# Patient Record
Sex: FEMALE | Race: BLACK OR AFRICAN AMERICAN | Employment: OTHER | URBAN - METROPOLITAN AREA
[De-identification: names, ages, dates, MRNs, and addresses within clinical notes are randomized per-mention and may not be internally consistent; named-entity substitution may affect disease eponyms.]

---

## 2018-05-07 ENCOUNTER — HOSPITAL ENCOUNTER (OUTPATIENT)
Dept: MAMMOGRAPHY | Age: 78
Discharge: HOME | End: 2018-05-07
Attending: FAMILY MEDICINE | Admitting: FAMILY MEDICINE

## 2018-05-07 DIAGNOSIS — Z12.31 VISIT FOR SCREENING MAMMOGRAM: ICD-10-CM

## 2019-05-08 ENCOUNTER — HOSPITAL ENCOUNTER (OUTPATIENT)
Dept: MAMMOGRAPHY | Age: 79
Discharge: HOME | End: 2019-05-08
Payer: MEDICARE

## 2019-05-08 DIAGNOSIS — Z12.31 VISIT FOR SCREENING MAMMOGRAM: ICD-10-CM

## 2019-05-08 PROCEDURE — 77067 SCR MAMMO BI INCL CAD: CPT

## 2022-02-20 ENCOUNTER — APPOINTMENT (OUTPATIENT)
Dept: GENERAL RADIOLOGY | Age: 82
DRG: 233 | End: 2022-02-20
Payer: MEDICARE

## 2022-02-20 ENCOUNTER — HOSPITAL ENCOUNTER (INPATIENT)
Age: 82
LOS: 18 days | Discharge: NURSING, REHAB OR LONG-TERM CARE | DRG: 233 | End: 2022-03-10
Attending: INTERNAL MEDICINE | Admitting: THORACIC SURGERY (CARDIOTHORACIC VASCULAR SURGERY)
Payer: MEDICARE

## 2022-02-20 DIAGNOSIS — R07.9 CHEST PAIN, UNSPECIFIED TYPE: Primary | ICD-10-CM

## 2022-02-20 LAB
ANION GAP SERPL CALCULATED.3IONS-SCNC: 15 MMOL/L (ref 3–16)
BASOPHILS ABSOLUTE: 0 K/UL (ref 0–0.2)
BASOPHILS RELATIVE PERCENT: 1 %
BUN BLDV-MCNC: 25 MG/DL (ref 7–20)
CALCIUM SERPL-MCNC: 9 MG/DL (ref 8.3–10.6)
CHLORIDE BLD-SCNC: 102 MMOL/L (ref 99–110)
CO2: 22 MMOL/L (ref 21–32)
CREAT SERPL-MCNC: 1.2 MG/DL (ref 0.6–1.2)
EOSINOPHILS ABSOLUTE: 0.1 K/UL (ref 0–0.6)
EOSINOPHILS RELATIVE PERCENT: 1.4 %
GFR AFRICAN AMERICAN: 52
GFR NON-AFRICAN AMERICAN: 43
GLUCOSE BLD-MCNC: 94 MG/DL (ref 70–99)
HCT VFR BLD CALC: 40.7 % (ref 36–48)
HEMOGLOBIN: 13.4 G/DL (ref 12–16)
LYMPHOCYTES ABSOLUTE: 1.5 K/UL (ref 1–5.1)
LYMPHOCYTES RELATIVE PERCENT: 30.7 %
MCH RBC QN AUTO: 28.2 PG (ref 26–34)
MCHC RBC AUTO-ENTMCNC: 32.9 G/DL (ref 31–36)
MCV RBC AUTO: 85.7 FL (ref 80–100)
MONOCYTES ABSOLUTE: 0.5 K/UL (ref 0–1.3)
MONOCYTES RELATIVE PERCENT: 10 %
NEUTROPHILS ABSOLUTE: 2.8 K/UL (ref 1.7–7.7)
NEUTROPHILS RELATIVE PERCENT: 56.9 %
PDW BLD-RTO: 14.5 % (ref 12.4–15.4)
PLATELET # BLD: 166 K/UL (ref 135–450)
PMV BLD AUTO: 9.2 FL (ref 5–10.5)
POTASSIUM REFLEX MAGNESIUM: 4 MMOL/L (ref 3.5–5.1)
RBC # BLD: 4.75 M/UL (ref 4–5.2)
SODIUM BLD-SCNC: 139 MMOL/L (ref 136–145)
TROPONIN: <0.01 NG/ML
WBC # BLD: 4.9 K/UL (ref 4–11)

## 2022-02-20 PROCEDURE — 1200000000 HC SEMI PRIVATE

## 2022-02-20 PROCEDURE — 84484 ASSAY OF TROPONIN QUANT: CPT

## 2022-02-20 PROCEDURE — 71045 X-RAY EXAM CHEST 1 VIEW: CPT

## 2022-02-20 PROCEDURE — 36415 COLL VENOUS BLD VENIPUNCTURE: CPT

## 2022-02-20 PROCEDURE — 99285 EMERGENCY DEPT VISIT HI MDM: CPT

## 2022-02-20 PROCEDURE — 6370000000 HC RX 637 (ALT 250 FOR IP): Performed by: INTERNAL MEDICINE

## 2022-02-20 PROCEDURE — 93005 ELECTROCARDIOGRAM TRACING: CPT | Performed by: PHYSICIAN ASSISTANT

## 2022-02-20 PROCEDURE — 85025 COMPLETE CBC W/AUTO DIFF WBC: CPT

## 2022-02-20 PROCEDURE — 80048 BASIC METABOLIC PNL TOTAL CA: CPT

## 2022-02-20 PROCEDURE — 6360000002 HC RX W HCPCS

## 2022-02-20 PROCEDURE — 6370000000 HC RX 637 (ALT 250 FOR IP): Performed by: PHYSICIAN ASSISTANT

## 2022-02-20 PROCEDURE — 2500000003 HC RX 250 WO HCPCS

## 2022-02-20 PROCEDURE — 2580000003 HC RX 258: Performed by: INTERNAL MEDICINE

## 2022-02-20 RX ORDER — LATANOPROST 50 UG/ML
1 SOLUTION/ DROPS OPHTHALMIC DAILY
Status: DISCONTINUED | OUTPATIENT
Start: 2022-02-21 | End: 2022-02-20

## 2022-02-20 RX ORDER — METHIMAZOLE 5 MG/1
5 TABLET ORAL DAILY
COMMUNITY
Start: 2021-11-05 | End: 2022-06-25

## 2022-02-20 RX ORDER — GABAPENTIN 300 MG/1
300 CAPSULE ORAL 4 TIMES DAILY
COMMUNITY
Start: 2021-11-05

## 2022-02-20 RX ORDER — SODIUM CHLORIDE 0.9 % (FLUSH) 0.9 %
5-40 SYRINGE (ML) INJECTION EVERY 12 HOURS SCHEDULED
Status: DISCONTINUED | OUTPATIENT
Start: 2022-02-20 | End: 2022-02-21 | Stop reason: SDUPTHER

## 2022-02-20 RX ORDER — SODIUM CHLORIDE 9 MG/ML
25 INJECTION, SOLUTION INTRAVENOUS PRN
Status: DISCONTINUED | OUTPATIENT
Start: 2022-02-20 | End: 2022-02-21 | Stop reason: SDUPTHER

## 2022-02-20 RX ORDER — ONDANSETRON 4 MG/1
4 TABLET, ORALLY DISINTEGRATING ORAL EVERY 8 HOURS PRN
Status: DISCONTINUED | OUTPATIENT
Start: 2022-02-20 | End: 2022-02-25

## 2022-02-20 RX ORDER — NITROGLYCERIN 0.4 MG/1
0.4 TABLET SUBLINGUAL EVERY 5 MIN PRN
Status: DISCONTINUED | OUTPATIENT
Start: 2022-02-20 | End: 2022-02-25

## 2022-02-20 RX ORDER — AMLODIPINE AND OLMESARTAN MEDOXOMIL 5; 40 MG/1; MG/1
1 TABLET ORAL DAILY
Status: DISCONTINUED | OUTPATIENT
Start: 2022-02-21 | End: 2022-02-20

## 2022-02-20 RX ORDER — LINACLOTIDE 72 UG/1
1-2 CAPSULE, GELATIN COATED ORAL DAILY
Status: ON HOLD | COMMUNITY
Start: 2021-11-05 | End: 2022-06-26

## 2022-02-20 RX ORDER — POLYETHYLENE GLYCOL 3350 17 G/17G
17 POWDER, FOR SOLUTION ORAL DAILY PRN
Status: DISCONTINUED | OUTPATIENT
Start: 2022-02-20 | End: 2022-02-25

## 2022-02-20 RX ORDER — ACETAMINOPHEN 325 MG/1
650 TABLET ORAL EVERY 6 HOURS PRN
Status: DISCONTINUED | OUTPATIENT
Start: 2022-02-20 | End: 2022-02-25

## 2022-02-20 RX ORDER — OXYCODONE HYDROCHLORIDE AND ACETAMINOPHEN 5; 325 MG/1; MG/1
1 TABLET ORAL EVERY 4 HOURS PRN
Status: DISCONTINUED | OUTPATIENT
Start: 2022-02-20 | End: 2022-02-25

## 2022-02-20 RX ORDER — CLONIDINE HYDROCHLORIDE 0.1 MG/1
0.1 TABLET ORAL ONCE
Status: COMPLETED | OUTPATIENT
Start: 2022-02-20 | End: 2022-02-20

## 2022-02-20 RX ORDER — AMLODIPINE BESYLATE 5 MG/1
5 TABLET ORAL DAILY
Status: ON HOLD | COMMUNITY
Start: 2021-11-05 | End: 2022-03-09 | Stop reason: HOSPADM

## 2022-02-20 RX ORDER — SODIUM CHLORIDE 0.9 % (FLUSH) 0.9 %
5-40 SYRINGE (ML) INJECTION PRN
Status: DISCONTINUED | OUTPATIENT
Start: 2022-02-20 | End: 2022-02-22 | Stop reason: SDUPTHER

## 2022-02-20 RX ORDER — ACETAMINOPHEN 650 MG/1
650 SUPPOSITORY RECTAL EVERY 6 HOURS PRN
Status: DISCONTINUED | OUTPATIENT
Start: 2022-02-20 | End: 2022-02-25

## 2022-02-20 RX ORDER — ONDANSETRON 2 MG/ML
4 INJECTION INTRAMUSCULAR; INTRAVENOUS EVERY 6 HOURS PRN
Status: DISCONTINUED | OUTPATIENT
Start: 2022-02-20 | End: 2022-02-25

## 2022-02-20 RX ORDER — AMLODIPINE BESYLATE 5 MG/1
5 TABLET ORAL DAILY
Status: DISCONTINUED | OUTPATIENT
Start: 2022-02-21 | End: 2022-02-21

## 2022-02-20 RX ORDER — AMITRIPTYLINE HYDROCHLORIDE 10 MG/1
10 TABLET, FILM COATED ORAL EVERY EVENING
Status: ON HOLD | COMMUNITY
Start: 2021-11-05 | End: 2022-06-26

## 2022-02-20 RX ORDER — ASPIRIN 81 MG/1
81 TABLET, CHEWABLE ORAL DAILY
Status: DISCONTINUED | OUTPATIENT
Start: 2022-02-21 | End: 2022-02-25

## 2022-02-20 RX ORDER — ASPIRIN 81 MG/1
324 TABLET, CHEWABLE ORAL ONCE
Status: COMPLETED | OUTPATIENT
Start: 2022-02-20 | End: 2022-02-20

## 2022-02-20 RX ORDER — DOCUSATE SODIUM 100 MG/1
100 CAPSULE, LIQUID FILLED ORAL 2 TIMES DAILY
Status: DISCONTINUED | OUTPATIENT
Start: 2022-02-20 | End: 2022-02-25

## 2022-02-20 RX ORDER — HYDROCHLOROTHIAZIDE 25 MG/1
25 TABLET ORAL DAILY
Status: ON HOLD | COMMUNITY
Start: 2021-11-05 | End: 2022-03-09 | Stop reason: HOSPADM

## 2022-02-20 RX ORDER — LOSARTAN POTASSIUM 100 MG/1
100 TABLET ORAL DAILY
Status: DISCONTINUED | OUTPATIENT
Start: 2022-02-21 | End: 2022-02-20

## 2022-02-20 RX ORDER — MELOXICAM 15 MG/1
15 TABLET ORAL DAILY
Status: ON HOLD | COMMUNITY
Start: 2021-11-05 | End: 2022-03-04 | Stop reason: HOSPADM

## 2022-02-20 RX ORDER — PRAVASTATIN SODIUM 40 MG
40 TABLET ORAL EVERY EVENING
Status: DISCONTINUED | OUTPATIENT
Start: 2022-02-20 | End: 2022-02-21

## 2022-02-20 RX ORDER — HYDRALAZINE HYDROCHLORIDE 20 MG/ML
20 INJECTION INTRAMUSCULAR; INTRAVENOUS EVERY 6 HOURS PRN
Status: DISCONTINUED | OUTPATIENT
Start: 2022-02-20 | End: 2022-02-25

## 2022-02-20 RX ADMIN — PRAVASTATIN SODIUM 40 MG: 40 TABLET ORAL at 22:08

## 2022-02-20 RX ADMIN — ASPIRIN 81 MG 324 MG: 81 TABLET ORAL at 18:15

## 2022-02-20 RX ADMIN — CLONIDINE HYDROCHLORIDE 0.1 MG: 0.1 TABLET ORAL at 18:15

## 2022-02-20 RX ADMIN — Medication 10 ML: at 22:10

## 2022-02-20 ASSESSMENT — PAIN DESCRIPTION - LOCATION: LOCATION: CHEST

## 2022-02-20 ASSESSMENT — PAIN DESCRIPTION - FREQUENCY: FREQUENCY: CONTINUOUS

## 2022-02-20 ASSESSMENT — PAIN DESCRIPTION - DESCRIPTORS: DESCRIPTORS: TIGHTNESS

## 2022-02-20 ASSESSMENT — PAIN SCALES - GENERAL
PAINLEVEL_OUTOF10: 0
PAINLEVEL_OUTOF10: 7

## 2022-02-20 ASSESSMENT — PAIN DESCRIPTION - PAIN TYPE: TYPE: ACUTE PAIN

## 2022-02-20 ASSESSMENT — PAIN DESCRIPTION - PROGRESSION: CLINICAL_PROGRESSION: RESOLVED

## 2022-02-20 ASSESSMENT — PAIN DESCRIPTION - ONSET: ONSET: PROGRESSIVE

## 2022-02-20 ASSESSMENT — HEART SCORE: ECG: 1

## 2022-02-20 ASSESSMENT — PAIN - FUNCTIONAL ASSESSMENT: PAIN_FUNCTIONAL_ASSESSMENT: 0-10

## 2022-02-20 NOTE — ED TRIAGE NOTES
Arrives via EMS to ER c/o chest pain and high blood pressure. States shortness of breath earlier today, denies nausea or vomiting. No previous MI. Resp shallow and unlabored. Skin p/w/d.

## 2022-02-20 NOTE — ED PROVIDER NOTES
[address]  [address] [address]  Dept: [phone]  Loc: [address] ENCOUNTER        This patient was not seen or evaluated by the attending physician. I evaluated this patient, the attending physician was available for consultation. CHIEF COMPLAINT    Chief Complaint   Patient presents with    Chest Pain    Hypertension       HPI    Krista Palmer is a [de-identified] y.o. female who presents with chest pain. The onset was at 1:30 PM.  The duration has been constant since the onset. The quality of the pain is pressure, with a severity of 7/10. The pain is localized under both breasts, radiating to the midsternum. The context is that the symptoms started at rest. The patient complains of associated diaphoresis. The patient denies any associated vomiting. REVIEW OF SYSTEMS    Cardiac: see HPI, no syncope  Respiratory: no shortness of breath, no cough, no hemoptysis  GI: No vomiting or diarrhea  : No dysuria or hematuria  General: No fever or chills  All other systems reviewed and are negative. PAST MEDICAL & SURGICAL HISTORY    Past Medical History:   Diagnosis Date    Arthritis     Asthma     as child grew out of it    Glaucoma     Hyperlipidemia     Hypertension      Past Surgical History:   Procedure Laterality Date    CHOLECYSTECTOMY      FOOT SURGERY      bilateral, hammer toes    GASTRIC BAND  [id number]    laproscopic    TUBAL LIGATION         CURRENT MEDICATIONS  (may include discharge medications prescribed in the ED)  Current Outpatient Rx   Medication Sig Dispense Refill    amLODIPine-olmesartan (PRASHANT) 5-40 MG per tablet Take 1 tablet by mouth daily.  oxyCODONE-acetaminophen (PERCOCET) 5-325 MG per tablet Take 1 tablet by mouth every 4 hours as needed for Pain. 20 tablet 0    docusate sodium (COLACE) 100 MG capsule Take 1 capsule by mouth 2 times daily.  10 capsule 0  HYDROcodone-acetaminophen (VICODIN) 5-500 MG per tablet Take 1 tablet by mouth every 6 hours as needed.  ibuprofen (IBU) 800 MG tablet Take 1 tablet by mouth every 6 hours as needed for Pain. 30 tablet 0    Bimatoprost (LUMIGAN) 0.01 % SOLN Apply 1 drop to eye daily. 1 drop each eye for glaucoma       pravastatin (PRAVACHOL) 40 MG tablet Take 40 mg by mouth every evening. ALLERGIES    No Known Allergies    SOCIAL & FAMILY HISTORY    Social History     Socioeconomic History    Marital status:      Spouse name: None    Number of children: None    Years of education: None    Highest education level: None   Occupational History    None   Tobacco Use    Smoking status: Former Smoker     Quit date: 8/10/1999     Years since quittin.5    Smokeless tobacco: Never Used   Substance and Sexual Activity    Alcohol use: No    Drug use: None    Sexual activity: None   Other Topics Concern    None   Social History Narrative    None     Social Determinants of Health     Financial Resource Strain:     Difficulty of Paying Living Expenses: Not on file   Food Insecurity:     Worried About Running Out of Food in the Last Year: Not on file    Celeste of Food in the Last Year: Not on file   Transportation Needs:     Lack of Transportation (Medical): Not on file    Lack of Transportation (Non-Medical):  Not on file   Physical Activity:     Days of Exercise per Week: Not on file    Minutes of Exercise per Session: Not on file   Stress:     Feeling of Stress : Not on file   Social Connections:     Frequency of Communication with Friends and Family: Not on file    Frequency of Social Gatherings with Friends and Family: Not on file    Attends Congregation Services: Not on file    Active Member of Clubs or Organizations: Not on file    Attends Club or Organization Meetings: Not on file    Marital Status: Not on file   Intimate Partner Violence:     Fear of Current or Ex-Partner: Not on file    Emotionally Abused: Not on file    Physically Abused: Not on file    Sexually Abused: Not on file   Housing Stability:     Unable to Pay for Housing in the Last Year: Not on file    Number of Places Lived in the Last Year: Not on file    Unstable Housing in the Last Year: Not on file     History reviewed. No pertinent family history. PHYSICAL EXAM    VITAL SIGNS: BP (!) 231/95   Pulse 90   Temp 97.9 °F (36.6 °C) (Oral)   Resp 16   Ht 5' 3\" (1.6 m)   Wt 168 lb 1.6 oz (76.2 kg)   SpO2 100%   BMI 29.78 kg/m²    Constitutional:  Well developed, well nourished, no acute distress   HENT:  Atraumatic, moist mucus membranes  Neck: supple, no JVD   Respiratory:  Lungs clear to auscultation bilaterally, no retractions   Cardiovascular:  regular rate, no murmurs  Vascular: Radial and DP pulses 2+ and equal bilaterally  GI:  Soft, nontender, normal bowel sounds  Musculoskeletal:  no lower extremity edema, no lower extremity asymmetry, no calf tenderness, no thigh tenderness, no acute deformities  Integument:  Skin warm and dry, no petechiae   Neurologic:  Alert & oriented, no slurred speech  Psych: Pleasant affect, no hallucinations    EKG    Please see the physician note for EKG interpretation. RADIOLOGY/PROCEDURES    XR CHEST PORTABLE   Final Result   1. No acute process. 2. Possible small nodule in the right mid lung. Consider further evaluation   with nonemergent noncontrast chest CT. ED COURSE & MEDICAL DECISION MAKING    Pertinent Labs & Imaging studies reviewed and interpreted. (See chart for details)  The patient was immediately placed on the cardiac monitor. IV access obtained. ASA was given. See chart for details of medications given during the ED stay.     Vitals:    02/20/22 1644   BP: (!) 231/95   Pulse: 90   Resp: 16   Temp: 97.9 °F (36.6 °C)   TempSrc: Oral   SpO2: 100%   Weight: 168 lb 1.6 oz (76.2 kg)   Height: 5' 3\" (1.6 m)       Differential Diagnosis: Acute Coronary Syndrome, Congestive Heart Failure, Thoracic Dissection, Pericarditis, Pericardial Effusion, Pulmonary Embolism, Pneumonia, Pneumothorax, Ischemic Bowel, Bowel Obstruction, PUD, GERD, Acute Cholecystitis, Pancreatitis, Hepatitis, Colitis, other    CRITICAL CARE NOTE:  There was a high probability of clinically significant life-threatening deterioration of the patient's condition requiring my urgent intervention. Total critical care time was at least 15 minutes. This includes vital sign monitoring, pulse oximetry monitoring, telemetry monitoring, clinical response to the IV medications, reviewing the nursing notes, consultation time, dictation/documentation time, and interpretation of the labwork. This excludes any separately billable procedures performed. Patient is afebrile and nontoxic in appearance. Labs reveal no leukocytosis or anemia. Metabolic panel unremarkable. CXR findings as above. EKG interpreted by physician. Troponin negative. Patient's HEART score is 6, moderate risk. Reevaluation at 6:20 PM: Patient is resting comfortably. Consultation with hospitalist at 6:30 PM: I contacted Dr. Juan Carlos Garcia via [facility] for admission. FINAL IMPRESSION    1.  Chest pain, unspecified type        PLAN  Admission to the hospital    (Please note that this note was completed with a voice recognition program.  Every attempt was made to edit the dictations, but inevitably there remain words that are mis-transcribed.)        Jane Aguirre  02/20/22 5996

## 2022-02-20 NOTE — H&P
Hospital Medicine History & Physical      PCP: Keila Hutton    Date of Admission: 2/20/2022    Date of Service: Pt seen/examined on 2/20/2022    Chief Complaint:      Chief Complaint   Patient presents with    Chest Pain    Hypertension       History Of Present Illness:   81-year-old female with past medical history of hypertension, hyperlipidemia, arthritis presented to the hospital with chest pain. Patient states that she has been having chest pain since 1:30 PM and has been constant since then. She describes the pain as pressure-like, 7/10 in severity. It is located under both the breast radiating to the mid sternum. She said her symptoms started at rest and has not had symptoms like this before. She denies any shortness of breath but did have some diaphoresis. Due to her pain not improving she decided to come to the hospital.  On arrival she was noted to be hypertensive blood pressure to 130/95. Lab work including first set of troponin was negative. Chest x-ray was performed which did not show any acute process. Patient was admitted to the hospital for further work-up and management. Past Medical History:        Diagnosis Date    Arthritis     Asthma     as child grew out of it    Glaucoma     Hyperlipidemia     Hypertension        Past Surgical History:        Procedure Laterality Date    CHOLECYSTECTOMY      FOOT SURGERY      bilateral, hammer toes    GASTRIC BAND  [id number]    laproscopic    TUBAL LIGATION         Medications Prior to Admission:    Prior to Admission medications    Medication Sig Start Date End Date Taking? Authorizing Provider   amLODIPine-olmesartan (PRASHANT) 5-40 MG per tablet Take 1 tablet by mouth daily. Historical Provider, MD   oxyCODONE-acetaminophen (PERCOCET) 5-325 MG per tablet Take 1 tablet by mouth every 4 hours as needed for Pain. 2/6/15   Shannon Vásquez MD   docusate sodium (COLACE) 100 MG capsule Take 1 capsule by mouth 2 times daily. 2/6/15   Brianna Guy MD   HYDROcodone-acetaminophen (VICODIN) 5-500 MG per tablet Take 1 tablet by mouth every 6 hours as needed. Historical Provider, MD   ibuprofen (IBU) 800 MG tablet Take 1 tablet by mouth every 6 hours as needed for Pain. 8/16/12   TORRIE Fuller   Bimatoprost (LUMIGAN) 0.01 % SOLN Apply 1 drop to eye daily. 1 drop each eye for glaucoma     Historical Provider, MD   pravastatin (PRAVACHOL) 40 MG tablet Take 40 mg by mouth every evening. Historical Provider, MD       Allergies:  Patient has no known allergies. Social History:       reports that she quit smoking about 22 years ago. She has never used smokeless tobacco. She reports that she does not drink alcohol. Family History:  Reviewed in detail and negative for DM, Early CAD, Cancer, CVA. History reviewed. No pertinent family history. REVIEW OF SYSTEMS:   Positive review  noted in the HPI. All other systems reviewed and negative.     PHYSICAL EXAM:    BP (!) 231/95   Pulse 90   Temp 97.9 °F (36.6 °C) (Oral)   Resp 16   Ht 5' 3\" (1.6 m)   Wt 168 lb 1.6 oz (76.2 kg)   SpO2 100%   BMI 29.78 kg/m²   General Appearance: alert and oriented to person, place and time, well developed and well- nourished, in no acute distress  Skin: warm and dry, no rash or erythema  Head: normocephalic and atraumatic  Eyes: pupils equal, round, and reactive to light, extraocular eye movements intact, conjunctivae normal  ENT: tympanic membrane, external ear and ear canal normal bilaterally, nose without deformity, nasal mucosa and turbinates normal without polyps  Neck: supple and non-tender without mass, no thyromegaly or thyroid nodules, no cervical lymphadenopathy  Pulmonary/Chest: clear to auscultation bilaterally- no wheezes, rales or rhonchi, normal air movement, no respiratory distress  Cardiovascular: normal rate, regular rhythm, normal S1 and S2, no murmurs, rubs, clicks, or gallops, Peripheral pulses good, Cap refill <3 sec, no carotid bruits  Abdomen: soft, non-tender, non-distended, normal bowel sounds, no masses or organomegaly  Extremities: no cyanosis, clubbing or edema  Musculoskeletal: normal range of motion, no joint swelling, deformity or tenderness  Neurologic: reflexes normal and symmetric, no cranial nerve deficit, gait, coordination and speech normal      LABS:        CBC   Recent Labs     02/20/22  1657   WBC 4.9   HGB 13.4   HCT 40.7         RENAL  Recent Labs     02/20/22  1657      K 4.0      CO2 22   BUN 25*   CREATININE 1.2     LFT'S  No results for input(s): AST, ALT, ALB, BILIDIR, BILITOT, ALKPHOS in the last 72 hours. COAG  No results for input(s): INR in the last 72 hours. CARDIAC ENZYMES  Recent Labs     02/20/22 1657   TROPONINI <0.01       U/A:    Lab Results   Component Value Date    COLORU Yellow 08/10/2011    CLARITYU Clear 08/10/2011    SPECGRAV >=1.030 08/10/2011    LEUKOCYTESUR Negative 08/10/2011    BLOODU Negative 08/10/2011    GLUCOSEU Negative 08/10/2011       ABG  No results found for: QRT8WVU, BEART, E1GCZXGA, PHART, THGBART, YGY7GKW, PO2ART, ESB8TIV    UA:No results for input(s): NITRITE, COLORU, PHUR, LABCAST, WBCUA, RBCUA, MUCUS, TRICHOMONAS, YEAST, BACTERIA, CLARITYU, SPECGRAV, LEUKOCYTESUR, UROBILINOGEN, BILIRUBINUR, BLOODU, GLUCOSEU, KETUA, AMORPHOUS in the last 72 hours. Microbiology:  No results for input(s): LABGRAM, LABANAE, ORG, CXSURG in the last 72 hours. Nasal Culture: No results for input(s): ORG, MRSAPCR in the last 72 hours. Blood Culture: No results for input(s): BC, BLOODCULT2, ORG in the last 72 hours. Fungal Culture:   No results for input(s): FUNGSM in the last 72 hours. No results for input(s): FUNCXBLD in the last 72 hours. CSF Culture:  No results for input(s): COLORCSF, APPEARCSF, CFTUBE, CLOTCSF, WBCCSF, RBCCSF, NEUTCSF, NUMCELLSCSF, LYMPHSCSF, MONOCSF, GLUCCSF, VOLCSF in the last 72 hours.   Respiratory Culture:  No results for input(s): Nancie Ni in the last 72 hours. AFB:No results for input(s): AFBSMEAR in the last 72 hours. Urine Culture  No results for input(s): LABURIN in the last 72 hours. RADIOLOGY:    XR CHEST PORTABLE   Final Result   1. No acute process. 2. Possible small nodule in the right mid lung. Consider further evaluation   with nonemergent noncontrast chest CT. Previous medical records personally reviewed and analyzed         PHYSICIAN CERTIFICATION    I certify that Christiane Segura is expected to be hospitalized for >2 midnights based on the following assessment and plan:    ASSESSMENT/PLAN:    Hypertensive urgency  -Continue home blood pressure medications  -Hydralazine 20 mg IV for SBP greater than 180     Chest pain  -Continue aspirin, statin  -Nitroglycerin as needed  -Lexiscan/echo has been ordered    Osteoarthritis  -Continue home pain medication    DVT Prophylaxis: Lovenox  Diet: N.p.o. at midnight  Code Status: Full    Dispo -pending clinical course       Lisa Durand MD  The note was completed using EMR. Every effort was made to ensure accuracy; however, inadvertent computerized transcription errors may be present. Thank you [facility] for the opportunity to be involved in this patient's care. If you have any questions or concerns please feel free to contact me at [phone].

## 2022-02-21 LAB
ANION GAP SERPL CALCULATED.3IONS-SCNC: 13 MMOL/L (ref 3–16)
BASOPHILS ABSOLUTE: 0 K/UL (ref 0–0.2)
BASOPHILS RELATIVE PERCENT: 0.9 %
BUN BLDV-MCNC: 27 MG/DL (ref 7–20)
CALCIUM SERPL-MCNC: 8.7 MG/DL (ref 8.3–10.6)
CHLORIDE BLD-SCNC: 104 MMOL/L (ref 99–110)
CO2: 22 MMOL/L (ref 21–32)
CREAT SERPL-MCNC: 1.3 MG/DL (ref 0.6–1.2)
EOSINOPHILS ABSOLUTE: 0.1 K/UL (ref 0–0.6)
EOSINOPHILS RELATIVE PERCENT: 1.5 %
GFR AFRICAN AMERICAN: 48
GFR NON-AFRICAN AMERICAN: 39
GLUCOSE BLD-MCNC: 95 MG/DL (ref 70–99)
HCT VFR BLD CALC: 38.7 % (ref 36–48)
HEMOGLOBIN: 12.4 G/DL (ref 12–16)
LV EF: 58 %
LV EF: 69 %
LVEF MODALITY: NORMAL
LVEF MODALITY: NORMAL
LYMPHOCYTES ABSOLUTE: 1.8 K/UL (ref 1–5.1)
LYMPHOCYTES RELATIVE PERCENT: 36.9 %
MCH RBC QN AUTO: 27.7 PG (ref 26–34)
MCHC RBC AUTO-ENTMCNC: 32.1 G/DL (ref 31–36)
MCV RBC AUTO: 86.3 FL (ref 80–100)
MONOCYTES ABSOLUTE: 0.5 K/UL (ref 0–1.3)
MONOCYTES RELATIVE PERCENT: 9.6 %
NEUTROPHILS ABSOLUTE: 2.5 K/UL (ref 1.7–7.7)
NEUTROPHILS RELATIVE PERCENT: 51.1 %
PDW BLD-RTO: 14.7 % (ref 12.4–15.4)
PLATELET # BLD: 163 K/UL (ref 135–450)
PMV BLD AUTO: 9 FL (ref 5–10.5)
POTASSIUM REFLEX MAGNESIUM: 4.1 MMOL/L (ref 3.5–5.1)
RBC # BLD: 4.48 M/UL (ref 4–5.2)
SODIUM BLD-SCNC: 139 MMOL/L (ref 136–145)
TROPONIN: <0.01 NG/ML
WBC # BLD: 4.8 K/UL (ref 4–11)

## 2022-02-21 PROCEDURE — 3430000000 HC RX DIAGNOSTIC RADIOPHARMACEUTICAL: Performed by: INTERNAL MEDICINE

## 2022-02-21 PROCEDURE — 94760 N-INVAS EAR/PLS OXIMETRY 1: CPT

## 2022-02-21 PROCEDURE — 99223 1ST HOSP IP/OBS HIGH 75: CPT | Performed by: INTERNAL MEDICINE

## 2022-02-21 PROCEDURE — 93306 TTE W/DOPPLER COMPLETE: CPT

## 2022-02-21 PROCEDURE — 6370000000 HC RX 637 (ALT 250 FOR IP): Performed by: INTERNAL MEDICINE

## 2022-02-21 PROCEDURE — A9502 TC99M TETROFOSMIN: HCPCS | Performed by: INTERNAL MEDICINE

## 2022-02-21 PROCEDURE — 6360000002 HC RX W HCPCS: Performed by: INTERNAL MEDICINE

## 2022-02-21 PROCEDURE — 85025 COMPLETE CBC W/AUTO DIFF WBC: CPT

## 2022-02-21 PROCEDURE — 84484 ASSAY OF TROPONIN QUANT: CPT

## 2022-02-21 PROCEDURE — 80048 BASIC METABOLIC PNL TOTAL CA: CPT

## 2022-02-21 PROCEDURE — 1200000000 HC SEMI PRIVATE

## 2022-02-21 PROCEDURE — 2580000003 HC RX 258: Performed by: INTERNAL MEDICINE

## 2022-02-21 PROCEDURE — 36415 COLL VENOUS BLD VENIPUNCTURE: CPT

## 2022-02-21 PROCEDURE — 78452 HT MUSCLE IMAGE SPECT MULT: CPT

## 2022-02-21 PROCEDURE — 93017 CV STRESS TEST TRACING ONLY: CPT

## 2022-02-21 RX ORDER — ROSUVASTATIN CALCIUM 40 MG/1
40 TABLET, COATED ORAL NIGHTLY
Status: DISCONTINUED | OUTPATIENT
Start: 2022-02-21 | End: 2022-02-25

## 2022-02-21 RX ORDER — SODIUM CHLORIDE 0.9 % (FLUSH) 0.9 %
5-40 SYRINGE (ML) INJECTION EVERY 12 HOURS SCHEDULED
Status: DISCONTINUED | OUTPATIENT
Start: 2022-02-21 | End: 2022-02-22 | Stop reason: SDUPTHER

## 2022-02-21 RX ORDER — SODIUM CHLORIDE 9 MG/ML
INJECTION, SOLUTION INTRAVENOUS CONTINUOUS
Status: DISCONTINUED | OUTPATIENT
Start: 2022-02-22 | End: 2022-02-22

## 2022-02-21 RX ORDER — SODIUM CHLORIDE 9 MG/ML
25 INJECTION, SOLUTION INTRAVENOUS PRN
Status: DISCONTINUED | OUTPATIENT
Start: 2022-02-21 | End: 2022-02-22 | Stop reason: SDUPTHER

## 2022-02-21 RX ORDER — CARVEDILOL 6.25 MG/1
6.25 TABLET ORAL 2 TIMES DAILY WITH MEALS
Status: DISCONTINUED | OUTPATIENT
Start: 2022-02-21 | End: 2022-02-22

## 2022-02-21 RX ORDER — SODIUM CHLORIDE 0.9 % (FLUSH) 0.9 %
5-40 SYRINGE (ML) INJECTION PRN
Status: DISCONTINUED | OUTPATIENT
Start: 2022-02-21 | End: 2022-02-21 | Stop reason: SDUPTHER

## 2022-02-21 RX ADMIN — ROSUVASTATIN CALCIUM 40 MG: 40 TABLET, FILM COATED ORAL at 21:28

## 2022-02-21 RX ADMIN — ASPIRIN 81 MG 81 MG: 81 TABLET ORAL at 08:26

## 2022-02-21 RX ADMIN — DOCUSATE SODIUM 100 MG: 100 CAPSULE, LIQUID FILLED ORAL at 21:27

## 2022-02-21 RX ADMIN — REGADENOSON 0.4 MG: 0.08 INJECTION, SOLUTION INTRAVENOUS at 10:10

## 2022-02-21 RX ADMIN — SODIUM CHLORIDE, PRESERVATIVE FREE 10 ML: 5 INJECTION INTRAVENOUS at 21:39

## 2022-02-21 RX ADMIN — TETROFOSMIN 10 MILLICURIE: 1.38 INJECTION, POWDER, LYOPHILIZED, FOR SOLUTION INTRAVENOUS at 07:17

## 2022-02-21 RX ADMIN — AMLODIPINE BESYLATE 5 MG: 5 TABLET ORAL at 08:26

## 2022-02-21 RX ADMIN — CARVEDILOL 6.25 MG: 6.25 TABLET, FILM COATED ORAL at 18:52

## 2022-02-21 ASSESSMENT — PAIN SCALES - GENERAL
PAINLEVEL_OUTOF10: 0

## 2022-02-21 NOTE — PROGRESS NOTES
4 Eyes Admission Assessment     I agree as the admission nurse that 2 RN's have performed a thorough Head to Toe Skin Assessment on the patient. ALL assessment sites listed below have been assessed on admission. Areas assessed by both nurses:   [x]   Head, Face, and Ears   [x]   Shoulders, Back, and Chest  [x]   Arms, Elbows, and Hands   [x]   Coccyx, Sacrum, and Ischum  [x]   Legs, Feet, and Heels        Does the Patient have Skin Breakdown?   No         Kushal Prevention initiated:  NA   Wound Care Orders initiated:  NA      WO nurse consulted for Pressure Injury (Stage 3,4, Unstageable, DTI, NWPT, and Complex wounds):  NA      Nurse 1 eSignature: Electronically signed by Karely Durbin RN on 2/20/22 at 9:47 PM EST    **SHARE this note so that the co-signing nurse is able to place an eSignature**    Nurse 2 eSignature: Electronically signed by Naya Lopez RN on 2/21/22 at 6:57 AM EST

## 2022-02-21 NOTE — PROGRESS NOTES
Patient admitted to room 3111 from ER via stretcher. Oriented to room, call light, and floor policies. Plan of care reviewed with patient and daughter. Pt is resting in bed and not c/o pain at this time; no s/s of distress noted. VSS. Safety precautions in place; call light and bedside table within reach. Pt encouraged to call for needs or ambulation. Pt VU. Will continue to monitor.

## 2022-02-21 NOTE — ED PROVIDER NOTES
EKG interpretation    Normal sinus rhythm ventricular of 69 normal EKG      Edna Banda MD  02/20/22 2011

## 2022-02-21 NOTE — PROGRESS NOTES
Hospitalist Progress Note      PCP: Homar Islas [address]    Date of Admission: 2/20/2022      Subjective: Denies any chest pain at this time, no nausea vomiting or abdominal pain. Medications:  Reviewed    Infusion Medications    sodium chloride       Scheduled Medications    pravastatin  40 mg Oral QPM    docusate sodium  100 mg Oral BID    sodium chloride flush  5-40 mL IntraVENous 2 times per day    enoxaparin  40 mg SubCUTAneous Daily    aspirin  81 mg Oral Daily    amLODIPine  5 mg Oral Daily     PRN Meds: oxyCODONE-acetaminophen, sodium chloride flush, sodium chloride, ondansetron **OR** ondansetron, polyethylene glycol, acetaminophen **OR** acetaminophen, perflutren lipid microspheres, nitroGLYCERIN, hydrALAZINE      Intake/Output Summary (Last 24 hours) at 2/21/2022 2342  Last data filed at 2/20/2022 2209  Gross per 24 hour   Intake 120 ml   Output --   Net 120 ml       Physical Exam Performed:    BP (!) 156/75   Pulse 60   Temp 97.4 °F (36.3 °C) (Oral)   Resp 14   Ht 5' 3\" (1.6 m)   Wt 168 lb 1.6 oz (76.2 kg)   SpO2 100%   BMI 29.78 kg/m²     General appearance: No apparent distress  Neck: Supple  Respiratory:  Normal respiratory effort. Clear to auscultation, bilaterally without Rales/Wheezes/Rhonchi. Cardiovascular: Regular rate and rhythm with normal S1/S2 without murmurs, rubs or gallops. Abdomen: Soft, non-tender, non-distended with normal bowel sounds. Musculoskeletal: No erythema    Skin: Skin color, texture, turgor normal.  No rashes or lesions.   Neurologic: No focal weakness  Psychiatric: Alert and oriented  Capillary Refill: Brisk,3 seconds, normal   Peripheral Pulses: +2 palpable, equal bilaterally       Labs:   Recent Labs     02/20/22  1657 02/21/22  0549   WBC 4.9 4.8   HGB 13.4 12.4   HCT 40.7 38.7    163     Recent Labs     02/20/22  1657 02/21/22  0549    139   K 4.0 4.1    104   CO2 22 22   BUN 25* 27*   CREATININE 1.2 1.3*   CALCIUM 9.0 8.7 No results for input(s): AST, ALT, BILIDIR, BILITOT, ALKPHOS in the last 72 hours. No results for input(s): INR in the last 72 hours. Recent Labs     02/20/22  1657 02/21/22  0035   TROPONINI <0.01 <0.01       Urinalysis:      Lab Results   Component Value Date    NITRU Negative 08/10/2011    BLOODU Negative 08/10/2011    SPECGRAV >=1.030 08/10/2011    GLUCOSEU Negative 08/10/2011       Radiology:  XR CHEST PORTABLE   Final Result   1. No acute process. 2. Possible small nodule in the right mid lung. Consider further evaluation   with nonemergent noncontrast chest CT. NM MYOCARDIAL SPECT REST EXERCISE OR RX    (Results Pending)           Assessment/Plan:    Active Hospital Problems    Diagnosis     Chest pain [R07.9]      1. Hypertensive urgency, likely due to missing few doses of her medication, restarted on p.o. medication and as needed medication, better controlled at this time  2. Chest pain, troponin negative, for stress test today  3. Osteoarthritis, controlled at this time  4.   Likely CKD, follow-up as outpatient for further monitoring      Diet: Diet NPO  Code Status: Full Code        Aliyah Velez MD

## 2022-02-21 NOTE — PLAN OF CARE
Problem: Falls - Risk of:  Goal: Will remain free from falls  Description: Will remain free from falls  Outcome: Ongoing  Goal: Absence of physical injury  Description: Absence of physical injury  Outcome: Ongoing   Fall risk assessment completed every shift. All precautions in place. Pt has call light within reach at all times. Room clear of clutter. Pt aware to call for assistance when getting up. Problem: Skin Integrity:  Goal: Will show no infection signs and symptoms  Description: Will show no infection signs and symptoms  Outcome: Ongoing  Goal: Absence of new skin breakdown  Description: Absence of new skin breakdown  Outcome: Ongoing   Skin assessment completed every shift. Pt encouraged to turn/rotate every 2 hours. Assistance provided if pt unable to do so themselves.

## 2022-02-21 NOTE — CONSULTS
[address]  1940    February 21, 2022    Reason for Consult: Chest Pain    CC: Chest Pain    HPI:  The patient is [de-identified] y.o. female with a past medical history significant for essential hypertension and hyperlipidemia who presented to [facility] ED with chest pain. I was asked to see her for this and an abnormal cardiovascular stress. Theone Yves states that she started having tightness in her chest which did not go away with anything she did. It was sharp in nature and in both flanks. She had shortness of breath and \"shaking with it\" as well. This started about 3pm yesterday and lasted about 60-90 minutes before resolving. About 1-2 weeks prior she had an episode of dyspnea like this. Her family thought it was \"heartburn\" at that time. She is not fairly active right now. Her PCP is Dr. Lawanda Brice. Review of Systems:  Constitutional: No fatigue, weakness, night sweats or fever. HEENT: No new vision difficulties or ringing in the ears. Respiratory: No new SOB, PND, orthopnea or cough. Cardiovascular: See HPI   GI: No n/v, diarrhea, constipation, abdominal pain or changes in bowel habits. No melena, no hematochezia  : No urinary frequency, urgency, incontinence, hematuria or dysuria. Skin: No cyanosis or skin lesions. Musculoskeletal: No new muscle or joint pain. Neurological: No syncope or TIA-like symptoms.   Psychiatric: No anxiety, insomnia or depression     Past Medical History:   Diagnosis Date    Arthritis     Asthma     as child grew out of it    Glaucoma     Hyperlipidemia     Hypertension      Past Surgical History:   Procedure Laterality Date    CHOLECYSTECTOMY      FOOT SURGERY      bilateral, hammer toes    GASTRIC BAND  [id number]    laproscopic    TUBAL LIGATION       Family History:  No pertinent family history of early CAD or sudden cardiac death  Father with a CVA in his 66's, smoker    Social History     Tobacco Use    Smoking status: Former Smoker     Quit date: 8/10/1999     Years since quittin.5    Smokeless tobacco: Never Used   Substance Use Topics    Alcohol use: No    Drug use: Not on file       No Known Allergies  Current Facility-Administered Medications   Medication Dose Route Frequency Provider Last Rate Last Admin    technetium tetrofosmin (Tc-MYOVIEW) injection 30 millicurie  30 millicurie IntraVENous ONCE PRN Rob Montanez MD        pravastatin (PRAVACHOL) tablet 40 mg  40 mg Oral QPM [facility] MD OUMOU   40 mg at 22 2208    oxyCODONE-acetaminophen (PERCOCET) 5-325 MG per tablet 1 tablet  1 tablet Oral Q4H PRN [facility] MD OUMOU        docusate sodium (COLACE) capsule 100 mg  100 mg Oral BID [facility] MD OUMOU        sodium chloride flush 0.9 % injection 5-40 mL  5-40 mL IntraVENous 2 times per day [facility], MD   10 mL at 22 2210    sodium chloride flush 0.9 % injection 5-40 mL  5-40 mL IntraVENous PRN [facility] MD OUMOU        0.9 % sodium chloride infusion  25 mL IntraVENous PRN [facility], MD        enoxaparin (LOVENOX) injection 40 mg  40 mg SubCUTAneous Daily [facility] MD OUMOU        ondansetron (ZOFRAN-ODT) disintegrating tablet 4 mg  4 mg Oral Q8H PRN [facility] MD OUMOU        Or    ondansetron [facility]) injection 4 mg  4 mg IntraVENous Q6H PRN [facility] MD OUMOU        polyethylene glycol (GLYCOLAX) packet 17 g  17 g Oral Daily PRN [facility] MD OUMOU        acetaminophen (TYLENOL) tablet 650 mg  650 mg Oral Q6H PRN [facility] MD OUMOU        Or    acetaminophen (TYLENOL) suppository 650 mg  650 mg Rectal Q6H PRN [facility] MD OUMOU        perflutren lipid microspheres (DEFINITY) injection 1.65 mg  1.5 mL IntraVENous ONCE PRN [facility] MD OUMOU        aspirin chewable tablet 81 mg  81 mg Oral Daily [facility] MD OUMOU   81 mg at 22 0826    nitroGLYCERIN (NITROSTAT) SL tablet 0.4 mg  0.4 mg SubLINGual Q5 Min PRN [facility], MD        hydrALAZINE (APRESOLINE) injection 20 mg  20 mg IntraVENous Q6H PRN Shagufta Chao MD        amLODIPine [facility]) tablet 5 mg  5 mg Oral Daily Shagufta Chao MD   5 mg at 02/21/22 9885       Physical Exam:   BP (!) 156/75   Pulse 60   Temp 97.4 °F (36.3 °C) (Oral)   Resp 14   Ht 5' 3\" (1.6 m)   Wt 168 lb 1.6 oz (76.2 kg)   SpO2 100%   BMI 29.78 kg/m²     Intake/Output Summary (Last 24 hours) at 2/21/2022 1316  Last data filed at 2/20/2022 2209  Gross per 24 hour   Intake 120 ml   Output --   Net 120 ml     Wt Readings from Last 2 Encounters:   02/20/22 168 lb 1.6 oz (76.2 kg)   08/10/11 214 lb 2 oz (97.1 kg)     Constitutional: She is oriented to person, place, and time. She appears well-developed and well-nourished. In no acute distress. Head: Normocephalic and atraumatic. Neck: Neck supple. No JVD present. Carotid bruit is not present. No mass and no thyromegaly present. No lymphadenopathy present. Cardiovascular: Normal rate, regular rhythm, normal heart sounds and intact distal pulses. Exam reveals no gallop and no friction rub. No murmur heard. Pulmonary/Chest: Effort normal and breath sounds normal. No respiratory distress. She has no wheezes, rhonchi or rales. Abdominal: Soft, non-tender. Bowel sounds and aorta are normal. She exhibits no organomegaly, mass or bruit. Extremities: No edema, cyanosis, or clubbing. Pulses are 2+ radial/carotid/dorsalis pedis and posterior tibial bilaterally. Neurological: She is alert and oriented to person, place, and time. She has normal reflexes. No cranial nerve deficit. Coordination normal.   Skin: Skin is warm and dry. There is no rash or diaphoresis. Psychiatric: She has a normal mood and affect.  Her speech is normal and behavior is normal.     Personally reviewed and interpreted   EKG Interpretation: Sinus rhythm with normal intervals    Lab Review:   No results found for: TRIG, HDL, LDLCALC, LDLDIRECT, LABVLDL  Lab Results   Component Value Date     02/21/2022    K 4.1 02/21/2022    BUN 27 02/21/2022    CREATININE 1.3 02/21/2022     Recent Labs     02/20/22  1657 02/20/22  1657 02/21/22  0549   WBC 4.9   < > 4.8   HGB 13.4  --  12.4   HCT 40.7  --  38.7     --  163    < > = values in this interval not displayed. Stress Perfusion 2/21/22:  Abnormal study. There is a large sized, moderate intensity, reversible    defect of the mid to apical anterior, apical septal, apical inferior, and    apical cap which is consistent with ischemia.    Normal LV size and systolic function.    Uncontrolled hypertension.    Overall findings represent a high risk study. Assessment / Plan:    1. Unstable Angina  No pain at present and negative troponin assays with normal ECG. Nitrates as needed for chest pain. Start beta blockade with carvedilol and stop amlodipine. Continue aspirin. 2. Abnormal Cardiovascular Stress Test  Stress suspicious for ischemia in the distribution of the LAD. She warrants a left heart catheterization for this. I discussed the risks and benefits of cardiac catheterization with the patient. I also discussed the possible therapies including medical management, angioplasty and stenting or coronary bypass surgery. The patient is amenable to undergoing the procedure. We will have this scheduled for tomorrow morning. 3. Hyperlipidemia with goal LDL<70mg/dL  Start statin therapy with rosuvastatin 40mg daily.

## 2022-02-21 NOTE — CARE COORDINATION
Care manager for [facility]  Ph:  [phone]  Administrative Assist, Case Management  [phone]  Electronically signed by Tammy Bueno on 2/21/2022 at 10:41 AM

## 2022-02-22 ENCOUNTER — APPOINTMENT (OUTPATIENT)
Dept: CARDIAC CATH/INVASIVE PROCEDURES | Age: 82
DRG: 233 | End: 2022-02-22
Payer: MEDICARE

## 2022-02-22 LAB
EKG ATRIAL RATE: 69 BPM
EKG DIAGNOSIS: NORMAL
EKG P AXIS: 52 DEGREES
EKG P-R INTERVAL: 148 MS
EKG Q-T INTERVAL: 386 MS
EKG QRS DURATION: 68 MS
EKG QTC CALCULATION (BAZETT): 413 MS
EKG R AXIS: 5 DEGREES
EKG T AXIS: 19 DEGREES
EKG VENTRICULAR RATE: 69 BPM

## 2022-02-22 PROCEDURE — B2151ZZ FLUOROSCOPY OF LEFT HEART USING LOW OSMOLAR CONTRAST: ICD-10-PCS | Performed by: INTERNAL MEDICINE

## 2022-02-22 PROCEDURE — B2111ZZ FLUOROSCOPY OF MULTIPLE CORONARY ARTERIES USING LOW OSMOLAR CONTRAST: ICD-10-PCS | Performed by: INTERNAL MEDICINE

## 2022-02-22 PROCEDURE — 4A023N7 MEASUREMENT OF CARDIAC SAMPLING AND PRESSURE, LEFT HEART, PERCUTANEOUS APPROACH: ICD-10-PCS | Performed by: INTERNAL MEDICINE

## 2022-02-22 PROCEDURE — 2709999900 HC NON-CHARGEABLE SUPPLY

## 2022-02-22 PROCEDURE — 2580000003 HC RX 258: Performed by: INTERNAL MEDICINE

## 2022-02-22 PROCEDURE — C1894 INTRO/SHEATH, NON-LASER: HCPCS

## 2022-02-22 PROCEDURE — 2060000000 HC ICU INTERMEDIATE R&B

## 2022-02-22 PROCEDURE — 6370000000 HC RX 637 (ALT 250 FOR IP): Performed by: INTERNAL MEDICINE

## 2022-02-22 PROCEDURE — 6370000000 HC RX 637 (ALT 250 FOR IP)

## 2022-02-22 PROCEDURE — 99152 MOD SED SAME PHYS/QHP 5/>YRS: CPT

## 2022-02-22 PROCEDURE — 2500000003 HC RX 250 WO HCPCS

## 2022-02-22 PROCEDURE — 6360000004 HC RX CONTRAST MEDICATION: Performed by: INTERNAL MEDICINE

## 2022-02-22 PROCEDURE — 93458 L HRT ARTERY/VENTRICLE ANGIO: CPT

## 2022-02-22 PROCEDURE — 93010 ELECTROCARDIOGRAM REPORT: CPT | Performed by: INTERNAL MEDICINE

## 2022-02-22 PROCEDURE — 6360000002 HC RX W HCPCS

## 2022-02-22 PROCEDURE — 93458 L HRT ARTERY/VENTRICLE ANGIO: CPT | Performed by: INTERNAL MEDICINE

## 2022-02-22 PROCEDURE — 99152 MOD SED SAME PHYS/QHP 5/>YRS: CPT | Performed by: INTERNAL MEDICINE

## 2022-02-22 PROCEDURE — 99153 MOD SED SAME PHYS/QHP EA: CPT

## 2022-02-22 PROCEDURE — C1769 GUIDE WIRE: HCPCS

## 2022-02-22 RX ORDER — SODIUM CHLORIDE 9 MG/ML
25 INJECTION, SOLUTION INTRAVENOUS PRN
Status: DISCONTINUED | OUTPATIENT
Start: 2022-02-22 | End: 2022-02-25

## 2022-02-22 RX ORDER — ACETAMINOPHEN 325 MG/1
650 TABLET ORAL EVERY 4 HOURS PRN
Status: DISCONTINUED | OUTPATIENT
Start: 2022-02-22 | End: 2022-02-25

## 2022-02-22 RX ORDER — AMITRIPTYLINE HYDROCHLORIDE 10 MG/1
10 TABLET, FILM COATED ORAL EVERY EVENING
Status: DISCONTINUED | OUTPATIENT
Start: 2022-02-22 | End: 2022-02-25

## 2022-02-22 RX ORDER — CARVEDILOL 12.5 MG/1
12.5 TABLET ORAL 2 TIMES DAILY WITH MEALS
Status: DISCONTINUED | OUTPATIENT
Start: 2022-02-22 | End: 2022-02-25

## 2022-02-22 RX ORDER — SODIUM CHLORIDE 0.9 % (FLUSH) 0.9 %
5-40 SYRINGE (ML) INJECTION EVERY 12 HOURS SCHEDULED
Status: DISCONTINUED | OUTPATIENT
Start: 2022-02-22 | End: 2022-02-25

## 2022-02-22 RX ORDER — MELOXICAM 7.5 MG/1
15 TABLET ORAL DAILY
Status: DISCONTINUED | OUTPATIENT
Start: 2022-02-22 | End: 2022-02-24

## 2022-02-22 RX ORDER — SODIUM CHLORIDE 9 MG/ML
INJECTION, SOLUTION INTRAVENOUS CONTINUOUS
Status: ACTIVE | OUTPATIENT
Start: 2022-02-22 | End: 2022-02-22

## 2022-02-22 RX ORDER — SODIUM CHLORIDE 0.9 % (FLUSH) 0.9 %
5-40 SYRINGE (ML) INJECTION PRN
Status: DISCONTINUED | OUTPATIENT
Start: 2022-02-22 | End: 2022-02-25

## 2022-02-22 RX ORDER — HYDROCHLOROTHIAZIDE 25 MG/1
25 TABLET ORAL DAILY
Status: DISCONTINUED | OUTPATIENT
Start: 2022-02-22 | End: 2022-02-24

## 2022-02-22 RX ADMIN — SODIUM CHLORIDE, PRESERVATIVE FREE 10 ML: 5 INJECTION INTRAVENOUS at 20:45

## 2022-02-22 RX ADMIN — ASPIRIN 81 MG 81 MG: 81 TABLET ORAL at 08:30

## 2022-02-22 RX ADMIN — NITROGLYCERIN 0.5 INCH: 20 OINTMENT TOPICAL at 09:31

## 2022-02-22 RX ADMIN — DOCUSATE SODIUM 100 MG: 100 CAPSULE, LIQUID FILLED ORAL at 20:45

## 2022-02-22 RX ADMIN — ROSUVASTATIN CALCIUM 40 MG: 40 TABLET, FILM COATED ORAL at 20:45

## 2022-02-22 RX ADMIN — SODIUM CHLORIDE: 9 INJECTION, SOLUTION INTRAVENOUS at 05:58

## 2022-02-22 RX ADMIN — CARVEDILOL 12.5 MG: 12.5 TABLET, FILM COATED ORAL at 16:52

## 2022-02-22 RX ADMIN — AMITRIPTYLINE HYDROCHLORIDE 10 MG: 10 TABLET, FILM COATED ORAL at 16:52

## 2022-02-22 RX ADMIN — IOPAMIDOL 75 ML: 755 INJECTION, SOLUTION INTRAVENOUS at 08:44

## 2022-02-22 RX ADMIN — OXYCODONE HYDROCHLORIDE AND ACETAMINOPHEN 1 TABLET: 5; 325 TABLET ORAL at 13:45

## 2022-02-22 RX ADMIN — CARVEDILOL 12.5 MG: 12.5 TABLET, FILM COATED ORAL at 09:42

## 2022-02-22 ASSESSMENT — PAIN SCALES - WONG BAKER: WONGBAKER_NUMERICALRESPONSE: 0

## 2022-02-22 ASSESSMENT — PAIN SCALES - GENERAL
PAINLEVEL_OUTOF10: 0
PAINLEVEL_OUTOF10: 0
PAINLEVEL_OUTOF10: 9

## 2022-02-22 NOTE — PROGRESS NOTES
Hospitalist Progress Note      PCP: Donnie Hinton    Date of Admission: 2/20/2022        Subjective: Denies any chest pain this morning, no blurry vision dizziness or dyspnea. Daughter at bedside      Medications:  Reviewed    Infusion Medications    sodium chloride      sodium chloride       Scheduled Medications    rosuvastatin  40 mg Oral Nightly    carvedilol  6.25 mg Oral BID WC    sodium chloride flush  5-40 mL IntraVENous 2 times per day    docusate sodium  100 mg Oral BID    enoxaparin  40 mg SubCUTAneous Daily    aspirin  81 mg Oral Daily     PRN Meds: technetium tetrofosmin, sodium chloride, oxyCODONE-acetaminophen, sodium chloride flush, ondansetron **OR** ondansetron, polyethylene glycol, acetaminophen **OR** acetaminophen, perflutren lipid microspheres, nitroGLYCERIN, hydrALAZINE    No intake or output data in the 24 hours ending 02/22/22 0529    Physical Exam Performed:    /64   Pulse 68   Temp 97.5 °F (36.4 °C) (Oral)   Resp 16   Ht 5' 3\" (1.6 m)   Wt 168 lb 1.6 oz (76.2 kg)   SpO2 99%   BMI 29.78 kg/m²     General appearance: No apparent distress  Neck: Supple  Respiratory:  Normal respiratory effort. Clear to auscultation, bilaterally without Rales/Wheezes/Rhonchi. Cardiovascular: Regular rate and rhythm with normal S1/S2 without murmurs, rubs or gallops. Abdomen: Soft, non-tender, non-distended  Musculoskeletal: No clubbing, cyanosis   Skin: Skin color, texture, turgor normal.  No rashes or lesions.   Neurologic:  No focal weakness   Psychiatric: Alert and oriented  Capillary Refill: Brisk,3 seconds, normal   Peripheral Pulses: +2 palpable, equal bilaterally       Labs:   Recent Labs     02/20/22  1657 02/21/22  0549   WBC 4.9 4.8   HGB 13.4 12.4   HCT 40.7 38.7    163     Recent Labs     02/20/22  1657 02/21/22  0549    139   K 4.0 4.1    104   CO2 22 22   BUN 25* 27*   CREATININE 1.2 1.3*   CALCIUM 9.0 8.7     No results for input(s): AST, ALT, BILIDIR, BILITOT, ALKPHOS in the last 72 hours. No results for input(s): INR in the last 72 hours. Recent Labs     02/20/22  1657 02/21/22  0035   TROPONINI <0.01 <0.01       Urinalysis:      Lab Results   Component Value Date    NITRU Negative 08/10/2011    BLOODU Negative 08/10/2011    SPECGRAV >=1.030 08/10/2011    GLUCOSEU Negative 08/10/2011       Radiology:  NM MYOCARDIAL SPECT REST EXERCISE OR RX   Final Result      XR CHEST PORTABLE   Final Result   1. No acute process. 2. Possible small nodule in the right mid lung. Consider further evaluation   with nonemergent noncontrast chest CT. Assessment/Plan:    Active Hospital Problems    Diagnosis     Chest pain [R07.9]        1. Hypertensive urgency, likely due to missing few doses of her medication, restarted on p.o. medication and as needed medication, better controlled. 2. Chest pain, resolved now, troponin negative, positive stress test, cardiology consulted, for cardiac cath. 3. Osteoarthritis, controlled at this time  4.   Likely CKD, follow-up as outpatient for further monitoring      Diet: Diet NPO Exceptions are: Sips of Water with Meds  Code Status: Full Code        Lorri Umaña MD

## 2022-02-22 NOTE — PROCEDURES
[address]           [address] [address]                            CARDIAC CATHETERIZATION    PATIENT NAME: Emi Gamboa                   :        1940  MED REC NO:   [id number]                          ROOM:       3111  ACCOUNT NO:   [de-identified]                           ADMIT DATE: 2022  PROVIDER:     Chayo Diamond MD    DATE OF PROCEDURE:  2022    This is a [place] [address] cardiology catheterization report. INDICATION FOR PROCEDURE:  Unstable angina, abnormal cardiovascular  stress test.    PROCEDURE:  The risks and benefits of the procedure were explained to  the patient. Informed consent was obtained. She was placed on the  cardiac catheterization table and prepped and draped in a sterile  fashion. Anesthesia was provided in the volar surface of the right  wrist after an Delcie Hernández test confirmed patency of the palmar arch. The right radial artery was accessed and cannulated and a 5-Nepali  sheath inserted using Seldinger technique. The pre-cocktail of heparin,  verapamil and nitroglycerin was given through the sheath port. Over the 0.035 J-wire, the JL3.5, 5-Nepali diagnostic catheter was  advanced to the ostium of the left main coronary artery. Coronary  angiography was performed to this system. The catheter was removed over  the wire. Next, the JR5, 5-Nepali diagnostic catheter was advanced to  the ostium of the right coronary artery and coronary angiography was  performed to this system. Catheter was removed over the wire. Lastly,  the pigtail catheter was advanced over the wire into the left ventricle. Left ventricular end-diastolic pressure measurements were obtained and  then a power injection left ventriculogram was performed. The catheter  was flushed and placed back on pressure and then pulled back across the  aortic valve to assess for gradient.   The post cocktail of verapamil and  nitroglycerin was given through the sheath port. The right radial  sheath was removed and a Terumo pressure band applied for nonocclusive  hemostasis. There were no complications from the procedure and  estimated blood loss was less than 20 mL. Moderate sedation was given for the procedure. She had an ASA grade of  III and a Mallampati score of II. Total duration of sedation was 30  minutes. Sedation was administered by an independent agent at my  direction and supervision. She received 1.5 mg of IV Versed and 75 mcg  of fentanyl. Vital signs were monitored throughout the case and  remained stable and there were no complications from the procedure. FINDINGS:  1. Severe triple-vessel coronary artery disease. The distal left main  is heavily calcified and has a 60% lesion. The proximal LAD is heavily  calcified with a 60% lesion. The mid-LAD is 100% occluded but fills in  from right collaterals. The distal LAD backfills back to the midportion  via left collaterals. The proximal circumflex is heavily calcified and  has an 80% lesion. OM1 is calcified with 80% serial lesions. The  proximal right coronary artery is subtotally occluded and the PDA fills  from both collateral native flow from the right and the left. There is  backfilling from the right system into the mid LAD via a septal  . Also, the LAD first diagonal branch has a 90% lesion. 2.  Normal left ventricular systolic function. LV ejection fraction of  65%. 3.  Borderline left ventricular end-diastolic pressure at 14 to 16 mmHg. 4.  No gradient across the aortic valve on pullback to suggest aortic  stenosis.         Rhona Gtz MD    D: 02/22/2022 8:53:39       T: 02/22/2022 8:56:27     [id number]  Job#: [id number]     Doc#: [id number]    CC:  Obdulia Barry MD

## 2022-02-22 NOTE — PROGRESS NOTES
Patient in bed resting with daughter at the bedside. She is A/0 x4, RA, and VSS. Evening assessment and medication given without complications. Bed in lowest locked position and call light within reach. Will continue to monitor throughout the shift.

## 2022-02-22 NOTE — PLAN OF CARE
Problem: Falls - Risk of:  Goal: Will remain free from falls  Description: Will remain free from falls  Outcome: Ongoing  Note: Patient educated on fall prevention. Call light is within reach, bed locked in lowest position, personal items within reach, and bed alarm is on. Will round on patient per unit guidelines. Goal: Absence of physical injury  Description: Absence of physical injury  Outcome: Ongoing     Problem: Skin Integrity:  Goal: Will show no infection signs and symptoms  Description: Will show no infection signs and symptoms  Outcome: Ongoing  Note: Will monitor skin and mucous members. Will turn patient every 2 hours, monitor for friction and sheering, and change dressings as needed. Will preform skin assessment every shift.      Goal: Absence of new skin breakdown  Description: Absence of new skin breakdown  Outcome: Ongoing

## 2022-02-22 NOTE — ANESTHESIA PRE-OP
Brief Pre-Op Note/Sedation Assessment      Sandra Maher  1940  [id number]  8:03 AM    Planned Procedure: Cardiac Catheterization Procedure  Post Procedure Plan: Return to same level of care  Consent: I have discussed with the patient and/or the patient representative the indication, alternatives, and the possible risks and/or complications of the planned procedure and the anesthesia methods. The patient and/or patient representative appear to understand and agree to proceed. Chief Complaint:   Chest Pain/Pressure      Indications for Cath Procedure:  1. Presentation:  Worsening Angina  2. Anginal Classification within 2 weeks:  CCS III - Symptoms with everyday living activities, i.e., moderate limitation  3. Angina Symptoms Assessment:  Typical Chest Pain  4. Heart Failure Class within last 2 weeks:  No symptoms  5. Cardiovascular Instability:  No    Prior Ischemic Workup/Eval:  1. Pre-Procedural Medications: Yes: Aspirin, Beta Blockers and STATIN  2. Stress Test Completed? Yes:  Stress or Imaging Studies Performed (within ANY time period):   Type:  Stress Nuclear  Results:  Positive:  Myocardial Perfusion Defects (Nuclear) Extent of Ischemia:  High Risk (>3% annual death or MI)    Does Patient need surgery?   Cath Valve Surgery:  No    Pre-Procedure Medical History:  Vital Signs:  /64   Pulse 68   Temp 97.5 °F (36.4 °C) (Oral)   Resp 16   Ht 5' 3\" (1.6 m)   Wt 167 lb 15.9 oz (76.2 kg)   SpO2 99%   BMI 29.76 kg/m²     Allergies:  No Known Allergies  Medications:    Current Facility-Administered Medications   Medication Dose Route Frequency Provider Last Rate Last Admin    technetium tetrofosmin (Tc-MYOVIEW) injection 30 millicurie  30 millicurie IntraVENous ONCE PRN Rob Montanez MD        rosuvastatin (CRESTOR) tablet 40 mg  40 mg Oral Nightly Sean Quan MD   40 mg at 02/21/22 2128    carvedilol (COREG) tablet 6.25 mg  6.25 mg Oral BID JUANPABLO Stafford Conception MD Padilla   6.25 mg at 02/21/22 1852    0.9 % sodium chloride infusion   IntraVENous Continuous Tracy Castro  mL/hr at 02/22/22 0558 New Bag at 02/22/22 0558    sodium chloride flush 0.9 % injection 5-40 mL  5-40 mL IntraVENous 2 times per day Tracy Castro MD   10 mL at 02/21/22 2139    0.9 % sodium chloride infusion  25 mL IntraVENous PRN Tracy Castro MD        oxyCODONE-acetaminophen (PERCOCET) 5-325 MG per tablet 1 tablet  1 tablet Oral Q4H PRN Drake Peñaloza MD        docusate sodium (COLACE) capsule 100 mg  100 mg Oral BID Drake Peñaloza MD   100 mg at 02/21/22 2127    sodium chloride flush 0.9 % injection 5-40 mL  5-40 mL IntraVENous PRN Drake Peñaloza MD        enoxaparin (LOVENOX) injection 40 mg  40 mg SubCUTAneous Daily Drake Peñaloza MD        ondansetron (ZOFRAN-ODT) disintegrating tablet 4 mg  4 mg Oral Q8H PRN Drake Peñaolza MD        Or    [facility] injection 4 mg  4 mg IntraVENous Q6H PRN Drake Peñaloza MD        polyethylene glycol [facility]) packet 17 g  17 g Oral Daily PRN Drake Peñaloza MD        acetaminophen (TYLENOL) tablet 650 mg  650 mg Oral Q6H PRN Drake Peñaloza MD        Or    acetaminophen (TYLENOL) suppository 650 mg  650 mg Rectal Q6H PRN Drake Peñaloza MD        perflutren lipid microspheres (DEFINITY) injection 1.65 mg  1.5 mL IntraVENous ONCE PRN Drake Peñaloza MD        aspirin chewable tablet 81 mg  81 mg Oral Daily Drake Peñaloza MD   81 mg at 02/21/22 6059    nitroGLYCERIN (NITROSTAT) SL tablet 0.4 mg  0.4 mg SubLINGual Q5 Min PRN Drake Peñaloza MD        hydrALAZINE (APRESOLINE) injection 20 mg  20 mg IntraVENous Q6H PRN Drake Peñaloza MD           Past Medical History:    Past Medical History:   Diagnosis Date    Arthritis     Asthma     as child grew out of it    Glaucoma     Hyperlipidemia     Hypertension        Surgical History:    Past Surgical History:   Procedure Laterality Date    CHOLECYSTECTOMY      FOOT SURGERY      bilateral, hammer toes    GASTRIC BAND  [id number]    laproscopic    TUBAL LIGATION               Pre-Sedation:  Pre-Sedation Documentation and Exam:  I have personally completed a history, physical exam & review of systems for this patient (see notes). Prior History of Anesthesia Complications:   none    Modified Mallampati:  II (soft palate, uvula, fauces visible)    ASA Classification:  Class 3 - A patient with severe systemic disease that limits activity but is not incapacitating    Jordy Scale: Activity:  2 - Able to move 4 extremities voluntarily on command  Respiration:  2 - Able to breathe deeply and cough freely  Circulation:  2 - BP+/- 20mmHg of normal  Consciousness:  2 - Fully awake  Oxygen Saturation (color):  2 - Able to maintain oxygen saturation >92% on room air    Sedation/Anesthesia Plan:  Guard the patient's safety and welfare. Minimize physical discomfort and pain. Minimize negative psychological responses to treatment by providing sedation and analgesia and maximize the potential amnesia. Patient to meet pre-procedure discharge plan.     Medication Planned:  midazolam intravenously and fentanyl intravenously    Patient is an appropriate candidate for plan of sedation:   yes      Electronically signed by Gianna Doherty MD on 2/22/2022 at 8:03 AM

## 2022-02-23 ENCOUNTER — APPOINTMENT (OUTPATIENT)
Dept: CT IMAGING | Age: 82
DRG: 233 | End: 2022-02-23
Payer: MEDICARE

## 2022-02-23 LAB
ABO/RH: NORMAL
ALBUMIN SERPL-MCNC: 4 G/DL (ref 3.4–5)
ALP BLD-CCNC: 82 U/L (ref 40–129)
ALT SERPL-CCNC: 9 U/L (ref 10–40)
ANION GAP SERPL CALCULATED.3IONS-SCNC: 12 MMOL/L (ref 3–16)
ANTIBODY SCREEN: NORMAL
APTT: 38.3 SEC (ref 26.2–38.6)
AST SERPL-CCNC: 16 U/L (ref 15–37)
BASE EXCESS ARTERIAL: -0.1 MMOL/L (ref -3–3)
BASOPHILS ABSOLUTE: 0.1 K/UL (ref 0–0.2)
BASOPHILS RELATIVE PERCENT: 1.2 %
BILIRUB SERPL-MCNC: 0.5 MG/DL (ref 0–1)
BILIRUBIN DIRECT: <0.2 MG/DL (ref 0–0.3)
BILIRUBIN URINE: NEGATIVE
BILIRUBIN, INDIRECT: ABNORMAL MG/DL (ref 0–1)
BLOOD, URINE: NEGATIVE
BUN BLDV-MCNC: 30 MG/DL (ref 7–20)
CALCIUM SERPL-MCNC: 9 MG/DL (ref 8.3–10.6)
CARBOXYHEMOGLOBIN ARTERIAL: 0 % (ref 0–1.5)
CHLORIDE BLD-SCNC: 105 MMOL/L (ref 99–110)
CHOLESTEROL, TOTAL: 221 MG/DL (ref 0–199)
CLARITY: CLEAR
CO2: 23 MMOL/L (ref 21–32)
COLOR: YELLOW
CREAT SERPL-MCNC: 1.5 MG/DL (ref 0.6–1.2)
EOSINOPHILS ABSOLUTE: 0.1 K/UL (ref 0–0.6)
EOSINOPHILS RELATIVE PERCENT: 1.4 %
ESTIMATED AVERAGE GLUCOSE: 116.9 MG/DL
FIBRINOGEN: 475 MG/DL (ref 200–397)
GFR AFRICAN AMERICAN: 40
GFR NON-AFRICAN AMERICAN: 33
GLUCOSE BLD-MCNC: 95 MG/DL (ref 70–99)
GLUCOSE URINE: NEGATIVE MG/DL
HBA1C MFR BLD: 5.7 %
HCO3 ARTERIAL: 23.7 MMOL/L (ref 21–29)
HCT VFR BLD CALC: 40.8 % (ref 36–48)
HDLC SERPL-MCNC: 57 MG/DL (ref 40–60)
HEMOGLOBIN, ART, EXTENDED: 13.6 G/DL (ref 12–16)
HEMOGLOBIN: 13.3 G/DL (ref 12–16)
INR BLD: 1.05 (ref 0.88–1.12)
KETONES, URINE: NEGATIVE MG/DL
LDL CHOLESTEROL CALCULATED: 148 MG/DL
LEUKOCYTE ESTERASE, URINE: NEGATIVE
LYMPHOCYTES ABSOLUTE: 1.1 K/UL (ref 1–5.1)
LYMPHOCYTES RELATIVE PERCENT: 16.1 %
MAGNESIUM: 2.4 MG/DL (ref 1.8–2.4)
MCH RBC QN AUTO: 28.3 PG (ref 26–34)
MCHC RBC AUTO-ENTMCNC: 32.6 G/DL (ref 31–36)
MCV RBC AUTO: 86.7 FL (ref 80–100)
METHEMOGLOBIN ARTERIAL: 0.6 %
MICROSCOPIC EXAMINATION: NORMAL
MONOCYTES ABSOLUTE: 0.4 K/UL (ref 0–1.3)
MONOCYTES RELATIVE PERCENT: 6.1 %
NEUTROPHILS ABSOLUTE: 4.9 K/UL (ref 1.7–7.7)
NEUTROPHILS RELATIVE PERCENT: 75.2 %
NITRITE, URINE: NEGATIVE
O2 SAT, ARTERIAL: 98.1 %
O2 THERAPY: ABNORMAL
PCO2 ARTERIAL: 35.2 MMHG (ref 35–45)
PDW BLD-RTO: 15.2 % (ref 12.4–15.4)
PH ARTERIAL: 7.44 (ref 7.35–7.45)
PH UA: 5.5 (ref 5–8)
PLATELET # BLD: 186 K/UL (ref 135–450)
PMV BLD AUTO: 9.1 FL (ref 5–10.5)
PO2 ARTERIAL: 133 MMHG (ref 75–108)
POTASSIUM SERPL-SCNC: 4.2 MMOL/L (ref 3.5–5.1)
PREALBUMIN: 20.4 MG/DL (ref 20–40)
PROTEIN UA: NEGATIVE MG/DL
PROTHROMBIN TIME: 11.9 SEC (ref 9.9–12.7)
RBC # BLD: 4.7 M/UL (ref 4–5.2)
SARS-COV-2, NAAT: NOT DETECTED
SODIUM BLD-SCNC: 140 MMOL/L (ref 136–145)
SPECIFIC GRAVITY UA: 1.03 (ref 1–1.03)
TCO2 ARTERIAL: 24.8 MMOL/L
TOTAL PROTEIN: 7.6 G/DL (ref 6.4–8.2)
TRIGL SERPL-MCNC: 78 MG/DL (ref 0–150)
URINE REFLEX TO CULTURE: NORMAL
URINE TYPE: NORMAL
UROBILINOGEN, URINE: 0.2 E.U./DL
VLDLC SERPL CALC-MCNC: 16 MG/DL
WBC # BLD: 6.5 K/UL (ref 4–11)

## 2022-02-23 PROCEDURE — 94010 BREATHING CAPACITY TEST: CPT

## 2022-02-23 PROCEDURE — 85025 COMPLETE CBC W/AUTO DIFF WBC: CPT

## 2022-02-23 PROCEDURE — 99222 1ST HOSP IP/OBS MODERATE 55: CPT | Performed by: THORACIC SURGERY (CARDIOTHORACIC VASCULAR SURGERY)

## 2022-02-23 PROCEDURE — 94760 N-INVAS EAR/PLS OXIMETRY 1: CPT

## 2022-02-23 PROCEDURE — 85610 PROTHROMBIN TIME: CPT

## 2022-02-23 PROCEDURE — 84134 ASSAY OF PREALBUMIN: CPT

## 2022-02-23 PROCEDURE — 85730 THROMBOPLASTIN TIME PARTIAL: CPT

## 2022-02-23 PROCEDURE — 86850 RBC ANTIBODY SCREEN: CPT

## 2022-02-23 PROCEDURE — 83036 HEMOGLOBIN GLYCOSYLATED A1C: CPT

## 2022-02-23 PROCEDURE — 86900 BLOOD TYPING SEROLOGIC ABO: CPT

## 2022-02-23 PROCEDURE — 6370000000 HC RX 637 (ALT 250 FOR IP): Performed by: INTERNAL MEDICINE

## 2022-02-23 PROCEDURE — 86923 COMPATIBILITY TEST ELECTRIC: CPT

## 2022-02-23 PROCEDURE — 83735 ASSAY OF MAGNESIUM: CPT

## 2022-02-23 PROCEDURE — 87635 SARS-COV-2 COVID-19 AMP PRB: CPT

## 2022-02-23 PROCEDURE — 36600 WITHDRAWAL OF ARTERIAL BLOOD: CPT

## 2022-02-23 PROCEDURE — 6360000002 HC RX W HCPCS: Performed by: INTERNAL MEDICINE

## 2022-02-23 PROCEDURE — 2060000000 HC ICU INTERMEDIATE R&B

## 2022-02-23 PROCEDURE — 87641 MR-STAPH DNA AMP PROBE: CPT

## 2022-02-23 PROCEDURE — 2580000003 HC RX 258: Performed by: INTERNAL MEDICINE

## 2022-02-23 PROCEDURE — 99233 SBSQ HOSP IP/OBS HIGH 50: CPT | Performed by: INTERNAL MEDICINE

## 2022-02-23 PROCEDURE — 82803 BLOOD GASES ANY COMBINATION: CPT

## 2022-02-23 PROCEDURE — 93971 EXTREMITY STUDY: CPT

## 2022-02-23 PROCEDURE — 80061 LIPID PANEL: CPT

## 2022-02-23 PROCEDURE — P9016 RBC LEUKOCYTES REDUCED: HCPCS

## 2022-02-23 PROCEDURE — 36415 COLL VENOUS BLD VENIPUNCTURE: CPT

## 2022-02-23 PROCEDURE — 85384 FIBRINOGEN ACTIVITY: CPT

## 2022-02-23 PROCEDURE — 71250 CT THORAX DX C-: CPT

## 2022-02-23 PROCEDURE — 86901 BLOOD TYPING SEROLOGIC RH(D): CPT

## 2022-02-23 PROCEDURE — 80076 HEPATIC FUNCTION PANEL: CPT

## 2022-02-23 PROCEDURE — 80048 BASIC METABOLIC PNL TOTAL CA: CPT

## 2022-02-23 PROCEDURE — 81003 URINALYSIS AUTO W/O SCOPE: CPT

## 2022-02-23 PROCEDURE — 93880 EXTRACRANIAL BILAT STUDY: CPT

## 2022-02-23 RX ADMIN — MELOXICAM 15 MG: 7.5 TABLET ORAL at 08:26

## 2022-02-23 RX ADMIN — HYDROCHLOROTHIAZIDE 25 MG: 25 TABLET ORAL at 08:26

## 2022-02-23 RX ADMIN — CARVEDILOL 12.5 MG: 12.5 TABLET, FILM COATED ORAL at 17:55

## 2022-02-23 RX ADMIN — ASPIRIN 81 MG 81 MG: 81 TABLET ORAL at 08:27

## 2022-02-23 RX ADMIN — ENOXAPARIN SODIUM 40 MG: 100 INJECTION SUBCUTANEOUS at 08:27

## 2022-02-23 RX ADMIN — ROSUVASTATIN CALCIUM 40 MG: 40 TABLET, FILM COATED ORAL at 21:18

## 2022-02-23 RX ADMIN — AMITRIPTYLINE HYDROCHLORIDE 10 MG: 10 TABLET, FILM COATED ORAL at 17:55

## 2022-02-23 RX ADMIN — CARVEDILOL 12.5 MG: 12.5 TABLET, FILM COATED ORAL at 08:27

## 2022-02-23 RX ADMIN — SODIUM CHLORIDE, PRESERVATIVE FREE 10 ML: 5 INJECTION INTRAVENOUS at 08:31

## 2022-02-23 RX ADMIN — DOCUSATE SODIUM 100 MG: 100 CAPSULE, LIQUID FILLED ORAL at 08:26

## 2022-02-23 RX ADMIN — DOCUSATE SODIUM 100 MG: 100 CAPSULE, LIQUID FILLED ORAL at 21:18

## 2022-02-23 RX ADMIN — SODIUM CHLORIDE, PRESERVATIVE FREE 10 ML: 5 INJECTION INTRAVENOUS at 21:18

## 2022-02-23 ASSESSMENT — PAIN SCALES - WONG BAKER
WONGBAKER_NUMERICALRESPONSE: 0

## 2022-02-23 ASSESSMENT — PAIN SCALES - GENERAL
PAINLEVEL_OUTOF10: 0
PAINLEVEL_OUTOF10: 0

## 2022-02-23 NOTE — PROGRESS NOTES
Consult received. Consult for elevated creatinine. Patient had left heart Cath this admission. Found to have triple vessel coronary artery disease and just being evaluated for CABG. Appears to be on scheduled meloxicam.    Will hold meloxicam.  For consult to follow.

## 2022-02-23 NOTE — PROGRESS NOTES
[address]   Daily Progress Note      Admit Date:  2/20/2022      Subjective:   Ms. Daphne Sheffield is an [de-identified] male with a past medical history significant for essential hypertension who presented to [facility] with chest pain. She underwent a stress perfusion study that was grossly abnormal and high risk. A left heart catheterization demonstrated normal LV function but triple vessel CAD. A consult to CVTS was placed for CABG consideration. Intervaql History:  Roshan Xiao has had no further chest pain or tightness. She denies any shortness of breath. Sinus rhythm on telemetry.        Objective:     BP (!) 166/78   Pulse 73   Temp 98.6 °F (37 °C) (Oral)   Resp 16   Ht 5' 3\" (1.6 m)   Wt 171 lb 11.8 oz (77.9 kg)   SpO2 94%   BMI 30.42 kg/m²      Intake/Output Summary (Last 24 hours) at 2/23/2022 1236  Last data filed at 2/23/2022 1033  Gross per 24 hour   Intake 480 ml   Output --   Net 480 ml       Physical Exam:  General:  Awake, alert, NAD  Skin:  Warm and dry  Neck:  JVD<8, no carotid bruits  Chest:  Clear to auscultation, no wheezes/rhonchi/rales  Cardiovascular:  RRR, normal S1/S2, no M/R/G  Abdomen:  Soft, nontender, +bowel sounds  Extremities:  No edema  Pulses: 2+ bilat carotid    2+ bilat radial    2+ bilat femoral        Medications:    carvedilol  12.5 mg Oral BID WC    amitriptyline  10 mg Oral QPM    hydroCHLOROthiazide  25 mg Oral Daily    meloxicam  15 mg Oral Daily    sodium chloride flush  5-40 mL IntraVENous 2 times per day    rosuvastatin  40 mg Oral Nightly    docusate sodium  100 mg Oral BID    [Held by provider] enoxaparin  40 mg SubCUTAneous Daily    aspirin  81 mg Oral Daily      sodium chloride         Lab Data:  CBC:   Recent Labs     02/20/22  1657 02/21/22  0549   WBC 4.9 4.8   HGB 13.4 12.4    163     BMP:    Recent Labs     02/20/22  1657 02/21/22  0549    139   K 4.0 4.1   CO2 22 22   BUN 25* 27*   CREATININE 1.2 1.3*       Recent Labs 02/20/22  1657 02/21/22  0035   TROPONINI <0.01 <0.01       Left Heart Catheterization 2/22/22:  1. Severe triple-vessel coronary artery disease. The distal left main  is heavily calcified and has a 60% lesion. The proximal LAD is heavily  calcified with a 60% lesion. The mid-LAD is 100% occluded but fills in  from right collaterals. The distal LAD backfills back to the midportion  via left collaterals. The proximal circumflex is heavily calcified and  has an 80% lesion. OM1 is calcified with 80% serial lesions. The  proximal right coronary artery is subtotally occluded and the PDA fills  from both collateral native flow from the right and the left. There is  backfilling from the right system into the mid LAD via a septal  . Also, the LAD first diagonal branch has a 90% lesion. 2.  Normal left ventricular systolic function. LV ejection fraction of  65%. 3.  Borderline left ventricular end-diastolic pressure at 14 to 16 mmHg. 4.  No gradient across the aortic valve on pullback to suggest aortic  stenosis. Echo 2/21/22:  Normal left ventricle size, wall thickness, and systolic function with an   estimated ejection fraction of 55-60%. No regional wall motion abnormalities   are seen. grade 1 diastolic dysfunction   The right ventricle is normal in size and function. No significant valvular heart disease    Assessment / Plan:     1. Unstable Angina  Eliceo has had no further chest pain. Nitrates prn for angina  Continue aspirin, statin and beta blockade therapy. Triple vessel disease by [facility]. Check hemoglobin A1C. Will consult CVTS for consideration of CABG. Carotid study today. LVEF is preserved. 2. Hyperlipidemia with goal LDL<70mg/dL  Continue statin therapy with rosuvastatin 40mg daily. 3. Essential Hypertension  Continue HCTZ and carvedilol for control.             Signed:  Selma Batres MD

## 2022-02-23 NOTE — PROGRESS NOTES
Hospitalist Progress Note      PCP: Preethi Jaimes [id number]    Date of Admission: 2/20/2022        Subjective: Denies chest pain at this time, no dyspnea no dizziness blurry vision no abdominal pain. Son at bedside. Medications:  Reviewed    Infusion Medications    sodium chloride       Scheduled Medications    carvedilol  12.5 mg Oral BID WC    amitriptyline  10 mg Oral QPM    hydroCHLOROthiazide  25 mg Oral Daily    meloxicam  15 mg Oral Daily    sodium chloride flush  5-40 mL IntraVENous 2 times per day    rosuvastatin  40 mg Oral Nightly    docusate sodium  100 mg Oral BID    enoxaparin  40 mg SubCUTAneous Daily    aspirin  81 mg Oral Daily     PRN Meds: sodium chloride flush, sodium chloride, acetaminophen, technetium tetrofosmin, oxyCODONE-acetaminophen, ondansetron **OR** ondansetron, polyethylene glycol, acetaminophen **OR** acetaminophen, perflutren lipid microspheres, nitroGLYCERIN, hydrALAZINE      Intake/Output Summary (Last 24 hours) at 2/23/2022 0908  Last data filed at 2/22/2022 1854  Gross per 24 hour   Intake 240 ml   Output --   Net 240 ml       Physical Exam Performed:    BP (!) 175/71   Pulse 73   Temp 98 °F (36.7 °C) (Oral)   Resp 18   Ht 5' 3\" (1.6 m)   Wt 171 lb 11.8 oz (77.9 kg)   SpO2 99%   BMI 30.42 kg/m²     General appearance: No apparent distress  Respiratory:  Normal respiratory effort. Clear to auscultation, bilaterally without Rales/Wheezes/Rhonchi. Cardiovascular: Regular rate and rhythm with normal S1/S2 without murmurs, rubs or gallops. Abdomen: Soft, non-tender, non-distended  Musculoskeletal: No clubbing, cyanosis. Skin: Skin color, texture, turgor normal.  No rashes or lesions. Neurologic:  No focal weakness. Psychiatric: Alert and oriented.   Capillary Refill: Brisk,3 seconds, normal   Peripheral Pulses: +2 palpable, equal bilaterally       Labs:   Recent Labs     02/20/22  1657 02/21/22  0549   WBC 4.9 4.8   HGB 13.4 12.4   HCT 40.7 38.7   PLT

## 2022-02-23 NOTE — CONSULTS
Consultation H&P    Date of Admission:  2/20/2022  4:56 PM  Date of Consultation:  2/23/2022    PCP:  Preethi VAZ      Cards: Tavares Schafer    Chief Complaint: CAD    History of Present Illness: We are asked to see this patient in consultation by Dr. Gokul Staton regarding candidacy for cabg. oRberta Santana is a [de-identified] y.o. vaccinated female with hx significant for CKD, HTN, HLD, obesity. Presented to ED 2/20/21 - chest pain, began 3 hours prior at rest, constant, 7/10, midsternal pressure. +diaphoresis. bp 231/95. NSR. Trop <0.01. cxdr ?R nodule  Admitted. 2/21 Cr 1.3. BB, ASA. Echo EF 55. SPECT abn.  2/22 cath 3VD       Past Medical History:  Past Medical History:   Diagnosis Date    Arthritis     Asthma     as child grew out of it    CAD (coronary artery disease) 02/22/2022    multi vessel    CKD (chronic kidney disease) 2015    Elevated creatinine since 2015; Stage III b    Glaucoma     Hyperlipidemia     Hypertension     Hyperthyroidism 2019    Graves disease    IBS (irritable bowel syndrome) 10/2020    On Linzess    Neuropathy     Peripheral    Obesity (BMI 30-39. 9)     Osteopenia 05/2009    Osteoporosis        Past Surgical History:  Past Surgical History:   Procedure Laterality Date    ANKLE FRACTURE SURGERY Left 02/12/2015    CHOLECYSTECTOMY      FOOT SURGERY      bilateral, hammer toes    GASTRIC BAND  [id number]    laproscopic    HAND SURGERY  2009    trigger finger    ORIF FEMUR DECOMPRESSION Left 04/2014    OTHER SURGICAL HISTORY Right 2010    mass behind ear    TUBAL LIGATION         Home Medications:   Prior to Admission medications    Medication Sig Start Date End Date Taking?  Authorizing Provider   amitriptyline (ELAVIL) 10 MG tablet Take 10 mg by mouth every evening 11/5/21  Yes Historical Provider, MD   amLODIPine (NORVASC) 5 MG tablet Take 5 mg by mouth daily 11/5/21  Yes Historical Provider, MD   gabapentin (NEURONTIN) 100 MG capsule Take 100-300 mg by mouth daily. 21  Yes Historical Provider, MD   hydroCHLOROthiazide (HYDRODIURIL) 25 MG tablet Take 25 mg by mouth daily 21  Yes Historical Provider, MD   linaCLOtide Vance Nest) 72 MCG CAPS capsule Take 1-2 capsules by mouth daily 21  Yes Historical Provider, MD   Propranolol HCl SR Beads (PROPRANOLOL HCL ER BEADS PO) Take 20 mg by mouth daily  21  Yes Historical Provider, MD   meloxicam (MOBIC) 15 MG tablet Take 15 mg by mouth daily 21  Yes Historical Provider, MD   methIMAzole (TAPAZOLE) 5 MG tablet Take 5 mg by mouth daily 21  Yes Historical Provider, MD   pravastatin (PRAVACHOL) 80 MG tablet Take 80 mg by mouth every evening    Yes Historical Provider, MD        Facility Administered Medications:    carvedilol  12.5 mg Oral BID WC    amitriptyline  10 mg Oral QPM    hydroCHLOROthiazide  25 mg Oral Daily    [Held by provider] meloxicam  15 mg Oral Daily    sodium chloride flush  5-40 mL IntraVENous 2 times per day    rosuvastatin  40 mg Oral Nightly    docusate sodium  100 mg Oral BID    [Held by provider] enoxaparin  40 mg SubCUTAneous Daily    aspirin  81 mg Oral Daily       Allergies:  No Known Allergies     Social History:    Working: retired from "[employer]  Caffeine: soda, none this week  Lifestyle: lives with daughter. Son present in room, daughter on phone.    Social History     Socioeconomic History    Marital status:      Spouse name: Not on file    Number of children: Not on file    Years of education: Not on file    Highest education level: Not on file   Occupational History    Not on file   Tobacco Use    Smoking status: Former Smoker     Quit date: 8/10/1999     Years since quittin.5    Smokeless tobacco: Never Used    Tobacco comment: started age 26   Substance and Sexual Activity    Alcohol use: No    Drug use: Not on file    Sexual activity: Not on file   Other Topics Concern    Not on file   Social History Narrative    Not on file     Social Determinants of Health     Financial Resource Strain:     Difficulty of Paying Living Expenses: Not on file   Food Insecurity:     Worried About Running Out of Food in the Last Year: Not on file    Celeste of Food in the Last Year: Not on file   Transportation Needs:     Lack of Transportation (Medical): Not on file    Lack of Transportation (Non-Medical):  Not on file   Physical Activity:     Days of Exercise per Week: Not on file    Minutes of Exercise per Session: Not on file   Stress:     Feeling of Stress : Not on file   Social Connections:     Frequency of Communication with Friends and Family: Not on file    Frequency of Social Gatherings with Friends and Family: Not on file    Attends Protestant Services: Not on file    Active Member of [address] [place] [address] [employer] or Organizations: Not on file    Attends Club or Organization Meetings: Not on file    Marital Status: Not on file   Intimate Partner Violence:     Fear of Current or Ex-Partner: Not on file    Emotionally Abused: Not on file    Physically Abused: Not on file    Sexually Abused: Not on file   Housing Stability:     Unable to Pay for Housing in the Last Year: Not on file    Number of [place] in the Last Year: Not on file    Unstable Housing in the Last Year: Not on file       Family History:      Problem Relation Age of Onset    Stroke Father     Heart Disease Father          age 68, stroke    Rheum Arthritis Sister        Review of Systems:  Reviewed, negative unless noted  Constitutional: weight change, weakness, impairment of ADLs  Eyes: eyestrain, redness, discharges  ENMT: post nasal drip, sinus pain, discharge   Cardiovascular: faintness, vertigo, color changes in fingers/toes  Respiratory: cough, sputum, hemoptysis  GI: excessive thirst, changes in bowel habits, abdominal swelling  : painful urination, pyuria, incontinence  Musculoskeletal: cramps, swelling, limitation of motor activity  Integumentary: cyanosis, changes in skin, dryness  Neurological: paralysis, tingling, tremors  Psychiatric: restlessness, irritability, mood swings  Endocrine: heat/cold intolerance, excessive sweating, hair loss  Hematologic/lymphatic: swollen glands, anemia, easy bruising/bleeding      Physical Examination:    BP (!) 166/78   Pulse 73   Temp 98.6 °F (37 °C) (Oral)   Resp 16   Ht 5' 3\" (1.6 m)   Wt 171 lb 11.8 oz (77.9 kg)   SpO2 94%   BMI 30.42 kg/m²      BP RUE: 205/88 (127) BP LUE: 192/98 (129)  Admission Weight: 168 lb 1.6 oz (76.2 kg)   Hand dominance: right    General appearance: NAD, well nourished  Eyes: anicteric, PERRLA  ENMT: no scars or lesions, no nasal deformity, acceptable dentition, no cyanosis of oral mucosa  Neck: no masses, no thyroid enlargement, no JVD  Respiratory: effort is unlabored, symmetric, no crackles, wheezes or rubs. No palpable/percussable abnormalities. Cardiovascular: regular, no murmur. PMI normal, no thrill. No carotid bruits. No edema or varicosities. Abdominal aorta cannot be appreciated given body habitus. Pulses:    carotid brachial radial femoral popliteal DP PT   RIGHT   2+       LEFT   2+       GI: abdomen soft, nondistended, no organomegaly. No masses. Lymphatic: no cervical/supraclavicular adenopathy  Musculoskeletal: strength and tone normal. Full ROM. No scoliosis. Extremities: warm and pink. No clubbing or petechiae. Skin: no dermatitis or ulceration. No nodularity or induration. Neuro: CN grossly intact. Sensation and motor function grossly intact. Psychiatric: oriented, appropriate mood/affect. MEDICAL DECISION MAKING/TESTING  Studies personally reviewed. SPECT: 2/21/22  Abnormal study. There is a large sized, moderate intensity, reversible    defect of the mid to apical anterior, apical septal, apical inferior, and    apical cap which is consistent with ischemia.    Normal LV size and systolic function. Cath: 2/23/22  1.   Severe triple-vessel coronary artery disease. The distal left main is heavily calcified and has a 60% lesion. The proximal LAD is heavily calcified with a 60% lesion. The mid-LAD is 100% occluded but fills in from right collaterals. The distal LAD backfills back to the midportion via left collaterals. The proximal circumflex is heavily calcified and has an 80% lesion. OM1 is calcified with 80% serial lesions. The proximal right coronary artery is subtotally occluded and the PDA fills from both collateral native flow from the right and the left. There is backfilling from the right system into the mid LAD via a septal . Also, the LAD first diagonal branch has a 90% lesion. 2.  Normal left ventricular systolic function. LV ejection fraction of 65%. 3.  Borderline left ventricular end-diastolic pressure at 14 to 16 mmHg. 4.  No gradient across the aortic valve on pullback to suggest aortic stenosis. Echo: 2/21/22Normal left ventricle size, wall thickness, and systolic function with an estimated ejection fraction of 55-60%. No regional wall motion abnormalities are seen. grade 1 diastolic dysfunction The right ventricle is normal in size and function. No significant valvular heart disease LV Diastolic Dimension: 9.09 cm LV Systolic Dimension: 1.23 cm    CXR: 2/20/22  The lungs are without acute focal process.  Possible small nodule in the   right mid lung.  There is no effusion or pneumothorax. The cardiomediastinal   silhouette is without acute process. The osseous structures are without acute   process.      CT head: 10/18/11  CT without contrast obtained without prior for comparison.  No   intracranial hemorrhage or hydrocephalus.  Sinuses and mastoids   are clear.  No skull fracture.       Carotid duplex: 2/23/22  <50% bilat      Labs:   CBC:   Recent Labs     02/21/22  0549 02/23/22  1229   WBC 4.8 6.5   HGB 12.4 13.3   HCT 38.7 40.8   MCV 86.3 86.7    186     BMP:   Recent Labs     02/21/22  0549 02/23/22  1229    140   K 4.1 4.2    105   CO2 22 23   BUN 27* 30*   CREATININE 1.3* 1.5*   CALCIUM 8.7 9.0   MG  --  2.40     Cardiac Enzymes:   Recent Labs     02/21/22  0035   TROPONINI <0.01     PT/INR:   Recent Labs     02/23/22  1229   PROTIME 11.9   INR 1.05     APTT:   Recent Labs     02/23/22  1229   APTT 38.3     Liver Profile:  Lab Results   Component Value Date    AST 16 02/23/2022    ALT 9 02/23/2022    BILIDIR <0.2 02/23/2022    BILITOT 0.5 02/23/2022    ALKPHOS 82 02/23/2022    LABALBU 4.0 02/23/2022     Lab Results   Component Value Date    CHOL 221 02/23/2022    HDL 57 02/23/2022    TRIG 78 02/23/2022     HgbA1c:  Lab Results   Component Value Date    LABA1C 5.7 02/23/2022     UA:   Lab Results   Component Value Date    COLORU Yellow 08/10/2011    PHUR 6.0 08/10/2011    CLARITYU Clear 08/10/2011    SPECGRAV >=1.030 08/10/2011    LEUKOCYTESUR Negative 08/10/2011    UROBILINOGEN 0.2 08/10/2011    BILIRUBINUR Negative 08/10/2011    BLOODU Negative 08/10/2011    GLUCOSEU Negative 08/10/2011       History obtained: chart, pt    Risk factors: HTN, HLD, hx smoking    Diagnosis: CAD    Plan:   - CAD  Symptomatic. Ruled out for MI.   3VD. Well preserved LV function. Discussed cabg. Typical periop/postop course reviewed including initial limitations on driving/heavy lifting. Risks, benefits and postoperative complications discussed including bleeding, infection, stroke, death, postop pulmonary (former smoker) and renal (CKD) issues.    - HTN  - HLD  - CRI. Baseline 1.4-1.7.  - peripheral neuropathy. Meloxicam.    Pt and family in agreement to proceed. Schedule for 2/25 to give time for renal recovery from contrast and for renal consultation ([facility]  Etelvina Espinal)      [id number] Score for Atrial Fibrillation Stroke Risk   Risk   Factors  Component Value   C CHF No 0   H HTN No 0   A2 Age >= 76 Yes,  [de-identified] y.o.) 2   D DM No 0   S2 Prior Stroke/TIA No 0   V Vascular Disease No 0   A Age 74-69 No,  [de-identified] y.o.) 0   Sc Sex female 1    [id number]  Score  3   Score last updated 2/23/22 5:30 PM EST    Click here for a link to the UpToDate guideline \"Atrial Fibrillation: Anticoagulation therapy to prevent embolization    Disclaimer: Risk Score calculation is dependent on accuracy of patient problem list and past encounter diagnosis.     STS -  Risk of Mortality:2.319%  Renal Failure:2.323%  Permanent Stroke:2.251%  Prolonged Ventilation:8.313%  DSW Infection:0.141%  Reoperation:1.849%  Morbidity or Mortality:12.657%  Short Length of Stay:28.886%  Long Length of Stay:6.448%      Kasi Quiñones MD  2/23/2022  5:24 PM

## 2022-02-24 ENCOUNTER — ANESTHESIA EVENT (OUTPATIENT)
Dept: OPERATING ROOM | Age: 82
DRG: 233 | End: 2022-02-24
Payer: MEDICARE

## 2022-02-24 LAB
A/G RATIO: 0.9 (ref 1.1–2.2)
ALBUMIN SERPL-MCNC: 3.4 G/DL (ref 3.4–5)
ALP BLD-CCNC: 76 U/L (ref 40–129)
ALT SERPL-CCNC: 8 U/L (ref 10–40)
ANION GAP SERPL CALCULATED.3IONS-SCNC: 11 MMOL/L (ref 3–16)
AST SERPL-CCNC: 15 U/L (ref 15–37)
BASOPHILS ABSOLUTE: 0.1 K/UL (ref 0–0.2)
BASOPHILS RELATIVE PERCENT: 1.1 %
BILIRUB SERPL-MCNC: 0.4 MG/DL (ref 0–1)
BUN BLDV-MCNC: 26 MG/DL (ref 7–20)
CALCIUM SERPL-MCNC: 8.8 MG/DL (ref 8.3–10.6)
CHLORIDE BLD-SCNC: 104 MMOL/L (ref 99–110)
CO2: 23 MMOL/L (ref 21–32)
CREAT SERPL-MCNC: 1.4 MG/DL (ref 0.6–1.2)
EOSINOPHILS ABSOLUTE: 0.1 K/UL (ref 0–0.6)
EOSINOPHILS RELATIVE PERCENT: 2.6 %
GFR AFRICAN AMERICAN: 44
GFR NON-AFRICAN AMERICAN: 36
GLUCOSE BLD-MCNC: 98 MG/DL (ref 70–99)
HCT VFR BLD CALC: 38.8 % (ref 36–48)
HEMOGLOBIN: 12.8 G/DL (ref 12–16)
LYMPHOCYTES ABSOLUTE: 1.7 K/UL (ref 1–5.1)
LYMPHOCYTES RELATIVE PERCENT: 32.1 %
MCH RBC QN AUTO: 28.2 PG (ref 26–34)
MCHC RBC AUTO-ENTMCNC: 32.9 G/DL (ref 31–36)
MCV RBC AUTO: 85.6 FL (ref 80–100)
MONOCYTES ABSOLUTE: 0.6 K/UL (ref 0–1.3)
MONOCYTES RELATIVE PERCENT: 11 %
MRSA SCREEN RT-PCR: NORMAL
NEUTROPHILS ABSOLUTE: 2.8 K/UL (ref 1.7–7.7)
NEUTROPHILS RELATIVE PERCENT: 53.2 %
PDW BLD-RTO: 15.2 % (ref 12.4–15.4)
PLATELET # BLD: 170 K/UL (ref 135–450)
PMV BLD AUTO: 8.7 FL (ref 5–10.5)
POTASSIUM SERPL-SCNC: 3.9 MMOL/L (ref 3.5–5.1)
RBC # BLD: 4.53 M/UL (ref 4–5.2)
SODIUM BLD-SCNC: 138 MMOL/L (ref 136–145)
TOTAL PROTEIN: 7.1 G/DL (ref 6.4–8.2)
WBC # BLD: 5.2 K/UL (ref 4–11)

## 2022-02-24 PROCEDURE — 6370000000 HC RX 637 (ALT 250 FOR IP): Performed by: INTERNAL MEDICINE

## 2022-02-24 PROCEDURE — 2500000003 HC RX 250 WO HCPCS: Performed by: NURSE PRACTITIONER

## 2022-02-24 PROCEDURE — 80053 COMPREHEN METABOLIC PANEL: CPT

## 2022-02-24 PROCEDURE — 2100000000 HC CCU R&B

## 2022-02-24 PROCEDURE — 36415 COLL VENOUS BLD VENIPUNCTURE: CPT

## 2022-02-24 PROCEDURE — 2580000003 HC RX 258: Performed by: INTERNAL MEDICINE

## 2022-02-24 PROCEDURE — 99232 SBSQ HOSP IP/OBS MODERATE 35: CPT | Performed by: INTERNAL MEDICINE

## 2022-02-24 PROCEDURE — 6370000000 HC RX 637 (ALT 250 FOR IP): Performed by: NURSE PRACTITIONER

## 2022-02-24 PROCEDURE — 99024 POSTOP FOLLOW-UP VISIT: CPT | Performed by: THORACIC SURGERY (CARDIOTHORACIC VASCULAR SURGERY)

## 2022-02-24 PROCEDURE — 85025 COMPLETE CBC W/AUTO DIFF WBC: CPT

## 2022-02-24 PROCEDURE — 94760 N-INVAS EAR/PLS OXIMETRY 1: CPT

## 2022-02-24 RX ORDER — SODIUM CHLORIDE 0.9 % (FLUSH) 0.9 %
10 SYRINGE (ML) INJECTION PRN
Status: DISCONTINUED | OUTPATIENT
Start: 2022-02-24 | End: 2022-02-25

## 2022-02-24 RX ORDER — NITROGLYCERIN 0.4 MG/1
0.4 TABLET SUBLINGUAL EVERY 5 MIN PRN
Status: DISCONTINUED | OUTPATIENT
Start: 2022-02-24 | End: 2022-02-24 | Stop reason: SDUPTHER

## 2022-02-24 RX ORDER — CHLORHEXIDINE GLUCONATE 4 G/100ML
SOLUTION TOPICAL SEE ADMIN INSTRUCTIONS
Status: DISCONTINUED | OUTPATIENT
Start: 2022-02-24 | End: 2022-02-25

## 2022-02-24 RX ORDER — SODIUM CHLORIDE 9 MG/ML
INJECTION, SOLUTION INTRAVENOUS CONTINUOUS
Status: DISCONTINUED | OUTPATIENT
Start: 2022-02-25 | End: 2022-02-25

## 2022-02-24 RX ORDER — SODIUM CHLORIDE 9 MG/ML
25 INJECTION, SOLUTION INTRAVENOUS PRN
Status: DISCONTINUED | OUTPATIENT
Start: 2022-02-24 | End: 2022-02-25

## 2022-02-24 RX ORDER — BISACODYL 10 MG
10 SUPPOSITORY, RECTAL RECTAL ONCE
Status: DISCONTINUED | OUTPATIENT
Start: 2022-02-24 | End: 2022-02-25

## 2022-02-24 RX ORDER — NITROGLYCERIN 20 MG/100ML
10-200 INJECTION INTRAVENOUS CONTINUOUS
Status: DISCONTINUED | OUTPATIENT
Start: 2022-02-24 | End: 2022-02-25

## 2022-02-24 RX ORDER — CHLORHEXIDINE GLUCONATE 0.12 MG/ML
15 RINSE ORAL ONCE
Status: COMPLETED | OUTPATIENT
Start: 2022-02-25 | End: 2022-02-25

## 2022-02-24 RX ORDER — SODIUM CHLORIDE 0.9 % (FLUSH) 0.9 %
10 SYRINGE (ML) INJECTION EVERY 12 HOURS SCHEDULED
Status: DISCONTINUED | OUTPATIENT
Start: 2022-02-24 | End: 2022-02-25

## 2022-02-24 RX ADMIN — DOCUSATE SODIUM 100 MG: 100 CAPSULE, LIQUID FILLED ORAL at 09:02

## 2022-02-24 RX ADMIN — DOCUSATE SODIUM 100 MG: 100 CAPSULE, LIQUID FILLED ORAL at 21:46

## 2022-02-24 RX ADMIN — SODIUM CHLORIDE, PRESERVATIVE FREE 10 ML: 5 INJECTION INTRAVENOUS at 09:03

## 2022-02-24 RX ADMIN — AMITRIPTYLINE HYDROCHLORIDE 10 MG: 10 TABLET, FILM COATED ORAL at 18:35

## 2022-02-24 RX ADMIN — CARVEDILOL 12.5 MG: 12.5 TABLET, FILM COATED ORAL at 18:35

## 2022-02-24 RX ADMIN — CARVEDILOL 12.5 MG: 12.5 TABLET, FILM COATED ORAL at 09:02

## 2022-02-24 RX ADMIN — NITROGLYCERIN 10 MCG/MIN: 20 INJECTION INTRAVENOUS at 21:34

## 2022-02-24 RX ADMIN — ASPIRIN 81 MG 81 MG: 81 TABLET ORAL at 09:02

## 2022-02-24 RX ADMIN — ROSUVASTATIN CALCIUM 40 MG: 40 TABLET, FILM COATED ORAL at 21:46

## 2022-02-24 RX ADMIN — POLYETHYLENE GLYCOL 3350 17 G: 17 POWDER, FOR SOLUTION ORAL at 15:36

## 2022-02-24 RX ADMIN — MUPIROCIN: 20 OINTMENT TOPICAL at 21:46

## 2022-02-24 ASSESSMENT — PAIN SCALES - WONG BAKER
WONGBAKER_NUMERICALRESPONSE: 0

## 2022-02-24 ASSESSMENT — PAIN SCALES - GENERAL
PAINLEVEL_OUTOF10: 0

## 2022-02-24 NOTE — CARE COORDINATION
INITIAL CASE MANAGEMENT ASSESSMENT    Reviewed chart, met with patient & family (adults children [address]) to assess possible discharge needs. Explained Case Management role/services. Living Situation: Confirmed address, lives with daughter & son in law, basement level apartment, 6 steps w/ railing down, 1 step to enter building    ADLs: Independent, family assists with homemaking duties     DME: None    PT/OT Recs: Not ordered, may need eval post heart surgery     Active Services: None     Transportation: Active      Medications: Uses [facility] [address] -- no issues obtaining or affording medications    PCP: Tara Torres MD      HD/PD: n/a    PLAN/COMMENTS: Patient wants to return home with family. Would be open to home care if needed. Watch for needs post op. SW/CM provided contact information for patient or family to call with any questions. SW/CM will follow and assist as needed.   Electronically signed by Lupe Schmitt RN Case Management [phone] on 2/24/2022 at 10:21 AM

## 2022-02-24 NOTE — PROGRESS NOTES
[facility]   Daily Progress Note      Admit Date:  2/20/2022      Subjective:   Ms. [facility] is an [de-identified] male with a past medical history significant for essential hypertension who presented to [facility] with chest pain. She underwent a stress perfusion study that was grossly abnormal and high risk. A left heart catheterization demonstrated normal LV function but triple vessel CAD. A consult to CVTS was placed for CABG consideration. Intervaql History:  Samuel Banuelos feels well and has had no further chest pain or tightness. She denies any shortness of breath. . Sinus rhythm on telemetry. Objective:     /89   Pulse 66   Temp 97.7 °F (36.5 °C) (Oral)   Resp 16   Ht 5' 3\" (1.6 m)   Wt 173 lb 9.6 oz (78.7 kg)   SpO2 99%   BMI 30.75 kg/m²      Intake/Output Summary (Last 24 hours) at 2/24/2022 0905  Last data filed at 2/23/2022 1033  Gross per 24 hour   Intake 240 ml   Output --   Net 240 ml       Physical Exam:  General:  Awake, alert, NAD  Skin:  Warm and dry  Neck:  JVD<8, no carotid bruits  Chest:  Clear to auscultation, no wheezes/rhonchi/rales  Cardiovascular:  RRR, normal S1/S2, no M/R/G  Abdomen:  Soft, nontender, +bowel sounds  Extremities:  No edema  Pulses: 2+ bilat carotid    2+ bilat radial    2+ bilat femoral        Medications:    carvedilol  12.5 mg Oral BID WC    amitriptyline  10 mg Oral QPM    [Held by provider] hydroCHLOROthiazide  25 mg Oral Daily    sodium chloride flush  5-40 mL IntraVENous 2 times per day    rosuvastatin  40 mg Oral Nightly    docusate sodium  100 mg Oral BID    aspirin  81 mg Oral Daily      sodium chloride         Lab Data:  CBC:   Recent Labs     02/23/22  1229 02/24/22  0639   WBC 6.5 5.2   HGB 13.3 12.8    170     BMP:    Recent Labs     02/23/22  1229 02/24/22  0639    138   K 4.2 3.9   CO2 23 23   BUN 30* 26*   CREATININE 1.5* 1.4*       No results for input(s): CKMB, CKMBINDEX, TROPONINI in the last 72 hours.     Invalid input(s): CKTOTAL;3    Left Heart Catheterization 2/22/22:  1. Severe triple-vessel coronary artery disease. The distal left main  is heavily calcified and has a 60% lesion. The proximal LAD is heavily  calcified with a 60% lesion. The mid-LAD is 100% occluded but fills in  from right collaterals. The distal LAD backfills back to the midportion  via left collaterals. The proximal circumflex is heavily calcified and  has an 80% lesion. OM1 is calcified with 80% serial lesions. The  proximal right coronary artery is subtotally occluded and the PDA fills  from both collateral native flow from the right and the left. There is  backfilling from the right system into the mid LAD via a septal  . Also, the LAD first diagonal branch has a 90% lesion. 2.  Normal left ventricular systolic function. LV ejection fraction of  65%. 3.  Borderline left ventricular end-diastolic pressure at 14 to 16 mmHg. 4.  No gradient across the aortic valve on pullback to suggest aortic  stenosis. Echo 2/21/22:  Normal left ventricle size, wall thickness, and systolic function with an   estimated ejection fraction of 55-60%. No regional wall motion abnormalities   are seen. grade 1 diastolic dysfunction   The right ventricle is normal in size and function. No significant valvular heart disease    Assessment / Plan:     1. Unstable Angina  Rayola has had no further chest pain. Nitrates prn for angina  Continue aspirin, statin and beta blockade therapy. Triple vessel disease by [facility]. Check hemoglobin A1C. Plan for CABG on 2/25/22. Mild carotid disease by Duplex. LVEF is preserved. 2. Hyperlipidemia with goal LDL<70mg/dL  Continue statin therapy with rosuvastatin 40mg daily. 3. Essential Hypertension  Continue carvedilol for control.   Stop HCTZ now            Signed:  Precious Shelton MD

## 2022-02-24 NOTE — PROGRESS NOTES
Hospitalist Progress Note      PCP: Em Salinas    Date of Admission: 2/20/2022        Subjective: Denies any chest pain nausea vomiting blurry vision or double vision. Denies any neck pain denies any palpitation. Daughter at bedside      Medications:  Reviewed    Infusion Medications    sodium chloride       Scheduled Medications    carvedilol  12.5 mg Oral BID WC    amitriptyline  10 mg Oral QPM    hydroCHLOROthiazide  25 mg Oral Daily    [Held by provider] meloxicam  15 mg Oral Daily    sodium chloride flush  5-40 mL IntraVENous 2 times per day    rosuvastatin  40 mg Oral Nightly    docusate sodium  100 mg Oral BID    [Held by provider] enoxaparin  40 mg SubCUTAneous Daily    aspirin  81 mg Oral Daily     PRN Meds: sodium chloride flush, sodium chloride, acetaminophen, technetium tetrofosmin, oxyCODONE-acetaminophen, ondansetron **OR** ondansetron, polyethylene glycol, acetaminophen **OR** acetaminophen, perflutren lipid microspheres, nitroGLYCERIN, hydrALAZINE      Intake/Output Summary (Last 24 hours) at 2/24/2022 0357  Last data filed at 2/23/2022 1033  Gross per 24 hour   Intake 240 ml   Output --   Net 240 ml       Physical Exam Performed:    /60   Pulse 67   Temp 97.2 °F (36.2 °C) (Axillary)   Resp 18   Ht 5' 3\" (1.6 m)   Wt 171 lb 11.8 oz (77.9 kg)   SpO2 100%   BMI 30.42 kg/m²     General appearance: No apparent distress  Neck: Supple  Respiratory:  Normal respiratory effort. Clear to auscultation, bilaterally without Rales/Wheezes/Rhonchi. Cardiovascular: Regular rate and rhythm with normal S1/S2 without murmurs, rubs or gallops. Abdomen: Soft, non-tender, non-distended   Musculoskeletal: No clubbing, cyanosis   Skin: Skin color, texture, turgor normal.  No rashes or lesions.   Neurologic: No focal weakness  Psychiatric: Alert and oriented  Capillary Refill: Brisk,3 seconds, normal   Peripheral Pulses: +2 palpable, equal bilaterally       Labs:   Recent Labs 02/21/22  0549 02/23/22  1229   WBC 4.8 6.5   HGB 12.4 13.3   HCT 38.7 40.8    186     Recent Labs     02/21/22  0549 02/23/22  1229    140   K 4.1 4.2    105   CO2 22 23   BUN 27* 30*   CREATININE 1.3* 1.5*   CALCIUM 8.7 9.0     Recent Labs     02/23/22  1229   AST 16   ALT 9*   BILIDIR <0.2   BILITOT 0.5   ALKPHOS 82     Recent Labs     02/23/22  1229   INR 1.05     No results for input(s): CKTOTAL, TROPONINI in the last 72 hours. Urinalysis:      Lab Results   Component Value Date    NITRU Negative 02/23/2022    BLOODU Negative 02/23/2022    SPECGRAV 1.026 02/23/2022    GLUCOSEU Negative 02/23/2022    GLUCOSEU Negative 08/10/2011       Radiology:  VL PRE OP VEIN MAPPING   Final Result      CT CHEST WO CONTRAST   Final Result   Punctate pulmonary nodule in the lingula. No pulmonary nodule on the right. No pneumonia or edema. Severe coronary artery calcification is seen. There is mild calcification of   the ascending aorta with moderate calcification of the aortic arch and   descending thoracic aorta. RECOMMENDATIONS:   Fleischner Society guidelines for follow-up and management of incidentally   detected pulmonary nodules:      Single Solid Nodule:      Nodule size less than 6 mm   In a low-risk patient, no routine follow-up. In a high-risk patient, optional CT at 12 months. - Low risk patients include individuals with minimal or absent history of   smoking and other known risk factors. - High risk patients include individuals with a history or smoking or known   risk factors. Radiology 2017 http://pubs. rsna.org/doi/full/10.1148/radiol. 9489962123         VL DUP CAROTID BILATERAL   Final Result      NM MYOCARDIAL SPECT REST EXERCISE OR RX   Final Result      XR CHEST PORTABLE   Final Result   1. No acute process. 2. Possible small nodule in the right mid lung. Consider further evaluation   with nonemergent noncontrast chest CT. Assessment/Plan:    Active Hospital Problems    Diagnosis     Unstable angina ([facility] [address] [I20.0]     Abnormal cardiovascular stress test [R94.39]     Hyperlipidemia LDL goal <70 [E78.5]     Chest pain [R07.9]      1. unstable angina, troponin negative, positive stress test, cardiology consulted, cardiac cath positive for triple-vessel disease, discussed with cardiology, discussed with patient and her son, all questions answered. Plan to proceed with CABG. 2. Hypertensive urgency, likely due to missing few doses of her medication, restarted on p.o. medication and as needed medication, better controlled. 3. Osteoarthritis, controlled at this time  4.  Likely CKD, with some increase in creatinine, nephrology consulted. Diet: ADULT DIET; Regular;  No Added Salt (3-4 gm)  Code Status: Full Code      Leticia Anne MD

## 2022-02-24 NOTE — PLAN OF CARE
Problem: Falls - Risk of:  Goal: Will remain free from falls  Description: Will remain free from falls  Outcome: Met This Shift  Goal: Absence of physical injury  Description: Absence of physical injury  Outcome: Met This Shift   Alert and oriented with some confusion but easily redirected. Resp. Easy and even Sats. WNL on RA Lungs diminished in bases Cough and deep breathing exercises encouraged Daughter at bedside Patient up ADLIB with steady gait Free form fall/injury. Cont.  Per bathroom Frequently turns and repositions self

## 2022-02-24 NOTE — ACP (ADVANCE CARE PLANNING)
Advance Care Planning     Advance Care Planning Activator (Inpatient)  Conversation Note      Date of ACP Conversation: 2/24/2022     Conversation Conducted with: Patient with Decision Making Capacity    ACP Activator: Willow Meyesr 46 Decision Maker:     Current Designated Health Care Decision Maker:     Primary Decision Maker: Cyndy Newberry Child - [phone]    Secondary Decision Maker: Yusuf Hernandez - Child - [phone]    Today we documented Decision Maker(s) consistent with Legal Next of Kin hierarchy. Care Preferences    Ventilation: \"If you were in your present state of health and suddenly became very ill and were unable to breathe on your own, what would your preference be about the use of a ventilator (breathing machine) if it were available to you? \"      Would the patient desire the use of ventilator (breathing machine)?: yes    \"If your health worsens and it becomes clear that your chance of recovery is unlikely, what would your preference be about the use of a ventilator (breathing machine) if it were available to you? \"     Would the patient desire the use of ventilator (breathing machine)?: No      Resuscitation  \"CPR works best to restart the heart when there is a sudden event, like a heart attack, in someone who is otherwise healthy. Unfortunately, CPR does not typically restart the heart for people who have serious health conditions or who are very sick. \"    \"In the event your heart stopped as a result of an underlying serious health condition, would you want attempts to be made to restart your heart (answer \"yes\" for attempt to resuscitate) or would you prefer a natural death (answer \"no\" for do not attempt to resuscitate)? \" yes       [] Yes   [x] No   Educated Patient / Mk Izaguirre regarding differences between Advance Directives and portable DNR orders.     Length of ACP Conversation in minutes:  5 minutes    Conversation Outcomes:  [x] ACP discussion completed  [] Existing advance directive reviewed with patient; no changes to patient's previously recorded wishes  [] New Advance Directive completed  [] Portable Do Not Rescitate prepared for Provider review and signature  [] POLST/POST/MOLST/MOST prepared for Provider review and signature      Follow-up plan:    [] Schedule follow-up conversation to continue planning  [] Referred individual to Provider for additional questions/concerns   [] Advised patient/agent/surrogate to review completed ACP document and update if needed with changes in condition, patient preferences or care setting    [x] This note routed to one or more involved healthcare providers    Electronically signed by Caleb Perera RN Case Management [phone] on 2/24/2022 at 10:24 AM

## 2022-02-24 NOTE — CONSULTS
[address] [place] [address] Nephrology   [facility] [employer]  [phone]  Nephrology Consult Note          Patient ID: Amanda Bennett  Referring/ Physician: Kelly Horowitz MD      HPI/Summary:   Amanda Bennett is being seen by nephrology for SKINNY. This is a [de-identified] yo lady with PMH of HTN, CKD, HLD and obesity who presented with chest pain and SOB. She had been having several weeks of chest tightness and dyspnea with exertion. She feels ok now, no complaints. Says she has been on medication for BP for decades. Her Bp had been running prior to admission. She has been taking Meloxicam for the past few years for knee pain/arthritis. Denies hematuria, foamy urine. LUTS. No dysuria. No issues with swelling right now. During this admission she has had C 2/22/22 showing severe triple vessel disease. Normal LV function. LVEDP 14-16. She was referred for CABG and it is planned for 2/25/22 . Cr has been rising from 1.2 > 1.3 > 1.5 at time of consult. Of note , she has been on scheduled meloxicam in addition to receiving contrast. Her PO intake has been fine, no NVD. /89  99% sat on room air. UO not recorded. Plan:   - discontinue NSAIDs.   - Cr stable today and no acute electrolyte or volume issues. - suspect Cr rise was triggered by decreased renal perfusion in the setting of contrast load plus the NSAID. - her risk of postoperative SKINNY and/or dialysis are very low. 1.8% based on [facility] scoring system. 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 Cr peaked at 1.5  She was on meloxicam and got contrast 2/22, Cr started to rise two days later consistent with DANNY. She is not volume overloaded or dehydrated on exam.   Uric acid  CK  UA showed no protein or cells, totally bland. #CAD triple vessel disease  [facility] 2/22  CABG planned 2/25  Asa statin and BB  Preserved EF  Not volume overloaded. #full code.          [facility] Nephrology would like to thank you for the opportunity to serve this patient. Please call with any questions or concerns. Albino Walls MD  [facility] Nephrology  [address]  [place], [address]  Fax: [phone]  Office: [phone]         CC/Reason for consult:   Reason for consult: SKINNY  Chief Complaint   Patient presents with    Chest Pain    Hypertension           Review of Systems:   [address] All other remaining systems are negative. Constitutional:  fever, chills, weakness, weight change, fatigue,      Skin:  rash, pruritus, hair loss, bruising, dry skin, petechiae. Head, Face, Neck   headaches, swelling,  cervical adenopathy. Respiratory: shortness of breath, cough, or wheezing  Cardiovascular: chest pain, palpitations, dizzy, edema  Gastrointestinal: nausea, vomiting, diarrhea, constipation,belly pain    Yellow skin, blood in stool  Musculoskeletal:  back pain, muscle weakness, gait problems,       joint pain or swelling. Genitourinary:  dysuria, poor urine flow, flank pain, blood in urine  Neurologic:  vertigo, TIA'S, syncope, seizures, focal weakness  Psychosocial:  insomnia, anxiety, or depression. Additional positive findings: -     PMH/SH/FH:    Medical Hx: reviewed and updated as appropriate  Past Medical History:   Diagnosis Date    Arthritis     Asthma     as child grew out of it    CAD (coronary artery disease) 02/22/2022    multi vessel    CKD (chronic kidney disease) 2015    Elevated creatinine since 2015; Stage III b    Glaucoma     Hyperlipidemia     Hypertension     Hyperthyroidism 2019    Graves disease    IBS (irritable bowel syndrome) 10/2020    On Linzess    Neuropathy     Peripheral    Obesity (BMI 30-39. 9)     Osteopenia 05/2009    Osteoporosis          Surgical Hx: reviewed and updated as appropriate   has a past surgical history that includes Cholecystectomy; Foot surgery;  Tubal ligation; Gastric Band ([id number]); orif femur decompression (Left, 2014); Ankle fracture surgery (Left, 2015); Hand surgery (); and other surgical history (Right, ). Social Hx: reviewed and updated as appropriate  Social History     Tobacco Use    Smoking status: Former Smoker     Quit date: 8/10/1999     Years since quittin.5    Smokeless tobacco: Never Used    Tobacco comment: started age 26   Substance Use Topics    Alcohol use: No        Family hx: reviewed and updated as appropriate  family history includes Heart Disease in her father; Rheum Arthritis in her sister; Stroke in her father. Medications:   carvedilol, 12.5 mg, BID WC  amitriptyline, 10 mg, QPM  sodium chloride flush, 5-40 mL, 2 times per day  rosuvastatin, 40 mg, Nightly  docusate sodium, 100 mg, BID  aspirin, 81 mg, Daily       Patient has no known allergies. Allergies:   No Known Allergies      Physical Exam/Objective:   Vitals:    22 0830   BP: 122/89   Pulse: 66   Resp: 16   Temp: 97.7 °F (36.5 °C)   SpO2: 99%       Intake/Output Summary (Last 24 hours) at 2022 1225  Last data filed at 2022 0919  Gross per 24 hour   Intake 120 ml   Output --   Net 120 ml         General appearance:  in no acute distress, comfortable, communicative, awake and alert. HEENT: no icterus, EOM intact, trachea midline. Neck : no masses, appears symmetrical and no JVD appreciated. Respiratory: Respiratory effort normal, bilateral equal chest rise. No wheeze, no crackles   Cardiovascular: Ausculation shows RRR and  no edema   Abdomen: abdomen is soft, non distended, no masses, no pain with palpation. Musculoskeletal:  no joint swelling, no deformity, strength grossly normal.   Skin: no rashes, no induration, no tightening, no jaundice   Neuro:   Follows commands, moves all extremities spontaneously       Data:   CBC:   Recent Labs     22  1229 22  0639   WBC 6.5 5.2   HGB 13.3 12.8   HCT 40.8 38.8    170     BMP:    Recent Labs     22  1229 02/24/22  0639    138   K 4.2 3.9    104   CO2 23 23   BUN 30* 26*   CREATININE 1.5* 1.4*   GLUCOSE 95 98   MG 2.40  --

## 2022-02-25 ENCOUNTER — APPOINTMENT (OUTPATIENT)
Dept: GENERAL RADIOLOGY | Age: 82
DRG: 233 | End: 2022-02-25
Payer: MEDICARE

## 2022-02-25 ENCOUNTER — ANESTHESIA (OUTPATIENT)
Dept: OPERATING ROOM | Age: 82
DRG: 233 | End: 2022-02-25
Payer: MEDICARE

## 2022-02-25 VITALS — TEMPERATURE: 98.1 F | RESPIRATION RATE: 12 BRPM | OXYGEN SATURATION: 100 %

## 2022-02-25 LAB
ACTIVATED CLOTTING TIME: 135 SEC (ref 99–130)
ACTIVATED CLOTTING TIME: 471 SEC (ref 99–130)
ACTIVATED CLOTTING TIME: 489 SEC (ref 99–130)
ACTIVATED CLOTTING TIME: 502 SEC (ref 99–130)
ACTIVATED CLOTTING TIME: 521 SEC (ref 99–130)
ACTIVATED CLOTTING TIME: 531 SEC (ref 99–130)
ACTIVATED CLOTTING TIME: 96 SEC (ref 99–130)
ANION GAP SERPL CALCULATED.3IONS-SCNC: 10 MMOL/L (ref 3–16)
ANION GAP SERPL CALCULATED.3IONS-SCNC: 11 MMOL/L (ref 3–16)
APTT: 36.7 SEC (ref 26.2–38.6)
BASE EXCESS ARTERIAL: -1 (ref -3–3)
BASE EXCESS ARTERIAL: -2 (ref -3–3)
BASE EXCESS ARTERIAL: -3 (ref -3–3)
BASE EXCESS ARTERIAL: -4 (ref -3–3)
BASE EXCESS ARTERIAL: -5 (ref -3–3)
BASE EXCESS ARTERIAL: 0 (ref -3–3)
BASE EXCESS ARTERIAL: 1 (ref -3–3)
BASOPHILS ABSOLUTE: 0 K/UL (ref 0–0.2)
BASOPHILS ABSOLUTE: 0 K/UL (ref 0–0.2)
BASOPHILS RELATIVE PERCENT: 0.3 %
BASOPHILS RELATIVE PERCENT: 0.9 %
BUN BLDV-MCNC: 19 MG/DL (ref 7–20)
BUN BLDV-MCNC: 24 MG/DL (ref 7–20)
CALCIUM IONIZED: 1.13 MMOL/L (ref 1.12–1.32)
CALCIUM IONIZED: 1.14 MMOL/L (ref 1.12–1.32)
CALCIUM IONIZED: 1.14 MMOL/L (ref 1.12–1.32)
CALCIUM IONIZED: 1.17 MMOL/L (ref 1.12–1.32)
CALCIUM IONIZED: 1.19 MMOL/L (ref 1.12–1.32)
CALCIUM IONIZED: 1.32 MMOL/L (ref 1.12–1.32)
CALCIUM IONIZED: 1.34 MMOL/L (ref 1.12–1.32)
CALCIUM IONIZED: 1.35 MMOL/L (ref 1.12–1.32)
CALCIUM SERPL-MCNC: 8.1 MG/DL (ref 8.3–10.6)
CALCIUM SERPL-MCNC: 8.9 MG/DL (ref 8.3–10.6)
CHLORIDE BLD-SCNC: 101 MMOL/L (ref 99–110)
CHLORIDE BLD-SCNC: 106 MMOL/L (ref 99–110)
CO2: 21 MMOL/L (ref 21–32)
CO2: 23 MMOL/L (ref 21–32)
CREAT SERPL-MCNC: 1.1 MG/DL (ref 0.6–1.2)
CREAT SERPL-MCNC: 1.3 MG/DL (ref 0.6–1.2)
EOSINOPHILS ABSOLUTE: 0 K/UL (ref 0–0.6)
EOSINOPHILS ABSOLUTE: 0.1 K/UL (ref 0–0.6)
EOSINOPHILS RELATIVE PERCENT: 0 %
EOSINOPHILS RELATIVE PERCENT: 2.5 %
GFR AFRICAN AMERICAN: 48
GFR AFRICAN AMERICAN: 58
GFR NON-AFRICAN AMERICAN: 39
GFR NON-AFRICAN AMERICAN: 48
GLUCOSE BLD-MCNC: 103 MG/DL (ref 70–99)
GLUCOSE BLD-MCNC: 109 MG/DL (ref 70–99)
GLUCOSE BLD-MCNC: 126 MG/DL (ref 70–99)
GLUCOSE BLD-MCNC: 132 MG/DL (ref 70–99)
GLUCOSE BLD-MCNC: 164 MG/DL (ref 70–99)
GLUCOSE BLD-MCNC: 209 MG/DL (ref 70–99)
GLUCOSE BLD-MCNC: 76 MG/DL (ref 70–99)
GLUCOSE BLD-MCNC: 86 MG/DL (ref 70–99)
GLUCOSE BLD-MCNC: 88 MG/DL (ref 70–99)
GLUCOSE BLD-MCNC: 89 MG/DL (ref 70–99)
GLUCOSE BLD-MCNC: 89 MG/DL (ref 70–99)
GLUCOSE BLD-MCNC: 97 MG/DL (ref 70–99)
GLUCOSE BLD-MCNC: 98 MG/DL (ref 70–99)
HCO3 ARTERIAL: 20.4 MMOL/L (ref 21–29)
HCO3 ARTERIAL: 20.5 MMOL/L (ref 21–29)
HCO3 ARTERIAL: 21.3 MMOL/L (ref 21–29)
HCO3 ARTERIAL: 21.7 MMOL/L (ref 21–29)
HCO3 ARTERIAL: 22.1 MMOL/L (ref 21–29)
HCO3 ARTERIAL: 22.6 MMOL/L (ref 21–29)
HCO3 ARTERIAL: 22.8 MMOL/L (ref 21–29)
HCO3 ARTERIAL: 23.2 MMOL/L (ref 21–29)
HCO3 ARTERIAL: 23.6 MMOL/L (ref 21–29)
HCO3 ARTERIAL: 23.8 MMOL/L (ref 21–29)
HCO3 ARTERIAL: 23.9 MMOL/L (ref 21–29)
HCO3 ARTERIAL: 25.4 MMOL/L (ref 21–29)
HCT VFR BLD CALC: 23.1 % (ref 36–48)
HCT VFR BLD CALC: 24.1 % (ref 36–48)
HCT VFR BLD CALC: 39.3 % (ref 36–48)
HEMOGLOBIN: 11.5 GM/DL (ref 12–16)
HEMOGLOBIN: 12.9 G/DL (ref 12–16)
HEMOGLOBIN: 6.4 GM/DL (ref 12–16)
HEMOGLOBIN: 6.6 GM/DL (ref 12–16)
HEMOGLOBIN: 7.6 G/DL (ref 12–16)
HEMOGLOBIN: 7.8 GM/DL (ref 12–16)
HEMOGLOBIN: 7.9 GM/DL (ref 12–16)
HEMOGLOBIN: 8 GM/DL (ref 12–16)
HEMOGLOBIN: 8.2 G/DL (ref 12–16)
HEMOGLOBIN: 9.6 GM/DL (ref 12–16)
INR BLD: 1.09 (ref 0.88–1.12)
INR BLD: 1.82 (ref 0.88–1.12)
LACTATE: 1.32 MMOL/L (ref 0.4–2)
LACTATE: 1.32 MMOL/L (ref 0.4–2)
LACTATE: 1.35 MMOL/L (ref 0.4–2)
LACTATE: 1.39 MMOL/L (ref 0.4–2)
LACTATE: 1.52 MMOL/L (ref 0.4–2)
LACTATE: 1.55 MMOL/L (ref 0.4–2)
LACTATE: 1.69 MMOL/L (ref 0.4–2)
LYMPHOCYTES ABSOLUTE: 0.3 K/UL (ref 1–5.1)
LYMPHOCYTES ABSOLUTE: 1 K/UL (ref 1–5.1)
LYMPHOCYTES RELATIVE PERCENT: 2.9 %
LYMPHOCYTES RELATIVE PERCENT: 22.3 %
MAGNESIUM: 2.1 MG/DL (ref 1.8–2.4)
MAGNESIUM: 3.3 MG/DL (ref 1.8–2.4)
MCH RBC QN AUTO: 28 PG (ref 26–34)
MCH RBC QN AUTO: 28.1 PG (ref 26–34)
MCH RBC QN AUTO: 28.8 PG (ref 26–34)
MCHC RBC AUTO-ENTMCNC: 32.7 G/DL (ref 31–36)
MCHC RBC AUTO-ENTMCNC: 32.8 G/DL (ref 31–36)
MCHC RBC AUTO-ENTMCNC: 34.1 G/DL (ref 31–36)
MCV RBC AUTO: 84.4 FL (ref 80–100)
MCV RBC AUTO: 85.3 FL (ref 80–100)
MCV RBC AUTO: 85.9 FL (ref 80–100)
MONOCYTES ABSOLUTE: 0.4 K/UL (ref 0–1.3)
MONOCYTES ABSOLUTE: 0.8 K/UL (ref 0–1.3)
MONOCYTES RELATIVE PERCENT: 7.1 %
MONOCYTES RELATIVE PERCENT: 9.6 %
NEUTROPHILS ABSOLUTE: 10.1 K/UL (ref 1.7–7.7)
NEUTROPHILS ABSOLUTE: 3 K/UL (ref 1.7–7.7)
NEUTROPHILS RELATIVE PERCENT: 64.7 %
NEUTROPHILS RELATIVE PERCENT: 89.7 %
O2 SAT, ARTERIAL: 100 % (ref 93–100)
O2 SAT, ARTERIAL: 99 % (ref 93–100)
O2 SAT, ARTERIAL: 99 % (ref 93–100)
PCO2 ARTERIAL: 30.1 MM HG (ref 35–45)
PCO2 ARTERIAL: 30.1 MM HG (ref 35–45)
PCO2 ARTERIAL: 31 MM HG (ref 35–45)
PCO2 ARTERIAL: 31.3 MM HG (ref 35–45)
PCO2 ARTERIAL: 33.5 MM HG (ref 35–45)
PCO2 ARTERIAL: 35.2 MM HG (ref 35–45)
PCO2 ARTERIAL: 37.1 MM HG (ref 35–45)
PCO2 ARTERIAL: 37.8 MM HG (ref 35–45)
PCO2 ARTERIAL: 38.6 MM HG (ref 35–45)
PCO2 ARTERIAL: 40.1 MM HG (ref 35–45)
PCO2 ARTERIAL: 41 MM HG (ref 35–45)
PCO2 ARTERIAL: 41.3 MM HG (ref 35–45)
PDW BLD-RTO: 13.9 % (ref 12.4–15.4)
PDW BLD-RTO: 14.5 % (ref 12.4–15.4)
PDW BLD-RTO: 14.6 % (ref 12.4–15.4)
PERFORMED ON: ABNORMAL
PH ARTERIAL: 7.35 (ref 7.35–7.45)
PH ARTERIAL: 7.37 (ref 7.35–7.45)
PH ARTERIAL: 7.38 (ref 7.35–7.45)
PH ARTERIAL: 7.39 (ref 7.35–7.45)
PH ARTERIAL: 7.39 (ref 7.35–7.45)
PH ARTERIAL: 7.41 (ref 7.35–7.45)
PH ARTERIAL: 7.41 (ref 7.35–7.45)
PH ARTERIAL: 7.42 (ref 7.35–7.45)
PH ARTERIAL: 7.43 (ref 7.35–7.45)
PH ARTERIAL: 7.46 (ref 7.35–7.45)
PH ARTERIAL: 7.47 (ref 7.35–7.45)
PH ARTERIAL: 7.48 (ref 7.35–7.45)
PLATELET # BLD: 151 K/UL (ref 135–450)
PLATELET # BLD: 72 K/UL (ref 135–450)
PLATELET # BLD: 75 K/UL (ref 135–450)
PLATELET SLIDE REVIEW: ABNORMAL
PMV BLD AUTO: 8.8 FL (ref 5–10.5)
PMV BLD AUTO: 8.9 FL (ref 5–10.5)
PMV BLD AUTO: 9.1 FL (ref 5–10.5)
PO2 ARTERIAL: 136.7 MM HG (ref 75–108)
PO2 ARTERIAL: 158.9 MM HG (ref 75–108)
PO2 ARTERIAL: 207.2 MM HG (ref 75–108)
PO2 ARTERIAL: 256.7 MM HG (ref 75–108)
PO2 ARTERIAL: 381.4 MM HG (ref 75–108)
PO2 ARTERIAL: 382.4 MM HG (ref 75–108)
PO2 ARTERIAL: 422.8 MM HG (ref 75–108)
PO2 ARTERIAL: 452.3 MM HG (ref 75–108)
PO2 ARTERIAL: 458.6 MM HG (ref 75–108)
PO2 ARTERIAL: 504.6 MM HG (ref 75–108)
PO2 ARTERIAL: 523.4 MM HG (ref 75–108)
PO2 ARTERIAL: 580.7 MM HG (ref 75–108)
POC FIO2: 100
POC FIO2: 4
POC FIO2: 40
POC FIO2: 50
POC HEMATOCRIT: 19 % (ref 36–48)
POC HEMATOCRIT: 19 % (ref 36–48)
POC HEMATOCRIT: 23 % (ref 36–48)
POC HEMATOCRIT: 23 % (ref 36–48)
POC HEMATOCRIT: 24 % (ref 36–48)
POC HEMATOCRIT: 28 % (ref 36–48)
POC HEMATOCRIT: 34 % (ref 36–48)
POC POTASSIUM: 3.8 MMOL/L (ref 3.5–5.1)
POC POTASSIUM: 3.9 MMOL/L (ref 3.5–5.1)
POC POTASSIUM: 4.2 MMOL/L (ref 3.5–5.1)
POC POTASSIUM: 4.4 MMOL/L (ref 3.5–5.1)
POC POTASSIUM: 4.4 MMOL/L (ref 3.5–5.1)
POC POTASSIUM: 4.6 MMOL/L (ref 3.5–5.1)
POC POTASSIUM: 4.8 MMOL/L (ref 3.5–5.1)
POC POTASSIUM: 4.9 MMOL/L (ref 3.5–5.1)
POC POTASSIUM: 5 MMOL/L (ref 3.5–5.1)
POC POTASSIUM: 5.8 MMOL/L (ref 3.5–5.1)
POC SAMPLE TYPE: ABNORMAL
POC SODIUM: 136 MMOL/L (ref 136–145)
POC SODIUM: 137 MMOL/L (ref 136–145)
POC SODIUM: 138 MMOL/L (ref 136–145)
POC SODIUM: 138 MMOL/L (ref 136–145)
POC SODIUM: 141 MMOL/L (ref 136–145)
POTASSIUM SERPL-SCNC: 4.2 MMOL/L (ref 3.5–5.1)
POTASSIUM SERPL-SCNC: 4.3 MMOL/L (ref 3.5–5.1)
POTASSIUM SERPL-SCNC: 4.5 MMOL/L (ref 3.5–5.1)
PROTHROMBIN TIME: 12.4 SEC (ref 9.9–12.7)
PROTHROMBIN TIME: 21.1 SEC (ref 9.9–12.7)
RBC # BLD: 2.7 M/UL (ref 4–5.2)
RBC # BLD: 2.86 M/UL (ref 4–5.2)
RBC # BLD: 4.58 M/UL (ref 4–5.2)
SLIDE REVIEW: ABNORMAL
SODIUM BLD-SCNC: 135 MMOL/L (ref 136–145)
SODIUM BLD-SCNC: 137 MMOL/L (ref 136–145)
TCO2 ARTERIAL: 21 MMOL/L
TCO2 ARTERIAL: 22 MMOL/L
TCO2 ARTERIAL: 22 MMOL/L
TCO2 ARTERIAL: 23 MMOL/L
TCO2 ARTERIAL: 23 MMOL/L
TCO2 ARTERIAL: 24 MMOL/L
TCO2 ARTERIAL: 25 MMOL/L
TCO2 ARTERIAL: 27 MMOL/L
VANCOMYCIN RANDOM: 10.2 UG/ML
WBC # BLD: 11.3 K/UL (ref 4–11)
WBC # BLD: 11.3 K/UL (ref 4–11)
WBC # BLD: 4.6 K/UL (ref 4–11)

## 2022-02-25 PROCEDURE — C1889 IMPLANT/INSERT DEVICE, NOC: HCPCS | Performed by: THORACIC SURGERY (CARDIOTHORACIC VASCULAR SURGERY)

## 2022-02-25 PROCEDURE — P9041 ALBUMIN (HUMAN),5%, 50ML: HCPCS | Performed by: THORACIC SURGERY (CARDIOTHORACIC VASCULAR SURGERY)

## 2022-02-25 PROCEDURE — 6360000002 HC RX W HCPCS: Performed by: NURSE PRACTITIONER

## 2022-02-25 PROCEDURE — C1751 CATH, INF, PER/CENT/MIDLINE: HCPCS | Performed by: THORACIC SURGERY (CARDIOTHORACIC VASCULAR SURGERY)

## 2022-02-25 PROCEDURE — 83735 ASSAY OF MAGNESIUM: CPT

## 2022-02-25 PROCEDURE — 3600000018 HC SURGERY OHS ADDTL 15MIN: Performed by: THORACIC SURGERY (CARDIOTHORACIC VASCULAR SURGERY)

## 2022-02-25 PROCEDURE — 2500000003 HC RX 250 WO HCPCS: Performed by: ANESTHESIOLOGY

## 2022-02-25 PROCEDURE — 71045 X-RAY EXAM CHEST 1 VIEW: CPT

## 2022-02-25 PROCEDURE — 3700000001 HC ADD 15 MINUTES (ANESTHESIA): Performed by: THORACIC SURGERY (CARDIOTHORACIC VASCULAR SURGERY)

## 2022-02-25 PROCEDURE — C1729 CATH, DRAINAGE: HCPCS | Performed by: THORACIC SURGERY (CARDIOTHORACIC VASCULAR SURGERY)

## 2022-02-25 PROCEDURE — 82803 BLOOD GASES ANY COMBINATION: CPT

## 2022-02-25 PROCEDURE — A4216 STERILE WATER/SALINE, 10 ML: HCPCS | Performed by: ANESTHESIOLOGY

## 2022-02-25 PROCEDURE — B24BZZ4 ULTRASONOGRAPHY OF HEART WITH AORTA, TRANSESOPHAGEAL: ICD-10-PCS | Performed by: THORACIC SURGERY (CARDIOTHORACIC VASCULAR SURGERY)

## 2022-02-25 PROCEDURE — 82947 ASSAY GLUCOSE BLOOD QUANT: CPT

## 2022-02-25 PROCEDURE — 33533 CABG ARTERIAL SINGLE: CPT | Performed by: THORACIC SURGERY (CARDIOTHORACIC VASCULAR SURGERY)

## 2022-02-25 PROCEDURE — A4217 STERILE WATER/SALINE, 500 ML: HCPCS | Performed by: THORACIC SURGERY (CARDIOTHORACIC VASCULAR SURGERY)

## 2022-02-25 PROCEDURE — 02100A9 BYPASS CORONARY ARTERY, ONE ARTERY FROM LEFT INTERNAL MAMMARY WITH AUTOLOGOUS ARTERIAL TISSUE, OPEN APPROACH: ICD-10-PCS | Performed by: THORACIC SURGERY (CARDIOTHORACIC VASCULAR SURGERY)

## 2022-02-25 PROCEDURE — 2720000010 HC SURG SUPPLY STERILE: Performed by: THORACIC SURGERY (CARDIOTHORACIC VASCULAR SURGERY)

## 2022-02-25 PROCEDURE — 84132 ASSAY OF SERUM POTASSIUM: CPT

## 2022-02-25 PROCEDURE — 2500000003 HC RX 250 WO HCPCS: Performed by: THORACIC SURGERY (CARDIOTHORACIC VASCULAR SURGERY)

## 2022-02-25 PROCEDURE — 02L70CK OCCLUSION OF LEFT ATRIAL APPENDAGE WITH EXTRALUMINAL DEVICE, OPEN APPROACH: ICD-10-PCS | Performed by: THORACIC SURGERY (CARDIOTHORACIC VASCULAR SURGERY)

## 2022-02-25 PROCEDURE — 06BQ0ZZ EXCISION OF LEFT SAPHENOUS VEIN, OPEN APPROACH: ICD-10-PCS | Performed by: THORACIC SURGERY (CARDIOTHORACIC VASCULAR SURGERY)

## 2022-02-25 PROCEDURE — 2709999900 HC NON-CHARGEABLE SUPPLY: Performed by: THORACIC SURGERY (CARDIOTHORACIC VASCULAR SURGERY)

## 2022-02-25 PROCEDURE — 6370000000 HC RX 637 (ALT 250 FOR IP): Performed by: NURSE PRACTITIONER

## 2022-02-25 PROCEDURE — 7100000001 HC PACU RECOVERY - ADDTL 15 MIN

## 2022-02-25 PROCEDURE — 3700000000 HC ANESTHESIA ATTENDED CARE: Performed by: THORACIC SURGERY (CARDIOTHORACIC VASCULAR SURGERY)

## 2022-02-25 PROCEDURE — 2780000010 HC IMPLANT OTHER: Performed by: THORACIC SURGERY (CARDIOTHORACIC VASCULAR SURGERY)

## 2022-02-25 PROCEDURE — 33519 CABG ARTERY-VEIN THREE: CPT | Performed by: THORACIC SURGERY (CARDIOTHORACIC VASCULAR SURGERY)

## 2022-02-25 PROCEDURE — 94002 VENT MGMT INPAT INIT DAY: CPT

## 2022-02-25 PROCEDURE — 7100000000 HC PACU RECOVERY - FIRST 15 MIN

## 2022-02-25 PROCEDURE — 2580000003 HC RX 258: Performed by: NURSE ANESTHETIST, CERTIFIED REGISTERED

## 2022-02-25 PROCEDURE — 6360000002 HC RX W HCPCS: Performed by: ANESTHESIOLOGY

## 2022-02-25 PROCEDURE — 6370000000 HC RX 637 (ALT 250 FOR IP): Performed by: NURSE ANESTHETIST, CERTIFIED REGISTERED

## 2022-02-25 PROCEDURE — 80202 ASSAY OF VANCOMYCIN: CPT

## 2022-02-25 PROCEDURE — 3600000008 HC SURGERY OHS BASE: Performed by: THORACIC SURGERY (CARDIOTHORACIC VASCULAR SURGERY)

## 2022-02-25 PROCEDURE — 85347 COAGULATION TIME ACTIVATED: CPT

## 2022-02-25 PROCEDURE — 2100000000 HC CCU R&B

## 2022-02-25 PROCEDURE — 85730 THROMBOPLASTIN TIME PARTIAL: CPT

## 2022-02-25 PROCEDURE — 85610 PROTHROMBIN TIME: CPT

## 2022-02-25 PROCEDURE — 2580000003 HC RX 258: Performed by: THORACIC SURGERY (CARDIOTHORACIC VASCULAR SURGERY)

## 2022-02-25 PROCEDURE — 36415 COLL VENOUS BLD VENIPUNCTURE: CPT

## 2022-02-25 PROCEDURE — 2580000003 HC RX 258: Performed by: NURSE PRACTITIONER

## 2022-02-25 PROCEDURE — 6360000002 HC RX W HCPCS: Performed by: NURSE ANESTHETIST, CERTIFIED REGISTERED

## 2022-02-25 PROCEDURE — 84295 ASSAY OF SERUM SODIUM: CPT

## 2022-02-25 PROCEDURE — 80048 BASIC METABOLIC PNL TOTAL CA: CPT

## 2022-02-25 PROCEDURE — 6360000002 HC RX W HCPCS: Performed by: THORACIC SURGERY (CARDIOTHORACIC VASCULAR SURGERY)

## 2022-02-25 PROCEDURE — 33508 ENDOSCOPIC VEIN HARVEST: CPT | Performed by: THORACIC SURGERY (CARDIOTHORACIC VASCULAR SURGERY)

## 2022-02-25 PROCEDURE — 83605 ASSAY OF LACTIC ACID: CPT

## 2022-02-25 PROCEDURE — 6370000000 HC RX 637 (ALT 250 FOR IP): Performed by: THORACIC SURGERY (CARDIOTHORACIC VASCULAR SURGERY)

## 2022-02-25 PROCEDURE — 2580000003 HC RX 258: Performed by: ANESTHESIOLOGY

## 2022-02-25 PROCEDURE — 2500000003 HC RX 250 WO HCPCS: Performed by: NURSE ANESTHETIST, CERTIFIED REGISTERED

## 2022-02-25 PROCEDURE — 94761 N-INVAS EAR/PLS OXIMETRY MLT: CPT

## 2022-02-25 PROCEDURE — 85014 HEMATOCRIT: CPT

## 2022-02-25 PROCEDURE — 2500000003 HC RX 250 WO HCPCS: Performed by: NURSE PRACTITIONER

## 2022-02-25 PROCEDURE — 0212093 BYPASS CORONARY ARTERY, THREE ARTERIES FROM CORONARY ARTERY WITH AUTOLOGOUS VENOUS TISSUE, OPEN APPROACH: ICD-10-PCS | Performed by: THORACIC SURGERY (CARDIOTHORACIC VASCULAR SURGERY)

## 2022-02-25 PROCEDURE — 36430 TRANSFUSION BLD/BLD COMPNT: CPT

## 2022-02-25 PROCEDURE — 5A12012 PERFORMANCE OF CARDIAC OUTPUT, SINGLE, MANUAL: ICD-10-PCS | Performed by: THORACIC SURGERY (CARDIOTHORACIC VASCULAR SURGERY)

## 2022-02-25 PROCEDURE — 6360000002 HC RX W HCPCS: Performed by: INTERNAL MEDICINE

## 2022-02-25 PROCEDURE — 85025 COMPLETE CBC W/AUTO DIFF WBC: CPT

## 2022-02-25 PROCEDURE — 82330 ASSAY OF CALCIUM: CPT

## 2022-02-25 PROCEDURE — 2700000000 HC OXYGEN THERAPY PER DAY

## 2022-02-25 PROCEDURE — 94060 EVALUATION OF WHEEZING: CPT | Performed by: INTERNAL MEDICINE

## 2022-02-25 PROCEDURE — P9041 ALBUMIN (HUMAN),5%, 50ML: HCPCS | Performed by: ANESTHESIOLOGY

## 2022-02-25 PROCEDURE — 85027 COMPLETE CBC AUTOMATED: CPT

## 2022-02-25 DEVICE — DEVICE OCCL CLP L35MM PLUNG GRP FLX SHFT FOR GILLINOV: Type: IMPLANTABLE DEVICE | Site: HEART | Status: FUNCTIONAL

## 2022-02-25 DEVICE — MATERIAL IMPL BNE HEMSTAS 3.5 GM ALKYLENE STRL OSTENE: Type: IMPLANTABLE DEVICE | Site: STERNUM | Status: FUNCTIONAL

## 2022-02-25 RX ORDER — ALBUMIN, HUMAN INJ 5% 5 %
SOLUTION INTRAVENOUS
Status: DISCONTINUED
Start: 2022-02-25 | End: 2022-02-25

## 2022-02-25 RX ORDER — ESMOLOL HYDROCHLORIDE 10 MG/ML
INJECTION INTRAVENOUS PRN
Status: DISCONTINUED | OUTPATIENT
Start: 2022-02-25 | End: 2022-02-25 | Stop reason: SDUPTHER

## 2022-02-25 RX ORDER — OXYCODONE HYDROCHLORIDE 10 MG/1
10 TABLET ORAL EVERY 4 HOURS PRN
Status: DISCONTINUED | OUTPATIENT
Start: 2022-02-25 | End: 2022-02-25

## 2022-02-25 RX ORDER — SODIUM CHLORIDE 9 MG/ML
INJECTION, SOLUTION INTRAVENOUS CONTINUOUS
Status: DISCONTINUED | OUTPATIENT
Start: 2022-02-25 | End: 2022-02-26

## 2022-02-25 RX ORDER — ALBUMIN, HUMAN INJ 5% 5 %
25 SOLUTION INTRAVENOUS PRN
Status: DISCONTINUED | OUTPATIENT
Start: 2022-02-25 | End: 2022-03-07

## 2022-02-25 RX ORDER — DIPHENHYDRAMINE HCL 25 MG
25 TABLET ORAL NIGHTLY PRN
Status: DISCONTINUED | OUTPATIENT
Start: 2022-02-26 | End: 2022-03-08

## 2022-02-25 RX ORDER — INSULIN GLARGINE 100 [IU]/ML
0.15 INJECTION, SOLUTION SUBCUTANEOUS NIGHTLY
Status: DISCONTINUED | OUTPATIENT
Start: 2022-02-26 | End: 2022-02-27

## 2022-02-25 RX ORDER — REMIFENTANIL HYDROCHLORIDE 1 MG/ML
INJECTION, POWDER, LYOPHILIZED, FOR SOLUTION INTRAVENOUS PRN
Status: DISCONTINUED | OUTPATIENT
Start: 2022-02-25 | End: 2022-02-25

## 2022-02-25 RX ORDER — DEXTROSE MONOHYDRATE 25 G/50ML
12.5 INJECTION, SOLUTION INTRAVENOUS PRN
Status: DISCONTINUED | OUTPATIENT
Start: 2022-02-25 | End: 2022-03-10 | Stop reason: HOSPADM

## 2022-02-25 RX ORDER — SODIUM CHLORIDE 9 MG/ML
10 INJECTION INTRAVENOUS DAILY
Status: DISCONTINUED | OUTPATIENT
Start: 2022-02-26 | End: 2022-03-10 | Stop reason: HOSPADM

## 2022-02-25 RX ORDER — MEPERIDINE HYDROCHLORIDE 50 MG/ML
25 INJECTION INTRAMUSCULAR; INTRAVENOUS; SUBCUTANEOUS
Status: ACTIVE | OUTPATIENT
Start: 2022-02-25 | End: 2022-02-25

## 2022-02-25 RX ORDER — PHENYLEPHRINE HCL IN 0.9% NACL 1 MG/10 ML
SYRINGE (ML) INTRAVENOUS PRN
Status: DISCONTINUED | OUTPATIENT
Start: 2022-02-25 | End: 2022-02-25 | Stop reason: SDUPTHER

## 2022-02-25 RX ORDER — SODIUM CHLORIDE 0.9 % (FLUSH) 0.9 %
10 SYRINGE (ML) INJECTION EVERY 12 HOURS SCHEDULED
Status: DISCONTINUED | OUTPATIENT
Start: 2022-02-25 | End: 2022-03-10 | Stop reason: HOSPADM

## 2022-02-25 RX ORDER — NICOTINE POLACRILEX 4 MG
15 LOZENGE BUCCAL PRN
Status: DISCONTINUED | OUTPATIENT
Start: 2022-02-25 | End: 2022-03-10 | Stop reason: HOSPADM

## 2022-02-25 RX ORDER — DEXTROSE MONOHYDRATE 50 MG/ML
100 INJECTION, SOLUTION INTRAVENOUS PRN
Status: DISCONTINUED | OUTPATIENT
Start: 2022-02-25 | End: 2022-03-10 | Stop reason: HOSPADM

## 2022-02-25 RX ORDER — AMINOCAPROIC ACID 250 MG/ML
INJECTION, SOLUTION INTRAVENOUS PRN
Status: DISCONTINUED | OUTPATIENT
Start: 2022-02-25 | End: 2022-02-25 | Stop reason: SDUPTHER

## 2022-02-25 RX ORDER — NITROGLYCERIN 40 MG/1
1 PATCH TRANSDERMAL DAILY
Status: DISCONTINUED | OUTPATIENT
Start: 2022-02-26 | End: 2022-02-26

## 2022-02-25 RX ORDER — ALBUMIN, HUMAN INJ 5% 5 %
SOLUTION INTRAVENOUS PRN
Status: DISCONTINUED | OUTPATIENT
Start: 2022-02-25 | End: 2022-02-25 | Stop reason: SDUPTHER

## 2022-02-25 RX ORDER — PROTAMINE SULFATE 10 MG/ML
INJECTION, SOLUTION INTRAVENOUS PRN
Status: DISCONTINUED | OUTPATIENT
Start: 2022-02-25 | End: 2022-02-25 | Stop reason: SDUPTHER

## 2022-02-25 RX ORDER — MAGNESIUM HYDROXIDE 1200 MG/15ML
LIQUID ORAL CONTINUOUS PRN
Status: COMPLETED | OUTPATIENT
Start: 2022-02-25 | End: 2022-02-25

## 2022-02-25 RX ORDER — ATORVASTATIN CALCIUM 40 MG/1
40 TABLET, FILM COATED ORAL NIGHTLY
Status: DISCONTINUED | OUTPATIENT
Start: 2022-02-26 | End: 2022-02-25

## 2022-02-25 RX ORDER — KETAMINE HCL IN NACL, ISO-OSM 100MG/10ML
SYRINGE (ML) INJECTION PRN
Status: DISCONTINUED | OUTPATIENT
Start: 2022-02-25 | End: 2022-02-25 | Stop reason: SDUPTHER

## 2022-02-25 RX ORDER — PANTOPRAZOLE SODIUM 40 MG/10ML
40 INJECTION, POWDER, LYOPHILIZED, FOR SOLUTION INTRAVENOUS DAILY
Status: DISCONTINUED | OUTPATIENT
Start: 2022-02-26 | End: 2022-02-27

## 2022-02-25 RX ORDER — FENTANYL CITRATE 50 UG/ML
25 INJECTION, SOLUTION INTRAMUSCULAR; INTRAVENOUS
Status: DISCONTINUED | OUTPATIENT
Start: 2022-02-25 | End: 2022-02-26

## 2022-02-25 RX ORDER — SODIUM CHLORIDE 9 MG/ML
25 INJECTION, SOLUTION INTRAVENOUS PRN
Status: DISCONTINUED | OUTPATIENT
Start: 2022-02-25 | End: 2022-03-10 | Stop reason: HOSPADM

## 2022-02-25 RX ORDER — CHLORHEXIDINE GLUCONATE 0.12 MG/ML
15 RINSE ORAL 2 TIMES DAILY
Status: DISCONTINUED | OUTPATIENT
Start: 2022-02-25 | End: 2022-03-10 | Stop reason: HOSPADM

## 2022-02-25 RX ORDER — MAGNESIUM SULFATE IN WATER 40 MG/ML
2000 INJECTION, SOLUTION INTRAVENOUS PRN
Status: DISCONTINUED | OUTPATIENT
Start: 2022-02-25 | End: 2022-03-10 | Stop reason: HOSPADM

## 2022-02-25 RX ORDER — ONDANSETRON 2 MG/ML
INJECTION INTRAMUSCULAR; INTRAVENOUS PRN
Status: DISCONTINUED | OUTPATIENT
Start: 2022-02-25 | End: 2022-02-25 | Stop reason: SDUPTHER

## 2022-02-25 RX ORDER — FUROSEMIDE 10 MG/ML
40 INJECTION INTRAMUSCULAR; INTRAVENOUS PRN
Status: DISCONTINUED | OUTPATIENT
Start: 2022-02-25 | End: 2022-03-07

## 2022-02-25 RX ORDER — POTASSIUM CHLORIDE 29.8 MG/ML
20 INJECTION INTRAVENOUS PRN
Status: DISCONTINUED | OUTPATIENT
Start: 2022-02-25 | End: 2022-03-02

## 2022-02-25 RX ORDER — OXYCODONE HYDROCHLORIDE 5 MG/1
2.5 TABLET ORAL EVERY 4 HOURS PRN
Status: DISCONTINUED | OUTPATIENT
Start: 2022-02-25 | End: 2022-03-04

## 2022-02-25 RX ORDER — PROTAMINE SULFATE 10 MG/ML
50 INJECTION, SOLUTION INTRAVENOUS
Status: ACTIVE | OUTPATIENT
Start: 2022-02-25 | End: 2022-02-25

## 2022-02-25 RX ORDER — PROPOFOL 10 MG/ML
INJECTION, EMULSION INTRAVENOUS PRN
Status: DISCONTINUED | OUTPATIENT
Start: 2022-02-25 | End: 2022-02-25 | Stop reason: SDUPTHER

## 2022-02-25 RX ORDER — SODIUM CHLORIDE 0.9 % (FLUSH) 0.9 %
10 SYRINGE (ML) INJECTION PRN
Status: DISCONTINUED | OUTPATIENT
Start: 2022-02-25 | End: 2022-03-10 | Stop reason: HOSPADM

## 2022-02-25 RX ORDER — MIDAZOLAM HYDROCHLORIDE 1 MG/ML
1 INJECTION INTRAMUSCULAR; INTRAVENOUS
Status: ACTIVE | OUTPATIENT
Start: 2022-02-25 | End: 2022-02-26

## 2022-02-25 RX ORDER — OXYCODONE HYDROCHLORIDE 5 MG/1
5 TABLET ORAL EVERY 4 HOURS PRN
Status: DISCONTINUED | OUTPATIENT
Start: 2022-02-25 | End: 2022-02-25

## 2022-02-25 RX ORDER — DEXAMETHASONE SODIUM PHOSPHATE 4 MG/ML
INJECTION, SOLUTION INTRA-ARTICULAR; INTRALESIONAL; INTRAMUSCULAR; INTRAVENOUS; SOFT TISSUE PRN
Status: DISCONTINUED | OUTPATIENT
Start: 2022-02-25 | End: 2022-02-25 | Stop reason: SDUPTHER

## 2022-02-25 RX ORDER — ONDANSETRON 2 MG/ML
4 INJECTION INTRAMUSCULAR; INTRAVENOUS EVERY 6 HOURS PRN
Status: DISCONTINUED | OUTPATIENT
Start: 2022-02-25 | End: 2022-03-10 | Stop reason: HOSPADM

## 2022-02-25 RX ORDER — REMIFENTANIL HYDROCHLORIDE 1 MG/ML
INJECTION, POWDER, LYOPHILIZED, FOR SOLUTION INTRAVENOUS CONTINUOUS PRN
Status: DISCONTINUED | OUTPATIENT
Start: 2022-02-25 | End: 2022-02-25 | Stop reason: SDUPTHER

## 2022-02-25 RX ORDER — SODIUM CHLORIDE 9 MG/ML
INJECTION INTRAVENOUS PRN
Status: DISCONTINUED | OUTPATIENT
Start: 2022-02-25 | End: 2022-02-25 | Stop reason: SDUPTHER

## 2022-02-25 RX ORDER — SUCCINYLCHOLINE/SOD CL,ISO/PF 200MG/10ML
SYRINGE (ML) INTRAVENOUS PRN
Status: DISCONTINUED | OUTPATIENT
Start: 2022-02-25 | End: 2022-02-25 | Stop reason: SDUPTHER

## 2022-02-25 RX ORDER — 0.9 % SODIUM CHLORIDE 0.9 %
500 INTRAVENOUS SOLUTION INTRAVENOUS CONTINUOUS PRN
Status: DISCONTINUED | OUTPATIENT
Start: 2022-02-25 | End: 2022-02-28

## 2022-02-25 RX ORDER — ROCURONIUM BROMIDE 10 MG/ML
INJECTION, SOLUTION INTRAVENOUS PRN
Status: DISCONTINUED | OUTPATIENT
Start: 2022-02-25 | End: 2022-02-25 | Stop reason: SDUPTHER

## 2022-02-25 RX ORDER — POTASSIUM CHLORIDE 750 MG/1
10 TABLET, FILM COATED, EXTENDED RELEASE ORAL
Status: DISCONTINUED | OUTPATIENT
Start: 2022-02-27 | End: 2022-02-27

## 2022-02-25 RX ORDER — OXYCODONE HYDROCHLORIDE 5 MG/1
5 TABLET ORAL EVERY 4 HOURS PRN
Status: DISCONTINUED | OUTPATIENT
Start: 2022-02-25 | End: 2022-03-04

## 2022-02-25 RX ORDER — CALCIUM CHLORIDE 100 MG/ML
INJECTION INTRAVENOUS; INTRAVENTRICULAR PRN
Status: DISCONTINUED | OUTPATIENT
Start: 2022-02-25 | End: 2022-02-25 | Stop reason: SDUPTHER

## 2022-02-25 RX ORDER — MILRINONE LACTATE 0.2 MG/ML
0.38 INJECTION, SOLUTION INTRAVENOUS CONTINUOUS PRN
Status: DISCONTINUED | OUTPATIENT
Start: 2022-02-25 | End: 2022-03-02

## 2022-02-25 RX ORDER — MIDAZOLAM HYDROCHLORIDE 1 MG/ML
INJECTION INTRAMUSCULAR; INTRAVENOUS PRN
Status: DISCONTINUED | OUTPATIENT
Start: 2022-02-25 | End: 2022-02-25 | Stop reason: SDUPTHER

## 2022-02-25 RX ORDER — ATORVASTATIN CALCIUM 80 MG/1
80 TABLET, FILM COATED ORAL NIGHTLY
Status: DISCONTINUED | OUTPATIENT
Start: 2022-02-26 | End: 2022-03-10 | Stop reason: HOSPADM

## 2022-02-25 RX ORDER — SODIUM CHLORIDE 9 MG/ML
INJECTION, SOLUTION INTRAVENOUS PRN
Status: DISCONTINUED | OUTPATIENT
Start: 2022-02-25 | End: 2022-03-02

## 2022-02-25 RX ORDER — LANOLIN ALCOHOL/MO/W.PET/CERES
400 CREAM (GRAM) TOPICAL 2 TIMES DAILY
Status: DISCONTINUED | OUTPATIENT
Start: 2022-02-26 | End: 2022-03-03

## 2022-02-25 RX ORDER — FUROSEMIDE 10 MG/ML
40 INJECTION INTRAMUSCULAR; INTRAVENOUS 2 TIMES DAILY
Status: DISCONTINUED | OUTPATIENT
Start: 2022-02-26 | End: 2022-02-27

## 2022-02-25 RX ORDER — MILRINONE LACTATE 0.2 MG/ML
INJECTION, SOLUTION INTRAVENOUS CONTINUOUS PRN
Status: DISCONTINUED | OUTPATIENT
Start: 2022-02-25 | End: 2022-02-25 | Stop reason: SDUPTHER

## 2022-02-25 RX ORDER — SUFENTANIL CITRATE 50 UG/ML
INJECTION EPIDURAL; INTRAVENOUS PRN
Status: DISCONTINUED | OUTPATIENT
Start: 2022-02-25 | End: 2022-02-25 | Stop reason: SDUPTHER

## 2022-02-25 RX ORDER — NITROGLYCERIN 20 MG/100ML
10 INJECTION INTRAVENOUS CONTINUOUS PRN
Status: DISCONTINUED | OUTPATIENT
Start: 2022-02-25 | End: 2022-03-04

## 2022-02-25 RX ORDER — HEPARIN SODIUM 1000 [USP'U]/ML
INJECTION, SOLUTION INTRAVENOUS; SUBCUTANEOUS PRN
Status: DISCONTINUED | OUTPATIENT
Start: 2022-02-25 | End: 2022-02-25 | Stop reason: SDUPTHER

## 2022-02-25 RX ORDER — SODIUM CHLORIDE 9 MG/ML
INJECTION, SOLUTION INTRAVENOUS CONTINUOUS PRN
Status: DISCONTINUED | OUTPATIENT
Start: 2022-02-25 | End: 2022-02-25 | Stop reason: SDUPTHER

## 2022-02-25 RX ORDER — NITROGLYCERIN 20 MG/100ML
INJECTION INTRAVENOUS PRN
Status: DISCONTINUED | OUTPATIENT
Start: 2022-02-25 | End: 2022-02-25 | Stop reason: SDUPTHER

## 2022-02-25 RX ORDER — DOBUTAMINE HYDROCHLORIDE 200 MG/100ML
2.5-1 INJECTION INTRAVENOUS CONTINUOUS
Status: DISCONTINUED | OUTPATIENT
Start: 2022-02-25 | End: 2022-03-01

## 2022-02-25 RX ORDER — HYDRALAZINE HYDROCHLORIDE 20 MG/ML
5 INJECTION INTRAMUSCULAR; INTRAVENOUS
Status: DISCONTINUED | OUTPATIENT
Start: 2022-02-25 | End: 2022-03-07

## 2022-02-25 RX ORDER — ONDANSETRON 4 MG/1
4 TABLET, ORALLY DISINTEGRATING ORAL EVERY 8 HOURS PRN
Status: DISCONTINUED | OUTPATIENT
Start: 2022-02-25 | End: 2022-03-10 | Stop reason: HOSPADM

## 2022-02-25 RX ADMIN — ROCURONIUM BROMIDE 100 MG: 10 INJECTION INTRAVENOUS at 07:20

## 2022-02-25 RX ADMIN — SUFENTANIL CITRATE 50 MCG: 50 INJECTION EPIDURAL; INTRAVENOUS at 07:36

## 2022-02-25 RX ADMIN — MILRINONE LACTATE 0.38 MCG/KG/MIN: 0.2 INJECTION, SOLUTION INTRAVENOUS at 10:20

## 2022-02-25 RX ADMIN — CEFAZOLIN 2 G: 10 INJECTION, POWDER, FOR SOLUTION INTRAVENOUS at 10:48

## 2022-02-25 RX ADMIN — ESMOLOL HYDROCHLORIDE 100 MG: 10 INJECTION, SOLUTION INTRAVENOUS at 10:54

## 2022-02-25 RX ADMIN — Medication 30 MG: at 07:13

## 2022-02-25 RX ADMIN — AMINOCAPROIC ACID 5000 MG: 250 INJECTION, SOLUTION INTRAVENOUS at 07:55

## 2022-02-25 RX ADMIN — NITROGLYCERIN 25 MCG: 20 INJECTION INTRAVENOUS at 07:40

## 2022-02-25 RX ADMIN — ONDANSETRON 4 MG: 2 INJECTION INTRAMUSCULAR; INTRAVENOUS at 11:28

## 2022-02-25 RX ADMIN — NITROGLYCERIN 10 MCG/MIN: 20 INJECTION INTRAVENOUS at 17:20

## 2022-02-25 RX ADMIN — Medication 200 MCG: at 11:36

## 2022-02-25 RX ADMIN — DEXMEDETOMIDINE HYDROCHLORIDE 0.7 MCG/KG/HR: 100 INJECTION, SOLUTION INTRAVENOUS at 07:13

## 2022-02-25 RX ADMIN — MIDAZOLAM 2 MG: 1 INJECTION INTRAMUSCULAR; INTRAVENOUS at 10:20

## 2022-02-25 RX ADMIN — ALBUMIN (HUMAN) 25 G: 12.5 INJECTION, SOLUTION INTRAVENOUS at 12:29

## 2022-02-25 RX ADMIN — POTASSIUM CHLORIDE 20 MEQ: 29.8 INJECTION, SOLUTION INTRAVENOUS at 13:39

## 2022-02-25 RX ADMIN — CEFAZOLIN 2000 MG: 10 INJECTION, POWDER, FOR SOLUTION INTRAVENOUS at 20:11

## 2022-02-25 RX ADMIN — SODIUM CHLORIDE 10 ML: 9 INJECTION INTRAMUSCULAR; INTRAVENOUS; SUBCUTANEOUS at 07:13

## 2022-02-25 RX ADMIN — CALCIUM CHLORIDE 0.2 G: 100 INJECTION, SOLUTION INTRAVENOUS; INTRAVENTRICULAR at 07:28

## 2022-02-25 RX ADMIN — CHLORHEXIDINE GLUCONATE 0.12% ORAL RINSE 15 ML: 1.2 LIQUID ORAL at 21:59

## 2022-02-25 RX ADMIN — SODIUM CHLORIDE 4 UNITS/HR: 9 INJECTION, SOLUTION INTRAVENOUS at 07:30

## 2022-02-25 RX ADMIN — NITROGLYCERIN 10 MCG/MIN: 20 INJECTION INTRAVENOUS at 15:37

## 2022-02-25 RX ADMIN — ROCURONIUM BROMIDE 50 MG: 10 INJECTION INTRAVENOUS at 08:40

## 2022-02-25 RX ADMIN — POTASSIUM CHLORIDE 20 MEQ: 29.8 INJECTION, SOLUTION INTRAVENOUS at 18:35

## 2022-02-25 RX ADMIN — MILRINONE LACTATE IN DEXTROSE 0.38 MCG/KG/MIN: 200 INJECTION, SOLUTION INTRAVENOUS at 17:07

## 2022-02-25 RX ADMIN — MIDAZOLAM 5 MG: 1 INJECTION INTRAMUSCULAR; INTRAVENOUS at 07:13

## 2022-02-25 RX ADMIN — ALBUMIN (HUMAN) 500 ML: 12.5 INJECTION, SOLUTION INTRAVENOUS at 10:39

## 2022-02-25 RX ADMIN — POTASSIUM CHLORIDE 20 MEQ: 29.8 INJECTION, SOLUTION INTRAVENOUS at 16:56

## 2022-02-25 RX ADMIN — Medication 200 MCG: at 07:26

## 2022-02-25 RX ADMIN — Medication 200 MCG: at 08:36

## 2022-02-25 RX ADMIN — SODIUM CHLORIDE 2.5 MG/HR: 9 INJECTION, SOLUTION INTRAVENOUS at 17:51

## 2022-02-25 RX ADMIN — SUFENTANIL CITRATE 50 MCG: 50 INJECTION EPIDURAL; INTRAVENOUS at 07:13

## 2022-02-25 RX ADMIN — SODIUM BICARBONATE 25 MEQ: 84 INJECTION, SOLUTION INTRAVENOUS at 11:00

## 2022-02-25 RX ADMIN — CEFAZOLIN 2 G: 10 INJECTION, POWDER, FOR SOLUTION INTRAVENOUS at 07:13

## 2022-02-25 RX ADMIN — SODIUM CHLORIDE, PRESERVATIVE FREE 10 ML: 5 INJECTION INTRAVENOUS at 21:59

## 2022-02-25 RX ADMIN — PROPOFOL 50 MG: 10 INJECTION, EMULSION INTRAVENOUS at 07:42

## 2022-02-25 RX ADMIN — CALCIUM CHLORIDE 0.5 G: 100 INJECTION, SOLUTION INTRAVENOUS; INTRAVENTRICULAR at 10:41

## 2022-02-25 RX ADMIN — SODIUM CHLORIDE 500 ML: 9 INJECTION, SOLUTION INTRAVENOUS at 16:11

## 2022-02-25 RX ADMIN — Medication 160 MG: at 07:13

## 2022-02-25 RX ADMIN — Medication 1250 MG: at 22:05

## 2022-02-25 RX ADMIN — DOBUTAMINE HYDROCHLORIDE 2.5 MCG/KG/MIN: 200 INJECTION INTRAVENOUS at 17:14

## 2022-02-25 RX ADMIN — CALCIUM CHLORIDE 1000 MG: 100 INJECTION, SOLUTION INTRAVENOUS at 15:07

## 2022-02-25 RX ADMIN — PROTAMINE SULFATE 50 MG: 10 INJECTION, SOLUTION INTRAVENOUS at 11:01

## 2022-02-25 RX ADMIN — CHLORHEXIDINE GLUCONATE 15 ML: 1.2 SOLUTION ORAL at 05:05

## 2022-02-25 RX ADMIN — AMINOCAPROIC ACID 5000 MG: 250 INJECTION, SOLUTION INTRAVENOUS at 10:48

## 2022-02-25 RX ADMIN — MUPIROCIN: 20 OINTMENT TOPICAL at 21:59

## 2022-02-25 RX ADMIN — Medication 30 MG: at 10:20

## 2022-02-25 RX ADMIN — FAMOTIDINE 20 MG: 10 INJECTION, SOLUTION INTRAVENOUS at 05:05

## 2022-02-25 RX ADMIN — REMIFENTANIL HYDROCHLORIDE 0.15 MCG/KG/MIN: 1 INJECTION, POWDER, LYOPHILIZED, FOR SOLUTION INTRAVENOUS at 10:20

## 2022-02-25 RX ADMIN — SODIUM BICARBONATE 50 MEQ: 84 INJECTION, SOLUTION INTRAVENOUS at 14:21

## 2022-02-25 RX ADMIN — HEPARIN SODIUM 27500 UNITS: 1000 INJECTION INTRAVENOUS; SUBCUTANEOUS at 08:13

## 2022-02-25 RX ADMIN — DEXAMETHASONE SODIUM PHOSPHATE 4 MG: 4 INJECTION, SOLUTION INTRAMUSCULAR; INTRAVENOUS at 11:28

## 2022-02-25 RX ADMIN — SODIUM CHLORIDE 1.29 UNITS/HR: 9 INJECTION, SOLUTION INTRAVENOUS at 12:15

## 2022-02-25 RX ADMIN — Medication 200 MCG: at 08:37

## 2022-02-25 RX ADMIN — SUGAMMADEX 200 MG: 100 INJECTION, SOLUTION INTRAVENOUS at 12:06

## 2022-02-25 RX ADMIN — ROCURONIUM BROMIDE 50 MG: 10 INJECTION INTRAVENOUS at 10:20

## 2022-02-25 RX ADMIN — Medication 200 MCG: at 11:32

## 2022-02-25 RX ADMIN — SODIUM CHLORIDE: 9 INJECTION, SOLUTION INTRAVENOUS at 07:02

## 2022-02-25 RX ADMIN — Medication 100 MCG: at 08:40

## 2022-02-25 RX ADMIN — SODIUM CHLORIDE: 9 INJECTION, SOLUTION INTRAVENOUS at 13:38

## 2022-02-25 RX ADMIN — Medication 100 MCG: at 08:45

## 2022-02-25 RX ADMIN — SODIUM BICARBONATE 25 MEQ: 84 INJECTION, SOLUTION INTRAVENOUS at 08:03

## 2022-02-25 RX ADMIN — PROTAMINE SULFATE 350 MG: 10 INJECTION, SOLUTION INTRAVENOUS at 10:48

## 2022-02-25 RX ADMIN — HEPARIN SODIUM 2500 UNITS: 1000 INJECTION INTRAVENOUS; SUBCUTANEOUS at 07:56

## 2022-02-25 RX ADMIN — HYDRALAZINE HYDROCHLORIDE 10 MG: 20 INJECTION INTRAMUSCULAR; INTRAVENOUS at 05:58

## 2022-02-25 ASSESSMENT — PULMONARY FUNCTION TESTS
PIF_VALUE: 22
PIF_VALUE: 1
PIF_VALUE: 20
PIF_VALUE: 1
PIF_VALUE: 1
PIF_VALUE: 22
PIF_VALUE: 22
PIF_VALUE: 20
PIF_VALUE: 29
PIF_VALUE: 15
PIF_VALUE: 1
PIF_VALUE: 1
PIF_VALUE: 20
PIF_VALUE: 20
PIF_VALUE: 1
PIF_VALUE: 21
PIF_VALUE: 15
PIF_VALUE: 1
PIF_VALUE: 1
PIF_VALUE: 15
PIF_VALUE: 1
PIF_VALUE: 13
PIF_VALUE: 21
PIF_VALUE: 1
PIF_VALUE: 19
PIF_VALUE: 24
PIF_VALUE: 22
PIF_VALUE: 1
PIF_VALUE: 22
PIF_VALUE: 19
PIF_VALUE: 20
PIF_VALUE: 23
PIF_VALUE: 19
PIF_VALUE: 20
PIF_VALUE: 21
PIF_VALUE: 2
PIF_VALUE: 21
PIF_VALUE: 20
PIF_VALUE: 14
PIF_VALUE: 1
PIF_VALUE: 15
PIF_VALUE: 21
PIF_VALUE: 16
PIF_VALUE: 22
PIF_VALUE: 1
PIF_VALUE: 20
PIF_VALUE: 1
PIF_VALUE: 14
PIF_VALUE: 1
PIF_VALUE: 23
PIF_VALUE: 19
PIF_VALUE: 14
PIF_VALUE: 0
PIF_VALUE: 15
PIF_VALUE: 0
PIF_VALUE: 23
PIF_VALUE: 20
PIF_VALUE: 16
PIF_VALUE: 20
PIF_VALUE: 14
PIF_VALUE: 1
PIF_VALUE: 1
PIF_VALUE: 25
PIF_VALUE: 15
PIF_VALUE: 2
PIF_VALUE: 23
PIF_VALUE: 22
PIF_VALUE: 23
PIF_VALUE: 15
PIF_VALUE: 22
PIF_VALUE: 21
PIF_VALUE: 20
PIF_VALUE: 1
PIF_VALUE: 20
PIF_VALUE: 0
PIF_VALUE: 20
PIF_VALUE: 1
PIF_VALUE: 20
PIF_VALUE: 20
PIF_VALUE: 21
PIF_VALUE: 1
PIF_VALUE: 21
PIF_VALUE: 21
PIF_VALUE: 10
PIF_VALUE: 22
PIF_VALUE: 1
PIF_VALUE: 15
PIF_VALUE: 1
PIF_VALUE: 20
PIF_VALUE: 18
PIF_VALUE: 21
PIF_VALUE: 21
PIF_VALUE: 1
PIF_VALUE: 26
PIF_VALUE: 1
PIF_VALUE: 22
PIF_VALUE: 20
PIF_VALUE: 15
PIF_VALUE: 20
PIF_VALUE: 16
PIF_VALUE: 16
PIF_VALUE: 1
PIF_VALUE: 2
PIF_VALUE: 20
PIF_VALUE: 13
PIF_VALUE: 21
PIF_VALUE: 35
PIF_VALUE: 1
PIF_VALUE: 1
PIF_VALUE: 19
PIF_VALUE: 14
PIF_VALUE: 1
PIF_VALUE: 16
PIF_VALUE: 21
PIF_VALUE: 24
PIF_VALUE: 14
PIF_VALUE: 15
PIF_VALUE: 22
PIF_VALUE: 1
PIF_VALUE: 1
PIF_VALUE: 14
PIF_VALUE: 1
PIF_VALUE: 15
PIF_VALUE: 1
PIF_VALUE: 16
PIF_VALUE: 20
PIF_VALUE: 20
PIF_VALUE: 1
PIF_VALUE: 20
PIF_VALUE: 23
PIF_VALUE: 15
PIF_VALUE: 1
PIF_VALUE: 20
PIF_VALUE: 1
PIF_VALUE: 15
PIF_VALUE: 19
PIF_VALUE: 1
PIF_VALUE: 1
PIF_VALUE: 18
PIF_VALUE: 14
PIF_VALUE: 24
PIF_VALUE: 1
PIF_VALUE: 1
PIF_VALUE: 19
PIF_VALUE: 14
PIF_VALUE: 1
PIF_VALUE: 24
PIF_VALUE: 16
PIF_VALUE: 1
PIF_VALUE: 20
PIF_VALUE: 23
PIF_VALUE: 14
PIF_VALUE: 22
PIF_VALUE: 22
PIF_VALUE: 14
PIF_VALUE: 14
PIF_VALUE: 21
PIF_VALUE: 21
PIF_VALUE: 25
PIF_VALUE: 22
PIF_VALUE: 19
PIF_VALUE: 20
PIF_VALUE: 1
PIF_VALUE: 14
PIF_VALUE: 21
PIF_VALUE: 1
PIF_VALUE: 21
PIF_VALUE: 21
PIF_VALUE: 1
PIF_VALUE: 1
PIF_VALUE: 19
PIF_VALUE: 25
PIF_VALUE: 16
PIF_VALUE: 14
PIF_VALUE: 15
PIF_VALUE: 21
PIF_VALUE: 21
PIF_VALUE: 1
PIF_VALUE: 1
PIF_VALUE: 18
PIF_VALUE: 23
PIF_VALUE: 1
PIF_VALUE: 20
PIF_VALUE: 16
PIF_VALUE: 1
PIF_VALUE: 22
PIF_VALUE: 20
PIF_VALUE: 1
PIF_VALUE: 22
PIF_VALUE: 1
PIF_VALUE: 14
PIF_VALUE: 1
PIF_VALUE: 20
PIF_VALUE: 24
PIF_VALUE: 4
PIF_VALUE: 19
PIF_VALUE: 20
PIF_VALUE: 4
PIF_VALUE: 1
PIF_VALUE: 20
PIF_VALUE: 2
PIF_VALUE: 21
PIF_VALUE: 22
PIF_VALUE: 22
PIF_VALUE: 1
PIF_VALUE: 21
PIF_VALUE: 0
PIF_VALUE: 15
PIF_VALUE: 1
PIF_VALUE: 1
PIF_VALUE: 14
PIF_VALUE: 21
PIF_VALUE: 20
PIF_VALUE: 20
PIF_VALUE: 16
PIF_VALUE: 21
PIF_VALUE: 20
PIF_VALUE: 23
PIF_VALUE: 17
PIF_VALUE: 19
PIF_VALUE: 22
PIF_VALUE: 1
PIF_VALUE: 16
PIF_VALUE: 1
PIF_VALUE: 22
PIF_VALUE: 1
PIF_VALUE: 22
PIF_VALUE: 1
PIF_VALUE: 20
PIF_VALUE: 14
PIF_VALUE: 15
PIF_VALUE: 1
PIF_VALUE: 22
PIF_VALUE: 20
PIF_VALUE: 14
PIF_VALUE: 20
PIF_VALUE: 1
PIF_VALUE: 1
PIF_VALUE: 22
PIF_VALUE: 40
PIF_VALUE: 1
PIF_VALUE: 21
PIF_VALUE: 21
PIF_VALUE: 20
PIF_VALUE: 24
PIF_VALUE: 15
PIF_VALUE: 1
PIF_VALUE: 23
PIF_VALUE: 1
PIF_VALUE: 20
PIF_VALUE: 1
PIF_VALUE: 22
PIF_VALUE: 20
PIF_VALUE: 1
PIF_VALUE: 1
PIF_VALUE: 23
PIF_VALUE: 1
PIF_VALUE: 19
PIF_VALUE: 22
PIF_VALUE: 1
PIF_VALUE: 22
PIF_VALUE: 1
PIF_VALUE: 1
PIF_VALUE: 21
PIF_VALUE: 19
PIF_VALUE: 1
PIF_VALUE: 0
PIF_VALUE: 14
PIF_VALUE: 10
PIF_VALUE: 0
PIF_VALUE: 15
PIF_VALUE: 21
PIF_VALUE: 14
PIF_VALUE: 1
PIF_VALUE: 24
PIF_VALUE: 23
PIF_VALUE: 21
PIF_VALUE: 22
PIF_VALUE: 1
PIF_VALUE: 21
PIF_VALUE: 1
PIF_VALUE: 20
PIF_VALUE: 20

## 2022-02-25 ASSESSMENT — PAIN SCALES - GENERAL
PAINLEVEL_OUTOF10: 0
PAINLEVEL_OUTOF10: 1
PAINLEVEL_OUTOF10: 0
PAINLEVEL_OUTOF10: 0

## 2022-02-25 ASSESSMENT — ENCOUNTER SYMPTOMS: SHORTNESS OF BREATH: 1

## 2022-02-25 ASSESSMENT — LIFESTYLE VARIABLES: SMOKING_STATUS: 0

## 2022-02-25 ASSESSMENT — PAIN DESCRIPTION - PAIN TYPE: TYPE: SURGICAL PAIN

## 2022-02-25 ASSESSMENT — PAIN - FUNCTIONAL ASSESSMENT: PAIN_FUNCTIONAL_ASSESSMENT: PREVENTS OR INTERFERES SOME ACTIVE ACTIVITIES AND ADLS

## 2022-02-25 ASSESSMENT — PAIN DESCRIPTION - LOCATION: LOCATION: STERNUM

## 2022-02-25 NOTE — PROGRESS NOTES
Late Entry:Patient admitted to CVU from [place] [address] and attached to ventilator and monitors. Report received from anesthesiologist.  Chest x-ray ordered. Labs drawn and sent. Assessment complete. Continue monitoring hemodynamics. Family let back to see patient. Visiting hours reviewed and all questions answered. Family aware of discharge class.  Primary RN [address]

## 2022-02-25 NOTE — PROCEDURES
Reason for PFT:  Chest pain       Overall Interpretation  No active airflow obstruction, moderate degree of restrictive lung defect is implied given symmetrical restriction of FVC and FEV1. complete PFT would help distinguish parenchymal versus extraparenchymal restriction. OBSTRUCTION % Predicted FEV1   MILD >70%   MODERATE 60-69%   MODERATELY-SEVERE 50-59%   SEVERE 35-49%   VERY SEVERE <35%         RESTRICTION % Predicted TLC   MILD Less than LLN but > than 70%   MODERATE 60-69%   MODERATELY-SEVERE <60%                 DIFFUSION CAPACITY DL,CO % Pred   MILD >60% AND < LLN   MODERATE 40-60%   SEVERE <40%       PFT data will be scanned into the media tab in epic. Daphney Matias M.D.    [facility] 1475

## 2022-02-25 NOTE — BRIEF OP NOTE
Date: 2/25/2022  Patient:  Peter De Paz    Brief Op Note    Pre-op Diagnosis:  cad    Post-op Diagnosis:  same    Procedure:    1. JENNY  2. Left greater saphenous EVH  3. Urgent cabgx4 (LIMA-LAD, SVG-RI-OM, SVG-PDA)  4. EDISON exclusion( 35mm AtriClip)    Surgeon: Geovanna Chiang    Assistant(s):  Krys Wilson    Anesthesia:  General    EBL: n/a    XC:  78       CPB: 97     Specimens:  none    Findings:  preEF 55, postEF 65    Drains:  Mediastinal, left pleural    Drips:  milrinone    Complications:  none    Disposition: To CVU in stable condition    Post-Op Note:  Dictated.      Devaughn Jackson MD  2/25/2022  11:38 AM

## 2022-02-25 NOTE — ANESTHESIA POSTPROCEDURE EVALUATION
Department of Anesthesiology  Postprocedure Note    Patient: Tristen Catalan  MRN: [id number]  YOB: 1940  Date of evaluation: 2/25/2022  Time:  1:48 PM     Procedure Summary     Date: 02/25/22 Room / Location: [address]    Anesthesia Start: 0702 Anesthesia Stop: 8289    Procedure: TRANSESOPHAGEAL ECHOCARDIOGRAM, TOTAL CARDIOPULMONARY BYPASS, TOTAL CARDIOPULMONARY BYPASS GRAFTING X4 (1 ARTERY, 3 VEIN) USING LEFT INTERNAL MAMMARY ARTERY AND RIGHT ENDOSCOPICALLY HARVESTED SAPHENOUS VEIN, LEFT ATRIAL APPENDAGE CLIP WITH 35MM ATRICLIP (N/A Chest) Diagnosis:       Coronary artery disease, unspecified vessel or lesion type, unspecified whether angina present, unspecified whether native or transplanted heart      (CORONARY ARTERY DISEASE)    Surgeons: Marciano Reilly MD Responsible Provider: Edison Moreira MD    Anesthesia Type: general ASA Status: 4          Anesthesia Type: general    Jordy Phase I:      Jordy Phase II:      Last vitals: Reviewed and per EMR flowsheets.        Anesthesia Post Evaluation    Patient location during evaluation: ICU  Patient participation: complete - patient cannot participate  Level of consciousness: sedated and ventilated  Pain score: 0  Airway patency: patent  Nausea & Vomiting: no nausea and no vomiting  Cardiovascular status: hemodynamically stable  Respiratory status: ventilator  Hydration status: euvolemic

## 2022-02-25 NOTE — PROGRESS NOTES
[address] [place] [address] Nephrology   [facility] [email or web address]  [phone]  Nephrology Note          Patient ID: Khushbu Hinton  Referring/ Physician: Wilson Nava MD      HPI/Summary:   Khushbu Hinton is being seen by nephrology for SKINNY. This is a [de-identified] yo lady with PMH of HTN, CKD, HLD and obesity who presented with chest pain and SOB. She had been having several weeks of chest tightness and dyspnea with exertion. She feels ok now, no complaints. Says she has been on medication for BP for decades. Her Bp had been running prior to admission. She has been taking Meloxicam for the past few years for knee pain/arthritis. Denies hematuria, foamy urine. LUTS. No dysuria. No issues with swelling right now. During this admission she has had LHC 2/22/22 showing severe triple vessel disease. Normal LV function. LVEDP 14-16. She was referred for CABG and it is planned for 2/25/22 . Cr has been rising from 1.2 > 1.3 > 1.5 at time of consult. Of note , she has been on scheduled meloxicam in addition to receiving contrast. Her PO intake has been fine, no NVD. Interval hx:   Seen at bedside in CVICU post op  She is intubated and sedated. S/p JENNY, s/P CABG EDISON exclusion. With Dr Valarie Castelan. No significant blood loss per op report. Surgery went well. BP 80/39  On NTG and milrinone. Ventilated mechanically on 60% Fio2 sating 100%  UO 1.3 L today so far. Labs reviewed. Cr 1.5 >1.4 >1.3 >1.1  No acute electrolyte issues. Hgb dropped 6.6        Plan:   - SKINNY has resolved and Cr is at baseline.   - would make sure she stays off NSAIDs at discharge as she does have some CKD  - will follow along with you. 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 Cr peaked at 1.5  She was on meloxicam and got contrast 2/22, Cr started to rise two days later consistent with DANNY.    She is not volume overloaded or dehydrated on exam.   Uric acid  CK  UA showed no protein or cells, totally floyd. #CAD triple vessel disease  [facility]   CABG planned   Asa statin and BB  Preserved EF  Not volume overloaded. #full code. [facility] Nephrology would like to thank you for the opportunity to serve this patient. Please call with any questions or concerns. Lenin Christensen MD  [facility] Nephrology  [address]  [place], [address]  Fax: [phone]  Office: [phone]         CC/Reason for consult:   Reason for consult: SKINNY  Chief Complaint   Patient presents with    Chest Pain    Hypertension           Review of Systems:   Unable to assess. PMH/SH/FH:    Medical Hx: reviewed and updated as appropriate  Past Medical History:   Diagnosis Date    Arthritis     Asthma     as child grew out of it    CAD (coronary artery disease) 2022    multi vessel    CKD (chronic kidney disease)     Elevated creatinine since ; Stage III b    Glaucoma     Hyperlipidemia     Hypertension     Hyperthyroidism     Graves disease    IBS (irritable bowel syndrome) 10/2020    On Linzess    Neuropathy     Peripheral    Obesity (BMI 30-39. 9)     Osteopenia 2009    Osteoporosis          Surgical Hx: reviewed and updated as appropriate   has a past surgical history that includes Cholecystectomy; Foot surgery; Tubal ligation; Gastric Band ([id number]); orif femur decompression (Left, 2014); Ankle fracture surgery (Left, 2015); Hand surgery (); other surgical history (Right, ); Coronary artery bypass graft (2022); and Coronary artery bypass graft (N/A, 2022).      Social Hx: reviewed and updated as appropriate  Social History     Tobacco Use    Smoking status: Former Smoker     Quit date: 8/10/1999     Years since quittin.5    Smokeless tobacco: Never Used    Tobacco comment: started age 26   Substance Use Topics    Alcohol use: No        Family hx: reviewed and updated as appropriate  family history includes Heart Disease in her father; Rheum Arthritis in her sister; Stroke in her father. Medications:   sodium chloride flush, 10 mL, 2 times per day  [START ON 2/26/2022] aspirin, 325 mg, Daily  chlorhexidine, 15 mL, BID  [START ON 2/26/2022] furosemide, 40 mg, BID  [START ON 2/26/2022] magnesium oxide, 400 mg, BID  mupirocin, , BID  [START ON 2/26/2022] nitroGLYCERIN, 1 patch, Daily  [START ON 2/27/2022] potassium chloride, 10 mEq, TID WC  [START ON 2/26/2022] metoprolol tartrate, 12.5 mg, BID  [START ON 2/26/2022] pantoprazole, 40 mg, Daily   And  [START ON 2/26/2022] sodium chloride (PF), 10 mL, Daily  ceFAZolin (ANCEF) IVPB, 2,000 mg, Q12H  [START ON 2/26/2022] insulin glargine, 0.15 Units/kg, Nightly  [START ON 2/27/2022] insulin lispro, 0-12 Units, TID WC  [START ON 2/27/2022] insulin lispro, 0-6 Units, Nightly  [START ON 2/26/2022] atorvastatin, 80 mg, Nightly       Patient has no known allergies. Allergies:   No Known Allergies      Physical Exam/Objective:   Vitals:    02/25/22 1330   BP:    Pulse: 85   Resp: 21   Temp:    SpO2: 100%       Intake/Output Summary (Last 24 hours) at 2/25/2022 1344  Last data filed at 2/25/2022 1330  Gross per 24 hour   Intake 18.05 ml   Output 1415 ml   Net -1396.95 ml         General appearance: intubated and sedated  HEENT: no icterus, EOM intact, trachea midline. Neck : no masses, appears symmetrical and no JVD appreciated. Respiratory: Respiratory effort normal, bilateral equal chest rise. Cardiovascular:  Ausculation shows RRR and  no edema   Abdomen: abdomen is soft, non distended  Musculoskeletal:  no joint swelling, no deformity  Skin: no rashes, no induration, no tightening, no jaundice   Neuro:  sedated      Data:   CBC:   Recent Labs     02/24/22  0639 02/24/22  0639 02/25/22  0530 02/25/22  0725 02/25/22  1058 02/25/22  1159 02/25/22  1200   WBC 5.2  --  4.6  --   --   --  11.3*   HGB 12.8   < > 12.9   < > 6.4* 6.6* 7.6*   HCT 38.8  --  39.3  --   --   --  23.1*     --  151  --   --   --

## 2022-02-25 NOTE — PROGRESS NOTES
While in lining room patients bp up to 838 systolic. Ntg drip titrated up. 10 mg hydralazine given ivp.

## 2022-02-25 NOTE — PROGRESS NOTES
Pt verified information regarding surgery, name, birth date, surgeon, procedure and allergies: No Known Allergies. Consent and labs reviewed. 4 units of RBC's on call to OR. Patient transferred to CV preop for surgery. Appropriate antibiotics ordered: 2g of cefazolin and 1g of Vancomycin . Beta blocker: carvedilol last given on 02/24/2022 at [address]. DVT prophyaxis: Lovenox 40mg stopped at 0827. Hair clipped, pt washed with 2% chlorhexidine gluconate skin prep and alcohol wipe down. Mepilex sacral border applied. Patient and family educated about surgery and pain management. Arterial line placed in right brachial at 0644 and TLC introducer in right internal jugular with swan at 0654 by Dr. Lenora Langley. VSS. Tolerated well. To surgery per bed at 0700.

## 2022-02-25 NOTE — PROGRESS NOTES
Pt admitted via wheelchair to 1301, transported with daughter at side. A&O, transferred self to bed and ambulated to bathroom without difficulty. Denies pain. VS stable, with exception of elevated BP. Per charge RN from 5, pt had not received pm Coreg. Administered pm Coreg and Elavil-see MAR. Will recheck BP in one hour and consider prn if needed. Oriented to call light, TV and flow of CVU. Daughter approved to spend night. Informed daughter she would be unable to spend night tomorrow night post surgery. All questions answered. Will monitor. Electronically signed by Brenton Rodriguez RN on 2/24/2022 at 7:06 PM    Pt also states she has a small knot in the middle of her abdomen, slightly above the umbilicus. Stated she had had gastric surgery? Years ago, and has had this know ever since. States it has caused her elimination problems, per pt and daughter.   Electronically signed by Brenton Rodriguez RN on 2/24/2022 at 7:11 PM

## 2022-02-25 NOTE — H&P
PCP:  Mariann VAZ      Cards: Actonvic Garcia Sandeep    Previous H+P on chart 2/23/22:  Maria Guadalupe Bro is a [de-identified] y.o. vaccinated female with hx significant for CKD, HTN, HLD, obesity.   Presented to ED 2/20/21 - chest pain, began 3 hours prior at rest, constant, 7/10, midsternal pressure. +diaphoresis. bp 231/95. NSR. Trop <0.01. cxdr ?R nodule  Admitted. 2/21 Cr 1.3. BB, ASA. Echo EF 55. SPECT abn.  2/22 cath 3VD\"  No changes    Past Medical History:  Past Medical History:   Diagnosis Date    Arthritis     Asthma     as child grew out of it    CAD (coronary artery disease) 02/22/2022    multi vessel    CKD (chronic kidney disease) 2015    Elevated creatinine since 2015; Stage III b    Glaucoma     Hyperlipidemia     Hypertension     Hyperthyroidism 2019    Graves disease    IBS (irritable bowel syndrome) 10/2020    On Linzess    Neuropathy     Peripheral    Obesity (BMI 30-39. 9)     Osteopenia 05/2009    Osteoporosis        Past Surgical History:  Past Surgical History:   Procedure Laterality Date    ANKLE FRACTURE SURGERY Left 02/12/2015    CHOLECYSTECTOMY      FOOT SURGERY      bilateral, hammer toes    GASTRIC BAND  [id number]    laproscopic    HAND SURGERY  2009    trigger finger    ORIF FEMUR DECOMPRESSION Left 04/2014    OTHER SURGICAL HISTORY Right 2010    mass behind ear    TUBAL LIGATION         Home Medications:   Prior to Admission medications    Medication Sig Start Date End Date Taking? Authorizing Provider   amitriptyline (ELAVIL) 10 MG tablet Take 10 mg by mouth every evening 11/5/21  Yes Historical Provider, MD   amLODIPine (NORVASC) 5 MG tablet Take 5 mg by mouth daily 11/5/21  Yes Historical Provider, MD   gabapentin (NEURONTIN) 100 MG capsule Take 100-300 mg by mouth daily.  11/5/21  Yes Historical Provider, MD   hydroCHLOROthiazide (HYDRODIURIL) 25 MG tablet Take 25 mg by mouth daily 11/5/21  Yes Historical Provider, MD   linaCLOtide Lattie Ades) 72 MCG CAPS capsule Take 1-2 capsules by mouth daily 11/5/21  Yes Historical Provider, MD   Propranolol HCl SR Beads (PROPRANOLOL HCL ER BEADS PO) Take 20 mg by mouth daily  11/5/21  Yes Historical Provider, MD   meloxicam (MOBIC) 15 MG tablet Take 15 mg by mouth daily 11/5/21  Yes Historical Provider, MD   methIMAzole (TAPAZOLE) 5 MG tablet Take 5 mg by mouth daily 11/5/21  Yes Historical Provider, MD   pravastatin (PRAVACHOL) 80 MG tablet Take 80 mg by mouth every evening    Yes Historical Provider, MD        Facility Administered Medications:    sodium chloride flush  10 mL IntraVENous 2 times per day    vancomycin (VANCOCIN) IV  1,000 mg IntraVENous On Call to OR    mupirocin   Nasal BID    chlorhexidine   Topical See Admin Instructions    ceFAZolin (ANCEF) IVPB  2,000 mg IntraVENous On Call to OR    famotidine (PEPCID) injection  20 mg IntraVENous 60 Min Pre-Op    bisacodyl  10 mg Rectal Once    carvedilol  12.5 mg Oral BID WC    amitriptyline  10 mg Oral QPM    sodium chloride flush  5-40 mL IntraVENous 2 times per day    rosuvastatin  40 mg Oral Nightly    docusate sodium  100 mg Oral BID    aspirin  81 mg Oral Daily       Allergies:  No Known Allergies     Pt examined.   Vital Signs:                                                 BP (!) 172/69   Pulse 67   Temp 98.2 °F (36.8 °C) (Oral)   Resp 15   Ht 5' 3\" (1.6 m)   Wt 191 lb 12.8 oz (87 kg)   SpO2 100%   BMI 33.98 kg/m²  O2 Flow Rate (L/min): 2 L/min  PAP (Mean): 5 mmHg  CCI: 2.8 L/min  SVO2 (%): 89 %   Admission Weight: Weight: 168 lb 1.6 oz (76.2 kg)      CV: reg  Pulm: decreased at bases  Abd: soft  Ext: warm    CBC:   Lab Results   Component Value Date    WBC 4.6 02/25/2022    HGB 12.9 02/25/2022    HCT 39.3 02/25/2022    MCV 85.9 02/25/2022     02/25/2022     BMP:   Lab Results   Component Value Date     02/25/2022    K 4.3 02/25/2022    K 4.1 02/21/2022     02/25/2022    CO2 23 02/25/2022    BUN 24 02/25/2022 CREATININE 1.3 02/25/2022    CALCIUM 8.9 02/25/2022    MG 2.10 02/25/2022     Cardiac Enzymes:   Lab Results   Component Value Date    TROPONINI <0.01 02/21/2022     PT/INR:   Lab Results   Component Value Date    PROTIME 12.4 02/25/2022    INR 1.09 02/25/2022     APTT:   Lab Results   Component Value Date    APTT 38.3 02/23/2022     Liver Profile:  Lab Results   Component Value Date    AST 15 02/24/2022    ALT 8 02/24/2022    BILIDIR <0.2 02/23/2022    BILITOT 0.4 02/24/2022    ALKPHOS 76 02/24/2022    LABALBU 3.4 02/24/2022     Lab Results   Component Value Date    CHOL 221 02/23/2022    HDL 57 02/23/2022    TRIG 78 02/23/2022     HgbA1c:  Lab Results   Component Value Date    LABA1C 5.7 02/23/2022     UA:   Lab Results   Component Value Date    COLORU YELLOW 02/23/2022    PHUR 5.5 02/23/2022    CLARITYU Clear 02/23/2022    SPECGRAV 1.026 02/23/2022    LEUKOCYTESUR Negative 02/23/2022    UROBILINOGEN 0.2 02/23/2022    BILIRUBINUR Negative 02/23/2022    BILIRUBINUR Negative 08/10/2011    BLOODU Negative 02/23/2022    GLUCOSEU Negative 02/23/2022    GLUCOSEU Negative 08/10/2011       Reviewed above, reason for surgery, diagnosis and treatment plan including risks, benefits and alternatives. Will proceed with Robina Restrepo MD  2/25/2022  7:07 AM     I have discussed with the patient the rationale for blood component transfusion; its benefits in treating or preventing fatigue, organ damage, or death; and its risk which includes mild transfusion reactions, rare risk of blood borne infection, or more serious but rare reactions. I have discussed the alternatives to transfusion, including the risk and consequences of not receiving transfusion. The patient had an opportunity to ask questions and had agreed to proceed with transfusion of blood components.

## 2022-02-25 NOTE — PROGRESS NOTES
Hospitalist Progress Note      PCP: Galdino Cruz    Date of Admission: 2/20/2022        Subjective: Feels okay this morning no chest pain or shortness of breath. Daughter at bedside      Medications:  Reviewed    Infusion Medications    sodium chloride      sodium chloride      nitroGLYCERIN 25 mcg/min (02/25/22 9674)    sodium chloride       Scheduled Medications    sodium chloride flush  10 mL IntraVENous 2 times per day    vancomycin (VANCOCIN) IV  1,000 mg IntraVENous On Call to OR    mupirocin   Nasal BID    chlorhexidine   Topical See Admin Instructions    ceFAZolin (ANCEF) IVPB  2,000 mg IntraVENous On Call to OR    famotidine (PEPCID) injection  20 mg IntraVENous 60 Min Pre-Op    bisacodyl  10 mg Rectal Once    carvedilol  12.5 mg Oral BID WC    amitriptyline  10 mg Oral QPM    sodium chloride flush  5-40 mL IntraVENous 2 times per day    rosuvastatin  40 mg Oral Nightly    docusate sodium  100 mg Oral BID    aspirin  81 mg Oral Daily     PRN Meds: sodium chloride flush, sodium chloride, sodium chloride flush, sodium chloride, acetaminophen, technetium tetrofosmin, oxyCODONE-acetaminophen, ondansetron **OR** ondansetron, polyethylene glycol, acetaminophen **OR** acetaminophen, perflutren lipid microspheres, nitroGLYCERIN, hydrALAZINE      Intake/Output Summary (Last 24 hours) at 2/25/2022 0541  Last data filed at 2/25/2022 0317  Gross per 24 hour   Intake 378.05 ml   Output --   Net 378.05 ml       Physical Exam Performed:    /65   Pulse 60   Temp 98.2 °F (36.8 °C) (Oral)   Resp 15   Ht 5' 3\" (1.6 m)   Wt 191 lb 12.8 oz (87 kg)   SpO2 97%   BMI 33.98 kg/m²     General appearance: No apparent distress  Neck: Supple  Respiratory:  Normal respiratory effort. Clear to auscultation, bilaterally without Rales/Wheezes/Rhonchi. Cardiovascular: Regular rate and rhythm with normal S1/S2 without murmurs, rubs or gallops.   Abdomen: Soft, non-tender, non-distended  Musculoskeletal: No clubbing, cyanosis  Skin: Skin color, texture, turgor normal.  No rashes or lesions. Neurologic:  No focal weakness   Psychiatric: Alert and oriented  Capillary Refill: Brisk,3 seconds, normal   Peripheral Pulses: +2 palpable, equal bilaterally       Labs:   Recent Labs     02/23/22  1229 02/24/22  0639   WBC 6.5 5.2   HGB 13.3 12.8   HCT 40.8 38.8    170     Recent Labs     02/23/22  1229 02/24/22  0639    138   K 4.2 3.9    104   CO2 23 23   BUN 30* 26*   CREATININE 1.5* 1.4*   CALCIUM 9.0 8.8     Recent Labs     02/23/22  1229 02/24/22  0639   AST 16 15   ALT 9* 8*   BILIDIR <0.2  --    BILITOT 0.5 0.4   ALKPHOS 82 76     Recent Labs     02/23/22  1229   INR 1.05     No results for input(s): CKTOTAL, TROPONINI in the last 72 hours. Urinalysis:      Lab Results   Component Value Date    NITRU Negative 02/23/2022    BLOODU Negative 02/23/2022    SPECGRAV 1.026 02/23/2022    GLUCOSEU Negative 02/23/2022    GLUCOSEU Negative 08/10/2011       Radiology:  VL PRE OP VEIN MAPPING   Final Result      CT CHEST WO CONTRAST   Final Result   Punctate pulmonary nodule in the lingula. No pulmonary nodule on the right. No pneumonia or edema. Severe coronary artery calcification is seen. There is mild calcification of   the ascending aorta with moderate calcification of the aortic arch and   descending thoracic aorta. RECOMMENDATIONS:   Fleischner Society guidelines for follow-up and management of incidentally   detected pulmonary nodules:      Single Solid Nodule:      Nodule size less than 6 mm   In a low-risk patient, no routine follow-up. In a high-risk patient, optional CT at 12 months. - Low risk patients include individuals with minimal or absent history of   smoking and other known risk factors. - High risk patients include individuals with a history or smoking or known   risk factors.       Radiology 2017 http://pubs. rsna.org/doi/full/10.1148/radiol. 4011388388         VL DUP CAROTID BILATERAL   Final Result      NM MYOCARDIAL SPECT REST EXERCISE OR RX   Final Result      XR CHEST PORTABLE   Final Result   1. No acute process. 2. Possible small nodule in the right mid lung. Consider further evaluation   with nonemergent noncontrast chest CT. Assessment/Plan:    Active Hospital Problems    Diagnosis     Unstable angina ([facility] [address] [I20.0]     Abnormal cardiovascular stress test [R94.39]     Hyperlipidemia LDL goal <70 [E78.5]     Chest pain [R07.9]      1. unstable angina, troponin negative, positive stress test, cardiology consulted, cardiac cath positive for triple-vessel disease, for CABG today    2. Hypertensive urgency, likely due to missing few doses of her medication, restarted on p.o. medication and as needed medication, better controlled. 3. Osteoarthritis, controlled at this time  4.  Likely CKD, with some increase in creatinine, nephrology consulted.       Diet: Diet NPO Exceptions are: Sips of Water with Meds  Code Status: Full Code        Leticia Anne MD

## 2022-02-25 NOTE — PROGRESS NOTES
1720 Pt awake and following all commands. 1728 Pt placed on CPAP 5/5.     1805 ABG's drawn:    Results for Palmira Owen (MRN [id number]) as of 2/25/2022 18:36   Ref. Range 2/25/2022 18:05   pH, Arterial Latest Ref Range: 7.350 - 7.450  7.369   pCO2, Arterial Latest Ref Range: 35.0 - 45.0 mm Hg 41.3   pO2, Arterial Latest Ref Range: 75.0 - 108.0 mm Hg 158.9 (H)   HCO3, Arterial Latest Ref Range: 21.0 - 29.0 mmol/L 23.8   TCO2 (calc), Art Latest Ref Range: Not Established mmol/L 25   Base Excess, Arterial Latest Ref Range: -3 - 3  -2   O2 Sat, Arterial Latest Ref Range: 93 - 100 % 99   Sample Type Unknown ART   FIO2 Unknown 40.00     Pt following all commands, able to lift her head off the pillow. RSBI < 100, SPO2 saturation 100% on monitor. VSS. Minimal drainage from CT's. 1815 Pt extubated to 100% NRB. Lung sounds clear bilaterally. SPO2 100%. Pt denies any pain at this time. Family at bedside. All questions and concerns addressed.

## 2022-02-25 NOTE — ANESTHESIA PRE PROCEDURE
Department of Anesthesiology  Preprocedure Note       Name:  Olaf Benavidez   Age:  [de-identified] y.o.  :  1940                                          MRN:  [id number]         Date:  2022      Surgeon: Nallely Pérez):  Gurdeep Monte MD    Procedure: Procedure(s):  TRANSESOPHAGEAL ECHOCARDIOGRAM, TOTAL CARDIOPULMONARY BYPASS GRAFTING X4 (1 ARTERY, 3 VEIN) USING LEFT INTERNAL MAMMARY ARTERY AND RIGHT ENDOSCOPICALLY HARVESTED SAPHENOUS VEIN    Medications prior to admission:   Prior to Admission medications    Medication Sig Start Date End Date Taking? Authorizing Provider   amitriptyline (ELAVIL) 10 MG tablet Take 10 mg by mouth every evening 21  Yes Historical Provider, MD   amLODIPine (NORVASC) 5 MG tablet Take 5 mg by mouth daily 21  Yes Historical Provider, MD   gabapentin (NEURONTIN) 100 MG capsule Take 100-300 mg by mouth daily.  21  Yes Historical Provider, MD   hydroCHLOROthiazide (HYDRODIURIL) 25 MG tablet Take 25 mg by mouth daily 21  Yes Historical Provider, MD   linaCLOtide Jaylon Snow) 72 MCG CAPS capsule Take 1-2 capsules by mouth daily 21  Yes Historical Provider, MD   Propranolol HCl SR Beads (PROPRANOLOL HCL ER BEADS PO) Take 20 mg by mouth daily  21  Yes Historical Provider, MD   meloxicam (MOBIC) 15 MG tablet Take 15 mg by mouth daily 21  Yes Historical Provider, MD   methIMAzole (TAPAZOLE) 5 MG tablet Take 5 mg by mouth daily 21  Yes Historical Provider, MD   pravastatin (PRAVACHOL) 80 MG tablet Take 80 mg by mouth every evening    Yes Historical Provider, MD       Current medications:    Current Facility-Administered Medications   Medication Dose Route Frequency Provider Last Rate Last Admin    sodium chloride 0.9 % irrigation    Continuous PRN Gurdeep Monte MD   4,000 mL at 22 0608    sodium chloride flush 0.9 % injection 10 mL  10 mL IntraVENous 2 times per day MARKEL Alston - CNP        sodium chloride flush 0.9 % injection 10 mL  10 mL IntraVENous PRN Harle Rogers, APRN - CNP        0.9 % sodium chloride infusion  25 mL IntraVENous PRN Harle Rogers, APRN - CNP        vancomycin 1000 mg IVPB in 250 mL D5W addavial  1,000 mg IntraVENous On Call to [address], APRN - CNP        mupirocin (BACTROBAN) 2 % ointment   Nasal BID Harle Rogers, APRN - CNP   Given at 02/24/22 2146    chlorhexidine (HIBICLENS) 4 % liquid   Topical See Admin Instructions Harle Rogers, APRN - CNP        0.9 % sodium chloride infusion   IntraVENous Continuous Harle Rogers, APRN - CNP        famotidine (PEPCID) injection 20 mg  20 mg IntraVENous 60 Min Pre-Op Harle Rogers, APRN - CNP   20 mg at 02/25/22 0505    bisacodyl (DULCOLAX) suppository 10 mg  10 mg Rectal Once Aki M DO Jenna        nitroGLYCERIN 50 mg in dextrose 5% 250 mL infusion   mcg/min IntraVENous Continuous Abilio Rogers, APRN - CNP 19.5 mL/hr at 02/25/22 0606 65 mcg/min at 02/25/22 0606    carvedilol (COREG) tablet 12.5 mg  12.5 mg Oral BID WC Jossue Reid MD   12.5 mg at 02/24/22 1835    amitriptyline (ELAVIL) tablet 10 mg  10 mg Oral QPM Jossue Reid MD   10 mg at 02/24/22 1835    sodium chloride flush 0.9 % injection 5-40 mL  5-40 mL IntraVENous 2 times per day Jossue Reid MD   10 mL at 02/24/22 5927    sodium chloride flush 0.9 % injection 5-40 mL  5-40 mL IntraVENous PRN Jossue Reid MD        0.9 % sodium chloride infusion  25 mL IntraVENous PRN Jossue Reid MD        acetaminophen (TYLENOL) tablet 650 mg  650 mg Oral Q4H PRN Jossue Reid MD        technetium tetrofosmin (Tc-MYOVIEW) injection 30 millicurie  30 millicurie IntraVENous ONCE PRN Jossue Reid MD        rosuvastatin (CRESTOR) tablet 40 mg  40 mg Oral Nightly Jossue Reid MD   40 mg at 02/24/22 2146    oxyCODONE-acetaminophen (PERCOCET) 5-325 MG per tablet 1 tablet  1 tablet Oral Q4H PRN [place] IntraVENous PRN Marge Cheadle, APRN - CRNA   0.2 g at 02/25/22 3929    phenylephrine (LIV-SYNEPHRINE) 1 MG/10ML prefilled syringe   IntraVENous PRN Marge Cheadle, APRN - CRNA   200 mcg at 02/25/22 8454    insulin regular (HUMULIN R;NOVOLIN R) 100 Units in sodium chloride 0.9 % 100 mL infusion   IntraVENous Continuous PRN Marge Cheadle, APRN - CRNA 4 mL/hr at 02/25/22 0730 4 Units/hr at 02/25/22 0730    dexmedetomidine (PRECEDEX) 400 mcg in sodium chloride 0.9 % 100 mL infusion   IntraVENous Continuous PRN Marge Cheadle, APRN - CRNA 15.225 mL/hr at 02/25/22 0713 0.7 mcg/kg/hr at 02/25/22 0713    nitroGLYCERIN 50 mg in dextrose 5% 250 mL infusion   IntraVENous PRN Marge Cheadle, APRN - CRNA   25 mcg at 02/25/22 0740    propofol injection   IntraVENous PRN Marge Cheadle, APRN - CRNA   50 mg at 02/25/22 3445       Allergies:  No Known Allergies    Problem List:    Patient Active Problem List   Diagnosis Code    Morbid obesity ([facility] [address] E66.01    Chest pain R07.9    Unstable angina ([facility] [address] I20.0    Abnormal cardiovascular stress test R94.39    Hyperlipidemia LDL goal <70 E78.5       Past Medical History:        Diagnosis Date    Arthritis     Asthma     as child grew out of it    CAD (coronary artery disease) 02/22/2022    multi vessel    CKD (chronic kidney disease) 2015    Elevated creatinine since 2015; Stage III b    Glaucoma     Hyperlipidemia     Hypertension     Hyperthyroidism 2019    Graves disease    IBS (irritable bowel syndrome) 10/2020    On Linzess    Neuropathy     Peripheral    Obesity (BMI 30-39. 9)     Osteopenia 05/2009    Osteoporosis        Past Surgical History:        Procedure Laterality Date    ANKLE FRACTURE SURGERY Left 02/12/2015    CHOLECYSTECTOMY      FOOT SURGERY      bilateral, hammer toes    GASTRIC BAND  [id number]    laproscopic    HAND SURGERY  2009    trigger finger    ORIF FEMUR DECOMPRESSION Left 04/2014    OTHER SURGICAL HISTORY Right 2010    mass behind ear    TUBAL LIGATION         Social History:    Social History     Tobacco Use    Smoking status: Former Smoker     Quit date: 8/10/1999     Years since quittin.5    Smokeless tobacco: Never Used    Tobacco comment: started age 26   Substance Use Topics    Alcohol use: No                                Counseling given: Not Answered  Comment: started age 26      Vital Signs (Current):   Vitals:    22 0650 22 0655 22 0658 22 0700   BP:       Pulse: 73 68 67 67   Resp: 19 19 15 18   Temp:       TempSrc:       SpO2: 100% 100% 100% 100%   Weight:       Height:                                                  BP Readings from Last 3 Encounters:   22 (!) 172/69   08/10/11 151/86       NPO Status: Time of last liquid consumption:                         Time of last solid consumption:                         Date of last liquid consumption: 22                        Date of last solid food consumption: 22    BMI:   Wt Readings from Last 3 Encounters:   22 191 lb 12.8 oz (87 kg)   08/10/11 214 lb 2 oz (97.1 kg)     Body mass index is 33.98 kg/m². CBC:   Lab Results   Component Value Date    WBC 4.6 2022    RBC 4.58 2022    HGB 12.9 2022    HCT 39.3 2022    MCV 85.9 2022    RDW 14.5 2022     2022       CMP:   Lab Results   Component Value Date     2022    K 4.3 2022    K 4.1 2022     2022    CO2 23 2022    BUN 24 2022    CREATININE 1.3 2022    GFRAA 48 2022    GFRAA 66 08/10/2011    AGRATIO 0.9 2022    LABGLOM 39 2022    GLUCOSE 103 2022    PROT 7.1 2022    PROT 7.2 08/10/2011    CALCIUM 8.9 2022    BILITOT 0.4 2022    ALKPHOS 76 2022    AST 15 2022    ALT 8 2022       POC Tests: No results for input(s): POCGLU, POCNA, POCK, POCCL, POCBUN, POCHEMO, POCHCT in the last 72 hours.     Coags:   Lab Results Component Value Date    PROTIME 12.4 02/25/2022    INR 1.09 02/25/2022    APTT 38.3 02/23/2022       HCG (If Applicable): No results found for: PREGTESTUR, PREGSERUM, HCG, HCGQUANT     ABGs:   Lab Results   Component Value Date    PHART 7.437 02/23/2022    PO2ART 133.0 02/23/2022    RQY1UGG 35.2 02/23/2022    EUI0KPM 23.7 02/23/2022    BEART -0.1 02/23/2022    K5ONCZQW 98.1 02/23/2022        Type & Screen (If Applicable):  No results found for: LABABO, LABRH    Drug/Infectious Status (If Applicable):  No results found for: HIV, HEPCAB    COVID-19 Screening (If Applicable):   Lab Results   Component Value Date    COVID19 Not Detected 02/23/2022           Anesthesia Evaluation  Patient summary reviewed and Nursing notes reviewed no history of anesthetic complications:   Airway: Mallampati: III  TM distance: >3 FB   Neck ROM: full  Mouth opening: > = 3 FB Dental:      Comment: No loose teeth    Pulmonary: breath sounds clear to auscultation  (+) shortness of breath:  asthma:     (-) pneumonia, COPD, recent URI, sleep apnea and not a current smoker                           Cardiovascular:    (+) hypertension:, angina:, past MI:, CAD:, JHA:, hyperlipidemia    (-) pacemaker, valvular problems/murmurs, dysrhythmias,  CHF, orthopnea, PND and no pulmonary hypertensionCABG/stent: CABG 2-. ECG reviewed  Rhythm: regular    Echocardiogram reviewed    Cleared by cardiology     Beta Blocker:  Dose within 24 Hrs         Neuro/Psych:      (-) seizures, neuromuscular disease, TIA, CVA, headaches and depression/anxiety            GI/Hepatic/Renal:        (-) hiatal hernia, GERD, PUD, hepatitis, liver disease, bowel prep and no morbid obesity       Endo/Other:    (+) hyperthyroidism::., no malignancy/cancer. (-) diabetes mellitus, hypothyroidism, blood dyscrasia, no electrolyte abnormalities, no malignancy/cancer               Abdominal:             Vascular:     - PVD, DVT and PE.       Other Findings: Anesthesia Plan      general     ASA 4       Induction: intravenous. arterial line, PA catheter, BIS, JENNY, central line and CVP  MIPS: Postoperative opioids intended and Prophylactic antiemetics administered. Anesthetic plan and risks discussed with patient. Use of blood products discussed with patient whom consented to blood products. Plan discussed with CRNA. Meg Christensen MD   2/25/2022        This pre-anesthesia assessment may be used as a history and physical.    DOS STAFF ADDENDUM:    Pt seen and examined, chart reviewed (including anesthesia, drug and allergy history). No interval changes to history and physical examination. Anesthetic plan, risks, benefits, alternatives, and personnel involved discussed with patient. Patient verbalized an understanding and agrees to proceed.       Meg Christensen MD  February 25, 2022  7:52 AM

## 2022-02-25 NOTE — PROCEDURES
[facility] Department of Anesthesiology  Procedure Note - Pulmonary Artery Catheter Insertion       Name:  Krista Palmer                                         Age:  [de-identified] y.o. MRN:  [id number]     ALLERGIES: Patient has no known allergies. Indication: Perioperative evaluation of cardiac function via cardiac output/index monitoring and evaluation of pulmonary artery and central venous pressures. Central administration of vasoactive medications. Time-Out: A time-out was completed verifying correct patient, procedure, site, positioning, and special equipment if applicable. Procedure: Patient supine. Landmarks identified. Sterile prep and drape. Lidocaine 1% skin wheal over right internal jugular vein approximately 4 cm cephalad of the clavicle. Ultrasound used to locate the IJ vein; an 18G needle was then directed in the same plane into the right internal jugular vein. A guidewire was then passed easily via the needle and the needle was removed. A 9Fr introducer was then passed easily over the wire and the wire and dilator introducer were then removed. The introducer was secured after verifying good blood return and injectability. A pulmonary artery catheter was then introduced and passed easily through the introducer; a pulmonary artery waveform was obtained and the catheter was secured at 50 cm after being withdrawn 2 cm once the catheter balloon was deflated. There were no apparent complications and the procedure was tolerated well. Estimated Blood Loss: minimal    Nallely Alex MD  February 25, 2022  7:53 AM      [facility] Department of Anesthesiology  Procedure Note - Arterial Line Placement       Name:  Krista Palmer                                         Age:  [de-identified] y.o. MRN:  [id number]     ALLERGIES: Patient has no known allergies. Indication: Perioperative hemodynamic monitoring and access.     Time-Out: A time-out was completed verifying correct patient, procedure,

## 2022-02-25 NOTE — PROGRESS NOTES
* Late entry due to patient care. Upon coming on shift patient had a leaking 22 g iv. Patients bp also elevated as high as 060 systolic. Spoke with Zuleyma Thomas NP. Nitro drip to be started to target sbp<140. Attempted to place an 18 g iv for or but unable to obtain. Patient tolerates sticks poorly and is a tough stick. Only to obtain an ultrasound guided 20 g iv in right upper arm. Will defer any more sticks to CV[facility] staff as this nurse attempted 3 times. Patient received pre op chg bath. Education also provided.

## 2022-02-26 LAB
ANION GAP SERPL CALCULATED.3IONS-SCNC: 10 MMOL/L (ref 3–16)
ANION GAP SERPL CALCULATED.3IONS-SCNC: 11 MMOL/L (ref 3–16)
BASE EXCESS ARTERIAL: -3 (ref -3–3)
BASE EXCESS ARTERIAL: -4 (ref -3–3)
BASE EXCESS MIXED: -9
BUN BLDV-MCNC: 21 MG/DL (ref 7–20)
BUN BLDV-MCNC: 26 MG/DL (ref 7–20)
CALCIUM IONIZED: 1.28 MMOL/L (ref 1.12–1.32)
CALCIUM SERPL-MCNC: 8.2 MG/DL (ref 8.3–10.6)
CALCIUM SERPL-MCNC: 8.8 MG/DL (ref 8.3–10.6)
CHLORIDE BLD-SCNC: 112 MMOL/L (ref 99–110)
CHLORIDE BLD-SCNC: 112 MMOL/L (ref 99–110)
CO2: 18 MMOL/L (ref 21–32)
CO2: 19 MMOL/L (ref 21–32)
CREAT SERPL-MCNC: 1.8 MG/DL (ref 0.6–1.2)
CREAT SERPL-MCNC: 2.6 MG/DL (ref 0.6–1.2)
GFR AFRICAN AMERICAN: 21
GFR AFRICAN AMERICAN: 33
GFR NON-AFRICAN AMERICAN: 18
GFR NON-AFRICAN AMERICAN: 27
GLUCOSE BLD-MCNC: 112 MG/DL (ref 70–99)
GLUCOSE BLD-MCNC: 135 MG/DL (ref 70–99)
GLUCOSE BLD-MCNC: 80 MG/DL (ref 70–99)
GLUCOSE BLD-MCNC: 80 MG/DL (ref 70–99)
GLUCOSE BLD-MCNC: 83 MG/DL (ref 70–99)
GLUCOSE BLD-MCNC: 86 MG/DL (ref 70–99)
GLUCOSE BLD-MCNC: 87 MG/DL (ref 70–99)
GLUCOSE BLD-MCNC: 89 MG/DL (ref 70–99)
GLUCOSE BLD-MCNC: 89 MG/DL (ref 70–99)
GLUCOSE BLD-MCNC: 92 MG/DL (ref 70–99)
GLUCOSE BLD-MCNC: 93 MG/DL (ref 70–99)
GLUCOSE BLD-MCNC: 98 MG/DL (ref 70–99)
HCO3 ARTERIAL: 21.3 MMOL/L (ref 21–29)
HCO3 ARTERIAL: 22 MMOL/L (ref 21–29)
HCO3, MIXED: 16.8 MMOL/L
HCT VFR BLD CALC: 22.3 % (ref 36–48)
HCT VFR BLD CALC: 26 % (ref 36–48)
HEMOGLOBIN: 7.2 G/DL (ref 12–16)
HEMOGLOBIN: 8.5 G/DL (ref 12–16)
HEPARIN INDUCED PLATELET ANTIBODY: NEGATIVE
INR BLD: 1.29 (ref 0.88–1.12)
MAGNESIUM: 2.8 MG/DL (ref 1.8–2.4)
MCH RBC QN AUTO: 27.8 PG (ref 26–34)
MCHC RBC AUTO-ENTMCNC: 32.4 G/DL (ref 31–36)
MCV RBC AUTO: 85.9 FL (ref 80–100)
O2 SAT, ARTERIAL: 98 % (ref 93–100)
O2 SAT, ARTERIAL: 99 % (ref 93–100)
O2 SAT, MIXED: 53 %
PCO2 ARTERIAL: 37.6 MM HG (ref 35–45)
PCO2 ARTERIAL: 38 MM HG (ref 35–45)
PCO2 MIXED: 32.2 MM HG
PDW BLD-RTO: 14.9 % (ref 12.4–15.4)
PERFORMED ON: ABNORMAL
PERFORMED ON: NORMAL
PH ARTERIAL: 7.36 (ref 7.35–7.45)
PH ARTERIAL: 7.37 (ref 7.35–7.45)
PH, MIXED: 7.32 (ref 7.35–7.45)
PLATELET # BLD: 66 K/UL (ref 135–450)
PMV BLD AUTO: 8.8 FL (ref 5–10.5)
PO2 ARTERIAL: 113.9 MM HG (ref 75–108)
PO2 ARTERIAL: 141.6 MM HG (ref 75–108)
PO2 MIXED: 30 MM HG
POC FIO2: 3
POC SAMPLE TYPE: ABNORMAL
POC SAMPLE TYPE: NORMAL
POTASSIUM SERPL-SCNC: 4.4 MMOL/L (ref 3.5–5.1)
POTASSIUM SERPL-SCNC: 4.8 MMOL/L (ref 3.5–5.1)
POTASSIUM SERPL-SCNC: 4.9 MMOL/L (ref 3.5–5.1)
PROTHROMBIN TIME: 14.7 SEC (ref 9.9–12.7)
RBC # BLD: 2.59 M/UL (ref 4–5.2)
SODIUM BLD-SCNC: 140 MMOL/L (ref 136–145)
SODIUM BLD-SCNC: 142 MMOL/L (ref 136–145)
TCO2 ARTERIAL: 23 MMOL/L
TCO2 ARTERIAL: 23 MMOL/L
TCO2 CALC MIXED: 18 MMOL/L
WBC # BLD: 12.3 K/UL (ref 4–11)

## 2022-02-26 PROCEDURE — 36430 TRANSFUSION BLD/BLD COMPNT: CPT

## 2022-02-26 PROCEDURE — 85014 HEMATOCRIT: CPT

## 2022-02-26 PROCEDURE — 85027 COMPLETE CBC AUTOMATED: CPT

## 2022-02-26 PROCEDURE — P9041 ALBUMIN (HUMAN),5%, 50ML: HCPCS | Performed by: THORACIC SURGERY (CARDIOTHORACIC VASCULAR SURGERY)

## 2022-02-26 PROCEDURE — 82947 ASSAY GLUCOSE BLOOD QUANT: CPT

## 2022-02-26 PROCEDURE — 6370000000 HC RX 637 (ALT 250 FOR IP): Performed by: THORACIC SURGERY (CARDIOTHORACIC VASCULAR SURGERY)

## 2022-02-26 PROCEDURE — 86022 PLATELET ANTIBODIES: CPT

## 2022-02-26 PROCEDURE — 85018 HEMOGLOBIN: CPT

## 2022-02-26 PROCEDURE — 83735 ASSAY OF MAGNESIUM: CPT

## 2022-02-26 PROCEDURE — 37799 UNLISTED PX VASCULAR SURGERY: CPT

## 2022-02-26 PROCEDURE — 6370000000 HC RX 637 (ALT 250 FOR IP): Performed by: NURSE PRACTITIONER

## 2022-02-26 PROCEDURE — 80048 BASIC METABOLIC PNL TOTAL CA: CPT

## 2022-02-26 PROCEDURE — 85610 PROTHROMBIN TIME: CPT

## 2022-02-26 PROCEDURE — 84132 ASSAY OF SERUM POTASSIUM: CPT

## 2022-02-26 PROCEDURE — 6360000002 HC RX W HCPCS: Performed by: THORACIC SURGERY (CARDIOTHORACIC VASCULAR SURGERY)

## 2022-02-26 PROCEDURE — 82803 BLOOD GASES ANY COMBINATION: CPT

## 2022-02-26 PROCEDURE — 99024 POSTOP FOLLOW-UP VISIT: CPT | Performed by: THORACIC SURGERY (CARDIOTHORACIC VASCULAR SURGERY)

## 2022-02-26 PROCEDURE — C9113 INJ PANTOPRAZOLE SODIUM, VIA: HCPCS | Performed by: THORACIC SURGERY (CARDIOTHORACIC VASCULAR SURGERY)

## 2022-02-26 PROCEDURE — 2580000003 HC RX 258: Performed by: THORACIC SURGERY (CARDIOTHORACIC VASCULAR SURGERY)

## 2022-02-26 PROCEDURE — 2100000000 HC CCU R&B

## 2022-02-26 PROCEDURE — 6360000002 HC RX W HCPCS: Performed by: NURSE PRACTITIONER

## 2022-02-26 PROCEDURE — 9990000010 HC NO CHARGE VISIT: Performed by: PHYSICAL THERAPIST

## 2022-02-26 RX ORDER — FENTANYL CITRATE 50 UG/ML
12.5 INJECTION, SOLUTION INTRAMUSCULAR; INTRAVENOUS
Status: DISCONTINUED | OUTPATIENT
Start: 2022-02-26 | End: 2022-02-27

## 2022-02-26 RX ORDER — ACETAMINOPHEN 325 MG/1
650 TABLET ORAL EVERY 4 HOURS PRN
Status: DISCONTINUED | OUTPATIENT
Start: 2022-02-26 | End: 2022-03-10 | Stop reason: HOSPADM

## 2022-02-26 RX ORDER — PREDNISONE 20 MG/1
20 TABLET ORAL 2 TIMES DAILY
Status: DISCONTINUED | OUTPATIENT
Start: 2022-02-26 | End: 2022-03-01

## 2022-02-26 RX ORDER — SODIUM CHLORIDE 9 MG/ML
INJECTION, SOLUTION INTRAVENOUS PRN
Status: DISCONTINUED | OUTPATIENT
Start: 2022-02-26 | End: 2022-03-02

## 2022-02-26 RX ADMIN — PREDNISONE 20 MG: 20 TABLET ORAL at 20:08

## 2022-02-26 RX ADMIN — SODIUM CHLORIDE 500 ML: 9 INJECTION, SOLUTION INTRAVENOUS at 21:58

## 2022-02-26 RX ADMIN — ALBUMIN (HUMAN) 25 G: 12.5 INJECTION, SOLUTION INTRAVENOUS at 00:29

## 2022-02-26 RX ADMIN — FENTANYL CITRATE 12.5 MCG: 50 INJECTION, SOLUTION INTRAMUSCULAR; INTRAVENOUS at 13:53

## 2022-02-26 RX ADMIN — FENTANYL CITRATE 25 MCG: 50 INJECTION, SOLUTION INTRAMUSCULAR; INTRAVENOUS at 01:13

## 2022-02-26 RX ADMIN — ACETAMINOPHEN 650 MG: 325 TABLET ORAL at 20:08

## 2022-02-26 RX ADMIN — OXYCODONE 2.5 MG: 5 TABLET ORAL at 20:08

## 2022-02-26 RX ADMIN — POTASSIUM CHLORIDE 20 MEQ: 29.8 INJECTION, SOLUTION INTRAVENOUS at 02:33

## 2022-02-26 RX ADMIN — MILRINONE LACTATE IN DEXTROSE 0.3 MCG/KG/MIN: 200 INJECTION, SOLUTION INTRAVENOUS at 04:17

## 2022-02-26 RX ADMIN — SODIUM CHLORIDE, PRESERVATIVE FREE 10 ML: 5 INJECTION INTRAVENOUS at 08:27

## 2022-02-26 RX ADMIN — OXYCODONE 2.5 MG: 5 TABLET ORAL at 02:09

## 2022-02-26 RX ADMIN — CEFAZOLIN SODIUM 1000 MG: 1 INJECTION, POWDER, FOR SOLUTION INTRAMUSCULAR; INTRAVENOUS at 21:59

## 2022-02-26 RX ADMIN — SODIUM CHLORIDE, PRESERVATIVE FREE 10 ML: 5 INJECTION INTRAVENOUS at 21:55

## 2022-02-26 RX ADMIN — ALBUMIN (HUMAN) 25 G: 12.5 INJECTION, SOLUTION INTRAVENOUS at 09:17

## 2022-02-26 RX ADMIN — FENTANYL CITRATE 25 MCG: 50 INJECTION, SOLUTION INTRAMUSCULAR; INTRAVENOUS at 03:44

## 2022-02-26 RX ADMIN — PANTOPRAZOLE SODIUM 40 MG: 40 INJECTION, POWDER, FOR SOLUTION INTRAVENOUS at 08:25

## 2022-02-26 RX ADMIN — ATORVASTATIN CALCIUM 80 MG: 80 TABLET, FILM COATED ORAL at 20:08

## 2022-02-26 RX ADMIN — FENTANYL CITRATE 12.5 MCG: 50 INJECTION, SOLUTION INTRAMUSCULAR; INTRAVENOUS at 11:16

## 2022-02-26 RX ADMIN — MUPIROCIN: 20 OINTMENT TOPICAL at 08:25

## 2022-02-26 RX ADMIN — MUPIROCIN: 20 OINTMENT TOPICAL at 20:08

## 2022-02-26 RX ADMIN — CEFAZOLIN 2000 MG: 10 INJECTION, POWDER, FOR SOLUTION INTRAVENOUS at 08:25

## 2022-02-26 RX ADMIN — CHLORHEXIDINE GLUCONATE 0.12% ORAL RINSE 15 ML: 1.2 LIQUID ORAL at 20:08

## 2022-02-26 RX ADMIN — MILRINONE LACTATE IN DEXTROSE 0.22 MCG/KG/MIN: 200 INJECTION, SOLUTION INTRAVENOUS at 18:52

## 2022-02-26 RX ADMIN — CHLORHEXIDINE GLUCONATE 0.12% ORAL RINSE 15 ML: 1.2 LIQUID ORAL at 08:25

## 2022-02-26 RX ADMIN — SODIUM CHLORIDE, PRESERVATIVE FREE 10 ML: 5 INJECTION INTRAVENOUS at 08:26

## 2022-02-26 ASSESSMENT — PAIN SCALES - GENERAL
PAINLEVEL_OUTOF10: 4
PAINLEVEL_OUTOF10: 4
PAINLEVEL_OUTOF10: 0
PAINLEVEL_OUTOF10: 4
PAINLEVEL_OUTOF10: 5
PAINLEVEL_OUTOF10: 4
PAINLEVEL_OUTOF10: 0

## 2022-02-26 ASSESSMENT — PAIN DESCRIPTION - DESCRIPTORS: DESCRIPTORS: ACHING

## 2022-02-26 ASSESSMENT — PAIN DESCRIPTION - FREQUENCY: FREQUENCY: INTERMITTENT

## 2022-02-26 ASSESSMENT — PAIN DESCRIPTION - PAIN TYPE: TYPE: OTHER (COMMENT)

## 2022-02-26 ASSESSMENT — PAIN DESCRIPTION - LOCATION: LOCATION: LEG

## 2022-02-26 ASSESSMENT — PAIN SCALES - WONG BAKER
WONGBAKER_NUMERICALRESPONSE: 4
WONGBAKER_NUMERICALRESPONSE: 4

## 2022-02-26 ASSESSMENT — PAIN DESCRIPTION - ORIENTATION: ORIENTATION: RIGHT

## 2022-02-26 NOTE — PROGRESS NOTES
[address] [place] [address] Nephrology   [facility]  [phone]  Nephrology Note          Patient ID: Hailey Jason  Referring/ Physician: No att. providers found      HPI/Summary:   Hailey Jason is being seen by nephrology for SKINNY. This is a [de-identified] yo lady with PMH of HTN, CKD, HLD and obesity who presented with chest pain and SOB. She had been having several weeks of chest tightness and dyspnea with exertion. She feels ok now, no complaints. Says she has been on medication for BP for decades. Her Bp had been running prior to admission. She has been taking Meloxicam for the past few years for knee pain/arthritis. Denies hematuria, foamy urine. LUTS. No dysuria. No issues with swelling right now. During this admission she has had LHC 2/22/22 showing severe triple vessel disease. Normal LV function. LVEDP 14-16. She was referred for CABG and it is planned for 2/25/22 . Cr has been rising from 1.2 > 1.3 > 1.5 at time of consult. Of note , she has been on scheduled meloxicam in addition to receiving contrast. Her PO intake has been fine, no NVD. Interval hx:    The patient was seen and examined in ICU with her family members present  The patient nurse is also at her bedside  The patient was resting comfortably  The patient looked pale and frail, but was under no acute distress  The patient was arousable and able to answer the patient denies of any new discomfort  The patient had major blood pressure fluctuation, the patient required medication to lower her blood pressure and then pressors  1 patient on Levophed at 5 mg/min the patient also on dobutamine  The patient output has dropped  The patient neuropathy for yesterday was slightly over 2 L and today so far it is only 77 mL  The patient is edematous  Lab work from this morning showed sodium 142, potassium 4.9, bicarb 19, BUN 21, creatinine was 1.8, glucose was 93, magnesium 2.8        Assessment/plan for today:  The patient's creatinine has gone up, this morning it was 1.8, yesterday it was 1.1  The patient also has dropped  She had a bypass grafting surgery 1 day earlier  According the patient nurse, the patient blood pressure has been very labile, from very high to very low. Right now the patient does not require supplemental oxygen  The patient was verbal, drowsy. The patient appears to be comfortable  The patient is edematous  The patient 5 mg Levophed  The patient blood pressure now is between 412-952 systolic    Reviewed her medication list, I do not see any concerning medications as of now    So as of now I would recommend to hold off on any further evaluation intervention above SKINNY. she suffered AK probably from hemodynamic changes related to her open heart surgery and also the blood pressure fluctuation after open heart surgery. I have explained to the patient family about her condition and care plan from [facility]  I have discussed the case with the patient nurse. 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 Cr peaked at 1.5  She was on meloxicam and got contrast 2/22, Cr started to rise two days later consistent with DANNY. She is not volume overloaded or dehydrated on exam.   Uric acid  CK  UA showed no protein or cells, totally bland. #CAD triple vessel disease  LHC 2/22  CABG planned 2/25  Asa statin and BB  Preserved EF  Not volume overloaded. #full code. [facility] would like to thank you for the opportunity to serve this patient. Please call with any questions or concerns. Marisa Cunningham MD  [facility]  [address], [address]  Fax: [phone]  Office: [phone]         CC/Reason for consult:   Reason for consult: SKINNY  Chief Complaint   Patient presents with    Chest Pain    Hypertension           Review of Systems:   Unable to assess.      PMH/SH/FH:    Medical Hx: reviewed and updated as appropriate  Past Medical History: Diagnosis Date    Arthritis     Asthma     as child grew out of it    CAD (coronary artery disease) 2022    multi vessel    CKD (chronic kidney disease)     Elevated creatinine since ; Stage III b    Glaucoma     Hyperlipidemia     Hypertension     Hyperthyroidism     Graves disease    IBS (irritable bowel syndrome) 10/2020    On Linzess    Neuropathy     Peripheral    Obesity (BMI 30-39. 9)     Osteopenia 2009    Osteoporosis          Surgical Hx: reviewed and updated as appropriate   has a past surgical history that includes Cholecystectomy; Foot surgery; Tubal ligation; Gastric Band ([id number]); orif femur decompression (Left, 2014); Ankle fracture surgery (Left, 2015); Hand surgery (); other surgical history (Right, ); Coronary artery bypass graft (2022); and Coronary artery bypass graft (N/A, 2022). Social Hx: reviewed and updated as appropriate  Social History     Tobacco Use    Smoking status: Former Smoker     Quit date: 8/10/1999     Years since quittin.5    Smokeless tobacco: Never Used    Tobacco comment: started age 26   Substance Use Topics    Alcohol use: No        Family hx: reviewed and updated as appropriate  family history includes Heart Disease in her father; Rheum Arthritis in her sister; Stroke in her father.     Medications:   predniSONE, 20 mg, BID  ceFAZolin, 1,000 mg, Q12H  sodium chloride flush, 10 mL, 2 times per day  [Held by provider] aspirin, 325 mg, Daily  chlorhexidine, 15 mL, BID  [Held by provider] furosemide, 40 mg, BID  [Held by provider] magnesium oxide, 400 mg, BID  mupirocin, , BID  [START ON 2022] potassium chloride, 10 mEq, TID WC  [Held by provider] metoprolol tartrate, 12.5 mg, BID  pantoprazole, 40 mg, Daily   And  sodium chloride (PF), 10 mL, Daily  insulin glargine, 0.15 Units/kg, Nightly  [START ON 2022] insulin lispro, 0-12 Units, TID WC  [START ON 2022] insulin lispro, 0-6 Units, Nightly  atorvastatin, 80 mg, Nightly       Patient has no known allergies. Allergies:   No Known Allergies      Physical Exam/Objective:   Vitals:    02/26/22 1545   BP:    Pulse: 96   Resp: 23   Temp:    SpO2: 93%       Intake/Output Summary (Last 24 hours) at 2/26/2022 1719  Last data filed at 2/26/2022 1400  Gross per 24 hour   Intake 2661.89 ml   Output 997 ml   Net 1664.89 ml         General appearance: intubated and sedated  HEENT: no icterus, EOM intact, trachea midline. Neck : no masses, appears symmetrical and no JVD appreciated. Respiratory: Respiratory effort normal, bilateral equal chest rise. Cardiovascular: Ausculation shows RRR and  no edema   Abdomen: abdomen is soft, non distended  Musculoskeletal:  no joint swelling, no deformity  Skin: no rashes, no induration, no tightening, no jaundice   Neuro:  sedated      Data:   CBC:   Recent Labs     02/25/22  1200 02/25/22  1200 02/25/22  1605 02/26/22  0413 02/26/22  1330   WBC 11.3*  --  11.3* 12.3*  --    HGB 7.6*   < > 8.2* 7.2* 8.5*   HCT 23.1*   < > 24.1* 22.3* 26.0*   PLT 75*  --  72* 66*  --     < > = values in this interval not displayed. BMP:    Recent Labs     02/25/22  0530 02/25/22  0530 02/25/22  1200 02/25/22  1200 02/25/22  1805 02/26/22  0000 02/26/22  0413   *  --  137  --   --   --  142   K 4.3   < > 4.2   < > 4.5 4.4 4.9     --  106  --   --   --  112*   CO2 23  --  21  --   --   --  19*   BUN 24*  --  19  --   --   --  21*   CREATININE 1.3*  --  1.1  --   --   --  1.8*   GLUCOSE 103*  --  98  --   --   --  93   MG 2.10  --  3.30*  --   --   --  2.80*    < > = values in this interval not displayed.

## 2022-02-26 NOTE — PROGRESS NOTES
Clinical Pharmacy Note  Renal Dose Adjustment    Gabriel Carrera is receiving Cefazolin 2 gm IVPB Q12H. This medication is renally eliminated. Based on the patient's Estimated Creatinine Clearance: 27 mL/min (A) (based on SCr of 1.8 mg/dL (H)). and urine output, the dose has been adjusted to Cefazolin 1 gm IVPB Q12H for remainder of 48-hour post-op Cefazolin course.       Christopher Rene RPH, PharmD, BCPS  2/26/2022 11:56 AM

## 2022-02-26 NOTE — PROGRESS NOTES
POD #1    NSR  Hemodynamics relatively stable. Will transfuse I unit prbc to give a little volume expansion ad see if that helps us wean her drips. She's awake and alert but not very talkative. Not c/o pain. U.O. just adequate. Cr up to 1.8. My guess is her kidneys are used to higher pressures than what she's currently experiencing. Will d/c nitro patch and wean drips as tolerated. Plts 66K. HIT panel pending. Will add stress dose oral steroids  Given her age and general frailty.

## 2022-02-26 NOTE — PROGRESS NOTES
Occupational Therapy  Unable to see pt at this time due to RN wishing to hold therapy evaluation this date and initiate therapy tomorrow . Will follow up with pt as time allows.     Juliann Emerson OTR/L

## 2022-02-26 NOTE — PROGRESS NOTES
Late entry due to patient care:    @0005: Attempted to begin weaning milrinone. CI stable at 2.2 or greater. BP  systolic. Weaned to 8.8ml/hr  @0029: Noted BP decreasing. CI 2.4 on monitor. 500ml albumin started infusing via pump at this time. @0102: Milrinone to 9ml/hr  @0113: Pt more alert at this time and complaining of pain in back and legs. PRN fentanyl given. @9694[de-identified] Milrinone back to 9.8ml. Albumins remains infusing but almost finished.

## 2022-02-26 NOTE — PLAN OF CARE
Problem: Nutrition  Goal: Optimal nutrition therapy  Outcome: Ongoing     Nutrition Problem #1: Increased nutrient needs  Intervention: Food and/or Nutrient Delivery: Continue Current Diet,Continue Oral Nutrition Supplement  Nutritional Goals: PO intake greater than 50%

## 2022-02-26 NOTE — OP NOTE
[address]                                OPERATIVE REPORT    PATIENT NAME: Jose Daniel Saonn                   :        1940  MED REC NO:   [id number]                          ROOM:       1301  ACCOUNT NO:   [de-identified]                           ADMIT DATE: 2022  PROVIDER:     Amador Bella MD      DATE OF PROCEDURE:  2022    PREOPERATIVE DIAGNOSIS:  Coronary artery disease. POSTOPERATIVE DIAGNOSIS:  Coronary artery disease. OPERATION PERFORMED:  1.  JENNY. 2.  Left greater saphenous endoscopic vein harvesting. 3.  Urgent coronary artery bypass grafting x4 (LIMA to LAD, SVG  sequentially to ramus intermedius and OM, SVG to PDA). 4.  Left atrial appendage exclusion (35-mm AtriClip). SURGEON:  Amador Bella MD    ASSISTANTS:  Radha Bailey SA and Alfonso Lu.    ANESTHESIA:  General.    INDICATIONS:  The patient is an 67-year-old female who presented to the  emergency room with anginal symptoms and ruled in for a non-ST-elevation  myocardial infarction. She underwent cardiac catheterization  demonstrating diffuse three-vessel coronary artery disease with  well-preserved left ventricular function. She presents for coronary  artery bypass grafting. She and her children are aware of the risks and  possible complications of the procedure and wish to proceed. OPERATIVE FINDINGS:  The preprocedural JENNY confirmed the presence of  left ventricular hypertrophy and ejection fraction 55%. There were no  valvular abnormalities. Saphenous vein conduit was taken from the left  leg and the majority of it was of good caliber and quality. One  friable, weepy area had to be excluded. The left internal mammary  artery was a moderate-size vessel with excellent flow. The ascending  aorta was free of any palpable calcification.   There was palpable  calcification along the lesser curvature of the arch. The coronary  targets were heavily and diffusely calcified. On the inferior wall, the  posterior descending artery was selected for grafting in one of the few  soft areas available. An EverPoint needle was required. The vessel was  2 mm in diameter. On the anterior wall, the distal LAD was selected. The wall was quite thickened with patchy calcification. The vessel was  about 2.5 mm in diameter. High on the lateral wall was a ramus  intermedius branch, again with heavy calcification, which was able to be  grafted beyond its bifurcation but did require an EverPoint needle. The  vessel was about 1.5 to 2 mm in diameter. More inferolaterally was an  obtuse marginal branch, which again was about 1.5 to 2 mm in diameter  and was heavily calcified. The posterolateral branch was prohibitively  calcified. The completion JENNY showed improvement in the ejection  fraction to 65%. OPERATIVE PROCEDURE:  The patient was brought to the operating room and  placed supine on the operating table. General anesthesia was induced  without complication. The patient was prepped and draped in the usual  sterile fashion. After assuring that preoperative antibiotics had been  given, the left greater saphenous vein was harvested endoscopically. The wounds were closed with 0-Vicryl and 2-0 Vicryl suture and 4-0  Monocryl running subcuticular closure. Simultaneously, a median sternotomy was performed. The left internal  mammary artery was dissected off the anterior chest wall and placed in a  papaverine-soaked sponge. The pericardium was opened and a cradle  raised. 3-0 [place]-K[place] ascending aortic cannulation stitches were placed  followed by a 2-0 Ethibond atrial cannulation stitch. After assuring an  adequate ACT, a soft flow aortic cannula was introduced followed by a  triple-staged venous cannula. A retrograde cardioplegia cannula was  placed into the coronary sinus.     After preparing the procured conduit, the patient was placed on  cardiopulmonary bypass and the temperature cooled to 33 degrees. An  ascending aortic cardioplegia root vent was inserted. Attention was  turned to the left atrial appendage, which was sized. An aortic  cross-clamp was applied along with topical iced saline. 500 mL of cold  blood antegrade cardioplegia was given with arrest at 150 mL. This was  followed by an equivalent amount of retrograde cardioplegia. A 35-mm  AtriClip was then deployed to exclude the left atrial appendage. Attention was turned to the obtuse marginal branch, which was dissected  out and opened longitudinally. The saphenous vein conduit was reversed  and anastomosed to it in an end-to-side fashion. A side-to-side  transverse anastomosis was constructed between the same segment of the  saphenous vein and the ramus intermedius branch. An additional dose of  retrograde cardioplegia was given, as the proximal anastomosis was  performed to the left side of the ascending aorta. Attention was then turned to the posterior descending artery, which was  dissected out and opened longitudinally. The remaining saphenous vein  conduit was reversed and anastomosed to it in an end-to-side fashion. The proximal anastomosis was performed to the right side of the  ascending aorta excluding the weeping piece of conduit. An additional  dose of retrograde cardioplegia was given. The left internal mammary artery was then brought through the lateral  pericardium and trimmed and spatulated appropriately. It was  anastomosed to the distal left anterior descending artery in an  end-to-side fashion. The patient was systemically rewarmed. A hot shot  of cardioplegia was given, first retrograde and then antegrade. The  aortic cross-clamp was removed and there was spontaneous return of sinus  rhythm. After an appropriate recovery period, the patient was weaned from  cardiopulmonary bypass on a milrinone drip. Protamine sulfate was  administered without complication and all cannulas were removed. Cannulation sites were oversewn with 3-0 Prolene suture. After assuring  adequate hemostasis, left pleural and mediastinal chest tubes were  placed in addition to a ventricular pacing wire. Platelet-poor gel was  applied to the mammary bed. The pericardium was closed. The sternum was approximated with #7 sternal wires after application of  platelet-rich gel to the oozing bony edges. The wound was irrigated  with warm antibiotic solution and closed in layers with 0 Vicryl and 2-0  Vicryl suture and 4-0 Monocryl running subcuticular closure. Sterile  dressings were applied. The patient tolerated the procedure well without any complications. Estimated blood loss was not applicable. Cross-clamp time was 78  minutes. Cardiopulmonary bypass time was 97 minutes. The patient was  transferred intubated in stable condition on a milrinone drip to the  cardiovascular unit. Sponge and needle count was correct at the end of  the case.         Lianet Romero MD    D: 02/25/2022 12:23:43       T: 02/25/2022 13:47:21     [email or web address]  Job#: [id number]     Doc#: [id number]    CC:

## 2022-02-26 NOTE — PROGRESS NOTES
Physical Therapy  Ajay Acosta  [phone]  [id number]    Attempted to see for PT eval however nursing requested to defer until tomorrow  Electronically signed by LINDY FLORIAN, PT on 2/26/2022 at 1:36 PM

## 2022-02-26 NOTE — CONSULTS
Comprehensive Nutrition Assessment    Type and Reason for Visit:  Initial,Consult    Nutrition Recommendations/Plan:   - Continue current diet  - Continue ONS  - Pt needs cardiac nutrition edu prior to dc    Nutrition Assessment:  RD consult s/p cardiac sx. Pt underwent CABG yesterday afternoon. Pt will need cardiac nutrition edu prior to d/c. On 3 carb, low fat, low cholesterol, high fiber, no added salt diet. Pt with >50% intake prior to sx. Nepro supplement onboard for additional protein intake to aid in wound healing. Will continue current interventions and monitor. Malnutrition Assessment:  Malnutrition Status:  No malnutrition    Context:  Chronic Illness       Estimated Daily Nutrient Needs:  Energy (kcal):  9472-1209 (20-25kcal/76kg); Weight Used for Energy Requirements:  Admission     Protein (g):  62-73 (1.2-1.4g/52kg); Weight Used for Protein Requirements:  Ideal        Fluid (ml/day): 1 ml/kcal      Nutrition Related Findings:  +2.2 liters. Oriented to situation / person. Forgetful. +BM 2/21. +1 nonpitting generalized edema. Wounds:  Multiple,Surgical Incision       Current Nutrition Therapies:    ADULT ORAL NUTRITION SUPPLEMENT; Breakfast, Lunch, Dinner; Renal Oral Supplement  ADULT DIET; Regular; 3 carb choices (45 gm/meal);  Low Fat/Low Chol/High Fiber/URSULA    Anthropometric Measures:  · Height: 5' 3\" (160 cm)  · Current Body Weight: 198 lb (89.8 kg)   · Admission Body Weight: 167 lb (75.8 kg)    · Ideal Body Weight: 115 lbs; % Ideal Body Weight 172.2 %   · BMI: 35.1   · BMI Categories: Overweight (BMI 25.0-29.9) (Using admission wt)       Nutrition Diagnosis:   · Increased nutrient needs related to increase demand for energy/nutrients as evidenced by wounds    Nutrition Interventions:   Food and/or Nutrient Delivery:  Continue Current Diet,Continue Oral Nutrition Supplement  Nutrition Education/Counseling:  Education needed   Coordination of Nutrition Care:  Continue to monitor while inpatient    Goals:  PO intake greater than 50%       Nutrition Monitoring and Evaluation:   Behavioral-Environmental Outcomes:  None Identified   Food/Nutrient Intake Outcomes:  Diet Advancement/Tolerance,Food and Nutrient Intake,Supplement Intake  Physical Signs/Symptoms Outcomes:  Biochemical Data,Fluid Status or Edema,Skin,Weight,Meal Time Behavior     Discharge Planning:     Too soon to determine     Electronically signed by Jimmy Maradiaga RD, LD on 2/26/22 at 11:40 AM EST    Contact: [phone]

## 2022-02-27 LAB
ALBUMIN SERPL-MCNC: 3.5 G/DL (ref 3.4–5)
ALBUMIN SERPL-MCNC: 3.7 G/DL (ref 3.4–5)
ALBUMIN SERPL-MCNC: 3.8 G/DL (ref 3.4–5)
ANION GAP SERPL CALCULATED.3IONS-SCNC: 12 MMOL/L (ref 3–16)
ANION GAP SERPL CALCULATED.3IONS-SCNC: 15 MMOL/L (ref 3–16)
ANION GAP SERPL CALCULATED.3IONS-SCNC: 17 MMOL/L (ref 3–16)
ANION GAP SERPL CALCULATED.3IONS-SCNC: 18 MMOL/L (ref 3–16)
BASE EXCESS ARTERIAL: -10 (ref -3–3)
BASE EXCESS ARTERIAL: -7 (ref -3–3)
BLOOD BANK DISPENSE STATUS: NORMAL
BLOOD BANK PRODUCT CODE: NORMAL
BPU ID: NORMAL
BUN BLDV-MCNC: 30 MG/DL (ref 7–20)
BUN BLDV-MCNC: 33 MG/DL (ref 7–20)
BUN BLDV-MCNC: 40 MG/DL (ref 7–20)
BUN BLDV-MCNC: 44 MG/DL (ref 7–20)
CALCIUM IONIZED: 1.15 MMOL/L (ref 1.12–1.32)
CALCIUM SERPL-MCNC: 7.9 MG/DL (ref 8.3–10.6)
CALCIUM SERPL-MCNC: 8 MG/DL (ref 8.3–10.6)
CALCIUM SERPL-MCNC: 8 MG/DL (ref 8.3–10.6)
CALCIUM SERPL-MCNC: 8.3 MG/DL (ref 8.3–10.6)
CHLORIDE BLD-SCNC: 106 MMOL/L (ref 99–110)
CHLORIDE BLD-SCNC: 107 MMOL/L (ref 99–110)
CHLORIDE BLD-SCNC: 109 MMOL/L (ref 99–110)
CHLORIDE BLD-SCNC: 109 MMOL/L (ref 99–110)
CO2: 14 MMOL/L (ref 21–32)
CO2: 15 MMOL/L (ref 21–32)
CO2: 15 MMOL/L (ref 21–32)
CO2: 16 MMOL/L (ref 21–32)
CREAT SERPL-MCNC: 3.8 MG/DL (ref 0.6–1.2)
CREAT SERPL-MCNC: 4 MG/DL (ref 0.6–1.2)
CREAT SERPL-MCNC: 4.7 MG/DL (ref 0.6–1.2)
CREAT SERPL-MCNC: 4.9 MG/DL (ref 0.6–1.2)
DESCRIPTION BLOOD BANK: NORMAL
GFR AFRICAN AMERICAN: 10
GFR AFRICAN AMERICAN: 11
GFR AFRICAN AMERICAN: 13
GFR AFRICAN AMERICAN: 14
GFR NON-AFRICAN AMERICAN: 11
GFR NON-AFRICAN AMERICAN: 11
GFR NON-AFRICAN AMERICAN: 9
GFR NON-AFRICAN AMERICAN: 9
GLUCOSE BLD-MCNC: 136 MG/DL (ref 70–99)
GLUCOSE BLD-MCNC: 145 MG/DL (ref 70–99)
GLUCOSE BLD-MCNC: 148 MG/DL (ref 70–99)
GLUCOSE BLD-MCNC: 149 MG/DL (ref 70–99)
GLUCOSE BLD-MCNC: 152 MG/DL (ref 70–99)
GLUCOSE BLD-MCNC: 152 MG/DL (ref 70–99)
GLUCOSE BLD-MCNC: 166 MG/DL (ref 70–99)
GLUCOSE BLD-MCNC: 168 MG/DL (ref 70–99)
HCO3 ARTERIAL: 16.2 MMOL/L (ref 21–29)
HCO3 ARTERIAL: 17.8 MMOL/L (ref 21–29)
HCT VFR BLD CALC: 26.2 % (ref 36–48)
HEMOGLOBIN: 8.6 G/DL (ref 12–16)
LACTATE: 0.87 MMOL/L (ref 0.4–2)
LACTATE: 1.1 MMOL/L (ref 0.4–2)
MAGNESIUM: 2.6 MG/DL (ref 1.8–2.4)
MCH RBC QN AUTO: 29 PG (ref 26–34)
MCHC RBC AUTO-ENTMCNC: 32.7 G/DL (ref 31–36)
MCV RBC AUTO: 88.9 FL (ref 80–100)
O2 SAT, ARTERIAL: 91 % (ref 93–100)
O2 SAT, ARTERIAL: 96 % (ref 93–100)
PCO2 ARTERIAL: 28.1 MM HG (ref 35–45)
PCO2 ARTERIAL: 29.9 MM HG (ref 35–45)
PDW BLD-RTO: 15.7 % (ref 12.4–15.4)
PERFORMED ON: ABNORMAL
PH ARTERIAL: 7.34 (ref 7.35–7.45)
PH ARTERIAL: 7.41 (ref 7.35–7.45)
PHOSPHORUS: 3.9 MG/DL (ref 2.5–4.9)
PHOSPHORUS: 4.2 MG/DL (ref 2.5–4.9)
PHOSPHORUS: 4.5 MG/DL (ref 2.5–4.9)
PLATELET # BLD: 70 K/UL (ref 135–450)
PMV BLD AUTO: 9.7 FL (ref 5–10.5)
PO2 ARTERIAL: 63.3 MM HG (ref 75–108)
PO2 ARTERIAL: 79.3 MM HG (ref 75–108)
POC FIO2: 2
POC SAMPLE TYPE: ABNORMAL
POC SAMPLE TYPE: ABNORMAL
POTASSIUM SERPL-SCNC: 5 MMOL/L (ref 3.5–5.1)
POTASSIUM SERPL-SCNC: 5.2 MMOL/L (ref 3.5–5.1)
POTASSIUM SERPL-SCNC: 5.2 MMOL/L (ref 3.5–5.1)
POTASSIUM SERPL-SCNC: 5.5 MMOL/L (ref 3.5–5.1)
RBC # BLD: 2.95 M/UL (ref 4–5.2)
SODIUM BLD-SCNC: 137 MMOL/L (ref 136–145)
SODIUM BLD-SCNC: 137 MMOL/L (ref 136–145)
SODIUM BLD-SCNC: 138 MMOL/L (ref 136–145)
SODIUM BLD-SCNC: 141 MMOL/L (ref 136–145)
TCO2 ARTERIAL: 17 MMOL/L
TCO2 ARTERIAL: 19 MMOL/L
WBC # BLD: 16.5 K/UL (ref 4–11)

## 2022-02-27 PROCEDURE — 82803 BLOOD GASES ANY COMBINATION: CPT

## 2022-02-27 PROCEDURE — 6360000002 HC RX W HCPCS: Performed by: NURSE PRACTITIONER

## 2022-02-27 PROCEDURE — 80069 RENAL FUNCTION PANEL: CPT

## 2022-02-27 PROCEDURE — 6370000000 HC RX 637 (ALT 250 FOR IP): Performed by: NURSE PRACTITIONER

## 2022-02-27 PROCEDURE — 6370000000 HC RX 637 (ALT 250 FOR IP): Performed by: THORACIC SURGERY (CARDIOTHORACIC VASCULAR SURGERY)

## 2022-02-27 PROCEDURE — 83605 ASSAY OF LACTIC ACID: CPT

## 2022-02-27 PROCEDURE — 37799 UNLISTED PX VASCULAR SURGERY: CPT

## 2022-02-27 PROCEDURE — 82330 ASSAY OF CALCIUM: CPT

## 2022-02-27 PROCEDURE — 83735 ASSAY OF MAGNESIUM: CPT

## 2022-02-27 PROCEDURE — 97162 PT EVAL MOD COMPLEX 30 MIN: CPT

## 2022-02-27 PROCEDURE — 6360000002 HC RX W HCPCS: Performed by: THORACIC SURGERY (CARDIOTHORACIC VASCULAR SURGERY)

## 2022-02-27 PROCEDURE — 2580000003 HC RX 258: Performed by: THORACIC SURGERY (CARDIOTHORACIC VASCULAR SURGERY)

## 2022-02-27 PROCEDURE — 99024 POSTOP FOLLOW-UP VISIT: CPT | Performed by: THORACIC SURGERY (CARDIOTHORACIC VASCULAR SURGERY)

## 2022-02-27 PROCEDURE — 85027 COMPLETE CBC AUTOMATED: CPT

## 2022-02-27 PROCEDURE — 2100000000 HC CCU R&B

## 2022-02-27 PROCEDURE — 97530 THERAPEUTIC ACTIVITIES: CPT

## 2022-02-27 PROCEDURE — C9113 INJ PANTOPRAZOLE SODIUM, VIA: HCPCS | Performed by: THORACIC SURGERY (CARDIOTHORACIC VASCULAR SURGERY)

## 2022-02-27 RX ORDER — PANTOPRAZOLE SODIUM 20 MG/1
20 TABLET, DELAYED RELEASE ORAL
Status: DISCONTINUED | OUTPATIENT
Start: 2022-02-28 | End: 2022-03-10 | Stop reason: HOSPADM

## 2022-02-27 RX ORDER — INSULIN GLARGINE 100 [IU]/ML
0.05 INJECTION, SOLUTION SUBCUTANEOUS NIGHTLY
Status: DISCONTINUED | OUTPATIENT
Start: 2022-02-27 | End: 2022-03-02

## 2022-02-27 RX ADMIN — INSULIN GLARGINE 4 UNITS: 100 INJECTION, SOLUTION SUBCUTANEOUS at 20:48

## 2022-02-27 RX ADMIN — OXYCODONE 2.5 MG: 5 TABLET ORAL at 10:42

## 2022-02-27 RX ADMIN — PREDNISONE 20 MG: 20 TABLET ORAL at 08:48

## 2022-02-27 RX ADMIN — PREDNISONE 20 MG: 20 TABLET ORAL at 20:48

## 2022-02-27 RX ADMIN — SODIUM CHLORIDE, PRESERVATIVE FREE 10 ML: 5 INJECTION INTRAVENOUS at 08:48

## 2022-02-27 RX ADMIN — CHLORHEXIDINE GLUCONATE 0.12% ORAL RINSE 15 ML: 1.2 LIQUID ORAL at 08:47

## 2022-02-27 RX ADMIN — CHLORHEXIDINE GLUCONATE 0.12% ORAL RINSE 15 ML: 1.2 LIQUID ORAL at 20:48

## 2022-02-27 RX ADMIN — PANTOPRAZOLE SODIUM 40 MG: 40 INJECTION, POWDER, FOR SOLUTION INTRAVENOUS at 08:48

## 2022-02-27 RX ADMIN — INSULIN LISPRO 1 UNITS: 100 INJECTION, SOLUTION INTRAVENOUS; SUBCUTANEOUS at 20:47

## 2022-02-27 RX ADMIN — INSULIN LISPRO 2 UNITS: 100 INJECTION, SOLUTION INTRAVENOUS; SUBCUTANEOUS at 12:18

## 2022-02-27 RX ADMIN — MUPIROCIN: 20 OINTMENT TOPICAL at 08:47

## 2022-02-27 RX ADMIN — ATORVASTATIN CALCIUM 80 MG: 80 TABLET, FILM COATED ORAL at 20:48

## 2022-02-27 RX ADMIN — FUROSEMIDE 10 MG/HR: 10 INJECTION, SOLUTION INTRAMUSCULAR; INTRAVENOUS at 12:18

## 2022-02-27 RX ADMIN — MUPIROCIN: 20 OINTMENT TOPICAL at 20:48

## 2022-02-27 RX ADMIN — SODIUM CHLORIDE, PRESERVATIVE FREE 10 ML: 5 INJECTION INTRAVENOUS at 08:51

## 2022-02-27 RX ADMIN — INSULIN LISPRO 2 UNITS: 100 INJECTION, SOLUTION INTRAVENOUS; SUBCUTANEOUS at 18:45

## 2022-02-27 RX ADMIN — ACETAMINOPHEN 650 MG: 325 TABLET ORAL at 20:48

## 2022-02-27 RX ADMIN — FUROSEMIDE 20 MG/HR: 10 INJECTION, SOLUTION INTRAMUSCULAR; INTRAVENOUS at 23:25

## 2022-02-27 RX ADMIN — INSULIN LISPRO 2 UNITS: 100 INJECTION, SOLUTION INTRAVENOUS; SUBCUTANEOUS at 09:32

## 2022-02-27 RX ADMIN — FUROSEMIDE 20 MG/HR: 10 INJECTION, SOLUTION INTRAMUSCULAR; INTRAVENOUS at 18:16

## 2022-02-27 RX ADMIN — OXYCODONE 2.5 MG: 5 TABLET ORAL at 17:01

## 2022-02-27 RX ADMIN — DOBUTAMINE HYDROCHLORIDE 2.5 MCG/KG/MIN: 200 INJECTION INTRAVENOUS at 04:45

## 2022-02-27 RX ADMIN — FENTANYL CITRATE 12.5 MCG: 50 INJECTION, SOLUTION INTRAMUSCULAR; INTRAVENOUS at 03:45

## 2022-02-27 RX ADMIN — SODIUM CHLORIDE, PRESERVATIVE FREE 10 ML: 5 INJECTION INTRAVENOUS at 20:48

## 2022-02-27 RX ADMIN — ACETAMINOPHEN 650 MG: 325 TABLET ORAL at 08:48

## 2022-02-27 ASSESSMENT — PAIN SCALES - GENERAL
PAINLEVEL_OUTOF10: 0
PAINLEVEL_OUTOF10: 6
PAINLEVEL_OUTOF10: 3
PAINLEVEL_OUTOF10: 0
PAINLEVEL_OUTOF10: 4
PAINLEVEL_OUTOF10: 0
PAINLEVEL_OUTOF10: 5
PAINLEVEL_OUTOF10: 0
PAINLEVEL_OUTOF10: 3
PAINLEVEL_OUTOF10: 0
PAINLEVEL_OUTOF10: 3
PAINLEVEL_OUTOF10: 0
PAINLEVEL_OUTOF10: 4

## 2022-02-27 ASSESSMENT — PAIN SCALES - WONG BAKER
WONGBAKER_NUMERICALRESPONSE: 0

## 2022-02-27 NOTE — PROGRESS NOTES
Morning labs results. Results sent via perfect serve to Nephrology on call d/t increasing creatine, decreasing CO2, and elevated K. Informed of minimal UOP and gtts. Awaiting response. Assessment unchanged for pt. Difficult to awaken and needs frequent cuing to follow commands.

## 2022-02-27 NOTE — PROGRESS NOTES
Most recent lab work showed [id number]. [address] [facility]   [facility]. [place]  [phone]  Nephrology Note          Patient ID: Nicole Young  Referring/ Physician: No att. providers found      HPI/Summary:   Nicole Young is being seen by nephrology for SKINNY. This is a [de-identified] yo lady with PMH of HTN, CKD, HLD and obesity who presented with chest pain and SOB. She had been having several weeks of chest tightness and dyspnea with exertion. She feels ok now, no complaints. Says she has been on medication for BP for decades. Her Bp had been running prior to admission. She has been taking Meloxicam for the past few years for knee pain/arthritis. Denies hematuria, foamy urine. LUTS. No dysuria. No issues with swelling right now. During this admission she has had LHC 2/22/22 showing severe triple vessel disease. Normal LV function. LVEDP 14-16. She was referred for CABG and it is planned for 2/25/22 . Cr has been rising from 1.2 > 1.3 > 1.5 at time of consult. Of note , she has been on scheduled meloxicam in addition to receiving contrast. Her PO intake has been fine, no NVD. Interval hx:   Unfortunately, she has suffered severe SKINNY, most likely due to hemodynamic instability associated with her open heart surgery  The patient's electrolytes are okay, potassium level was 5, bicarb 16, and creatinine is up to 4. Reviewed her medications, and did not see any concerning medications. As of now, there is no indication for dialysis. Typically this type of  SKINNY will recover with improvement of hemodynamic status. The patient hemodynamic status is getting much better, she is off pressors and her blood pressure is good.       The patient was seen and examined in CVICU  The patient was sleeping  The patient's daughter is at her bedside  The patient nurse is also at her bedside  According to her nurse, she is definitely much more awake, alert and conversant  She is off Levophed completely now  She has been started on Lasix drip around noontime, currently she is getting 15 mg/h  She is on dobutamine drip 1 mcg/min/kg body weight. So far the patient often has been very poor  The patient appears to be comfortable  She is on O2 2 L via nasal cannula  No she complains of pain at her thoracotomy site  The patient vital signs getting much better now the most recent set of vital signs showed blood pressure of 127/56 with a MAP of 75 via A-line, heart rate 88 and pulse ox 100%  She has a Natarajan catheter    Most recent lab work showed sodium 137, potassium 5, bicarb 16, BUN 33, creatinine 4, glucose 149, calcium 8.5, phosphorus 4.5    WBC 16.5, hemoglobin 8.6 and platelet was 70. 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 Cr peaked at 1.5  She was on meloxicam and got contrast 2/22, Cr started to rise two days later consistent with DANNY. She is not volume overloaded or dehydrated on exam.   Uric acid  CK  UA showed no protein or cells, totally bland. #CAD triple vessel disease  LHC 2/22  CABG planned 2/25  Asa statin and BB  Preserved EF  Not volume overloaded. #full code. [facility] would like to thank you for the opportunity to serve this patient. Please call with any questions or concerns. Radhika Thomas MD  [facility]  [address], [address]  Fax: [phone]  Office: [phone]         CC/Reason for consult:   Reason for consult: SKINNY  Chief Complaint   Patient presents with    Chest Pain    Hypertension           Review of Systems:   Unable to assess.      PMH/SH/FH:    Medical Hx: reviewed and updated as appropriate  Past Medical History:   Diagnosis Date    Arthritis     Asthma     as child grew out of it    CAD (coronary artery disease) 02/22/2022    multi vessel    CKD (chronic kidney disease) 2015    Elevated creatinine since 2015; Stage III b    Glaucoma     Hyperlipidemia     Hypertension     Hyperthyroidism     Graves disease    IBS (irritable bowel syndrome) 10/2020    On Linzess    Neuropathy     Peripheral    Obesity (BMI 30-39. 9)     Osteopenia 2009    Osteoporosis          Surgical Hx: reviewed and updated as appropriate   has a past surgical history that includes Cholecystectomy; Foot surgery; Tubal ligation; Gastric Band ([id number]); orif femur decompression (Left, 2014); Ankle fracture surgery (Left, 2015); Hand surgery (); other surgical history (Right, ); Coronary artery bypass graft (2022); and Coronary artery bypass graft (N/A, 2022). Social Hx: reviewed and updated as appropriate  Social History     Tobacco Use    Smoking status: Former Smoker     Quit date: 8/10/1999     Years since quittin.5    Smokeless tobacco: Never Used    Tobacco comment: started age 26   Substance Use Topics    Alcohol use: No        Family hx: reviewed and updated as appropriate  family history includes Heart Disease in her father; Rheum Arthritis in her sister; Stroke in her father. Medications:   insulin glargine, 0.05 Units/kg, Nightly  [START ON 2022] pantoprazole, 20 mg, QAM AC  predniSONE, 20 mg, BID  sodium chloride flush, 10 mL, 2 times per day  [Held by provider] aspirin, 325 mg, Daily  chlorhexidine, 15 mL, BID  [Held by provider] magnesium oxide, 400 mg, BID  mupirocin, , BID  potassium chloride, 10 mEq, TID WC  [Held by provider] metoprolol tartrate, 12.5 mg, BID  sodium chloride (PF), 10 mL, Daily  insulin lispro, 0-12 Units, TID WC  insulin lispro, 0-6 Units, Nightly  atorvastatin, 80 mg, Nightly       Patient has no known allergies.     Allergies:   No Known Allergies      Physical Exam/Objective:   Vitals:    22 1600   BP:    Pulse: 88   Resp: 26   Temp:    SpO2: 99%       Intake/Output Summary (Last 24 hours) at 2022 1633  Last data filed at 2022 1600  Gross per 24 hour   Intake 955.74 ml   Output 505 ml   Net 450.74 ml General appearance: intubated and sedated  HEENT: no icterus, EOM intact, trachea midline. Neck : no masses, appears symmetrical and no JVD appreciated. Respiratory: Respiratory effort normal, bilateral equal chest rise. Cardiovascular: Ausculation shows RRR and  no edema   Abdomen: abdomen is soft, non distended  Musculoskeletal:  no joint swelling, no deformity  Skin: no rashes, no induration, no tightening, no jaundice   Neuro:  sedated      Data:   CBC:   Recent Labs     02/25/22  1605 02/25/22  1605 02/26/22  0413 02/26/22  1330 02/27/22  0405   WBC 11.3*  --  12.3*  --  16.5*   HGB 8.2*   < > 7.2* 8.5* 8.6*   HCT 24.1*   < > 22.3* 26.0* 26.2*   PLT 72*  --  66*  --  70*    < > = values in this interval not displayed. BMP:    Recent Labs     02/25/22  1200 02/25/22  1805 02/26/22  0413 02/26/22  0413 02/26/22  1730 02/27/22  0405 02/27/22  1212     --  142   < > 140 141 137   K 4.2   < > 4.9   < > 4.8 5.2* 5.0     --  112*   < > 112* 109 109   CO2 21  --  19*   < > 18* 14* 16*   BUN 19  --  21*   < > 26* 30* 33*   CREATININE 1.1  --  1.8*   < > 2.6* 3.8* 4.0*   GLUCOSE 98  --  93   < > 87 136* 149*   MG 3.30*  --  2.80*  --   --  2.60*  --    PHOS  --   --   --   --   --   --  4.5    < > = values in this interval not displayed.

## 2022-02-27 NOTE — PLAN OF CARE
Problem: Falls - Risk of:  Goal: Will remain free from falls  Description: Will remain free from falls  2/27/2022 0519 by Dwight Grier RN  Outcome: Met This Shift  2/27/2022 0519 by Dwight Grier RN  Outcome: Met This Shift  Goal: Absence of physical injury  Description: Absence of physical injury  2/27/2022 0519 by Dwight Grier RN  Outcome: Met This Shift  2/27/2022 0519 by Dwight Grier RN  Outcome: Met This Shift     Problem: Skin Integrity:  Goal: Will show no infection signs and symptoms  Description: Will show no infection signs and symptoms  2/27/2022 0519 by Dwight Grier RN  Outcome: Ongoing  2/27/2022 0519 by Dwight Grier RN  Outcome: Ongoing  Goal: Absence of new skin breakdown  Description: Absence of new skin breakdown  2/27/2022 0519 by Dwight Grier RN  Outcome: Ongoing  2/27/2022 0519 by Dwight Grier RN  Outcome: Ongoing     Problem: Pain:  Goal: Pain level will decrease  Description: Pain level will decrease  2/27/2022 0519 by Dwight Grier RN  Outcome: Ongoing  2/27/2022 0519 by Dwight Grier RN  Outcome: Ongoing  Goal: Control of acute pain  Description: Control of acute pain  2/27/2022 0519 by Dwight Grier RN  Outcome: Ongoing  2/27/2022 0519 by Dwight Grier RN  Outcome: Ongoing     Problem: Discharge Planning:  Goal: Discharged to appropriate level of care  Description: Discharged to appropriate level of care  Outcome: Ongoing     Problem: Anxiety:  Goal: Level of anxiety will decrease  Description: Level of anxiety will decrease  Outcome: Ongoing     Problem: Cardiac Output - Decreased:  Goal: Cardiac output within specified parameters  Description: Cardiac output within specified parameters  Outcome: Ongoing  Goal: Hemodynamic stability will improve  Description: Hemodynamic stability will improve  Outcome: Ongoing     Problem: Fluid Volume - Imbalance:  Goal: Ability to achieve a balanced intake and output will improve  Description: Ability to achieve a balanced intake and output will improve  Outcome: Ongoing  Goal: Chest tube drainage is within specified parameters  Description: Chest tube drainage is within specified parameters  Outcome: Ongoing     Problem: Gas Exchange - Impaired:  Goal: Levels of oxygenation will improve  Description: Levels of oxygenation will improve  Outcome: Ongoing  Goal: Ability to maintain adequate ventilation will improve  Description: Ability to maintain adequate ventilation will improve  Outcome: Ongoing     Problem: Tissue Perfusion - Cardiopulmonary, Altered:  Goal: Absence of angina  Description: Absence of angina  Outcome: Ongoing  Goal: Hemodynamic stability will improve  Description: Hemodynamic stability will improve  Outcome: Ongoing  Goal: Will show no evidence of cardiac arrhythmias  Description: Will show no evidence of cardiac arrhythmias  Outcome: Ongoing     Problem: Nutrition  Goal: Optimal nutrition therapy  2/27/2022 0519 by Tj Moore RN  Outcome: Not Met This Shift  2/27/2022 0519 by Tj Moore RN  Outcome: Not Met This Shift     Problem:  Activity Intolerance:  Goal: Able to perform prescribed physical activity  Description: Able to perform prescribed physical activity  Outcome: Not Met This Shift  Goal: Ability to tolerate increased activity will improve  Description: Ability to tolerate increased activity will improve  Outcome: Not Met This Shift     Problem: Non-Violent Restraints  Goal: Removal from restraints as soon as assessed to be safe  Outcome: Completed  Goal: No harm/injury to patient while restraints in use  Outcome: Completed  Goal: Patient's dignity will be maintained  Outcome: Completed

## 2022-02-27 NOTE — PROGRESS NOTES
Assessment completed. Pt very difficult to wake up. Will answer to yes/no questions when prompting. Needed reorientation to place/time/situation. Able to state name. Followed commands when asked to move all extremities, squeeze hands, and move BLE, but needed cuing. Per report, this has been pt baseline today and MD aware. Needed heavy coaching to take PO medications with spoon filled sips of water. Eventually able to get all PO medications scheduled along with tylenol and 2.5mg oxicodone as pt was displaying signs of discomfort. BG 80. Insulin gtt stopped at this time. Attempting to let BP run on higher end of hemodynamic parameters to hopefully increase renal perfusion. Levo running @ 5mcg at this time.

## 2022-02-27 NOTE — PROGRESS NOTES
Off all drips except dobutamine at 1 prabha/kg/min. Once this has been weaned off she can be transitioned. Had breakfast this morning, much more alert,  Cr 3.8, K+ 5.2, clearly has SKINNY. Will check renal panel q 6Hrs today. Will titrate up her lasix drip to try and keep her in non-oliguric renal failure and help her excrete potassium. Her son was at bedside and all questions answered. Overall better today. Hope she can avoid CRRT.

## 2022-02-27 NOTE — PROGRESS NOTES
Physical Therapy    [facility]  Initial Assessment    NAME: Pricilla Will  : 1940  MRN: [id number]    Date of Service: 2022    Discharge Recommendations:  Continue to assess pending progress   PT Equipment Recommendations  Other: Will monitor for potential equipt needs. Current Am-Pac Score 8/24; will monitor as able to proceed eob/oob. Assessment   Body structures, Functions, Activity limitations: Decreased functional mobility ; Decreased strength;Decreased safe awareness;Decreased cognition;Decreased endurance  Assessment: [de-identified] y/o female admit 2022 with CAD, NSTEMI.  2022 S/P AtriClip, Urgent CABG x 4. PMH as noted including CKD, IBS, L Femur Fx/ORIF (2014), L Ankle Fx/ORIF (2015), Osteoporosis, Surg R Ear Mass (behind ear, ). PTA (per family) pt living with dtr/s-i-l in apt setting with steps to access, independent daily care and functional mobility. Currently, Pt confused; follows simple commands delayed/inconsistent. Defer oob at this time. At this time, monitor closely for potential cont PT (3-5) upon d/c. Will cont to monitor progress. Prognosis: Fair;Good  Decision Making: Medium Complexity  History: [de-identified] y/o female admit 2022 with CAD, NSTEMI.  2022 S/P AtriClip, Urgent CABG x 4. PMH as noted including CKD, IBS, L Femur Fx/ORIF (2014), L Ankle Fx/ORIF (2015), Osteoporosis, Surg R Ear Mass (behind ear, ). Exam: See above. Clinical Presentation: See above. Patient Education: Role of PT, POC, Need to call for assist, Sternal Precautions/Use of Cardiac Pillow. Barriers to Learning: Cognitive. REQUIRES PT FOLLOW UP: Yes  Activity Tolerance  Activity Tolerance: Patient limited by cognitive status  Activity Tolerance: Pt confused; follows simple commands delayed/inconsistent. Defer oob at this time. Patient Diagnosis(es): The encounter diagnosis was Chest pain, unspecified type.      has a past medical history of Layout: One level  Home Access: Stairs to enter without rails  Bathroom Shower/Tub: Tub/Shower unit  Bathroom Toilet: Standard  Bathroom Equipment: Shower chair  Bathroom Accessibility: Accessible  ADL Assistance: Independent  Homemaking Assistance:  (Shared with family.)  Ambulation Assistance: Independent (Without assist device pta.)  Transfer Assistance: Independent  Active : Yes  Occupation: Retired  Type of occupation: Retired : worked [employer]. Additional Comments: Information obtained by PT from son at bedside. Reports S-I-L works although Dtr does not (able to provide assist upon d/c). Cognition   Cognition  Overall Cognitive Status: Exceptions  Arousal/Alertness: Appropriate responses to stimuli  Following Commands: Follows one step commands with increased time; Follows one step commands with repetition  Attention Span: Attends with cues to redirect  Memory: Decreased recall of recent events  Insights: Decreased awareness of deficits    Objective          AROM RLE (degrees)  RLE AROM: WFL  AROM LLE (degrees)  LLE AROM : WFL  AROM RUE (degrees)  RUE AROM : WFL  RUE General AROM: Adhere to Sternal Precautions. AROM LUE (degrees)  LUE AROM : WFL  LUE General AROM: Adhere to Sternal Precautions. Strength Other  Other: Unable to fully assess due to cognition; appears at least 3+/comparable R/L. Bed mobility  Supine to Sit: Dependent/Total  Sit to Supine: Dependent/Total  Comment: Pt briefly to chair position. Able to kyleigh upright/supported sitting. Dependent to brief unsupported sitting while adhere to Sternal Precautions. Transfers  Bed to Chair: Unable to assess  Comment: Defer oob at this time due to safety concerns. Plan   Plan  Times per week: 3-5x week while in acute care setting.   Current Treatment Recommendations: Strengthening,Functional Mobility Training,Transfer Training,Gait Training,Stair training,Safety Education & Training,Patient/Caregiver Education & Training  Safety Devices  Type of devices: Bed alarm in place,Call light within reach,Left in bed,Nurse notified      AM-PAC Score  AM-PAC Inpatient Mobility Raw Score : 8 (02/27/22 0714)  AM-PAC Inpatient T-Scale Score : 28.52 (02/27/22 0714)  Mobility Inpatient CMS 0-100% Score: 86.62 (02/27/22 0714)  Mobility Inpatient CMS G-Code Modifier : CM (02/27/22 7512)          Goals  Short term goals  Time Frame for Short term goals: Upon d/c acute care setting. Short term goal 1: Bed Mob Mod assist; adhere to Sternal Precautions. Short term goal 2: Transfers with assist device Min assist; adhere to Sternal Precautions. Short term goal 3: Amb with assist device 48' CGA. Short term goal 4: Stair Negotiation as approp. Patient Goals   Patient goals : Return home with family assist/support.        Therapy Time   Individual Concurrent Group Co-treatment   Time In 0630         Time Out 0700         Minutes [address], PT

## 2022-02-27 NOTE — PLAN OF CARE
Problem: Falls - Risk of:  Goal: Will remain free from falls  Description: Will remain free from falls  2/27/2022 1623 by Shital Zhao RN  Outcome: Ongoing  2/27/2022 0519 by Gareth Plaza RN  Outcome: Met This Shift  2/27/2022 0519 by Gareth Plaza RN  Outcome: Met This Shift  Goal: Absence of physical injury  Description: Absence of physical injury  2/27/2022 1623 by Shital Zhao RN  Outcome: Ongoing  2/27/2022 0519 by Gareth Plaza RN  Outcome: Met This Shift  2/27/2022 0519 by Gareth Plaza RN  Outcome: Met This Shift     Problem: Skin Integrity:  Goal: Will show no infection signs and symptoms  Description: Will show no infection signs and symptoms  2/27/2022 1623 by Shital Zhao RN  Outcome: Ongoing  2/27/2022 0519 by Gareth Plaza RN  Outcome: Ongoing  2/27/2022 0519 by Gareth Plaza RN  Outcome: Ongoing  Goal: Absence of new skin breakdown  Description: Absence of new skin breakdown  2/27/2022 1623 by Shital Zhao RN  Outcome: Ongoing  2/27/2022 0519 by Gareth Plaza RN  Outcome: Ongoing  2/27/2022 0519 by Gareth Plaza RN  Outcome: Ongoing       Problem: Pain:  Goal: Pain level will decrease  Description: Pain level will decrease  2/27/2022 1623 by Shital Zhao RN  Outcome: Ongoing  2/27/2022 0519 by Gareth Plaza RN  Outcome: Ongoing  2/27/2022 0519 by Gareth Plaza RN  Outcome: Ongoing  Goal: Control of acute pain  Description: Control of acute pain  2/27/2022 1623 by Shital Zhao RN  Outcome: Ongoing  2/27/2022 0519 by Gareth Plaza RN  Outcome: Ongoing  2/27/2022 0519 by Gareth Plaza RN  Outcome: Ongoing  Goal: Control of chronic pain  Description: Control of chronic pain  Outcome: Ongoing     Problem: Nutrition  Goal: Optimal nutrition therapy  2/27/2022 1623 by Shital Zhao RN  Outcome: Ongoing  2/27/2022 0519 by Gareth Plaza RN  Outcome: Not Met This Shift  2/27/2022 0519 by Gareth Plaza RN  Outcome: Not Met This Shift     Problem: Discharge Planning:  Goal: Discharged to appropriate level of care  Description: Discharged to appropriate level of care  2/27/2022 1623 by Narayan Calero RN  Outcome: Ongoing  2/27/2022 0519 by Estefany Elias RN  Outcome: Ongoing     Problem: Cardiac Output - Decreased:  Goal: Cardiac output within specified parameters  Description: Cardiac output within specified parameters  2/27/2022 1623 by Narayan Calero RN  Outcome: Ongoing  2/27/2022 0519 by Estefany Elias RN  Outcome: Ongoing  Goal: Hemodynamic stability will improve  Description: Hemodynamic stability will improve  2/27/2022 1623 by Narayan Calero RN  Outcome: Ongoing  2/27/2022 0519 by Estefany Elias RN  Outcome: Ongoing     Problem: Cardiac Output - Decreased:  Goal: Hemodynamic stability will improve  Description: Hemodynamic stability will improve  2/27/2022 1623 by Narayan Calero RN  Outcome: Ongoing  2/27/2022 0519 by Estefany Elias RN  Outcome: Ongoing     Problem: Fluid Volume - Imbalance:  Goal: Ability to achieve a balanced intake and output will improve  Description: Ability to achieve a balanced intake and output will improve  2/27/2022 1623 by Narayan Calero RN  Outcome: Ongoing  2/27/2022 0519 by Estefany Elias RN  Outcome: Ongoing  Goal: Chest tube drainage is within specified parameters  Description: Chest tube drainage is within specified parameters  2/27/2022 1623 by Narayan Calero RN  Outcome: Ongoing  2/27/2022 0519 by Estefany Elias RN  Outcome: Ongoing     Problem: Fluid Volume - Imbalance:  Goal: Chest tube drainage is within specified parameters  Description: Chest tube drainage is within specified parameters  2/27/2022 1623 by Narayan Calero RN  Outcome: Ongoing  2/27/2022 0519 by Estefany Elias RN  Outcome: Ongoing     Problem: Gas Exchange - Impaired:  Goal: Levels of oxygenation will improve  Description: Levels of oxygenation will improve  2/27/2022 1623 by Harris Gutiérrez RN  Outcome: Ongoing  2/27/2022 0519 by Tani Elkins RN  Outcome: Ongoing  Goal: Ability to maintain adequate ventilation will improve  Description: Ability to maintain adequate ventilation will improve  2/27/2022 1623 by Harris Gutiérrez RN  Outcome: Ongoing  2/27/2022 0519 by Tani Elkins RN  Outcome: Ongoing     Problem: Pain:  Goal: Control of acute pain  Description: Control of acute pain  Outcome: Ongoing  Goal: Control of chronic pain  Description: Control of chronic pain  Outcome: Ongoing     Problem: Tissue Perfusion - Cardiopulmonary, Altered:  Goal: Absence of angina  Description: Absence of angina  2/27/2022 1623 by Harris Gutiérrez RN  Outcome: Ongoing  2/27/2022 0519 by Tani Elkins RN  Outcome: Ongoing  Goal: Hemodynamic stability will improve  Description: Hemodynamic stability will improve  2/27/2022 1623 by Harris Gutiérrez RN  Outcome: Ongoing  2/27/2022 0519 by Tani Elkins RN  Outcome: Ongoing  Goal: Will show no evidence of cardiac arrhythmias  Description: Will show no evidence of cardiac arrhythmias  2/27/2022 1623 by Harris Gutiérrez RN  Outcome: Ongoing  2/27/2022 0519 by Tani Elkins RN  Outcome: Ongoing

## 2022-02-27 NOTE — PROGRESS NOTES
Dr. Babs Reardon at bedside this am. Dobutamine titrated down to 1 mcg/kg/min to keep C.I. > 2.0. Pt's index 2.0-2.2 on Vigilance Monitor. Levophed gtt remains off since this am. BP stable on arterial line (SBP 's). Lasix gtt initiated per orders at 1200. U/O remains low at 5-15 ml/hr. Okay to continue to titrate gtt up per nephrology to 20 mg/hr in efforts to achieve u/o goal. Repeat Renal Function lab scheduled for 1645. Dr. Stacy Brunson at bedside this afternoon updated family and pt. No plan for CRRT today. Will continue to monitor.

## 2022-02-27 NOTE — PROGRESS NOTES
Nephrology note:  Received a phone call from the patient's nurse regarding her lab test  At 4 AM this morning, the patient renal panel showed sodium 141, potassium 5.2, bicarb 14, BUN 30, creatinine was 3.8. Her urine output for yesterday was only 190  The day before that it was slightly over 2 L  The patient still requires Levophed at 5 mcg/min and dobutamine drip. So the patient kidney function is rising rapidly. The patient suffered SKINNY, most likely due to ATN from unstable hemodynamic status related to her CABG surgery. I have reviewed her medication list and did not see any obvious nephrotoxins. The patient bicarb level is low and it is also dropping rapidly, this is due to the patient's renal failure. The patient ABG showed pH of 7.34 with PCO2 of 30. Therefore, this is purely metabolic acidosis. The patient potassium level is rising    As of now, there is no indication for dialysis  However, we will need to monitor the patient potassium and bicarb levels very closely. I will repeat another renal panel at noon time.

## 2022-02-28 ENCOUNTER — ANESTHESIA EVENT (OUTPATIENT)
Dept: CARDIOVASCULAR ICU | Age: 82
End: 2022-02-28

## 2022-02-28 ENCOUNTER — ANESTHESIA (OUTPATIENT)
Dept: CARDIOVASCULAR ICU | Age: 82
End: 2022-02-28

## 2022-02-28 ENCOUNTER — APPOINTMENT (OUTPATIENT)
Dept: GENERAL RADIOLOGY | Age: 82
DRG: 233 | End: 2022-02-28
Payer: MEDICARE

## 2022-02-28 LAB
A/G RATIO: 1.8 (ref 1.1–2.2)
ABO/RH: NORMAL
ALBUMIN SERPL-MCNC: 3.5 G/DL (ref 3.4–5)
ALBUMIN SERPL-MCNC: 3.5 G/DL (ref 3.4–5)
ALBUMIN SERPL-MCNC: 3.7 G/DL (ref 3.4–5)
ALBUMIN SERPL-MCNC: 3.9 G/DL (ref 3.4–5)
ALP BLD-CCNC: 52 U/L (ref 40–129)
ALP BLD-CCNC: 56 U/L (ref 40–129)
ALT SERPL-CCNC: <5 U/L (ref 10–40)
ALT SERPL-CCNC: <5 U/L (ref 10–40)
ANION GAP SERPL CALCULATED.3IONS-SCNC: 14 MMOL/L (ref 3–16)
ANION GAP SERPL CALCULATED.3IONS-SCNC: 16 MMOL/L (ref 3–16)
ANION GAP SERPL CALCULATED.3IONS-SCNC: 16 MMOL/L (ref 3–16)
ANTIBODY SCREEN: NORMAL
AST SERPL-CCNC: 30 U/L (ref 15–37)
AST SERPL-CCNC: 30 U/L (ref 15–37)
BASE EXCESS ARTERIAL: -3 (ref -3–3)
BILIRUB SERPL-MCNC: 0.4 MG/DL (ref 0–1)
BILIRUB SERPL-MCNC: 0.4 MG/DL (ref 0–1)
BILIRUBIN DIRECT: <0.2 MG/DL (ref 0–0.3)
BILIRUBIN, INDIRECT: ABNORMAL MG/DL (ref 0–1)
BUN BLDV-MCNC: 48 MG/DL (ref 7–20)
BUN BLDV-MCNC: 52 MG/DL (ref 7–20)
BUN BLDV-MCNC: 52 MG/DL (ref 7–20)
CALCIUM SERPL-MCNC: 7.8 MG/DL (ref 8.3–10.6)
CALCIUM SERPL-MCNC: 7.9 MG/DL (ref 8.3–10.6)
CALCIUM SERPL-MCNC: 8.1 MG/DL (ref 8.3–10.6)
CHLORIDE BLD-SCNC: 106 MMOL/L (ref 99–110)
CHLORIDE BLD-SCNC: 107 MMOL/L (ref 99–110)
CHLORIDE BLD-SCNC: 108 MMOL/L (ref 99–110)
CO2: 15 MMOL/L (ref 21–32)
CO2: 16 MMOL/L (ref 21–32)
CO2: 16 MMOL/L (ref 21–32)
CREAT SERPL-MCNC: 5.5 MG/DL (ref 0.6–1.2)
CREAT SERPL-MCNC: 5.6 MG/DL (ref 0.6–1.2)
CREAT SERPL-MCNC: 5.9 MG/DL (ref 0.6–1.2)
EKG ATRIAL RATE: 86 BPM
EKG DIAGNOSIS: NORMAL
EKG P AXIS: 50 DEGREES
EKG P-R INTERVAL: 146 MS
EKG Q-T INTERVAL: 380 MS
EKG QRS DURATION: 62 MS
EKG QTC CALCULATION (BAZETT): 454 MS
EKG R AXIS: 12 DEGREES
EKG T AXIS: 19 DEGREES
EKG VENTRICULAR RATE: 86 BPM
GFR AFRICAN AMERICAN: 8
GFR AFRICAN AMERICAN: 9
GFR AFRICAN AMERICAN: 9
GFR NON-AFRICAN AMERICAN: 7
GLUCOSE BLD-MCNC: 103 MG/DL (ref 70–99)
GLUCOSE BLD-MCNC: 123 MG/DL (ref 70–99)
GLUCOSE BLD-MCNC: 126 MG/DL (ref 70–99)
GLUCOSE BLD-MCNC: 137 MG/DL (ref 70–99)
GLUCOSE BLD-MCNC: 150 MG/DL (ref 70–99)
GLUCOSE BLD-MCNC: 153 MG/DL (ref 70–99)
GLUCOSE BLD-MCNC: 158 MG/DL (ref 70–99)
GLUCOSE BLD-MCNC: 162 MG/DL (ref 70–99)
HCO3 ARTERIAL: 21.2 MMOL/L (ref 21–29)
HCT VFR BLD CALC: 24.2 % (ref 36–48)
HEMOGLOBIN: 7.8 G/DL (ref 12–16)
INR BLD: 1.2 (ref 0.88–1.12)
MAGNESIUM: 2.8 MG/DL (ref 1.8–2.4)
MAGNESIUM: 2.8 MG/DL (ref 1.8–2.4)
MCH RBC QN AUTO: 28.4 PG (ref 26–34)
MCHC RBC AUTO-ENTMCNC: 32.2 G/DL (ref 31–36)
MCV RBC AUTO: 88 FL (ref 80–100)
O2 SAT, ARTERIAL: 100 % (ref 93–100)
PCO2 ARTERIAL: 29.2 MM HG (ref 35–45)
PDW BLD-RTO: 15.7 % (ref 12.4–15.4)
PERFORMED ON: ABNORMAL
PH ARTERIAL: 7.47 (ref 7.35–7.45)
PHOSPHORUS: 3.9 MG/DL (ref 2.5–4.9)
PHOSPHORUS: 4.1 MG/DL (ref 2.5–4.9)
PHOSPHORUS: 4.1 MG/DL (ref 2.5–4.9)
PLATELET # BLD: 57 K/UL (ref 135–450)
PMV BLD AUTO: 9.7 FL (ref 5–10.5)
PO2 ARTERIAL: 412.9 MM HG (ref 75–108)
POC FIO2: 80
POC SAMPLE TYPE: ABNORMAL
POTASSIUM REFLEX MAGNESIUM: 4.9 MMOL/L (ref 3.5–5.1)
POTASSIUM SERPL-SCNC: 5.1 MMOL/L (ref 3.5–5.1)
POTASSIUM SERPL-SCNC: 5.4 MMOL/L (ref 3.5–5.1)
PROTHROMBIN TIME: 13.7 SEC (ref 9.9–12.7)
RBC # BLD: 2.75 M/UL (ref 4–5.2)
SODIUM BLD-SCNC: 136 MMOL/L (ref 136–145)
SODIUM BLD-SCNC: 138 MMOL/L (ref 136–145)
SODIUM BLD-SCNC: 140 MMOL/L (ref 136–145)
TCO2 ARTERIAL: 22 MMOL/L
TOTAL PROTEIN: 5.6 G/DL (ref 6.4–8.2)
TOTAL PROTEIN: 6.1 G/DL (ref 6.4–8.2)
WBC # BLD: 14.7 K/UL (ref 4–11)

## 2022-02-28 PROCEDURE — 99233 SBSQ HOSP IP/OBS HIGH 50: CPT | Performed by: INTERNAL MEDICINE

## 2022-02-28 PROCEDURE — 6360000002 HC RX W HCPCS: Performed by: NURSE PRACTITIONER

## 2022-02-28 PROCEDURE — 6360000002 HC RX W HCPCS: Performed by: THORACIC SURGERY (CARDIOTHORACIC VASCULAR SURGERY)

## 2022-02-28 PROCEDURE — 93010 ELECTROCARDIOGRAM REPORT: CPT | Performed by: INTERNAL MEDICINE

## 2022-02-28 PROCEDURE — 84100 ASSAY OF PHOSPHORUS: CPT

## 2022-02-28 PROCEDURE — 85027 COMPLETE CBC AUTOMATED: CPT

## 2022-02-28 PROCEDURE — 36415 COLL VENOUS BLD VENIPUNCTURE: CPT

## 2022-02-28 PROCEDURE — 86900 BLOOD TYPING SEROLOGIC ABO: CPT

## 2022-02-28 PROCEDURE — 9990000010 HC NO CHARGE VISIT

## 2022-02-28 PROCEDURE — 6370000000 HC RX 637 (ALT 250 FOR IP): Performed by: NURSE PRACTITIONER

## 2022-02-28 PROCEDURE — 94761 N-INVAS EAR/PLS OXIMETRY MLT: CPT

## 2022-02-28 PROCEDURE — 2100000000 HC CCU R&B

## 2022-02-28 PROCEDURE — 6370000000 HC RX 637 (ALT 250 FOR IP): Performed by: THORACIC SURGERY (CARDIOTHORACIC VASCULAR SURGERY)

## 2022-02-28 PROCEDURE — 80053 COMPREHEN METABOLIC PANEL: CPT

## 2022-02-28 PROCEDURE — 3E1M39Z IRRIGATION OF PERITONEAL CAVITY USING DIALYSATE, PERCUTANEOUS APPROACH: ICD-10-PCS | Performed by: STUDENT IN AN ORGANIZED HEALTH CARE EDUCATION/TRAINING PROGRAM

## 2022-02-28 PROCEDURE — 83735 ASSAY OF MAGNESIUM: CPT

## 2022-02-28 PROCEDURE — 99024 POSTOP FOLLOW-UP VISIT: CPT | Performed by: THORACIC SURGERY (CARDIOTHORACIC VASCULAR SURGERY)

## 2022-02-28 PROCEDURE — 71045 X-RAY EXAM CHEST 1 VIEW: CPT

## 2022-02-28 PROCEDURE — 2580000003 HC RX 258: Performed by: STUDENT IN AN ORGANIZED HEALTH CARE EDUCATION/TRAINING PROGRAM

## 2022-02-28 PROCEDURE — 37799 UNLISTED PX VASCULAR SURGERY: CPT

## 2022-02-28 PROCEDURE — 86850 RBC ANTIBODY SCREEN: CPT

## 2022-02-28 PROCEDURE — P9047 ALBUMIN (HUMAN), 25%, 50ML: HCPCS | Performed by: NURSE PRACTITIONER

## 2022-02-28 PROCEDURE — 2580000003 HC RX 258: Performed by: THORACIC SURGERY (CARDIOTHORACIC VASCULAR SURGERY)

## 2022-02-28 PROCEDURE — 93005 ELECTROCARDIOGRAM TRACING: CPT | Performed by: THORACIC SURGERY (CARDIOTHORACIC VASCULAR SURGERY)

## 2022-02-28 PROCEDURE — 36556 INSERT NON-TUNNEL CV CATH: CPT

## 2022-02-28 PROCEDURE — 85610 PROTHROMBIN TIME: CPT

## 2022-02-28 PROCEDURE — 2500000003 HC RX 250 WO HCPCS

## 2022-02-28 PROCEDURE — 90945 DIALYSIS ONE EVALUATION: CPT

## 2022-02-28 PROCEDURE — 86901 BLOOD TYPING SEROLOGIC RH(D): CPT

## 2022-02-28 RX ORDER — FENTANYL CITRATE 50 UG/ML
12.5 INJECTION, SOLUTION INTRAMUSCULAR; INTRAVENOUS
Status: DISCONTINUED | OUTPATIENT
Start: 2022-02-28 | End: 2022-03-04

## 2022-02-28 RX ORDER — POTASSIUM CHLORIDE 29.8 MG/ML
20 INJECTION INTRAVENOUS PRN
Status: DISCONTINUED | OUTPATIENT
Start: 2022-02-28 | End: 2022-03-02

## 2022-02-28 RX ORDER — FENTANYL CITRATE 50 UG/ML
INJECTION, SOLUTION INTRAMUSCULAR; INTRAVENOUS
Status: DISPENSED
Start: 2022-02-28 | End: 2022-03-01

## 2022-02-28 RX ORDER — HYDRALAZINE HYDROCHLORIDE 10 MG/1
5 TABLET, FILM COATED ORAL ONCE
Status: COMPLETED | OUTPATIENT
Start: 2022-02-28 | End: 2022-02-28

## 2022-02-28 RX ORDER — HYDRALAZINE HYDROCHLORIDE 10 MG/1
5 TABLET, FILM COATED ORAL EVERY 6 HOURS SCHEDULED
Status: DISCONTINUED | OUTPATIENT
Start: 2022-02-28 | End: 2022-03-01

## 2022-02-28 RX ORDER — CALCIUM CHLORIDE, MAGNESIUM CHLORIDE, DEXTROSE MONOHYDRATE, LACTIC ACID, SODIUM CHLORIDE, SODIUM BICARBONATE AND POTASSIUM CHLORIDE 5.15; 2.03; 22; 5.4; 6.46; 3.09; .157 G/L; G/L; G/L; G/L; G/L; G/L; G/L
INJECTION INTRAVENOUS CONTINUOUS
Status: DISCONTINUED | OUTPATIENT
Start: 2022-02-28 | End: 2022-03-03

## 2022-02-28 RX ORDER — CALCIUM GLUCONATE 20 MG/ML
2000 INJECTION, SOLUTION INTRAVENOUS PRN
Status: DISCONTINUED | OUTPATIENT
Start: 2022-02-28 | End: 2022-03-02

## 2022-02-28 RX ORDER — HEPARIN SODIUM 1000 [USP'U]/ML
1000 INJECTION, SOLUTION INTRAVENOUS; SUBCUTANEOUS
Status: ACTIVE | OUTPATIENT
Start: 2022-02-28 | End: 2022-02-28

## 2022-02-28 RX ORDER — HYDRALAZINE HYDROCHLORIDE 10 MG/1
5 TABLET, FILM COATED ORAL EVERY 8 HOURS SCHEDULED
Status: DISCONTINUED | OUTPATIENT
Start: 2022-02-28 | End: 2022-02-28

## 2022-02-28 RX ORDER — LIDOCAINE HYDROCHLORIDE 10 MG/ML
INJECTION, SOLUTION EPIDURAL; INFILTRATION; INTRACAUDAL; PERINEURAL
Status: DISPENSED
Start: 2022-02-28 | End: 2022-02-28

## 2022-02-28 RX ORDER — CALCIUM CHLORIDE, MAGNESIUM CHLORIDE, DEXTROSE MONOHYDRATE, LACTIC ACID, SODIUM CHLORIDE, SODIUM BICARBONATE AND POTASSIUM CHLORIDE 5.15; 2.03; 22; 5.4; 6.46; 3.09; .157 G/L; G/L; G/L; G/L; G/L; G/L; G/L
INJECTION INTRAVENOUS CONTINUOUS
Status: DISCONTINUED | OUTPATIENT
Start: 2022-02-28 | End: 2022-02-28

## 2022-02-28 RX ORDER — SODIUM CHLORIDE 9 MG/ML
INJECTION, SOLUTION INTRAVENOUS PRN
Status: DISCONTINUED | OUTPATIENT
Start: 2022-02-28 | End: 2022-03-08

## 2022-02-28 RX ORDER — CALCIUM GLUCONATE 20 MG/ML
1000 INJECTION, SOLUTION INTRAVENOUS PRN
Status: DISCONTINUED | OUTPATIENT
Start: 2022-02-28 | End: 2022-03-02

## 2022-02-28 RX ORDER — MAGNESIUM SULFATE 1 G/100ML
1000 INJECTION INTRAVENOUS PRN
Status: DISCONTINUED | OUTPATIENT
Start: 2022-02-28 | End: 2022-03-02

## 2022-02-28 RX ORDER — ALBUMIN (HUMAN) 12.5 G/50ML
25 SOLUTION INTRAVENOUS EVERY 6 HOURS
Status: DISCONTINUED | OUTPATIENT
Start: 2022-02-28 | End: 2022-03-02

## 2022-02-28 RX ORDER — LIDOCAINE HYDROCHLORIDE 10 MG/ML
INJECTION, SOLUTION EPIDURAL; INFILTRATION; INTRACAUDAL; PERINEURAL
Status: COMPLETED
Start: 2022-02-28 | End: 2022-02-28

## 2022-02-28 RX ADMIN — HYDRALAZINE HYDROCHLORIDE 5 MG: 20 INJECTION INTRAMUSCULAR; INTRAVENOUS at 18:12

## 2022-02-28 RX ADMIN — CHLORHEXIDINE GLUCONATE 0.12% ORAL RINSE 15 ML: 1.2 LIQUID ORAL at 21:15

## 2022-02-28 RX ADMIN — ATORVASTATIN CALCIUM 80 MG: 80 TABLET, FILM COATED ORAL at 21:07

## 2022-02-28 RX ADMIN — HYDRALAZINE HYDROCHLORIDE 5 MG: 10 TABLET, FILM COATED ORAL at 11:39

## 2022-02-28 RX ADMIN — CHLORHEXIDINE GLUCONATE 0.12% ORAL RINSE 15 ML: 1.2 LIQUID ORAL at 07:43

## 2022-02-28 RX ADMIN — ALBUMIN (HUMAN) 25 G: 0.25 INJECTION, SOLUTION INTRAVENOUS at 17:48

## 2022-02-28 RX ADMIN — ALBUMIN (HUMAN) 25 G: 0.25 INJECTION, SOLUTION INTRAVENOUS at 23:01

## 2022-02-28 RX ADMIN — ALBUMIN (HUMAN) 25 G: 0.25 INJECTION, SOLUTION INTRAVENOUS at 11:31

## 2022-02-28 RX ADMIN — OXYCODONE 2.5 MG: 5 TABLET ORAL at 15:49

## 2022-02-28 RX ADMIN — CALCIUM CHLORIDE, MAGNESIUM CHLORIDE, DEXTROSE MONOHYDRATE, LACTIC ACID, SODIUM CHLORIDE, SODIUM BICARBONATE AND POTASSIUM CHLORIDE: 5.15; 2.03; 22; 5.4; 6.46; 3.09; .157 INJECTION INTRAVENOUS at 16:37

## 2022-02-28 RX ADMIN — OXYCODONE 5 MG: 5 TABLET ORAL at 21:07

## 2022-02-28 RX ADMIN — CALCIUM CHLORIDE, MAGNESIUM CHLORIDE, DEXTROSE MONOHYDRATE, LACTIC ACID, SODIUM CHLORIDE, SODIUM BICARBONATE AND POTASSIUM CHLORIDE: 5.15; 2.03; 22; 5.4; 6.46; 3.09; .157 INJECTION INTRAVENOUS at 19:19

## 2022-02-28 RX ADMIN — Medication: at 19:19

## 2022-02-28 RX ADMIN — INSULIN GLARGINE 4 UNITS: 100 INJECTION, SOLUTION SUBCUTANEOUS at 21:07

## 2022-02-28 RX ADMIN — FENTANYL CITRATE 12.5 MCG: 50 INJECTION, SOLUTION INTRAMUSCULAR; INTRAVENOUS at 18:49

## 2022-02-28 RX ADMIN — Medication: at 12:08

## 2022-02-28 RX ADMIN — FENTANYL CITRATE 12.5 MCG: 50 INJECTION, SOLUTION INTRAMUSCULAR; INTRAVENOUS at 12:39

## 2022-02-28 RX ADMIN — HYDRALAZINE HYDROCHLORIDE 5 MG: 20 INJECTION INTRAMUSCULAR; INTRAVENOUS at 21:13

## 2022-02-28 RX ADMIN — PREDNISONE 20 MG: 20 TABLET ORAL at 21:07

## 2022-02-28 RX ADMIN — OXYCODONE 2.5 MG: 5 TABLET ORAL at 06:20

## 2022-02-28 RX ADMIN — MUPIROCIN: 20 OINTMENT TOPICAL at 07:43

## 2022-02-28 RX ADMIN — LIDOCAINE HYDROCHLORIDE: 10 INJECTION, SOLUTION EPIDURAL; INFILTRATION; INTRACAUDAL; PERINEURAL at 12:22

## 2022-02-28 RX ADMIN — FENTANYL CITRATE 12.5 MCG: 50 INJECTION, SOLUTION INTRAMUSCULAR; INTRAVENOUS at 14:18

## 2022-02-28 RX ADMIN — MUPIROCIN: 20 OINTMENT TOPICAL at 21:07

## 2022-02-28 RX ADMIN — PREDNISONE 20 MG: 20 TABLET ORAL at 11:40

## 2022-02-28 RX ADMIN — SODIUM CHLORIDE, PRESERVATIVE FREE 10 ML: 5 INJECTION INTRAVENOUS at 21:07

## 2022-02-28 RX ADMIN — HYDRALAZINE HYDROCHLORIDE 5 MG: 20 INJECTION INTRAMUSCULAR; INTRAVENOUS at 06:06

## 2022-02-28 RX ADMIN — FUROSEMIDE 20 MG/HR: 10 INJECTION, SOLUTION INTRAMUSCULAR; INTRAVENOUS at 09:13

## 2022-02-28 RX ADMIN — ACETAMINOPHEN 650 MG: 325 TABLET ORAL at 21:07

## 2022-02-28 RX ADMIN — CALCIUM CHLORIDE, MAGNESIUM CHLORIDE, DEXTROSE MONOHYDRATE, LACTIC ACID, SODIUM CHLORIDE, SODIUM BICARBONATE AND POTASSIUM CHLORIDE: 5.15; 2.03; 22; 5.4; 6.46; 3.09; .157 INJECTION INTRAVENOUS at 12:11

## 2022-02-28 RX ADMIN — CALCIUM CHLORIDE, MAGNESIUM CHLORIDE, DEXTROSE MONOHYDRATE, LACTIC ACID, SODIUM CHLORIDE, SODIUM BICARBONATE AND POTASSIUM CHLORIDE: 5.15; 2.03; 22; 5.4; 6.46; 3.09; .157 INJECTION INTRAVENOUS at 12:14

## 2022-02-28 RX ADMIN — HYDRALAZINE HYDROCHLORIDE 5 MG: 10 TABLET, FILM COATED ORAL at 15:48

## 2022-02-28 RX ADMIN — CALCIUM CHLORIDE, MAGNESIUM CHLORIDE, DEXTROSE MONOHYDRATE, LACTIC ACID, SODIUM CHLORIDE, SODIUM BICARBONATE AND POTASSIUM CHLORIDE: 5.15; 2.03; 22; 5.4; 6.46; 3.09; .157 INJECTION INTRAVENOUS at 22:00

## 2022-02-28 RX ADMIN — HYDRALAZINE HYDROCHLORIDE 5 MG: 10 TABLET, FILM COATED ORAL at 18:05

## 2022-02-28 RX ADMIN — FUROSEMIDE 20 MG/HR: 10 INJECTION, SOLUTION INTRAMUSCULAR; INTRAVENOUS at 04:07

## 2022-02-28 ASSESSMENT — PAIN SCALES - GENERAL
PAINLEVEL_OUTOF10: 0
PAINLEVEL_OUTOF10: 7
PAINLEVEL_OUTOF10: 0
PAINLEVEL_OUTOF10: 4
PAINLEVEL_OUTOF10: 5
PAINLEVEL_OUTOF10: 4
PAINLEVEL_OUTOF10: 6
PAINLEVEL_OUTOF10: 5

## 2022-02-28 NOTE — PROGRESS NOTES
Received handoff from Cathalene Dandy. Dobutamine weaned to 0.5 mcg/kg/min overnight. C.I. stable from 2.3-2.5. Arterial -120's. Levophed gtt is off. Lasix gtt at 20 mg/hr. Urine output increasing to 40 to 50 cc/hr the last 2 hours. Urine is clear and yellow. Pt remains responsive to voice and is following all commands. Tor Glaser NP at bedside this am. STAT labs sent and consent obtained for vas-cath placement from pt's daughter Maru So at bedside. Will continue to monitor.

## 2022-02-28 NOTE — PROGRESS NOTES
BP quickly rebounded after restarting levo and dobutamine. Levo off quickly. BP continuing to rise. @8594, 5mg hydralazine given IVP. Pt awake and moaning at this time.

## 2022-02-28 NOTE — PROGRESS NOTES
Most recent lab work showed [id number]. [address] [facility]   [facility]. [facility]  [phone]  Nephrology Note          Patient ID: Katia Howard  Referring/ Physician: No att. providers found      HPI/Summary:   Katia Howard is being seen by nephrology for SKINNY. This is a [de-identified] yo lady with PMH of HTN, CKD, HLD and obesity who presented with chest pain and SOB. She had been having several weeks of chest tightness and dyspnea with exertion. She feels ok now, no complaints. Says she has been on medication for BP for decades. Her Bp had been running prior to admission. She has been taking Meloxicam for the past few years for knee pain/arthritis. Denies hematuria, foamy urine. LUTS. No dysuria. No issues with swelling right now. During this admission she has had C 2/22/22 showing severe triple vessel disease. Normal LV function. LVEDP 14-16. She was referred for CABG and it is planned for 2/25/22 . Cr has been rising from 1.2 > 1.3 > 1.5 at time of consult. Of note , she has been on scheduled meloxicam in addition to receiving contrast. Her PO intake has been fine, no NVD. Interval hx:   Cr continues to worsen  Persistently acidotic. UO is sluggish, only had  284 mL yesterday  CVP is elevated  On dobutamine 0.5 mcg/min    Plan:  Start CRRT for solute and volume management. Stop lasix infusion once CRRT is started. Can re-assess renal function once euvolemic. 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 Cr peaked at 1.5  She was on meloxicam and got contrast 2/22, Cr started to rise two days later consistent with DANNY. She is not volume overloaded or dehydrated on exam.   Uric acid  CK  UA showed no protein or cells, totally bland. #CAD triple vessel disease  C 2/22  CABG planned 2/25  Asa statin and BB  Preserved EF  Not volume overloaded. #full code.          [facility] would like to thank you for the opportunity to serve this patient. Please call with any questions or concerns. Disha Gonzalez MD  [facility]  [address], [address]  Fax: [phone]  Office: [phone]         CC/Reason for consult:   Reason for consult: SKINNY  Chief Complaint   Patient presents with    Chest Pain    Hypertension           Review of Systems:   [address]. All other remaining systems are negative. Constitutional:  fever, chills, weakness, weight change, fatigue,      Skin:  rash, pruritus, hair loss, bruising, dry skin, petechiae. Head, Face, Neck   headaches, swelling,  cervical adenopathy. Respiratory: shortness of breath, cough, or wheezing  Cardiovascular: chest pain, palpitations, dizzy, edema  Gastrointestinal: nausea, vomiting, diarrhea, constipation,belly pain    Yellow skin, blood in stool  Musculoskeletal:  back pain, muscle weakness, gait problems,       joint pain or swelling. Genitourinary:  dysuria, poor urine flow, flank pain, blood in urine  Neurologic:  vertigo, TIA'S, syncope, seizures, focal weakness  Psychosocial:  insomnia, anxiety, or depression. Additional positive findings: -        PMH/SH/FH:    Medical Hx: reviewed and updated as appropriate  Past Medical History:   Diagnosis Date    Arthritis     Asthma     as child grew out of it    CAD (coronary artery disease) 02/22/2022    multi vessel    CKD (chronic kidney disease) 2015    Elevated creatinine since 2015; Stage III b    Glaucoma     Hyperlipidemia     Hypertension     Hyperthyroidism 2019    Graves disease    IBS (irritable bowel syndrome) 10/2020    On Linzess    Neuropathy     Peripheral    Obesity (BMI 30-39. 9)     Osteopenia 05/2009    Osteoporosis          Surgical Hx: reviewed and updated as appropriate   has a past surgical history that includes Cholecystectomy; Foot surgery; Tubal ligation; Gastric Band ([id number]); orif femur decompression (Left, 04/2014);  Ankle fracture surgery (Left, 2015); Hand surgery (); other surgical history (Right, 2010); Coronary artery bypass graft (2022); and Coronary artery bypass graft (N/A, 2022). Social Hx: reviewed and updated as appropriate  Social History     Tobacco Use    Smoking status: Former Smoker     Quit date: 8/10/1999     Years since quittin.5    Smokeless tobacco: Never Used    Tobacco comment: started age 26   Substance Use Topics    Alcohol use: No        Family hx: reviewed and updated as appropriate  family history includes Heart Disease in her father; Rheum Arthritis in her sister; Stroke in her father. Medications:   hydrALAZINE, 5 mg, 3 times per day  albumin human, 25 g, Q6H  lidocaine PF, ,   fentaNYL, ,   insulin glargine, 0.05 Units/kg, Nightly  pantoprazole, 20 mg, QAM AC  predniSONE, 20 mg, BID  sodium chloride flush, 10 mL, 2 times per day  [Held by provider] aspirin, 325 mg, Daily  chlorhexidine, 15 mL, BID  [Held by provider] magnesium oxide, 400 mg, BID  mupirocin, , BID  [Held by provider] metoprolol tartrate, 12.5 mg, BID  sodium chloride (PF), 10 mL, Daily  insulin lispro, 0-12 Units, TID WC  insulin lispro, 0-6 Units, Nightly  atorvastatin, 80 mg, Nightly       Patient has no known allergies. Allergies:   No Known Allergies      Physical Exam/Objective:   Vitals:    22 1300   BP: 115/76   Pulse: 85   Resp: 20   Temp: 99.3 °F (37.4 °C)   SpO2: 99%       Intake/Output Summary (Last 24 hours) at 2022 1411  Last data filed at 2022 1300  Gross per 24 hour   Intake 859.65 ml   Output 612 ml   Net 247.65 ml         General appearance: ialert, intearactive  HEENT: no icterus, EOM intact, trachea midline. Neck : no masses, appears symmetrical and no JVD appreciated. Respiratory: Respiratory effort normal, bilateral equal chest rise. Cardiovascular:  Ausculation shows RRR and  ++ edema   Abdomen: abdomen is soft, non distended  Musculoskeletal:  no joint swelling, no deformity  Skin: no rashes, no induration, no tightening, no jaundice   Neuro: no localizing deficits      Data:   CBC:   Recent Labs     02/26/22  0413 02/26/22  0413 02/26/22  1330 02/27/22  0405 02/28/22  0406   WBC 12.3*  --   --  16.5* 14.7*   HGB 7.2*   < > 8.5* 8.6* 7.8*   HCT 22.3*   < > 26.0* 26.2* 24.2*   PLT 66*  --   --  70* 57*    < > = values in this interval not displayed. BMP:    Recent Labs     02/26/22  0413 02/26/22  1730 02/27/22  0405 02/27/22  1212 02/27/22  2208 02/28/22  0406 02/28/22  1030      < > 141   < > 137 138 140   K 4.9   < > 5.2*   < > 5.2* 5.4* 5.1   *   < > 109   < > 107 106 108   CO2 19*   < > 14*   < > 15* 16* 16*   BUN 21*   < > 30*   < > 44* 48* 52*   CREATININE 1.8*   < > 3.8*   < > 4.9* 5.6* 5.9*   GLUCOSE 93   < > 136*   < > 148* 158* 153*   MG 2.80*  --  2.60*  --   --  2.80*  --    PHOS  --   --   --    < > 3.9 4.1 4.1    < > = values in this interval not displayed.

## 2022-02-28 NOTE — PROGRESS NOTES
Spoke with Nephrology updated on pt's status. Order to begin CRRT and stop Lasix gtt once CRRT initiated.

## 2022-02-28 NOTE — PROGRESS NOTES
Progress Note    S/P cabgx4, EDISON exclusion 2/25/22    Vital Signs:                                                 BP (!) 116/57   Pulse 75   Temp 98.5 °F (36.9 °C) (Core)   Resp 24   Ht 5' 3\" (1.6 m)   Wt 207 lb 10.8 oz (94.2 kg)   SpO2 99%   BMI 36.79 kg/m²  O2 Flow Rate (L/min): 2 L/min   SR  CVP (Mean): 21 mmHg  PAP (s/d): 41/24  PAP (Mean): 29 mmHg  SVR (Using ABP Mean): 782.22 dyne*sec/cm5  CCI: 2.8 L/min  SVO2 (%): 62 %  Admission Weight: 168 lb 1.6 oz (76.2 kg)  lb/87 kg     Drips: dobut 0.5, lasix 20    I/O:      Intake/Output Summary (Last 24 hours) at 2/28/2022 0959  Last data filed at 2/28/2022 0913  Gross per 24 hour   Intake 1151.44 ml   Output 467 ml   Net 684.44 ml     Chest Tube: 90, 10, 10    CV: reg, wound c/d/i  Pulm: decreased  Abd: soft  Ext: warm, 1+ edema    Data Review:  CBC:   Recent Labs     02/26/22  0413 02/26/22  0413 02/26/22  1330 02/27/22  0405 02/28/22  0406   WBC 12.3*  --   --  16.5* 14.7*   HGB 7.2*   < > 8.5* 8.6* 7.8*   HCT 22.3*   < > 26.0* 26.2* 24.2*   MCV 85.9  --   --  88.9 88.0   PLT 66*  --   --  70* 57*    < > = values in this interval not displayed. BMP:   Recent Labs     02/26/22  0413 02/26/22  1730 02/27/22  0405 02/27/22  1212 02/27/22  1720 02/27/22  2208 02/28/22  0406      < > 141   < > 138 137 138   K 4.9   < > 5.2*   < > 5.5* 5.2* 5.4*   *   < > 109   < > 106 107 106   CO2 19*   < > 14*   < > 15* 15* 16*   PHOS  --   --   --    < > 4.2 3.9 4.1   BUN 21*   < > 30*   < > 40* 44* 48*   CREATININE 1.8*   < > 3.8*   < > 4.7* 4.9* 5.6*   CALCIUM 8.8   < > 8.3   < > 8.0* 7.9* 8.1*   MG 2.80*  --  2.60*  --   --   --  2.80*    < > = values in this interval not displayed. Cardiac Enzymes: No results for input(s): CKTOTAL, CKMB, CKMBINDEX, TROPONINI in the last 72 hours.   PT/INR:   Recent Labs     02/25/22  1200 02/26/22  0413 02/28/22  0725   PROTIME 21.1* 14.7* 13.7*   INR 1.82* 1.29* 1.20*     APTT:   Recent Labs 02/25/22  1200   APTT 36.7       Assessment/Plan:  CV - SR   - EF nl but marginal CI. LVH, needs afterload reduction and optimal filling. Hydralazine, SPA. Wean dobut.   - HLD/CAD. Statin. pulm - pulm toilet, wean O2  Renal - acute on CKD, BL Cr 1.4-1.7. place line, start crrt/hd per neph recs   - retain Natarajan for accurate I+O  ID - elevated wbc likely secondary to steroids. Wean. Heme - acute blood loss anemia, dilutional. Fe   - thrombocytopenia. HIT neg 2/26. Hold ASA.     - scds dvt prophylaxis    Liang Marin MD  2/28/2022  9:59 AM

## 2022-02-28 NOTE — PROGRESS NOTES
Pt BP decreasing high 70-80's. Dobutamine and levo off at this time. CI 1.8-2. Restarted dobutamine @ 0.5mcg and levo @ 1mcg. Received return phone call from Dr. Lei Oropeza. Will be in to see shortly this morning, and pt will most likely need dialysis but will evaluate on assessment.

## 2022-02-28 NOTE — PROGRESS NOTES
Physical Therapy  PT Rx held today per CTS request.   Reports probable CRRT. Will follow-up tomorrow, cont current POC as approp.   Bob Vilchis, PT

## 2022-02-28 NOTE — PROGRESS NOTES
Attempted to decrease dobutamine to 0.5mcg. CI stable around 2.2 for 2 hours, then decreasing to 1.5 sustained. Increased dobutamine to 1mcg. Renal panel drawn and sent to lab for processing.

## 2022-02-28 NOTE — PLAN OF CARE
Problem: Falls - Risk of:  Goal: Will remain free from falls  Description: Will remain free from falls  2/28/2022 1634 by Elysia Escobar RN  Outcome: Ongoing  2/28/2022 0601 by Monse Puentes RN  Outcome: Met This Shift  Goal: Absence of physical injury  Description: Absence of physical injury  2/28/2022 1634 by Elysia Escobar RN  Outcome: Ongoing  2/28/2022 0601 by Monse Puentes RN  Outcome: Met This Shift     Problem: Skin Integrity:  Goal: Will show no infection signs and symptoms  Description: Will show no infection signs and symptoms  2/28/2022 1634 by Elysia Escobar RN  Outcome: Ongoing  2/28/2022 0601 by Monse Puentes RN  Outcome: Ongoing  Goal: Absence of new skin breakdown  Description: Absence of new skin breakdown  2/28/2022 1634 by Elysia Escobar RN  Outcome: Ongoing  2/28/2022 0601 by Monse Puentes RN  Outcome: Ongoing     Problem: Pain:  Goal: Pain level will decrease  Description: Pain level will decrease  2/28/2022 1634 by Elysia Escobar RN  Outcome: Ongoing  2/28/2022 0601 by Monse Puentes RN  Outcome: Ongoing  Goal: Control of acute pain  Description: Control of acute pain  2/28/2022 1634 by Elysia Escobar RN  Outcome: Ongoing  2/28/2022 0601 by Monse Puentes RN  Outcome: Ongoing  Goal: Control of chronic pain  Description: Control of chronic pain  Outcome: Ongoing     Problem: Nutrition  Goal: Optimal nutrition therapy  2/28/2022 1634 by Elysia Escobar RN  Outcome: Ongoing  2/28/2022 0601 by Monse Puentes RN  Outcome: Ongoing     Problem: Discharge Planning:  Goal: Discharged to appropriate level of care  Description: Discharged to appropriate level of care  2/28/2022 1634 by Elysia Escobar RN  Outcome: Ongoing  2/28/2022 0601 by Monse Puentes RN  Outcome: Ongoing     Problem:  Activity Intolerance:  Goal: Able to perform prescribed physical activity  Description: Able to perform prescribed physical activity  2/28/2022 1634 by Saeid Torres Alhaji Ward RN  Outcome: Ongoing  2/28/2022 0601 by Tj Moore RN  Outcome: Ongoing  Goal: Ability to tolerate increased activity will improve  Description: Ability to tolerate increased activity will improve  2/28/2022 1634 by Roselyn Aguilera RN  Outcome: Ongoing  2/28/2022 0601 by Tj Moore RN  Outcome: Ongoing     Problem: Anxiety:  Goal: Level of anxiety will decrease  Description: Level of anxiety will decrease  2/28/2022 1634 by Roselyn Aguilera RN  Outcome: Ongoing  2/28/2022 0601 by Tj Moore RN  Outcome: Ongoing     Problem: Cardiac Output - Decreased:  Goal: Cardiac output within specified parameters  Description: Cardiac output within specified parameters  2/28/2022 1634 by Roselyn Aguilera RN  Outcome: Ongoing  2/28/2022 0601 by Tj Moore RN  Outcome: Ongoing  Goal: Hemodynamic stability will improve  Description: Hemodynamic stability will improve  2/28/2022 1634 by Roselyn Aguilera RN  Outcome: Ongoing  2/28/2022 0601 by Tj Moore RN  Outcome: Ongoing     Problem: Fluid Volume - Imbalance:  Goal: Ability to achieve a balanced intake and output will improve  Description: Ability to achieve a balanced intake and output will improve  2/28/2022 1634 by Roselyn Aguilera RN  Outcome: Ongoing  2/28/2022 0601 by Tj Moore RN  Outcome: Ongoing  Goal: Chest tube drainage is within specified parameters  Description: Chest tube drainage is within specified parameters  2/28/2022 1634 by Roselyn Aguilera RN  Outcome: Ongoing  2/28/2022 0601 by Tj Moore RN  Outcome: Ongoing     Problem: Gas Exchange - Impaired:  Goal: Levels of oxygenation will improve  Description: Levels of oxygenation will improve  2/28/2022 1634 by Roselyn Aguilera RN  Outcome: Ongoing  2/28/2022 0601 by Tj Moore RN  Outcome: Ongoing  Goal: Ability to maintain adequate ventilation will improve  Description: Ability to maintain adequate ventilation will improve  2/28/2022 1634 by Shital Zhao RN  Outcome: Ongoing  2/28/2022 0601 by Gareth Plaza RN  Outcome: Ongoing     Problem: Pain:  Goal: Control of acute pain  Description: Control of acute pain  2/28/2022 1634 by Shital Zhao RN  Outcome: Ongoing  2/28/2022 0601 by Gareth Plaza RN  Outcome: Ongoing  Goal: Control of chronic pain  Description: Control of chronic pain  Outcome: Ongoing     Problem: Tissue Perfusion - Cardiopulmonary, Altered:  Goal: Absence of angina  Description: Absence of angina  2/28/2022 1634 by Shital Zhao RN  Outcome: Ongoing  2/28/2022 0601 by Gareth Plaza RN  Outcome: Ongoing  Goal: Hemodynamic stability will improve  Description: Hemodynamic stability will improve  2/28/2022 1634 by Shital Zhao RN  Outcome: Ongoing  2/28/2022 0601 by Gareth Plaza RN  Outcome: Ongoing  Goal: Will show no evidence of cardiac arrhythmias  Description: Will show no evidence of cardiac arrhythmias  2/28/2022 1634 by Shital Zhao RN  Outcome: Ongoing  2/28/2022 0601 by Gareth Plaza RN  Outcome: Ongoing

## 2022-02-28 NOTE — PROGRESS NOTES
[address]   Daily Progress Note      Admit Date:  2/20/2022      Subjective:   Ms. Efren Delgadillo is an [de-identified] male with a past medical history significant for essential hypertension who presented to [facility] with chest pain. She underwent a stress perfusion study that was grossly abnormal and high risk. A left heart catheterization demonstrated normal LV function but triple vessel CAD. A consult to CVTS was placed for CABG consideration. Intervaql History:  Purcell Libman is lethargic and has had diminished urine output overnight. She is requiring several liters of oxygen . Sinus rhythm on telemetry. She is on lasix drip at 20mg/hr. She is also still on dobutamine 05. mcg/kg/hr.        Objective:     BP (!) 116/57   Pulse 87   Temp 98.5 °F (36.9 °C) (Core)   Resp 27   Ht 5' 3\" (1.6 m)   Wt 207 lb 10.8 oz (94.2 kg)   SpO2 98%   BMI 36.79 kg/m²      Intake/Output Summary (Last 24 hours) at 2/28/2022 0935  Last data filed at 2/28/2022 0913  Gross per 24 hour   Intake 1151.44 ml   Output 467 ml   Net 684.44 ml       Physical Exam:  General:  Awake, alert, NAD  Skin:  Warm and dry  Neck:  JVD<8, no carotid bruits  Chest:  Clear to auscultation, no wheezes/rhonchi/rales  Cardiovascular:  RRR, normal S1/S2, no M/R/G  Abdomen:  Soft, nontender, +bowel sounds  Extremities:  No edema  Pulses: 2+ bilat carotid    2+ bilat radial    2+ bilat femoral        Medications:    insulin glargine  0.05 Units/kg SubCUTAneous Nightly    pantoprazole  20 mg Oral QAM AC    predniSONE  20 mg Oral BID    sodium chloride flush  10 mL IntraVENous 2 times per day    [Held by provider] aspirin  325 mg Oral Daily    chlorhexidine  15 mL Mouth/Throat BID    [Held by provider] magnesium oxide  400 mg Oral BID    mupirocin   Nasal BID    [Held by provider] metoprolol tartrate  12.5 mg Oral BID    sodium chloride (PF)  10 mL IntraVENous Daily    insulin lispro  0-12 Units SubCUTAneous TID WC    insulin lispro  0-6 Units SubCUTAneous Nightly    atorvastatin  80 mg Oral Nightly      sodium chloride      furosemide (LASIX) 1mg/ml infusion 20 mg/hr (02/28/22 0913)    sodium chloride      sodium chloride 5 mL/hr at 02/28/22 0913    sodium chloride Stopped (02/25/22 1711)    milrinone Stopped (02/27/22 0607)    nitroGLYCERIN Stopped (02/25/22 1850)    insulin Stopped (02/26/22 2001)    dextrose      sodium chloride      norepinephrine Stopped (02/28/22 0110)    DOBUTamine 0.5 mcg/kg/min (02/28/22 0913)       Lab Data:  CBC:   Recent Labs     02/26/22  0413 02/26/22  0413 02/26/22  1330 02/27/22  0405 02/28/22  0406   WBC 12.3*  --   --  16.5* 14.7*   HGB 7.2*   < > 8.5* 8.6* 7.8*   PLT 66*  --   --  70* 57*    < > = values in this interval not displayed. BMP:    Recent Labs     02/27/22  1720 02/27/22  2208 02/28/22  0406    137 138   K 5.5* 5.2* 5.4*   CO2 15* 15* 16*   BUN 40* 44* 48*   CREATININE 4.7* 4.9* 5.6*       No results for input(s): CKMB, CKMBINDEX, TROPONINI in the last 72 hours. Invalid input(s): CKTOTAL;3    Left Heart Catheterization 2/22/22:  1. Severe triple-vessel coronary artery disease. The distal left main  is heavily calcified and has a 60% lesion. The proximal LAD is heavily  calcified with a 60% lesion. The mid-LAD is 100% occluded but fills in  from right collaterals. The distal LAD backfills back to the midportion  via left collaterals. The proximal circumflex is heavily calcified and  has an 80% lesion. OM1 is calcified with 80% serial lesions. The  proximal right coronary artery is subtotally occluded and the PDA fills  from both collateral native flow from the right and the left. There is  backfilling from the right system into the mid LAD via a septal  . Also, the LAD first diagonal branch has a 90% lesion. 2.  Normal left ventricular systolic function. LV ejection fraction of  65%. 3.  Borderline left ventricular end-diastolic pressure at 14 to 16 mmHg.   4. No gradient across the aortic valve on pullback to suggest aortic  stenosis. Echo 2/21/22:  Normal left ventricle size, wall thickness, and systolic function with an   estimated ejection fraction of 55-60%. No regional wall motion abnormalities   are seen. grade 1 diastolic dysfunction   The right ventricle is normal in size and function. No significant valvular heart disease    Assessment / Plan:     1. Unstable Angina  S/p CABG on 02/25/22. Continue aspirin, statin and beta blockade therapy. Triple vessel disease by [facility]. Check hemoglobin A1C. Mild carotid disease by Duplex. LVEF is preserved. 2. Hyperlipidemia with goal LDL<70mg/dL  Continue statin therapy with rosuvastatin 40mg daily. 3. Essential Hypertension  Continue carvedilol for control. Stop HCTZ now    4. Acute on chronic Kidney Injury  Likely ATN. Still producing urine. Agree with Fabi Read and dialysis for now.           Signed:  Precious Shelton MD

## 2022-02-28 NOTE — PROGRESS NOTES
Occupational Therapy    OT order received. Per CTS defer therapy this date. Will follow up as schedule and pt condition permit.     Electronically signed by Carol Khalil OT on 2/28/2022 at 8:18 AM

## 2022-02-28 NOTE — PROGRESS NOTES
PO pain medication given as pt showing signs of pain and just nodding and saying yes. Pt kept refusing to open mouth. Able to get pt to take pill and small sips of water fed via spoon. Daughter at bedside.

## 2022-02-28 NOTE — PLAN OF CARE
physical activity  2/28/2022 0601 by Kyra Sanchez RN  Outcome: Ongoing  2/27/2022 1623 by Isra Blanc RN  Outcome: Ongoing  Goal: Ability to tolerate increased activity will improve  Description: Ability to tolerate increased activity will improve  2/28/2022 0601 by Kyra Sanchez RN  Outcome: Ongoing  2/27/2022 1623 by Isra Blanc RN  Outcome: Ongoing     Problem: Anxiety:  Goal: Level of anxiety will decrease  Description: Level of anxiety will decrease  2/28/2022 0601 by Kyra Sanchez RN  Outcome: Ongoing  2/27/2022 1623 by Isra Blanc RN  Outcome: Ongoing     Problem: Cardiac Output - Decreased:  Goal: Cardiac output within specified parameters  Description: Cardiac output within specified parameters  2/28/2022 0601 by Kyra Sanchez RN  Outcome: Ongoing  2/27/2022 1623 by Isra Blanc RN  Outcome: Ongoing  Goal: Hemodynamic stability will improve  Description: Hemodynamic stability will improve  2/28/2022 0601 by Kyra Sanchez RN  Outcome: Ongoing  2/27/2022 1623 by Isra Blanc RN  Outcome: Ongoing     Problem: Fluid Volume - Imbalance:  Goal: Ability to achieve a balanced intake and output will improve  Description: Ability to achieve a balanced intake and output will improve  2/28/2022 0601 by Kyra Sanchez RN  Outcome: Ongoing  2/27/2022 1623 by Isra Blanc RN  Outcome: Ongoing  Goal: Chest tube drainage is within specified parameters  Description: Chest tube drainage is within specified parameters  2/28/2022 0601 by Kyra Sanchez RN  Outcome: Ongoing  2/27/2022 1623 by Isra Blanc RN  Outcome: Ongoing     Problem: Gas Exchange - Impaired:  Goal: Levels of oxygenation will improve  Description: Levels of oxygenation will improve  2/28/2022 0601 by Kyra Sanchez RN  Outcome: Ongoing  2/27/2022 1623 by Isra Blanc RN  Outcome: Ongoing  Goal: Ability to maintain adequate ventilation will improve  Description: Ability to maintain adequate ventilation will improve  2/28/2022 0601 by Oanh Carson RN  Outcome: Ongoing  2/27/2022 1623 by Oli Lopez RN  Outcome: Ongoing     Problem: Pain:  Goal: Control of acute pain  Description: Control of acute pain  2/28/2022 0601 by Oanh Carson RN  Outcome: Ongoing  2/27/2022 1623 by Oli Lopez RN  Outcome: Ongoing  Goal: Control of chronic pain  Description: Control of chronic pain  2/27/2022 1623 by Oli Lopez RN  Outcome: Ongoing     Problem: Tissue Perfusion - Cardiopulmonary, Altered:  Goal: Absence of angina  Description: Absence of angina  2/28/2022 0601 by Oanh Carson RN  Outcome: Ongoing  2/27/2022 1623 by Oli Lopez RN  Outcome: Ongoing  Goal: Hemodynamic stability will improve  Description: Hemodynamic stability will improve  2/28/2022 0601 by Oanh Carson RN  Outcome: Ongoing  2/27/2022 1623 by Oli Lopez RN  Outcome: Ongoing  Goal: Will show no evidence of cardiac arrhythmias  Description: Will show no evidence of cardiac arrhythmias  2/28/2022 0601 by Oanh Carson RN  Outcome: Ongoing  2/27/2022 1623 by Oli Lopez RN  Outcome: Ongoing

## 2022-02-28 NOTE — PROCEDURES
[facility] Department of Anesthesiology  Procedure Note - Temporary Dialysis Catheter       Name:  Sandra Maher                                         Age:  [de-identified] y.o. MRN:  [id number]     Procedure: Insertion of temporary dialysis catheter under ultrasound guidance     ALLERGIES: Patient has no known allergies. Indication: renal failure    Time-Out: A time-out was completed verifying correct patient, procedure, site, positioning, and special equipment if applicable. Anesthesia: Local infiltration of 1% lidocaine    Consent:  Informed written consent obtained     Technique:  Under sterile conditions and full barrier precautions the skin above the left internal jugular vein was prepped with chlorhexidine (ChloraPrep) and covered with a sterile drape. Local anesthesia 1% xylocaine was applied to the skin and subcutaneous tissues. Ultrasound guidance was used to identify the underlying internal jugular vein. An 18-gauge central venous access needle was then inserted into the vein. A guide wire was then passed easily through the needle. There were no arrhythmias. The needle was then withdrawn. A 9.0 dilator and a triple-lumen dialysis catheter was then inserted into the vessel over the guide wire. The catheter was sutured into place. Number of sticks: 1    Number of kits used: 1    Estimated Blood Loss: minimal    SEDATION 1mg versed  10 mg ketamine.     Complications: No immediate complication    Steph Perera MD  February 28, 2022  9:40 AM

## 2022-02-28 NOTE — PROGRESS NOTES
Critical creatinine called by lab. PerfectServe message sent to Dr. Dontrell Walker, nephrology on call with critical lab, additional renal panel results, and to see if any new orders to be placed. Awaiting response.

## 2022-02-28 NOTE — PROGRESS NOTES
Dobutamine stopped at this time. CI >2.0. Will see if pt tolerates being off inotrope and assess the need to restart.

## 2022-02-28 NOTE — PROGRESS NOTES
Pt tolerating CRRT fluid removal rate of 100 cc/hr. -160's this afternoon. Dr. Lorene Mena updated and scheduled hydralazine frequency increased. Dobutamine gtt stopped at 1646. C.I. have remained greater than 2.

## 2022-03-01 LAB
A/G RATIO: 2.5 (ref 1.1–2.2)
A/G RATIO: 2.6 (ref 1.1–2.2)
ALBUMIN SERPL-MCNC: 4.6 G/DL (ref 3.4–5)
ALBUMIN SERPL-MCNC: 4.9 G/DL (ref 3.4–5)
ALP BLD-CCNC: 50 U/L (ref 40–129)
ALP BLD-CCNC: 52 U/L (ref 40–129)
ALT SERPL-CCNC: <5 U/L (ref 10–40)
ALT SERPL-CCNC: <5 U/L (ref 10–40)
ANION GAP SERPL CALCULATED.3IONS-SCNC: 14 MMOL/L (ref 3–16)
ANION GAP SERPL CALCULATED.3IONS-SCNC: 17 MMOL/L (ref 3–16)
ANION GAP SERPL CALCULATED.3IONS-SCNC: 18 MMOL/L (ref 3–16)
AST SERPL-CCNC: 30 U/L (ref 15–37)
AST SERPL-CCNC: 36 U/L (ref 15–37)
BILIRUB SERPL-MCNC: 0.6 MG/DL (ref 0–1)
BILIRUB SERPL-MCNC: 1 MG/DL (ref 0–1)
BLOOD BANK DISPENSE STATUS: NORMAL
BLOOD BANK PRODUCT CODE: NORMAL
BPU ID: NORMAL
BUN BLDV-MCNC: 20 MG/DL (ref 7–20)
BUN BLDV-MCNC: 27 MG/DL (ref 7–20)
BUN BLDV-MCNC: 31 MG/DL (ref 7–20)
CALCIUM IONIZED: 1.17 MMOL/L (ref 1.12–1.32)
CALCIUM IONIZED: 1.22 MMOL/L (ref 1.12–1.32)
CALCIUM SERPL-MCNC: 8.8 MG/DL (ref 8.3–10.6)
CALCIUM SERPL-MCNC: 9.7 MG/DL (ref 8.3–10.6)
CALCIUM SERPL-MCNC: 9.7 MG/DL (ref 8.3–10.6)
CHLORIDE BLD-SCNC: 101 MMOL/L (ref 99–110)
CHLORIDE BLD-SCNC: 102 MMOL/L (ref 99–110)
CHLORIDE BLD-SCNC: 103 MMOL/L (ref 99–110)
CO2: 20 MMOL/L (ref 21–32)
CO2: 20 MMOL/L (ref 21–32)
CO2: 21 MMOL/L (ref 21–32)
CREAT SERPL-MCNC: 1.8 MG/DL (ref 0.6–1.2)
CREAT SERPL-MCNC: 2.5 MG/DL (ref 0.6–1.2)
CREAT SERPL-MCNC: 2.9 MG/DL (ref 0.6–1.2)
DESCRIPTION BLOOD BANK: NORMAL
GFR AFRICAN AMERICAN: 19
GFR AFRICAN AMERICAN: 22
GFR AFRICAN AMERICAN: 33
GFR NON-AFRICAN AMERICAN: 16
GFR NON-AFRICAN AMERICAN: 19
GFR NON-AFRICAN AMERICAN: 27
GLUCOSE BLD-MCNC: 109 MG/DL (ref 70–99)
GLUCOSE BLD-MCNC: 117 MG/DL (ref 70–99)
GLUCOSE BLD-MCNC: 118 MG/DL (ref 70–99)
GLUCOSE BLD-MCNC: 124 MG/DL (ref 70–99)
GLUCOSE BLD-MCNC: 124 MG/DL (ref 70–99)
GLUCOSE BLD-MCNC: 132 MG/DL (ref 70–99)
GLUCOSE BLD-MCNC: 134 MG/DL (ref 70–99)
HCT VFR BLD CALC: 23.8 % (ref 36–48)
HEMOGLOBIN: 7.8 G/DL (ref 12–16)
MAGNESIUM: 2.3 MG/DL (ref 1.8–2.4)
MCH RBC QN AUTO: 28.5 PG (ref 26–34)
MCHC RBC AUTO-ENTMCNC: 32.8 G/DL (ref 31–36)
MCV RBC AUTO: 86.8 FL (ref 80–100)
PDW BLD-RTO: 15.3 % (ref 12.4–15.4)
PERFORMED ON: ABNORMAL
PH VENOUS: 7.44 (ref 7.35–7.45)
PH VENOUS: 7.47 (ref 7.35–7.45)
PHOSPHORUS: 2.3 MG/DL (ref 2.5–4.9)
PHOSPHORUS: 2.6 MG/DL (ref 2.5–4.9)
PLATELET # BLD: 64 K/UL (ref 135–450)
PMV BLD AUTO: 10.2 FL (ref 5–10.5)
POTASSIUM REFLEX MAGNESIUM: 4 MMOL/L (ref 3.5–5.1)
POTASSIUM REFLEX MAGNESIUM: 4.1 MMOL/L (ref 3.5–5.1)
POTASSIUM SERPL-SCNC: 4 MMOL/L (ref 3.5–5.1)
RBC # BLD: 2.74 M/UL (ref 4–5.2)
SODIUM BLD-SCNC: 137 MMOL/L (ref 136–145)
SODIUM BLD-SCNC: 139 MMOL/L (ref 136–145)
SODIUM BLD-SCNC: 140 MMOL/L (ref 136–145)
TOTAL PROTEIN: 6.4 G/DL (ref 6.4–8.2)
TOTAL PROTEIN: 6.9 G/DL (ref 6.4–8.2)
WBC # BLD: 12 K/UL (ref 4–11)

## 2022-03-01 PROCEDURE — 82330 ASSAY OF CALCIUM: CPT

## 2022-03-01 PROCEDURE — 97530 THERAPEUTIC ACTIVITIES: CPT

## 2022-03-01 PROCEDURE — 2580000003 HC RX 258: Performed by: THORACIC SURGERY (CARDIOTHORACIC VASCULAR SURGERY)

## 2022-03-01 PROCEDURE — 80053 COMPREHEN METABOLIC PANEL: CPT

## 2022-03-01 PROCEDURE — 99232 SBSQ HOSP IP/OBS MODERATE 35: CPT | Performed by: INTERNAL MEDICINE

## 2022-03-01 PROCEDURE — 85027 COMPLETE CBC AUTOMATED: CPT

## 2022-03-01 PROCEDURE — 94761 N-INVAS EAR/PLS OXIMETRY MLT: CPT

## 2022-03-01 PROCEDURE — 6370000000 HC RX 637 (ALT 250 FOR IP): Performed by: THORACIC SURGERY (CARDIOTHORACIC VASCULAR SURGERY)

## 2022-03-01 PROCEDURE — 2700000000 HC OXYGEN THERAPY PER DAY

## 2022-03-01 PROCEDURE — 6360000002 HC RX W HCPCS: Performed by: THORACIC SURGERY (CARDIOTHORACIC VASCULAR SURGERY)

## 2022-03-01 PROCEDURE — 6370000000 HC RX 637 (ALT 250 FOR IP): Performed by: NURSE PRACTITIONER

## 2022-03-01 PROCEDURE — 51798 US URINE CAPACITY MEASURE: CPT

## 2022-03-01 PROCEDURE — 90945 DIALYSIS ONE EVALUATION: CPT

## 2022-03-01 PROCEDURE — 97110 THERAPEUTIC EXERCISES: CPT

## 2022-03-01 PROCEDURE — 83735 ASSAY OF MAGNESIUM: CPT

## 2022-03-01 PROCEDURE — 97166 OT EVAL MOD COMPLEX 45 MIN: CPT

## 2022-03-01 PROCEDURE — 99024 POSTOP FOLLOW-UP VISIT: CPT | Performed by: THORACIC SURGERY (CARDIOTHORACIC VASCULAR SURGERY)

## 2022-03-01 PROCEDURE — 6360000002 HC RX W HCPCS: Performed by: NURSE PRACTITIONER

## 2022-03-01 PROCEDURE — 84100 ASSAY OF PHOSPHORUS: CPT

## 2022-03-01 PROCEDURE — P9047 ALBUMIN (HUMAN), 25%, 50ML: HCPCS | Performed by: NURSE PRACTITIONER

## 2022-03-01 PROCEDURE — 2500000003 HC RX 250 WO HCPCS: Performed by: STUDENT IN AN ORGANIZED HEALTH CARE EDUCATION/TRAINING PROGRAM

## 2022-03-01 PROCEDURE — 2580000003 HC RX 258: Performed by: STUDENT IN AN ORGANIZED HEALTH CARE EDUCATION/TRAINING PROGRAM

## 2022-03-01 PROCEDURE — 2100000000 HC CCU R&B

## 2022-03-01 RX ORDER — HEPARIN SODIUM 5000 [USP'U]/ML
5000 INJECTION, SOLUTION INTRAVENOUS; SUBCUTANEOUS EVERY 8 HOURS SCHEDULED
Status: DISCONTINUED | OUTPATIENT
Start: 2022-03-01 | End: 2022-03-01

## 2022-03-01 RX ORDER — FENTANYL CITRATE 50 UG/ML
12.5 INJECTION, SOLUTION INTRAMUSCULAR; INTRAVENOUS
Status: COMPLETED | OUTPATIENT
Start: 2022-03-01 | End: 2022-03-01

## 2022-03-01 RX ORDER — POLYETHYLENE GLYCOL 3350 17 G/17G
17 POWDER, FOR SOLUTION ORAL DAILY
Status: DISCONTINUED | OUTPATIENT
Start: 2022-03-01 | End: 2022-03-08

## 2022-03-01 RX ORDER — HYDRALAZINE HYDROCHLORIDE 25 MG/1
25 TABLET, FILM COATED ORAL EVERY 6 HOURS SCHEDULED
Status: DISCONTINUED | OUTPATIENT
Start: 2022-03-01 | End: 2022-03-02

## 2022-03-01 RX ORDER — SENNA AND DOCUSATE SODIUM 50; 8.6 MG/1; MG/1
2 TABLET, FILM COATED ORAL 2 TIMES DAILY
Status: DISCONTINUED | OUTPATIENT
Start: 2022-03-01 | End: 2022-03-08

## 2022-03-01 RX ORDER — PREDNISONE 1 MG/1
15 TABLET ORAL 2 TIMES DAILY
Status: DISCONTINUED | OUTPATIENT
Start: 2022-03-01 | End: 2022-03-02

## 2022-03-01 RX ORDER — LIDOCAINE 4 G/G
1 PATCH TOPICAL DAILY
Status: DISCONTINUED | OUTPATIENT
Start: 2022-03-01 | End: 2022-03-10 | Stop reason: HOSPADM

## 2022-03-01 RX ADMIN — FENTANYL CITRATE 12.5 MCG: 50 INJECTION, SOLUTION INTRAMUSCULAR; INTRAVENOUS at 12:13

## 2022-03-01 RX ADMIN — Medication: at 15:57

## 2022-03-01 RX ADMIN — CALCIUM CHLORIDE, MAGNESIUM CHLORIDE, DEXTROSE MONOHYDRATE, LACTIC ACID, SODIUM CHLORIDE, SODIUM BICARBONATE AND POTASSIUM CHLORIDE: 5.15; 2.03; 22; 5.4; 6.46; 3.09; .157 INJECTION INTRAVENOUS at 03:07

## 2022-03-01 RX ADMIN — INSULIN GLARGINE 4 UNITS: 100 INJECTION, SOLUTION SUBCUTANEOUS at 20:10

## 2022-03-01 RX ADMIN — HYDRALAZINE HYDROCHLORIDE 25 MG: 25 TABLET, FILM COATED ORAL at 11:17

## 2022-03-01 RX ADMIN — CALCIUM CHLORIDE, MAGNESIUM CHLORIDE, DEXTROSE MONOHYDRATE, LACTIC ACID, SODIUM CHLORIDE, SODIUM BICARBONATE AND POTASSIUM CHLORIDE: 5.15; 2.03; 22; 5.4; 6.46; 3.09; .157 INJECTION INTRAVENOUS at 10:37

## 2022-03-01 RX ADMIN — HYDRALAZINE HYDROCHLORIDE 5 MG: 20 INJECTION INTRAMUSCULAR; INTRAVENOUS at 15:03

## 2022-03-01 RX ADMIN — MUPIROCIN: 20 OINTMENT TOPICAL at 08:23

## 2022-03-01 RX ADMIN — CALCIUM CHLORIDE, MAGNESIUM CHLORIDE, DEXTROSE MONOHYDRATE, LACTIC ACID, SODIUM CHLORIDE, SODIUM BICARBONATE AND POTASSIUM CHLORIDE: 5.15; 2.03; 22; 5.4; 6.46; 3.09; .157 INJECTION INTRAVENOUS at 13:17

## 2022-03-01 RX ADMIN — FENTANYL CITRATE 12.5 MCG: 50 INJECTION, SOLUTION INTRAMUSCULAR; INTRAVENOUS at 16:47

## 2022-03-01 RX ADMIN — CHLORHEXIDINE GLUCONATE 0.12% ORAL RINSE 15 ML: 1.2 LIQUID ORAL at 20:10

## 2022-03-01 RX ADMIN — CHLORHEXIDINE GLUCONATE 0.12% ORAL RINSE 15 ML: 1.2 LIQUID ORAL at 08:23

## 2022-03-01 RX ADMIN — FENTANYL CITRATE 12.5 MCG: 50 INJECTION, SOLUTION INTRAMUSCULAR; INTRAVENOUS at 13:12

## 2022-03-01 RX ADMIN — OXYCODONE 5 MG: 5 TABLET ORAL at 09:22

## 2022-03-01 RX ADMIN — PANTOPRAZOLE SODIUM 20 MG: 20 TABLET, DELAYED RELEASE ORAL at 06:05

## 2022-03-01 RX ADMIN — HYDRALAZINE HYDROCHLORIDE 5 MG: 10 TABLET, FILM COATED ORAL at 00:09

## 2022-03-01 RX ADMIN — HYDRALAZINE HYDROCHLORIDE 5 MG: 20 INJECTION INTRAMUSCULAR; INTRAVENOUS at 13:14

## 2022-03-01 RX ADMIN — CALCIUM CHLORIDE, MAGNESIUM CHLORIDE, DEXTROSE MONOHYDRATE, LACTIC ACID, SODIUM CHLORIDE, SODIUM BICARBONATE AND POTASSIUM CHLORIDE: 5.15; 2.03; 22; 5.4; 6.46; 3.09; .157 INJECTION INTRAVENOUS at 15:55

## 2022-03-01 RX ADMIN — HYDRALAZINE HYDROCHLORIDE 5 MG: 20 INJECTION INTRAMUSCULAR; INTRAVENOUS at 01:45

## 2022-03-01 RX ADMIN — Medication: at 00:34

## 2022-03-01 RX ADMIN — CALCIUM CHLORIDE, MAGNESIUM CHLORIDE, DEXTROSE MONOHYDRATE, LACTIC ACID, SODIUM CHLORIDE, SODIUM BICARBONATE AND POTASSIUM CHLORIDE: 5.15; 2.03; 22; 5.4; 6.46; 3.09; .157 INJECTION INTRAVENOUS at 05:35

## 2022-03-01 RX ADMIN — PREDNISONE 15 MG: 5 TABLET ORAL at 20:10

## 2022-03-01 RX ADMIN — SODIUM PHOSPHATE, MONOBASIC, MONOHYDRATE 6 MMOL: 276; 142 INJECTION, SOLUTION INTRAVENOUS at 14:00

## 2022-03-01 RX ADMIN — ATORVASTATIN CALCIUM 80 MG: 80 TABLET, FILM COATED ORAL at 20:10

## 2022-03-01 RX ADMIN — METOPROLOL TARTRATE 12.5 MG: 25 TABLET, FILM COATED ORAL at 15:57

## 2022-03-01 RX ADMIN — Medication: at 05:36

## 2022-03-01 RX ADMIN — HYDRALAZINE HYDROCHLORIDE 5 MG: 20 INJECTION INTRAMUSCULAR; INTRAVENOUS at 09:11

## 2022-03-01 RX ADMIN — HYDRALAZINE HYDROCHLORIDE 5 MG: 20 INJECTION INTRAMUSCULAR; INTRAVENOUS at 11:03

## 2022-03-01 RX ADMIN — SODIUM CHLORIDE, PRESERVATIVE FREE 10 ML: 5 INJECTION INTRAVENOUS at 20:11

## 2022-03-01 RX ADMIN — FENTANYL CITRATE 12.5 MCG: 50 INJECTION, SOLUTION INTRAMUSCULAR; INTRAVENOUS at 04:51

## 2022-03-01 RX ADMIN — HYDRALAZINE HYDROCHLORIDE 5 MG: 20 INJECTION INTRAMUSCULAR; INTRAVENOUS at 17:05

## 2022-03-01 RX ADMIN — SODIUM CHLORIDE, PRESERVATIVE FREE 10 ML: 5 INJECTION INTRAVENOUS at 08:23

## 2022-03-01 RX ADMIN — ALBUMIN (HUMAN) 25 G: 0.25 INJECTION, SOLUTION INTRAVENOUS at 05:02

## 2022-03-01 RX ADMIN — CALCIUM CHLORIDE, MAGNESIUM CHLORIDE, DEXTROSE MONOHYDRATE, LACTIC ACID, SODIUM CHLORIDE, SODIUM BICARBONATE AND POTASSIUM CHLORIDE: 5.15; 2.03; 22; 5.4; 6.46; 3.09; .157 INJECTION INTRAVENOUS at 15:54

## 2022-03-01 RX ADMIN — POLYETHYLENE GLYCOL 3350 17 G: 17 POWDER, FOR SOLUTION ORAL at 09:03

## 2022-03-01 RX ADMIN — Medication: at 10:45

## 2022-03-01 RX ADMIN — HYDRALAZINE HYDROCHLORIDE 5 MG: 20 INJECTION INTRAMUSCULAR; INTRAVENOUS at 06:14

## 2022-03-01 RX ADMIN — HYDRALAZINE HYDROCHLORIDE 5 MG: 10 TABLET, FILM COATED ORAL at 06:05

## 2022-03-01 RX ADMIN — OXYCODONE 5 MG: 5 TABLET ORAL at 13:16

## 2022-03-01 RX ADMIN — FENTANYL CITRATE 12.5 MCG: 50 INJECTION, SOLUTION INTRAMUSCULAR; INTRAVENOUS at 18:00

## 2022-03-01 RX ADMIN — PREDNISONE 15 MG: 5 TABLET ORAL at 09:03

## 2022-03-01 RX ADMIN — CALCIUM CHLORIDE, MAGNESIUM CHLORIDE, DEXTROSE MONOHYDRATE, LACTIC ACID, SODIUM CHLORIDE, SODIUM BICARBONATE AND POTASSIUM CHLORIDE: 5.15; 2.03; 22; 5.4; 6.46; 3.09; .157 INJECTION INTRAVENOUS at 21:09

## 2022-03-01 RX ADMIN — SENNOSIDES AND DOCUSATE SODIUM 2 TABLET: 50; 8.6 TABLET ORAL at 09:03

## 2022-03-01 RX ADMIN — CALCIUM CHLORIDE, MAGNESIUM CHLORIDE, DEXTROSE MONOHYDRATE, LACTIC ACID, SODIUM CHLORIDE, SODIUM BICARBONATE AND POTASSIUM CHLORIDE: 5.15; 2.03; 22; 5.4; 6.46; 3.09; .157 INJECTION INTRAVENOUS at 10:41

## 2022-03-01 RX ADMIN — FENTANYL CITRATE 12.5 MCG: 50 INJECTION, SOLUTION INTRAMUSCULAR; INTRAVENOUS at 03:25

## 2022-03-01 RX ADMIN — Medication: at 21:08

## 2022-03-01 RX ADMIN — CALCIUM CHLORIDE, MAGNESIUM CHLORIDE, DEXTROSE MONOHYDRATE, LACTIC ACID, SODIUM CHLORIDE, SODIUM BICARBONATE AND POTASSIUM CHLORIDE: 5.15; 2.03; 22; 5.4; 6.46; 3.09; .157 INJECTION INTRAVENOUS at 18:33

## 2022-03-01 RX ADMIN — ACETAMINOPHEN 650 MG: 325 TABLET ORAL at 09:11

## 2022-03-01 RX ADMIN — OXYCODONE 5 MG: 5 TABLET ORAL at 19:24

## 2022-03-01 RX ADMIN — OXYCODONE 2.5 MG: 5 TABLET ORAL at 00:44

## 2022-03-01 RX ADMIN — CALCIUM CHLORIDE, MAGNESIUM CHLORIDE, DEXTROSE MONOHYDRATE, LACTIC ACID, SODIUM CHLORIDE, SODIUM BICARBONATE AND POTASSIUM CHLORIDE: 5.15; 2.03; 22; 5.4; 6.46; 3.09; .157 INJECTION INTRAVENOUS at 08:08

## 2022-03-01 RX ADMIN — CALCIUM CHLORIDE, MAGNESIUM CHLORIDE, DEXTROSE MONOHYDRATE, LACTIC ACID, SODIUM CHLORIDE, SODIUM BICARBONATE AND POTASSIUM CHLORIDE: 5.15; 2.03; 22; 5.4; 6.46; 3.09; .157 INJECTION INTRAVENOUS at 00:31

## 2022-03-01 RX ADMIN — ACETAMINOPHEN 650 MG: 325 TABLET ORAL at 13:55

## 2022-03-01 RX ADMIN — MUPIROCIN: 20 OINTMENT TOPICAL at 20:11

## 2022-03-01 RX ADMIN — FENTANYL CITRATE 12.5 MCG: 50 INJECTION, SOLUTION INTRAMUSCULAR; INTRAVENOUS at 15:19

## 2022-03-01 RX ADMIN — HYDRALAZINE HYDROCHLORIDE 25 MG: 25 TABLET, FILM COATED ORAL at 18:14

## 2022-03-01 RX ADMIN — SENNOSIDES AND DOCUSATE SODIUM 2 TABLET: 50; 8.6 TABLET ORAL at 20:10

## 2022-03-01 ASSESSMENT — PAIN SCALES - GENERAL
PAINLEVEL_OUTOF10: 0
PAINLEVEL_OUTOF10: 6
PAINLEVEL_OUTOF10: 0
PAINLEVEL_OUTOF10: 5
PAINLEVEL_OUTOF10: 8
PAINLEVEL_OUTOF10: 7
PAINLEVEL_OUTOF10: 0
PAINLEVEL_OUTOF10: 4
PAINLEVEL_OUTOF10: 10
PAINLEVEL_OUTOF10: 8
PAINLEVEL_OUTOF10: 9
PAINLEVEL_OUTOF10: 8
PAINLEVEL_OUTOF10: 0
PAINLEVEL_OUTOF10: 4
PAINLEVEL_OUTOF10: 7
PAINLEVEL_OUTOF10: 0
PAINLEVEL_OUTOF10: 0
PAINLEVEL_OUTOF10: 5
PAINLEVEL_OUTOF10: 0
PAINLEVEL_OUTOF10: 8
PAINLEVEL_OUTOF10: 0

## 2022-03-01 ASSESSMENT — PAIN DESCRIPTION - LOCATION
LOCATION: HIP
LOCATION: CHEST
LOCATION: HIP
LOCATION: CHEST
LOCATION: HIP

## 2022-03-01 ASSESSMENT — PAIN - FUNCTIONAL ASSESSMENT
PAIN_FUNCTIONAL_ASSESSMENT: ACTIVITIES ARE NOT PREVENTED
PAIN_FUNCTIONAL_ASSESSMENT: ACTIVITIES ARE NOT PREVENTED

## 2022-03-01 ASSESSMENT — PAIN DESCRIPTION - ORIENTATION
ORIENTATION: MID
ORIENTATION: MID
ORIENTATION: UPPER
ORIENTATION: LEFT
ORIENTATION: LEFT
ORIENTATION: MID
ORIENTATION: MID;LOWER

## 2022-03-01 ASSESSMENT — PAIN DESCRIPTION - DESCRIPTORS
DESCRIPTORS: ACHING
DESCRIPTORS: SORE
DESCRIPTORS: ACHING
DESCRIPTORS: SORE
DESCRIPTORS: STABBING
DESCRIPTORS: ACHING

## 2022-03-01 ASSESSMENT — PAIN DESCRIPTION - PAIN TYPE
TYPE: ACUTE PAIN
TYPE: SURGICAL PAIN
TYPE: ACUTE PAIN
TYPE: SURGICAL PAIN

## 2022-03-01 ASSESSMENT — PAIN DESCRIPTION - ONSET: ONSET: GRADUAL

## 2022-03-01 NOTE — PROGRESS NOTES
Pt with uneventful shift. CRRT running with no complications. Able to pull 100ml/hr or a little more without issue. BP remains elevated. Has been given 3 IVP 5mg hydralazine as well as 5mg PO hydralazine scheduled. Dobutamine remains off and CI >2.0. Pain managed with PRN pain medication. Pt able to follow commands, but is pleasantly confused most of the time. Minimal UOP noted. CT with minimal serosanguinous drainage.

## 2022-03-01 NOTE — PROGRESS NOTES
Pt weighed x2  In bed. 2 pillows, sheet, and extra items lifted off bed. 1kg increase from yesterday. Question accuracy of bed weights.

## 2022-03-01 NOTE — PROGRESS NOTES
Occupational Therapy   Occupational Therapy Initial Assessment  Date: 3/1/2022   Patient Name: Melissa Ball  MRN: [id number]     : 1940    Date of Service: 3/1/2022    Discharge Recommendations:  Patient would benefit from continued therapy after discharge,3-5 sessions per week  OT Equipment Recommendations  Other: defer to [place] facility    Melissa Ball scored a 9/ on the AM-PAC ADL Inpatient form. Current research shows that an AM-PAC score of 17 or less is typically not associated with a discharge to the patient's home setting. Based on the patient's AM-PAC score and their current ADL deficits, it is recommended that the patient have 3-5 sessions per week of Occupational Therapy at d/c to increase the patient's independence. Please see assessment section for further patient specific details. If patient discharges prior to next session this note will serve as a discharge summary. Please see below for the latest assessment towards goals. Assessment   Performance deficits / Impairments: Decreased functional mobility ; Decreased safe awareness;Decreased balance;Decreased ADL status; Decreased cognition;Decreased high-level IADLs;Decreased endurance;Decreased strength  Assessment: [de-identified] y/o female admit 2022 with CAD, NSTEMI.  2022 S/P AtriClip, Urgent CABG x 4.  CRRT initiated. PTA pt lived at home with family and was independent with ADLs and functional mobility. Today, pt oriented to self only but able to follow commands. Pt tolerated chair position and brief stand to stedy with mod A x 2. Pt with functional UE ROM for self care and anticipate will require up to max A for ADLs at this time. Pt is functioning below baseline and benefit from skilled therapy.   Prognosis: Good  Decision Making: Medium Complexity  OT Education: OT Role;Transfer Training;Plan of Care;Orientation;Precautions  Barriers to Learning: confusion  REQUIRES OT FOLLOW UP: Yes  Activity Tolerance  Activity Tolerance: Treatment limited secondary to decreased cognition;Patient limited by fatigue;Patient limited by pain  Activity Tolerance: easily fatigued, pleasantly confused but able to follow commands; moaning as if she is in pain but did not rate  Safety Devices  Safety Devices in place: Yes  Type of devices: Nurse notified;Call light within reach; Left in bed           Patient Diagnosis(es): The encounter diagnosis was Chest pain, unspecified type. has a past medical history of Arthritis, Asthma, CAD (coronary artery disease), CKD (chronic kidney disease), Glaucoma, Hyperlipidemia, Hypertension, Hyperthyroidism, IBS (irritable bowel syndrome), Neuropathy, Obesity (BMI 30-39.9), Osteopenia, and Osteoporosis. has a past surgical history that includes Cholecystectomy; Foot surgery; Tubal ligation; Gastric Band ([id number]); orif femur decompression (Left, 04/2014); Ankle fracture surgery (Left, 02/12/2015); Hand surgery (2009); other surgical history (Right, 2010); Coronary artery bypass graft (02/25/2022); and Coronary artery bypass graft (N/A, 2/25/2022). Restrictions  Restrictions/Precautions  Restrictions/Precautions: Fall Risk  Position Activity Restriction  Sternal Precautions: 2/25/2022 S/P AtriClip, Urgent CABG x 4.  Other position/activity restrictions: Arterial Line, Pacing Wires, Chest Tube x 2, O2 1L via NC. CRRT via L IJ    Subjective   General  Chart Reviewed: Yes  Patient assessed for rehabilitation services?: Yes  Additional Pertinent Hx: [de-identified] y/o female admit 2/20/2022 with CAD, NSTEMI.  2/25/2022 S/P AtriClip, Urgent CABG x 4. 2/28 CRRT initiated  Family / Caregiver Present: No  Referring Practitioner: MARKEL Koroma CNP  Subjective  Subjective: Pt seen bedside and agreeable to therapy. Pt pleasantly confused. General Comment  Comments: Per RN ok for therapy.     Social/Functional History  Social/Functional History  Lives With: Family (Dtr, S-I-L.)  Type of Home: Apartment (Lower level : 6 steps with handrail.)  Home Layout: One level  Home Access: Stairs to enter without rails  Bathroom Shower/Tub: Tub/Shower unit  Bathroom Toilet: Standard  Bathroom Equipment: Shower chair  Bathroom Accessibility: Accessible  ADL Assistance: Independent  Homemaking Assistance:  (Shared with family.)  Ambulation Assistance: Independent (Without assist device pta.)  Transfer Assistance: Independent  Active : Yes  Occupation: Retired  Type of occupation: Retired : [email or web address]. Additional Comments: Information obtained by PT from son at bedside on 2/27. Reports S-I-L works although Dtr does not (able to provide assist upon d/c). Objective   Vision: Within Functional Limits  Hearing: Within functional limits    Orientation  Overall Orientation Status: Impaired  Orientation Level: Oriented to person;Disoriented to situation;Disoriented to time;Disoriented to place     Balance  Sitting Balance:  (assist to bring trunk forward in bed and maintain prior to standing)  Standing Balance: Minimal assistance  Standing Balance  Time: stood briefly with stedy  Functional Mobility  Functional Mobility Comments: defer ambulation 2/2 CRRT     ADL  Additional Comments: Anticipate pt will require max A for LB bathing/dressing and toileting, min A for UB bathing/dressing and grooming when seated based on balance and cognition observed        Bed mobility  Supine to Sit:  (bed converted to chair position)  Sit to Supine:  (bed taken out of chair position)  Scooting: Dependent/Total;2 Person assistance (to scoot up in bed)  Comment: Pt briefly to chair position. Able to kyleigh upright/supported sitting. Dependent to brief unsupported sitting while adhere to Sternal Precautions        Cognition  Overall Cognitive Status: Exceptions  Arousal/Alertness: Appropriate responses to stimuli  Following Commands: Follows one step commands with increased time; Follows one step commands with repetition  Attention Span: Attends with cues to redirect  Memory: Decreased recall of recent events;Decreased short term memory;Decreased recall of biographical Information;Decreased recall of precautions  Problem Solving: Decreased awareness of errors  Insights: Decreased awareness of deficits                 LUE AROM (degrees)  LUE AROM : WFL  Left Hand AROM (degrees)  Left Hand AROM: WFL  RUE AROM (degrees)  RUE AROM : WFL  Right Hand AROM (degrees)  Right Hand AROM: [facility]       Plan   Plan  Times per week: 3-5  Current Treatment Recommendations: Strengthening,Endurance Training,Balance Training,Gait Training,Functional Mobility Training,Cognitive Reorientation,Self-Care / ADL    AM-PAC Score     AM-PAC Inpatient Daily Activity Raw Score: 9 (03/01/22 0916)  AM-PAC Inpatient ADL T-Scale Score : 25.33 (03/01/22 0916)  ADL Inpatient CMS 0-100% Score: 79.59 (03/01/22 0916)  ADL Inpatient CMS G-Code Modifier : CL (03/01/22 0833)    Goals  Short term goals  Time Frame for Short term goals: Prior to DC: Short term goal 1: Pt will complete ADL transfers with min A  Short term goal 2: Pt will complete functional mobility with min A  Short term goal 3: Pt will tolerate standing > 5 min for functional task with CGA  Short term goal 4: Pt will complete grooming tasks with set up  Short term goal 5: Pt will complete toileting with min A  Patient Goals   Patient goals : no goal stated 2/2 cognition       Therapy Time   Individual Concurrent Group Co-treatment   Time In 0830         Time Out 0910         Minutes 40         Timed Code Treatment Minutes: 25 Minutes     This note to serve as OT d/c summary if pt is d/c-ed prior to next therapy session.     Unk Levels, OTR/L

## 2022-03-01 NOTE — PROGRESS NOTES
Physical Therapy  Facility/Department: [facility]  Daily Treatment Note  NAME: Sherly Hdz  : 1940  MRN: [id number]    Date of Service: 3/1/2022    Discharge Recommendations:  Continue to assess pending progress,3-5 sessions per week   PT Equipment Recommendations  Other: Will monitor for potential equipt needs. Sherly Hdz scored a  on the AM-PAC short mobility form. Current research shows that an AM-PAC score of 17 or less is typically not associated with a discharge to the patient's home setting. Based on the patient's AM-PAC score and their current functional mobility deficits, it is recommended that the patient have 3-5 sessions per week of Physical Therapy at d/c to increase the patient's independence. Please see assessment section for further patient specific details. If patient discharges prior to next session this note will serve as a discharge summary. Please see below for the latest assessment towards goals. Assessment   Body structures, Functions, Activity limitations: Decreased functional mobility ; Decreased strength;Decreased safe awareness;Decreased cognition;Decreased endurance  Assessment: [de-identified] y/o female admit 2022 with CAD, NSTEMI.  2022 S/P AtriClip, Urgent CABG x 4. PMH as noted including CKD, IBS, L Femur Fx/ORIF (2014), L Ankle Fx/ORIF (2015), Osteoporosis, Surg R Ear Mass (behind ear, ). PTA (per family) pt living with dtr/s-i-l in apt setting with steps to access, independent daily care and functional mobility. Pt cont somewhat confused; following simple commands although alittle delayed at times. Pt kyleigh brief eob/upright oob via Stedy with assist x 2. At this time, cont anticipate need cont PT (3-5) upon d/c. Will monitor pt's progress. Prognosis: Fair;Good  Decision Making: Medium Complexity  History: [de-identified] y/o female admit 2022 with CAD, NSTEMI.  2022 S/P AtriClip, Urgent CABG x 4.   PMH as noted including CKD, IBS, L Femur Fx/ORIF (4/2014), L Ankle Fx/ORIF (2/2015), Osteoporosis, Surg R Ear Mass (behind ear, 2010). Exam: See above. Clinical Presentation: See above. Patient Education: Role of PT, POC, Need to call for assist, Sternal Precautions/Use of Cardiac Pillow, OOB via Stedy. Barriers to Learning: Cognitive. REQUIRES PT FOLLOW UP: Yes  Activity Tolerance  Activity Tolerance: Patient limited by cognitive status; Patient limited by endurance  Activity Tolerance: Pt cont somewhat confused; following simple commands although alittle delayed at times. Pt kyleigh brief eob/upright oob via Stedy with assist x 2. Patient Diagnosis(es): The encounter diagnosis was Chest pain, unspecified type. has a past medical history of Arthritis, Asthma, CAD (coronary artery disease), CKD (chronic kidney disease), Glaucoma, Hyperlipidemia, Hypertension, Hyperthyroidism, IBS (irritable bowel syndrome), Neuropathy, Obesity (BMI 30-39.9), Osteopenia, and Osteoporosis. has a past surgical history that includes Cholecystectomy; Foot surgery; Tubal ligation; Gastric Band ([id number]); orif femur decompression (Left, 04/2014); Ankle fracture surgery (Left, 02/12/2015); Hand surgery (2009); other surgical history (Right, 2010); Coronary artery bypass graft (02/25/2022); and Coronary artery bypass graft (N/A, 2/25/2022). Restrictions  Restrictions/Precautions  Restrictions/Precautions: Fall Risk  Position Activity Restriction  Sternal Precautions: 2/25/2022 S/P AtriClip, Urgent CABG x 4.  Other position/activity restrictions: Arterial Line, Pacing Wires, Chest Tube x 2, O2 1L via NC. CRRT via L IJ  Subjective   General  Chart Reviewed: Yes  Additional Pertinent Hx: [de-identified] y/o female admit 2/20/2022 with CAD, NSTEMI.  2/25/2022 S/P AtriClip, Urgent CABG x 4.  2/28/2022 CRRT started. PMH as noted including CKD, IBS, L Femur Fx/ORIF (4/2014), L Ankle Fx/ORIF (2/2015), Osteoporosis, Surg R Ear Mass (behind ear, 2010).   Family / Caregiver Present: No  Referring Practitioner: Dr. Jv Rushing  Subjective  Subjective: Pt agreeable to PT Rx. Orientation  Orientation  Overall Orientation Status: Impaired  Orientation Level: Oriented to person;Disoriented to place; Disoriented to time;Disoriented to situation  Cognition   Cognition  Overall Cognitive Status: Exceptions  Arousal/Alertness: Appropriate responses to stimuli  Following Commands: Follows one step commands with increased time; Follows one step commands with repetition  Attention Span: Attends with cues to redirect  Memory: Decreased recall of recent events;Decreased short term memory;Decreased recall of biographical Information;Decreased recall of precautions  Problem Solving: Decreased awareness of errors  Insights: Decreased awareness of deficits  Objective   Bed mobility  Supine to Sit: Dependent/Total (Bed converted to/from chair position.)  Sit to Supine: Dependent/Total (Bed converted to/from chair position.)  Scooting: Dependent/Total;2 Person assistance  Comment: Pt briefly to chair position. Able to kyleigh upright/supported sitting. Dependent to brief unsupported sitting while adhere to Sternal Precautions  Transfers  Sit to Stand: Moderate Assistance;2 Person Assistance (Via Windell Bolus. Use of Cardiac Pillow. (+1 for lines). )  Stand to sit: Moderate Assistance;2 Person Assistance (Via Windell Bolus. Use of Cardiac Pillow. (+1 for lines). )  Bed to Chair: Unable to assess (Defer oob at this time.)  Comment: Pt able to maintain brief sitting on Stedy during Transfers enabling repositioning and placement of Lift pad prior return to bed. Exercises  Hip Flexion: 5x B LEs. Knee Long Arc Quad: 5x B LEs. Ankle Pumps: 10x. Comment: Ongoing pt ed re : Sternal Precautions/Use of Cardiac Pillow.                  AM-PAC Score  AM-PAC Inpatient Mobility Raw Score : 9 (03/01/22 1134)  AM-PAC Inpatient T-Scale Score : 30.55 (03/01/22 1134)  Mobility Inpatient CMS 0-100% Score: 81.38 (03/01/22 1134)  Mobility Inpatient CMS G-Code Modifier : CM (03/01/22 1134)          Goals  Short term goals  Time Frame for Short term goals: Upon d/c acute care setting. Short term goal 1: Bed Mob Mod assist; adhere to Sternal Precautions. Short term goal 2: Transfers with assist device Min assist; adhere to Sternal Precautions. Short term goal 3: Amb with assist device 48' CGA. Short term goal 4: Stair Negotiation as approp. Patient Goals   Patient goals : Return home with family assist/support. Plan    Plan  Times per week: 3-5x week while in acute care setting.   Current Treatment Recommendations: Strengthening,Functional Mobility Training,Transfer Training,Gait Training,Stair training,Safety Education & Training,Patient/Caregiver Education & Training  Safety Devices  Type of devices: Bed alarm in place,Call light within reach,Left in bed,Nurse notified     Therapy Time   Individual Concurrent Group Co-treatment   Time In 0830         Time Out 0910         Minutes [address] [facility], PT

## 2022-03-01 NOTE — PROGRESS NOTES
Most recent lab work showed [id number] [facility]   [facility]. [facility]  [phone]  Nephrology Note          Patient ID: Apryl Gonzalez  Referring/ Physician: No att. providers found      HPI/Summary:   Apryl Gonzalez is being seen by nephrology for SKINNY. This is a [de-identified] yo lady with PMH of HTN, CKD, HLD and obesity who presented with chest pain and SOB. She had been having several weeks of chest tightness and dyspnea with exertion. She feels ok now, no complaints. Says she has been on medication for BP for decades. Her Bp had been running prior to admission. She has been taking Meloxicam for the past few years for knee pain/arthritis. Denies hematuria, foamy urine. LUTS. No dysuria. No issues with swelling right now. During this admission she has had LHC 2/22/22 showing severe triple vessel disease. Normal LV function. LVEDP 14-16. She was referred for CABG and it is planned for 2/25/22 . Cr has been rising from 1.2 > 1.3 > 1.5 at time of consult. Of note , she has been on scheduled meloxicam in addition to receiving contrast. Her PO intake has been fine, no NVD. Interval hx:   Started CRRT 2/28/2022  Tolerating CRRT well without complications. Tolerated net -ve 100 mL/h  Recorded  mL, 2101 mL UF. Net -ve 1910 mL  Off dobutamine, off pressors. Now hypertensive. Requiring hydralazine. Looks more alert and oriented today    Plan: Will continue CRRT for today and aim for net -ve 100-150 mL for volume optimization  If she remains hemodynamically stable till tomorrow, will then plan on transitioning to iHD. 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 Cr peaked at 1.5  She was on meloxicam and got contrast 2/22, Cr started to rise two days later consistent with DANNY. She is not volume overloaded or dehydrated on exam.   Uric acid  CK  UA showed no protein or cells, totally bland.      #CAD triple vessel disease  LHC 2/22  CABG planned 2/25  Asa statin and BB  Preserved EF  Not volume overloaded. #full code. [facility] would like to thank you for the opportunity to serve this patient. Please call with any questions or concerns. Sarabjit Mckeon MD  [facility]  [address], [address]  Fax: [phone]  Office: [phone]         CC/Reason for consult:   Reason for consult: SKINNY  Chief Complaint   Patient presents with    Chest Pain    Hypertension           Review of Systems:   [address]. All other remaining systems are negative. Constitutional:  fever, chills, weakness, weight change, fatigue,      Skin:  rash, pruritus, hair loss, bruising, dry skin, petechiae. Head, Face, Neck   headaches, swelling,  cervical adenopathy. Respiratory: shortness of breath, cough, or wheezing  Cardiovascular: chest pain, palpitations, dizzy, edema  Gastrointestinal: nausea, vomiting, diarrhea, constipation,belly pain    Yellow skin, blood in stool  Musculoskeletal:  back pain, muscle weakness, gait problems,       joint pain or swelling. Genitourinary:  dysuria, poor urine flow, flank pain, blood in urine  Neurologic:  vertigo, TIA'S, syncope, seizures, focal weakness  Psychosocial:  insomnia, anxiety, or depression. Additional positive findings: -        PMH/SH/FH:    Medical Hx: reviewed and updated as appropriate  Past Medical History:   Diagnosis Date    Arthritis     Asthma     as child grew out of it    CAD (coronary artery disease) 02/22/2022    multi vessel    CKD (chronic kidney disease) 2015    Elevated creatinine since 2015; Stage III b    Glaucoma     Hyperlipidemia     Hypertension     Hyperthyroidism 2019    Graves disease    IBS (irritable bowel syndrome) 10/2020    On Linzess    Neuropathy     Peripheral    Obesity (BMI 30-39. 9)     Osteopenia 05/2009    Osteoporosis          Surgical Hx: reviewed and updated as appropriate   has a past surgical history that includes Cholecystectomy; Foot surgery; Tubal ligation; Gastric Band ([id number]); orif femur decompression (Left, 2014); Ankle fracture surgery (Left, 2015); Hand surgery (); other surgical history (Right, ); Coronary artery bypass graft (2022); and Coronary artery bypass graft (N/A, 2022). Social Hx: reviewed and updated as appropriate  Social History     Tobacco Use    Smoking status: Former Smoker     Quit date: 8/10/1999     Years since quittin.5    Smokeless tobacco: Never Used    Tobacco comment: started age 26   Substance Use Topics    Alcohol use: No        Family hx: reviewed and updated as appropriate  family history includes Heart Disease in her father; Rheum Arthritis in her sister; Stroke in her father. Medications:   hydrALAZINE, 25 mg, 4 times per day  predniSONE, 15 mg, BID  [Held by provider] heparin (porcine), 5,000 Units, 3 times per day  sennosides-docusate sodium, 2 tablet, BID  polyethylene glycol, 17 g, Daily  albumin human, 25 g, Q6H  insulin glargine, 0.05 Units/kg, Nightly  pantoprazole, 20 mg, QAM AC  sodium chloride flush, 10 mL, 2 times per day  [Held by provider] aspirin, 325 mg, Daily  chlorhexidine, 15 mL, BID  [Held by provider] magnesium oxide, 400 mg, BID  mupirocin, , BID  [Held by provider] metoprolol tartrate, 12.5 mg, BID  sodium chloride (PF), 10 mL, Daily  insulin lispro, 0-12 Units, TID WC  insulin lispro, 0-6 Units, Nightly  atorvastatin, 80 mg, Nightly       Patient has no known allergies. Allergies:   No Known Allergies      Physical Exam/Objective:   Vitals:    22 0900   BP: (!) 158/76   Pulse: 94   Resp: 23   Temp:    SpO2: 95%       Intake/Output Summary (Last 24 hours) at 3/1/2022 5417  Last data filed at 3/1/2022 0800  Gross per 24 hour   Intake 794.16 ml   Output 2838 ml   Net -2043.84 ml         General appearance: ialert, intearactive  HEENT: no icterus, EOM intact, trachea midline.    Neck : no masses, appears symmetrical and no JVD appreciated. Respiratory: Respiratory effort normal, bilateral equal chest rise. Cardiovascular: Ausculation shows RRR and  ++ edema   Abdomen: abdomen is soft, non distended  Musculoskeletal:  no joint swelling, no deformity  Skin: no rashes, no induration, no tightening, no jaundice   Neuro: no localizing deficits      Data:   CBC:   Recent Labs     02/27/22  0405 02/28/22  0406 03/01/22  0531   WBC 16.5* 14.7* 12.0*   HGB 8.6* 7.8* 7.8*   HCT 26.2* 24.2* 23.8*   PLT 70* 57* 64*     BMP:    Recent Labs     02/28/22  1030 02/28/22  1030 02/28/22  1330 03/01/22  0005 03/01/22  0531      < > 136 137 139   K 5.1  --  4.9 4.1 4.0      < > 107 103 101   CO2 16*   < > 15* 20* 20*   BUN 52*   < > 52* 31* 27*   CREATININE 5.9*   < > 5.5* 2.9* 2.5*   GLUCOSE 153*   < > 137* 109* 124*   MG  --   --  2.80* 2.30 2.30   PHOS 4.1  --  3.9 2.6  --     < > = values in this interval not displayed.

## 2022-03-01 NOTE — PLAN OF CARE
Problem: Falls - Risk of:  Goal: Will remain free from falls  Description: Will remain free from falls  3/1/2022 0958 by Maggie Etienne RN  Outcome: Ongoing  3/1/2022 0546 by Pranay Helton RN  Outcome: Met This Shift  Goal: Absence of physical injury  Description: Absence of physical injury  3/1/2022 0958 by Maggie Etienne RN  Outcome: Ongoing  3/1/2022 0546 by Pranay Helton RN  Outcome: Met This Shift     Problem: Skin Integrity:  Goal: Will show no infection signs and symptoms  Description: Will show no infection signs and symptoms  3/1/2022 0958 by Maggie Etienne RN  Outcome: Ongoing  3/1/2022 0546 by Pranay Helton RN  Outcome: Ongoing  Goal: Absence of new skin breakdown  Description: Absence of new skin breakdown  3/1/2022 0958 by Maggie Etienne RN  Outcome: Ongoing  3/1/2022 0546 by Pranay Helton RN  Outcome: Ongoing     Problem: Pain:  Goal: Pain level will decrease  Description: Pain level will decrease  3/1/2022 0958 by Maggie Etienne RN  Outcome: Ongoing  3/1/2022 0546 by Pranay Helton RN  Outcome: Ongoing  Goal: Control of acute pain  Description: Control of acute pain  3/1/2022 0958 by Maggie Etienne RN  Outcome: Ongoing  3/1/2022 0546 by Pranay Helton RN  Outcome: Ongoing  Goal: Control of chronic pain  Description: Control of chronic pain  Outcome: Ongoing     Problem: Nutrition  Goal: Optimal nutrition therapy  3/1/2022 0546 by Pranay Helton RN  Outcome: Ongoing     Problem: Discharge Planning:  Goal: Discharged to appropriate level of care  Description: Discharged to appropriate level of care  3/1/2022 0546 by Pranay Helton RN  Outcome: Ongoing     Problem:  Activity Intolerance:  Goal: Able to perform prescribed physical activity  Description: Able to perform prescribed physical activity  3/1/2022 0958 by Maggie Etienne RN  Outcome: Ongoing  3/1/2022 0546 by Pranay Helton RN  Outcome: Ongoing  Goal: Ability to tolerate increased activity will improve  Description: Ability to tolerate increased activity will improve  3/1/2022 0958 by Shivam Rosales RN  Outcome: Ongoing  3/1/2022 0546 by Dominic Samaniego RN  Outcome: Ongoing     Problem: Anxiety:  Goal: Level of anxiety will decrease  Description: Level of anxiety will decrease  3/1/2022 0546 by Dominic Samaniego RN  Outcome: Ongoing     Problem: Cardiac Output - Decreased:  Goal: Cardiac output within specified parameters  Description: Cardiac output within specified parameters  3/1/2022 0546 by Dominic Samaniego RN  Outcome: Ongoing  Goal: Hemodynamic stability will improve  Description: Hemodynamic stability will improve  3/1/2022 0546 by Dominic Samaniego RN  Outcome: Ongoing     Problem: Fluid Volume - Imbalance:  Goal: Ability to achieve a balanced intake and output will improve  Description: Ability to achieve a balanced intake and output will improve  3/1/2022 0958 by Shivam Rosales RN  Outcome: Ongoing  3/1/2022 0546 by Dominic Samaniego RN  Outcome: Ongoing  Goal: Chest tube drainage is within specified parameters  Description: Chest tube drainage is within specified parameters  3/1/2022 0958 by Shivam Rosales RN  Outcome: Ongoing

## 2022-03-01 NOTE — PLAN OF CARE
Problem: Falls - Risk of:  Goal: Will remain free from falls  Description: Will remain free from falls  3/1/2022 0546 by Tanya Flynn RN  Outcome: Met This Shift  2/28/2022 1634 by Hamlet White RN  Outcome: Ongoing  Goal: Absence of physical injury  Description: Absence of physical injury  3/1/2022 0546 by Tanya Flynn RN  Outcome: Met This Shift  2/28/2022 1634 by Hamlet White RN  Outcome: Ongoing     Problem: Tissue Perfusion - Cardiopulmonary, Altered:  Goal: Absence of angina  Description: Absence of angina  3/1/2022 0546 by Tanya Flynn RN  Outcome: Met This Shift  2/28/2022 1634 by Hamlet White RN  Outcome: Ongoing  Goal: Will show no evidence of cardiac arrhythmias  Description: Will show no evidence of cardiac arrhythmias  3/1/2022 0546 by Tanya Flynn RN  Outcome: Met This Shift  2/28/2022 1634 by Hamlet White RN  Outcome: Ongoing     Problem: Skin Integrity:  Goal: Will show no infection signs and symptoms  Description: Will show no infection signs and symptoms  3/1/2022 0546 by Tanya Flynn RN  Outcome: Ongoing  2/28/2022 1634 by Hamlet White RN  Outcome: Ongoing  Goal: Absence of new skin breakdown  Description: Absence of new skin breakdown  3/1/2022 0546 by Tanya Flynn RN  Outcome: Ongoing  2/28/2022 1634 by Hamlet White RN  Outcome: Ongoing     Problem: Pain:  Goal: Pain level will decrease  Description: Pain level will decrease  3/1/2022 0546 by Tanya Flynn RN  Outcome: Ongoing  2/28/2022 1634 by Hamlet White RN  Outcome: Ongoing  Goal: Control of acute pain  Description: Control of acute pain  3/1/2022 0546 by Tanya Flynn RN  Outcome: Ongoing  2/28/2022 1634 by Hamlet White RN  Outcome: Ongoing  Goal: Control of chronic pain  Description: Control of chronic pain  2/28/2022 1634 by Hamlet White RN  Outcome: Ongoing     Problem: Nutrition  Goal: Optimal nutrition therapy  3/1/2022 0546 by Denis Snyder NAZANIN Carroll  Outcome: Ongoing  2/28/2022 1634 by Joselyn Montalvo RN  Outcome: Ongoing     Problem: Discharge Planning:  Goal: Discharged to appropriate level of care  Description: Discharged to appropriate level of care  3/1/2022 0546 by Ana Finney RN  Outcome: Ongoing  2/28/2022 1634 by Joselyn Montalvo RN  Outcome: Ongoing     Problem:  Activity Intolerance:  Goal: Able to perform prescribed physical activity  Description: Able to perform prescribed physical activity  3/1/2022 0546 by Ana Finney RN  Outcome: Ongoing  2/28/2022 1634 by Joselyn Montalvo RN  Outcome: Ongoing  Goal: Ability to tolerate increased activity will improve  Description: Ability to tolerate increased activity will improve  3/1/2022 0546 by Ana Finney RN  Outcome: Ongoing  2/28/2022 1634 by Joselyn Montalvo RN  Outcome: Ongoing     Problem: Anxiety:  Goal: Level of anxiety will decrease  Description: Level of anxiety will decrease  3/1/2022 0546 by Ana Finney RN  Outcome: Ongoing  2/28/2022 1634 by Joselyn Montalvo RN  Outcome: Ongoing     Problem: Cardiac Output - Decreased:  Goal: Cardiac output within specified parameters  Description: Cardiac output within specified parameters  3/1/2022 0546 by Ana Finney RN  Outcome: Ongoing  2/28/2022 1634 by Joselyn Montalvo RN  Outcome: Ongoing  Goal: Hemodynamic stability will improve  Description: Hemodynamic stability will improve  3/1/2022 0546 by Ana Finney RN  Outcome: Ongoing  2/28/2022 1634 by Joselyn Montalvo RN  Outcome: Ongoing     Problem: Fluid Volume - Imbalance:  Goal: Ability to achieve a balanced intake and output will improve  Description: Ability to achieve a balanced intake and output will improve  3/1/2022 0546 by Ana Finney RN  Outcome: Ongoing  2/28/2022 1634 by Joselyn Montalvo RN  Outcome: Ongoing  Goal: Chest tube drainage is within specified parameters  Description: Chest tube drainage is within specified parameters  3/1/2022 0546 by Tj Moore RN  Outcome: Ongoing  2/28/2022 1634 by Roselyn Aguilera RN  Outcome: Ongoing     Problem: Gas Exchange - Impaired:  Goal: Levels of oxygenation will improve  Description: Levels of oxygenation will improve  3/1/2022 0546 by Tj Moore RN  Outcome: Ongoing  2/28/2022 1634 by Roselyn Aguilera RN  Outcome: Ongoing  Goal: Ability to maintain adequate ventilation will improve  Description: Ability to maintain adequate ventilation will improve  3/1/2022 0546 by Tj Moore RN  Outcome: Ongoing  2/28/2022 1634 by Roselyn Aguilera RN  Outcome: Ongoing     Problem: Pain:  Goal: Control of acute pain  Description: Control of acute pain  3/1/2022 0546 by Tj Moore RN  Outcome: Ongoing  2/28/2022 1634 by Roselyn Aguilera RN  Outcome: Ongoing  Goal: Control of chronic pain  Description: Control of chronic pain  2/28/2022 1634 by Roselyn Aguilera RN  Outcome: Ongoing     Problem: Tissue Perfusion - Cardiopulmonary, Altered:  Goal: Hemodynamic stability will improve  Description: Hemodynamic stability will improve  3/1/2022 0546 by Tj Moore RN  Outcome: Ongoing  2/28/2022 1634 by Roselyn Aguilera RN  Outcome: Ongoing

## 2022-03-01 NOTE — PROGRESS NOTES
[facility]   Daily Progress Note      Admit Date:  2/20/2022      Subjective:   Ms. Whit Thornton is an [de-identified] male with a past medical history significant for essential hypertension who presented to [facility] with chest pain. She underwent a stress perfusion study that was grossly abnormal and high risk. A left heart catheterization demonstrated normal LV function but triple vessel CAD. A consult to CVTS was placed for CABG consideration. Intervaql History:  Eliceo appears more comofrtable today but is somewhat confused. She is off supplemental oxygen . Sinus rhythm on telemetry. She is on CRRT.        Objective:     BP 99/84   Pulse 85   Temp 97.7 °F (36.5 °C) (Core)   Resp 27   Ht 5' 3\" (1.6 m)   Wt 210 lb 1.6 oz (95.3 kg)   SpO2 94%   BMI 37.22 kg/m²      Intake/Output Summary (Last 24 hours) at 3/1/2022 1317  Last data filed at 3/1/2022 1200  Gross per 24 hour   Intake 881.16 ml   Output 3411 ml   Net -2529.84 ml       Physical Exam:  General:  Awake, alert, NAD  Skin:  Warm and dry  Neck:  JVD<8, no carotid bruits  Chest:  Clear to auscultation, no wheezes/rhonchi/rales  Cardiovascular:  RRR, normal S1/S2, no M/R/G  Abdomen:  Soft, nontender, +bowel sounds  Extremities:  No edema  Pulses: 2+ bilat carotid    2+ bilat radial    2+ bilat femoral        Medications:    hydrALAZINE  25 mg Oral 4 times per day    predniSONE  15 mg Oral BID    sennosides-docusate sodium  2 tablet Oral BID    polyethylene glycol  17 g Oral Daily    [Held by provider] albumin human  25 g IntraVENous Q6H    insulin glargine  0.05 Units/kg SubCUTAneous Nightly    pantoprazole  20 mg Oral QAM AC    sodium chloride flush  10 mL IntraVENous 2 times per day    [Held by provider] aspirin  325 mg Oral Daily    chlorhexidine  15 mL Mouth/Throat BID    [Held by provider] magnesium oxide  400 mg Oral BID    mupirocin   Nasal BID    [Held by provider] metoprolol tartrate  12.5 mg Oral BID    sodium chloride (PF) 10 mL IntraVENous Daily    insulin lispro  0-12 Units SubCUTAneous TID WC    insulin lispro  0-6 Units SubCUTAneous Nightly    atorvastatin  80 mg Oral Nightly      sodium chloride      prismaSATE BGK 4/2.5 250 mL/hr at 03/01/22 1045    prismaSol BGK 2/3.5 2,000 mL/hr at 03/01/22 1041    prismaSol BGK 2/3.5 1,000 mL/hr at 03/01/22 1037    sodium chloride      sodium chloride Stopped (02/28/22 1748)    milrinone Stopped (02/27/22 0607)    nitroGLYCERIN Stopped (02/25/22 1850)    dextrose      sodium chloride      norepinephrine Stopped (02/28/22 0110)       Lab Data:  CBC:   Recent Labs     02/27/22  0405 02/28/22  0406 03/01/22  0531   WBC 16.5* 14.7* 12.0*   HGB 8.6* 7.8* 7.8*   PLT 70* 57* 64*     BMP:    Recent Labs     03/01/22  0005 03/01/22  0531 03/01/22  1219    139 140   K 4.1 4.0 4.0   CO2 20* 20* 21   BUN 31* 27* 20   CREATININE 2.9* 2.5* 1.8*         Left Heart Catheterization 2/22/22:  1. Severe triple-vessel coronary artery disease. The distal left main  is heavily calcified and has a 60% lesion. The proximal LAD is heavily  calcified with a 60% lesion. The mid-LAD is 100% occluded but fills in  from right collaterals. The distal LAD backfills back to the midportion  via left collaterals. The proximal circumflex is heavily calcified and  has an 80% lesion. OM1 is calcified with 80% serial lesions. The  proximal right coronary artery is subtotally occluded and the PDA fills  from both collateral native flow from the right and the left. There is  backfilling from the right system into the mid LAD via a septal  . Also, the LAD first diagonal branch has a 90% lesion. 2.  Normal left ventricular systolic function. LV ejection fraction of  65%. 3.  Borderline left ventricular end-diastolic pressure at 14 to 16 mmHg. 4.  No gradient across the aortic valve on pullback to suggest aortic  stenosis.     Echo 2/21/22:  Normal left ventricle size, wall thickness, and systolic function with an   estimated ejection fraction of 55-60%. No regional wall motion abnormalities   are seen. grade 1 diastolic dysfunction   The right ventricle is normal in size and function. No significant valvular heart disease    Assessment / Plan:     1. Unstable Angina  S/p CABG on 02/25/22. Continue aspirin, statin therapy. Triple vessel disease by [facility]. Check hemoglobin A1C. Mild carotid disease by Duplex. LVEF is preserved. 2. Hyperlipidemia with goal LDL<70mg/dL  Continue statin therapy with rosuvastatin 40mg daily. 3. Essential Hypertension  Restart beta blockade when off CRRT. 4. Acute on chronic Kidney Injury  Likely ATN. Continue CRRT for clearance.        Signed:  Clarence Dinero MD

## 2022-03-01 NOTE — PROGRESS NOTES
Pt with uneventful shift. Pt's BP remained elevated throughout the day with a 20-40 point difference between arterial systolic and cuff SBP. Spoke with  Martinez Tai, ADRIANA and updated on pt's status. Order given to treat BP based on cuff pressures. Pt still requiring several PRN doses of IVP hydralazine for SBP >120. Beta blocker started this evening. Will continue to monitor patient closely.

## 2022-03-01 NOTE — PROGRESS NOTES
Progress Note    S/P cabgx4, EDISON exclusion 2/25/22    Vital Signs:                                                 BP (!) 164/73   Pulse 73   Temp 97.6 °F (36.4 °C) (Core)   Resp 12   Ht 5' 3\" (1.6 m)   Wt 210 lb 1.6 oz (95.3 kg)   SpO2 99%   BMI 37.22 kg/m²  O2 Flow Rate (L/min): 2 L/min   SR  CVP (Mean): 38 mmHg  PAP (s/d): 45/29  PAP (Mean): 35 mmHg  SVR (Using ABP Mean): 872.73 dyne*sec/cm5  CCI: 2.8 L/min  SVO2 (%): 61 %  Admission Weight: 168 lb 1.6 oz (76.2 kg)  lb/87 kg     Drips: -    I/O:      Intake/Output Summary (Last 24 hours) at 3/1/2022 0707  Last data filed at 3/1/2022 0600  Gross per 24 hour   Intake 879.5 ml   Output 2635 ml   Net -1755.5 ml     Chest Tube: 20, 90, 90    CV: reg, wound c/d/i  Pulm: decreased  Abd: soft  Ext: warm, 1+ edema    Data Review:  CBC:   Recent Labs     02/27/22  0405 02/28/22  0406 03/01/22  0531   WBC 16.5* 14.7* 12.0*   HGB 8.6* 7.8* 7.8*   HCT 26.2* 24.2* 23.8*   MCV 88.9 88.0 86.8   PLT 70* 57* 64*     BMP:   Recent Labs     02/28/22  1030 02/28/22  1030 02/28/22  1330 03/01/22  0005 03/01/22  0531      < > 136 137 139   K 5.1  --  4.9 4.1 4.0      < > 107 103 101   CO2 16*   < > 15* 20* 20*   PHOS 4.1  --  3.9 2.6  --    BUN 52*   < > 52* 31* 27*   CREATININE 5.9*   < > 5.5* 2.9* 2.5*   CALCIUM 7.9*   < > 7.8* 8.8 9.7   MG  --   --  2.80* 2.30 2.30    < > = values in this interval not displayed. Cardiac Enzymes: No results for input(s): CKTOTAL, CKMB, CKMBINDEX, TROPONINI in the last 72 hours. PT/INR:   Recent Labs     02/28/22  0725   PROTIME 13.7*   INR 1.20*     APTT:   No results for input(s): APTT in the last 72 hours. Assessment/Plan:  CV - SR   - EF nl. Remove swan. Hydralazine.   - HLD/CAD. Statin. pulm - pulm toilet, wean O2  Renal - acute on CKD, BL Cr 1.4-1.7. crrt, -100/hr   - remove Natarajan  ID - elevated wbc likely secondary to steroids. Wean.   Heme - acute blood loss anemia, dilutional. Fe   - thrombocytopenia. HIT neg 2/26. Hold ASA.     - sc hep, scds dvt prophylaxis    Shereen Saab MD  3/1/2022  7:07 AM

## 2022-03-02 LAB
A/G RATIO: 2.5 (ref 1.1–2.2)
ALBUMIN SERPL-MCNC: 4.9 G/DL (ref 3.4–5)
ALP BLD-CCNC: 62 U/L (ref 40–129)
ALT SERPL-CCNC: <5 U/L (ref 10–40)
ANION GAP SERPL CALCULATED.3IONS-SCNC: 12 MMOL/L (ref 3–16)
ANION GAP SERPL CALCULATED.3IONS-SCNC: 14 MMOL/L (ref 3–16)
AST SERPL-CCNC: 38 U/L (ref 15–37)
BILIRUB SERPL-MCNC: 1.1 MG/DL (ref 0–1)
BUN BLDV-MCNC: 13 MG/DL (ref 7–20)
BUN BLDV-MCNC: 17 MG/DL (ref 7–20)
CALCIUM IONIZED: 1.21 MMOL/L (ref 1.12–1.32)
CALCIUM IONIZED: 1.28 MMOL/L (ref 1.12–1.32)
CALCIUM SERPL-MCNC: 9.8 MG/DL (ref 8.3–10.6)
CALCIUM SERPL-MCNC: 9.9 MG/DL (ref 8.3–10.6)
CHLORIDE BLD-SCNC: 101 MMOL/L (ref 99–110)
CHLORIDE BLD-SCNC: 99 MMOL/L (ref 99–110)
CO2: 24 MMOL/L (ref 21–32)
CO2: 26 MMOL/L (ref 21–32)
CREAT SERPL-MCNC: 1.3 MG/DL (ref 0.6–1.2)
CREAT SERPL-MCNC: 1.6 MG/DL (ref 0.6–1.2)
GFR AFRICAN AMERICAN: 37
GFR AFRICAN AMERICAN: 48
GFR NON-AFRICAN AMERICAN: 31
GFR NON-AFRICAN AMERICAN: 39
GLUCOSE BLD-MCNC: 113 MG/DL (ref 70–99)
GLUCOSE BLD-MCNC: 115 MG/DL (ref 70–99)
GLUCOSE BLD-MCNC: 124 MG/DL (ref 70–99)
GLUCOSE BLD-MCNC: 124 MG/DL (ref 70–99)
HCT VFR BLD CALC: 26.1 % (ref 36–48)
HEMOGLOBIN: 8.8 G/DL (ref 12–16)
MAGNESIUM: 2.3 MG/DL (ref 1.8–2.4)
MAGNESIUM: 2.3 MG/DL (ref 1.8–2.4)
MCH RBC QN AUTO: 29.4 PG (ref 26–34)
MCHC RBC AUTO-ENTMCNC: 33.5 G/DL (ref 31–36)
MCV RBC AUTO: 87.5 FL (ref 80–100)
PDW BLD-RTO: 15.3 % (ref 12.4–15.4)
PERFORMED ON: ABNORMAL
PERFORMED ON: ABNORMAL
PH VENOUS: 7.42 (ref 7.35–7.45)
PH VENOUS: 7.51 (ref 7.35–7.45)
PHOSPHORUS: 2.4 MG/DL (ref 2.5–4.9)
PLATELET # BLD: 92 K/UL (ref 135–450)
PMV BLD AUTO: 9.2 FL (ref 5–10.5)
POTASSIUM REFLEX MAGNESIUM: 4 MMOL/L (ref 3.5–5.1)
POTASSIUM SERPL-SCNC: 4 MMOL/L (ref 3.5–5.1)
RBC # BLD: 2.98 M/UL (ref 4–5.2)
SODIUM BLD-SCNC: 137 MMOL/L (ref 136–145)
SODIUM BLD-SCNC: 139 MMOL/L (ref 136–145)
TOTAL PROTEIN: 6.9 G/DL (ref 6.4–8.2)
WBC # BLD: 14 K/UL (ref 4–11)

## 2022-03-02 PROCEDURE — 85027 COMPLETE CBC AUTOMATED: CPT

## 2022-03-02 PROCEDURE — 6370000000 HC RX 637 (ALT 250 FOR IP): Performed by: NURSE PRACTITIONER

## 2022-03-02 PROCEDURE — 80048 BASIC METABOLIC PNL TOTAL CA: CPT

## 2022-03-02 PROCEDURE — 51798 US URINE CAPACITY MEASURE: CPT

## 2022-03-02 PROCEDURE — 2580000003 HC RX 258: Performed by: STUDENT IN AN ORGANIZED HEALTH CARE EDUCATION/TRAINING PROGRAM

## 2022-03-02 PROCEDURE — 94761 N-INVAS EAR/PLS OXIMETRY MLT: CPT

## 2022-03-02 PROCEDURE — 99024 POSTOP FOLLOW-UP VISIT: CPT | Performed by: THORACIC SURGERY (CARDIOTHORACIC VASCULAR SURGERY)

## 2022-03-02 PROCEDURE — 6360000002 HC RX W HCPCS: Performed by: NURSE PRACTITIONER

## 2022-03-02 PROCEDURE — 99233 SBSQ HOSP IP/OBS HIGH 50: CPT | Performed by: INTERNAL MEDICINE

## 2022-03-02 PROCEDURE — 6360000002 HC RX W HCPCS: Performed by: THORACIC SURGERY (CARDIOTHORACIC VASCULAR SURGERY)

## 2022-03-02 PROCEDURE — 2500000003 HC RX 250 WO HCPCS: Performed by: STUDENT IN AN ORGANIZED HEALTH CARE EDUCATION/TRAINING PROGRAM

## 2022-03-02 PROCEDURE — 97530 THERAPEUTIC ACTIVITIES: CPT

## 2022-03-02 PROCEDURE — 6370000000 HC RX 637 (ALT 250 FOR IP): Performed by: THORACIC SURGERY (CARDIOTHORACIC VASCULAR SURGERY)

## 2022-03-02 PROCEDURE — 82330 ASSAY OF CALCIUM: CPT

## 2022-03-02 PROCEDURE — 90945 DIALYSIS ONE EVALUATION: CPT

## 2022-03-02 PROCEDURE — 2580000003 HC RX 258: Performed by: THORACIC SURGERY (CARDIOTHORACIC VASCULAR SURGERY)

## 2022-03-02 PROCEDURE — 2100000000 HC CCU R&B

## 2022-03-02 PROCEDURE — 83735 ASSAY OF MAGNESIUM: CPT

## 2022-03-02 PROCEDURE — 36592 COLLECT BLOOD FROM PICC: CPT

## 2022-03-02 RX ORDER — HEPARIN SODIUM 1000 [USP'U]/ML
2600 INJECTION, SOLUTION INTRAVENOUS; SUBCUTANEOUS PRN
Status: DISCONTINUED | OUTPATIENT
Start: 2022-03-02 | End: 2022-03-10 | Stop reason: HOSPADM

## 2022-03-02 RX ORDER — HYDRALAZINE HYDROCHLORIDE 25 MG/1
25 TABLET, FILM COATED ORAL EVERY 8 HOURS SCHEDULED
Status: DISCONTINUED | OUTPATIENT
Start: 2022-03-02 | End: 2022-03-03

## 2022-03-02 RX ORDER — PREDNISONE 1 MG/1
10 TABLET ORAL 2 TIMES DAILY
Status: DISCONTINUED | OUTPATIENT
Start: 2022-03-02 | End: 2022-03-02

## 2022-03-02 RX ORDER — HEPARIN SODIUM 5000 [USP'U]/ML
5000 INJECTION, SOLUTION INTRAVENOUS; SUBCUTANEOUS EVERY 8 HOURS SCHEDULED
Status: DISPENSED | OUTPATIENT
Start: 2022-03-02 | End: 2022-03-06

## 2022-03-02 RX ORDER — HEPARIN SODIUM 1000 [USP'U]/ML
3200 INJECTION, SOLUTION INTRAVENOUS; SUBCUTANEOUS PRN
Status: DISCONTINUED | OUTPATIENT
Start: 2022-03-02 | End: 2022-03-02

## 2022-03-02 RX ADMIN — PREDNISONE 10 MG: 5 TABLET ORAL at 07:50

## 2022-03-02 RX ADMIN — HYDRALAZINE HYDROCHLORIDE 25 MG: 25 TABLET, FILM COATED ORAL at 14:49

## 2022-03-02 RX ADMIN — CHLORHEXIDINE GLUCONATE 0.12% ORAL RINSE 15 ML: 1.2 LIQUID ORAL at 21:34

## 2022-03-02 RX ADMIN — CALCIUM CHLORIDE, MAGNESIUM CHLORIDE, DEXTROSE MONOHYDRATE, LACTIC ACID, SODIUM CHLORIDE, SODIUM BICARBONATE AND POTASSIUM CHLORIDE: 5.15; 2.03; 22; 5.4; 6.46; 3.09; .157 INJECTION INTRAVENOUS at 00:34

## 2022-03-02 RX ADMIN — HEPARIN SODIUM 5000 UNITS: 5000 INJECTION INTRAVENOUS; SUBCUTANEOUS at 14:49

## 2022-03-02 RX ADMIN — Medication: at 09:52

## 2022-03-02 RX ADMIN — HYDRALAZINE HYDROCHLORIDE 5 MG: 20 INJECTION INTRAMUSCULAR; INTRAVENOUS at 07:41

## 2022-03-02 RX ADMIN — SODIUM CHLORIDE, PRESERVATIVE FREE 10 ML: 5 INJECTION INTRAVENOUS at 08:20

## 2022-03-02 RX ADMIN — SENNOSIDES AND DOCUSATE SODIUM 2 TABLET: 50; 8.6 TABLET ORAL at 21:35

## 2022-03-02 RX ADMIN — MUPIROCIN: 20 OINTMENT TOPICAL at 07:53

## 2022-03-02 RX ADMIN — POLYETHYLENE GLYCOL 3350 17 G: 17 POWDER, FOR SOLUTION ORAL at 07:50

## 2022-03-02 RX ADMIN — METOPROLOL TARTRATE 25 MG: 25 TABLET, FILM COATED ORAL at 07:49

## 2022-03-02 RX ADMIN — SODIUM CHLORIDE, PRESERVATIVE FREE 10 ML: 5 INJECTION INTRAVENOUS at 21:34

## 2022-03-02 RX ADMIN — SENNOSIDES AND DOCUSATE SODIUM 2 TABLET: 50; 8.6 TABLET ORAL at 08:14

## 2022-03-02 RX ADMIN — METOPROLOL TARTRATE 25 MG: 25 TABLET, FILM COATED ORAL at 21:33

## 2022-03-02 RX ADMIN — SODIUM PHOSPHATE, MONOBASIC, MONOHYDRATE 6 MMOL: 276; 142 INJECTION, SOLUTION INTRAVENOUS at 02:05

## 2022-03-02 RX ADMIN — ATORVASTATIN CALCIUM 80 MG: 80 TABLET, FILM COATED ORAL at 21:33

## 2022-03-02 RX ADMIN — CHLORHEXIDINE GLUCONATE 0.12% ORAL RINSE 15 ML: 1.2 LIQUID ORAL at 07:53

## 2022-03-02 RX ADMIN — HYDRALAZINE HYDROCHLORIDE 5 MG: 20 INJECTION INTRAMUSCULAR; INTRAVENOUS at 04:47

## 2022-03-02 RX ADMIN — CALCIUM CHLORIDE, MAGNESIUM CHLORIDE, DEXTROSE MONOHYDRATE, LACTIC ACID, SODIUM CHLORIDE, SODIUM BICARBONATE AND POTASSIUM CHLORIDE: 5.15; 2.03; 22; 5.4; 6.46; 3.09; .157 INJECTION INTRAVENOUS at 10:30

## 2022-03-02 RX ADMIN — PANTOPRAZOLE SODIUM 20 MG: 20 TABLET, DELAYED RELEASE ORAL at 06:06

## 2022-03-02 RX ADMIN — HYDRALAZINE HYDROCHLORIDE 25 MG: 25 TABLET, FILM COATED ORAL at 00:12

## 2022-03-02 RX ADMIN — CALCIUM CHLORIDE, MAGNESIUM CHLORIDE, DEXTROSE MONOHYDRATE, LACTIC ACID, SODIUM CHLORIDE, SODIUM BICARBONATE AND POTASSIUM CHLORIDE: 5.15; 2.03; 22; 5.4; 6.46; 3.09; .157 INJECTION INTRAVENOUS at 05:00

## 2022-03-02 RX ADMIN — SODIUM CHLORIDE, PRESERVATIVE FREE 10 ML: 5 INJECTION INTRAVENOUS at 07:41

## 2022-03-02 RX ADMIN — CALCIUM CHLORIDE, MAGNESIUM CHLORIDE, DEXTROSE MONOHYDRATE, LACTIC ACID, SODIUM CHLORIDE, SODIUM BICARBONATE AND POTASSIUM CHLORIDE: 5.15; 2.03; 22; 5.4; 6.46; 3.09; .157 INJECTION INTRAVENOUS at 12:59

## 2022-03-02 RX ADMIN — CALCIUM CHLORIDE, MAGNESIUM CHLORIDE, DEXTROSE MONOHYDRATE, LACTIC ACID, SODIUM CHLORIDE, SODIUM BICARBONATE AND POTASSIUM CHLORIDE: 5.15; 2.03; 22; 5.4; 6.46; 3.09; .157 INJECTION INTRAVENOUS at 12:54

## 2022-03-02 RX ADMIN — CALCIUM CHLORIDE, MAGNESIUM CHLORIDE, DEXTROSE MONOHYDRATE, LACTIC ACID, SODIUM CHLORIDE, SODIUM BICARBONATE AND POTASSIUM CHLORIDE: 5.15; 2.03; 22; 5.4; 6.46; 3.09; .157 INJECTION INTRAVENOUS at 07:46

## 2022-03-02 RX ADMIN — HEPARIN SODIUM 5000 UNITS: 5000 INJECTION INTRAVENOUS; SUBCUTANEOUS at 21:34

## 2022-03-02 RX ADMIN — CALCIUM CHLORIDE, MAGNESIUM CHLORIDE, DEXTROSE MONOHYDRATE, LACTIC ACID, SODIUM CHLORIDE, SODIUM BICARBONATE AND POTASSIUM CHLORIDE: 5.15; 2.03; 22; 5.4; 6.46; 3.09; .157 INJECTION INTRAVENOUS at 08:07

## 2022-03-02 RX ADMIN — HYDRALAZINE HYDROCHLORIDE 25 MG: 25 TABLET, FILM COATED ORAL at 06:06

## 2022-03-02 ASSESSMENT — PULMONARY FUNCTION TESTS
PIF_VALUE: 0

## 2022-03-02 ASSESSMENT — PAIN SCALES - GENERAL
PAINLEVEL_OUTOF10: 0

## 2022-03-02 NOTE — PROGRESS NOTES
Physical Therapy  Facility/Department: [address] CVICU  Daily Treatment Note  NAME: Lior Mosher  : 1940  MRN: [id number]    Date of Service: 3/2/2022    Discharge Recommendations:  Continue to assess pending progress,3-5 sessions per week   PT Equipment Recommendations  Other: Will monitor for potential equipt needs. Lior Mosher scored a  on the AM-PAC short mobility form. Current research shows that an AM-PAC score of 17 or less is typically not associated with a discharge to the patient's home setting. Based on the patient's AM-PAC score and their current functional mobility deficits, it is recommended that the patient have 3-5 sessions per week of Physical Therapy at d/c to increase the patient's independence. Please see assessment section for further patient specific details. If patient discharges prior to next session this note will serve as a discharge summary. Please see below for the latest assessment towards goals. Assessment   Body structures, Functions, Activity limitations: Decreased functional mobility ; Decreased strength;Decreased safe awareness;Decreased cognition;Decreased endurance  Assessment: [de-identified] y/o female admit 2022 with CAD, NSTEMI.  2022 S/P AtriClip, Urgent CABG x 4. PMH as noted including CKD, IBS, L Femur Fx/ORIF (2014), L Ankle Fx/ORIF (2015), Osteoporosis, Surg R Ear Mass (behind ear, ). PTA (per family) pt living with dtr/s-i-l in apt setting with steps to access, independent daily care and functional mobility. Pt cont somewhat confused; following simple commands although alittle delayed at times. Pt kyleigh brief eob/upright oob via Stedy with assist x 2. At this time, cont anticipate need cont PT (3-5) upon d/c. Will monitor pt's progress. Prognosis: Fair;Good  Decision Making: Medium Complexity  History: [de-identified] y/o female admit 2022 with CAD, NSTEMI.  2022 S/P AtriClip, Urgent CABG x 4.   PMH as noted including CKD, IBS, L Femur Fx/ORIF (4/2014), L Ankle Fx/ORIF (2/2015), Osteoporosis, Surg R Ear Mass (behind ear, 2010). Exam: See above. Clinical Presentation: See above. Patient Education: Role of PT, POC, Need to call for assist, Sternal Precautions/Use of Cardiac Pillow, OOB via Stedy, LE Exs. Barriers to Learning: Cognitive. REQUIRES PT FOLLOW UP: Yes  Activity Tolerance  Activity Tolerance: Patient limited by cognitive status; Patient limited by endurance  Activity Tolerance: Pt cont somewhat confused although improving; following simple commands although alittle delayed at times. Pt kyleigh brief eob/upright oob via Stedy with assist x 2. Patient Diagnosis(es): The encounter diagnosis was Chest pain, unspecified type. has a past medical history of Arthritis, Asthma, CAD (coronary artery disease), CKD (chronic kidney disease), Glaucoma, Hyperlipidemia, Hypertension, Hyperthyroidism, IBS (irritable bowel syndrome), Neuropathy, Obesity (BMI 30-39.9), Osteopenia, and Osteoporosis. has a past surgical history that includes Cholecystectomy; Foot surgery; Tubal ligation; Gastric Band ([id number]); orif femur decompression (Left, 04/2014); Ankle fracture surgery (Left, 02/12/2015); Hand surgery (2009); other surgical history (Right, 2010); Coronary artery bypass graft (02/25/2022); and Coronary artery bypass graft (N/A, 2/25/2022). Restrictions  Restrictions/Precautions  Restrictions/Precautions: Fall Risk  Position Activity Restriction  Sternal Precautions: 2/25/2022 S/P AtriClip, Urgent CABG x 4.  Other position/activity restrictions: Chest Tube x 2, O2 1L via NC. CRRT via L IJ  Subjective   General  Chart Reviewed: Yes  Additional Pertinent Hx: [de-identified] y/o female admit 2/20/2022 with CAD, NSTEMI.  2/25/2022 S/P AtriClip, Urgent CABG x 4.  2/28/2022 CRRT started. PMH as noted including CKD, IBS, L Femur Fx/ORIF (4/2014), L Ankle Fx/ORIF (2/2015), Osteoporosis, Surg R Ear Mass (behind ear, 2010).   Response To Previous Treatment: Patient with no complaints from previous session. Family / Caregiver Present: Yes (Dtr.)  Referring Practitioner: Dr. Chad Mercer  Subjective  Subjective: Pt agreeable to PT Rx. Orientation  Orientation  Overall Orientation Status: Impaired  Orientation Level: Oriented to person;Disoriented to place; Disoriented to time;Disoriented to situation  Cognition   Cognition  Arousal/Alertness: Appropriate responses to stimuli  Following Commands: Follows one step commands with increased time; Follows one step commands with repetition  Attention Span: Attends with cues to redirect  Memory: Decreased recall of recent events;Decreased recall of precautions  Problem Solving: Decreased awareness of errors  Insights: Decreased awareness of deficits  Objective      Transfers  Sit to Stand: Moderate Assistance;2 Person Assistance ([place]ia [place]. Use of Cardiac Pillow. (+1 for lines). )  Stand to sit: Moderate Assistance;2 Person Assistance (Via [place]. Use of Cardiac Pillow. (+1 for lines). )  Bed to Chair: Unable to assess (Defer oob at this time.)  Comment: Pt able to maintain brief sitting on Stedy during Transfers enabling repositioning and placement of Lift pad prior return to bed. Ambulation  Ambulation?: No        Exercises  Quad Sets: 10x  Heelslides: 10x B LEs. Hip Abduction: 10x B LEs. Ankle Pumps: 10x. Comment: Ongoing pt ed re : Sternal Precautions/Use of Cardiac Pillow. AM-PAC Score  AM-PAC Inpatient Mobility Raw Score : 9 (03/02/22 1343)  AM-PAC Inpatient T-Scale Score : 30.55 (03/02/22 1343)  Mobility Inpatient CMS 0-100% Score: 81.38 (03/02/22 1343)  Mobility Inpatient CMS G-Code Modifier : CM (03/02/22 1343)          Goals  Short term goals  Time Frame for Short term goals: Upon d/c acute care setting. Short term goal 1: Bed Mob Mod assist; adhere to Sternal Precautions. Short term goal 2: Transfers with assist device Min assist; adhere to Sternal Precautions.   Short term goal 3: Amb with assist device 50' CGA. Short term goal 4: Stair Negotiation as approp. Patient Goals   Patient goals : Return home with family assist/support. Plan    Plan  Times per week: 3-5x week while in acute care setting.   Current Treatment Recommendations: Strengthening,Functional Mobility Training,Transfer Training,Gait Training,Stair training,Safety Education & Training,Patient/Caregiver Education & Training  Safety Devices  Type of devices: Bed alarm in place,Call light within reach,Left in bed,Nurse notified     Therapy Time   Individual Concurrent Group Co-treatment   Time In [address]         Time Out 1210         Minutes [address] [facility], PT

## 2022-03-02 NOTE — PLAN OF CARE
Problem: Gas Exchange - Impaired:  Goal: Levels of oxygenation will improve  Description: Levels of oxygenation will improve  Outcome: Met This Shift  Note: A: SpO2 monitoring, titrate O2 to keep SpO2 >90%. Encourage cough/deep breath, IS and Acapella. Collaborate with RT. Progressive activity plan. R: Pt maintaining SpO2 >90% on room air  Goal: Ability to maintain adequate ventilation will improve  Description: Ability to maintain adequate ventilation will improve  Outcome: Met This Shift  Note: A: Assess respiratory rate, effort and pattern. Assess for JHA, SOB and accessory muscle use. Notify physician of changes. Problem: Falls - Risk of:  Goal: Will remain free from falls  Description: Will remain free from falls  Outcome: Ongoing  Note: A: Sound com dome light, fall risk arm band, bed/chair alarm, bed in lowest position, wheels of bed/chair locked, non skid footwear, call light in reach, fall precaution education, intentional rounding. PT/OT eval/treat    Goal: Absence of physical injury  Description: Absence of physical injury  Outcome: Ongoing     Problem: Skin Integrity:  Goal: Will show no infection signs and symptoms  Description: Will show no infection signs and symptoms  Outcome: Ongoing  Note: A: Routine surgical site care. Assess surgical site and educate for sign/symptoms of infection. Goal: Absence of new skin breakdown  Description: Absence of new skin breakdown  Outcome: Ongoing  Note: A: Low air loss/alternating pressure specialty mattress, assist with turn/repositioning, incontinence care, Preventative Sacral Mepilex border, off loading pressure areas, Nutritional consult, daily weight, I/O, monitor meal intake       Problem: Pain:  Goal: Pain level will decrease  Description: Pain level will decrease  Outcome: Ongoing  Note: A: Assess for pain using appropriate scale. Reposition/non-pharmaceutical interventions and/or medicate as needed for pain.  Reassess for effectiveness of intervention. Notify physician of unmanaged pain. Goal: Control of acute pain  Description: Control of acute pain  Outcome: Ongoing     Problem: Nutrition  Goal: Optimal nutrition therapy  Outcome: Ongoing  Note: A: Monitor meal intake. Nutritional consult as needed. Problem: Discharge Planning:  Goal: Discharged to appropriate level of care  Description: Discharged to appropriate level of care  Outcome: Ongoing  Note: A: SW following for discharge planning. Problem: Activity Intolerance:  Goal: Able to perform prescribed physical activity  Description: Able to perform prescribed physical activity  Outcome: Ongoing  Note: A: Educate benefits of progressive activity post-operatively. Collaborate with patient, physician, and PT/OT for progressive activity plan. Assess tolerance of activity. Goal: Ability to tolerate increased activity will improve  Description: Ability to tolerate increased activity will improve  Outcome: Ongoing     Problem: Cardiac Output - Decreased:  Goal: Cardiac output within specified parameters  Description: Cardiac output within specified parameters  Outcome: Ongoing  Goal: Hemodynamic stability will improve  Description: Hemodynamic stability will improve  Outcome: Ongoing  Note: A: Continuous cardiac telemetry monitor. VS per routine. Administer treatments and medications as ordered. Monitor patient's weight. Repeat 12 EKGs as ordered for rhythm changes or chest pain. Notify physician of hemodynamic or rhythm changes. Problem: Fluid Volume - Imbalance:  Goal: Ability to achieve a balanced intake and output will improve  Description: Ability to achieve a balanced intake and output will improve  Outcome: Ongoing  Note: A: Strict I/O, daily weight. Assess for edema.  Fluid balance management per nephrology CRRT orders    Goal: Chest tube drainage is within specified parameters  Description: Chest tube drainage is within specified parameters  Outcome: Ongoing  Note: A: CT to suction as ordered. Assess for air leak and crepitus. Assess quality and quantity of CT drainage. Notify physician if drainage greater then ordered parameters or meets criteria for removal.       Problem: Tissue Perfusion - Cardiopulmonary, Altered:  Goal: Will show no evidence of cardiac arrhythmias  Description: Will show no evidence of cardiac arrhythmias  Outcome: Ongoing  Note: A: Continuous cardiac rhythm monitor. Initiate CTS arrhythmia protocol orders for Amiodarone if patient converts to arterial fib. Problem: Infection - Central Venous Catheter-Associated Bloodstream Infection:  Goal: Will show no infection signs and symptoms  Description: Will show no infection signs and symptoms  Outcome: Ongoing  Note: A: Assess need for continued use. Assess and educate for signs/symptoms of infection. Occlusive dressing with antimicrobial patch in place. Alcohol swab cap on unused port. Aseptic technique when accessing ports. Problem: Tissue Perfusion - Renal, Altered:  Goal: Ability to achieve a balanced intake and output will improve  Description: Ability to achieve a balanced intake and output will improve  Outcome: Ongoing  Note: A: Strict I/O, daily weight. Assess for edema. Fluid balance management per nephrology CRRT orders    Goal: Electrolytes within specified parameters  Description: Electrolytes within specified parameters  Outcome: Ongoing  Note: A: Review lab results. Notify physician of abnormal results. Electrolyte replacement per CRRT orders  Goal: Serum creatinine will be within specified parameters  Description: Serum creatinine will be within specified parameters  Outcome: Ongoing  Note: A: Review lab results. Notify physician of abnormal results.

## 2022-03-02 NOTE — CARE COORDINATION
Orders rec'd for DC planning. Met with patient and multiple family members at bedside to introduce  role and to initiate discussion regarding DC planning. Informed them she is participating in therapy and at this time, she would need care of two people if she were to return home. Informed them she will continue to work with our therapy Team on physical therapy and occupational therapies. Provided options to them of SNF vs home with home care but stressed she will need someone 24 hours of the day. Dgtr in room states she is going to be that person for her and the goal is to return home. Informed them of both options:  Home care and provided them with CMS star rated list of agencies with education given regarding star rating. Provided them with CMS Star rated list of SNF agencies in the event she may need more can than can be given at home with one person. THey are hopeful she can return home with one daughter providing care and they would be agreeable to home care services to assist.  Following for DC needs. Dgr is asking if therapy will do any training on how to care for her mother. Will update this request to Danae Hurtado     Case Management   [phone]    3/2/2022  1:46 PM  The Plan for Transition of Care is related to the following treatment goals:   Home vs snf to continue her progress in personal care and ambulation needs    The Patient and son and daughter  was provided with a choice of provider and agrees   with the discharge plan. [x] Yes [] No    Freedom of choice list was provided with basic dialogue that supports the patient's individualized plan of care/goals, treatment preferences and shares the quality data associated with the providers.  [x] Yes [] No  Danae Garcia     Case Management   [phone]    3/2/2022  1:47 PM

## 2022-03-02 NOTE — PROGRESS NOTES
Late Entry d/t care:  @ 0801 Pt awake/alert in bed. Disoriented to time, knows she is at hospital and had surgery. Pt repetitive to daughter stating she is ready to go home. Assessment completed. CRRT in progress with goal 150ml/hr removal. SBP above goal, PRN dose IVP Hydralazine administered and oral betablocker administered, see eMAR. Daughter at bedside. New order noted for Yony Linton, daughter informed can bring in pt home supply since not in hospital formulary. @ 0900 Criteria met per clinical pathway to remove epicardial pacing wires & MD order received. Procedure explained to patient. Pacing wire site within normal limits. Cleansed site with Chloroprep. Ventricular pacing wires removed per policy without difficulty. Dry sterile dressing applied. Vital signs will be monitored and recorded per open heart protocol (every 15 minutes X 2, every 30 minutes X 1, and every hour X 1). Patient tolerated procedure well, heart tones easily auscultated, oxygen saturation within normal limits. Signs/symtoms for cardiac tamponade will be monitored closely.

## 2022-03-02 NOTE — PROGRESS NOTES
[facility]   Daily Progress Note      Admit Date:  2/20/2022      Subjective:   Ms. Jerry Araiza is an [de-identified] male with a past medical history significant for essential hypertension who presented to [facility] with chest pain. She underwent a stress perfusion study that was grossly abnormal and high risk. A left heart catheterization demonstrated normal LV function but triple vessel CAD. A consult to CVTS was placed for CABG consideration. Intervaql History:  Shannon Escalante appears to be doing better today and is no longer confused. She is off supplemental oxygen . Sinus rhythm on telemetry. She is on CRRT but this is to be stopped later today.        Objective:     /64   Pulse 77   Temp 97.7 °F (36.5 °C) (Axillary)   Resp 22   Ht 5' 3\" (1.6 m)   Wt 195 lb 12.3 oz (88.8 kg)   SpO2 97%   BMI 34.68 kg/m²      Intake/Output Summary (Last 24 hours) at 3/2/2022 1155  Last data filed at 3/2/2022 1100  Gross per 24 hour   Intake 1111.5 ml   Output 4968 ml   Net -3856.5 ml       Physical Exam:  General:  Awake, alert, NAD  Skin:  Warm and dry  Neck:  JVD<8, no carotid bruits  Chest:  Clear to auscultation, no wheezes/rhonchi/rales  Cardiovascular:  RRR, normal S1/S2, no M/R/G  Abdomen:  Soft, nontender, +bowel sounds  Extremities:  No edema  Pulses: 2+ bilat carotid    2+ bilat radial    2+ bilat femoral        Medications:    linaCLOtide  144 mcg Oral QAM AC    metoprolol tartrate  25 mg Oral BID    hydrALAZINE  25 mg Oral 3 times per day    heparin (porcine)  5,000 Units SubCUTAneous 3 times per day    sennosides-docusate sodium  2 tablet Oral BID    polyethylene glycol  17 g Oral Daily    lidocaine  1 patch TransDERmal Daily    pantoprazole  20 mg Oral QAM AC    sodium chloride flush  10 mL IntraVENous 2 times per day    [Held by provider] aspirin  325 mg Oral Daily    chlorhexidine  15 mL Mouth/Throat BID    [Held by provider] magnesium oxide  400 mg Oral BID    sodium chloride (PF)  10 mL IntraVENous Daily    atorvastatin  80 mg Oral Nightly      sodium chloride      prismaSol BGK 2/3.5 2,000 mL/hr at 03/02/22 1030    prismaSol BGK 2/3.5 1,000 mL/hr at 03/02/22 0746    sodium chloride Stopped (02/28/22 1748)    nitroGLYCERIN Stopped (02/25/22 1850)    dextrose      norepinephrine Stopped (02/28/22 0110)       Lab Data:  CBC:   Recent Labs     02/28/22  0406 03/01/22  0531 03/02/22  0613   WBC 14.7* 12.0* 14.0*   HGB 7.8* 7.8* 8.8*   PLT 57* 64* 92*     BMP:    Recent Labs     03/01/22  1219 03/01/22  2350 03/02/22  0613    139 137   K 4.0 4.0 4.0   CO2 21 26 24   BUN 20 17 13   CREATININE 1.8* 1.6* 1.3*         Left Heart Catheterization 2/22/22:  1. Severe triple-vessel coronary artery disease. The distal left main  is heavily calcified and has a 60% lesion. The proximal LAD is heavily  calcified with a 60% lesion. The mid-LAD is 100% occluded but fills in  from right collaterals. The distal LAD backfills back to the midportion  via left collaterals. The proximal circumflex is heavily calcified and  has an 80% lesion. OM1 is calcified with 80% serial lesions. The  proximal right coronary artery is subtotally occluded and the PDA fills  from both collateral native flow from the right and the left. There is  backfilling from the right system into the mid LAD via a septal  . Also, the LAD first diagonal branch has a 90% lesion. 2.  Normal left ventricular systolic function. LV ejection fraction of  65%. 3.  Borderline left ventricular end-diastolic pressure at 14 to 16 mmHg. 4.  No gradient across the aortic valve on pullback to suggest aortic  stenosis. Echo 2/21/22:  Normal left ventricle size, wall thickness, and systolic function with an   estimated ejection fraction of 55-60%. No regional wall motion abnormalities   are seen. grade 1 diastolic dysfunction   The right ventricle is normal in size and function.    No significant valvular heart

## 2022-03-02 NOTE — CONSULTS
Consult for cardiac diet education. Pt instructed to follow a low fat, low sodium, heart healthy diet upon discharge. Family was seen in room and said she would be doing the grocery shopping and cooking for pt. She was receptive of education and wanted to know what she should buy/cook for pt. I explained good recommendations she could follow and she understood. Pt has educational packet and dietitian's contact information for any follow up questions.      Electronically signed by Neva Coffman on 3/2/2022 at 10:24 AM

## 2022-03-02 NOTE — PROGRESS NOTES
volume overloaded. #full code. [facility] would like to thank you for the opportunity to serve this patient. Please call with any questions or concerns. Efren Echeverria MD  [facility]  Elza Professor [address], [address]  Fax: [phone]  Office: [phone]         CC/Reason for consult:   Reason for consult: SKINNY  Chief Complaint   Patient presents with    Chest Pain    Hypertension           Review of Systems:   [address]. All other remaining systems are negative. Constitutional:  fever, chills, weakness, weight change, fatigue,      Skin:  rash, pruritus, hair loss, bruising, dry skin, petechiae. Head, Face, Neck   headaches, swelling,  cervical adenopathy. Respiratory: shortness of breath, cough, or wheezing  Cardiovascular: chest pain, palpitations, dizzy, edema  Gastrointestinal: nausea, vomiting, diarrhea, constipation,belly pain    Yellow skin, blood in stool  Musculoskeletal:  back pain, muscle weakness, gait problems,       joint pain or swelling. Genitourinary:  dysuria, poor urine flow, flank pain, blood in urine  Neurologic:  vertigo, TIA'S, syncope, seizures, focal weakness  Psychosocial:  insomnia, anxiety, or depression. Additional positive findings: -        PMH/SH/FH:    Medical Hx: reviewed and updated as appropriate  Past Medical History:   Diagnosis Date    Arthritis     Asthma     as child grew out of it    CAD (coronary artery disease) 02/22/2022    multi vessel    CKD (chronic kidney disease) 2015    Elevated creatinine since 2015; Stage III b    Glaucoma     Hyperlipidemia     Hypertension     Hyperthyroidism 2019    Graves disease    IBS (irritable bowel syndrome) 10/2020    On Linzess    Neuropathy     Peripheral    Obesity (BMI 30-39. 9)     Osteopenia 05/2009    Osteoporosis          Surgical Hx: reviewed and updated as appropriate   has a past surgical history that includes Cholecystectomy; Foot surgery;  Tubal ligation; Gastric Band ([id number]); orif femur decompression (Left, 2014); Ankle fracture surgery (Left, 2015); Hand surgery (); other surgical history (Right, ); Coronary artery bypass graft (2022); and Coronary artery bypass graft (N/A, 2022). Social Hx: reviewed and updated as appropriate  Social History     Tobacco Use    Smoking status: Former Smoker     Quit date: 8/10/1999     Years since quittin.5    Smokeless tobacco: Never Used    Tobacco comment: started age 26   Substance Use Topics    Alcohol use: No        Family hx: reviewed and updated as appropriate  family history includes Heart Disease in her father; Rheum Arthritis in her sister; Stroke in her father. Medications:   linaCLOtide, 144 mcg, QAM AC  predniSONE, 10 mg, BID  metoprolol tartrate, 25 mg, BID  hydrALAZINE, 25 mg, 3 times per day  heparin (porcine), 5,000 Units, 3 times per day  sennosides-docusate sodium, 2 tablet, BID  polyethylene glycol, 17 g, Daily  lidocaine, 1 patch, Daily  pantoprazole, 20 mg, QAM AC  sodium chloride flush, 10 mL, 2 times per day  [Held by provider] aspirin, 325 mg, Daily  chlorhexidine, 15 mL, BID  [Held by provider] magnesium oxide, 400 mg, BID  sodium chloride (PF), 10 mL, Daily  atorvastatin, 80 mg, Nightly       Patient has no known allergies. Allergies:   No Known Allergies      Physical Exam/Objective:   Vitals:    22 1004   BP: 109/66   Pulse: 78   Resp: 21   Temp:    SpO2: 93%       Intake/Output Summary (Last 24 hours) at 3/2/2022 1016  Last data filed at 3/2/2022 1000  Gross per 24 hour   Intake 1112 ml   Output 4840 ml   Net -3728 ml         General appearance: ialert, intearactive  HEENT: no icterus, EOM intact, trachea midline. Neck : no masses, appears symmetrical and no JVD appreciated. Respiratory: Respiratory effort normal, bilateral equal chest rise. Cardiovascular:  Ausculation shows RRR and  ++ edema   Abdomen: abdomen is soft, non distended  Musculoskeletal:  no joint swelling, no deformity  Skin: no rashes, no induration, no tightening, no jaundice   Neuro: no localizing deficits      Data:   CBC:   Recent Labs     02/28/22  0406 03/01/22  0531 03/02/22  0613   WBC 14.7* 12.0* 14.0*   HGB 7.8* 7.8* 8.8*   HCT 24.2* 23.8* 26.1*   PLT 57* 64* 92*     BMP:    Recent Labs     03/01/22  0005 03/01/22  0531 03/01/22  1219 03/01/22  2350 03/02/22  0613      < > 140 139 137   K 4.1   < > 4.0 4.0 4.0      < > 102 101 99   CO2 20*   < > 21 26 24   BUN 31*   < > 20 17 13   CREATININE 2.9*   < > 1.8* 1.6* 1.3*   GLUCOSE 109*   < > 118* 124* 115*   MG 2.30   < > 2.30 2.30 2.30   PHOS 2.6  --  2.3* 2.4*  --     < > = values in this interval not displayed.

## 2022-03-02 NOTE — PROGRESS NOTES
Progress Note    S/P cabgx4, EDISON exclusion 2/25/22    Vital Signs:                                                 BP (!) 136/116   Pulse 81   Temp 97.7 °F (36.5 °C) (Axillary)   Resp 26   Ht 5' 3\" (1.6 m)   Wt 195 lb 12.3 oz (88.8 kg)   SpO2 97%   BMI 34.68 kg/m²  O2 Flow Rate (L/min): 1 L/min   SR  Admission Weight: 168 lb 1.6 oz (76.2 kg)  lb/87 kg     I/O:      Intake/Output Summary (Last 24 hours) at 3/2/2022 0609  Last data filed at 3/2/2022 0600  Gross per 24 hour   Intake 1066 ml   Output 4870 ml   Net -3804 ml     Chest Tube: 230, 160, 50    CV: reg, wound c/d/i  Pulm: decreased  Abd: soft  Ext: warm, 1+ edema    Data Review:  CBC:   Recent Labs     02/28/22  0406 03/01/22  0531   WBC 14.7* 12.0*   HGB 7.8* 7.8*   HCT 24.2* 23.8*   MCV 88.0 86.8   PLT 57* 64*     BMP:   Recent Labs     03/01/22  0005 03/01/22  0005 03/01/22  0531 03/01/22  1219 03/01/22  2350      < > 139 140 139   K 4.1  --  4.0 4.0 4.0      < > 101 102 101   CO2 20*   < > 20* 21 26   PHOS 2.6  --   --  2.3* 2.4*   BUN 31*   < > 27* 20 17   CREATININE 2.9*   < > 2.5* 1.8* 1.6*   CALCIUM 8.8   < > 9.7 9.7 9.9   MG 2.30   < > 2.30 2.30 2.30    < > = values in this interval not displayed. Cardiac Enzymes: No results for input(s): CKTOTAL, CKMB, CKMBINDEX, TROPONINI in the last 72 hours. PT/INR:   Recent Labs     02/28/22  0725   PROTIME 13.7*   INR 1.20*     APTT:   No results for input(s): APTT in the last 72 hours. Assessment/Plan:  CV - SR   - EF nl. BB, hydralazine.   - HLD/CAD. Statin. pulm - pulm toilet, off O2  Renal - acute on CKD, BL Cr 1.4-1.7. crrt. Close to preop weight  ID - await wbc. Weaning steroids. Heme - await h&h   - thrombocytopenia. HIT neg 2/26. Holding ASA. Await plt ct. - scds dvt prophylaxis    Jennifer Fajardo MD  3/2/2022  6:09 AM     Cr 1.3  plts 92, hgb 8. 8    pw removed  Follow chest tube output - split tubes  crrt to hd  clarified statin    Jennifer Fajardo, MD  3/2/2022  12:35 PM

## 2022-03-03 LAB
ANION GAP SERPL CALCULATED.3IONS-SCNC: 12 MMOL/L (ref 3–16)
BUN BLDV-MCNC: 28 MG/DL (ref 7–20)
CALCIUM IONIZED: 1.21 MMOL/L (ref 1.12–1.32)
CALCIUM IONIZED: 1.28 MMOL/L (ref 1.12–1.32)
CALCIUM SERPL-MCNC: 9.3 MG/DL (ref 8.3–10.6)
CHLORIDE BLD-SCNC: 99 MMOL/L (ref 99–110)
CO2: 24 MMOL/L (ref 21–32)
CREAT SERPL-MCNC: 2.8 MG/DL (ref 0.6–1.2)
GFR AFRICAN AMERICAN: 20
GFR NON-AFRICAN AMERICAN: 16
GLUCOSE BLD-MCNC: 98 MG/DL (ref 70–99)
HCT VFR BLD CALC: 25.5 % (ref 36–48)
HEMOGLOBIN: 8.4 G/DL (ref 12–16)
MAGNESIUM: 2.3 MG/DL (ref 1.8–2.4)
MCH RBC QN AUTO: 28.7 PG (ref 26–34)
MCHC RBC AUTO-ENTMCNC: 32.8 G/DL (ref 31–36)
MCV RBC AUTO: 87.5 FL (ref 80–100)
PDW BLD-RTO: 15.4 % (ref 12.4–15.4)
PH VENOUS: 7.46 (ref 7.35–7.45)
PH VENOUS: 7.51 (ref 7.35–7.45)
PLATELET # BLD: 111 K/UL (ref 135–450)
PMV BLD AUTO: 8.7 FL (ref 5–10.5)
POTASSIUM SERPL-SCNC: 3.3 MMOL/L (ref 3.5–5.1)
RBC # BLD: 2.91 M/UL (ref 4–5.2)
SODIUM BLD-SCNC: 135 MMOL/L (ref 136–145)
WBC # BLD: 14.8 K/UL (ref 4–11)

## 2022-03-03 PROCEDURE — 2580000003 HC RX 258: Performed by: THORACIC SURGERY (CARDIOTHORACIC VASCULAR SURGERY)

## 2022-03-03 PROCEDURE — 87340 HEPATITIS B SURFACE AG IA: CPT

## 2022-03-03 PROCEDURE — 6370000000 HC RX 637 (ALT 250 FOR IP): Performed by: THORACIC SURGERY (CARDIOTHORACIC VASCULAR SURGERY)

## 2022-03-03 PROCEDURE — 97110 THERAPEUTIC EXERCISES: CPT

## 2022-03-03 PROCEDURE — 97530 THERAPEUTIC ACTIVITIES: CPT

## 2022-03-03 PROCEDURE — 86706 HEP B SURFACE ANTIBODY: CPT

## 2022-03-03 PROCEDURE — 99024 POSTOP FOLLOW-UP VISIT: CPT | Performed by: THORACIC SURGERY (CARDIOTHORACIC VASCULAR SURGERY)

## 2022-03-03 PROCEDURE — 97535 SELF CARE MNGMENT TRAINING: CPT

## 2022-03-03 PROCEDURE — 80048 BASIC METABOLIC PNL TOTAL CA: CPT

## 2022-03-03 PROCEDURE — 83735 ASSAY OF MAGNESIUM: CPT

## 2022-03-03 PROCEDURE — 37799 UNLISTED PX VASCULAR SURGERY: CPT

## 2022-03-03 PROCEDURE — 2100000000 HC CCU R&B

## 2022-03-03 PROCEDURE — 82330 ASSAY OF CALCIUM: CPT

## 2022-03-03 PROCEDURE — 94761 N-INVAS EAR/PLS OXIMETRY MLT: CPT

## 2022-03-03 PROCEDURE — 86704 HEP B CORE ANTIBODY TOTAL: CPT

## 2022-03-03 PROCEDURE — 6360000002 HC RX W HCPCS: Performed by: NURSE PRACTITIONER

## 2022-03-03 PROCEDURE — 51798 US URINE CAPACITY MEASURE: CPT

## 2022-03-03 PROCEDURE — 6370000000 HC RX 637 (ALT 250 FOR IP): Performed by: NURSE PRACTITIONER

## 2022-03-03 PROCEDURE — 85027 COMPLETE CBC AUTOMATED: CPT

## 2022-03-03 RX ORDER — HYDRALAZINE HYDROCHLORIDE 10 MG/1
5 TABLET, FILM COATED ORAL EVERY 8 HOURS SCHEDULED
Status: DISCONTINUED | OUTPATIENT
Start: 2022-03-03 | End: 2022-03-04

## 2022-03-03 RX ORDER — HYDRALAZINE HYDROCHLORIDE 10 MG/1
10 TABLET, FILM COATED ORAL EVERY 8 HOURS SCHEDULED
Status: DISCONTINUED | OUTPATIENT
Start: 2022-03-03 | End: 2022-03-03

## 2022-03-03 RX ORDER — FERROUS SULFATE TAB EC 324 MG (65 MG FE EQUIVALENT) 324 (65 FE) MG
324 TABLET DELAYED RESPONSE ORAL 2 TIMES DAILY WITH MEALS
Status: DISCONTINUED | OUTPATIENT
Start: 2022-03-03 | End: 2022-03-10 | Stop reason: HOSPADM

## 2022-03-03 RX ORDER — LANOLIN ALCOHOL/MO/W.PET/CERES
400 CREAM (GRAM) TOPICAL DAILY
Status: DISCONTINUED | OUTPATIENT
Start: 2022-03-03 | End: 2022-03-08

## 2022-03-03 RX ADMIN — PANTOPRAZOLE SODIUM 20 MG: 20 TABLET, DELAYED RELEASE ORAL at 06:05

## 2022-03-03 RX ADMIN — FERROUS SULFATE TAB EC 324 MG (65 MG FE EQUIVALENT) 324 MG: 324 (65 FE) TABLET DELAYED RESPONSE at 17:24

## 2022-03-03 RX ADMIN — METOPROLOL TARTRATE 12.5 MG: 25 TABLET, FILM COATED ORAL at 21:51

## 2022-03-03 RX ADMIN — FERROUS SULFATE TAB EC 324 MG (65 MG FE EQUIVALENT) 324 MG: 324 (65 FE) TABLET DELAYED RESPONSE at 09:41

## 2022-03-03 RX ADMIN — SENNOSIDES AND DOCUSATE SODIUM 2 TABLET: 50; 8.6 TABLET ORAL at 21:51

## 2022-03-03 RX ADMIN — HEPARIN SODIUM 5000 UNITS: 5000 INJECTION INTRAVENOUS; SUBCUTANEOUS at 21:52

## 2022-03-03 RX ADMIN — ATORVASTATIN CALCIUM 80 MG: 80 TABLET, FILM COATED ORAL at 21:51

## 2022-03-03 RX ADMIN — HEPARIN SODIUM 5000 UNITS: 5000 INJECTION INTRAVENOUS; SUBCUTANEOUS at 06:04

## 2022-03-03 RX ADMIN — CHLORHEXIDINE GLUCONATE 0.12% ORAL RINSE 15 ML: 1.2 LIQUID ORAL at 21:52

## 2022-03-03 RX ADMIN — HYDRALAZINE HYDROCHLORIDE 5 MG: 10 TABLET, FILM COATED ORAL at 21:52

## 2022-03-03 RX ADMIN — SODIUM CHLORIDE, PRESERVATIVE FREE 10 ML: 5 INJECTION INTRAVENOUS at 09:36

## 2022-03-03 RX ADMIN — POLYETHYLENE GLYCOL 3350 17 G: 17 POWDER, FOR SOLUTION ORAL at 09:34

## 2022-03-03 RX ADMIN — Medication 400 MG: at 09:33

## 2022-03-03 RX ADMIN — SENNOSIDES AND DOCUSATE SODIUM 2 TABLET: 50; 8.6 TABLET ORAL at 09:33

## 2022-03-03 RX ADMIN — HEPARIN SODIUM 5000 UNITS: 5000 INJECTION INTRAVENOUS; SUBCUTANEOUS at 14:33

## 2022-03-03 RX ADMIN — ASPIRIN 325 MG: 325 TABLET, COATED ORAL at 09:33

## 2022-03-03 RX ADMIN — CHLORHEXIDINE GLUCONATE 0.12% ORAL RINSE 15 ML: 1.2 LIQUID ORAL at 09:33

## 2022-03-03 ASSESSMENT — PAIN SCALES - PAIN ASSESSMENT IN ADVANCED DEMENTIA (PAINAD)
TOTALSCORE: 0
TOTALSCORE: 0
BODYLANGUAGE: 0
BREATHING: 0
FACIALEXPRESSION: 0
CONSOLABILITY: 0
NEGVOCALIZATION: 0
FACIALEXPRESSION: 0
NEGVOCALIZATION: 0
BODYLANGUAGE: 0
CONSOLABILITY: 0
BREATHING: 0

## 2022-03-03 ASSESSMENT — PAIN SCALES - GENERAL
PAINLEVEL_OUTOF10: 0

## 2022-03-03 ASSESSMENT — PAIN SCALES - WONG BAKER: WONGBAKER_NUMERICALRESPONSE: 0

## 2022-03-03 NOTE — PLAN OF CARE
Problem: Nutrition  Goal: Optimal nutrition therapy  Outcome: Ongoing   Nutrition Problem #1: Increased nutrient needs  Intervention: Food and/or Nutrient Delivery: Continue Current Diet,Modify Oral Nutrition Supplement  Nutritional Goals: PO intake greater than 50%

## 2022-03-03 NOTE — PROGRESS NOTES
Physical Therapy  Facility/Department: [address] CVICU  Daily Treatment Note  NAME: Amanda Bennett  : 1940  MRN: [id number]    Date of Service: 3/3/2022    Discharge Recommendations:  Continue to assess pending progress,3-5 sessions per week   PT Equipment Recommendations  Other: Will monitor for potential equipt needs. Amanda Bennett scored a 9/24 on the AM-PAC short mobility form. Current research shows that an AM-PAC score of 17 or less is typically not associated with a discharge to the patient's home setting. Based on the patient's AM-PAC score and their current functional mobility deficits, it is recommended that the patient have 3-5 sessions per week of Physical Therapy at d/c to increase the patient's independence. Please see assessment section for further patient specific details. If patient discharges prior to next session this note will serve as a discharge summary. Please see below for the latest assessment towards goals. Assessment   Body structures, Functions, Activity limitations: Decreased functional mobility ; Decreased strength;Decreased safe awareness;Decreased cognition;Decreased endurance  Assessment: [de-identified] y/o female admit 2022 with CAD, NSTEMI.  2022 S/P AtriClip, Urgent CABG x 4. PMH as noted including CKD, IBS, L Femur Fx/ORIF (2014), L Ankle Fx/ORIF (2015), Osteoporosis, Surg R Ear Mass (behind ear, ). PTA (per family) pt living with dtr/s-i-l in apt setting with steps to access, independent daily care and functional mobility. Pt cont confused; ONGOING  cues/physical assist to maintain Sternal Precautions. Dependent pericare following bowel incontinence. Transfers via [employer] assist x 2. Initiated Transfers via [employer] assist x 2. Unable to amb at this time. At this time, cont anticipate need cont PT (3-5) upon d/c. Will monitor pt's progress.   Prognosis: Fair;Good  Decision Making: Medium Complexity  History: [de-identified] y/o female admit 2/20/2022 with CAD, NSTEMI.  2/25/2022 S/P AtriClip, Urgent CABG x 4. PMH as noted including CKD, IBS, L Femur Fx/ORIF (4/2014), L Ankle Fx/ORIF (2/2015), Osteoporosis, Surg R Ear Mass (behind ear, 2010). Exam: See above. Clinical Presentation: See above. Patient Education: Role of PT, POC, Need to call for assist, Sternal Precautions/Use of Cardiac Pillow, OOB via Stedy, Transfers with [employer]. Barriers to Learning: Cognitive. REQUIRES PT FOLLOW UP: Yes  Activity Tolerance  Activity Tolerance: Patient limited by cognitive status; Patient limited by endurance  Activity Tolerance: Pt cont confused; ONGOING  cues/physical assist to maintain Sternal Precautions. Dependent pericare following bowel incontinence. Transfers via [employer] assist x 2. Initiated Transfers via [employer] assist x 2. Unable to amb at this time. Patient Diagnosis(es): The encounter diagnosis was Chest pain, unspecified type. has a past medical history of Arthritis, Asthma, CAD (coronary artery disease), CKD (chronic kidney disease), Glaucoma, Hyperlipidemia, Hypertension, Hyperthyroidism, IBS (irritable bowel syndrome), Neuropathy, Obesity (BMI 30-39.9), Osteopenia, and Osteoporosis. has a past surgical history that includes Cholecystectomy; Foot surgery; Tubal ligation; Gastric Band ([id number]); orif femur decompression (Left, 04/2014); Ankle fracture surgery (Left, 02/12/2015); Hand surgery (2009); other surgical history (Right, 2010); Coronary artery bypass graft (02/25/2022); and Coronary artery bypass graft (N/A, 2/25/2022). Restrictions  Restrictions/Precautions  Restrictions/Precautions: Fall Risk  Position Activity Restriction  Sternal Precautions: 2/25/2022 S/P AtriClip, Urgent CABG x 4.  Other position/activity restrictions: Chest Tube x 2  Subjective   General  Chart Reviewed:  Yes  Additional Pertinent Hx: [de-identified] y/o female admit 2/20/2022 with CAD, NSTEMI.  2/25/2022 S/P AtriClip, Urgent CABG x 4.  2/28/2022 CRRT started. PMH as noted including CKD, IBS, L Femur Fx/ORIF (4/2014), L Ankle Fx/ORIF (2/2015), Osteoporosis, Surg R Ear Mass (behind ear, 2010). Response To Previous Treatment: Patient with no complaints from previous session. Family / Caregiver Present: No  Referring Practitioner: Dr. Aly Packer  Subjective  Subjective: Pt agreeable to PT Rx. Pt pleasantly confused. Orientation  Orientation  Overall Orientation Status: Impaired  Orientation Level: Oriented to person;Disoriented to place; Disoriented to time;Disoriented to situation  Cognition   Cognition  Overall Cognitive Status: Exceptions  Arousal/Alertness: Appropriate responses to stimuli  Following Commands: Follows one step commands with increased time; Follows one step commands with repetition  Attention Span: Attends with cues to redirect  Memory: Decreased recall of recent events;Decreased recall of precautions;Decreased short term memory  Problem Solving: Decreased awareness of errors  Insights: Decreased awareness of deficits  Initiation: Requires cues for some  Sequencing: Requires cues for some  Cognition Comment: Constant cuing/assist to maintain sternal precautions  Objective      Transfers  Sit to Stand: Moderate Assistance;2 Person Assistance ([employer]ia [employer]. Use of Cardiac Pillow.)  Stand to sit: Moderate Assistance;2 Person Assistance ([employer]. Use of Cardiac Pillow.)  Bed to Chair: Dependent/Total ([employer].)  Comment: Initial Transfer from bed, in chair position via [employer]. Following arrival at chair, additional Transfer and place of bedpan following bowel incontinence. Transfer to seat of Stedy, dependent pericare and linen change on chair. Following brief seated rest, Transfer with Walker Mod assist x 2. Use of Cardiac Pillow; requiring max cues and physical assist ongoing to hold pillow. Ambulation  Ambulation?: No  WB Status: Pt able to maintain brief stand with Walker Mod assist x 2; unable to attempt amb.      Balance  Comments: Static Sitting during use of Stedy : Min assist, Max cues. Brief Static Stand during use of Stedy for pericare : Min assist, kyleigh ~ 1 1/2 min. Comment: Ongoing pt ed re : Sternal Precautions/Use of Cardiac Pillow. AM-PAC Score  AM-PAC Inpatient Mobility Raw Score : 9 (03/03/22 1004)  AM-PAC Inpatient T-Scale Score : 30.55 (03/03/22 1004)  Mobility Inpatient CMS 0-100% Score: 81.38 (03/03/22 1004)  Mobility Inpatient CMS G-Code Modifier : CM (03/03/22 1004)          Goals  Short term goals  Time Frame for Short term goals: Upon d/c acute care setting. Short term goal 1: Bed Mob Mod assist; adhere to Sternal Precautions. Short term goal 2: Transfers with assist device Min assist; adhere to Sternal Precautions. Short term goal 3: Amb with assist device 48' CGA. Short term goal 4: Stair Negotiation as approp. Patient Goals   Patient goals : Return home with family assist/support. Plan    Plan  Times per week: 3-5x week while in acute care setting.   Current Treatment Recommendations: Strengthening,Functional Mobility Training,Transfer Training,Gait Training,Stair training,Safety Education & Training,Patient/Caregiver Education & Training  Safety Devices  Type of devices: Call light within reach,Left in chair,Nurse notified     Therapy Time   Individual Concurrent Group Co-treatment   Time In  Willow Urena         Time Out 0805         Minutes 216 Dorothy Calderon, PT

## 2022-03-03 NOTE — PROGRESS NOTES
Late entry due to patient care. @ 2120 to 2145 - Shift assessment completed. Plan of care for this shift was explained to her and her son at the bedside. She is alert, cooperative, fidgety, and denies any pain nor SOB. She's oriented to person, but disoriented to time, place, and situation, and she's also forgetful. This RN re-oriented her. Her scheduled medications were explained to her and were given to her (see MAR) without any problems. This RN educated her on the importance of sternal precautions (using her sternal pillow when coughing and not pushing or pulling herself w/ her arms/ hands) to prevent sternal injury and promote sternal wound healing. She verbalized understanding, but after a few seconds, will try to throw away her sternal pillow at the side or push herself. This RN re-educated her again. She has a chest binder on for sternal support. She was also educated on the use of the IS 10 times every hour, when awake, to prevent atelectasis and pulmonary complications. She verbalized understanding and was able to do an average of 350 ml (x 10 times) on the IS.     Electronically signed by Varun Espinoza RN on 3/2/2022 at 10:25 PM

## 2022-03-03 NOTE — DISCHARGE INSTR - COC
Continuity of Care Form    Patient Name: Pricilla Will   :  1940  MRN:  [id number]    Admit date:  2022  Discharge date:  3/10/2022    Code Status Order: Prior   Advance Directives:   [facility] Documentation       Date/Time Healthcare Directive Type of Healthcare Directive Copy in [address] Agent's Name Healthcare Agent's Phone Number    22 0216 Yes, patient has an advance directive for healthcare treatment Durable power of  for health care; Health care treatment directive; Living will -- Adult Children -- --            Admitting Physician:  Liang Marin MD  PCP: Omar Hinds [address]    Discharging Nurse: Shan Castillo Unit/Room#: Q7V-4342/1991-34  Discharging Unit Phone Number: [phone]    Emergency Contact:   Extended Emergency Contact Information  Primary Emergency Contact: Ania [address] Phone: [phone]  Mobile Phone: [phone]  Relation: Child  Secondary Emergency Contact: [address] Phone: [phone]  Relation: Child    Past Surgical History:  Past Surgical History:   Procedure Laterality Date    ANKLE FRACTURE SURGERY Left 2015    CHOLECYSTECTOMY      CORONARY ARTERY BYPASS GRAFT  2022    cabgx4, EDISON exclusion    CORONARY ARTERY BYPASS GRAFT N/A 2022    TRANSESOPHAGEAL ECHOCARDIOGRAM, TOTAL CARDIOPULMONARY BYPASS, TOTAL CARDIOPULMONARY BYPASS GRAFTING X4 (1 ARTERY, 3 VEIN) USING LEFT INTERNAL MAMMARY ARTERY AND RIGHT ENDOSCOPICALLY HARVESTED SAPHENOUS VEIN, LEFT ATRIAL APPENDAGE CLIP WITH 35MM ATRICLIP performed by Liang Marin MD at [facility] [place] [address]      bilateral, hammer toes    GASTRIC BAND  [id number]    laproscopic    HAND SURGERY  2009    trigger finger    ORIF FEMUR DECOMPRESSION Left 2014    OTHER SURGICAL HISTORY Right     mass behind ear    TUBAL LIGATION         Immunization History:   Immunization History   Administered Date(s) Administered    COVID-19, Pfizer Purple top, DILUTE for use, 12+ yrs, 30mcg/0.3mL dose 02/13/2021, 03/02/2021, 10/05/2021    Td, unspecified formulation 10/18/2011       Active Problems:  Patient Active Problem List   Diagnosis Code    Morbid obesity ([facility] [address] E66.01    Chest pain R07.9    Unstable angina (HCC) I20.0    Abnormal cardiovascular stress test R94.39    Hyperlipidemia LDL goal <70 E78.5    CAD in native artery I25.10       Isolation/Infection:   Isolation            No Isolation          Patient Infection Status       None to display            Nurse Assessment:  Last Vital Signs: BP 89/79   Pulse 80   Temp 99.6 °F (37.6 °C) (Axillary)   Resp 19   Ht 5' 3\" (1.6 m)   Wt 191 lb 5.8 oz (86.8 kg)   SpO2 98%   BMI 33.90 kg/m²     Last documented pain score (0-10 scale): Pain Level: 0  Last Weight:   Wt Readings from Last 1 Encounters:   03/03/22 191 lb 5.8 oz (86.8 kg)     Mental Status:  disoriented, alert, coherent, thought processes intact, and able to concentrate and follow conversation    IV Access:  - Peripheral IV - site  R Cephalic, insertion date: 3/6/22  - Dialysis Catheter  - site  right and subclavian, insertion date: 3/7/22  (tunneled catheter)    Nursing Mobility/ADLs:  Walking   Assisted  Transfer  Assisted  Bathing  Assisted  Dressing  Assisted  Toileting  Assisted  Feeding  Assisted  Med Admin  Assisted  Med Delivery   whole and prefers mixed with applesauce    Wound Care Documentation and Therapy:  Incision 08/23/11 Abdomen (Active)   Number of days: 3845        Elimination:  Continence: Bowel: No  Bladder: anuric   Urinary Catheter: None   Colostomy/Ileostomy/Ileal Conduit: No       Date of Last BM: 3/8/22      Intake/Output Summary (Last 24 hours) at 3/3/2022 1233  Last data filed at 3/3/2022 0750  Gross per 24 hour   Intake 290 ml   Output 757 ml   Net -467 ml     I/O last 3 completed shifts:   In: 944.5 [P.O.:310; I.V.:163.5; IV Piggyback:471]  Out: 8767 [Chest Tube:450]    Safety Concerns: At Risk for Falls and confused conversation and disorientation at times, but for the most part is alert and oriented x 4. Does not attempt to get up on her own. Impairments/Disabilities:      Patient is on strict sternal precautions. Nutrition Therapy:  Current Nutrition Therapy:   - Oral Diet:  General, Renal, Low Fat, and Low Sodium (2gm)    Routes of Feeding: Oral  Liquids: No Restrictions  Daily Fluid Restriction: 1800 ml  Last Modified Barium Swallow with Video (Video Swallowing Test): not done    Treatments at the Time of Hospital Discharge:   Respiratory Treatments:   Oxygen Therapy:  is not on home oxygen therapy.   Ventilator:    - No ventilator support    Rehab Therapies: Physical Therapy and Occupational Therapy  Weight Bearing Status/Restrictions: No weight bearing restrictions  Other Medical Equipment (for information only, NOT a DME order):  walker, bedside commode, and hospital bed  Other Treatments: gait belt, abdominal binder for sternal precautions    Patient's personal belongings (please select all that are sent with patient):  Shantanu    RN SIGNATURE:  Electronically signed by Shiva Duran RN on 3/10/22 at 1:15 PM EST    CASE MANAGEMENT/SOCIAL WORK SECTION    Inpatient Status Date: 2/20/2022    Readmission Risk Assessment Score:  Readmission Risk              Risk of Unplanned Readmission:  16           Discharging to Facility/ Agency   Name: [facility]   Address:  Phone:  [phone]  Fax:    Dialysis Facility (if applicable)   Name: [facility]  Address:  Dialysis Schedule: [facility]  Phone: [phone]  Fax:    / signature: Electronically signed by ASHWINI Salcedo, FLORENTINOW on 3/10/22 at 9:58 AM EST    PHYSICIAN SECTION    Prognosis: Good    Condition at Discharge: Stable    Rehab Potential (if transferring to Rehab):    Recommended Labs or Other Treatments After Discharge:  Hemodialysis per Nephrology    Physician Certification: I certify the above information and transfer of Hailey Jason  is necessary for the continuing treatment of the diagnosis listed and that she requires [facility] for less 30 days.      Update Admission H&P: No change in H&P    PHYSICIAN SIGNATURE:  Electronically signed by MARKEL Torres CNP on 3/9/22 at 9:56 AM

## 2022-03-03 NOTE — CARE COORDINATION
Family requested to see me about DC planning. Met with pt's dgtr and brother in room. They have reviewed CMS snf list and have chosen [address] or [address]. They would like to tour the facility. Referrals initiated.   [place], [place]     Case Management   [phone]    3/3/2022  1:21 PM

## 2022-03-03 NOTE — PROGRESS NOTES
Occupational Therapy  Facility/Department: [facility] 2Y CVICU  Daily Treatment Note  NAME: Jocelyn Gorman  : 1940  MRN: [id number]    Date of Service: 3/3/2022    Discharge Recommendations:  Patient would benefit from continued therapy after discharge,3-5 sessions per week  OT Equipment Recommendations  Other: defer to DC facility    Jocelyn Gorman scored a 9/24 on the AM-PAC ADL Inpatient form. Current research shows that an AM-PAC score of 17 or less is typically not associated with a discharge to the patient's home setting. Based on the patient's AM-PAC score and their current ADL deficits, it is recommended that the patient have 3-5 sessions per week of Occupational Therapy at d/c to increase the patient's independence. Please see assessment section for further patient specific details. If patient discharges prior to next session this note will serve as a discharge summary. Please see below for the latest assessment towards goals. Assessment   Performance deficits / Impairments: Decreased functional mobility ; Decreased safe awareness;Decreased balance;Decreased ADL status; Decreased cognition;Decreased high-level IADLs;Decreased endurance;Decreased strength  Assessment: [de-identified] y/o female admit 2022 with CAD, NSTEMI.  2022 S/P AtriClip, Urgent CABG x 4.  CRRT initiated. PTA pt lived at home with family and was independent with ADLs and functional mobility. Today, pt oriented to self only but able to follow simple commands. Pt completed multiple stands to stedy and RW with mod A x 2. Pt requires constant cuing and physical assist to hold hands on cardiac pillow. Pt with posterior lean when standing to RW and unsafe to attempt ambulation. Pt with loose BM when standing to stedy and required total A to place/remove bedpan in chair and complete hygiene. Pt limited by cognition, decreased carry over, and poor compliance of sternal precautions.   Pt with functional UE ROM for self care and anticipate will require up to max A for ADLs at this time. Pt is functioning below baseline and benefit from skilled therapy. Prognosis: Good  OT Education: OT Role;Transfer Training;Plan of Care;Orientation;Precautions  Barriers to Learning: confusion; decrease carry over  REQUIRES OT FOLLOW UP: Yes  Activity Tolerance  Activity Tolerance: Treatment limited secondary to decreased cognition  Activity Tolerance: limited by cognition/decreased carry over. Safety Devices  Safety Devices in place: Yes  Type of devices: Nurse notified;Call light within reach; Left in chair         Patient Diagnosis(es): The encounter diagnosis was Chest pain, unspecified type. has a past medical history of Arthritis, Asthma, CAD (coronary artery disease), CKD (chronic kidney disease), Glaucoma, Hyperlipidemia, Hypertension, Hyperthyroidism, IBS (irritable bowel syndrome), Neuropathy, Obesity (BMI 30-39.9), Osteopenia, and Osteoporosis. has a past surgical history that includes Cholecystectomy; Foot surgery; Tubal ligation; Gastric Band ([id number]); orif femur decompression (Left, 04/2014); Ankle fracture surgery (Left, 02/12/2015); Hand surgery (2009); other surgical history (Right, 2010); Coronary artery bypass graft (02/25/2022); and Coronary artery bypass graft (N/A, 2/25/2022). Restrictions  Restrictions/Precautions  Restrictions/Precautions: Fall Risk  Position Activity Restriction  Sternal Precautions: 2/25/2022 S/P AtriClip, Urgent CABG x 4.  Other position/activity restrictions: Chest Tube x 2     Subjective   General  Chart Reviewed: Yes  Patient assessed for rehabilitation services?: Yes  Additional Pertinent Hx: [de-identified] y/o female admit 2/20/2022 with CAD, NSTEMI.  2/25/2022 S/P AtriClip, Urgent CABG x 4. 2/28- 3/2 CRRT  Family / Caregiver Present: No  Referring Practitioner: MARKEL Escobedo - CNP  Subjective  Subjective: Per RN ok for therapy. Pt seen bedside, pleasantly confused but agreeable to therapy.  Pt with eyes closed majority of session. General Comment  Comments: Pt requires constant cuing to maintain sternal precautions and requires assist to hold hands on cardiac pillow      Orientation  Orientation  Overall Orientation Status: Impaired  Orientation Level: Oriented to person;Disoriented to situation;Disoriented to time;Disoriented to place     Objective    ADL  Toileting: Dependent/Total (pt with incontinent BM standing in stedy- pt sat on bedpan in chair to finish- total a for hygiene)        Balance  Sitting Balance:  (max A to bring trunk forward and maintain static sitting)  Standing Balance: Moderate assistance  Standing Balance  Time: stood prn with stedy to transfer to chair ; ~ 2 min for hygiene following BM with stedy support; stood ~ 15-20 sec with Rw. Posterior lean with standing to RW  Functional Mobility  Functional Mobility Comments: unsafe to attempt 2/2 confusion and poor standing balance     Bed mobility  Supine to Sit: Unable to assess (bed in chair position at start of session  Simultaneous filing. User may not have seen previous data.)  Sit to Supine:  (pt in chair at end of session)     Transfers  Sit to stand: Moderate assistance;2 Person assistance  Stand to sit: 2 Person assistance; Moderate assistance  Transfer Comments: stood bed > stedy; 2 stands from chair > stedy; chair > RW with mod A x 2. Pt requires constant cuing to maintain sternal precautions and assist to hold hands on cardiac pillow when standing           Cognition  Overall Cognitive Status: Exceptions  Arousal/Alertness: Appropriate responses to stimuli  Following Commands: Follows one step commands with increased time; Follows one step commands with repetition  Attention Span: Attends with cues to redirect  Memory: Decreased recall of recent events;Decreased recall of precautions;Decreased short term memory  Problem Solving: Decreased awareness of errors  Insights: Decreased awareness of deficits  Initiation: Requires cues for some  Sequencing: Requires cues for some  Cognition Comment: constant cuing/assist to maintain sternal precautions        Plan   Plan  Times per week: 3-5  Current Treatment Recommendations: Strengthening,Endurance Training,Balance Training,Gait Training,Functional Mobility Training,Cognitive Reorientation,Self-Care / ADL    AM-PAC Score  AM-PAC Inpatient Daily Activity Raw Score: 9 (03/03/22 0932)  AM-PAC Inpatient ADL T-Scale Score : 25.33 (03/03/22 0932)  ADL Inpatient CMS 0-100% Score: 79.59 (03/03/22 0932)  ADL Inpatient CMS G-Code Modifier : CL (03/03/22 0932)    Goals  Short term goals  Time Frame for Short term goals: Prior to DC: goals ongoing  Short term goal 1: Pt will complete ADL transfers with min A  Short term goal 2: Pt will complete functional mobility with min A  Short term goal 3: Pt will tolerate standing > 5 min for functional task with CGA  Short term goal 4: Pt will complete grooming tasks with set up  Short term goal 5: Pt will complete toileting with min A  Patient Goals   Patient goals : \"I need to go home\"       Therapy Time   Individual Concurrent Group Co-treatment   Time In 0710         Time Out 0805         Minutes 55         Timed Code Treatment Minutes: 54 Minutes      This note to serve as OT d/c summary if pt is d/c-ed prior to next therapy session.       Jany Weiss, OTR/L

## 2022-03-03 NOTE — PROGRESS NOTES
Comprehensive Nutrition Assessment    Type and Reason for Visit:  Reassess    Nutrition Recommendations/Plan:   Continue 3 carb choices (45 gm/meal); Low Fat/Low Chol/High Fiber/URSULA. Modify ONS to Glucerna BID. Diet education completed yesterday. Nutrition Assessment:  Follow-up. Pt off CRRT now, plan for HD tomorrow. Pt with chest tubes in place still. Noted pt's PO intakes have been slowlly declining. ONS on board but no intakes noted. Will trial different ONS. D/C planning underway. Malnutrition Assessment:  Malnutrition Status:  No malnutrition      Estimated Daily Nutrient Needs:  Energy (kcal):  7788-9213 (20-25kcal/76kg); Weight Used for Energy Requirements:  Admission     Protein (g):  62-73 (1.2-1.4g/52kg); Weight Used for Protein Requirements:  Ideal        Fluid (ml/day):     1 ml/kcal      Nutrition Related Findings:  BM 3/3; +1 BLE edema; K+3.3; -3.7 L fluid      Wounds:  Multiple,Surgical Incision       Current Nutrition Therapies:    ADULT ORAL NUTRITION SUPPLEMENT; Breakfast, Lunch, Dinner; Renal Oral Supplement  ADULT DIET; Regular; 3 carb choices (45 gm/meal);  Low Fat/Low Chol/High Fiber/URSULA    Anthropometric Measures:  · Height: 5' 3\" (160 cm)  · Current Body Weight: 191 lb (86.6 kg)   · Admission Body Weight: 167 lb (75.8 kg)     · Ideal Body Weight: 115 lbs; % Ideal Body Weight 166.1 %   · BMI: 33.8  · BMI Categories: Overweight (BMI 25.0-29.9) (Using admission wt)       Nutrition Diagnosis:   · Increased nutrient needs related to increase demand for energy/nutrients as evidenced by wounds    Nutrition Interventions:   Food and/or Nutrient Delivery:  Continue Current Diet,Modify Oral Nutrition Supplement  Nutrition Education/Counseling:  Education completed   Coordination of Nutrition Care:  Continue to monitor while inpatient    Goals:  PO intake greater than 50%       Nutrition Monitoring and Evaluation:   Food/Nutrient Intake Outcomes:  Diet Advancement/Tolerance,Food and Nutrient Intake,Supplement Intake  Physical Signs/Symptoms Outcomes:  Biochemical Data,Fluid Status or Edema,Skin,Weight,Meal Time Behavior     Discharge Planning:     Too soon to determine     Electronically signed by Kishan Cormier RD, LD on 3/3/22 at 11:23 AM EST    Contact: [phone]

## 2022-03-03 NOTE — PROGRESS NOTES
Most recent lab work showed [place]. [facility]   [facility]  [phone]  Nephrology Note          Patient ID: Arturo Ngo  Referring/ Physician: No att. providers found      HPI/Summary:   Arturo Ngo is being seen by nephrology for SKINNY. This is a [de-identified] yo lady with PMH of HTN, CKD, HLD and obesity who presented with chest pain and SOB. She had been having several weeks of chest tightness and dyspnea with exertion. She feels ok now, no complaints. Says she has been on medication for BP for decades. Her Bp had been running prior to admission. She has been taking Meloxicam for the past few years for knee pain/arthritis. Denies hematuria, foamy urine. LUTS. No dysuria. No issues with swelling right now. During this admission she has had C 2/22/22 showing severe triple vessel disease. Normal LV function. LVEDP 14-16. She was referred for CABG and it is planned for 2/25/22 . Cr has been rising from 1.2 > 1.3 > 1.5 at time of consult. Of note , she has been on scheduled meloxicam in addition to receiving contrast. Her PO intake has been fine, no NVD. Interval hx:   Started CRRT 2/28/2022, stopped 3/2/2022  Recorded UO 0 mL, 1427 mL UF. Net -ve 1.3 L  Off dobutamine, off pressors. BP control better  Oriented to self. Plan:  Labs and vitals reviewed. No acute indications for RRT today. Will tentatively plan on HD tomorrow. If no signs of renal recovery by tomorrow can begin planning for [facility] placement. 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 Cr peaked at 1.5  She was on meloxicam and got contrast 2/22, Cr started to rise two days later consistent with DANNY. She is not volume overloaded or dehydrated on exam.   Uric acid  CK  UA showed no protein or cells, totally bland. On CRRT 2/28/2022-3/2/2022 for anuric SKINNY.     #CAD triple vessel disease  [facility] 2/22  CABG planned 2/25  Asa statin and BB  Preserved EF  Not volume overloaded. #full code. [facility] would like to thank you for the opportunity to serve this patient. Please call with any questions or concerns. Thaddeus Pearson MD  [facility]  [address], [address]  Fax: [phone]  Office: [phone]         CC/Reason for consult:   Reason for consult: SKINNY  Chief Complaint   Patient presents with    Chest Pain    Hypertension           Review of Systems:   [address]. All other remaining systems are negative. Constitutional:  fever, chills, weakness, weight change, fatigue,      Skin:  rash, pruritus, hair loss, bruising, dry skin, petechiae. Head, Face, Neck   headaches, swelling,  cervical adenopathy. Respiratory: shortness of breath, cough, or wheezing  Cardiovascular: chest pain, palpitations, dizzy, edema  Gastrointestinal: nausea, vomiting, diarrhea, constipation,belly pain    Yellow skin, blood in stool  Musculoskeletal:  back pain, muscle weakness, gait problems,       joint pain or swelling. Genitourinary:  dysuria, poor urine flow, flank pain, blood in urine  Neurologic:  vertigo, TIA'S, syncope, seizures, focal weakness  Psychosocial:  insomnia, anxiety, or depression. Additional positive findings: -        PMH/SH/FH:    Medical Hx: reviewed and updated as appropriate  Past Medical History:   Diagnosis Date    Arthritis     Asthma     as child grew out of it    CAD (coronary artery disease) 02/22/2022    multi vessel    CKD (chronic kidney disease) 2015    Elevated creatinine since 2015; Stage III b    Glaucoma     Hyperlipidemia     Hypertension     Hyperthyroidism 2019    Graves disease    IBS (irritable bowel syndrome) 10/2020    On Linzess    Neuropathy     Peripheral    Obesity (BMI 30-39. 9)     Osteopenia 05/2009    Osteoporosis          Surgical Hx: reviewed and updated as appropriate   has a past surgical history that includes Cholecystectomy; Foot surgery;  Tubal ligation; Gastric Band ([id number]); orif femur decompression (Left, 2014); Ankle fracture surgery (Left, 2015); Hand surgery (); other surgical history (Right, ); Coronary artery bypass graft (2022); and Coronary artery bypass graft (N/A, 2022). Social Hx: reviewed and updated as appropriate  Social History     Tobacco Use    Smoking status: Former Smoker     Quit date: 8/10/1999     Years since quittin.5    Smokeless tobacco: Never Used    Tobacco comment: started age 26   Substance Use Topics    Alcohol use: No        Family hx: reviewed and updated as appropriate  family history includes Heart Disease in her father; Rheum Arthritis in her sister; Stroke in her father. Medications:   ferrous sulfate, 324 mg, BID WC  magnesium oxide, 400 mg, Daily  hydrALAZINE, 10 mg, 3 times per day  linaCLOtide, 144 mcg, QAM AC  metoprolol tartrate, 25 mg, BID  heparin (porcine), 5,000 Units, 3 times per day  sennosides-docusate sodium, 2 tablet, BID  polyethylene glycol, 17 g, Daily  lidocaine, 1 patch, Daily  pantoprazole, 20 mg, QAM AC  sodium chloride flush, 10 mL, 2 times per day  aspirin, 325 mg, Daily  chlorhexidine, 15 mL, BID  sodium chloride (PF), 10 mL, Daily  atorvastatin, 80 mg, Nightly       Patient has no known allergies. Allergies:   No Known Allergies      Physical Exam/Objective:   Vitals:    22 0800   BP: (!) 104/54   Pulse: 77   Resp: 19   Temp: 99.6 °F (37.6 °C)   SpO2: 98%       Intake/Output Summary (Last 24 hours) at 3/3/2022 0842  Last data filed at 3/3/2022 0750  Gross per 24 hour   Intake 399.5 ml   Output 1693 ml   Net -1293.5 ml         General appearance: ialert, intearactive  HEENT: no icterus, EOM intact, trachea midline. Neck : no masses, appears symmetrical and no JVD appreciated. Respiratory: Respiratory effort normal, bilateral equal chest rise. Cardiovascular:  Ausculation shows RRR and  ++ edema   Abdomen: abdomen is soft, non distended  Musculoskeletal:  no joint swelling, no deformity  Skin: no rashes, no induration, no tightening, no jaundice   Neuro: no localizing deficits      Data:   CBC:   Recent Labs     03/01/22  0531 03/02/22  0613 03/03/22  0453   WBC 12.0* 14.0* 14.8*   HGB 7.8* 8.8* 8.4*   HCT 23.8* 26.1* 25.5*   PLT 64* 92* 111*     BMP:    Recent Labs     03/01/22  0005 03/01/22  0531 03/01/22  1219 03/01/22  1219 03/01/22  2350 03/02/22  0613 03/03/22  0453      < > 140   < > 139 137 135*   K 4.1   < > 4.0   < > 4.0 4.0 3.3*      < > 102   < > 101 99 99   CO2 20*   < > 21   < > 26 24 24   BUN 31*   < > 20   < > 17 13 28*   CREATININE 2.9*   < > 1.8*   < > 1.6* 1.3* 2.8*   GLUCOSE 109*   < > 118*   < > 124* 115* 98   MG 2.30   < > 2.30   < > 2.30 2.30 2.30   PHOS 2.6  --  2.3*  --  2.4*  --   --     < > = values in this interval not displayed.

## 2022-03-03 NOTE — PROGRESS NOTES
Bedside shift hand-off done w/ off-going NAZANIN Rosas. is alert, SR on the monitor, on room air, not in any distress, w/ L pleural and mediastinal chest tubes to continuous wall suction (-20 cm H2O) and connected to an [facility] each, w/ R radial A-line, R IJ CVC, and L IJ Vascath. She had her chest binder almost off and her O2 probe (O2 sat) out of her finger. This RN replaced these back and re-educated her that she needs to keep the binder on and all the cables/ lines connected to her because they're very important. She agreed. Her son is at the bedside. Bed alarm is activated.     Electronically signed by Shauna Barajas RN on 3/2/2022 at 7:53 PM

## 2022-03-03 NOTE — FLOWSHEET NOTE
03/02/22 2150   Urine Assessment   Bladder Scan Volume (mL) 0 mL   $ Bladder scan $ Yes     Electronically signed by Omar Emmanuel RN on 3/2/2022 at 9:53 PM

## 2022-03-03 NOTE — PROGRESS NOTES
Progress Note    S/P cabgx4, EDISON exclusion 2/25/22    Vital Signs:                                                 BP (!) 101/57   Pulse 75   Temp 98.6 °F (37 °C) (Oral)   Resp 18   Ht 5' 3\" (1.6 m)   Wt 191 lb 5.8 oz (86.8 kg)   SpO2 96%   BMI 33.90 kg/m²  O2 Flow Rate (L/min): 0 L/min   SR  Admission Weight: 168 lb 1.6 oz (76.2 kg)  lb/87 kg     I/O:      Intake/Output Summary (Last 24 hours) at 3/3/2022 0629  Last data filed at 3/3/2022 8878  Gross per 24 hour   Intake 440.5 ml   Output 1921 ml   Net -1480.5 ml     Chest Tube: 170, 105, 45    CV: reg, wound c/d/i  Pulm: decreased  Abd: soft  Ext: warm, trace edema    Data Review:  CBC:   Recent Labs     03/01/22  0531 03/02/22  0613 03/03/22  0453   WBC 12.0* 14.0* 14.8*   HGB 7.8* 8.8* 8.4*   HCT 23.8* 26.1* 25.5*   MCV 86.8 87.5 87.5   PLT 64* 92* 111*     BMP:   Recent Labs     03/01/22  0005 03/01/22  0531 03/01/22  1219 03/01/22  1219 03/01/22  2350 03/02/22  0613 03/03/22  0453      < > 140   < > 139 137 135*   K 4.1   < > 4.0   < > 4.0 4.0 3.3*      < > 102   < > 101 99 99   CO2 20*   < > 21   < > 26 24 24   PHOS 2.6  --  2.3*  --  2.4*  --   --    BUN 31*   < > 20   < > 17 13 28*   CREATININE 2.9*   < > 1.8*   < > 1.6* 1.3* 2.8*   CALCIUM 8.8   < > 9.7   < > 9.9 9.8 9.3   MG 2.30   < > 2.30   < > 2.30 2.30 2.30    < > = values in this interval not displayed. Cardiac Enzymes: No results for input(s): CKTOTAL, CKMB, CKMBINDEX, TROPONINI in the last 72 hours. PT/INR:   Recent Labs     02/28/22  0725   PROTIME 13.7*   INR 1.20*     APTT:   No results for input(s): APTT in the last 72 hours. Assessment/Plan:  CV - SR. Remove art line   - EF nl. BB. hydralazine prn.   - HLD/CAD. Statin. pulm - pulm toilet, off O2   - remove chest tubes if criteria met  Renal - acute on CKD, BL Cr 1.4-1.7. crrt. at preop weight  ID - off steroids  Heme - dilutional anemia. Fe   - thrombocytopenia, resolving. HIT neg 2/26. ASA.    - scds dvt prophylaxis  dispo - d/c planning      Kasi Quiñones MD  3/3/2022  6:29 AM

## 2022-03-03 NOTE — PLAN OF CARE
Problem: Gas Exchange - Impaired:  Goal: Levels of oxygenation will improve  Description: Levels of oxygenation will improve  Outcome: Ongoing  A: Assess her respiratory rate, effort, & pattern. Assess for SOB & accessory muscle use. Keep HOB to at least 30 degrees elevation. Monitor O2 saturation & place on supplemental O2 to keep O2 sat > or = 90%. Educate on the use of IS 10 times every hour, when she's awake, to prevent atelectasis and pulmonary complications. Problem: Pain:  Goal: Control of acute pain  Description: Control of acute pain  Outcome: Ongoing  A: Pain assessment. Encourage non-pharmacological pain interventions. Administer PRN pain medication. Reassess pain an hour after intervention. Notify MD of unmanaged pain. Problem: Infection - Central Venous Catheter-Associated Bloodstream Infection:  Goal: Will show no infection signs and symptoms  Description: Will show no infection signs and symptoms  Outcome: Ongoing  A: Assess need for continued CVC use. Assess and educate for S/Sx of infection. Make sure that an occlusive dressing with antimicrobial patch is in place. Place alcohol swab caps on unused ports. Observe aseptic technique when accessing ports. Daily CHG wipes bath. Problem: Falls - Risk of:  Goal: Will remain free from falls  Description: Will remain free from falls  Outcome: Ongoing  A: Fall prevention education. Non-skid socks to remain in place. Place call light, bedside table, and personal items within her reach. Activate the bed/ chair alarm. Keep bed locked at the lowest position. Keep patient's room uncluttered. Assist w/ ambulation. Use assistive devices, as needed. Intentional rounding.     Electronically signed by Bianca Hodgson RN on 3/3/2022 at 2:45 AM

## 2022-03-03 NOTE — PROGRESS NOTES
Bedside shift hand-off done w/ oncoming NAZANIN West, to assume pt. care. This RN handed-off the pt. in a stable condition.     Electronically signed by Fran Johnson RN on 3/3/2022 at 7:36 AM

## 2022-03-03 NOTE — PROGRESS NOTES
Chest tubes removed per policy and procedure  Sterile dressing applied  Patient tolerated without difficulties.

## 2022-03-03 NOTE — CARE COORDINATION
Chart Reviewed. Therapy continues to recommend SNF as pt needs two person assistance. Met with bedside RN, Mary Chapin to get update. She still has chest tubes and is to have crrt again today. Met with daughter to discus. She reports they still plan to take her home at discharge and her brother will stay with them to assist with personal care needs. Informed them of [facility] who can continue her progress in home setting. She is agreeable to home care and will review the list given to them yesterday to determine which agency they will want to use. Goal is home with home care.   Danae Villanueva     Case Management   [phone]    3/3/2022  10:53 AM

## 2022-03-04 LAB
ANION GAP SERPL CALCULATED.3IONS-SCNC: 13 MMOL/L (ref 3–16)
BUN BLDV-MCNC: 51 MG/DL (ref 7–20)
CALCIUM IONIZED: 1.21 MMOL/L (ref 1.12–1.32)
CALCIUM SERPL-MCNC: 8.7 MG/DL (ref 8.3–10.6)
CHLORIDE BLD-SCNC: 100 MMOL/L (ref 99–110)
CO2: 23 MMOL/L (ref 21–32)
CREAT SERPL-MCNC: 4.7 MG/DL (ref 0.6–1.2)
GFR AFRICAN AMERICAN: 11
GFR NON-AFRICAN AMERICAN: 9
GLUCOSE BLD-MCNC: 92 MG/DL (ref 70–99)
HBV SURFACE AB TITR SER: <3.5 MIU/ML
HCT VFR BLD CALC: 25.5 % (ref 36–48)
HEMOGLOBIN: 8.3 G/DL (ref 12–16)
HEPATITIS B CORE TOTAL ANTIBODY: NEGATIVE
HEPATITIS B SURFACE ANTIGEN INTERPRETATION: NORMAL
MAGNESIUM: 2.3 MG/DL (ref 1.8–2.4)
MCH RBC QN AUTO: 28.7 PG (ref 26–34)
MCHC RBC AUTO-ENTMCNC: 32.7 G/DL (ref 31–36)
MCV RBC AUTO: 87.8 FL (ref 80–100)
PDW BLD-RTO: 15.2 % (ref 12.4–15.4)
PH VENOUS: 7.43 (ref 7.35–7.45)
PLATELET # BLD: 126 K/UL (ref 135–450)
PMV BLD AUTO: 8.7 FL (ref 5–10.5)
POTASSIUM SERPL-SCNC: 3.4 MMOL/L (ref 3.5–5.1)
RBC # BLD: 2.9 M/UL (ref 4–5.2)
SODIUM BLD-SCNC: 136 MMOL/L (ref 136–145)
WBC # BLD: 16.4 K/UL (ref 4–11)

## 2022-03-04 PROCEDURE — 97530 THERAPEUTIC ACTIVITIES: CPT

## 2022-03-04 PROCEDURE — 6360000002 HC RX W HCPCS: Performed by: STUDENT IN AN ORGANIZED HEALTH CARE EDUCATION/TRAINING PROGRAM

## 2022-03-04 PROCEDURE — 5A1D70Z PERFORMANCE OF URINARY FILTRATION, INTERMITTENT, LESS THAN 6 HOURS PER DAY: ICD-10-PCS | Performed by: STUDENT IN AN ORGANIZED HEALTH CARE EDUCATION/TRAINING PROGRAM

## 2022-03-04 PROCEDURE — 83735 ASSAY OF MAGNESIUM: CPT

## 2022-03-04 PROCEDURE — 90935 HEMODIALYSIS ONE EVALUATION: CPT

## 2022-03-04 PROCEDURE — 2100000000 HC CCU R&B

## 2022-03-04 PROCEDURE — 6360000002 HC RX W HCPCS: Performed by: NURSE PRACTITIONER

## 2022-03-04 PROCEDURE — 94761 N-INVAS EAR/PLS OXIMETRY MLT: CPT

## 2022-03-04 PROCEDURE — 80048 BASIC METABOLIC PNL TOTAL CA: CPT

## 2022-03-04 PROCEDURE — 82330 ASSAY OF CALCIUM: CPT

## 2022-03-04 PROCEDURE — 6370000000 HC RX 637 (ALT 250 FOR IP): Performed by: NURSE PRACTITIONER

## 2022-03-04 PROCEDURE — 6370000000 HC RX 637 (ALT 250 FOR IP): Performed by: THORACIC SURGERY (CARDIOTHORACIC VASCULAR SURGERY)

## 2022-03-04 PROCEDURE — 2580000003 HC RX 258: Performed by: THORACIC SURGERY (CARDIOTHORACIC VASCULAR SURGERY)

## 2022-03-04 PROCEDURE — 85027 COMPLETE CBC AUTOMATED: CPT

## 2022-03-04 PROCEDURE — 51798 US URINE CAPACITY MEASURE: CPT

## 2022-03-04 PROCEDURE — 99232 SBSQ HOSP IP/OBS MODERATE 35: CPT | Performed by: INTERNAL MEDICINE

## 2022-03-04 PROCEDURE — 97110 THERAPEUTIC EXERCISES: CPT

## 2022-03-04 PROCEDURE — P9047 ALBUMIN (HUMAN), 25%, 50ML: HCPCS | Performed by: STUDENT IN AN ORGANIZED HEALTH CARE EDUCATION/TRAINING PROGRAM

## 2022-03-04 PROCEDURE — 2140000000 HC CCU INTERMEDIATE R&B

## 2022-03-04 PROCEDURE — 99024 POSTOP FOLLOW-UP VISIT: CPT | Performed by: THORACIC SURGERY (CARDIOTHORACIC VASCULAR SURGERY)

## 2022-03-04 PROCEDURE — 97116 GAIT TRAINING THERAPY: CPT

## 2022-03-04 RX ORDER — LACTULOSE 10 G/15ML
20 SOLUTION ORAL 3 TIMES DAILY
Status: DISCONTINUED | OUTPATIENT
Start: 2022-03-04 | End: 2022-03-08

## 2022-03-04 RX ORDER — MIDODRINE HYDROCHLORIDE 5 MG/1
5 TABLET ORAL 3 TIMES DAILY PRN
Status: DISCONTINUED | OUTPATIENT
Start: 2022-03-04 | End: 2022-03-10 | Stop reason: HOSPADM

## 2022-03-04 RX ORDER — AMITRIPTYLINE HYDROCHLORIDE 25 MG/1
25 TABLET, FILM COATED ORAL NIGHTLY
Status: DISCONTINUED | OUTPATIENT
Start: 2022-03-04 | End: 2022-03-08

## 2022-03-04 RX ORDER — ALBUMIN (HUMAN) 12.5 G/50ML
25 SOLUTION INTRAVENOUS PRN
Status: DISCONTINUED | OUTPATIENT
Start: 2022-03-04 | End: 2022-03-10 | Stop reason: HOSPADM

## 2022-03-04 RX ORDER — ALBUMIN (HUMAN) 12.5 G/50ML
SOLUTION INTRAVENOUS
Status: DISPENSED
Start: 2022-03-04 | End: 2022-03-04

## 2022-03-04 RX ADMIN — AMITRIPTYLINE HYDROCHLORIDE 25 MG: 25 TABLET, FILM COATED ORAL at 22:47

## 2022-03-04 RX ADMIN — ALBUMIN (HUMAN) 25 G: 0.25 INJECTION, SOLUTION INTRAVENOUS at 09:37

## 2022-03-04 RX ADMIN — ATORVASTATIN CALCIUM 80 MG: 80 TABLET, FILM COATED ORAL at 22:47

## 2022-03-04 RX ADMIN — FERROUS SULFATE TAB EC 324 MG (65 MG FE EQUIVALENT) 324 MG: 324 (65 FE) TABLET DELAYED RESPONSE at 22:47

## 2022-03-04 RX ADMIN — SENNOSIDES AND DOCUSATE SODIUM 2 TABLET: 50; 8.6 TABLET ORAL at 14:04

## 2022-03-04 RX ADMIN — POLYETHYLENE GLYCOL 3350 17 G: 17 POWDER, FOR SOLUTION ORAL at 14:04

## 2022-03-04 RX ADMIN — HEPARIN SODIUM 5000 UNITS: 5000 INJECTION INTRAVENOUS; SUBCUTANEOUS at 14:05

## 2022-03-04 RX ADMIN — SODIUM CHLORIDE, PRESERVATIVE FREE 10 ML: 5 INJECTION INTRAVENOUS at 22:49

## 2022-03-04 RX ADMIN — DIPHENHYDRAMINE HCL 25 MG: 25 TABLET ORAL at 22:47

## 2022-03-04 RX ADMIN — CHLORHEXIDINE GLUCONATE 0.12% ORAL RINSE 15 ML: 1.2 LIQUID ORAL at 22:48

## 2022-03-04 RX ADMIN — HYDRALAZINE HYDROCHLORIDE 5 MG: 10 TABLET, FILM COATED ORAL at 06:23

## 2022-03-04 RX ADMIN — HEPARIN SODIUM 5000 UNITS: 5000 INJECTION INTRAVENOUS; SUBCUTANEOUS at 06:22

## 2022-03-04 RX ADMIN — FERROUS SULFATE TAB EC 324 MG (65 MG FE EQUIVALENT) 324 MG: 324 (65 FE) TABLET DELAYED RESPONSE at 14:04

## 2022-03-04 RX ADMIN — MIDODRINE HYDROCHLORIDE 5 MG: 5 TABLET ORAL at 09:10

## 2022-03-04 RX ADMIN — HEPARIN SODIUM 5000 UNITS: 5000 INJECTION INTRAVENOUS; SUBCUTANEOUS at 22:48

## 2022-03-04 RX ADMIN — ASPIRIN 325 MG: 325 TABLET, COATED ORAL at 14:04

## 2022-03-04 RX ADMIN — PANTOPRAZOLE SODIUM 20 MG: 20 TABLET, DELAYED RELEASE ORAL at 14:03

## 2022-03-04 RX ADMIN — Medication 400 MG: at 14:04

## 2022-03-04 RX ADMIN — LACTULOSE 20 G: 10 SOLUTION ORAL at 14:05

## 2022-03-04 ASSESSMENT — PAIN SCALES - GENERAL
PAINLEVEL_OUTOF10: 0

## 2022-03-04 NOTE — PROGRESS NOTES
[facility], [place], operated by [facility]   Daily Progress Note      Admit Date:  2/20/2022      Subjective:   Ms. Catalina Severino is an [de-identified] male with a past medical history significant for essential hypertension who presented to [facility] with chest pain. She underwent a stress perfusion study that was grossly abnormal and high risk. A left heart catheterization demonstrated normal LV function but triple vessel CAD. A consult to CVTS was placed for CABG consideration. Intervaql History:  Austen Menendez is doing well with no shortness of breath or chest pain. Sinus rhythm on telemetry. She is now receiving hemodialysis with a run today.      Objective:     BP (!) 104/52   Pulse 83   Temp 98.8 °F (37.1 °C)   Resp 22   Ht 5' 3\" (1.6 m)   Wt 190 lb 14.7 oz (86.6 kg)   SpO2 99%   BMI 33.82 kg/m²      Intake/Output Summary (Last 24 hours) at 3/4/2022 1611  Last data filed at 3/4/2022 1226  Gross per 24 hour   Intake 734.39 ml   Output 1206 ml   Net -471.61 ml       Physical Exam:  General:  Awake, alert, NAD  Skin:  Warm and dry  Neck:  JVD<8, no carotid bruits  Chest:  Clear to auscultation, no wheezes/rhonchi/rales  Cardiovascular:  RRR, normal S1/S2, no M/R/G  Abdomen:  Soft, nontender, +bowel sounds  Extremities:  No edema  Pulses: 2+ bilat carotid    2+ bilat radial    2+ bilat femoral        Medications:    lactulose  20 g Oral TID    amitriptyline  25 mg Oral Nightly    albumin human        ferrous sulfate  324 mg Oral BID WC    magnesium oxide  400 mg Oral Daily    [Held by provider] metoprolol tartrate  12.5 mg Oral BID    linaCLOtide  144 mcg Oral QAM AC    heparin (porcine)  5,000 Units SubCUTAneous 3 times per day    sennosides-docusate sodium  2 tablet Oral BID    polyethylene glycol  17 g Oral Daily    lidocaine  1 patch TransDERmal Daily    pantoprazole  20 mg Oral QAM AC    sodium chloride flush  10 mL IntraVENous 2 times per day    aspirin  325 mg Oral Daily    chlorhexidine  15 mL Mouth/Throat BID    sodium chloride (PF)  10 mL IntraVENous Daily    atorvastatin  80 mg Oral Nightly      sodium chloride      sodium chloride 5 mL/hr at 03/04/22 0600    dextrose         Lab Data:  CBC:   Recent Labs     03/02/22  0613 03/03/22  0453 03/04/22  0437   WBC 14.0* 14.8* 16.4*   HGB 8.8* 8.4* 8.3*   PLT 92* 111* 126*     BMP:    Recent Labs     03/02/22  0613 03/03/22  0453 03/04/22  0437    135* 136   K 4.0 3.3* 3.4*   CO2 24 24 23   BUN 13 28* 51*   CREATININE 1.3* 2.8* 4.7*         Left Heart Catheterization 2/22/22:  1. Severe triple-vessel coronary artery disease. The distal left main  is heavily calcified and has a 60% lesion. The proximal LAD is heavily  calcified with a 60% lesion. The mid-LAD is 100% occluded but fills in  from right collaterals. The distal LAD backfills back to the midportion  via left collaterals. The proximal circumflex is heavily calcified and  has an 80% lesion. OM1 is calcified with 80% serial lesions. The  proximal right coronary artery is subtotally occluded and the PDA fills  from both collateral native flow from the right and the left. There is  backfilling from the right system into the mid LAD via a septal  . Also, the LAD first diagonal branch has a 90% lesion. 2.  Normal left ventricular systolic function. LV ejection fraction of  65%. 3.  Borderline left ventricular end-diastolic pressure at 14 to 16 mmHg. 4.  No gradient across the aortic valve on pullback to suggest aortic  stenosis. Echo 2/21/22:  Normal left ventricle size, wall thickness, and systolic function with an   estimated ejection fraction of 55-60%. No regional wall motion abnormalities   are seen. grade 1 diastolic dysfunction   The right ventricle is normal in size and function. No significant valvular heart disease    Assessment / Plan:     1. Unstable Angina  S/p CABG on 02/25/22. Continue aspirin, statin therapy. Triple vessel disease by [facility].  Check hemoglobin A1C.    Mild carotid disease by Duplex. LVEF is preserved. 2. Hyperlipidemia with goal LDL<70mg/dL  Continue statin therapy with atorvastatin 80mg daily. 3. Essential Hypertension  Restart beta blockade when able. 4. Acute on chronic Kidney Injury  Likely ATN.   Continue Hemodialsysis for clearance and volume removal.       Signed:  Elli Rojo MD

## 2022-03-04 NOTE — CARE COORDINATION
Call rec'd from [address] [facility] who reports they anticipate beds available for HD early next week. She will review on Monday their bed situation and call on Monday. Waiting to hear from luna as well who has in house HD and bed available but has not accepted referral as yet. Melissa [facility]ahal can accept but is stating transportation to /from dialysis will be an issue.   Chayo Delgado [place]     Case Management   [phone]    3/4/2022  4:22 PM

## 2022-03-04 NOTE — CARE COORDINATION
Dannielle Sheldon from [facility] has denied due to the stretcher transport would need to come from Room and Board rate. Call placed to sonia, Nani Gowers to review. Informed her of [facility] who has in pt HD on sight. She agreed to referral there. Sent via fax and call. [address] [place] denied referral due to transportation to/from dialysis. Melissa [facility]ah[facility] would entertain but informs me dialysis transportation is an issue. [facility] denied referral.  Referral made to [facility]; left message from Fam Carl, rep.   [facility]     Case Management   [phone]    3/4/2022  2:37 PM

## 2022-03-04 NOTE — PROGRESS NOTES
Physical Therapy  Facility/Department: [address] CVICU  Daily Treatment Note  NAME: Melissa Ball  : 1940  MRN: [id number]    Date of Service: 3/4/2022    Discharge Recommendations:  Continue to assess pending progress,3-5 sessions per week   PT Equipment Recommendations  Other: Will monitor for potential equipt needs. Melissa Ball scored a  on the AM-PAC short mobility form. Current research shows that an AM-PAC score of 17 or less is typically not associated with a discharge to the patient's home setting. Based on the patient's AM-PAC score and their current functional mobility deficits, it is recommended that the patient have 3-5 sessions per week of Physical Therapy at d/c to increase the patient's independence. Please see assessment section for further patient specific details. If patient discharges prior to next session this note will serve as a discharge summary. Please see below for the latest assessment towards goals. Assessment   Body structures, Functions, Activity limitations: Decreased functional mobility ; Decreased strength;Decreased safe awareness;Decreased cognition;Decreased endurance  Assessment: [de-identified] y/o female admit 2022 with CAD, NSTEMI.  2022 S/P AtriClip, Urgent CABG x 4. PMH as noted including CKD, IBS, L Femur Fx/ORIF (2014), L Ankle Fx/ORIF (2015), Osteoporosis, Surg R Ear Mass (behind ear, ). PTA (per family) pt living with dtr/s-i-l in apt setting with steps to access, independent daily care and functional mobility. Pt cont confused; ONGOING  cues/physical assist to maintain Sternal Precautions. Able to initiate Transfers/Amb short distances with Bettye Alan 10-15' Min/Mod assist.  Requiring ongoing cues/physical assist to adhere to Sternal Precautions; no carryover noted. Cont HIGH Fall Risk. At this time, cont anticipate need cont PT (3-5) upon d/c. Will monitor pt's progress.   Prognosis: Fair;Good  Decision Making: Medium Complexity  History: [de-identified] y/o female admit 2/20/2022 with CAD, NSTEMI.  2/25/2022 S/P AtriClip, Urgent CABG x 4. PMH as noted including CKD, IBS, L Femur Fx/ORIF (4/2014), L Ankle Fx/ORIF (2/2015), Osteoporosis, Surg R Ear Mass (behind ear, 2010). Exam: See above. Clinical Presentation: See above. Patient Education: Role of PT, POC, Need to call for assist, Sternal Precautions/Use of Cardiac Pillow, OOB via Damian, Safe use of Walker, LE Exs. Barriers to Learning: Cognitive. REQUIRES PT FOLLOW UP: Yes  Activity Tolerance  Activity Tolerance: Patient limited by cognitive status; Patient limited by endurance  Activity Tolerance: Pt cont confused; ONGOING  cues/physical assist to maintain Sternal Precautions. Able to initiate Transfers/Amb short distances with Velta Aver 10-15' Min/Mod assist.  Requiring ongoing cues/physical assist to adhere to Sternal Precautions; no carryover noted. Cont HIGH Fall Risk. Patient Diagnosis(es): The encounter diagnosis was Chest pain, unspecified type. has a past medical history of Arthritis, Asthma, CAD (coronary artery disease), CKD (chronic kidney disease), Glaucoma, Hyperlipidemia, Hypertension, Hyperthyroidism, IBS (irritable bowel syndrome), Neuropathy, Obesity (BMI 30-39.9), Osteopenia, and Osteoporosis. has a past surgical history that includes Cholecystectomy; Foot surgery; Tubal ligation; Gastric Band ([id number]); orif femur decompression (Left, 04/2014); Ankle fracture surgery (Left, 02/12/2015); Hand surgery (2009); other surgical history (Right, 2010); Coronary artery bypass graft (02/25/2022); and Coronary artery bypass graft (N/A, 2/25/2022).     Restrictions  Restrictions/Precautions  Restrictions/Precautions: Fall Risk  Position Activity Restriction  Sternal Precautions: 2/25/2022 S/P AtriClip, Urgent CABG x 4.  Other position/activity restrictions: Chest Tube x 2  Subjective              Orientation  Orientation  Overall Orientation Status: Impaired  Orientation Level: Oriented to person;Disoriented to place; Disoriented to time;Disoriented to situation (Cont cues Sternal Precautions/Use of Cardiac Pillow : no carryover noted.)  Cognition   Cognition  Overall Cognitive Status: Exceptions  Arousal/Alertness: Appropriate responses to stimuli  Following Commands: Follows one step commands with increased time; Follows one step commands with repetition  Attention Span: Attends with cues to redirect  Memory: Decreased recall of recent events;Decreased recall of precautions;Decreased short term memory  Problem Solving: Decreased awareness of errors  Insights: Decreased awareness of deficits  Initiation: Requires cues for some  Sequencing: Requires cues for some  Cognition Comment: Constant cuing/assist to maintain sternal precautions  Objective   Bed mobility  Supine to Sit: Unable to assess (Bed converted to chair position.)  Transfers  Sit to Stand: Minimal Assistance;2 Person Assistance  Stand to sit: Minimal Assistance;2 Person Assistance  Comment: Initial Transfer bed to chair via Stedy MIn assist x 2. Additional Transfer from chair via [place] x 2. Initiated Transfers with Walker x 4 attempts during short amb distances : Min assist x 2. Use of Cardiac Pillow; cont require max cues and physical assist to hold Cardiac Pillow. No remarkable carryover noted despite ongoing cues. Ambulation  Ambulation?: Yes  Ambulation 1  Device: Rolling Walker  Distance: Pt amb 10' x 2, 15' x 2 with Walker ongoing MIn/Mod assist (+1 for lines/safety). Diminished step length/clearance; increase cues for safe transitional mvts. Balance  Comments: Pt able to maintain Static Stand with [employer] assist for dependent pericare. Amb Min/Mod assist with Walker. Exercises  Heelslides: 10x B LEs. Hip Abduction: 10x B LEs. Ankle Pumps: 10x. Comments: Bridging 10x         Comment: Ongoing pt ed re : Sternal Precautions/Use of Cardiac Pillow.

## 2022-03-04 NOTE — CARE COORDINATION
Called again to [facility]; left message asking for returned call about referral to Jacquelyn Kaufman. Call placed to tr, Antonio Crockett to inform of above. She is looking like she will need HD so [facility] will be out as an option as they are not taking any HD patients at this time. [facility] is not taking any patients at this time. Dgtr would like a referral to [facility] and [facility]. Referrals initiated by call and fax.   [place], [place]     Case Management   [phone]    3/4/2022  10:23 AM

## 2022-03-04 NOTE — PROGRESS NOTES
Most recent lab work showed [place]. [facility]   [facility] [place]  [phone]  Nephrology Note          Patient ID: Calin Zapien  Referring/ Physician: No att. providers found      HPI/Summary:   Calin Zapien is being seen by nephrology for SKINNY. This is a [de-identified] yo lady with PMH of HTN, CKD, HLD and obesity who presented with chest pain and SOB. She had been having several weeks of chest tightness and dyspnea with exertion. She feels ok now, no complaints. Says she has been on medication for BP for decades. Her Bp had been running prior to admission. She has been taking Meloxicam for the past few years for knee pain/arthritis. Denies hematuria, foamy urine. LUTS. No dysuria. No issues with swelling right now. During this admission she has had LHC 2/22/22 showing severe triple vessel disease. Normal LV function. LVEDP 14-16. She was referred for CABG and it is planned for 2/25/22 . Cr has been rising from 1.2 > 1.3 > 1.5 at time of consult. Of note , she has been on scheduled meloxicam in addition to receiving contrast. Her PO intake has been fine, no NVD. Interval hx:   Started CRRT 2/28/2022, stopped 3/2/2022  No documented urine output  BP acceptable. Cr 4.7 from 2.8 since stopping CRRT  Seen on dialysis. Required midodrine and albumin support to facilitate UF      Plan:  Labs and vitals reviewed. iHD today, aim for 1 L off today  Will plan on HD again tomorrow for another liter off for volume management. Since no signs of renal recovery will consult IR for a TDC. Will likely happen on Monday. Will need to coordinate holding antiplatelet therapy with CT surgery. 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 Cr peaked at 1.5  She was on meloxicam and got contrast 2/22, Cr started to rise two days later consistent with DANNY.    She is not volume overloaded or dehydrated on exam.   Uric acid  CK  UA showed no protein or cells, totally bland. On CRRT 2/28/2022-3/2/2022 for anuric SKINNY. #CAD triple vessel disease  LHC 2/22  CABG planned 2/25  Asa statin and BB  Preserved EF  Not volume overloaded. #full code. [facility] would like to thank you for the opportunity to serve this patient. Please call with any questions or concerns. Caterina Smith MD  [facility]  Elza Professor [address], [address]  Fax: [phone]  Office: [phone]         CC/Reason for consult:   Reason for consult: SKINNY  Chief Complaint   Patient presents with    Chest Pain    Hypertension           Review of Systems:   [address]. All other remaining systems are negative. Constitutional:  fever, chills, weakness, weight change, fatigue,      Skin:  rash, pruritus, hair loss, bruising, dry skin, petechiae. Head, Face, Neck   headaches, swelling,  cervical adenopathy. Respiratory: shortness of breath, cough, or wheezing  Cardiovascular: chest pain, palpitations, dizzy, edema  Gastrointestinal: nausea, vomiting, diarrhea, constipation,belly pain    Yellow skin, blood in stool  Musculoskeletal:  back pain, muscle weakness, gait problems,       joint pain or swelling. Genitourinary:  dysuria, poor urine flow, flank pain, blood in urine  Neurologic:  vertigo, TIA'S, syncope, seizures, focal weakness  Psychosocial:  insomnia, anxiety, or depression. Additional positive findings: -        PMH/SH/FH:    Medical Hx: reviewed and updated as appropriate  Past Medical History:   Diagnosis Date    Arthritis     Asthma     as child grew out of it    CAD (coronary artery disease) 02/22/2022    multi vessel    CKD (chronic kidney disease) 2015    Elevated creatinine since 2015; Stage III b    Glaucoma     Hyperlipidemia     Hypertension     Hyperthyroidism 2019    Graves disease    IBS (irritable bowel syndrome) 10/2020    On Linzess    Neuropathy     Peripheral    Obesity (BMI 30-39. 9)     Osteopenia 05/2009  Osteoporosis          Surgical Hx: reviewed and updated as appropriate   has a past surgical history that includes Cholecystectomy; Foot surgery; Tubal ligation; Gastric Band ([id number]); orif femur decompression (Left, 2014); Ankle fracture surgery (Left, 2015); Hand surgery (); other surgical history (Right, ); Coronary artery bypass graft (2022); and Coronary artery bypass graft (N/A, 2022). Social Hx: reviewed and updated as appropriate  Social History     Tobacco Use    Smoking status: Former Smoker     Quit date: 8/10/1999     Years since quittin.5    Smokeless tobacco: Never Used    Tobacco comment: started age 26   Substance Use Topics    Alcohol use: No        Family hx: reviewed and updated as appropriate  family history includes Heart Disease in her father; Rheum Arthritis in her sister; Stroke in her father. Medications:   lactulose, 20 g, TID  ferrous sulfate, 324 mg, BID WC  magnesium oxide, 400 mg, Daily  [Held by provider] metoprolol tartrate, 12.5 mg, BID  linaCLOtide, 144 mcg, QAM AC  heparin (porcine), 5,000 Units, 3 times per day  sennosides-docusate sodium, 2 tablet, BID  polyethylene glycol, 17 g, Daily  lidocaine, 1 patch, Daily  pantoprazole, 20 mg, QAM AC  sodium chloride flush, 10 mL, 2 times per day  aspirin, 325 mg, Daily  chlorhexidine, 15 mL, BID  sodium chloride (PF), 10 mL, Daily  atorvastatin, 80 mg, Nightly       Patient has no known allergies. Allergies:   No Known Allergies      Physical Exam/Objective:   Vitals:    22 0740   BP:    Pulse:    Resp:    Temp:    SpO2: 95%       Intake/Output Summary (Last 24 hours) at 3/4/2022 0826  Last data filed at 3/4/2022 0600  Gross per 24 hour   Intake 334.39 ml   Output --   Net 334.39 ml         General appearance: ialert, intearactive  HEENT: no icterus, EOM intact, trachea midline. Neck : no masses, appears symmetrical and no JVD appreciated.    Respiratory: Respiratory effort normal, bilateral equal chest rise. Cardiovascular: Ausculation shows RRR and  ++ edema   Abdomen: abdomen is soft, non distended  Musculoskeletal:  no joint swelling, no deformity  Skin: no rashes, no induration, no tightening, no jaundice   Neuro: no localizing deficits      Data:   CBC:   Recent Labs     03/02/22  0613 03/03/22 0453 03/04/22 0437   WBC 14.0* 14.8* 16.4*   HGB 8.8* 8.4* 8.3*   HCT 26.1* 25.5* 25.5*   PLT 92* 111* 126*     BMP:    Recent Labs     03/01/22  1219 03/01/22  1219 03/01/22  2350 03/01/22  2350 03/02/22 0613 03/03/22 0453 03/04/22 0437      < > 139   < > 137 135* 136   K 4.0   < > 4.0  --  4.0 3.3* 3.4*      < > 101   < > 99 99 100   CO2 21   < > 26   < > 24 24 23   BUN 20   < > 17   < > 13 28* 51*   CREATININE 1.8*   < > 1.6*   < > 1.3* 2.8* 4.7*   GLUCOSE 118*   < > 124*   < > 115* 98 92   MG 2.30   < > 2.30   < > 2.30 2.30 2.30   PHOS 2.3*  --  2.4*  --   --   --   --     < > = values in this interval not displayed.

## 2022-03-04 NOTE — PROGRESS NOTES
Education: OT Role;Transfer Training;Plan of Care;Orientation;Precautions  Barriers to Learning: confusion; decrease carry over  REQUIRES OT FOLLOW UP: Yes  Activity Tolerance  Activity Tolerance: Treatment limited secondary to decreased cognition  Activity Tolerance: limited by cognition/decreased carry over. Safety Devices  Safety Devices in place: Yes  Type of devices: Nurse notified;Gait belt;Call light within reach; Left in chair         Patient Diagnosis(es): The encounter diagnosis was Chest pain, unspecified type. has a past medical history of Arthritis, Asthma, CAD (coronary artery disease), CKD (chronic kidney disease), Glaucoma, Hyperlipidemia, Hypertension, Hyperthyroidism, IBS (irritable bowel syndrome), Neuropathy, Obesity (BMI 30-39.9), Osteopenia, and Osteoporosis. has a past surgical history that includes Cholecystectomy; Foot surgery; Tubal ligation; Gastric Band ([id number]); orif femur decompression (Left, 04/2014); Ankle fracture surgery (Left, 02/12/2015); Hand surgery (2009); other surgical history (Right, 2010); Coronary artery bypass graft (02/25/2022); and Coronary artery bypass graft (N/A, 2/25/2022). Restrictions  Restrictions/Precautions  Restrictions/Precautions: Fall Risk  Position Activity Restriction  Sternal Precautions: 2/25/2022 S/P AtriClip, Urgent CABG x 4.  Other position/activity restrictions: Room Air  Subjective   General  Chart Reviewed: Yes  Patient assessed for rehabilitation services?: Yes  Additional Pertinent Hx: [de-identified] y/o female admit 2/20/2022 with CAD, NSTEMI.  2/25/2022 S/P AtriClip, Urgent CABG x 4. 2/28- 3/2 CRRT  Family / Caregiver Present: Yes (daughter)  Referring Practitioner: MARKEL Torres CNP  Subjective  Subjective: Pt seen bedside and agreeable to therapy. Pt pleasantly confused and frueqnetly asking to eat a subway sandwhich.   General Comment  Comments: Pt requires constant cuing to maintain sternal precautions and requires physical assistance to hold hands on cardiac pillow      Orientation  Orientation  Orientation Level: Oriented to person;Disoriented to situation;Disoriented to time;Disoriented to place     Objective    ADL  Additional Comments: Anticipate pt will require max A for LB bathing/dressing and toileting, min A for UB bathing/dressing and grooming when seated based on balance and cognition observed        Balance  Sitting Balance:  (assist to bring trunk foward in chair)  Standing Balance  Time: stood prn with stedy to transfer to chair and prn prior to each bout of ambulation  Functional Mobility  Functional Mobility Comments: bed > chair via adonay stedy; pt then completed multiple short bouts (10-15 ft) of ambulation with RW and min/mod A .     Bed mobility  Supine to Sit:  (bed converted to chair position)  Sit to Supine:  (pt in chair at end of session)     Transfers  Sit to stand: Minimal assistance;2 Person assistance  Transfer Comments: stood bed and chair > stedy with min/mod A x 2. Pt stood chair > RW with min A x 2 for 4 total stands. Pt require constant cuing to maintain sternal precautions and physical assist to hold hands on cardiac pillow     Cognition  Overall Cognitive Status: Exceptions  Arousal/Alertness: Appropriate responses to stimuli  Following Commands: Follows one step commands with increased time; Follows one step commands with repetition  Attention Span: Attends with cues to redirect  Memory: Decreased recall of recent events;Decreased recall of precautions;Decreased short term memory  Problem Solving: Decreased awareness of errors  Insights: Decreased awareness of deficits  Initiation: Requires cues for some  Sequencing: Requires cues for some  Cognition Comment: Constant cuing/assist to maintain sternal precautions           Plan   Plan  Times per week: 3-5  Current Treatment Recommendations: Strengthening,Endurance Training,Balance Training,Gait Training,Functional Mobility Training,Cognitive Reorientation,Self-Care / ADL    AM-PAC Score  AM-PAC Inpatient Daily Activity Raw Score: 11 (03/04/22 1346)  AM-PAC Inpatient ADL T-Scale Score : 29.04 (03/04/22 1346)  ADL Inpatient CMS 0-100% Score: 70.42 (03/04/22 1346)  ADL Inpatient CMS G-Code Modifier : CL (03/04/22 1346)    Goals  Short term goals  Time Frame for Short term goals: Prior to DC: goals ongoing  Short term goal 1: Pt will complete ADL transfers with min A  Short term goal 2: Pt will complete functional mobility with min A  Short term goal 3: Pt will tolerate standing > 5 min for functional task with CGA  Short term goal 4: Pt will complete grooming tasks with set up  Short term goal 5: Pt will complete toileting with min A  Patient Goals   Patient goals : \"I need to go home\"       Therapy Time   Individual Concurrent Group Co-treatment   Time In 0730         Time Out 0815         Minutes 45         Timed Code Treatment Minutes: 39 Minutes     This note to serve as OT d/c summary if pt is d/c-ed prior to next therapy session.     Mortimer Showers, OTR/L

## 2022-03-04 NOTE — PROGRESS NOTES
Progress Note    S/P cabgx4, EDISON exclusion 2/25/22    Vital Signs:                                                 /83   Pulse 77   Temp 99.2 °F (37.3 °C) (Oral)   Resp 14   Ht 5' 3\" (1.6 m)   Wt 192 lb 0.3 oz (87.1 kg)   SpO2 97%   BMI 34.01 kg/m²  O2 Flow Rate (L/min): 0 L/min   SR  Admission Weight: 168 lb 1.6 oz (76.2 kg)  lb/87 kg     I/O:      Intake/Output Summary (Last 24 hours) at 3/4/2022 0638  Last data filed at 3/4/2022 0600  Gross per 24 hour   Intake 334.39 ml   Output 75 ml   Net 259.39 ml     CV: reg, wound c/d/i  Pulm: decreased  Abd: soft  Ext: warm, trace edema    Data Review:  CBC:   Recent Labs     03/02/22  0613 03/03/22  0453 03/04/22  0437   WBC 14.0* 14.8* 16.4*   HGB 8.8* 8.4* 8.3*   HCT 26.1* 25.5* 25.5*   MCV 87.5 87.5 87.8   PLT 92* 111* 126*     BMP:   Recent Labs     03/01/22  1219 03/01/22  1219 03/01/22  2350 03/01/22  2350 03/02/22  0613 03/03/22  0453 03/04/22  0437      < > 139   < > 137 135* 136   K 4.0   < > 4.0  --  4.0 3.3* 3.4*      < > 101   < > 99 99 100   CO2 21   < > 26   < > 24 24 23   PHOS 2.3*  --  2.4*  --   --   --   --    BUN 20   < > 17   < > 13 28* 51*   CREATININE 1.8*   < > 1.6*   < > 1.3* 2.8* 4.7*   CALCIUM 9.7   < > 9.9   < > 9.8 9.3 8.7   MG 2.30   < > 2.30   < > 2.30 2.30 2.30    < > = values in this interval not displayed. Cardiac Enzymes: No results for input(s): CKTOTAL, CKMB, CKMBINDEX, TROPONINI in the last 72 hours. PT/INR:   No results for input(s): PROTIME, INR in the last 72 hours. APTT:   No results for input(s): APTT in the last 72 hours. Assessment/Plan:  CV - SR.    - EF nl. BB. hydralazine prn.   - HLD/CAD. Statin. pulm - pulm toilet, off O2  Renal - acute on CKD, BL Cr 1.4-1.7. at preop weight. Likely HD today. tunneled cath, ?today  ID - off steroids. Some residual effect on wbc. Heme - dilutional anemia. Fe   - thrombocytopenia, resolved. HIT neg 2/26. ASA.    - scds dvt prophylaxis  dispo - d/c planning      Karolyn Bales MD  3/4/2022  6:38 AM

## 2022-03-04 NOTE — PROGRESS NOTES
Treatment time: 3.15 hours  Net UF: 800 ml    Pre weight: 87.1 kg   Post weight: 86.6 kg  EDW: tbd kg    Access used: [facility]  Access function: poorly after T/R with  ml/min    Medications or blood products given: midodrine 5mg po, albumin 25gm    Regular outpatient schedule: TBD SKINNY    Summary of response to treatment: Lines initiially reversed. T/R patient and catheter stopped working, even after repositioning. Stopped 15 min early    Copy of dialysis treatment record placed in chart, to be scanned into EMR.

## 2022-03-04 NOTE — FLOWSHEET NOTE
03/04/22 0913 03/04/22 1226   Treatment   Time On 0913  --    Time Off  --  1226   Treatment Goal 1000 1000   Observations & Evaluations   Level of Consciousness Alert (0) Responds to Voice (1)   Oriented X 2 1   Vital Signs   Temp 98.8 °F (37.1 °C) 98.8 °F (37.1 °C)   Weight 192 lb 0.3 oz (87.1 kg) 190 lb 14.7 oz (86.6 kg)   Weight Method Bed scale Bed scale   Percent Weight Change 0 -0.57   Pain Assessment   Pain Assessment 0-10 0-10   Pain Level 0 0   Technical Checks   RO Machine Log Sheet Completed Yes  --    Machine Alarm Self Test Completed  --    Machine Autotest Completed  --    Air Foam Detector Tested  --    Extracorporeal Circuit Tested for Integrity Yes  --    Treatment Initiation   Dialyze Hours 3.5  --    Treatment  Initiation Universal Precautions maintained;Lines secured to patient; Connections secured;Prime given;Venous Parameters set; Arterial Parameters set; Air foam detector engaged; Hemosafe Device; Saline line double clamped; Hemo-Safe Applied;Dialyzer;F180  --    During Hemodialysis Assessment   Blood Flow Rate (ml/min) 350 ml/min  --    Ultrafiltration Rate (ml/hr) 400 ml/hr  --    Arterial Pressure (mmHg) -120 mmHg  --    Venous Pressure (mmHg) 120  --    TMP 40  --      --    Access Visible Yes  --    Dialysis Bath   K+ (Potassium) 4  --    Ca+ (Calcium) 2.25  --    Na+ (Sodium) 138  --    HCO3 (Bicarb) 35  --    Post-Hemodialysis Assessment   Post-Treatment Procedures  --  Blood returned;Catheter capped, clamped and heparinized x 2 ports   Machine Disinfection Process  --  Exterior Machine Disinfection   Rinseback Volume (ml)  --  400 ml   Total Liters Processed (l/min)  --  58.8 l/min   Dialyzer Clearance  --  Lightly streaked   Duration of Treatment (minutes)  --  200 minutes   Hemodialysis Intake (ml)  --  400 ml   Hemodialysis Output (ml)  --  1206 ml   NET Removed (ml)  --  800 ml   Tolerated Treatment  --  Good

## 2022-03-04 NOTE — DISCHARGE SUMMARY
DISCHARGE SUMMARY    Admission Date:  2/20/2022  4:56 PM  Discharge Date:  3/10/2022    PCP:  Beba VAZ      Cards:  Barbara Hampton    Principle Diagnosis:  Chest pain, coronary artery disease, CKD    Past Medical History:  Past Medical History:   Diagnosis Date    Arthritis     Asthma     as child grew out of it    CAD (coronary artery disease) 02/22/2022    multi vessel    CKD (chronic kidney disease) 2015    Elevated creatinine since 2015; Stage III b    Glaucoma     Hyperlipidemia     Hypertension     Hyperthyroidism 2019    Graves disease    IBS (irritable bowel syndrome) 10/2020    On Linzess    Neuropathy     Peripheral    Obesity (BMI 30-39. 9)     Osteopenia 05/2009    Osteoporosis        Past Surgical History:  Past Surgical History:   Procedure Laterality Date    ANKLE FRACTURE SURGERY Left 02/12/2015    CHOLECYSTECTOMY      CORONARY ARTERY BYPASS GRAFT  02/25/2022    cabgx4, EDISON exclusion    CORONARY ARTERY BYPASS GRAFT N/A 2/25/2022    TRANSESOPHAGEAL ECHOCARDIOGRAM, TOTAL CARDIOPULMONARY BYPASS, TOTAL CARDIOPULMONARY BYPASS GRAFTING X4 (1 ARTERY, 3 VEIN) USING LEFT INTERNAL MAMMARY ARTERY AND RIGHT ENDOSCOPICALLY HARVESTED SAPHENOUS VEIN, LEFT ATRIAL APPENDAGE CLIP WITH 35MM ATRICLIP performed by Ziggy Kennedy MD at [facility]      bilateral, hammer toes    GASTRIC BAND  [id number]    laproscopic    HAND SURGERY  2009    trigger finger    IR TUNNELED CATHETER PLACEMENT GREATER THAN 5 YEARS  3/7/2022    IR TUNNELED CATHETER PLACEMENT GREATER THAN 5 YEARS 3/7/2022 [facility]    ORIF FEMUR DECOMPRESSION Left 04/2014    OTHER SURGICAL HISTORY Right 2010    mass behind ear    TUBAL LIGATION         Procedures:    2/25/2022 Transesophageal echocardiogram, left greater saphenous endoscopic vein harvesting, urgent coronary artery bypass grafting x4 (LIMA-LAD, SVG-RI-OM, SVG-PDA), left atrial appendage exclusion (35-mm AtriClip). 3/4/2022 Tunneled dialysis catheter    History:  The patient is a [de-identified] y.o. female who presented to the emergency room with anginal symptoms and ruled in for a non-ST-elevation myocardial infarction. She underwent cardiac catheterization demonstrating diffuse three-vessel coronary artery disease with well-preserved left ventricular function. She presented for coronary artery bypass grafting. Creatinine peak post surgery to 5.9 on 2/28/2022 requiring Vas cath placement and subsequent CRRT with eventual conversion to hemo dialysis. Tunnel catheter was placed by IR on 3/4/2022. Hospital Course: The patient underwent the following procedures:    2/25/2022 Transesophageal echocardiogram, left greater saphenous endoscopic vein harvesting, urgent coronary artery bypass grafting x4 (LIMA-LAD, SVG-RI-OM, SVG-PDA), left atrial appendage exclusion (35-mm AtriClip). 3/4/2022 Tunneled dialysis catheter placement    CV       - SR.               - EF nl. BB. ACE/ARB on hold due to elevated creatinine              - HLD/CAD. Statin. pulm    - pulm toilet, off O2  Renal   - acute on CKD, BL Cr 1.4-1.7. CRRT with transition to HD  ID         - off steroids. Some residual effect on wbc. Heme   - dilutional anemia. Fe              - thrombocytopenia, resolved. HIT neg 2/26. ASA. - scds dvt prophylaxis  dispo    - SNF, HD per Neph     Weight:   Admission Weight: 168 lb 1.6 oz (76.2 kg)  Day of surgery: 191 lb 12.8 oz (87 kg)  Day of discharge Weight: 190 lb 7.6 oz (86.4 kg)    Recent Labs     03/08/22  0429   WBC 13.8*   HGB 8.9*   HCT 27.5*   MCV 88.7        Recent Labs     03/08/22  0428 03/09/22  0423 03/10/22  0349    135* 132*   K 4.1 4.4 3.5   CL 98* 93* 92*   CO2 21 23 24   BUN 27* 46* 26*   CREATININE 4.1* 6.0* 4.2*   CALCIUM 8.9 9.1 8.6   MG 2.40 2.40 2.10     No results for input(s): PROTIME, INR in the last 72 hours.       Disposition: stable    Patient Instructions: Medication List      START taking these medications    atorvastatin 80 MG tablet  Commonly known as: LIPITOR  Take 1 tablet by mouth nightly     ferrous sulfate 324 (65 Fe) MG EC tablet  Take 1 tablet by mouth 2 times daily (with meals) for 7 days        CONTINUE taking these medications    amitriptyline 10 MG tablet  Commonly known as: ELAVIL     gabapentin 100 MG capsule  Commonly known as: NEURONTIN     Linzess 72 MCG Caps capsule  Generic drug: linaCLOtide     methIMAzole 5 MG tablet  Commonly known as: TAPAZOLE        STOP taking these medications    amLODIPine 5 MG tablet  Commonly known as: NORVASC     hydroCHLOROthiazide 25 MG tablet  Commonly known as: HYDRODIURIL     meloxicam 15 MG tablet  Commonly known as: MOBIC     pravastatin 80 MG tablet  Commonly known as: PRAVACHOL     PROPRANOLOL HCL ER BEADS PO           Where to Get Your Medications      You can get these medications from any pharmacy    Bring a paper prescription for each of these medications  · atorvastatin 80 MG tablet  · ferrous sulfate 324 (65 Fe) MG EC tablet     No ACE/ARB due to elevated creatinine level. Activity:  No heavy lifting (over 5 lbs) or driving until seen in the office  Diet: cardiac  Wound Care: none    Follow up with Dr. Leigh Guo in the office on 3/18/2022 at 10:15 am. .  Please call the office at [phone] with any questions or concerns.       MARKEL Naik CNP  3/10/2022  3:06 PM     Kasia Muse MD  3/10/2022  3:24 PM

## 2022-03-05 LAB
ANION GAP SERPL CALCULATED.3IONS-SCNC: 15 MMOL/L (ref 3–16)
BUN BLDV-MCNC: 37 MG/DL (ref 7–20)
CALCIUM IONIZED: 0.99 MMOL/L (ref 1.12–1.32)
CALCIUM IONIZED: 1.09 MMOL/L (ref 1.12–1.32)
CALCIUM SERPL-MCNC: 8.3 MG/DL (ref 8.3–10.6)
CHLORIDE BLD-SCNC: 99 MMOL/L (ref 99–110)
CO2: 23 MMOL/L (ref 21–32)
CREAT SERPL-MCNC: 4.2 MG/DL (ref 0.6–1.2)
GFR AFRICAN AMERICAN: 12
GFR NON-AFRICAN AMERICAN: 10
GLUCOSE BLD-MCNC: 91 MG/DL (ref 70–99)
HCT VFR BLD CALC: 24.1 % (ref 36–48)
HEMOGLOBIN: 7.9 G/DL (ref 12–16)
MAGNESIUM: 2.2 MG/DL (ref 1.8–2.4)
MCH RBC QN AUTO: 28.8 PG (ref 26–34)
MCHC RBC AUTO-ENTMCNC: 32.9 G/DL (ref 31–36)
MCV RBC AUTO: 87.7 FL (ref 80–100)
PDW BLD-RTO: 15.3 % (ref 12.4–15.4)
PH VENOUS: 7.45 (ref 7.35–7.45)
PH VENOUS: 7.5 (ref 7.35–7.45)
PHOSPHORUS: 1.4 MG/DL (ref 2.5–4.9)
PLATELET # BLD: 146 K/UL (ref 135–450)
PMV BLD AUTO: 8.5 FL (ref 5–10.5)
POTASSIUM SERPL-SCNC: 3.5 MMOL/L (ref 3.5–5.1)
RBC # BLD: 2.75 M/UL (ref 4–5.2)
SODIUM BLD-SCNC: 137 MMOL/L (ref 136–145)
WBC # BLD: 15.7 K/UL (ref 4–11)

## 2022-03-05 PROCEDURE — 6370000000 HC RX 637 (ALT 250 FOR IP): Performed by: NURSE PRACTITIONER

## 2022-03-05 PROCEDURE — P9047 ALBUMIN (HUMAN), 25%, 50ML: HCPCS | Performed by: STUDENT IN AN ORGANIZED HEALTH CARE EDUCATION/TRAINING PROGRAM

## 2022-03-05 PROCEDURE — 2580000003 HC RX 258: Performed by: THORACIC SURGERY (CARDIOTHORACIC VASCULAR SURGERY)

## 2022-03-05 PROCEDURE — 80048 BASIC METABOLIC PNL TOTAL CA: CPT

## 2022-03-05 PROCEDURE — 6360000002 HC RX W HCPCS: Performed by: STUDENT IN AN ORGANIZED HEALTH CARE EDUCATION/TRAINING PROGRAM

## 2022-03-05 PROCEDURE — 36415 COLL VENOUS BLD VENIPUNCTURE: CPT

## 2022-03-05 PROCEDURE — 94760 N-INVAS EAR/PLS OXIMETRY 1: CPT

## 2022-03-05 PROCEDURE — 2580000003 HC RX 258: Performed by: INTERNAL MEDICINE

## 2022-03-05 PROCEDURE — 2500000003 HC RX 250 WO HCPCS: Performed by: INTERNAL MEDICINE

## 2022-03-05 PROCEDURE — 2140000000 HC CCU INTERMEDIATE R&B

## 2022-03-05 PROCEDURE — 82330 ASSAY OF CALCIUM: CPT

## 2022-03-05 PROCEDURE — 84100 ASSAY OF PHOSPHORUS: CPT

## 2022-03-05 PROCEDURE — 6370000000 HC RX 637 (ALT 250 FOR IP): Performed by: THORACIC SURGERY (CARDIOTHORACIC VASCULAR SURGERY)

## 2022-03-05 PROCEDURE — 85027 COMPLETE CBC AUTOMATED: CPT

## 2022-03-05 PROCEDURE — 83735 ASSAY OF MAGNESIUM: CPT

## 2022-03-05 PROCEDURE — 99024 POSTOP FOLLOW-UP VISIT: CPT | Performed by: THORACIC SURGERY (CARDIOTHORACIC VASCULAR SURGERY)

## 2022-03-05 PROCEDURE — 51798 US URINE CAPACITY MEASURE: CPT

## 2022-03-05 PROCEDURE — 90935 HEMODIALYSIS ONE EVALUATION: CPT

## 2022-03-05 PROCEDURE — 2100000000 HC CCU R&B

## 2022-03-05 PROCEDURE — 6360000002 HC RX W HCPCS: Performed by: NURSE PRACTITIONER

## 2022-03-05 RX ADMIN — MIDODRINE HYDROCHLORIDE 5 MG: 5 TABLET ORAL at 09:38

## 2022-03-05 RX ADMIN — HEPARIN SODIUM 5000 UNITS: 5000 INJECTION INTRAVENOUS; SUBCUTANEOUS at 08:58

## 2022-03-05 RX ADMIN — ASPIRIN 325 MG: 325 TABLET, COATED ORAL at 09:38

## 2022-03-05 RX ADMIN — ALBUMIN (HUMAN) 25 G: 0.25 INJECTION, SOLUTION INTRAVENOUS at 05:03

## 2022-03-05 RX ADMIN — AMITRIPTYLINE HYDROCHLORIDE 25 MG: 25 TABLET, FILM COATED ORAL at 20:36

## 2022-03-05 RX ADMIN — Medication 400 MG: at 09:38

## 2022-03-05 RX ADMIN — HEPARIN SODIUM 5000 UNITS: 5000 INJECTION INTRAVENOUS; SUBCUTANEOUS at 22:08

## 2022-03-05 RX ADMIN — MIDODRINE HYDROCHLORIDE 5 MG: 5 TABLET ORAL at 04:08

## 2022-03-05 RX ADMIN — HEPARIN SODIUM 5000 UNITS: 5000 INJECTION INTRAVENOUS; SUBCUTANEOUS at 14:15

## 2022-03-05 RX ADMIN — PANTOPRAZOLE SODIUM 20 MG: 20 TABLET, DELAYED RELEASE ORAL at 09:38

## 2022-03-05 RX ADMIN — ATORVASTATIN CALCIUM 80 MG: 80 TABLET, FILM COATED ORAL at 20:36

## 2022-03-05 RX ADMIN — FERROUS SULFATE TAB EC 324 MG (65 MG FE EQUIVALENT) 324 MG: 324 (65 FE) TABLET DELAYED RESPONSE at 09:38

## 2022-03-05 RX ADMIN — SODIUM CHLORIDE, PRESERVATIVE FREE 10 ML: 5 INJECTION INTRAVENOUS at 20:36

## 2022-03-05 RX ADMIN — POTASSIUM PHOSPHATE, MONOBASIC AND POTASSIUM PHOSPHATE, DIBASIC 30 MMOL: 224; 236 INJECTION, SOLUTION, CONCENTRATE INTRAVENOUS at 08:50

## 2022-03-05 ASSESSMENT — PAIN SCALES - GENERAL
PAINLEVEL_OUTOF10: 0

## 2022-03-05 NOTE — FLOWSHEET NOTE
Treatment time: 3.5 Hours    Net UF: 1.0 L    Pre weight: 89.6 Kg  Post weight: 88.5 Kg  EDW: TBD    Access used: [facility]  Access function: Functioned well with lines reversed, prescribed BFR achieved and maintained throughout treatment without pressure alarms. Medications or blood products given: Albumin 25% 25 grams. Midodrine 5 mg given prior to HD per primary RN. Regular outpatient schedule: TBD    Summary of response to treatment: HD complete. Rinseback per protocol. All blood returned. No s/s of complications r/t HD. Report given. Safe patient handoff achieved. Copy of dialysis treatment record placed in chart, to be scanned into EMR.     DALILA Nascimento, RN       03/05/22 0430 03/05/22 0615 03/05/22 0815   Vital Signs   Temp 97.7 °F (36.5 °C)  --  97.9 °F (36.6 °C)   Temp Source Oral  --  Oral   Pulse 79 78 79   Heart Rate Source Monitor Monitor Monitor   Resp 19 21 21   BP (!) 93/49 (!) 94/50 (!) 99/38   BP Location Left upper arm Left upper arm Left upper arm   MAP (mmHg) (!) 63 (!) 64 (!) 57   Patient Position Semi fowlers Lying right side Semi fowlers   Level of Consciousness Alert (0)  --  Alert (0)   MEWS Score 2  --  3   Patient Currently in Pain Denies Other (comment)  (Patient resting quietly in bed with eyes closed) Denies   Pain Assessment   Pain Assessment 0-10  --  0-10   Pain Level 0  --  0   Oxygen Therapy   SpO2 98 %  --   --    Pulse Oximeter Device Mode Intermittent  --   --    Pulse Oximeter Device Location Finger  --   --    O2 Device None (Room air)  --   --    Skin Assessment Clean, dry, & intact  --   --    O2 Flow Rate (L/min) 0 L/min  --   --    Height and Weight   Weight 197 lb 8.5 oz (89.6 kg)  --  195 lb 1.7 oz (88.5 kg)   Weight Method Bed scale  --  Bed scale   BMI (Calculated) 35.1  --  34.6

## 2022-03-05 NOTE — PROGRESS NOTES
Most recent lab work showed [id number]   [facility]. [place]  [phone]  Nephrology Note          Patient ID: Jocelyn Gorman  Referring/ Physician: No att. providers found      HPI/Summary:   Jocelyn Gorman is being seen by nephrology for SKINNY. This is a [de-identified] yo lady with PMH of HTN, CKD, HLD and obesity who presented with chest pain and SOB. She had been having several weeks of chest tightness and dyspnea with exertion. She feels ok now, no complaints. Says she has been on medication for BP for decades. Her Bp had been running prior to admission. She has been taking Meloxicam for the past few years for knee pain/arthritis. Denies hematuria, foamy urine. LUTS. No dysuria. No issues with swelling right now. During this admission she has had LHC 2/22/22 showing severe triple vessel disease. Normal LV function. LVEDP 14-16. She was referred for CABG and it is planned for 2/25/22 . Cr has been rising from 1.2 > 1.3 > 1.5 at time of consult. Of note , she has been on scheduled meloxicam in addition to receiving contrast. Her PO intake has been fine, no NVD. Started CRRT 2/28/2022, stopped 3/2/2022    Interval hx:   Seen and examined at bedside. She is awake and alert and in no distress. Patient denies SOB, chest pain, NVD  She is currently receiving hemodialysis when seen. Midodrine 5 mg given prior to treatment, 25 g albumin given UF set for 1 L   Catheter is working without issues. Bp 100/50 and sating 98% on room air. No UO recorded. Had 1.2 L off yesterday net 800 cc negative     Labs reviewed. Na 137  K 3.5  Bicarb 23  Bun 37   Cr 4.2  Ca 8.3 and phos 1.4  hgb 7.9       Plan:  Oliguric SKINNY post CABG  HD today, UF 1 L planned. She appears overall euvolemic. Mild edema on room air. BP soft requiring albumin and midodrine support. - replace phos, 30 mmol kphos. For phos 1.4, K 3.5.   - will continue to assess daily for dialysis needs.        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 Cr peaked at 1.5  She was on meloxicam and got contrast 2/22, Cr started to rise two days later consistent with DANNY. She is not volume overloaded or dehydrated on exam.   Uric acid  CK  UA showed no protein or cells, totally bland. On CRRT 2/28/2022-3/2/2022 for anuric SKINNY. #CAD triple vessel disease  LHC 2/22  CABG planned 2/25  Asa statin and BB  Preserved EF  Not volume overloaded. #full code. [facility] would like to thank you for the opportunity to serve this patient. Please call with any questions or concerns. Hakan Schmidt MD  [facility]  [address], [address] [place] [address]  Fax: [phone]  Office: [phone]         CC/Reason for consult:   Reason for consult: SKINNY  Chief Complaint   Patient presents with    Chest Pain    Hypertension           Review of Systems:   [address]. All other remaining systems are negative. Constitutional:  fever, chills, weakness, weight change, fatigue,      Skin:  rash, pruritus, hair loss, bruising, dry skin, petechiae. Head, Face, Neck   headaches, swelling,  cervical adenopathy. Respiratory: shortness of breath, cough, or wheezing  Cardiovascular: chest pain, palpitations, dizzy, edema  Gastrointestinal: nausea, vomiting, diarrhea, constipation,belly pain    Yellow skin, blood in stool  Musculoskeletal:  back pain, muscle weakness, gait problems,       joint pain or swelling. Genitourinary:  dysuria, poor urine flow, flank pain, blood in urine  Neurologic:  vertigo, TIA'S, syncope, seizures, focal weakness  Psychosocial:  insomnia, anxiety, or depression.   Additional positive findings: - + feeling cold      PMH/SH/FH:    Medical Hx: reviewed and updated as appropriate  Past Medical History:   Diagnosis Date    Arthritis     Asthma     as child grew out of it    CAD (coronary artery disease) 02/22/2022    multi vessel    CKD (chronic kidney disease)     Elevated creatinine since ; Stage III b    Glaucoma     Hyperlipidemia     Hypertension     Hyperthyroidism     Graves disease    IBS (irritable bowel syndrome) 10/2020    On Linzess    Neuropathy     Peripheral    Obesity (BMI 30-39. 9)     Osteopenia 2009    Osteoporosis          Surgical Hx: reviewed and updated as appropriate   has a past surgical history that includes Cholecystectomy; Foot surgery; Tubal ligation; Gastric Band ([id number]); orif femur decompression (Left, 2014); Ankle fracture surgery (Left, 2015); Hand surgery (); other surgical history (Right, ); Coronary artery bypass graft (2022); and Coronary artery bypass graft (N/A, 2022). Social Hx: reviewed and updated as appropriate  Social History     Tobacco Use    Smoking status: Former Smoker     Quit date: 8/10/1999     Years since quittin.5    Smokeless tobacco: Never Used    Tobacco comment: started age 26   Substance Use Topics    Alcohol use: No        Family hx: reviewed and updated as appropriate  family history includes Heart Disease in her father; Rheum Arthritis in her sister; Stroke in her father. Medications:   lactulose, 20 g, TID  amitriptyline, 25 mg, Nightly  ferrous sulfate, 324 mg, BID WC  magnesium oxide, 400 mg, Daily  [Held by provider] metoprolol tartrate, 12.5 mg, BID  linaCLOtide, 144 mcg, QAM AC  heparin (porcine), 5,000 Units, 3 times per day  sennosides-docusate sodium, 2 tablet, BID  polyethylene glycol, 17 g, Daily  lidocaine, 1 patch, Daily  pantoprazole, 20 mg, QAM AC  sodium chloride flush, 10 mL, 2 times per day  aspirin, 325 mg, Daily  chlorhexidine, 15 mL, BID  sodium chloride (PF), 10 mL, Daily  atorvastatin, 80 mg, Nightly       Patient has no known allergies.     Allergies:   No Known Allergies      Physical Exam/Objective:   Vitals:    22 0745   BP: (!) 100/50   Pulse: 78   Resp: 21   Temp:    SpO2: Intake/Output Summary (Last 24 hours) at 3/5/2022 0751  Last data filed at 3/4/2022 1226  Gross per 24 hour   Intake 400 ml   Output 1206 ml   Net -806 ml         General appearance: awake alert female in NAD  HEENT: no icterus, EOM intact, trachea midline. Neck : no masses, appears symmetrical and no JVD appreciated. Respiratory: Respiratory effort normal, bilateral equal chest rise. Cardiovascular: Ausculation shows RRR and  + mild LE edema   Abdomen: abdomen is soft, non distended  Musculoskeletal:  no joint swelling, no deformity  Skin: no rashes, no induration, no tightening, no jaundice   Neuro: no localizing deficits    Left temporary dialysis catheter.          Data:   CBC:   Recent Labs     03/03/22 0453 03/04/22 0437 03/05/22  0255   WBC 14.8* 16.4* 15.7*   HGB 8.4* 8.3* 7.9*   HCT 25.5* 25.5* 24.1*   * 126* 146     BMP:    Recent Labs     03/03/22 0453 03/04/22 0437 03/05/22  0255   * 136 137   K 3.3* 3.4* 3.5   CL 99 100 99   CO2 24 23 23   BUN 28* 51* 37*   CREATININE 2.8* 4.7* 4.2*   GLUCOSE 98 92 91   MG 2.30 2.30 2.20   PHOS  --   --  1.4*

## 2022-03-05 NOTE — PROGRESS NOTES
Progress Note    S/P cabgx4, EDISON exclusion 2/25/22    Vital Signs:                                                 BP (!) 119/53   Pulse 78   Temp 97.7 °F (36.5 °C) (Oral)   Resp 22   Ht 5' 3\" (1.6 m)   Wt 197 lb 8.5 oz (89.6 kg)   SpO2 98%   BMI 34.99 kg/m²  O2 Flow Rate (L/min): 0 L/min   SR  Admission Weight: 168 lb 1.6 oz (76.2 kg)  lb/87 kg     I/O:      Intake/Output Summary (Last 24 hours) at 3/5/2022 0816  Last data filed at 3/4/2022 1226  Gross per 24 hour   Intake 400 ml   Output 1206 ml   Net -806 ml     CV: reg, wound c/d/i  Pulm: decreased  Abd: soft  Ext: warm, trace edema    Data Review:  CBC:   Recent Labs     03/03/22 0453 03/04/22 0437 03/05/22  0255   WBC 14.8* 16.4* 15.7*   HGB 8.4* 8.3* 7.9*   HCT 25.5* 25.5* 24.1*   MCV 87.5 87.8 87.7   * 126* 146     BMP:   Recent Labs     03/03/22 0453 03/04/22  0437 03/05/22  0255   * 136 137   K 3.3* 3.4* 3.5   CL 99 100 99   CO2 24 23 23   PHOS  --   --  1.4*   BUN 28* 51* 37*   CREATININE 2.8* 4.7* 4.2*   CALCIUM 9.3 8.7 8.3   MG 2.30 2.30 2.20     Cardiac Enzymes: No results for input(s): CKTOTAL, CKMB, CKMBINDEX, TROPONINI in the last 72 hours. PT/INR:   No results for input(s): PROTIME, INR in the last 72 hours. APTT:   No results for input(s): APTT in the last 72 hours. Assessment/Plan:  CV - SR.    - EF nl. BB - holding since bp marginal.    - HLD/CAD. Statin. pulm - pulm toilet, off O2  Renal - acute on CKD, BL Cr 1.4-1.7. at preop weight. HD today. tunneled cath 3/7  ID - off steroids. Some residual effect on wbc. Heme - dilutional anemia.  Fe   - ASA   - sc hep, scds dvt prophylaxis  dispo - d/c planning, likely ready after vasc access 3/7      Yue Young MD  3/5/2022  8:16 AM

## 2022-03-06 LAB
ANION GAP SERPL CALCULATED.3IONS-SCNC: 14 MMOL/L (ref 3–16)
BUN BLDV-MCNC: 31 MG/DL (ref 7–20)
CALCIUM SERPL-MCNC: 7.7 MG/DL (ref 8.3–10.6)
CHLORIDE BLD-SCNC: 97 MMOL/L (ref 99–110)
CO2: 25 MMOL/L (ref 21–32)
CREAT SERPL-MCNC: 4.2 MG/DL (ref 0.6–1.2)
GFR AFRICAN AMERICAN: 12
GFR NON-AFRICAN AMERICAN: 10
GLUCOSE BLD-MCNC: 96 MG/DL (ref 70–99)
HCT VFR BLD CALC: 23.9 % (ref 36–48)
HEMOGLOBIN: 7.8 G/DL (ref 12–16)
MAGNESIUM: 2.2 MG/DL (ref 1.8–2.4)
MCH RBC QN AUTO: 28.6 PG (ref 26–34)
MCHC RBC AUTO-ENTMCNC: 32.6 G/DL (ref 31–36)
MCV RBC AUTO: 87.8 FL (ref 80–100)
PDW BLD-RTO: 15.8 % (ref 12.4–15.4)
PLATELET # BLD: 180 K/UL (ref 135–450)
PMV BLD AUTO: 8.1 FL (ref 5–10.5)
POTASSIUM SERPL-SCNC: 4.1 MMOL/L (ref 3.5–5.1)
RBC # BLD: 2.72 M/UL (ref 4–5.2)
SODIUM BLD-SCNC: 136 MMOL/L (ref 136–145)
WBC # BLD: 13.9 K/UL (ref 4–11)

## 2022-03-06 PROCEDURE — 51798 US URINE CAPACITY MEASURE: CPT

## 2022-03-06 PROCEDURE — 94760 N-INVAS EAR/PLS OXIMETRY 1: CPT

## 2022-03-06 PROCEDURE — 6370000000 HC RX 637 (ALT 250 FOR IP): Performed by: THORACIC SURGERY (CARDIOTHORACIC VASCULAR SURGERY)

## 2022-03-06 PROCEDURE — 36415 COLL VENOUS BLD VENIPUNCTURE: CPT

## 2022-03-06 PROCEDURE — 83735 ASSAY OF MAGNESIUM: CPT

## 2022-03-06 PROCEDURE — 6360000002 HC RX W HCPCS: Performed by: NURSE PRACTITIONER

## 2022-03-06 PROCEDURE — 6370000000 HC RX 637 (ALT 250 FOR IP): Performed by: NURSE PRACTITIONER

## 2022-03-06 PROCEDURE — 99024 POSTOP FOLLOW-UP VISIT: CPT | Performed by: THORACIC SURGERY (CARDIOTHORACIC VASCULAR SURGERY)

## 2022-03-06 PROCEDURE — 85027 COMPLETE CBC AUTOMATED: CPT

## 2022-03-06 PROCEDURE — 2140000000 HC CCU INTERMEDIATE R&B

## 2022-03-06 PROCEDURE — 80048 BASIC METABOLIC PNL TOTAL CA: CPT

## 2022-03-06 PROCEDURE — 2580000003 HC RX 258: Performed by: THORACIC SURGERY (CARDIOTHORACIC VASCULAR SURGERY)

## 2022-03-06 RX ORDER — CALCIUM CARBONATE 200(500)MG
500 TABLET,CHEWABLE ORAL 3 TIMES DAILY
Status: COMPLETED | OUTPATIENT
Start: 2022-03-06 | End: 2022-03-06

## 2022-03-06 RX ADMIN — HEPARIN SODIUM 5000 UNITS: 5000 INJECTION INTRAVENOUS; SUBCUTANEOUS at 06:15

## 2022-03-06 RX ADMIN — SENNOSIDES AND DOCUSATE SODIUM 2 TABLET: 50; 8.6 TABLET ORAL at 20:25

## 2022-03-06 RX ADMIN — FERROUS SULFATE TAB EC 324 MG (65 MG FE EQUIVALENT) 324 MG: 324 (65 FE) TABLET DELAYED RESPONSE at 08:18

## 2022-03-06 RX ADMIN — FERROUS SULFATE TAB EC 324 MG (65 MG FE EQUIVALENT) 324 MG: 324 (65 FE) TABLET DELAYED RESPONSE at 15:09

## 2022-03-06 RX ADMIN — CHLORHEXIDINE GLUCONATE 0.12% ORAL RINSE 15 ML: 1.2 LIQUID ORAL at 20:25

## 2022-03-06 RX ADMIN — ANTACID TABLETS 500 MG: 500 TABLET, CHEWABLE ORAL at 08:18

## 2022-03-06 RX ADMIN — ANTACID TABLETS 500 MG: 500 TABLET, CHEWABLE ORAL at 15:09

## 2022-03-06 RX ADMIN — PANTOPRAZOLE SODIUM 20 MG: 20 TABLET, DELAYED RELEASE ORAL at 08:18

## 2022-03-06 RX ADMIN — LACTULOSE 20 G: 10 SOLUTION ORAL at 20:25

## 2022-03-06 RX ADMIN — ATORVASTATIN CALCIUM 80 MG: 80 TABLET, FILM COATED ORAL at 20:25

## 2022-03-06 RX ADMIN — ASPIRIN 325 MG: 325 TABLET, COATED ORAL at 08:18

## 2022-03-06 RX ADMIN — Medication 400 MG: at 08:18

## 2022-03-06 RX ADMIN — AMITRIPTYLINE HYDROCHLORIDE 25 MG: 25 TABLET, FILM COATED ORAL at 20:25

## 2022-03-06 RX ADMIN — SODIUM CHLORIDE, PRESERVATIVE FREE 10 ML: 5 INJECTION INTRAVENOUS at 08:26

## 2022-03-06 RX ADMIN — ANTACID TABLETS 500 MG: 500 TABLET, CHEWABLE ORAL at 20:25

## 2022-03-06 ASSESSMENT — PAIN SCALES - WONG BAKER: WONGBAKER_NUMERICALRESPONSE: 0

## 2022-03-06 ASSESSMENT — PAIN SCALES - GENERAL
PAINLEVEL_OUTOF10: 0

## 2022-03-06 NOTE — PROGRESS NOTES
Most recent lab work showed [facility] AND LUNG [place]. [facility]   [facility]. [place]  [phone]  Nephrology Note          Patient ID: Ajay Acosta  Referring/ Physician: No att. providers found      HPI/Summary:   Ajay Acosta is being seen by nephrology for SKINNY. This is a [de-identified] yo lady with PMH of HTN, CKD, HLD and obesity who presented with chest pain and SOB. She had been having several weeks of chest tightness and dyspnea with exertion. She feels ok now, no complaints. Says she has been on medication for BP for decades. Her Bp had been running prior to admission. She has been taking Meloxicam for the past few years for knee pain/arthritis. Denies hematuria, foamy urine. LUTS. No dysuria. No issues with swelling right now. During this admission she has had LHC 2/22/22 showing severe triple vessel disease. Normal LV function. LVEDP 14-16. She was referred for CABG and it is planned for 2/25/22 . Cr has been rising from 1.2 > 1.3 > 1.5 at time of consult. Of note , she has been on scheduled meloxicam in addition to receiving contrast. Her PO intake has been fine, no NVD. Started CRRT 2/28/2022, stopped 3/2/2022    Interval hx:   Seen and examined at bedside. She is awake and alert and in no distress. Patient denies SOB, chest pain, NVD  Her vascath came out last night      Bp 109/49  No UO recorded. Had 1 L UF yesterday but did require albumin and midodrine     Labs reviewed. Na 136  K 4.1  hgb 7.8      Plan:  Oliguric SKINNY post CABG  Appears euvolemic at current weight. No acute indications for dialysis today   [facility] tomorrow . - will continue to assess daily for dialysis needs. 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 Cr peaked at 1.5  She was on meloxicam and got contrast 2/22, Cr started to rise two days later consistent with DANNY.    She is not volume overloaded or dehydrated on exam.   Uric acid  CK  UA showed no protein or cells, totally bland. On CRRT 2/28/2022-3/2/2022 for anuric SKINNY. #CAD triple vessel disease  LHC 2/22  CABG planned 2/25  Asa statin and BB  Preserved EF  Not volume overloaded. #full code. [facility] would like to thank you for the opportunity to serve this patient. Please call with any questions or concerns. Hipolito Rodas MD  [facility]  [address]  [place], [address]  Fax: [phone]  Office: [phone]         CC/Reason for consult:   Reason for consult: SKINNY  Chief Complaint   Patient presents with    Chest Pain    Hypertension           Review of Systems:   [address]. All other remaining systems are negative. Constitutional:  fever, chills, weakness, weight change, fatigue,      Skin:  rash, pruritus, hair loss, bruising, dry skin, petechiae. Head, Face, Neck   headaches, swelling,  cervical adenopathy. Respiratory: shortness of breath, cough, or wheezing  Cardiovascular: chest pain, palpitations, dizzy, edema  Gastrointestinal: nausea, vomiting, diarrhea, constipation,belly pain    Yellow skin, blood in stool  Musculoskeletal:  back pain, muscle weakness, gait problems,       joint pain or swelling. Genitourinary:  dysuria, poor urine flow, flank pain, blood in urine  Neurologic:  vertigo, TIA'S, syncope, seizures, focal weakness  Psychosocial:  insomnia, anxiety, or depression. Additional positive findings: - + feeling cold      PMH/SH/FH:    Medical Hx: reviewed and updated as appropriate  Past Medical History:   Diagnosis Date    Arthritis     Asthma     as child grew out of it    CAD (coronary artery disease) 02/22/2022    multi vessel    CKD (chronic kidney disease) 2015    Elevated creatinine since 2015; Stage III b    Glaucoma     Hyperlipidemia     Hypertension     Hyperthyroidism 2019    Graves disease    IBS (irritable bowel syndrome) 10/2020    On Linzess    Neuropathy     Peripheral    Obesity (BMI 30-39. 9)     Osteopenia 2009    Osteoporosis          Surgical Hx: reviewed and updated as appropriate   has a past surgical history that includes Cholecystectomy; Foot surgery; Tubal ligation; Gastric Band ([id number]); orif femur decompression (Left, 2014); Ankle fracture surgery (Left, 2015); Hand surgery (); other surgical history (Right, ); Coronary artery bypass graft (2022); and Coronary artery bypass graft (N/A, 2022). Social Hx: reviewed and updated as appropriate  Social History     Tobacco Use    Smoking status: Former Smoker     Quit date: 8/10/1999     Years since quittin.5    Smokeless tobacco: Never Used    Tobacco comment: started age 26   Substance Use Topics    Alcohol use: No        Family hx: reviewed and updated as appropriate  family history includes Heart Disease in her father; Rheum Arthritis in her sister; Stroke in her father. Medications:   calcium carbonate, 500 mg, TID  lactulose, 20 g, TID  amitriptyline, 25 mg, Nightly  ferrous sulfate, 324 mg, BID WC  magnesium oxide, 400 mg, Daily  linaCLOtide, 144 mcg, QAM AC  [Held by provider] heparin (porcine), 5,000 Units, 3 times per day  sennosides-docusate sodium, 2 tablet, BID  polyethylene glycol, 17 g, Daily  lidocaine, 1 patch, Daily  pantoprazole, 20 mg, QAM AC  sodium chloride flush, 10 mL, 2 times per day  aspirin, 325 mg, Daily  chlorhexidine, 15 mL, BID  sodium chloride (PF), 10 mL, Daily  atorvastatin, 80 mg, Nightly       Patient has no known allergies. Allergies:   No Known Allergies      Physical Exam/Objective:   Vitals:    22 0810   BP:    Pulse:    Resp: 24   Temp:    SpO2: 96%       Intake/Output Summary (Last 24 hours) at 3/6/2022 0839  Last data filed at 3/6/2022 0011  Gross per 24 hour   Intake 374.1 ml   Output --   Net 374.1 ml         General appearance: awake alert female in NAD  HEENT: no icterus, EOM intact, trachea midline.    Neck : no masses, appears symmetrical and no JVD appreciated. Respiratory: Respiratory effort normal, bilateral equal chest rise. Cardiovascular: Ausculation shows RRR and  + mild LE edema   Abdomen: abdomen is soft, non distended  Musculoskeletal:  no joint swelling, no deformity  Skin: no rashes, no induration, no tightening, no jaundice   Neuro: no localizing deficits    Left temporary dialysis catheter.          Data:   CBC:   Recent Labs     03/04/22 0437 03/05/22  0255 03/06/22  0726   WBC 16.4* 15.7* 13.9*   HGB 8.3* 7.9* 7.8*   HCT 25.5* 24.1* 23.9*   * 146 180     BMP:    Recent Labs     03/04/22 0437 03/05/22  0255 03/06/22  0726    137 136   K 3.4* 3.5 4.1    99 97*   CO2 23 23 25   BUN 51* 37* 31*   CREATININE 4.7* 4.2* 4.2*   GLUCOSE 92 91 96   MG 2.30 2.20 2.20   PHOS  --  1.4*  --

## 2022-03-06 NOTE — PROGRESS NOTES
[facility] No further questions asked. Last night patient pulled vas cath out. This morning this RN placed 22g PIV to right AC without issue. Morning labs drawn. Dr. Bonilla Meth aware. Order placed for TUMS for calcium replacement. Spoke with patient's family for updates.

## 2022-03-06 NOTE — PROGRESS NOTES
Progress Note    S/P cabgx4, EDISON exclusion 2/25/22    Vital Signs:                                                 BP (!) 109/49   Pulse 79   Temp 98.2 °F (36.8 °C) (Oral)   Resp 20   Ht 5' 3\" (1.6 m)   Wt 188 lb 15 oz (85.7 kg)   SpO2 97%   BMI 33.47 kg/m²  O2 Flow Rate (L/min): 0 L/min   SR  Admission Weight: 168 lb 1.6 oz (76.2 kg)  lb/87 kg     I/O:      Intake/Output Summary (Last 24 hours) at 3/6/2022 0734  Last data filed at 3/6/2022 0011  Gross per 24 hour   Intake 674.1 ml   Output 1300 ml   Net -625.9 ml     CV: reg, wound c/d/i  Pulm: decreased  Abd: soft  Ext: warm, trace edema    Data Review:  CBC:   Recent Labs     03/04/22  0437 03/05/22  0255   WBC 16.4* 15.7*   HGB 8.3* 7.9*   HCT 25.5* 24.1*   MCV 87.8 87.7   * 146     BMP:   Recent Labs     03/04/22  0437 03/05/22  0255    137   K 3.4* 3.5    99   CO2 23 23   PHOS  --  1.4*   BUN 51* 37*   CREATININE 4.7* 4.2*   CALCIUM 8.7 8.3   MG 2.30 2.20     Cardiac Enzymes: No results for input(s): CKTOTAL, CKMB, CKMBINDEX, TROPONINI in the last 72 hours. PT/INR:   No results for input(s): PROTIME, INR in the last 72 hours. APTT:   No results for input(s): APTT in the last 72 hours. Assessment/Plan:  CV - SR.    - EF nl. Midodrine prn   - HLD/CAD. Statin. pulm - pulm toilet, off O2  Renal - acute on CKD, BL Cr 1.4-1.7. at preop weight. tunneled cath 3/7  ID - off steroids. Some residual effect on wbc. Heme - dilutional anemia.  Fe   - ASA   - sc hep, scds dvt prophylaxis  dispo - d/c planning, likely ready after vasc access 3/7      Shashank Trevino MD  3/6/2022  7:34 AM

## 2022-03-06 NOTE — PROGRESS NOTES
Jael Beaver RN attempted x2 to stick IV. Call made to lab to make sure they were aware of pending lab draws for pt now that she is no longer unit collect. Will come draw.

## 2022-03-06 NOTE — PLAN OF CARE
Problem: Falls - Risk of:  Goal: Will remain free from falls  Description: Will remain free from falls  Outcome: Met This Shift  Goal: Absence of physical injury  Description: Absence of physical injury  Outcome: Met This Shift     Problem: Pain:  Goal: Pain level will decrease  Description: Pain level will decrease  Outcome: Met This Shift  Goal: Control of acute pain  Description: Control of acute pain  Outcome: Met This Shift     Problem: Cardiac Output - Decreased:  Goal: Cardiac output within specified parameters  Description: Cardiac output within specified parameters  Outcome: Met This Shift  Goal: Hemodynamic stability will improve  Description: Hemodynamic stability will improve  Outcome: Met This Shift     Problem: Gas Exchange - Impaired:  Goal: Levels of oxygenation will improve  Description: Levels of oxygenation will improve  Outcome: Met This Shift  Goal: Ability to maintain adequate ventilation will improve  Description: Ability to maintain adequate ventilation will improve  Outcome: Met This Shift     Problem: Pain:  Goal: Control of acute pain  Description: Control of acute pain  Outcome: Met This Shift  Goal: Control of chronic pain  Description: Control of chronic pain  Outcome: Met This Shift     Problem: Tissue Perfusion - Cardiopulmonary, Altered:  Goal: Absence of angina  Description: Absence of angina  Outcome: Met This Shift  Goal: Hemodynamic stability will improve  Description: Hemodynamic stability will improve  Outcome: Met This Shift  Goal: Will show no evidence of cardiac arrhythmias  Description: Will show no evidence of cardiac arrhythmias  Outcome: Met This Shift     Problem: Skin Integrity:  Goal: Absence of new skin breakdown  Description: Absence of new skin breakdown  Outcome: Ongoing     Problem: Nutrition  Goal: Optimal nutrition therapy  Outcome: Ongoing     Problem: Discharge Planning:  Goal: Discharged to appropriate level of care  Description: Discharged to appropriate level of care  Outcome: Ongoing     Problem: Anxiety:  Goal: Level of anxiety will decrease  Description: Level of anxiety will decrease  Outcome: Ongoing     Problem: Fluid Volume - Imbalance:  Goal: Ability to achieve a balanced intake and output will improve  Description: Ability to achieve a balanced intake and output will improve  Outcome: Ongoing     Problem: Tissue Perfusion - Renal, Altered:  Goal: Ability to achieve a balanced intake and output will improve  Description: Ability to achieve a balanced intake and output will improve  Outcome: Ongoing     Problem:  Activity Intolerance:  Goal: Able to perform prescribed physical activity  Description: Able to perform prescribed physical activity  Outcome: Not Met This Shift  Goal: Ability to tolerate increased activity will improve  Description: Ability to tolerate increased activity will improve  Outcome: Not Met This Shift     Problem: Coping:  Goal: Ability to cope will improve  Description: Ability to cope will improve  Outcome: Not Met This Shift     Problem: Skin Integrity:  Goal: Will show no infection signs and symptoms  Description: Will show no infection signs and symptoms  Outcome: Completed     Problem: Fluid Volume - Imbalance:  Goal: Chest tube drainage is within specified parameters  Description: Chest tube drainage is within specified parameters  Outcome: Completed     Problem: Infection - Central Venous Catheter-Associated Bloodstream Infection:  Goal: Will show no infection signs and symptoms  Description: Will show no infection signs and symptoms  Outcome: Completed

## 2022-03-06 NOTE — PROGRESS NOTES
When going in for 4am vitals and lab draw, pt had partially pulled vas cath out of neck. Line kinked and out several inches. One set of sutures broken, with one set intact. Pt in bed, site cleansed with chlorprep. Instructed pt to hold breath and line pulled with vaseline gauze and a dry dressing applied. Hemostasis achieved. Line intact. Tegaderm applied. Pt tolerated well. Pt changed to lab draw at this time with labs ordered as STAT. Attempted PIV x1 and unsuccessful.

## 2022-03-06 NOTE — PROGRESS NOTES
Message sent to nephrology. Dr. Quiñones to make aware of pt pulling vas cath partially out, and line needing to be removed completely.

## 2022-03-07 ENCOUNTER — APPOINTMENT (OUTPATIENT)
Dept: INTERVENTIONAL RADIOLOGY/VASCULAR | Age: 82
DRG: 233 | End: 2022-03-07
Payer: MEDICARE

## 2022-03-07 LAB
ANION GAP SERPL CALCULATED.3IONS-SCNC: 18 MMOL/L (ref 3–16)
BUN BLDV-MCNC: 44 MG/DL (ref 7–20)
CALCIUM SERPL-MCNC: 8.5 MG/DL (ref 8.3–10.6)
CHLORIDE BLD-SCNC: 97 MMOL/L (ref 99–110)
CO2: 22 MMOL/L (ref 21–32)
CREAT SERPL-MCNC: 6 MG/DL (ref 0.6–1.2)
GFR AFRICAN AMERICAN: 8
GFR NON-AFRICAN AMERICAN: 7
GLUCOSE BLD-MCNC: 94 MG/DL (ref 70–99)
HCT VFR BLD CALC: 25 % (ref 36–48)
HEMOGLOBIN: 8.2 G/DL (ref 12–16)
MAGNESIUM: 2.3 MG/DL (ref 1.8–2.4)
MCH RBC QN AUTO: 28.6 PG (ref 26–34)
MCHC RBC AUTO-ENTMCNC: 32.7 G/DL (ref 31–36)
MCV RBC AUTO: 87.4 FL (ref 80–100)
PDW BLD-RTO: 15.6 % (ref 12.4–15.4)
PLATELET # BLD: 207 K/UL (ref 135–450)
PMV BLD AUTO: 8.3 FL (ref 5–10.5)
POTASSIUM SERPL-SCNC: 4.3 MMOL/L (ref 3.5–5.1)
RBC # BLD: 2.86 M/UL (ref 4–5.2)
SODIUM BLD-SCNC: 137 MMOL/L (ref 136–145)
WBC # BLD: 13 K/UL (ref 4–11)

## 2022-03-07 PROCEDURE — 90935 HEMODIALYSIS ONE EVALUATION: CPT

## 2022-03-07 PROCEDURE — 2709999900 IR TUNNELED CVC PLACE WO SQ PORT/PUMP > 5 YEARS

## 2022-03-07 PROCEDURE — 97530 THERAPEUTIC ACTIVITIES: CPT

## 2022-03-07 PROCEDURE — 6360000002 HC RX W HCPCS: Performed by: INTERNAL MEDICINE

## 2022-03-07 PROCEDURE — 0JH63XZ INSERTION OF TUNNELED VASCULAR ACCESS DEVICE INTO CHEST SUBCUTANEOUS TISSUE AND FASCIA, PERCUTANEOUS APPROACH: ICD-10-PCS | Performed by: RADIOLOGY

## 2022-03-07 PROCEDURE — 77001 FLUOROGUIDE FOR VEIN DEVICE: CPT

## 2022-03-07 PROCEDURE — 83735 ASSAY OF MAGNESIUM: CPT

## 2022-03-07 PROCEDURE — 6360000002 HC RX W HCPCS: Performed by: RADIOLOGY

## 2022-03-07 PROCEDURE — 6370000000 HC RX 637 (ALT 250 FOR IP): Performed by: NURSE PRACTITIONER

## 2022-03-07 PROCEDURE — 80048 BASIC METABOLIC PNL TOTAL CA: CPT

## 2022-03-07 PROCEDURE — 97535 SELF CARE MNGMENT TRAINING: CPT

## 2022-03-07 PROCEDURE — 99232 SBSQ HOSP IP/OBS MODERATE 35: CPT | Performed by: INTERNAL MEDICINE

## 2022-03-07 PROCEDURE — 2140000000 HC CCU INTERMEDIATE R&B

## 2022-03-07 PROCEDURE — 99024 POSTOP FOLLOW-UP VISIT: CPT | Performed by: THORACIC SURGERY (CARDIOTHORACIC VASCULAR SURGERY)

## 2022-03-07 PROCEDURE — 6370000000 HC RX 637 (ALT 250 FOR IP): Performed by: THORACIC SURGERY (CARDIOTHORACIC VASCULAR SURGERY)

## 2022-03-07 PROCEDURE — 36558 INSERT TUNNELED CV CATH: CPT

## 2022-03-07 PROCEDURE — 94760 N-INVAS EAR/PLS OXIMETRY 1: CPT

## 2022-03-07 PROCEDURE — 05HM33Z INSERTION OF INFUSION DEVICE INTO RIGHT INTERNAL JUGULAR VEIN, PERCUTANEOUS APPROACH: ICD-10-PCS | Performed by: RADIOLOGY

## 2022-03-07 PROCEDURE — 2580000003 HC RX 258: Performed by: THORACIC SURGERY (CARDIOTHORACIC VASCULAR SURGERY)

## 2022-03-07 PROCEDURE — 97116 GAIT TRAINING THERAPY: CPT

## 2022-03-07 PROCEDURE — 76937 US GUIDE VASCULAR ACCESS: CPT

## 2022-03-07 PROCEDURE — 85027 COMPLETE CBC AUTOMATED: CPT

## 2022-03-07 PROCEDURE — 36415 COLL VENOUS BLD VENIPUNCTURE: CPT

## 2022-03-07 PROCEDURE — 51798 US URINE CAPACITY MEASURE: CPT

## 2022-03-07 PROCEDURE — 97110 THERAPEUTIC EXERCISES: CPT

## 2022-03-07 RX ORDER — MIDAZOLAM HYDROCHLORIDE 1 MG/ML
INJECTION INTRAMUSCULAR; INTRAVENOUS DAILY PRN
Status: DISCONTINUED | OUTPATIENT
Start: 2022-03-07 | End: 2022-03-07

## 2022-03-07 RX ORDER — FENTANYL CITRATE 50 UG/ML
INJECTION, SOLUTION INTRAMUSCULAR; INTRAVENOUS DAILY PRN
Status: COMPLETED | OUTPATIENT
Start: 2022-03-07 | End: 2022-03-07

## 2022-03-07 RX ADMIN — ATORVASTATIN CALCIUM 80 MG: 80 TABLET, FILM COATED ORAL at 21:25

## 2022-03-07 RX ADMIN — CHLORHEXIDINE GLUCONATE 0.12% ORAL RINSE 15 ML: 1.2 LIQUID ORAL at 09:00

## 2022-03-07 RX ADMIN — MIDAZOLAM 1 MG: 1 INJECTION INTRAMUSCULAR; INTRAVENOUS at 12:19

## 2022-03-07 RX ADMIN — SODIUM CHLORIDE, PRESERVATIVE FREE 10 ML: 5 INJECTION INTRAVENOUS at 21:25

## 2022-03-07 RX ADMIN — HEPARIN SODIUM 2600 UNITS: 1000 INJECTION INTRAVENOUS; SUBCUTANEOUS at 18:30

## 2022-03-07 RX ADMIN — DIPHENHYDRAMINE HCL 25 MG: 25 TABLET ORAL at 23:18

## 2022-03-07 RX ADMIN — FENTANYL CITRATE 25 MCG: 50 INJECTION INTRAMUSCULAR; INTRAVENOUS at 12:19

## 2022-03-07 RX ADMIN — CHLORHEXIDINE GLUCONATE 0.12% ORAL RINSE 15 ML: 1.2 LIQUID ORAL at 21:25

## 2022-03-07 RX ADMIN — SODIUM CHLORIDE, PRESERVATIVE FREE 10 ML: 5 INJECTION INTRAVENOUS at 09:00

## 2022-03-07 RX ADMIN — CEFAZOLIN 2000 MG: 10 INJECTION, POWDER, FOR SOLUTION INTRAVENOUS at 12:13

## 2022-03-07 RX ADMIN — AMITRIPTYLINE HYDROCHLORIDE 25 MG: 25 TABLET, FILM COATED ORAL at 21:25

## 2022-03-07 ASSESSMENT — PAIN SCALES - GENERAL
PAINLEVEL_OUTOF10: 0

## 2022-03-07 NOTE — PRE SEDATION
Sedation Pre-Procedure Note    Patient Name: Sandra Maher   YOB: 1940  Room/Bed: Z1M-8817/1301-01  Medical Record Number: [id number]  Date: 3/7/2022   Time: 12:33 PM       Indication:  SKINNY here for tunneled HD catheter placement. Consent: I have discussed with the patient and/or the patient representative the indication, alternatives, and the possible risks and/or complications of the planned procedure and the anesthesia methods. The patient and/or patient representative appear to understand and agree to proceed. Vital Signs:   Vitals:    03/07/22 1231   BP: (!) 140/65   Pulse: 80   Resp: 18   Temp:    SpO2: 100%       Past Medical History:   has a past medical history of Arthritis, Asthma, CAD (coronary artery disease), CKD (chronic kidney disease), Glaucoma, Hyperlipidemia, Hypertension, Hyperthyroidism, IBS (irritable bowel syndrome), Neuropathy, Obesity (BMI 30-39.9), Osteopenia, and Osteoporosis. Past Surgical History:   has a past surgical history that includes Cholecystectomy; Foot surgery; Tubal ligation; Gastric Band ([id number]); orif femur decompression (Left, 04/2014); Ankle fracture surgery (Left, 02/12/2015); Hand surgery (2009); other surgical history (Right, 2010); Coronary artery bypass graft (02/25/2022); and Coronary artery bypass graft (N/A, 2/25/2022).     Medications:   Scheduled Meds:    ceFAZolin  2,000 mg IntraVENous Once    lactulose  20 g Oral TID    amitriptyline  25 mg Oral Nightly    ferrous sulfate  324 mg Oral BID WC    magnesium oxide  400 mg Oral Daily    linaCLOtide  144 mcg Oral QAM AC    sennosides-docusate sodium  2 tablet Oral BID    polyethylene glycol  17 g Oral Daily    lidocaine  1 patch TransDERmal Daily    pantoprazole  20 mg Oral QAM AC    sodium chloride flush  10 mL IntraVENous 2 times per day    chlorhexidine  15 mL Mouth/Throat BID    sodium chloride (PF)  10 mL IntraVENous Daily    atorvastatin  80 mg Oral Nightly     Continuous Infusions:    sodium chloride      sodium chloride 5 mL/hr at 03/04/22 0600    dextrose       PRN Meds: midodrine, albumin human, heparin (porcine), sodium chloride, acetaminophen, sodium chloride flush, sodium chloride, ondansetron **OR** ondansetron, diphenhydrAMINE, magnesium sulfate, glucose, dextrose, glucagon (rDNA), dextrose  Home Meds:   Prior to Admission medications    Medication Sig Start Date End Date Taking? Authorizing Provider   amitriptyline (ELAVIL) 10 MG tablet Take 10 mg by mouth every evening 11/5/21  Yes Historical Provider, MD   amLODIPine (NORVASC) 5 MG tablet Take 5 mg by mouth daily 11/5/21  Yes Historical Provider, MD   gabapentin (NEURONTIN) 100 MG capsule Take 100-300 mg by mouth daily. 11/5/21  Yes Historical Provider, MD   hydroCHLOROthiazide (HYDRODIURIL) 25 MG tablet Take 25 mg by mouth daily 11/5/21  Yes Historical Provider, MD   linaCLOtide Claudell Drone) 72 MCG CAPS capsule Take 1-2 capsules by mouth daily  11/5/21  Yes Historical Provider, MD   Propranolol HCl SR Beads (PROPRANOLOL HCL ER BEADS PO) Take 20 mg by mouth daily  11/5/21  Yes Historical Provider, MD   methIMAzole (TAPAZOLE) 5 MG tablet Take 5 mg by mouth daily 11/5/21  Yes Historical Provider, MD     Coumadin Use Last 7 Days:  no  Antiplatelet drug therapy use last 7 days: no  Other anticoagulant use last 7 days: no  Additional Medication Information:  n/a      Pre-Sedation Documentation and Exam:   I have reviewed the patient's history and review of systems.     Mallampati Airway Assessment:  Mallampati Class II - (soft palate, fauces & uvula are visible)    Prior History of Anesthesia Complications:   none    ASA Classification:  Class 2 - A normal healthy patient with mild systemic disease    Sedation/ Anesthesia Plan:   intravenous sedation    Medications Planned:   midazolam (Versed) intravenously and fentanyl intravenously    Patient is an appropriate candidate for plan of sedation: yes    Electronically signed by Guillermo Vanegas MD on 3/7/2022 at 12:33 PM

## 2022-03-07 NOTE — CARE COORDINATION
Dgr, [employer], called me to state that family does not want [place] for their option. They would like to hear if [place] bed. Call placed to Corinne Rob [phone] who reports no beds for tomorrow but she will call me if an unplanned bed becomes available. Called back to Dgtr, [employer] to inform of above. She does not have the list in front of her; offered to email another list to her at:  [email or web address]  Call placed to Ginodinorah Evita at [address] [place] [address] to inquire if they have a spot in the community for her. She will review from the referral from last week. Call placed to Jocelyn to initiate referral for HD at either [facility] or [facility]:  [phone]  [facility] is at capacity.   Possible spot available at [facility] location  For TTS 10:25 am but will need to process referral.  [place], [place]     Case Management   [phone]    3/7/2022  3:30 PM

## 2022-03-07 NOTE — PROGRESS NOTES
Most recent lab work showed [place]. [facility]   [facility]. [employer]  [phone]  Nephrology Note          Patient ID: Tristen Catalan  Referring/ Physician: No att. providers found      HPI/Summary:   Tristen Catalan is being seen by nephrology for SKINNY. This is a [de-identified] yo lady with PMH of HTN, CKD, HLD and obesity who presented with chest pain and SOB. She had been having several weeks of chest tightness and dyspnea with exertion. She feels ok now, no complaints. Says she has been on medication for BP for decades. Her Bp had been running prior to admission. She has been taking Meloxicam for the past few years for knee pain/arthritis. Denies hematuria, foamy urine. LUTS. No dysuria. No issues with swelling right now. During this admission she has had C 2/22/22 showing severe triple vessel disease. Normal LV function. LVEDP 14-16. She was referred for CABG and it is planned for 2/25/22 . Cr has been rising from 1.2 > 1.3 > 1.5 at time of consult. Of note , she has been on scheduled meloxicam in addition to receiving contrast. Her PO intake has been fine, no NVD. Started CRRT 2/28/2022, stopped 3/2/2022    Interval hx:   Seen and examined at bedside. She is sitting up in chair. Family members at bedside. She is awake and alert and in no distress. Patient denies SOB, chest pain, NVD  TDC today       Bp 122/59  No UO recorded. Last post weight 88.5 kilos    Labs reviewed. Cr 4.2 >6      Plan:  Oliguric SKINNY post CABG  target last post weight   HD today after [facility] placement. 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 Cr peaked at 1.5  She was on meloxicam and got contrast 2/22, Cr started to rise two days later consistent with DANNY. She is not volume overloaded or dehydrated on exam.   Uric acid  CK  UA showed no protein or cells, totally bland. On CRRT 2/28/2022-3/2/2022 for anuric SKINNY.     #CAD triple vessel disease  [facility] 2/22  CABG planned 2/25  Asa statin and BB  Preserved EF  Not volume overloaded. #full code. [facility] would like to thank you for the opportunity to serve this patient. Please call with any questions or concerns. Olivia Stephenson MD  [facility]  [address]amela Philip, [address]  Fax: [phone]  Office: [phone]         CC/Reason for consult:   Reason for consult: SKINNY  Chief Complaint   Patient presents with    Chest Pain    Hypertension           Review of Systems:   [address]. All other remaining systems are negative. Constitutional:  fever, chills, weakness, weight change, fatigue,      Skin:  rash, pruritus, hair loss, bruising, dry skin, petechiae. Head, Face, Neck   headaches, swelling,  cervical adenopathy. Respiratory: shortness of breath, cough, or wheezing  Cardiovascular: chest pain, palpitations, dizzy, edema  Gastrointestinal: nausea, vomiting, diarrhea, constipation,belly pain    Yellow skin, blood in stool  Musculoskeletal:  back pain, muscle weakness, gait problems,       joint pain or swelling. Genitourinary:  dysuria, poor urine flow, flank pain, blood in urine  Neurologic:  vertigo, TIA'S, syncope, seizures, focal weakness  Psychosocial:  insomnia, anxiety, or depression. Additional positive findings: - + feeling cold      PMH/SH/FH:    Medical Hx: reviewed and updated as appropriate  Past Medical History:   Diagnosis Date    Arthritis     Asthma     as child grew out of it    CAD (coronary artery disease) 02/22/2022    multi vessel    CKD (chronic kidney disease) 2015    Elevated creatinine since 2015; Stage III b    Glaucoma     Hyperlipidemia     Hypertension     Hyperthyroidism 2019    Graves disease    IBS (irritable bowel syndrome) 10/2020    On Linzess    Neuropathy     Peripheral    Obesity (BMI 30-39. 9)     Osteopenia 05/2009    Osteoporosis          Surgical Hx: reviewed and updated as appropriate   has a past surgical history that includes Cholecystectomy; Foot surgery; Tubal ligation; Gastric Band ([id number]); orif femur decompression (Left, 2014); Ankle fracture surgery (Left, 2015); Hand surgery (); other surgical history (Right, ); Coronary artery bypass graft (2022); and Coronary artery bypass graft (N/A, 2022). Social Hx: reviewed and updated as appropriate  Social History     Tobacco Use    Smoking status: Former Smoker     Quit date: 8/10/1999     Years since quittin.5    Smokeless tobacco: Never Used    Tobacco comment: started age 26   Substance Use Topics    Alcohol use: No        Family hx: reviewed and updated as appropriate  family history includes Heart Disease in her father; Rheum Arthritis in her sister; Stroke in her father. Medications:   lactulose, 20 g, TID  amitriptyline, 25 mg, Nightly  ferrous sulfate, 324 mg, BID WC  magnesium oxide, 400 mg, Daily  linaCLOtide, 144 mcg, QAM AC  sennosides-docusate sodium, 2 tablet, BID  polyethylene glycol, 17 g, Daily  lidocaine, 1 patch, Daily  pantoprazole, 20 mg, QAM AC  sodium chloride flush, 10 mL, 2 times per day  chlorhexidine, 15 mL, BID  sodium chloride (PF), 10 mL, Daily  atorvastatin, 80 mg, Nightly       Patient has no known allergies. Allergies:   No Known Allergies      Physical Exam/Objective:   Vitals:    22 0825   BP: (!) 122/59   Pulse: 81   Resp: 16   Temp: 98.1 °F (36.7 °C)   SpO2: 96%       Intake/Output Summary (Last 24 hours) at 3/7/2022 1020  Last data filed at 3/7/2022 0600  Gross per 24 hour   Intake 640 ml   Output --   Net 640 ml         General appearance: awake alert female in NAD  HEENT: no icterus, EOM intact, trachea midline. Neck : no masses, appears symmetrical and no JVD appreciated. Respiratory: Respiratory effort normal, bilateral equal chest rise. Cardiovascular:  Ausculation shows RRR and  + mild LE edema   Abdomen: abdomen is soft, non distended  Musculoskeletal:  no joint swelling, no deformity  Skin: no rashes, no induration, no tightening, no jaundice   Neuro: no localizing deficits    Left temporary dialysis catheter.          Data:   CBC:   Recent Labs     03/05/22  0255 03/06/22  0726 03/07/22  0400   WBC 15.7* 13.9* 13.0*   HGB 7.9* 7.8* 8.2*   HCT 24.1* 23.9* 25.0*    180 207     BMP:    Recent Labs     03/05/22  0255 03/06/22  0726 03/07/22  0400    136 137   K 3.5 4.1 4.3   CL 99 97* 97*   CO2 23 25 22   BUN 37* 31* 44*   CREATININE 4.2* 4.2* 6.0*   GLUCOSE 91 96 94   MG 2.20 2.20 2.30   PHOS 1.4*  --   --

## 2022-03-07 NOTE — PROGRESS NOTES
Physical Therapy  [facility]  Daily Treatment Note  NAME: Melissa Ball  : 1940  MRN: [id number]    Date of Service: 3/7/2022    Discharge Recommendations:  Continue to assess pending progress,3-5 sessions per week   PT Equipment Recommendations  Other: Defer to next level of care. Melissa Ball scored a 14/24 on the AM-PAC short mobility form. Current research shows that an AM-PAC score of 17 or less is typically not associated with a discharge to the patient's home setting. Based on the patient's AM-PAC score and their current functional mobility deficits, it is recommended that the patient have 3-5 sessions per week of Physical Therapy at d/c to increase the patient's independence. Please see assessment section for further patient specific details. If patient discharges prior to next session this note will serve as a discharge summary. Please see below for the latest assessment towards goals. Assessment   Body structures, Functions, Activity limitations: Decreased functional mobility ; Decreased strength;Decreased safe awareness;Decreased cognition;Decreased endurance  Assessment: [de-identified] y/o female admit 2022 with CAD, NSTEMI.  2022 S/P AtriClip, Urgent CABG x 4. PMH as noted including CKD, IBS, L Femur Fx/ORIF (2014), L Ankle Fx/ORIF (2015), Osteoporosis, Surg R Ear Mass (behind ear, ). PTA (per family) pt living with dtr/s-i-l in apt setting with steps to access, independent daily care and functional mobility. Pt cont confused; ONGOING  cues/physical assist to maintain Sternal Precautions. Able to initiate Transfers/Amb short distances with Flonnie Faden assist.  Requiring ongoing cues/physical assist to adhere to Sternal Precautions; no carryover noted. Cont HIGH Fall Risk. At this time, cont anticipate need cont PT (3-5) upon d/c. Will monitor pt's progress.   Prognosis: Good  Decision Making: Medium Complexity  History: [de-identified] y/o female admit 2/20/2022 with CAD, NSTEMI.  2/25/2022 S/P AtriClip, Urgent CABG x 4. PMH as noted including CKD, IBS, L Femur Fx/ORIF (4/2014), L Ankle Fx/ORIF (2/2015), Osteoporosis, Surg R Ear Mass (behind ear, 2010). Exam: See above. Clinical Presentation: See above. Patient Education: Role of PT, POC, Need to call for assist, Sternal Precautions/Use of Cardiac Pillow, Safe use of Walker, LE Exs. Barriers to Learning: Cognitive. REQUIRES PT FOLLOW UP: Yes  Activity Tolerance  Activity Tolerance: Patient limited by cognitive status; Patient limited by endurance  Activity Tolerance: Pt cont confused; ONGOING  cues/physical assist to maintain Sternal Precautions. Able to initiate Transfers/Amb short distances with Elia Passer assist.  Requiring ongoing cues/physical assist to adhere to Sternal Precautions; no carryover noted. Cont HIGH Fall Risk. Patient Diagnosis(es): The encounter diagnosis was Chest pain, unspecified type. has a past medical history of Arthritis, Asthma, CAD (coronary artery disease), CKD (chronic kidney disease), Glaucoma, Hyperlipidemia, Hypertension, Hyperthyroidism, IBS (irritable bowel syndrome), Neuropathy, Obesity (BMI 30-39.9), Osteopenia, and Osteoporosis. has a past surgical history that includes Cholecystectomy; Foot surgery; Tubal ligation; Gastric Band ([id number]); orif femur decompression (Left, 04/2014); Ankle fracture surgery (Left, 02/12/2015); Hand surgery (2009); other surgical history (Right, 2010); Coronary artery bypass graft (02/25/2022); and Coronary artery bypass graft (N/A, 2/25/2022). Restrictions  Restrictions/Precautions  Restrictions/Precautions: Fall Risk  Position Activity Restriction  Sternal Precautions: 2/25/2022 S/P AtriClip, Urgent CABG x 4.  Other position/activity restrictions: Room Air  Subjective   General  Chart Reviewed:  Yes  Additional Pertinent Hx: [de-identified] y/o female admit 2/20/2022 with CAD, NSTEMI.  2/25/2022 S/P AtriClip, Urgent CABG x 4.  2/28/2022 CRRT started. PMH as noted including CKD, IBS, L Femur Fx/ORIF (4/2014), L Ankle Fx/ORIF (2/2015), Osteoporosis, Surg R Ear Mass (behind ear, 2010). Response To Previous Treatment: Patient with no complaints from previous session. Family / Caregiver Present: No  Referring Practitioner: Dr. Rj Singleton  Subjective  Subjective: Pt agreeable to PT Rx. Pt cont pleasantly confused; requiring ongoing cues reorient recent events/situation and cont Sternal Precautions. Orientation  Orientation  Overall Orientation Status: Impaired  Orientation Level: Oriented to person;Disoriented to place; Disoriented to time;Disoriented to situation (Cont cues Sternal Precautions/Use of Cardiac Pillow : no carryover noted.)  Cognition   Cognition  Overall Cognitive Status: Exceptions  Arousal/Alertness: Appropriate responses to stimuli  Following Commands: Follows one step commands with increased time; Follows one step commands with repetition  Attention Span: Attends with cues to redirect  Memory: Decreased recall of recent events;Decreased recall of precautions;Decreased short term memory  Problem Solving: Decreased awareness of errors  Insights: Decreased awareness of deficits  Initiation: Requires cues for some  Sequencing: Requires cues for some  Cognition Comment: Constant cuing/assist to maintain sternal precautions  Objective   Bed mobility  Supine to Sit: Maximum assistance (HOB elevated. Use of Cardiac Pillow.)  Transfers  Sit to Stand: Minimal Assistance (With [place]. Use of Cardiac Pillow.)  Stand to sit: Minimal Assistance (With [place]. Use of Cardiac Pillow.)  Comment: Completed multiple transfers from various surfaces with Carl Ruizi assist.  Pt requiring ongoing constant cues/physical assist to maintain hands on Cardiac Pillow for Sternal Precautions.   Ambulation  Ambulation?: Yes  Ambulation 1  Surface: level tile  Device: Rolling Walker  Distance: Pt amb short distances within hospital room setting, to/from bathroom and additional 40' x 2 with Cherry Bugs Min assist.  Diminished step length/clearance; increase cues in prep transitional activities. Balance  Comments: EOB : CGA. Stand/Amb with Walker : short distances Min assist.  Exercises  Hip Flexion: 10x2 B LEs. Knee Long Arc Quad: 10x2 B LEs. Ankle Pumps: Toe/Heel Raises 10x2 B LEs. Comments: Pt requiring ongoing cues/redirection with exs. Comment: Ongoing pt ed re : Sternal Precautions/Use of Cardiac Pillow. Very poor carryover despite ongoing pt ed. AM-PAC Score  AM-PAC Inpatient Mobility Raw Score : 14 (03/07/22 1007)  AM-PAC Inpatient T-Scale Score : 38.1 (03/07/22 1007)  Mobility Inpatient CMS 0-100% Score: 61.29 (03/07/22 1007)  Mobility Inpatient CMS G-Code Modifier : CL (03/07/22 1007)          Goals  Short term goals  Time Frame for Short term goals: Upon d/c acute care setting. Short term goal 1: Bed Mob Mod assist; adhere to Sternal Precautions. Short term goal 2: Transfers with assist device Min assist; adhere to Sternal Precautions. Short term goal 3: Amb with assist device 48' CGA. Short term goal 4: Stair Negotiation as approp. Patient Goals   Patient goals : Return home with family assist/support. Plan    Plan  Times per week: 3-5x week while in acute care setting.   Current Treatment Recommendations: Strengthening,Functional Mobility Training,Transfer Training,Gait Training,Stair training,Safety Education & Training,Patient/Caregiver Education & Training  Safety Devices  Type of devices: Call light within reach,Left in chair,Nurse notified     Therapy Time   Individual Concurrent Group Co-treatment   Time In 0730         Time Out 0830         Minutes [address] [facility], PT

## 2022-03-07 NOTE — PROGRESS NOTES
Occupational Therapy  Facility/Department: [facility] CV[facility]  Daily Treatment Note  NAME: Juaquin Kelly  : 1940  MRN: [id number]    Date of Service: 3/7/2022    Discharge Recommendations:  Patient would benefit from continued therapy after discharge,3-5 sessions per week  OT Equipment Recommendations  Other: defer to DC facility    Juaquin Kelly scored a  on the AM-PAC ADL Inpatient form. Current research shows that an AM-PAC score of 17 or less is typically not associated with a discharge to the patient's home setting. Based on the patient's AM-PAC score and their current ADL deficits, it is recommended that the patient have 3-5 sessions per week of Occupational Therapy at d/c to increase the patient's independence. Please see assessment section for further patient specific details. If patient discharges prior to next session this note will serve as a discharge summary. Please see below for the latest assessment towards goals. Assessment   Performance deficits / Impairments: Decreased functional mobility ; Decreased safe awareness;Decreased balance;Decreased ADL status; Decreased cognition;Decreased high-level IADLs;Decreased endurance;Decreased strength  Assessment: [de-identified] y/o female admit 2022 with CAD, NSTEMI.  2022 S/P AtriClip, Urgent CABG x 4.  CRRT initiated. PTA pt lived at home with family and was independent with ADLs and functional mobility. Today, pt oriented to self only but able to follow simple commands. Pt completed multiple stands to RW with min A. Pt requires constant cuing and assist to hold hands on cardiac pillow. Pt tolerated multipled short bouts of ambulation in room/bathroom with RW and min A. Pt limited by cognition and decreased carry over. Pt completed grooming tasks seated in front of sink with assist with set up. Pt with functional UE ROM for self care and anticipate will require up to max A for ADLs at this time.  Pt is functioning below baseline and benefit from skilled therapy. Prognosis: Good  OT Education: OT Role;Transfer Training;Plan of Care;Orientation;Precautions  Barriers to Learning: confusion; decrease carry over  REQUIRES OT FOLLOW UP: Yes  Activity Tolerance  Activity Tolerance: Treatment limited secondary to decreased cognition  Activity Tolerance: limited by cognition/decreased carry over. Safety Devices  Safety Devices in place: Yes  Type of devices: Nurse notified;Gait belt;Call light within reach; Left in chair         Patient Diagnosis(es): The encounter diagnosis was Chest pain, unspecified type. has a past medical history of Arthritis, Asthma, CAD (coronary artery disease), CKD (chronic kidney disease), Glaucoma, Hyperlipidemia, Hypertension, Hyperthyroidism, IBS (irritable bowel syndrome), Neuropathy, Obesity (BMI 30-39.9), Osteopenia, and Osteoporosis. has a past surgical history that includes Cholecystectomy; Foot surgery; Tubal ligation; Gastric Band ([id number]); orif femur decompression (Left, 04/2014); Ankle fracture surgery (Left, 02/12/2015); Hand surgery (2009); other surgical history (Right, 2010); Coronary artery bypass graft (02/25/2022); and Coronary artery bypass graft (N/A, 2/25/2022). Restrictions  Restrictions/Precautions  Restrictions/Precautions: Fall Risk  Position Activity Restriction  Sternal Precautions: 2/25/2022 S/P AtriClip, Urgent CABG x 4.  Other position/activity restrictions: Room Air     Subjective   General  Chart Reviewed: Yes  Patient assessed for rehabilitation services?: Yes  Additional Pertinent Hx: [de-identified] y/o female admit 2/20/2022 with CAD, NSTEMI.  2/25/2022 S/P AtriClip, Urgent CABG x 4. 2/28- 3/2 CRRT  Family / Caregiver Present: No  Referring Practitioner: MARKEL Ybarra CNP  Subjective  Subjective: Pt seen bedside and agreeable to therapy. Pt pleasantly confused.   General Comment  Comments: Pt requires constant cuing to maintain sternal precautions and requires physical assistance to hold hands on cardiac pillow      Orientation  Orientation  Overall Orientation Status: Impaired  Orientation Level: Oriented to person;Disoriented to situation;Disoriented to time;Disoriented to place     Objective    ADL  Grooming: Setup (assist to put toothpaste on brush- pt then able to brush teeth seated in chair)  LE Dressing: Maximum assistance (assist to anita depends seated on toilet)  Toileting: Dependent/Total (assist for hygiene following voiding attempt on toilet. Assist to pull depends over hips)        Balance  Sitting Balance:  (assist to bring trunk forward in chair in prep for standing)  Standing Balance: Minimal assistance  Standing Balance  Time: stood prn for toileting and prn prior to each bout of ambulation  Functional Mobility  Functional Mobility Comments: Multiple short bouts of ambulation (bed > bathroom > chair > short distance in [place] > bathroom > chair) with RW and min A. Toilet Transfers  Toilet - Technique: Ambulating  Equipment Used: Standard toilet  Toilet Transfer: Moderate assistance     Bed mobility  Supine to Sit: Maximum assistance  Sit to Supine:  (pt in chair at end of session)     Transfers  Sit to stand: Minimal assistance  Stand to sit: Minimal assistance  Transfer Comments: multiple stands from different surface levels with min A to/from RW- Pt requires constant cuing and physical assist to hold hands on cardiac pillow to maintian sternal precautions. Cognition  Overall Cognitive Status: Exceptions  Arousal/Alertness: Appropriate responses to stimuli  Following Commands: Follows one step commands with increased time; Follows one step commands with repetition  Attention Span: Attends with cues to redirect  Memory: Decreased recall of recent events;Decreased recall of precautions;Decreased short term memory  Problem Solving: Decreased awareness of errors  Insights: Decreased awareness of deficits  Initiation: Requires cues for some  Sequencing: Requires cues for some  Cognition Comment: Constant cuing/assist to maintain sternal precautions        Plan   Plan  Times per week: 3-5  Current Treatment Recommendations: Strengthening,Endurance Training,Balance Training,Gait Training,Functional Mobility Training,Cognitive Reorientation,Self-Care / ADL    AM-PAC Score  AM-PAC Inpatient Daily Activity Raw Score: 11 (03/07/22 0830)  AM-PAC Inpatient ADL T-Scale Score : 29.04 (03/07/22 0830)  ADL Inpatient CMS 0-100% Score: 70.42 (03/07/22 0830)  ADL Inpatient CMS G-Code Modifier : CL (03/07/22 0830)    Goals  Short term goals  Time Frame for Short term goals: Prior to DC: goals ongoing  Short term goal 1: Pt will complete ADL transfers with min A  Short term goal 2: Pt will complete functional mobility with min A  Short term goal 3: Pt will tolerate standing > 5 min for functional task with CGA  Short term goal 4: Pt will complete grooming tasks with set up  Short term goal 5: Pt will complete toileting with min A  Patient Goals   Patient goals : \"I need to go home\"       Therapy Time   Individual Concurrent Group Co-treatment   Time In 0730         Time Out 0815         Minutes 45         Timed Code Treatment Minutes: 39 Minutes     This note to serve as OT d/c summary if pt is d/c-ed prior to next therapy session.     Ling Restrepo, OTR/L

## 2022-03-07 NOTE — CARE COORDINATION
Oleksandr Simons left message and would like to have referrals sent to [facility]. [place] Rep call ed back to offer a bed in their facility but on the memory impaired unit. Call placed to Kristyn Bradford to inform. She does not want this for her mother.   Kalyan Reynaga, [facility]     Case Management   [phone]    3/7/2022  5:02 PM

## 2022-03-07 NOTE — PROGRESS NOTES
Progress Note    S/P cabgx4, EDISON exclusion 2/25/22    Vital Signs:                                                 BP (!) 114/52   Pulse 79   Temp 98 °F (36.7 °C) (Oral)   Resp 18   Ht 5' 3\" (1.6 m)   Wt 193 lb 9 oz (87.8 kg)   SpO2 96%   BMI 34.29 kg/m²  O2 Flow Rate (L/min): 0 L/min   SR  Admission Weight: 168 lb 1.6 oz (76.2 kg)  lb/87 kg     I/O:      Intake/Output Summary (Last 24 hours) at 3/7/2022 3714  Last data filed at 3/7/2022 0600  Gross per 24 hour   Intake 640 ml   Output --   Net 640 ml     CV: reg, wound c/d/i  Pulm: decreased  Abd: soft  Ext: warm, trace edema    Data Review:  CBC:   Recent Labs     03/05/22  0255 03/06/22  0726 03/07/22  0400   WBC 15.7* 13.9* 13.0*   HGB 7.9* 7.8* 8.2*   HCT 24.1* 23.9* 25.0*   MCV 87.7 87.8 87.4    180 207     BMP:   Recent Labs     03/05/22  0255 03/06/22  0726 03/07/22  0400    136 137   K 3.5 4.1 4.3   CL 99 97* 97*   CO2 23 25 22   PHOS 1.4*  --   --    BUN 37* 31* 44*   CREATININE 4.2* 4.2* 6.0*   CALCIUM 8.3 7.7* 8.5   MG 2.20 2.20 2.30     Cardiac Enzymes: No results for input(s): CKTOTAL, CKMB, CKMBINDEX, TROPONINI in the last 72 hours. PT/INR:   No results for input(s): PROTIME, INR in the last 72 hours. APTT:   No results for input(s): APTT in the last 72 hours. Assessment/Plan:  CV - SR.    - EF nl. Midodrine prn   - HLD/CAD. Statin. pulm - pulm toilet, off O2  Renal - acute on CKD, BL Cr 1.4-1.7. at preop weight. tunneled cath 3/7  ID - off steroids. Some residual effect on wbc. Heme - dilutional anemia.  Fe   - ASA   - sc hep, scds dvt prophylaxis  dispo - d/c planning, ready after vasc access 3/7      Radha Lockett MD  3/7/2022  7:02 AM

## 2022-03-07 NOTE — PLAN OF CARE
Problem: Falls - Risk of:  Goal: Will remain free from falls  Description: Will remain free from falls  Outcome: Met This Shift  Goal: Absence of physical injury  Description: Absence of physical injury  Outcome: Met This Shift     Problem: Skin Integrity:  Goal: Absence of new skin breakdown  Description: Absence of new skin breakdown  Outcome: Met This Shift     Problem: Pain:  Goal: Pain level will decrease  Description: Pain level will decrease  Outcome: Met This Shift  Goal: Control of acute pain  Description: Control of acute pain  Outcome: Met This Shift     Problem: Nutrition  Goal: Optimal nutrition therapy  3/7/2022 1752 by Cathy Mcclure RN  Outcome: Ongoing  3/7/2022 1414 by Yolande Salinas  Outcome: Ongoing     Problem: Discharge Planning:  Goal: Discharged to appropriate level of care  Description: Discharged to appropriate level of care  Outcome: Ongoing     Problem:  Activity Intolerance:  Goal: Able to perform prescribed physical activity  Description: Able to perform prescribed physical activity  Outcome: Ongoing  Goal: Ability to tolerate increased activity will improve  Description: Ability to tolerate increased activity will improve  Outcome: Ongoing

## 2022-03-07 NOTE — CARE COORDINATION
Message rec'd from Judy Billings  [phone] who reports they have no bed today for her.   [facility]     Case Management   [phone]    3/7/2022  10:17 AM

## 2022-03-07 NOTE — PROGRESS NOTES
Late entry due to patient care:  [phone]: Call received from dialysis RN will send transport for treatment shortly. patient and family updated.    Electronically signed by Irving Craig RN on 3/7/2022 at 2:35 PM

## 2022-03-07 NOTE — PROGRESS NOTES
[address]   Daily Progress Note      Admit Date:  2/20/2022      Subjective:   Ms. Meeta Ge is an [de-identified] male with a past medical history significant for essential hypertension who [facility] with chest pain. She underwent a stress perfusion study that was grossly abnormal and high risk. A left heart catheterization demonstrated normal LV function but triple vessel CAD. A consult to CVTS was placed for CABG consideration. Intervaql History:  Harleen Jacinto is progressing well with no acute issues other than delirium. .Sinus rhythm on telemetry. She is on hemodialysis now. .     Objective:     BP (!) 128/58   Pulse 79   Temp 98.4 °F (36.9 °C) (Oral)   Resp 16   Ht 5' 3\" (1.6 m)   Wt 193 lb 9 oz (87.8 kg)   SpO2 98%   BMI 34.29 kg/m²      Intake/Output Summary (Last 24 hours) at 3/7/2022 1338  Last data filed at 3/7/2022 0600  Gross per 24 hour   Intake 440 ml   Output --   Net 440 ml       Physical Exam:  General:  Awake, alert, NAD  Skin:  Warm and dry  Neck:  JVD<8, no carotid bruits  Chest:  Clear to auscultation, no wheezes/rhonchi/rales  Cardiovascular:  RRR, normal S1/S2, no M/R/G  Abdomen:  Soft, nontender, +bowel sounds  Extremities:  No edema  Pulses: 2+ bilat carotid    2+ bilat radial    2+ bilat femoral        Medications:    lactulose  20 g Oral TID    amitriptyline  25 mg Oral Nightly    ferrous sulfate  324 mg Oral BID WC    magnesium oxide  400 mg Oral Daily    linaCLOtide  144 mcg Oral QAM AC    sennosides-docusate sodium  2 tablet Oral BID    polyethylene glycol  17 g Oral Daily    lidocaine  1 patch TransDERmal Daily    pantoprazole  20 mg Oral QAM AC    sodium chloride flush  10 mL IntraVENous 2 times per day    chlorhexidine  15 mL Mouth/Throat BID    sodium chloride (PF)  10 mL IntraVENous Daily    atorvastatin  80 mg Oral Nightly      sodium chloride      sodium chloride 5 mL/hr at 03/04/22 0600    dextrose         Lab Data:  CBC:   Recent Labs 03/05/22  0255 03/06/22  0726 03/07/22  0400   WBC 15.7* 13.9* 13.0*   HGB 7.9* 7.8* 8.2*    180 207     BMP:    Recent Labs     03/05/22  0255 03/06/22  0726 03/07/22  0400    136 137   K 3.5 4.1 4.3   CO2 23 25 22   BUN 37* 31* 44*   CREATININE 4.2* 4.2* 6.0*         Left Heart Catheterization 2/22/22:  1. Severe triple-vessel coronary artery disease. The distal left main  is heavily calcified and has a 60% lesion. The proximal LAD is heavily  calcified with a 60% lesion. The mid-LAD is 100% occluded but fills in  from right collaterals. The distal LAD backfills back to the midportion  via left collaterals. The proximal circumflex is heavily calcified and  has an 80% lesion. OM1 is calcified with 80% serial lesions. The  proximal right coronary artery is subtotally occluded and the PDA fills  from both collateral native flow from the right and the left. There is  backfilling from the right system into the mid LAD via a septal  . Also, the LAD first diagonal branch has a 90% lesion. 2.  Normal left ventricular systolic function. LV ejection fraction of  65%. 3.  Borderline left ventricular end-diastolic pressure at 14 to 16 mmHg. 4.  No gradient across the aortic valve on pullback to suggest aortic  stenosis. Echo 2/21/22:  Normal left ventricle size, wall thickness, and systolic function with an   estimated ejection fraction of 55-60%. No regional wall motion abnormalities   are seen. grade 1 diastolic dysfunction   The right ventricle is normal in size and function. No significant valvular heart disease    Assessment / Plan:     1. Unstable Angina  S/p CABG on 02/25/22. Continue aspirin, statin therapy. Triple vessel disease by [facility]. Check hemoglobin A1C. Mild carotid disease by Duplex. LVEF is preserved. 2. Hyperlipidemia with goal LDL<70mg/dL  Continue statin therapy with atorvastatin 80mg daily.     3. Essential Hypertension  Restart beta blockade when able.    4. Acute on chronic Kidney Injury  Continue Hemodialsysis for clearance and volume removal.     5. Delirium  Fluctuating. Hopefully this will continue to improve ay discharge.        Signed:  Cheryle Meckel, MD

## 2022-03-07 NOTE — PLAN OF CARE
Nutrition Problem #1: Inadequate protein-energy intake  Intervention: Food and/or Nutrient Delivery: Continue Current Diet,Modify Oral Nutrition Supplement  Nutritional Goals: PO intake greater than 50%      Problem: Nutrition  Goal: Optimal nutrition therapy  Outcome: Ongoing

## 2022-03-07 NOTE — CARE COORDINATION
Chart Reviewed. Call placed to the two agencies that may accept her:  Dorinda:  Ministerio Rizvi  [phone]  Elizabeth Serna: Dawn Geller [phone]  To request confirmation if they are to accept her. Family would like to tour but we also need a pre cert for dc once bed has been accepted.   [place], [place]     Case Management   [phone]    3/7/2022  8:41 AM

## 2022-03-07 NOTE — PROGRESS NOTES
Late entry due to patient care:    Was informed by Katya Massey with social work that placement sites for this patient cannot accept Albumin during dialysis treatments, will place call to Dr. Deep Crane to get these orders d/c. Multiple calls attempted to [facility], no answer, will attempt at a later time.    Electronically signed by Juan Weber RN on 3/7/2022 at 1:32 PM

## 2022-03-07 NOTE — PROGRESS NOTES
Comprehensive Nutrition Assessment    Type and Reason for Visit:  Reassess    Nutrition Recommendations/Plan:   Continue ADULT DIET; Regular; Low Fat/Low Chol/High Fiber/2 gm Na; Low Potassium (Less than 3000 mg/day); 1800 ml   Continue Nepro daily   Start Magic cup BID   Monitor intakes    Nutrition Assessment:  Follow-up. Pt was alseep in room when visited, but talked to family and she has poor intakes d/t how she feels and low appetite. Pt does not like Nepro ONS. Recommend continuing Nepro daily and adding a magic cup BID to encourage appetite/intakes and overall calorie and protein intakes. Malnutrition Assessment:  Malnutrition Status: At risk for malnutrition (Comment)    Context:  Chronic Illness     Findings of the 6 clinical characteristics of malnutrition:  Energy Intake:  Mild decrease in energy intake (Comment) (not feeling good/low appetite)  Weight Loss:  Unable to assess     Body Fat Loss:  No significant body fat loss     Muscle Mass Loss:  No significant muscle mass loss    Fluid Accumulation:  No significant fluid accumulation     Strength:  Not Performed    Estimated Daily Nutrient Needs:  Energy (kcal):  1230-2891 (20-25kcal/76kg); Weight Used for Energy Requirements:  Admission     Protein (g):  62-73 (1.2-1.4g/52kg); Weight Used for Protein Requirements:  Ideal        Fluid (ml/day):   ; Method Used for Fluid Requirements:  1 ml/kcal      Nutrition Related Findings:  LBM 3/6; +1 nonpitting generalized edema      Wounds:  Surgical Incision (sacrum)       Current Nutrition Therapies:    ADULT DIET; Regular; Low Fat/Low Chol/High Fiber/2 gm Na; Low Potassium (Less than 3000 mg/day); 1800 ml  ADULT ORAL NUTRITION SUPPLEMENT; PM Snack;  Renal Oral Supplement    Anthropometric Measures:  · Height: 5' 3\" (160 cm)  · Current Body Weight: 191 lb (86.6 kg)   · Admission Body Weight: 167 lb (75.8 kg)    · Usual Body Weight: 175 lb (79.4 kg)     · Ideal Body Weight: 115 lbs; % Ideal Body Weight 166.1 %   · BMI: 33.8  · Adjusted Body Weight:  ; No Adjustment   · BMI Categories: Overweight (BMI 25.0-29.9) (Using admission wt)       Nutrition Diagnosis:   · Inadequate protein-energy intake related to  (poor intake) as evidenced by intake 0-25%      Nutrition Interventions:   Food and/or Nutrient Delivery:  Continue Current Diet,Modify Oral Nutrition Supplement  Nutrition Education/Counseling:  No recommendation at this time   Coordination of Nutrition Care:  Continue to monitor while inpatient    Goals:  PO intake greater than 50%       Nutrition Monitoring and Evaluation:   Behavioral-Environmental Outcomes:  None Identified   Food/Nutrient Intake Outcomes:  Food and Nutrient Intake,Supplement Intake  Physical Signs/Symptoms Outcomes:  Biochemical Data,Nutrition Focused Physical Findings,Skin,Weight,Fluid Status or Edema     Discharge Planning:     Too soon to determine     Electronically signed by Radha Mauricio on 3/7/22 at 2:04 PM EST    Contact: [phone]

## 2022-03-08 LAB
ANION GAP SERPL CALCULATED.3IONS-SCNC: 18 MMOL/L (ref 3–16)
BUN BLDV-MCNC: 27 MG/DL (ref 7–20)
CALCIUM SERPL-MCNC: 8.9 MG/DL (ref 8.3–10.6)
CHLORIDE BLD-SCNC: 98 MMOL/L (ref 99–110)
CO2: 21 MMOL/L (ref 21–32)
CREAT SERPL-MCNC: 4.1 MG/DL (ref 0.6–1.2)
GFR AFRICAN AMERICAN: 13
GFR NON-AFRICAN AMERICAN: 10
GLUCOSE BLD-MCNC: 77 MG/DL (ref 70–99)
HCT VFR BLD CALC: 27.5 % (ref 36–48)
HEMOGLOBIN: 8.9 G/DL (ref 12–16)
MAGNESIUM: 2.4 MG/DL (ref 1.8–2.4)
MCH RBC QN AUTO: 28.7 PG (ref 26–34)
MCHC RBC AUTO-ENTMCNC: 32.3 G/DL (ref 31–36)
MCV RBC AUTO: 88.7 FL (ref 80–100)
PDW BLD-RTO: 15.6 % (ref 12.4–15.4)
PLATELET # BLD: 207 K/UL (ref 135–450)
PMV BLD AUTO: 8.2 FL (ref 5–10.5)
POTASSIUM SERPL-SCNC: 4.1 MMOL/L (ref 3.5–5.1)
RBC # BLD: 3.1 M/UL (ref 4–5.2)
SODIUM BLD-SCNC: 137 MMOL/L (ref 136–145)
WBC # BLD: 13.8 K/UL (ref 4–11)

## 2022-03-08 PROCEDURE — 94761 N-INVAS EAR/PLS OXIMETRY MLT: CPT

## 2022-03-08 PROCEDURE — 6370000000 HC RX 637 (ALT 250 FOR IP): Performed by: THORACIC SURGERY (CARDIOTHORACIC VASCULAR SURGERY)

## 2022-03-08 PROCEDURE — 99024 POSTOP FOLLOW-UP VISIT: CPT | Performed by: THORACIC SURGERY (CARDIOTHORACIC VASCULAR SURGERY)

## 2022-03-08 PROCEDURE — 2580000003 HC RX 258: Performed by: THORACIC SURGERY (CARDIOTHORACIC VASCULAR SURGERY)

## 2022-03-08 PROCEDURE — 97535 SELF CARE MNGMENT TRAINING: CPT

## 2022-03-08 PROCEDURE — 80048 BASIC METABOLIC PNL TOTAL CA: CPT

## 2022-03-08 PROCEDURE — 36415 COLL VENOUS BLD VENIPUNCTURE: CPT

## 2022-03-08 PROCEDURE — 97116 GAIT TRAINING THERAPY: CPT

## 2022-03-08 PROCEDURE — 2140000000 HC CCU INTERMEDIATE R&B

## 2022-03-08 PROCEDURE — 6360000002 HC RX W HCPCS: Performed by: NURSE PRACTITIONER

## 2022-03-08 PROCEDURE — 85027 COMPLETE CBC AUTOMATED: CPT

## 2022-03-08 PROCEDURE — 97530 THERAPEUTIC ACTIVITIES: CPT

## 2022-03-08 PROCEDURE — 83735 ASSAY OF MAGNESIUM: CPT

## 2022-03-08 PROCEDURE — 6370000000 HC RX 637 (ALT 250 FOR IP): Performed by: NURSE PRACTITIONER

## 2022-03-08 RX ORDER — POLYETHYLENE GLYCOL 3350 17 G/17G
17 POWDER, FOR SOLUTION ORAL DAILY PRN
Status: DISCONTINUED | OUTPATIENT
Start: 2022-03-08 | End: 2022-03-10 | Stop reason: HOSPADM

## 2022-03-08 RX ORDER — SENNA AND DOCUSATE SODIUM 50; 8.6 MG/1; MG/1
2 TABLET, FILM COATED ORAL 2 TIMES DAILY PRN
Status: DISCONTINUED | OUTPATIENT
Start: 2022-03-08 | End: 2022-03-10 | Stop reason: HOSPADM

## 2022-03-08 RX ORDER — HEPARIN SODIUM 5000 [USP'U]/ML
5000 INJECTION, SOLUTION INTRAVENOUS; SUBCUTANEOUS EVERY 8 HOURS SCHEDULED
Status: DISCONTINUED | OUTPATIENT
Start: 2022-03-08 | End: 2022-03-10 | Stop reason: HOSPADM

## 2022-03-08 RX ORDER — DIPHENHYDRAMINE HCL 25 MG
12.5 TABLET ORAL NIGHTLY PRN
Status: DISCONTINUED | OUTPATIENT
Start: 2022-03-08 | End: 2022-03-10 | Stop reason: HOSPADM

## 2022-03-08 RX ORDER — AMITRIPTYLINE HYDROCHLORIDE 10 MG/1
10 TABLET, FILM COATED ORAL NIGHTLY
Status: DISCONTINUED | OUTPATIENT
Start: 2022-03-08 | End: 2022-03-08

## 2022-03-08 RX ADMIN — ATORVASTATIN CALCIUM 80 MG: 80 TABLET, FILM COATED ORAL at 22:13

## 2022-03-08 RX ADMIN — FERROUS SULFATE TAB EC 324 MG (65 MG FE EQUIVALENT) 324 MG: 324 (65 FE) TABLET DELAYED RESPONSE at 08:30

## 2022-03-08 RX ADMIN — HEPARIN SODIUM 5000 UNITS: 5000 INJECTION INTRAVENOUS; SUBCUTANEOUS at 22:13

## 2022-03-08 RX ADMIN — CHLORHEXIDINE GLUCONATE 0.12% ORAL RINSE 15 ML: 1.2 LIQUID ORAL at 22:14

## 2022-03-08 RX ADMIN — HEPARIN SODIUM 5000 UNITS: 5000 INJECTION INTRAVENOUS; SUBCUTANEOUS at 14:10

## 2022-03-08 RX ADMIN — SODIUM CHLORIDE, PRESERVATIVE FREE 10 ML: 5 INJECTION INTRAVENOUS at 22:33

## 2022-03-08 RX ADMIN — PANTOPRAZOLE SODIUM 20 MG: 20 TABLET, DELAYED RELEASE ORAL at 08:30

## 2022-03-08 RX ADMIN — CHLORHEXIDINE GLUCONATE 0.12% ORAL RINSE 15 ML: 1.2 LIQUID ORAL at 08:30

## 2022-03-08 RX ADMIN — FERROUS SULFATE TAB EC 324 MG (65 MG FE EQUIVALENT) 324 MG: 324 (65 FE) TABLET DELAYED RESPONSE at 18:09

## 2022-03-08 ASSESSMENT — PAIN SCALES - GENERAL
PAINLEVEL_OUTOF10: 0

## 2022-03-08 NOTE — CARE COORDINATION
Family has toured [facility] today. They are no going to [facility] to tour now.   [facility]     Case Management   [phone]    3/8/2022  12:50 PM

## 2022-03-08 NOTE — CARE COORDINATION
Faxed referral to [facility] for OUT Patient Dialysis Spot. Call placed to [facility] ELGIN GAMBIN[facility] for possible SNF referral but they are out of network; spoke with [facility] [phone]. Call placed to [phone] [facility] acute hospital who [place] [facility] and will NOT cover for transport.   [facility]     Case Management   [phone]    3/8/2022  9:11 AM

## 2022-03-08 NOTE — FLOWSHEET NOTE
03/07/22 1536 03/07/22 1841   Vital Signs   BP (!) 134/103 123/81   Temp 98.6 °F (37 °C) 97.9 °F (36.6 °C)   Pulse 81 80   Resp 16 20     Treatment time: 3 hours    Net UF: 1 L    Pre weight: 77 kg (bed scale)  Post weight: 76 kg (bed scale)  EDW: TBD    Access used: RIJ HD tunneled cath  Access function: no problems    Medications or blood products given: None    Regular outpatient schedule: TBD    Summary of response to treatment: Tolerated 1st HD tx without difficulty. VSS. Copy of dialysis treatment record placed in chart, to be scanned into EMR. Report called to Devyn Torres RN.

## 2022-03-08 NOTE — CARE COORDINATION
[place] Rep called today to state they can now accept [phone] in a non memory care bed. [facility] is entertaining  but will need to know if they can arrange transportation to /from HD spot. Estelita Espinoza from [place] has found Spot available at [facility] but time is not secured at this time. Also, she will lose her Nephrologist and have to select a new MD to accept her there either:  Dr Marcos Batista or Dr Duque Resides. Call placed to [facility], Carolyn Polanco who states she is touring [employer] right now. Informed her of above. She will want to [address] after her [place] tour. Call placed to Bo Galindo who reports they can do this ASAP by going to their facility and asking for [facility] the :  [address], [address] [facility].

## 2022-03-08 NOTE — PROGRESS NOTES
Most recent lab work showed [address]. [address]   [facility]. [email or web address]  [phone]  Nephrology Note          Patient ID: Jean Carlos Rhodes  Referring/ Physician: No att. providers found      HPI/Summary:   Jean Carlos Rhodes is being seen by nephrology for SKINNY. This is a [de-identified] yo lady with PMH of HTN, CKD, HLD and obesity who presented with chest pain and SOB. She had been having several weeks of chest tightness and dyspnea with exertion. She feels ok now, no complaints. Says she has been on medication for BP for decades. Her Bp had been running prior to admission. She has been taking Meloxicam for the past few years for knee pain/arthritis. Denies hematuria, foamy urine. LUTS. No dysuria. No issues with swelling right now. During this admission she has had LHC 2/22/22 showing severe triple vessel disease. Normal LV function. LVEDP 14-16. She was referred for CABG and it is planned for 2/25/22 . Cr has been rising from 1.2 > 1.3 > 1.5 at time of consult. Of note , she has been on scheduled meloxicam in addition to receiving contrast. Her PO intake has been fine, no NVD. Started CRRT 2/28/2022, stopped 3/2/2022    Interval hx:   No acute overnight events. Patient has no complaints. Pretty weak still   Patient had her tunneled line placed yesterday   Had dialysis , 1 L UF achieved. Her post weight was 76 kilos. No issues with hypotension reported during dialysis. Vitals  /50  Sating 97% on room air. No UO recorded   Last post weight 88.5 kilos versus 76 kilos the other day so one of these weights is wrong   Either way, she looks euvolemic. Will have her stand for a weight if possible. Labs reviewed. Cr 4.2 >6 > 4.1 post HD  Na 137, K 4.1 bicarb 21. Mag, ca wnl  Hgb 8.9      Plan:  - there are no acute indications for dialysis today   - appears euvolemic at current weight. Large discrepancy though so will see if we can have her stand for weight.  - has TDC  - ongoing discharge planning. Outpatient dialysis coordinator at my office aware of this patient. 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 Cr peaked at 1.5  She was on meloxicam and got contrast 2/22, Cr started to rise two days later consistent with DANNY. She is not volume overloaded or dehydrated on exam.   Uric acid  CK  UA showed no protein or cells, totally bland. On CRRT 2/28/2022-3/2/2022 for anuric SKINNY. #CAD triple vessel disease  LHC 2/22  CABG planned 2/25  Asa statin and BB  Preserved EF  Not volume overloaded. #full code. [facility] would like to thank you for the opportunity to serve this patient. Please call with any questions or concerns. Lashay Little MD  [facility]  [facility] [address]  [place], [address]  Fax: [phone]  Office: [phone]         CC/Reason for consult:   Reason for consult: SKINNY  Chief Complaint   Patient presents with    Chest Pain    Hypertension           Review of Systems:   [address]. All other remaining systems are negative. Constitutional:  fever, chills, weakness, weight change, fatigue,      Skin:  rash, pruritus, hair loss, bruising, dry skin, petechiae. Head, Face, Neck   headaches, swelling,  cervical adenopathy. Respiratory: shortness of breath, cough, or wheezing  Cardiovascular: chest pain, palpitations, dizzy, edema  Gastrointestinal: nausea, vomiting, diarrhea, constipation,belly pain    Yellow skin, blood in stool  Musculoskeletal:  back pain, muscle weakness, gait problems,       joint pain or swelling. Genitourinary:  dysuria, poor urine flow, flank pain, blood in urine  Neurologic:  vertigo, TIA'S, syncope, seizures, focal weakness  Psychosocial:  insomnia, anxiety, or depression.   Additional positive findings: - + feeling cold      PMH/SH/FH:    Medical Hx: reviewed and updated as appropriate  Past Medical History:   Diagnosis Date    Arthritis 0374      General appearance: awake alert female in NAD  HEENT: no icterus, EOM intact, trachea midline. Neck : no masses, appears symmetrical and no JVD appreciated. Respiratory: Respiratory effort normal, bilateral equal chest rise. Cardiovascular: Ausculation shows RRR and  + mild LE edema   Abdomen: abdomen is soft, non distended  Musculoskeletal:  no joint swelling, no deformity  Skin: no rashes, no induration, no tightening, no jaundice   Neuro: no localizing deficits    Left temporary dialysis catheter.          Data:   CBC:   Recent Labs     03/06/22  0726 03/07/22  0400 03/08/22  0429   WBC 13.9* 13.0* 13.8*   HGB 7.8* 8.2* 8.9*   HCT 23.9* 25.0* 27.5*    207 207     BMP:    Recent Labs     03/06/22  0726 03/07/22  0400 03/08/22  0428    137 137   K 4.1 4.3 4.1   CL 97* 97* 98*   CO2 25 22 21   BUN 31* 44* 27*   CREATININE 4.2* 6.0* 4.1*   GLUCOSE 96 94 77   MG 2.20 2.30 2.40

## 2022-03-08 NOTE — PROGRESS NOTES
Physician Progress Note      Carrie Bowers  CSN #:                  [id number]  :                       1940  ADMIT DATE:       2022 4:56 PM  [address] [place] DATE:  RESPONDING  PROVIDER #:        Ernesto Schrader MD          QUERY TEXT:    Dear Dr. Erick Stein,  Pt admitted with CP and HTN Urgency and found to have Severe triple-vessel   coronary artery disease, had CABG  and post-op developed SKINNY requiring   CRRT and later HD . Pt noted to have lethargy and confusion that later   resolved. If possible, please document in the progress notes and discharge   summary if you are evaluating and / or treating any of the following: The medical record reflects the following:  Risk Factors: s/p CABG , , SKINNY, acidosis  Clinical Indicators:  Alert and oriented on admission,    Neph-Unfortunately, she has suffered severe SKINNY, most likely due to   hemodynamic instability associated with her open heart surgery, bicarb 16, and   creatinine is up to 4.reported minimal urine output,  creat=5.6,CO2=16,   Cardiology notes Daisy Winston is lethargic and has had diminished urine output   overnight. She is requiring several liters of oxygen\" and on 3/1OT notes   \"pleasantly confused but able to follow commands\"  Cardiology notes \"is   somewhat confused\" and on 3/2 notes \"appears to be doing better today and i  Treatment: CRRT -3/2, Nephrology consult, monitor labs  Thank you,  Jenny Hewitt RN, [email or web address]  Options provided:  -- Metabolic encephalopathy  -- Other - I will add my own diagnosis  -- Disagree - Not applicable / Not valid  -- Disagree - Clinically unable to determine / Unknown  -- Refer to Clinical Documentation Reviewer    PROVIDER RESPONSE TEXT:    Provider is clinically unable to determine a response to this query. Query created by:  Jamaal Devlin on 3/4/2022 2:23 PM      Electronically signed by:  Ernesto Schrader MD 3/8/2022 4:32 PM

## 2022-03-08 NOTE — PROGRESS NOTES
Progress Note    S/P cabgx4, EDISON exclusion 2/25/22    Vital Signs:                                                 BP (!) 126/51   Pulse 84   Temp 98.9 °F (37.2 °C) (Oral)   Resp 18   Ht 5' 3\" (1.6 m)   Wt 195 lb 1.7 oz (88.5 kg) Comment: large difference between unit bed weight and dialysis   SpO2 97%   BMI 34.56 kg/m²  O2 Flow Rate (L/min): 0 L/min   SR  Admission Weight: 168 lb 1.6 oz (76.2 kg)  lb/87 kg     I/O:    No intake or output data in the 24 hours ending 03/08/22 0607  CV: reg, wound c/d/i  Pulm: decreased  Abd: soft  Ext: warm, trace edema    Data Review:  CBC:   Recent Labs     03/06/22  0726 03/07/22  0400 03/08/22  0429   WBC 13.9* 13.0* 13.8*   HGB 7.8* 8.2* 8.9*   HCT 23.9* 25.0* 27.5*   MCV 87.8 87.4 88.7    207 207     BMP:   Recent Labs     03/06/22  0726 03/07/22  0400 03/08/22  0428    137 137   K 4.1 4.3 4.1   CL 97* 97* 98*   CO2 25 22 21   BUN 31* 44* 27*   CREATININE 4.2* 6.0* 4.1*   CALCIUM 7.7* 8.5 8.9   MG 2.20 2.30 2.40     Cardiac Enzymes: No results for input(s): CKTOTAL, CKMB, CKMBINDEX, TROPONINI in the last 72 hours. PT/INR:   No results for input(s): PROTIME, INR in the last 72 hours. APTT:   No results for input(s): APTT in the last 72 hours. Assessment/Plan:  CV - SR.    - EF nl. Midodrine prn   - HLD/CAD. Statin. pulm - pulm toilet, off O2  Renal - acute on CKD, BL Cr 1.4-1.7. just above preop weight, will fluctuate with HD. ID - off steroids. Some residual effect on wbc. Heme - dilutional anemia.  Fe   - ASA   - sc hep, scds dvt prophylaxis  dispo - ready for d/c once SNF sorted out      Renata Mendez MD  3/8/2022  6:07 AM

## 2022-03-08 NOTE — CARE COORDINATION
[place] rep is going to start auth with [employer] for to determine if they can accept SKINNY vs ESRD. After that, she will initiate precert with insurance company.   [place], [place]     Case Management   [phone]    3/8/2022  3:13 PM

## 2022-03-08 NOTE — CARE COORDINATION
Family called to state they really liked [facility] and would want to go there. Call placed to rep Hugh at [facility] who reports she made a mistake and didn't realize that she is SKINNY for diagnosis and not ESRD which would be needed to come there. Rosalee Thompson from [facility] called to state they CAN accept her and realizes she only has SKINNY for diagnosis. Call placed back to Wesly Kaur at [facility] to leave message to inform her that [facility] has accepted but that family's first choice is [facility]. Does this change anything about bed availability at [facility]. Call placed to Gunner scott to inform that [facility] has accepted and that call was made to [facility] to ask if they can accept her with SKINNY. Family's first choice is [place] and second is [facility].   Jnenie Quintero, [place]     Case Management   [phone]    3/8/2022  2:46 PM

## 2022-03-08 NOTE — CARE COORDINATION
Call rec'd from Willow Fonseca who will call [phone] [employer] who is their company who does the HD to ensure of her thought that she needs to be HD vs SKINNY. She will call me back.   Inocente Grande, [facility]     Case Management   [phone]    3/8/2022  2:58 PM

## 2022-03-08 NOTE — PROGRESS NOTES
Occupational Therapy  Facility/Department: [facility]  Daily Treatment Note  NAME: Olaf Benavidez  : 1940  MRN: [id number]    Date of Service: 3/8/2022    Discharge Recommendations:  Patient would benefit from continued therapy after discharge,3-5 sessions per week  OT Equipment Recommendations  Other: defer to [facility] facility    Olaf Benavidez scored a  on the AM-PAC ADL Inpatient form. Current research shows that an AM-PAC score of 17 or less is typically not associated with a discharge to the patient's home setting. Based on the patient's AM-PAC score and their current ADL deficits, it is recommended that the patient have 3-5 sessions per week of Occupational Therapy at d/c to increase the patient's independence. Please see assessment section for further patient specific details. If patient discharges prior to next session this note will serve as a discharge summary. Please see below for the latest assessment towards goals. Assessment   Performance deficits / Impairments: Decreased functional mobility ; Decreased safe awareness;Decreased balance;Decreased ADL status; Decreased cognition;Decreased high-level IADLs;Decreased endurance;Decreased strength  Assessment: [de-identified] y/o female admit 2022 with CAD, NSTEMI.  2022 S/P AtriClip, Urgent CABG x 4.  CRRT initiated. PTA pt lived at home with family and was independent with ADLs and functional mobility. Today, pt oriented to self only but able to follow simple commands. Pt completed multiple stands to RW with min A. Pt requires constant cuing and assist to hold hands on cardiac pillow. Pt tolerated multipled short bouts of ambulation in room/bathroom with RW and min A. Pt limited by cognition and decreased carry over. Pt completed grooming tasks seated in front of sink with assist with set up. Pt with functional UE ROM for self care and anticipate will require up to max A for ADLs at this time.  Pt is functioning below baseline and benefit from skilled therapy. Prognosis: Good  OT Education: OT Role;Transfer Training;Plan of Care;Orientation;Precautions  Barriers to Learning: confusion; decrease carry over  REQUIRES OT FOLLOW UP: Yes  Activity Tolerance  Activity Tolerance: Treatment limited secondary to decreased cognition  Activity Tolerance: limited by cognition/decreased carry over. Safety Devices  Safety Devices in place: Yes  Type of devices: Nurse notified;Gait belt;Call light within reach; Left in chair         Patient Diagnosis(es): The encounter diagnosis was Chest pain, unspecified type. has a past medical history of Arthritis, Asthma, CAD (coronary artery disease), CKD (chronic kidney disease), Glaucoma, Hyperlipidemia, Hypertension, Hyperthyroidism, IBS (irritable bowel syndrome), Neuropathy, Obesity (BMI 30-39.9), Osteopenia, and Osteoporosis. has a past surgical history that includes Cholecystectomy; Foot surgery; Tubal ligation; Gastric Band ([id number]); orif femur decompression (Left, 04/2014); Ankle fracture surgery (Left, 02/12/2015); Hand surgery (2009); other surgical history (Right, 2010); Coronary artery bypass graft (02/25/2022); Coronary artery bypass graft (N/A, 2/25/2022); and IR TUNNELED CVC PLACE WO SQ PORT/PUMP > 5 YEARS (3/7/2022). Restrictions  Restrictions/Precautions  Restrictions/Precautions: Fall Risk  Position Activity Restriction  Sternal Precautions: 2/25/2022 S/P AtriClip, Urgent CABG x 4.  Other position/activity restrictions: Room Air     Subjective   General  Chart Reviewed: Yes  Patient assessed for rehabilitation services?: Yes  Additional Pertinent Hx: [de-identified] y/o female admit 2/20/2022 with CAD, NSTEMI.  2/25/2022 S/P AtriClip, Urgent CABG x 4. 2/28- 3/2 CRRT  Family / Caregiver Present: No  Referring Practitioner: MARKEL Alston CNP  Subjective  Subjective: Pt seen bedside and agreeable to therapy. Pt pleasantly confused.   General Comment  Comments: Pt requires constant cuing to maintain sternal precautions and requires physical assistance to hold hands on cardiac pillow      Orientation  Orientation  Orientation Level: Oriented to person;Disoriented to situation;Disoriented to time;Disoriented to place     Objective    ADL  Grooming: Minimal assistance (assist with setting up toothbrush/paste and cuing to sequence steps seated in front of sink)  LE Dressing: Maximum assistance (assist to anita clean depends)  Toileting: Dependent/Total (assist with hygiene following voiding attempt and assist clothing management up/down)  Additional Comments: Anticipate pt will require max A for LB bathing/dressing and toileting, min A for UB bathing/dressing and grooming when seated based on balance and cognition observed        Balance  Sitting Balance: Contact guard assistance (EOB in prep for transfers)  Standing Balance: Minimal assistance  Standing Balance  Time: stood prn for toileting and prn prior to each bout of ambulation  Functional Mobility  Functional Mobility Comments: Multiple short bouts of ambulation (bed > bathroom > short distance in hogan x2  > chair) with RW and min A. Toilet Transfers  Toilet - Technique: Ambulating  Equipment Used: Standard toilet  Toilet Transfer: Moderate assistance     Bed mobility  Supine to Sit: Maximum assistance (HOB slightly elevated, assist to use cardiac pillow)  Sit to Supine:  (pt in chair at end of session)     Transfers  Sit to stand: Minimal assistance; Moderate assistance  Stand to sit: Minimal assistance; Moderate assistance  Transfer Comments: multiple stands from different surface levels with min/mod A to/from RW- Pt with slight posterior lean initially when standing. Pt requires constant cuing and physical assist to hold hands on cardiac pillow to maintian sternal precautions. Cognition  Overall Cognitive Status: Exceptions  Arousal/Alertness: Appropriate responses to stimuli  Following Commands:  Follows one step commands with increased time;Follows one step commands with repetition  Attention Span: Attends with cues to redirect  Memory: Decreased recall of recent events;Decreased recall of precautions;Decreased short term memory  Safety Judgement: Decreased awareness of need for safety;Decreased awareness of need for assistance  Problem Solving: Decreased awareness of errors  Insights: Decreased awareness of deficits  Initiation: Requires cues for some  Sequencing: Requires cues for some  Cognition Comment: Constant cuing/assist to maintain sternal precautions     Plan   Plan  Times per week: 3-5  Current Treatment Recommendations: Strengthening,Endurance Training,Balance Training,Gait Training,Functional Mobility Training,Cognitive Reorientation,Self-Care / ADL    AM-PAC Score  AM-PAC Inpatient Daily Activity Raw Score: 11 (03/08/22 1137)  AM-PAC Inpatient ADL T-Scale Score : 29.04 (03/08/22 1137)  ADL Inpatient CMS 0-100% Score: 70.42 (03/08/22 1137)  ADL Inpatient CMS G-Code Modifier : CL (03/08/22 1137)    Goals  Short term goals  Time Frame for Short term goals: Prior to DC: goals ongoing  Short term goal 1: Pt will complete ADL transfers with min A  Short term goal 2: Pt will complete functional mobility with min A  Short term goal 3: Pt will tolerate standing > 5 min for functional task with CGA  Short term goal 4: Pt will complete grooming tasks with set up  Short term goal 5: Pt will complete toileting with min A  Patient Goals   Patient goals : \"I need to go home\"       Therapy Time   Individual Concurrent Group Co-treatment   Time In 0740         Time Out 0820         Minutes 40         Timed Code Treatment Minutes: 40 Minutes     This note to serve as OT d/c summary if pt is d/c-ed prior to next therapy session.     Unk Levels, OTR/L

## 2022-03-08 NOTE — PROGRESS NOTES
Physical Therapy  Facility/Department: [facility]  Daily Treatment Note  NAME: Melissa Ball  : 1940  MRN: [id number]    Date of Service: 3/8/2022    Discharge Recommendations:  Continue to assess pending progress,3-5 sessions per week   PT Equipment Recommendations  Other: Defer to next level of care. Melissa Ball scored a 14/24 on the AM-PAC short mobility form. Current research shows that an AM-PAC score of 17 or less is typically not associated with a discharge to the patient's home setting. Based on the patient's AM-PAC score and their current functional mobility deficits, it is recommended that the patient have 3-5 sessions per week of Physical Therapy at d/c to increase the patient's independence. Please see assessment section for further patient specific details. If patient discharges prior to next session this note will serve as a discharge summary. Please see below for the latest assessment towards goals. Assessment   Body structures, Functions, Activity limitations: Decreased functional mobility ; Decreased strength;Decreased safe awareness;Decreased cognition;Decreased endurance  Assessment: [de-identified] y/o female admit 2022 with CAD, NSTEMI.  2022 S/P AtriClip, Urgent CABG x 4. PMH as noted including CKD, IBS, L Femur Fx/ORIF (2014), L Ankle Fx/ORIF (2015), Osteoporosis, Surg R Ear Mass (behind ear, ). PTA (per family) pt living with dtr/s-i-l in apt setting with steps to access, independent daily care and functional mobility. Pt cont confused; ONGOING  cues/physical assist to maintain Sternal Precautions. Able to initiate Transfers/Amb short distances with Bettye Alan Min/Mod (physical/verbal cues) assist.  Requiring ongoing cues/physical assist to adhere to Sternal Precautions; no carryover noted. Cont HIGH Fall Risk. At this time, cont anticipate need cont PT (3-5) upon d/c. Will monitor pt's progress.   Prognosis: Good  Decision Making: Medium Complexity  History: [de-identified] y/o female admit 2/20/2022 with CAD, NSTEMI.  2/25/2022 S/P AtriClip, Urgent CABG x 4. PMH as noted including CKD, IBS, L Femur Fx/ORIF (4/2014), L Ankle Fx/ORIF (2/2015), Osteoporosis, Surg R Ear Mass (behind ear, 2010). Exam: See above. Clinical Presentation: See above. Patient Education: Role of PT, POC, Need to call for assist, Sternal Precautions/Use of Cardiac Pillow, Safe use of Walker. Barriers to Learning: Cognitive. REQUIRES PT FOLLOW UP: Yes  Activity Tolerance  Activity Tolerance: Patient limited by cognitive status; Patient limited by endurance  Activity Tolerance: Pt cont confused; ONGOING  cues/physical assist to maintain Sternal Precautions. Able to initiate Transfers/Amb short distances with Peter Armentaing Min/Mod (physical/verbal cues) assist.  Requiring ongoing cues/physical assist to adhere to Sternal Precautions; no carryover noted. Appears to have some difficulty with motor planning/processing during functional activitities. Patient Diagnosis(es): The encounter diagnosis was Chest pain, unspecified type. has a past medical history of Arthritis, Asthma, CAD (coronary artery disease), CKD (chronic kidney disease), Glaucoma, Hyperlipidemia, Hypertension, Hyperthyroidism, IBS (irritable bowel syndrome), Neuropathy, Obesity (BMI 30-39.9), Osteopenia, and Osteoporosis. has a past surgical history that includes Cholecystectomy; Foot surgery; Tubal ligation; Gastric Band ([id number]); orif femur decompression (Left, 04/2014); Ankle fracture surgery (Left, 02/12/2015); Hand surgery (2009); other surgical history (Right, 2010); Coronary artery bypass graft (02/25/2022); Coronary artery bypass graft (N/A, 2/25/2022); and IR TUNNELED CVC PLACE WO SQ PORT/PUMP > 5 YEARS (3/7/2022).     Restrictions  Restrictions/Precautions  Restrictions/Precautions: Fall Risk  Position Activity Restriction  Sternal Precautions: 2/25/2022 S/P AtriClip, Urgent CABG x 4.  Other position/activity restrictions: Room Air  Subjective   General  Chart Reviewed: Yes  Additional Pertinent Hx: [de-identified] y/o female admit 2/20/2022 with CAD, NSTEMI.  2/25/2022 S/P AtriClip, Urgent CABG x 4.  2/28/2022 CRRT started. PMH as noted including CKD, IBS, L Femur Fx/ORIF (4/2014), L Ankle Fx/ORIF (2/2015), Osteoporosis, Surg R Ear Mass (behind ear, 2010). Response To Previous Treatment: Patient with no complaints from previous session. Family / Caregiver Present: No  Referring Practitioner: Dr. Juan Jose Parekh  Subjective  Subjective: Pt agreeable to PT Rx. Pt cont pleasantly confused; requiring ongoing cues reorient recent events/situation and cont Sternal Precautions. Orientation  Orientation  Overall Orientation Status: Impaired  Orientation Level: Oriented to person;Disoriented to place; Disoriented to time;Disoriented to situation  Cognition   Cognition  Overall Cognitive Status: Exceptions  Arousal/Alertness: Appropriate responses to stimuli  Following Commands: Follows one step commands with increased time; Follows one step commands with repetition  Attention Span: Attends with cues to redirect  Memory: Decreased recall of recent events;Decreased recall of precautions;Decreased short term memory  Problem Solving: Decreased awareness of errors  Insights: Decreased awareness of deficits  Initiation: Requires cues for some  Sequencing: Requires cues for some  Cognition Comment: Constant cuing/assist to maintain sternal precautions  Objective   Bed mobility  Supine to Sit: Maximum assistance (HOB slightly elevated. Use of Cardiac Pillow.)  Comment: Pt requiring ongoing cues/physical assist to maintain Sternal Precautions/Use of Cardiac Pillow. Transfers  Sit to Stand: Minimal Assistance; Moderate Assistance (With [place]. Use of Cardiac Pillow.)  Stand to sit: Minimal Assistance (With [place].   Use of Cardiac Pillow.)  Comment: Completed multiple transfers from various surfaces with Walker Min/Mod  assist. Sit to stand noting onset mild post lean at times. Pt requiring ongoing constant cues/physical assist to maintain hands on Cardiac Pillow for Sternal Precautions. Ambulation  Ambulation?: Yes  Ambulation 1  Surface: level tile  Device: Rolling Walker  Distance: Pt amb short distances within hospital room setting, to/from bathroom and additional 40' x 3 with Vinny Vergara Min assist.  Diminished step length/clearance; increase cues in prep transitional activities. Balance  Comments: EOB : CGA; cues to maintain Sternal Precautions. Stand/Amb with Walker Min/Mod assist; noting increase post lean upon upright stand at times. Comment: Ongoing pt ed re : Sternal Precautions/Use of Cardiac Pillow. Very poor carryover despite ongoing pt ed. Pt requiring actual physical assist/hold hands on Cardiac Pillow as complete Transfers to ensure adhere Sternal Precautions. AM-PAC Score  AM-PAC Inpatient Mobility Raw Score : 14 (03/08/22 1112)  AM-PAC Inpatient T-Scale Score : 38.1 (03/08/22 1112)  Mobility Inpatient CMS 0-100% Score: 61.29 (03/08/22 1112)  Mobility Inpatient CMS G-Code Modifier : CL (03/08/22 1112)          Goals  Short term goals  Time Frame for Short term goals: Upon d/c acute care setting. Short term goal 1: Bed Mob Mod assist; adhere to Sternal Precautions. Short term goal 2: Transfers with assist device Min assist; adhere to Sternal Precautions. Short term goal 3: Amb with assist device 48' CGA. Short term goal 4: Stair Negotiation as approp. Patient Goals   Patient goals : Return home with family assist/support. Plan    Plan  Times per week: 3-5x week while in acute care setting.   Current Treatment Recommendations: Strengthening,Functional Mobility Training,Transfer Training,Gait Training,Stair training,Safety Education & Training,Patient/Caregiver Education & Training  Safety Devices  Type of devices: Call light within reach,Left in chair,Nurse notified     Therapy Time   Individual Concurrent Group Co-treatment   Time In 0740         Time Out 0820         Minutes 508 [facility] Silvano Verduzco

## 2022-03-09 LAB
ANION GAP SERPL CALCULATED.3IONS-SCNC: 19 MMOL/L (ref 3–16)
BUN BLDV-MCNC: 46 MG/DL (ref 7–20)
CALCIUM SERPL-MCNC: 9.1 MG/DL (ref 8.3–10.6)
CHLORIDE BLD-SCNC: 93 MMOL/L (ref 99–110)
CO2: 23 MMOL/L (ref 21–32)
CREAT SERPL-MCNC: 6 MG/DL (ref 0.6–1.2)
GFR AFRICAN AMERICAN: 8
GFR NON-AFRICAN AMERICAN: 7
GLUCOSE BLD-MCNC: 85 MG/DL (ref 70–99)
MAGNESIUM: 2.4 MG/DL (ref 1.8–2.4)
POTASSIUM SERPL-SCNC: 4.4 MMOL/L (ref 3.5–5.1)
SODIUM BLD-SCNC: 135 MMOL/L (ref 136–145)

## 2022-03-09 PROCEDURE — 2580000003 HC RX 258: Performed by: THORACIC SURGERY (CARDIOTHORACIC VASCULAR SURGERY)

## 2022-03-09 PROCEDURE — 99024 POSTOP FOLLOW-UP VISIT: CPT | Performed by: THORACIC SURGERY (CARDIOTHORACIC VASCULAR SURGERY)

## 2022-03-09 PROCEDURE — 80048 BASIC METABOLIC PNL TOTAL CA: CPT

## 2022-03-09 PROCEDURE — 6370000000 HC RX 637 (ALT 250 FOR IP): Performed by: THORACIC SURGERY (CARDIOTHORACIC VASCULAR SURGERY)

## 2022-03-09 PROCEDURE — 94761 N-INVAS EAR/PLS OXIMETRY MLT: CPT

## 2022-03-09 PROCEDURE — 6370000000 HC RX 637 (ALT 250 FOR IP): Performed by: NURSE PRACTITIONER

## 2022-03-09 PROCEDURE — 83735 ASSAY OF MAGNESIUM: CPT

## 2022-03-09 PROCEDURE — 36415 COLL VENOUS BLD VENIPUNCTURE: CPT

## 2022-03-09 PROCEDURE — A4216 STERILE WATER/SALINE, 10 ML: HCPCS | Performed by: THORACIC SURGERY (CARDIOTHORACIC VASCULAR SURGERY)

## 2022-03-09 PROCEDURE — 90935 HEMODIALYSIS ONE EVALUATION: CPT

## 2022-03-09 PROCEDURE — 6360000002 HC RX W HCPCS: Performed by: NURSE PRACTITIONER

## 2022-03-09 PROCEDURE — 97116 GAIT TRAINING THERAPY: CPT

## 2022-03-09 PROCEDURE — 97530 THERAPEUTIC ACTIVITIES: CPT

## 2022-03-09 PROCEDURE — 2140000000 HC CCU INTERMEDIATE R&B

## 2022-03-09 PROCEDURE — 51798 US URINE CAPACITY MEASURE: CPT

## 2022-03-09 RX ORDER — ATORVASTATIN CALCIUM 80 MG/1
80 TABLET, FILM COATED ORAL NIGHTLY
Qty: 30 TABLET | Refills: 3 | Status: SHIPPED | OUTPATIENT
Start: 2022-03-09

## 2022-03-09 RX ORDER — FERROUS SULFATE TAB EC 324 MG (65 MG FE EQUIVALENT) 324 (65 FE) MG
324 TABLET DELAYED RESPONSE ORAL 2 TIMES DAILY WITH MEALS
Qty: 14 TABLET | Refills: 0 | Status: SHIPPED | OUTPATIENT
Start: 2022-03-09 | End: 2022-04-14 | Stop reason: ALTCHOICE

## 2022-03-09 RX ADMIN — SODIUM CHLORIDE, PRESERVATIVE FREE 10 ML: 5 INJECTION INTRAVENOUS at 09:00

## 2022-03-09 RX ADMIN — HEPARIN SODIUM 5000 UNITS: 5000 INJECTION INTRAVENOUS; SUBCUTANEOUS at 06:50

## 2022-03-09 RX ADMIN — FERROUS SULFATE TAB EC 324 MG (65 MG FE EQUIVALENT) 324 MG: 324 (65 FE) TABLET DELAYED RESPONSE at 16:16

## 2022-03-09 RX ADMIN — CHLORHEXIDINE GLUCONATE 0.12% ORAL RINSE 15 ML: 1.2 LIQUID ORAL at 21:25

## 2022-03-09 RX ADMIN — ATORVASTATIN CALCIUM 80 MG: 80 TABLET, FILM COATED ORAL at 21:25

## 2022-03-09 RX ADMIN — PANTOPRAZOLE SODIUM 20 MG: 20 TABLET, DELAYED RELEASE ORAL at 07:58

## 2022-03-09 RX ADMIN — FERROUS SULFATE TAB EC 324 MG (65 MG FE EQUIVALENT) 324 MG: 324 (65 FE) TABLET DELAYED RESPONSE at 07:59

## 2022-03-09 RX ADMIN — HEPARIN SODIUM 5000 UNITS: 5000 INJECTION INTRAVENOUS; SUBCUTANEOUS at 15:36

## 2022-03-09 RX ADMIN — CHLORHEXIDINE GLUCONATE 0.12% ORAL RINSE 15 ML: 1.2 LIQUID ORAL at 07:58

## 2022-03-09 RX ADMIN — SODIUM CHLORIDE, PRESERVATIVE FREE 10 ML: 5 INJECTION INTRAVENOUS at 21:25

## 2022-03-09 RX ADMIN — HEPARIN SODIUM 5000 UNITS: 5000 INJECTION INTRAVENOUS; SUBCUTANEOUS at 22:25

## 2022-03-09 RX ADMIN — SODIUM CHLORIDE, PRESERVATIVE FREE 10 ML: 5 INJECTION INTRAVENOUS at 07:59

## 2022-03-09 ASSESSMENT — PAIN SCALES - GENERAL
PAINLEVEL_OUTOF10: 0

## 2022-03-09 NOTE — CARE COORDINATION
Call placed to Mara Avila at [facility]  [phone]; message left asking if Insurance had made their decision. She will call back.   Dhara Joaquin [place]     Case Management   [phone]    3/9/2022  3:18 PM

## 2022-03-09 NOTE — PROGRESS NOTES
admit 2/20/2022 with CAD, NSTEMI.  2/25/2022 S/P AtriClip, Urgent CABG x 4. PMH as noted including CKD, IBS, L Femur Fx/ORIF (4/2014), L Ankle Fx/ORIF (2/2015), Osteoporosis, Surg R Ear Mass (behind ear, 2010). Exam: See above. Clinical Presentation: See above. Patient Education: Role of PT, POC, Need to call for assist, Sternal Precautions/Use of Cardiac Pillow, Safe use of Walker. Barriers to Learning: Cognitive. REQUIRES PT FOLLOW UP: Yes  Activity Tolerance  Activity Tolerance: Patient limited by cognitive status; Patient limited by endurance  Activity Tolerance: Pt cont confused; ONGOING  cues/physical assist to maintain Sternal Precautions. Able to initiate Transfers/Amb short distances with Dajuan Sanchez Min/Mod (physical/verbal cues) assist.  Requiring ongoing cues/physical assist to adhere to Sternal Precautions; no carryover noted. Patient Diagnosis(es): The encounter diagnosis was Chest pain, unspecified type. has a past medical history of Arthritis, Asthma, CAD (coronary artery disease), CKD (chronic kidney disease), Glaucoma, Hyperlipidemia, Hypertension, Hyperthyroidism, IBS (irritable bowel syndrome), Neuropathy, Obesity (BMI 30-39.9), Osteopenia, and Osteoporosis. has a past surgical history that includes Cholecystectomy; Foot surgery; Tubal ligation; Gastric Band ([id number]); orif femur decompression (Left, 04/2014); Ankle fracture surgery (Left, 02/12/2015); Hand surgery (2009); other surgical history (Right, 2010); Coronary artery bypass graft (02/25/2022); Coronary artery bypass graft (N/A, 2/25/2022); and IR TUNNELED CVC PLACE WO SQ PORT/PUMP > 5 YEARS (3/7/2022). Restrictions  Restrictions/Precautions  Restrictions/Precautions: Fall Risk  Position Activity Restriction  Sternal Precautions: 2/25/2022 S/P AtriClip, Urgent CABG x 4.  Other position/activity restrictions: Room Air  Subjective   General  Chart Reviewed:  Yes  Additional Pertinent Hx: [de-identified] y/o female admit 2/20/2022 with CAD, NSTEMI.  2/25/2022 S/P AtriClip, Urgent CABG x 4.  2/28/2022 CRRT started. PMH as noted including CKD, IBS, L Femur Fx/ORIF (4/2014), L Ankle Fx/ORIF (2/2015), Osteoporosis, Surg R Ear Mass (behind ear, 2010). Response To Previous Treatment: Patient with no complaints from previous session. Family / Caregiver Present: No  Referring Practitioner: Dr. Dmitriy Sheth  Subjective  Subjective: Pt agreeable to PT Rx. Pt cont pleasantly confused; requiring ongoing cues reorient recent events/situation and cont Sternal Precautions. Orientation  Orientation  Overall Orientation Status: Impaired  Orientation Level: Oriented to person;Disoriented to place; Disoriented to time;Disoriented to situation  Cognition   Cognition  Overall Cognitive Status: Exceptions  Arousal/Alertness: Appropriate responses to stimuli  Following Commands: Follows one step commands with increased time; Follows one step commands with repetition  Attention Span: Attends with cues to redirect  Memory: Decreased recall of recent events;Decreased recall of precautions;Decreased short term memory  Safety Judgement: Decreased awareness of need for safety;Decreased awareness of need for assistance  Problem Solving: Decreased awareness of errors  Insights: Decreased awareness of deficits  Initiation: Requires cues for some  Sequencing: Requires cues for some  Cognition Comment: Constant cuing/assist to maintain sternal precautions  Objective   Bed mobility  Supine to Sit: Moderate assistance;Maximum assistance (HOB elevated. Use of Cardiac Pillow. Pt initiates LEs towards eob; max cues.)  Comment: Pt requiring ongoing cues/physical assist to maintain Sternal Precautions/Use of Cardiac Pillow. Transfers  Sit to Stand: Minimal Assistance; Moderate Assistance (With Alline Lowe. Use of Cardiac Pillow.)  Stand to sit: Minimal Assistance (With Alline Lowe.   Use of Cardiac Pillow.)  Comment: Completed multiple transfers from various surfaces with Walker Min/Mod assist. Sit to stand noting onset mild post lean at times. Pt requiring ongoing constant cues/physical assist to maintain hands on Cardiac Pillow for Sternal Precautions. Ambulation  Ambulation?: Yes  Ambulation 1  Surface: level tile  Device: Rolling Walker  Distance: Pt amb 4-5 steps bed to chair, additional 20', 40' x 2 with Vinny Vergara Min assist.  Diminished step length/clearance although no LE buckling/giving way. Cont cues for safe transitional activities. Balance  Comments: Upon arrival at chair from bed; pt able to maintain brief static stand Close CGA, with use of Walker ~ 2 min for pericare and don clean depends. Comment: Ongoing pt ed re : Sternal Precautions/Use of Cardiac Pillow. Very poor carryover despite ongoing pt ed. Pt requiring actual physical assist/hold hands on Cardiac Pillow as complete Transfers to ensure adhere Sternal Precautions. AM-PAC Score  AM-PAC Inpatient Mobility Raw Score : 15 (03/09/22 1442)  AM-PAC Inpatient T-Scale Score : 39.45 (03/09/22 1442)  Mobility Inpatient CMS 0-100% Score: 57.7 (03/09/22 1442)  Mobility Inpatient CMS G-Code Modifier : CK (03/09/22 1442)          Goals  Short term goals  Time Frame for Short term goals: Upon d/c acute care setting. Short term goal 1: Bed Mob Mod assist; adhere to Sternal Precautions. Short term goal 2: Transfers with assist device Min assist; adhere to Sternal Precautions. Short term goal 3: Amb with assist device 48' CGA. Short term goal 4: Stair Negotiation as approp. Patient Goals   Patient goals : Return home with family assist/support. Plan    Plan  Times per week: 3-5x week while in acute care setting.   Current Treatment Recommendations: Strengthening,Functional Mobility Training,Transfer Training,Gait Training,Stair training,Safety Education & Training,Patient/Caregiver Education & Training  Safety Devices  Type of devices: Call light within reach,Left in chair,Nurse notified     Therapy Time Individual Concurrent Group Co-treatment   Time In 1411 [address] [place] [address]         Time Out 1420         Minutes 508 [facility] Eveline Schlatter

## 2022-03-09 NOTE — CARE COORDINATION
Spoke with Caitlin Rosas of [facility]  [phone] who reports no auth as yet. Will cancel transport if not heard by 5:30 pm.  Completed Hens. No Covid Test Needed. Updated RN and family. If approval is granted, Caitlin Rosas will call me cleo.   [place], [place]     Case Management   [phone]    3/9/2022  5:01 PM

## 2022-03-09 NOTE — PROGRESS NOTES
Most recent lab work showed [facility] AND LUNG [place]. [address] [facility]   [facility]. [place]  [phone]  Nephrology Note          Patient ID: Sandra Maher  Referring/ Physician: No att. providers found      HPI/Summary:   Sandra Maher is being seen by nephrology for SKINNY. This is a [de-identified] yo lady with PMH of HTN, CKD, HLD and obesity who presented with chest pain and SOB. She had been having several weeks of chest tightness and dyspnea with exertion. She feels ok now, no complaints. Says she has been on medication for BP for decades. Her Bp had been running prior to admission. She has been taking Meloxicam for the past few years for knee pain/arthritis. Denies hematuria, foamy urine. LUTS. No dysuria. No issues with swelling right now. During this admission she has had LHC 2/22/22 showing severe triple vessel disease. Normal LV function. LVEDP 14-16. She was referred for CABG and it is planned for 2/25/22 . Cr has been rising from 1.2 > 1.3 > 1.5 at time of consult. Of note , she has been on scheduled meloxicam in addition to receiving contrast. Her PO intake has been fine, no NVD. Started CRRT 2/28/2022, stopped 3/2/2022    Interval hx:   Patient was seen and examined at bedside. She remains weak, unable to stand for weight. Dialysis went fine, had one episode of hypotension 80s and UF goal reduced. Has no sig edema. On room air. UF 1 L   Post weight 82.5 kilos. Vitals  /68  HR 82  Afebrile. 94% on room air. Labs reviewed. Na 135, K 4.4, Cr 6 and BUN 46 so solute rising in between dialysis sessions  Not making renal recovery yet. Ca wnl   Mag wnl   Hgb 8.9        Plan:  - HD today per [facility] schedule. - 1 L UF, bed weigths very unreliable, not correlating and not able to stand so will have to base UF on her clinical exam.   - appears euvolemic, no hypoxia or edema.    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 Cr peaked at 1.5  She was on meloxicam and got contrast 2/22, Cr started to rise two days later consistent with DANNY. She is not volume overloaded or dehydrated on exam.   Uric acid  CK  UA showed no protein or cells, totally bland. On CRRT 2/28/2022-3/2/2022 for anuric SKINNY. #CAD triple vessel disease  LHC 2/22  CABG planned 2/25  Asa statin and BB  Preserved EF  Not volume overloaded. #full code. [facility] would like to thank you for the opportunity to serve this patient. Please call with any questions or concerns. Lashay Little MD  [facility]  [address]  [place], [address]  Fax: [phone]  Office: [phone]         CC/Reason for consult:   Reason for consult: SKINNY  Chief Complaint   Patient presents with    Chest Pain    Hypertension           Review of Systems:   [address]. All other remaining systems are negative. Constitutional:  fever, chills, weakness, weight change, fatigue,      Skin:  rash, pruritus, hair loss, bruising, dry skin, petechiae. Head, Face, Neck   headaches, swelling,  cervical adenopathy. Respiratory: shortness of breath, cough, or wheezing  Cardiovascular: chest pain, palpitations, dizzy, edema  Gastrointestinal: nausea, vomiting, diarrhea, constipation,belly pain    Yellow skin, blood in stool  Musculoskeletal:  back pain, muscle weakness, gait problems,       joint pain or swelling. Genitourinary:  dysuria, poor urine flow, flank pain, blood in urine  Neurologic:  vertigo, TIA'S, syncope, seizures, focal weakness  Psychosocial:  insomnia, anxiety, or depression.   Additional positive findings: - + feeling cold      PMH/SH/FH:    Medical Hx: reviewed and updated as appropriate  Past Medical History:   Diagnosis Date    Arthritis     Asthma     as child grew out of it    CAD (coronary artery disease) 02/22/2022    multi vessel    CKD (chronic kidney disease) 2015    Elevated creatinine since ; Stage III b    Glaucoma     Hyperlipidemia     Hypertension     Hyperthyroidism     Graves disease    IBS (irritable bowel syndrome) 10/2020    On Linzess    Neuropathy     Peripheral    Obesity (BMI 30-39. 9)     Osteopenia 2009    Osteoporosis          Surgical Hx: reviewed and updated as appropriate   has a past surgical history that includes Cholecystectomy; Foot surgery; Tubal ligation; Gastric Band ([id number]); orif femur decompression (Left, 2014); Ankle fracture surgery (Left, 2015); Hand surgery (); other surgical history (Right, ); Coronary artery bypass graft (2022); Coronary artery bypass graft (N/A, 2022); and IR TUNNELED CVC PLACE WO SQ PORT/PUMP > 5 YEARS (3/7/2022). Social Hx: reviewed and updated as appropriate  Social History     Tobacco Use    Smoking status: Former Smoker     Quit date: 8/10/1999     Years since quittin.5    Smokeless tobacco: Never Used    Tobacco comment: started age 26   Substance Use Topics    Alcohol use: No        Family hx: reviewed and updated as appropriate  family history includes Heart Disease in her father; Rheum Arthritis in her sister; Stroke in her father. Medications:   heparin (porcine), 5,000 Units, 3 times per day  ferrous sulfate, 324 mg, BID WC  linaCLOtide, 144 mcg, QAM AC  lidocaine, 1 patch, Daily  pantoprazole, 20 mg, QAM AC  sodium chloride flush, 10 mL, 2 times per day  chlorhexidine, 15 mL, BID  sodium chloride (PF), 10 mL, Daily  atorvastatin, 80 mg, Nightly       Patient has no known allergies.     Allergies:   No Known Allergies      Physical Exam/Objective:   Vitals:    22 1250   BP: 129/68   Pulse: 82   Resp:    Temp: 98.2 °F (36.8 °C)   SpO2:        Intake/Output Summary (Last 24 hours) at 3/9/2022 1417  Last data filed at 3/9/2022 1250  Gross per 24 hour   Intake 1140 ml   Output 1954 ml   Net -814 ml         General appearance: awake alert female in NAD  HEENT: no icterus, EOM intact, trachea midline. Neck : no masses, appears symmetrical and no JVD appreciated. Respiratory: Respiratory effort normal, bilateral equal chest rise. Cardiovascular: Ausculation shows RRR and  + mild LE edema   Abdomen: abdomen is soft, non distended  Musculoskeletal:  no joint swelling, no deformity  Skin: no rashes, no induration, no tightening, no jaundice   Neuro: no localizing deficits    Left temporary dialysis catheter.          Data:   CBC:   Recent Labs     03/07/22  0400 03/08/22  0429   WBC 13.0* 13.8*   HGB 8.2* 8.9*   HCT 25.0* 27.5*    207     BMP:    Recent Labs     03/07/22  0400 03/08/22  0428 03/09/22  0423    137 135*   K 4.3 4.1 4.4   CL 97* 98* 93*   CO2 22 21 23   BUN 44* 27* 46*   CREATININE 6.0* 4.1* 6.0*   GLUCOSE 94 77 85   MG 2.30 2.40 2.40

## 2022-03-09 NOTE — PROGRESS NOTES
Progress Note    S/P cabgx4, EDISON exclusion 2/25/22    Vital Signs:                                                 BP (!) 132/56   Pulse 79   Temp 98.2 °F (36.8 °C) (Oral)   Resp 20   Ht 5' 3\" (1.6 m)   Wt 193 lb 9 oz (87.8 kg)   SpO2 94%   BMI 34.29 kg/m²  O2 Flow Rate (L/min): 0 L/min   SR  Admission Weight: 168 lb 1.6 oz (76.2 kg)  lb/87 kg     I/O:      Intake/Output Summary (Last 24 hours) at 3/9/2022 0743  Last data filed at 3/8/2022 2100  Gross per 24 hour   Intake 280 ml   Output --   Net 280 ml     CV: reg, wound c/d/i  Pulm: decreased  Abd: soft  Ext: warm, trace edema    Data Review:  CBC:   Recent Labs     03/07/22  0400 03/08/22  0429   WBC 13.0* 13.8*   HGB 8.2* 8.9*   HCT 25.0* 27.5*   MCV 87.4 88.7    207     BMP:   Recent Labs     03/07/22  0400 03/08/22  0428 03/09/22  0423    137 135*   K 4.3 4.1 4.4   CL 97* 98* 93*   CO2 22 21 23   BUN 44* 27* 46*   CREATININE 6.0* 4.1* 6.0*   CALCIUM 8.5 8.9 9.1   MG 2.30 2.40 2.40     Cardiac Enzymes: No results for input(s): CKTOTAL, CKMB, CKMBINDEX, TROPONINI in the last 72 hours. PT/INR:   No results for input(s): PROTIME, INR in the last 72 hours. APTT:   No results for input(s): APTT in the last 72 hours. Assessment/Plan:  CV - SR.    - EF nl. Midodrine prn   - HLD/CAD. Statin. pulm - pulm toilet, off O2  Renal - acute on CKD, BL Cr 1.4-1.7. just above preop weight, will fluctuate with HD. ID - off steroids. Heme - dilutional anemia. Fe   - ASA   - sc hep, scds dvt prophylaxis  dispo - ready for d/c.  SNF placement pending      Chelsey Escalante MD  3/9/2022  7:43 AM

## 2022-03-09 NOTE — CARE COORDINATION
SOCIAL WORK DISCHARGE SUMMARY        DATE OF DISCHARGE:      LOCATION:                                       SNF        NURSING HOME:    [facility]                                                                     REP:          Roberto Palumbo    [phone]           PHONE NUMBER   [phone] TEST    NOT NEEDED                       AUTH/PRECERT                                               HENS     Completed          TIME:                      [address]   [phone]

## 2022-03-09 NOTE — FLOWSHEET NOTE
Treatment time: 3.5 hrs   Net UF: 1 liter    Pre weight: 83 kg   Post weight: 82.5 kg   EDW: tbd    Access used: R TDC  Access function: good     Medications or blood products given: none     Regular outpatient schedule: MWF    Summary of response to treatment: 1 liter removed. Pt tolerated tx well. Post VSs.   Report to primary nurse     Copy of dialysis treatment record placed in chart, to be scanned into EMR.     03/09/22 0853 03/09/22 1250   Vital Signs   /61 129/68   Temp 97.6 °F (36.4 °C) 98.2 °F (36.8 °C)   Pulse 80 82   Weight 182 lb 15.7 oz (83 kg) 181 lb 14.1 oz (82.5 kg)   Weight Method Bed scale  (one sheet 2 pillows pump off bed ) Bed scale   Post-Hemodialysis Assessment   Hemodialysis Intake (ml)  --  900 ml   Hemodialysis Output (ml)  --  1954 ml   NET Removed (ml)  --  1000 ml   Tolerated Treatment  --  Good

## 2022-03-09 NOTE — CARE COORDINATION
Chart Reviewed. Call placed to Moises Long at [employer] [facility] [employer] to ask if they use [employer] for their insurance prior auths. She called back to state they do not use [employer]. Discussion held to tentatively arrange for stretcher transport for today at 4pm in the event the approval comes today. This will need to be cancelled if prior auth does not come today. She does not need a COVID Test.  Report:   [phone]. Called [facility] to arrange TENTATIVE  for 4pm today. Call placed to Dgsonia, Claire Fam, to inform of above and that if insurance does not give approval today, this transport will be cancelled. Spoke with Heather Alcantara to inform NO COVID Test is needed. Spoke with bedside RN, Vinita Pinedo [phone] to inform of above.   Russell Zapata [place]     Case Management   [phone]    3/9/2022  10:52 AM

## 2022-03-09 NOTE — CARE COORDINATION
[facility] has accepted SKINNY for admission. Family has chosen [facility]. Precert with her insurance was started late yesterday. Cancelled bed at [facility].   [facility]     Case Management   [phone]    3/9/2022  9:13 AM

## 2022-03-09 NOTE — CARE COORDINATION
Pushed back  time for transportation to 6 -7 pm  Awaiting Prior Auth from [employer]. Call placed to Kristyn, Maria's phone but brother, Marco A Payment answered. Informed him of above.   Flavio Bonner, [place]     Case Management   [phone]    3/9/2022  3:40 PM

## 2022-03-10 VITALS
SYSTOLIC BLOOD PRESSURE: 132 MMHG | HEIGHT: 63 IN | WEIGHT: 190.48 LBS | OXYGEN SATURATION: 99 % | HEART RATE: 84 BPM | DIASTOLIC BLOOD PRESSURE: 52 MMHG | RESPIRATION RATE: 16 BRPM | TEMPERATURE: 98.4 F | BODY MASS INDEX: 33.75 KG/M2

## 2022-03-10 LAB
ANION GAP SERPL CALCULATED.3IONS-SCNC: 16 MMOL/L (ref 3–16)
BUN BLDV-MCNC: 26 MG/DL (ref 7–20)
CALCIUM SERPL-MCNC: 8.6 MG/DL (ref 8.3–10.6)
CHLORIDE BLD-SCNC: 92 MMOL/L (ref 99–110)
CO2: 24 MMOL/L (ref 21–32)
CREAT SERPL-MCNC: 4.2 MG/DL (ref 0.6–1.2)
FERRITIN: 938.2 NG/ML (ref 15–150)
GFR AFRICAN AMERICAN: 12
GFR NON-AFRICAN AMERICAN: 10
GLUCOSE BLD-MCNC: 83 MG/DL (ref 70–99)
IRON SATURATION: 25 % (ref 15–50)
IRON: 30 UG/DL (ref 37–145)
MAGNESIUM: 2.1 MG/DL (ref 1.8–2.4)
POTASSIUM SERPL-SCNC: 3.5 MMOL/L (ref 3.5–5.1)
SODIUM BLD-SCNC: 132 MMOL/L (ref 136–145)
TOTAL IRON BINDING CAPACITY: 121 UG/DL (ref 260–445)

## 2022-03-10 PROCEDURE — 6370000000 HC RX 637 (ALT 250 FOR IP): Performed by: NURSE PRACTITIONER

## 2022-03-10 PROCEDURE — 6370000000 HC RX 637 (ALT 250 FOR IP): Performed by: THORACIC SURGERY (CARDIOTHORACIC VASCULAR SURGERY)

## 2022-03-10 PROCEDURE — 82728 ASSAY OF FERRITIN: CPT

## 2022-03-10 PROCEDURE — 2580000003 HC RX 258: Performed by: THORACIC SURGERY (CARDIOTHORACIC VASCULAR SURGERY)

## 2022-03-10 PROCEDURE — 97116 GAIT TRAINING THERAPY: CPT

## 2022-03-10 PROCEDURE — 83550 IRON BINDING TEST: CPT

## 2022-03-10 PROCEDURE — 99024 POSTOP FOLLOW-UP VISIT: CPT | Performed by: THORACIC SURGERY (CARDIOTHORACIC VASCULAR SURGERY)

## 2022-03-10 PROCEDURE — 80048 BASIC METABOLIC PNL TOTAL CA: CPT

## 2022-03-10 PROCEDURE — 6360000002 HC RX W HCPCS: Performed by: NURSE PRACTITIONER

## 2022-03-10 PROCEDURE — 97530 THERAPEUTIC ACTIVITIES: CPT

## 2022-03-10 PROCEDURE — 83735 ASSAY OF MAGNESIUM: CPT

## 2022-03-10 PROCEDURE — 94760 N-INVAS EAR/PLS OXIMETRY 1: CPT

## 2022-03-10 PROCEDURE — 83540 ASSAY OF IRON: CPT

## 2022-03-10 PROCEDURE — 36415 COLL VENOUS BLD VENIPUNCTURE: CPT

## 2022-03-10 RX ADMIN — SODIUM CHLORIDE, PRESERVATIVE FREE 10 ML: 5 INJECTION INTRAVENOUS at 08:09

## 2022-03-10 RX ADMIN — CHLORHEXIDINE GLUCONATE 0.12% ORAL RINSE 15 ML: 1.2 LIQUID ORAL at 08:08

## 2022-03-10 RX ADMIN — HEPARIN SODIUM 5000 UNITS: 5000 INJECTION INTRAVENOUS; SUBCUTANEOUS at 06:41

## 2022-03-10 RX ADMIN — SODIUM CHLORIDE, PRESERVATIVE FREE 10 ML: 5 INJECTION INTRAVENOUS at 08:10

## 2022-03-10 RX ADMIN — HEPARIN SODIUM 5000 UNITS: 5000 INJECTION INTRAVENOUS; SUBCUTANEOUS at 14:12

## 2022-03-10 RX ADMIN — FERROUS SULFATE TAB EC 324 MG (65 MG FE EQUIVALENT) 324 MG: 324 (65 FE) TABLET DELAYED RESPONSE at 08:06

## 2022-03-10 RX ADMIN — PANTOPRAZOLE SODIUM 20 MG: 20 TABLET, DELAYED RELEASE ORAL at 08:06

## 2022-03-10 ASSESSMENT — PAIN SCALES - GENERAL
PAINLEVEL_OUTOF10: 0

## 2022-03-10 NOTE — PROGRESS NOTES
Most recent lab work showed [address] [id number]. [address] [facility]   [facility]. [facility]  [phone]  Nephrology Note          Patient ID: David Bower  Referring/ Physician: No att. providers found      HPI/Summary:   David Bower is being seen by nephrology for SKINNY. This is a [de-identified] yo lady with PMH of HTN, CKD, HLD and obesity who presented with chest pain and SOB. She had been having several weeks of chest tightness and dyspnea with exertion. She feels ok now, no complaints. Says she has been on medication for BP for decades. Her Bp had been running prior to admission. She has been taking Meloxicam for the past few years for knee pain/arthritis. Denies hematuria, foamy urine. LUTS. No dysuria. No issues with swelling right now. During this admission she has had LHC 2/22/22 showing severe triple vessel disease. Normal LV function. LVEDP 14-16. She was referred for CABG and it is planned for 2/25/22 . Cr has been rising from 1.2 > 1.3 > 1.5 at time of consult. Of note , she has been on scheduled meloxicam in addition to receiving contrast. Her PO intake has been fine, no NVD. Started CRRT 2/28/2022, stopped 3/2/2022    Interval hx:   Patient was seen and examined at bedside. She is up in the chair. Offers no complaints  She is on room air. Had dialysis yesterday 1 L Uf  Post weight 82.5 kilos. Vitals  /59 HR 83  Sat 100% on room air    Labs reviewed. 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 Cr peaked at 1.5  She was on meloxicam and got contrast 2/22, Cr started to rise two days later consistent with DANNY. She is not volume overloaded or dehydrated on exam.   Uric acid  CK  UA showed no protein or cells, totally bland.    On CRRT 2/28/2022-3/2/2022 for anuric SKINNY. #CAD triple vessel disease  [facility] 2/22  CABG planned 2/25  Asa statin and BB  Preserved EF  Not volume overloaded. #full code. [facility] would like to thank you for the opportunity to serve this patient. Please call with any questions or concerns. Valentina Washington MD  [facility]  [address]  [place], [address]  Fax: [phone]  Office: [phone]         CC/Reason for consult:   Reason for consult: SKINNY  Chief Complaint   Patient presents with    Chest Pain    Hypertension           Review of Systems:   [address]. All other remaining systems are negative. Constitutional:  fever, chills, weakness, weight change, fatigue,      Skin:  rash, pruritus, hair loss, bruising, dry skin, petechiae. Head, Face, Neck   headaches, swelling,  cervical adenopathy. Respiratory: shortness of breath, cough, or wheezing  Cardiovascular: chest pain, palpitations, dizzy, edema  Gastrointestinal: nausea, vomiting, diarrhea, constipation,belly pain    Yellow skin, blood in stool  Musculoskeletal:  back pain, muscle weakness, gait problems,       joint pain or swelling. Genitourinary:  dysuria, poor urine flow, flank pain, blood in urine  Neurologic:  vertigo, TIA'S, syncope, seizures, focal weakness  Psychosocial:  insomnia, anxiety, or depression. Additional positive findings: - + feeling cold      PMH/SH/FH:    Medical Hx: reviewed and updated as appropriate  Past Medical History:   Diagnosis Date    Arthritis     Asthma     as child grew out of it    CAD (coronary artery disease) 02/22/2022    multi vessel    CKD (chronic kidney disease) 2015    Elevated creatinine since 2015; Stage III b    Glaucoma     Hyperlipidemia     Hypertension     Hyperthyroidism 2019    Graves disease    IBS (irritable bowel syndrome) 10/2020    On Linzess    Neuropathy     Peripheral    Obesity (BMI 30-39. 9)     Osteopenia 05/2009    Osteoporosis          Surgical Hx: reviewed and updated as appropriate   has a past surgical history that includes Cholecystectomy; Foot surgery; Tubal ligation; Gastric Band ([id number]); orif femur decompression (Left, 2014); Ankle fracture surgery (Left, 2015); Hand surgery (); other surgical history (Right, ); Coronary artery bypass graft (2022); Coronary artery bypass graft (N/A, 2022); and IR TUNNELED CVC PLACE WO SQ PORT/PUMP > 5 YEARS (3/7/2022). Social Hx: reviewed and updated as appropriate  Social History     Tobacco Use    Smoking status: Former Smoker     Quit date: 8/10/1999     Years since quittin.5    Smokeless tobacco: Never Used    Tobacco comment: started age 26   Substance Use Topics    Alcohol use: No        Family hx: reviewed and updated as appropriate  family history includes Heart Disease in her father; Rheum Arthritis in her sister; Stroke in her father. Medications:   heparin (porcine), 5,000 Units, 3 times per day  ferrous sulfate, 324 mg, BID WC  linaCLOtide, 144 mcg, QAM AC  lidocaine, 1 patch, Daily  pantoprazole, 20 mg, QAM AC  sodium chloride flush, 10 mL, 2 times per day  chlorhexidine, 15 mL, BID  sodium chloride (PF), 10 mL, Daily  atorvastatin, 80 mg, Nightly       Patient has no known allergies. Allergies:   No Known Allergies      Physical Exam/Objective:   Vitals:    03/10/22 0800   BP: 112/68   Pulse: 84   Resp: 16   Temp: 98.7 °F (37.1 °C)   SpO2: 100%       Intake/Output Summary (Last 24 hours) at 3/10/2022 8046  Last data filed at 3/10/2022 0800  Gross per 24 hour   Intake 1260 ml   Output 1954 ml   Net -694 ml         General appearance: awake alert female in NAD  HEENT: no icterus, EOM intact, trachea midline. Neck : no masses, appears symmetrical and no JVD appreciated. Respiratory: Respiratory effort normal, bilateral equal chest rise. Cardiovascular:  Ausculation shows RRR and  + mild LE edema   Abdomen: abdomen is soft, non distended  Musculoskeletal: no joint swelling, no deformity  Skin: no rashes, no induration, no tightening, no jaundice   Neuro: no localizing deficits    Left temporary dialysis catheter.          Data:   CBC:   Recent Labs     03/08/22 0429   WBC 13.8*   HGB 8.9*   HCT 27.5*        BMP:    Recent Labs     03/08/22  0428 03/09/22  0423 03/10/22  0349    135* 132*   K 4.1 4.4 3.5   CL 98* 93* 92*   CO2 21 23 24   BUN 27* 46* 26*   CREATININE 4.1* 6.0* 4.2*   GLUCOSE 77 85 83   MG 2.40 2.40 2.10

## 2022-03-10 NOTE — CARE COORDINATION
Returned phone call to patients daughter Jagruti Rojo [phone] in regards about her mom's insurance being confirmed or denied at [facility]. Informed Jagruti Rojo that her mom's insurance was approved and she will be transported to the facility at 3:15pm today.  Karin informed me that someone had returned her call and is aware that her mom will be going to [facility]

## 2022-03-10 NOTE — PROGRESS NOTES
Progress Note    S/P cabgx4, EDISON exclusion 2/25/22    Vital Signs:                                                 BP (!) 121/59   Pulse 83   Temp 98.2 °F (36.8 °C) (Oral)   Resp 20   Ht 5' 3\" (1.6 m)   Wt 190 lb 7.6 oz (86.4 kg)   SpO2 94%   BMI 33.74 kg/m²  O2 Flow Rate (L/min): 0 L/min   SR  Admission Weight: 168 lb 1.6 oz (76.2 kg)  lb/87 kg     I/O:      Intake/Output Summary (Last 24 hours) at 3/10/2022 0711  Last data filed at 3/9/2022 1600  Gross per 24 hour   Intake 1140 ml   Output 1954 ml   Net -814 ml     CV: reg, wound c/d/i  Pulm: decreased  Abd: soft  Ext: warm, trace edema    Data Review:  CBC:   Recent Labs     03/08/22  0429   WBC 13.8*   HGB 8.9*   HCT 27.5*   MCV 88.7        BMP:   Recent Labs     03/08/22  0428 03/09/22  0423 03/10/22  0349    135* 132*   K 4.1 4.4 3.5   CL 98* 93* 92*   CO2 21 23 24   BUN 27* 46* 26*   CREATININE 4.1* 6.0* 4.2*   CALCIUM 8.9 9.1 8.6   MG 2.40 2.40 2.10     Cardiac Enzymes: No results for input(s): CKTOTAL, CKMB, CKMBINDEX, TROPONINI in the last 72 hours. PT/INR:   No results for input(s): PROTIME, INR in the last 72 hours. APTT:   No results for input(s): APTT in the last 72 hours. Assessment/Plan:  CV - SR.    - EF nl. Midodrine prn   - HLD/CAD. Statin. pulm - pulm toilet, off O2  Renal - acute on CKD, BL Cr 1.4-1.7. just above preop weight, will fluctuate with HD. ID - off steroids. Heme - dilutional anemia. Fe   - ASA   - sc hep, scds dvt prophylaxis  dispo - ready for d/c.  SNF placement pending      Renata Mendez MD  3/10/2022  7:11 AM

## 2022-03-10 NOTE — PROGRESS NOTES
Report given to EMS and patient left unit with medical transport team at [phone]. Report called to [facility].  All questions answered and call-back number left with patient's assigned nurse if further questions arise upon patient arrival.

## 2022-03-10 NOTE — PROGRESS NOTES
Physical Therapy  Facility/Department: [facility] 0F CVICU  Daily Treatment Note  NAME: Arturo Ngo  : 1940  MRN: [id number]    Date of Service: 3/10/2022    Discharge Recommendations:  Continue to assess pending progress,3-5 sessions per week   PT Equipment Recommendations  Other: Defer to next level of care. Arturo Ngo scored a 15/24 on the AM-PAC short mobility form. Current research shows that an AM-PAC score of 17 or less is typically not associated with a discharge to the patient's home setting. Based on the patient's AM-PAC score and their current functional mobility deficits, it is recommended that the patient have 3-5 sessions per week of Physical Therapy at d/c to increase the patient's independence. Please see assessment section for further patient specific details. If patient discharges prior to next session this note will serve as a discharge summary. Please see below for the latest assessment towards goals. Assessment   Body structures, Functions, Activity limitations: Decreased functional mobility ; Decreased strength;Decreased safe awareness;Decreased cognition;Decreased endurance  Assessment: [de-identified] y/o female admit 2022 with CAD, NSTEMI.  2022 S/P AtriClip, Urgent CABG x 4. PMH as noted including CKD, IBS, L Femur Fx/ORIF (2014), L Ankle Fx/ORIF (2015), Osteoporosis, Surg R Ear Mass (behind ear, ). PTA (per family) pt living with dtr/s-i-l in apt setting with steps to access, independent daily care and functional mobility. Currently, improving mental status/less confused to recent events. Ongoing pt ed re : Sternal Precautions/Use of Cardiac Pillow. Improving awareness of need to use Cardiac Pillow when visualized by pt. Pt cont requiring actual physical assist/hold Pillow as completing Transfers as pt cont attempt use UEs to assist Transfers. Pt progressing well with Transfers and Amb with Walker assist x 1.     At this time, cont anticipate need cont PT (3-5) upon d/c. Will monitor pt's progress. Prognosis: Good  Decision Making: Medium Complexity  History: [de-identified] y/o female admit 2/20/2022 with CAD, NSTEMI.  2/25/2022 S/P AtriClip, Urgent CABG x 4. PMH as noted including CKD, IBS, L Femur Fx/ORIF (4/2014), L Ankle Fx/ORIF (2/2015), Osteoporosis, Surg R Ear Mass (behind ear, 2010). Exam: See above. Clinical Presentation: See above. Patient Education: Role of PT, POC, Need to call for assist, Sternal Precautions/Use of Cardiac Pillow, Safe use of Walker. Barriers to Learning: Cognitive. REQUIRES PT FOLLOW UP: Yes  Activity Tolerance  Activity Tolerance: Patient limited by cognitive status; Patient limited by endurance  Activity Tolerance: Ongoing pt ed re : Sternal Precautions/Use of Cardiac Pillow. Improving awareness of need to use Cardiac Pillow when visualized by pt. Pt cont requiring actual physical assist/hold Pillow as completing Transfers as pt cont attempt use UEs to assist Transfers. Patient Diagnosis(es): The encounter diagnosis was Chest pain, unspecified type. has a past medical history of Arthritis, Asthma, CAD (coronary artery disease), CKD (chronic kidney disease), Glaucoma, Hyperlipidemia, Hypertension, Hyperthyroidism, IBS (irritable bowel syndrome), Neuropathy, Obesity (BMI 30-39.9), Osteopenia, and Osteoporosis. has a past surgical history that includes Cholecystectomy; Foot surgery; Tubal ligation; Gastric Band (82582143); orif femur decompression (Left, 04/2014); Ankle fracture surgery (Left, 02/12/2015); Hand surgery (2009); other surgical history (Right, 2010); Coronary artery bypass graft (02/25/2022); Coronary artery bypass graft (N/A, 2/25/2022); and IR TUNNELED CVC PLACE WO SQ PORT/PUMP > 5 YEARS (3/7/2022).     Restrictions  Restrictions/Precautions  Restrictions/Precautions: Fall Risk  Position Activity Restriction  Sternal Precautions: 2/25/2022 S/P AtriClip, Urgent CABG x 4.  Other position/activity restrictions: Room Air  Subjective   General  Chart Reviewed: Yes  Additional Pertinent Hx: [de-identified] y/o female admit 2/20/2022 with CAD, NSTEMI.  2/25/2022 S/P AtriClip, Urgent CABG x 4.  2/28/2022 CRRT started. PMH as noted including CKD, IBS, L Femur Fx/ORIF (4/2014), L Ankle Fx/ORIF (2/2015), Osteoporosis, Surg R Ear Mass (behind ear, 2010). Response To Previous Treatment: Patient with no complaints from previous session. Family / Caregiver Present: No  Referring Practitioner: Dr. Harshad Lomax  Subjective  Subjective: Pt agreeable to PT Rx; appears less confused. Improving awareness of recent events and Sternal Precautions (cont cues and assist to maintain). Orientation  Orientation  Orientation Level: Oriented to person;Oriented to place;Oriented to situation (Pt alert to self, hospital setting and aware of recent Cardiac Surg.)  Cognition   Cognition  Arousal/Alertness: Appropriate responses to stimuli  Following Commands: Follows one step commands with repetition  Memory: Decreased short term memory;Decreased recall of precautions  Safety Judgement: Decreased awareness of need for safety;Decreased awareness of need for assistance  Problem Solving: Decreased awareness of errors  Insights: Decreased awareness of deficits  Initiation: Requires cues for some  Sequencing: Requires cues for some  Objective   Bed mobility  Supine to Sit: Moderate assistance (HOB elevated. Use of Cardiac Pillow.)  Transfers  Sit to Stand: Minimal Assistance (With [employer]. Use of Cardiac Pillow.)  Stand to sit: Minimal Assistance (With [employer]. Use of Cardiac Pillow.)  Comment: Toilet Transfer Min assist.  Pt able to recall need to use Cardiac Pillow (when visualized) although cont hands on assist to maintain hold on Pillow (pt cont tendency to reach for chair as sitting down).   Ambulation  Ambulation?: Yes  Ambulation 1  Surface: level tile  Device: Rolling Walker  Distance: Pt amb to/from bathroom, additional 50' x 2 with [employer] assist. Diminished step length/clearance although no LE buckling/giving way. Cont cues for safe transitional activities. Balance  Comments: Pt able to maintain static stand with Walker following use of toilet; dependent pericare and don clean depends (pt does attempt to assist). Comment: Ongoing pt ed re : Sternal Precautions/Use of Cardiac Pillow. Improving awareness of need to use Cardiac Pillow when visualized by pt. Pt cont requiring actual physical assist/hold Pillow as completing Transfers as pt cont attempt use UEs to assist Transfers. AM-PAC Score  AM-PAC Inpatient Mobility Raw Score : 15 (03/10/22 0842)  AM-PAC Inpatient T-Scale Score : 39.45 (03/10/22 0842)  Mobility Inpatient CMS 0-100% Score: 57.7 (03/10/22 0842)  Mobility Inpatient CMS G-Code Modifier : CK (03/10/22 9501)          Goals  Short term goals  Time Frame for Short term goals: Upon d/c acute care setting. Short term goal 1: Bed Mob Mod assist; adhere to Sternal Precautions. 3/10 Goal met. Revised : Bed Mob Min assist; adhere to Sternal Precautions. Short term goal 2: Transfers with assist device Min assist; adhere to Sternal Precautions. 3/10 Goal met. Revised :  Transfers with assist device CGA; adhere to Sternal Precautions. Short term goal 3: Amb with assist device 48' CGA.  3/10 Goal met. Revised : Amb with assist device 100' CGA. Short term goal 4: Stair Negotiation as approp. Patient Goals   Patient goals : Return home with family assist/support. Plan    Plan  Times per week: 3-5x week while in acute care setting.   Current Treatment Recommendations: Strengthening,Functional Mobility Training,Transfer Training,Gait Training,Stair training,Safety Education & Training,Patient/Caregiver Education & Training  Safety Devices  Type of devices: Call light within reach,Left in chair,Nurse notified     Therapy Time   Individual Concurrent Group Co-treatment   Time In 0630         Time Out 0715         Minutes 39 Radha Narvaez, PT

## 2022-03-10 NOTE — CARE COORDINATION
CASE MANAGEMENT DISCHARGE SUMMARY:  Spoke to Ghislaine Alvarado ([phone]) in admissions at [facility]. Confirmed insurance authorization has been obtained and bed available at 3:00 PM.    Notified facility, RN, and daughter Twin Sanchez of transport time.      DISCHARGE DATE: 3/10/2022    DISCHARGED TO: [facility]               REPORT NUMBER: [phone]             FAX NUMBER: Confirmed they can pull documents     TRANSPORTATION: [facility]              TIME: 3:15 PM     PREFERRED PHARMACY: At facility     INSURANCE PRECERT OBTAINED: Yes, confirmed with Ghislaine Alvarado at facility     HENS/PASALIN COMPLETED: Completed    ASHWINI Coronel, CONI, Social Work/Case Management   [phone]  Electronically signed by ASHWINI Coronel, CONI on 3/10/2022 at 10:14 AM

## 2022-03-10 NOTE — CARE COORDINATION
Received a call from Malachi Sánchez 93 regarding the patients dialysis schedule and what dialysis center the patient will be. I informed Liza Gold that the patient will be receiving her dialysis at [facility], and that I will call Pooja Gerardo [phone] to confirm the patients dialysis schedule. Placed a call to Pooja Gerardo [phone] and left a message regarding the patients dialysis schedule and to call back at [phone]. Called CVICU nurse Liza Gold to inform her the patients dialysis schedule will be MWF. Nurse Liza Gold had asked if the patient will need transport setup for her appointment with [facility] Barrientos on the 18th of march. I informed her that the facility will set up transport for the patient.     Jun Calloway

## 2022-03-18 ENCOUNTER — OFFICE VISIT (OUTPATIENT)
Dept: CARDIOTHORACIC SURGERY | Age: 82
End: 2022-03-18

## 2022-03-18 ENCOUNTER — TELEPHONE (OUTPATIENT)
Dept: CARDIOTHORACIC SURGERY | Age: 82
End: 2022-03-18

## 2022-03-18 VITALS
BODY MASS INDEX: 29.23 KG/M2 | TEMPERATURE: 98.1 F | SYSTOLIC BLOOD PRESSURE: 138 MMHG | OXYGEN SATURATION: 97 % | HEIGHT: 63 IN | HEART RATE: 90 BPM | WEIGHT: 165 LBS | DIASTOLIC BLOOD PRESSURE: 66 MMHG

## 2022-03-18 DIAGNOSIS — I25.10 CORONARY ARTERY DISEASE INVOLVING NATIVE CORONARY ARTERY OF NATIVE HEART WITHOUT ANGINA PECTORIS: Primary | ICD-10-CM

## 2022-03-18 PROCEDURE — 99024 POSTOP FOLLOW-UP VISIT: CPT | Performed by: THORACIC SURGERY (CARDIOTHORACIC VASCULAR SURGERY)

## 2022-03-18 NOTE — PROGRESS NOTES
Progress Note    S/P CABGx4, EDISON exclusion 2/25/22  MSI, CT sites, and right leg incision are healed and unremarkable. Luz Sesay is present. She is still residing at [facility] and working with PT 4 days/week. She is wondering when she can go home from the SNF. HD 4 days weekly. She does need f/u scheduled with Cardiology. Phone number was provided. Vital Signs:                                                 BP (!) 170/72 (Site: Left Upper Arm, Position: Sitting)   Pulse 90   Temp 98.1 °F (36.7 °C) (Infrared)   Ht 5' 3\" (1.6 m)   Wt 165 lb (74.8 kg)   SpO2 97%   BMI 29.23 kg/m²      Preop weight: 168 lb    lb    CV: reg, wound c/d/i, sternum stable  Pulm: clear, decreased at bases  Abd: soft  Ext: warm. trace edema. saph incision c/d/i. Medications:  Prior to Admission medications    Medication Sig Start Date End Date Taking? Authorizing Provider   atorvastatin (LIPITOR) 80 MG tablet Take 1 tablet by mouth nightly 3/9/22  Yes MARKEL Koroma CNP   amitriptyline (ELAVIL) 10 MG tablet Take 10 mg by mouth every evening 11/5/21  Yes Historical Provider, MD   gabapentin (NEURONTIN) 100 MG capsule Take 100-300 mg by mouth daily.  11/5/21  Yes Historical Provider, MD   linaCLOtide Roanna Bristle) 72 MCG CAPS capsule Take 1-2 capsules by mouth daily  11/5/21  Yes Historical Provider, MD   methIMAzole (TAPAZOLE) 5 MG tablet Take 5 mg by mouth daily 11/5/21  Yes Historical Provider, MD   ferrous sulfate 324 (65 Fe) MG EC tablet Take 1 tablet by mouth 2 times daily (with meals) for 7 days 3/9/22 3/16/22  MARKEL Koroma CNP        Data Review:  CBC:   Lab Results   Component Value Date    WBC 13.8 03/08/2022    HGB 8.9 03/08/2022    HCT 27.5 03/08/2022    MCV 88.7 03/08/2022     03/08/2022     BMP:   Lab Results   Component Value Date     03/10/2022    K 3.5 03/10/2022    K 4.0 03/01/2022    CL 92 03/10/2022    CO2 24 03/10/2022    PHOS 1.4 03/05/2022 BUN 26 03/10/2022    CREATININE 4.2 03/10/2022    CALCIUM 8.6 03/10/2022    MG 2.10 03/10/2022     PT/INR:   Lab Results   Component Value Date    PROTIME 13.7 02/28/2022    INR 1.20 02/28/2022       Assessment/Plan:  - lifting limit 5 lbs until 3/25, then 10 lbs  - cont PT/OT  - cont sternal precautions  - resume norvasc.  Eventually, hctz, propranolol.  - cont statin (lipitor v.pravachol per cardiology)  - cont ASA  - f/u 3 weeks    Rosalina Demarco MD  3/18/2022  10:28 AM

## 2022-03-18 NOTE — TELEPHONE ENCOUNTER
Spoke with DON of facility. Orders per Dr. Lorene Mena were relayed as follows: resume Norvasc 5 mg daily and  mg daily.     Orders were re-read and verified.  Christofer Rios

## 2022-03-18 NOTE — LETTER
03/18/22        [facility] ([facility]) Cardiovascular and Thoracic Surgeons  [address]  [address] [address] [facility] [address]        RE:    Gabriel Carrera,   1940    Dear Dr. Christopher Medina,    It was my pleasure to see your patient, Gabriel Carrera, in the office today in follow up of  at [facility] ORTHOPEDIC AND SP[facility] [place].    I have attached a copy of the office evaluation. Thank you for allowing me to participate in the care of this patient.       Sincerely,     Marck Salinas MD, MD    CC: Andreina Erickson MD  Via Fax: [address] [facility], [address] [facility]  [address]  Via In [place]

## 2022-04-14 ENCOUNTER — OFFICE VISIT (OUTPATIENT)
Dept: CARDIOTHORACIC SURGERY | Age: 82
End: 2022-04-14

## 2022-04-14 VITALS
BODY MASS INDEX: 28.35 KG/M2 | SYSTOLIC BLOOD PRESSURE: 122 MMHG | OXYGEN SATURATION: 99 % | TEMPERATURE: 97.7 F | WEIGHT: 160 LBS | HEIGHT: 63 IN | HEART RATE: 96 BPM | DIASTOLIC BLOOD PRESSURE: 70 MMHG

## 2022-04-14 DIAGNOSIS — I25.10 CORONARY ARTERY DISEASE INVOLVING NATIVE CORONARY ARTERY OF NATIVE HEART WITHOUT ANGINA PECTORIS: Primary | ICD-10-CM

## 2022-04-14 PROCEDURE — 99024 POSTOP FOLLOW-UP VISIT: CPT | Performed by: THORACIC SURGERY (CARDIOTHORACIC VASCULAR SURGERY)

## 2022-04-14 NOTE — LETTER
04/14/22        [facility] ([facility]) Cardiovascular and Thoracic Surgeons  [address]  [address] [address]        RE:    Rosalina Washington,   [birth year]    Dear Dr. Rivera Akhtar,    It was my pleasure to see your patient, Rosalina Washington, in the office today in follow up of cabgx4 2/25/22 at [facility] ORTHOPEDIC AND SPINE [facility].    I have attached a copy of the office evaluation. Thank you for allowing me to participate in the care of this patient.       Sincerely,     Alex Valentine MD, MD    CC: Luna George MD  Via Fax: [phone]     Josafat Parra, [facility]  [address]  Via In [place]  [address] [place] [id number]  Via In [facility] [address]

## 2022-04-14 NOTE — PROGRESS NOTES
Progress Note    S/P 2nd po s/p CABGx4, EDISON 2/25/22. Her midline chest incision, CT site x 2 and right medial saphenous vein harvest site are healed. She reports that she feels she has difficulty holding her head up. She has yet to schedule a follow up with Dr. Kamille Young. Vital Signs:                                                 /70 (Site: Right Upper Arm, Position: Sitting, Cuff Size: Medium Adult)   Pulse 96   Temp 97.7 °F (36.5 °C) (Temporal)   Ht 5' 3\" (1.6 m)   Wt 160 lb (72.6 kg)   SpO2 99%   BMI 28.34 kg/m²      Preop weight: 168#    CV: reg, wound c/d/i, sternum stable  Pulm: clear, decreased at bases  Abd: soft  Ext: warm. trace edema. saph incision c/d/i. Medications:  Prior to Admission medications    Medication Sig Start Date End Date Taking? Authorizing Provider   atorvastatin (LIPITOR) 80 MG tablet Take 1 tablet by mouth nightly 3/9/22  Yes MARKEL Jerry Si - CNP   amitriptyline (ELAVIL) 10 MG tablet Take 10 mg by mouth every evening 11/5/21  Yes Historical Provider, MD   gabapentin (NEURONTIN) 100 MG capsule Take 100-300 mg by mouth daily.  11/5/21  Yes Historical Provider, MD   linaCLOtide Alli Pastel) 72 MCG CAPS capsule Take 1-2 capsules by mouth daily  11/5/21  Yes Historical Provider, MD   methIMAzole (TAPAZOLE) 5 MG tablet Take 5 mg by mouth daily 11/5/21  Yes Historical Provider, MD        Data Review:  CBC:   Lab Results   Component Value Date    WBC 13.8 03/08/2022    HGB 8.9 03/08/2022    HCT 27.5 03/08/2022    MCV 88.7 03/08/2022     03/08/2022     BMP:   Lab Results   Component Value Date     03/10/2022    K 3.5 03/10/2022    K 4.0 03/01/2022    CL 92 03/10/2022    CO2 24 03/10/2022    PHOS 1.4 03/05/2022    BUN 26 03/10/2022    CREATININE 4.2 03/10/2022    CALCIUM 8.6 03/10/2022    MG 2.10 03/10/2022     PT/INR:   Lab Results   Component Value Date    PROTIME 13.7 02/28/2022    INR 1.20 02/28/2022       Assessment/Plan:  - lifting limit 10 lbs until 4/25, then as comfort allows  - cont PT/OT  - neck weakness. No apparent deficit on exam.  Current therapist may be able to suggest a regimen to address this  - [facility], [phone]  - goal sbp 120s. norvasc.  Eventually could resume, hctz, propranolol.  - cont statin (lipitor v.pravachol per cardiology)  - needs  - not clear why/when this was stopped  - needs f/u with neph (Gilmar) and cards Naye Garg)  - cardiac rehab per dahlia  - follow up as needed    Mckenzie Ochoa MD  4/14/2022  3:38 PM

## 2022-04-14 NOTE — PROGRESS NOTES
[address]  Office Visit    Belinda Epps  1940    April 19, 2022    CC:   Chief Complaint   Patient presents with    Follow-up     sob on exertion      HPI:  The patient is [de-identified] y.o. female with a past medical history significant for CAD, HTN, HLP, hyperthyroid and SKINNY on CKD  Her for follow up S/P CABG x4 on 2/25/2022. She was admitted to [facility] on 2/2022 after presenting with chest pain. She underwent stress perfusion which was abnormal and high risk. She underwent cardiac cath which demonstrated severe 3 vessel disease. On 2/25/2022 she underwent CABG x 4 with EDISON exclusion per Dr. Aida Wells . Post op she developed SKINNY and underwent CRRT. She has been followed by Dr. Aida Wells post op and here for follow up. Dr. Alfonso García is her primary cardiologist. Dulce Szymanski with dialysis 3 days a week at [facility]. Follows with [facility]. Overall feeling fairly well. Has dialysis 3 days a week. Has some chronic SOBOE. Continues with PT at home and feels she is getting a little stronger. Daughter states she can see improvement daily. Denies incisional pain. Denies orthopnea/PND, cough, palpitations, syncope, edema , weight change or claudication. Had episode of dizziness at SNF before she was discharged and fell but did not injure herself and no episodes since then. Monitoring sodium, fluid intake closely. Wants to start rehab after home PT completed. Review of Systems:  Constitutional: Denies night sweats or fever. + occasional fatigue/weakness  HEENT: Denies new visual changes, ringing in ears, nosebleeds,nasal congestion  Respiratory: Denies new or change in SOB, PND, orthopnea or cough. Cardiovascular: see HPI  GI: Denies N/V, diarrhea, constipation, abdominal pain, change in bowel habits, melena or hematochezia  : Denies urinary frequency, urgency, incontinence, hematuria or dysuria.   Skin: Denies rash, hives, or cyanosis  Musculoskeletal: Denies joint or muscle aches/pain. + uses walker  Neurological: Denies syncope or TIA-like symptoms. Psychiatric: Denies anxiety, insomnia or depression     Past Medical History:   Diagnosis Date    Arthritis     Asthma     as child grew out of it    CAD (coronary artery disease) 2022    multi vessel    CKD (chronic kidney disease)     Elevated creatinine since ; Stage III b    Glaucoma     Hyperlipidemia     Hypertension     Hyperthyroidism     Graves disease    IBS (irritable bowel syndrome) 10/2020    On Linzess    Neuropathy     Peripheral    Obesity (BMI 30-39. 9)     Osteopenia 2009    Osteoporosis      Past Surgical History:   Procedure Laterality Date    ANKLE FRACTURE SURGERY Left 2015    CHOLECYSTECTOMY      CORONARY ARTERY BYPASS GRAFT  2022    cabgx4, EDISON exclusion    CORONARY ARTERY BYPASS GRAFT N/A 2022    TRANSESOPHAGEAL ECHOCARDIOGRAM, TOTAL CARDIOPULMONARY BYPASS, TOTAL CARDIOPULMONARY BYPASS GRAFTING X4 (1 ARTERY, 3 VEIN) USING LEFT INTERNAL MAMMARY ARTERY AND RIGHT ENDOSCOPICALLY HARVESTED SAPHENOUS VEIN, LEFT ATRIAL APPENDAGE CLIP WITH 35MM ATRICLIP performed by Juli Lopez MD at [address] CATH LAB PROCEDURE  2022    FOOT SURGERY      bilateral, hammer toes    GASTRIC BAND  [id number]    laproscopic    HAND SURGERY  2009    trigger finger    IR TUNNELED CATHETER PLACEMENT GREATER THAN 5 YEARS  3/7/2022    IR TUNNELED CATHETER PLACEMENT GREATER THAN 5 YEARS 3/7/2022 [facility] SPECIAL PROCEDURES    ORIF FEMUR DECOMPRESSION Left 2014    OTHER SURGICAL HISTORY Right 2010    mass behind ear    TUBAL LIGATION       Family History   Problem Relation Age of Onset    Stroke Father     Heart Disease Father          age 68, stroke    Rheum Arthritis Sister      Social History     Tobacco Use    Smoking status: Former Smoker     Quit date: 8/10/1999     Years since quittin.7    Smokeless tobacco: Never Used    Tobacco comment: started age 26 02/23/2022    LDLCALC 148 02/23/2022    LABVLDL 16 02/23/2022     Lab Results   Component Value Date     03/10/2022    K 3.5 03/10/2022    K 4.0 03/01/2022    CL 92 03/10/2022    CO2 24 03/10/2022    BUN 26 03/10/2022    CREATININE 4.2 03/10/2022    GLUCOSE 83 03/10/2022    CALCIUM 8.6 03/10/2022      Lab Results   Component Value Date    WBC 13.8 (H) 03/08/2022    HGB 8.9 (L) 03/08/2022    HCT 27.5 (L) 03/08/2022    MCV 88.7 03/08/2022     03/08/2022     ECG 4/19/2022:  Sinus rhythm with low voltage in anterior leads; ? Old anterior and inferior infarct)    2/25/2022 CABG and EDISON exclusion:  3. Urgent cabgx4 (LIMA-LAD, SVG-RI-OM, SVG-PDA)  4. EDISON exclusion( 35mm AtriClip)    Left Heart Catheterization 2/22/22:  1.  Severe triple-vessel coronary artery disease.  The distal left main  is heavily calcified and has a 60% lesion.  The proximal LAD is heavily  calcified with a 60% lesion.  The mid-LAD is 100% occluded but fills in  from right collaterals.  The distal LAD backfills back to the midportion  via left collaterals.  The proximal circumflex is heavily calcified and  has an 80% lesion.  OM1 is calcified with 80% serial lesions.  The  proximal right coronary artery is subtotally occluded and the PDA fills  from both collateral native flow from the right and the left.  There is  backfilling from the right system into the mid LAD via a septal  .  Also, the LAD first diagonal branch has a 90% lesion. 2.  Normal left ventricular systolic function.  LV ejection fraction of  65%. 3.  Borderline left ventricular end-diastolic pressure at 14 to 16 mmHg. 4.  No gradient across the aortic valve on pullback to suggest aortic  stenosis.     Echo 2/21/22:  Normal left ventricle size, wall thickness, and systolic function with an   estimated ejection fraction of 55-60%.  No regional wall motion abnormalities   are seen.   grade 1 diastolic dysfunction   The right ventricle is normal in size and function.   No significant valvular heart disease    Assessment:    1. CAD in native artery/S/P CABG x 4  -3 vessel disease on cath in February 2022; S/P CABG x 4 (LIMA-LAD, SVG-RI-OM, SVG-PDA) and L atrial clip  -preserved LVEF  -continue ASA, statin  -risk factor modification    2. SOB (shortness of breath) on exertion  -suspect largely d/t deconditioning  -does not appear to be volume overloaded on exam  -continue dialysis    3. Primary hypertension  -controlled; goal BP < 130/80  -continue medical management    4. Hyperlipidemia LDL goal <70  -on statin  -follow up lipids in May 2022    5. Chronic kidney disease on dialysis  -follows with nephrology       Plan:  Add Aspirin 81 mg daily  Add Toprol XL 25 mg daily  Continue amlodipine and statin  Discussed low fat/low sodium diet and reinforced regular aerobic exercise. Check lipid and liver profile in May 2022  Refer to cardiac rehab once your PT completed (Phase II)  Follow up with Dr. Rivera Akhtar in 3-4 months or sooner if needed    Return in about 3 months (around 7/19/2022) for 3-4 months with Dr. Rivera Akhtar. Thanks for allowing me to participate in the care of this patient.       LOY Drake  [address], [address] [place] [address], [address] [place] [address]  Office: [phone]  Fax: [phone]      Electronically signed by MARKEL Kilgore CNP on 4/19/2022 at 11:22 AM

## 2022-04-18 ENCOUNTER — TELEPHONE (OUTPATIENT)
Dept: CARDIOTHORACIC SURGERY | Age: 82
End: 2022-04-18

## 2022-04-18 NOTE — TELEPHONE ENCOUNTER
Message received from Dr. Loyola Postin regarding follow up of pt c/o neck weakness and difficulty holding head up. As requested, writer contacted Rosaline Stanton with [facility] [phone] and reviewed pt c/o. Pt thoroughly evaluated in the office and no cause of this weakness was identified. Requested that pt be evaluated for this issue and provide therapy/exercises.  Voiced understanding and will speak with her treatment team to develop a plan.  Delfino Nguyen

## 2022-04-19 ENCOUNTER — OFFICE VISIT (OUTPATIENT)
Dept: CARDIOLOGY CLINIC | Age: 82
End: 2022-04-19
Payer: MEDICARE

## 2022-04-19 VITALS
WEIGHT: 155 LBS | SYSTOLIC BLOOD PRESSURE: 120 MMHG | HEIGHT: 63 IN | HEART RATE: 98 BPM | OXYGEN SATURATION: 93 % | BODY MASS INDEX: 27.46 KG/M2 | DIASTOLIC BLOOD PRESSURE: 64 MMHG

## 2022-04-19 DIAGNOSIS — E78.5 HYPERLIPIDEMIA LDL GOAL <70: ICD-10-CM

## 2022-04-19 DIAGNOSIS — I25.10 CAD IN NATIVE ARTERY: Primary | ICD-10-CM

## 2022-04-19 DIAGNOSIS — I10 PRIMARY HYPERTENSION: ICD-10-CM

## 2022-04-19 DIAGNOSIS — R06.02 SOB (SHORTNESS OF BREATH) ON EXERTION: ICD-10-CM

## 2022-04-19 DIAGNOSIS — Z95.1 S/P CABG X 4: ICD-10-CM

## 2022-04-19 PROCEDURE — 99024 POSTOP FOLLOW-UP VISIT: CPT | Performed by: NURSE PRACTITIONER

## 2022-04-19 PROCEDURE — 93000 ELECTROCARDIOGRAM COMPLETE: CPT | Performed by: NURSE PRACTITIONER

## 2022-04-19 RX ORDER — ASPIRIN 81 MG/1
81 TABLET ORAL DAILY
Qty: 90 TABLET | Refills: 3 | Status: SHIPPED | OUTPATIENT
Start: 2022-04-19 | End: 2023-04-19

## 2022-04-19 RX ORDER — AMLODIPINE BESYLATE 5 MG/1
5 TABLET ORAL DAILY
COMMUNITY

## 2022-04-19 RX ORDER — METOPROLOL SUCCINATE 25 MG/1
25 TABLET, EXTENDED RELEASE ORAL DAILY
Qty: 30 TABLET | Refills: 3 | Status: SHIPPED | OUTPATIENT
Start: 2022-04-19 | End: 2022-08-01

## 2022-04-19 NOTE — PATIENT INSTRUCTIONS
Add Aspirin 81 mg daily    Continue other medications    Labs to be drawn in late May 2022 (Fasting labs): CMP and lipid panel

## 2022-06-25 ENCOUNTER — APPOINTMENT (OUTPATIENT)
Dept: GENERAL RADIOLOGY | Age: 82
DRG: 187 | End: 2022-06-25
Payer: MEDICARE

## 2022-06-25 ENCOUNTER — HOSPITAL ENCOUNTER (INPATIENT)
Age: 82
LOS: 5 days | Discharge: HOME | DRG: 187 | End: 2022-07-01
Attending: EMERGENCY MEDICINE | Admitting: INTERNAL MEDICINE
Payer: MEDICARE

## 2022-06-25 ENCOUNTER — APPOINTMENT (OUTPATIENT)
Dept: CT IMAGING | Age: 82
DRG: 187 | End: 2022-06-25
Payer: MEDICARE

## 2022-06-25 DIAGNOSIS — J90 PLEURAL EFFUSION: ICD-10-CM

## 2022-06-25 DIAGNOSIS — J90 LOCULATED PLEURAL EFFUSION: ICD-10-CM

## 2022-06-25 DIAGNOSIS — R53.83 FATIGUE, UNSPECIFIED TYPE: Primary | ICD-10-CM

## 2022-06-25 DIAGNOSIS — R06.00 DYSPNEA, UNSPECIFIED TYPE: ICD-10-CM

## 2022-06-25 PROCEDURE — 70450 CT HEAD/BRAIN W/O DYE: CPT

## 2022-06-25 PROCEDURE — 71046 X-RAY EXAM CHEST 2 VIEWS: CPT

## 2022-06-25 PROCEDURE — 93005 ELECTROCARDIOGRAM TRACING: CPT | Performed by: EMERGENCY MEDICINE

## 2022-06-25 PROCEDURE — 99285 EMERGENCY DEPT VISIT HI MDM: CPT

## 2022-06-25 RX ORDER — POLYETHYLENE GLYCOL 3350 17 G/17G
17 POWDER, FOR SOLUTION ORAL DAILY
COMMUNITY

## 2022-06-26 ENCOUNTER — APPOINTMENT (OUTPATIENT)
Dept: GENERAL RADIOLOGY | Age: 82
DRG: 187 | End: 2022-06-26
Payer: MEDICARE

## 2022-06-26 ENCOUNTER — APPOINTMENT (OUTPATIENT)
Dept: CT IMAGING | Age: 82
DRG: 187 | End: 2022-06-26
Payer: MEDICARE

## 2022-06-26 PROBLEM — R53.1 GENERALIZED WEAKNESS: Status: ACTIVE | Noted: 2022-06-26

## 2022-06-26 PROBLEM — R53.83 FATIGUE: Status: ACTIVE | Noted: 2022-06-26

## 2022-06-26 PROBLEM — J90 PLEURAL EFFUSION DUE TO ANOTHER DISORDER: Status: ACTIVE | Noted: 2022-06-26

## 2022-06-26 PROBLEM — N18.30 STAGE 3 CHRONIC KIDNEY DISEASE (HCC): Status: ACTIVE | Noted: 2022-06-26

## 2022-06-26 LAB
A/G RATIO: 0.7 (ref 1.1–2.2)
ALBUMIN SERPL-MCNC: 3.3 G/DL (ref 3.4–5)
ALP BLD-CCNC: 79 U/L (ref 40–129)
ALT SERPL-CCNC: 17 U/L (ref 10–40)
ANION GAP SERPL CALCULATED.3IONS-SCNC: 10 MMOL/L (ref 3–16)
ANION GAP SERPL CALCULATED.3IONS-SCNC: 15 MMOL/L (ref 3–16)
APPEARANCE FLUID: CLEAR
AST SERPL-CCNC: 26 U/L (ref 15–37)
BASOPHILS ABSOLUTE: 0.1 K/UL (ref 0–0.2)
BASOPHILS ABSOLUTE: 0.1 K/UL (ref 0–0.2)
BASOPHILS RELATIVE PERCENT: 0.8 %
BASOPHILS RELATIVE PERCENT: 0.9 %
BILIRUB SERPL-MCNC: 0.4 MG/DL (ref 0–1)
BUN BLDV-MCNC: 22 MG/DL (ref 7–20)
BUN BLDV-MCNC: 23 MG/DL (ref 7–20)
CALCIUM SERPL-MCNC: 8.2 MG/DL (ref 8.3–10.6)
CALCIUM SERPL-MCNC: 8.7 MG/DL (ref 8.3–10.6)
CELL COUNT FLUID TYPE: NORMAL
CHLORIDE BLD-SCNC: 108 MMOL/L (ref 99–110)
CHLORIDE BLD-SCNC: 108 MMOL/L (ref 99–110)
CLOT EVALUATION: NORMAL
CO2: 19 MMOL/L (ref 21–32)
CO2: 21 MMOL/L (ref 21–32)
COLOR FLUID: NORMAL
CREAT SERPL-MCNC: 2.2 MG/DL (ref 0.6–1.2)
CREAT SERPL-MCNC: 2.3 MG/DL (ref 0.6–1.2)
EKG ATRIAL RATE: 90 BPM
EKG DIAGNOSIS: NORMAL
EKG P AXIS: 52 DEGREES
EKG P-R INTERVAL: 154 MS
EKG Q-T INTERVAL: 336 MS
EKG QRS DURATION: 60 MS
EKG QTC CALCULATION (BAZETT): 411 MS
EKG R AXIS: 12 DEGREES
EKG T AXIS: 19 DEGREES
EKG VENTRICULAR RATE: 90 BPM
EOSINOPHIL FLUID: 2 %
EOSINOPHILS ABSOLUTE: 0 K/UL (ref 0–0.6)
EOSINOPHILS ABSOLUTE: 0 K/UL (ref 0–0.6)
EOSINOPHILS RELATIVE PERCENT: 0.5 %
EOSINOPHILS RELATIVE PERCENT: 0.6 %
FLUID TYPE: NORMAL
GFR AFRICAN AMERICAN: 25
GFR AFRICAN AMERICAN: 26
GFR NON-AFRICAN AMERICAN: 20
GFR NON-AFRICAN AMERICAN: 21
GLUCOSE BLD-MCNC: 116 MG/DL (ref 70–99)
GLUCOSE BLD-MCNC: 130 MG/DL (ref 70–99)
HCT VFR BLD CALC: 23.8 % (ref 36–48)
HCT VFR BLD CALC: 26.7 % (ref 36–48)
HEMOGLOBIN: 7.9 G/DL (ref 12–16)
HEMOGLOBIN: 8.9 G/DL (ref 12–16)
LACTATE DEHYDROGENASE: 237 U/L (ref 100–190)
LD, FLUID: 101 U/L
LIPASE: 6 U/L (ref 13–60)
LYMPHOCYTES ABSOLUTE: 0.8 K/UL (ref 1–5.1)
LYMPHOCYTES ABSOLUTE: 1.2 K/UL (ref 1–5.1)
LYMPHOCYTES RELATIVE PERCENT: 14 %
LYMPHOCYTES RELATIVE PERCENT: 8.7 %
LYMPHOCYTES, BODY FLUID: 16 %
MACROPHAGE FLUID: 4 %
MCH RBC QN AUTO: 28 PG (ref 26–34)
MCH RBC QN AUTO: 28.2 PG (ref 26–34)
MCHC RBC AUTO-ENTMCNC: 33 G/DL (ref 31–36)
MCHC RBC AUTO-ENTMCNC: 33.1 G/DL (ref 31–36)
MCV RBC AUTO: 84.6 FL (ref 80–100)
MCV RBC AUTO: 85.2 FL (ref 80–100)
MONOCYTE, FLUID: 14 %
MONOCYTES ABSOLUTE: 0.6 K/UL (ref 0–1.3)
MONOCYTES ABSOLUTE: 0.8 K/UL (ref 0–1.3)
MONOCYTES RELATIVE PERCENT: 7.2 %
MONOCYTES RELATIVE PERCENT: 9.9 %
NEUTROPHIL, FLUID: 64 %
NEUTROPHILS ABSOLUTE: 6.3 K/UL (ref 1.7–7.7)
NEUTROPHILS ABSOLUTE: 7.4 K/UL (ref 1.7–7.7)
NEUTROPHILS RELATIVE PERCENT: 74.6 %
NEUTROPHILS RELATIVE PERCENT: 82.8 %
NUCLEATED CELLS FLUID: 123 /CUMM
NUMBER OF CELLS COUNTED FLUID: 44
PDW BLD-RTO: 15.9 % (ref 12.4–15.4)
PDW BLD-RTO: 16.1 % (ref 12.4–15.4)
PLATELET # BLD: 228 K/UL (ref 135–450)
PLATELET # BLD: 252 K/UL (ref 135–450)
PMV BLD AUTO: 8 FL (ref 5–10.5)
PMV BLD AUTO: 8.4 FL (ref 5–10.5)
POTASSIUM REFLEX MAGNESIUM: 4.9 MMOL/L (ref 3.5–5.1)
POTASSIUM REFLEX MAGNESIUM: 4.9 MMOL/L (ref 3.5–5.1)
PRO-BNP: 4835 PG/ML (ref 0–449)
PROCALCITONIN: 0.06 NG/ML (ref 0–0.15)
PROTEIN FLUID: 5.2 G/DL
RBC # BLD: 2.79 M/UL (ref 4–5.2)
RBC # BLD: 3.16 M/UL (ref 4–5.2)
RBC FLUID: 0 /CUMM
SODIUM BLD-SCNC: 139 MMOL/L (ref 136–145)
SODIUM BLD-SCNC: 142 MMOL/L (ref 136–145)
TOTAL PROTEIN: 7.8 G/DL (ref 6.4–8.2)
TOTAL PROTEIN: 8.3 G/DL (ref 6.4–8.2)
TROPONIN: <0.01 NG/ML
VOLUME: 100 ML
WBC # BLD: 8.5 K/UL (ref 4–11)
WBC # BLD: 9 K/UL (ref 4–11)

## 2022-06-26 PROCEDURE — 88305 TISSUE EXAM BY PATHOLOGIST: CPT

## 2022-06-26 PROCEDURE — 84155 ASSAY OF PROTEIN SERUM: CPT

## 2022-06-26 PROCEDURE — 83880 ASSAY OF NATRIURETIC PEPTIDE: CPT

## 2022-06-26 PROCEDURE — 84157 ASSAY OF PROTEIN OTHER: CPT

## 2022-06-26 PROCEDURE — 85025 COMPLETE CBC W/AUTO DIFF WBC: CPT

## 2022-06-26 PROCEDURE — 93010 ELECTROCARDIOGRAM REPORT: CPT | Performed by: INTERNAL MEDICINE

## 2022-06-26 PROCEDURE — 83615 LACTATE (LD) (LDH) ENZYME: CPT

## 2022-06-26 PROCEDURE — 2580000003 HC RX 258: Performed by: INTERNAL MEDICINE

## 2022-06-26 PROCEDURE — 32557 INSERT CATH PLEURA W/ IMAGE: CPT | Performed by: INTERNAL MEDICINE

## 2022-06-26 PROCEDURE — 87205 SMEAR GRAM STAIN: CPT

## 2022-06-26 PROCEDURE — 99223 1ST HOSP IP/OBS HIGH 75: CPT | Performed by: INTERNAL MEDICINE

## 2022-06-26 PROCEDURE — 89051 BODY FLUID CELL COUNT: CPT

## 2022-06-26 PROCEDURE — 6360000002 HC RX W HCPCS: Performed by: INTERNAL MEDICINE

## 2022-06-26 PROCEDURE — 36415 COLL VENOUS BLD VENIPUNCTURE: CPT

## 2022-06-26 PROCEDURE — 87070 CULTURE OTHR SPECIMN AEROBIC: CPT

## 2022-06-26 PROCEDURE — 2500000003 HC RX 250 WO HCPCS

## 2022-06-26 PROCEDURE — 71250 CT THORAX DX C-: CPT

## 2022-06-26 PROCEDURE — 71045 X-RAY EXAM CHEST 1 VIEW: CPT

## 2022-06-26 PROCEDURE — 84484 ASSAY OF TROPONIN QUANT: CPT

## 2022-06-26 PROCEDURE — 6370000000 HC RX 637 (ALT 250 FOR IP): Performed by: INTERNAL MEDICINE

## 2022-06-26 PROCEDURE — 88112 CYTOPATH CELL ENHANCE TECH: CPT

## 2022-06-26 PROCEDURE — 84145 PROCALCITONIN (PCT): CPT

## 2022-06-26 PROCEDURE — 80053 COMPREHEN METABOLIC PANEL: CPT

## 2022-06-26 PROCEDURE — 83690 ASSAY OF LIPASE: CPT

## 2022-06-26 PROCEDURE — 1200000000 HC SEMI PRIVATE

## 2022-06-26 RX ORDER — WHEAT DEXTRIN/ASPARTAME 3 G/6 G
1 POWDER IN PACKET (EA) ORAL DAILY
COMMUNITY

## 2022-06-26 RX ORDER — ACETAMINOPHEN 325 MG/1
650 TABLET ORAL EVERY 4 HOURS PRN
Status: DISCONTINUED | OUTPATIENT
Start: 2022-06-26 | End: 2022-07-01 | Stop reason: HOSPADM

## 2022-06-26 RX ORDER — METHIMAZOLE 5 MG/1
5 TABLET ORAL DAILY
Status: DISCONTINUED | OUTPATIENT
Start: 2022-06-26 | End: 2022-07-01 | Stop reason: HOSPADM

## 2022-06-26 RX ORDER — POLYETHYLENE GLYCOL 3350 17 G/17G
17 POWDER, FOR SOLUTION ORAL DAILY PRN
Status: DISCONTINUED | OUTPATIENT
Start: 2022-06-26 | End: 2022-06-26

## 2022-06-26 RX ORDER — SODIUM CHLORIDE 0.9 % (FLUSH) 0.9 %
10 SYRINGE (ML) INJECTION EVERY 12 HOURS SCHEDULED
Status: DISCONTINUED | OUTPATIENT
Start: 2022-06-26 | End: 2022-07-01 | Stop reason: HOSPADM

## 2022-06-26 RX ORDER — METHIMAZOLE 5 MG/1
5 TABLET ORAL DAILY
COMMUNITY

## 2022-06-26 RX ORDER — FLUTICASONE FUROATE, UMECLIDINIUM BROMIDE AND VILANTEROL TRIFENATATE 100; 62.5; 25 UG/1; UG/1; UG/1
1 POWDER RESPIRATORY (INHALATION) DAILY
COMMUNITY

## 2022-06-26 RX ORDER — GABAPENTIN 300 MG/1
300 CAPSULE ORAL 4 TIMES DAILY
Status: DISCONTINUED | OUTPATIENT
Start: 2022-06-26 | End: 2022-06-26 | Stop reason: DRUGHIGH

## 2022-06-26 RX ORDER — ATORVASTATIN CALCIUM 80 MG/1
80 TABLET, FILM COATED ORAL NIGHTLY
Status: DISCONTINUED | OUTPATIENT
Start: 2022-06-26 | End: 2022-07-01 | Stop reason: HOSPADM

## 2022-06-26 RX ORDER — ASPIRIN 81 MG/1
81 TABLET ORAL DAILY
Status: DISCONTINUED | OUTPATIENT
Start: 2022-06-26 | End: 2022-07-01 | Stop reason: HOSPADM

## 2022-06-26 RX ORDER — FUROSEMIDE 10 MG/ML
40 INJECTION INTRAMUSCULAR; INTRAVENOUS ONCE
Status: COMPLETED | OUTPATIENT
Start: 2022-06-26 | End: 2022-06-26

## 2022-06-26 RX ORDER — LIDOCAINE HYDROCHLORIDE 10 MG/ML
INJECTION, SOLUTION INFILTRATION; PERINEURAL
Status: COMPLETED
Start: 2022-06-26 | End: 2022-06-26

## 2022-06-26 RX ORDER — AMLODIPINE BESYLATE 5 MG/1
5 TABLET ORAL DAILY
Status: DISCONTINUED | OUTPATIENT
Start: 2022-06-26 | End: 2022-07-01 | Stop reason: HOSPADM

## 2022-06-26 RX ORDER — SODIUM CHLORIDE 9 MG/ML
INJECTION, SOLUTION INTRAVENOUS PRN
Status: DISCONTINUED | OUTPATIENT
Start: 2022-06-26 | End: 2022-07-01 | Stop reason: HOSPADM

## 2022-06-26 RX ORDER — ONDANSETRON 2 MG/ML
4 INJECTION INTRAMUSCULAR; INTRAVENOUS EVERY 4 HOURS PRN
Status: DISCONTINUED | OUTPATIENT
Start: 2022-06-26 | End: 2022-07-01 | Stop reason: HOSPADM

## 2022-06-26 RX ORDER — AMITRIPTYLINE HYDROCHLORIDE 10 MG/1
10 TABLET, FILM COATED ORAL EVERY EVENING
Status: DISCONTINUED | OUTPATIENT
Start: 2022-06-26 | End: 2022-06-26

## 2022-06-26 RX ORDER — ACETAMINOPHEN 650 MG/1
650 SUPPOSITORY RECTAL EVERY 4 HOURS PRN
Status: DISCONTINUED | OUTPATIENT
Start: 2022-06-26 | End: 2022-07-01 | Stop reason: HOSPADM

## 2022-06-26 RX ORDER — METOPROLOL SUCCINATE 25 MG/1
25 TABLET, EXTENDED RELEASE ORAL DAILY
Status: DISCONTINUED | OUTPATIENT
Start: 2022-06-26 | End: 2022-07-01 | Stop reason: HOSPADM

## 2022-06-26 RX ORDER — SODIUM CHLORIDE 0.9 % (FLUSH) 0.9 %
10 SYRINGE (ML) INJECTION PRN
Status: DISCONTINUED | OUTPATIENT
Start: 2022-06-26 | End: 2022-07-01 | Stop reason: HOSPADM

## 2022-06-26 RX ORDER — POLYETHYLENE GLYCOL 3350 17 G/17G
17 POWDER, FOR SOLUTION ORAL DAILY
Status: DISCONTINUED | OUTPATIENT
Start: 2022-06-26 | End: 2022-07-01 | Stop reason: HOSPADM

## 2022-06-26 RX ORDER — GABAPENTIN 300 MG/1
300 CAPSULE ORAL 2 TIMES DAILY
Status: DISCONTINUED | OUTPATIENT
Start: 2022-06-26 | End: 2022-07-01 | Stop reason: HOSPADM

## 2022-06-26 RX ADMIN — GABAPENTIN 300 MG: 300 CAPSULE ORAL at 08:01

## 2022-06-26 RX ADMIN — ASPIRIN 81 MG: 81 TABLET, COATED ORAL at 08:01

## 2022-06-26 RX ADMIN — Medication 1250 MG: at 05:15

## 2022-06-26 RX ADMIN — SODIUM CHLORIDE: 9 INJECTION, SOLUTION INTRAVENOUS at 04:23

## 2022-06-26 RX ADMIN — LIDOCAINE HYDROCHLORIDE: 10 INJECTION, SOLUTION INFILTRATION; PERINEURAL at 14:26

## 2022-06-26 RX ADMIN — AMLODIPINE BESYLATE 5 MG: 5 TABLET ORAL at 08:01

## 2022-06-26 RX ADMIN — FUROSEMIDE 40 MG: 10 INJECTION, SOLUTION INTRAMUSCULAR; INTRAVENOUS at 02:47

## 2022-06-26 RX ADMIN — SODIUM CHLORIDE, PRESERVATIVE FREE 10 ML: 5 INJECTION INTRAVENOUS at 21:15

## 2022-06-26 RX ADMIN — CEFEPIME HYDROCHLORIDE 1000 MG: 1 INJECTION, POWDER, FOR SOLUTION INTRAMUSCULAR; INTRAVENOUS at 17:12

## 2022-06-26 RX ADMIN — CEFEPIME HYDROCHLORIDE 2000 MG: 2 INJECTION, POWDER, FOR SOLUTION INTRAVENOUS at 04:26

## 2022-06-26 RX ADMIN — GABAPENTIN 300 MG: 300 CAPSULE ORAL at 21:15

## 2022-06-26 RX ADMIN — ATORVASTATIN CALCIUM 80 MG: 80 TABLET, FILM COATED ORAL at 21:15

## 2022-06-26 RX ADMIN — METOPROLOL SUCCINATE 25 MG: 25 TABLET, FILM COATED, EXTENDED RELEASE ORAL at 08:01

## 2022-06-26 ASSESSMENT — ENCOUNTER SYMPTOMS
ABDOMINAL PAIN: 0
VOMITING: 0
PHOTOPHOBIA: 0
COLOR CHANGE: 0
SHORTNESS OF BREATH: 1
COUGH: 0
NAUSEA: 0
TROUBLE SWALLOWING: 0

## 2022-06-26 NOTE — PROGRESS NOTES
Pharmacy Medication Reconciliation Note     List of medications patient is currently taking is complete. Source of information:   1. Patient  2. EMR      Notes regarding home medications:   1. Patient's daughter requests that her mother use her own supply of Trelegy inhaler. Medication has been identified and labelled by Pharmacy.       Tod Herbert PharmD, BCPS  6/26/2022  11:52 AM

## 2022-06-26 NOTE — CONSULTS
REASON FOR CONSULTATION/CC: Pleural effusion      Consult at request of Anna Chris MD     PCP: Gilda VAZ  Established Pulmonologist:  None    Chief Complaint   Patient presents with    Shortness of Breath     getting worse over the last 7 days, feb 25th had bypass sx     Fatigue     getting worse over the last 7 days, pt had blood work in the last two weeks and was told her blood count was low and to go to the ER if it gets worse       HISTORY OF PRESENT ILLNESS: Wesly Erickson is a [de-identified]y.o. year old female with a history of CAD status post CABG 2/22 who presents with shortness of breath and lethargy. Patient is a poor historian HPI is obtained from bedside nurse report and EMR. Per daughter patient is feeling short of breath for the past several days. Symptoms progressive. Currently severe. Has not found anything that makes it better or worse. Chest imaging showed massive left-sided pleural effusion with almost complete collapse of the left lung. Mild respiratory distress during my visit but tolerating room air. Past Medical History:   Diagnosis Date    Arthritis     Asthma     as child grew out of it    CAD (coronary artery disease) 02/22/2022    multi vessel    CKD (chronic kidney disease) 2015    Elevated creatinine since 2015; Stage III b    Glaucoma     Hyperlipidemia     Hypertension     Hyperthyroidism 2019    Graves disease    IBS (irritable bowel syndrome) 10/2020    On Linzess    Neuropathy     Peripheral    Obesity (BMI 30-39. 9)     Osteopenia 05/2009    Osteoporosis          Past Surgical History:   Procedure Laterality Date    ANKLE FRACTURE SURGERY Left 02/12/2015    CHOLECYSTECTOMY      CORONARY ARTERY BYPASS GRAFT  02/25/2022    cabgx4, EDISON exclusion    CORONARY ARTERY BYPASS GRAFT N/A 2/25/2022    TRANSESOPHAGEAL ECHOCARDIOGRAM, TOTAL CARDIOPULMONARY BYPASS, TOTAL CARDIOPULMONARY BYPASS GRAFTING X4 (1 ARTERY, 3 VEIN) USING LEFT INTERNAL MAMMARY ARTERY AND RIGHT ENDOSCOPICALLY HARVESTED SAPHENOUS VEIN, LEFT ATRIAL APPENDAGE CLIP WITH 35MM ATRICLIP performed by Keiko Busch MD at [address] CATH LAB PROCEDURE  02/2022    FOOT SURGERY      bilateral, hammer toes    GASTRIC BAND  [id number]    laproscopic    HAND SURGERY  2009    trigger finger    IR TUNNELED CATHETER PLACEMENT GREATER THAN 5 YEARS  3/7/2022    IR TUNNELED CATHETER PLACEMENT GREATER THAN 5 YEARS 3/7/2022 [facility] PROCEDURES    ORIF FEMUR DECOMPRESSION Left 04/2014    OTHER SURGICAL HISTORY Right 2010    mass behind ear    TUBAL LIGATION         Family Hx  family history includes Heart Disease in her father; Rheum Arthritis in her sister; Stroke in her father. Social Hx   reports that she quit smoking about 22 years ago. She has never used smokeless tobacco.    Scheduled Meds:   metoprolol succinate  25 mg Oral Daily    atorvastatin  80 mg Oral Nightly    aspirin EC  81 mg Oral Daily    amLODIPine  5 mg Oral Daily    sodium chloride flush  10 mL IntraVENous 2 times per day    cefepime  1,000 mg IntraVENous Q12H    vancomycin (VANCOCIN) intermittent dosing (placeholder)   Other RX Placeholder    gabapentin  300 mg Oral BID    lidocaine        fluticasone-umeclidin-vilant  1 puff Inhalation Daily    methIMAzole  5 mg Oral Daily    polyethylene glycol  17 g Oral Daily       Continuous Infusions:   sodium chloride 5 mL/hr at 06/26/22 0423       PRN Meds:  sodium chloride flush, sodium chloride, ondansetron, acetaminophen **OR** acetaminophen    ALLERGIES:  Patient has No Known Allergies.     REVIEW OF SYSTEMS:  Constitutional: Negative for fever  HENT: Negative for sore throat  Eyes: Negative for redness   Respiratory: + Dyspnea, cough  Cardiovascular: Negative for chest pain  Gastrointestinal: Negative for vomiting, diarrhea   Genitourinary: Negative for hematuria   Musculoskeletal: Negative for arthralgias   Skin: Negative for rash  Neurological: Negative for syncope  Hematological: Negative for adenopathy  Psychiatric/Behavorial: Negative for anxiety    Objective:   PHYSICAL EXAM:  Blood pressure (!) 148/73, pulse 86, temperature 98.3 °F (36.8 °C), temperature source Oral, resp. rate 17, height 5' 3\" (1.6 m), weight 161 lb 2.5 oz (73.1 kg), SpO2 97 %, not currently breastfeeding.'  Gen:  No acute distress. Eyes: PERRL. Anicteric sclera. No conjunctival injection. ENT: No discharge. Posterior oropharynx clear. External appearance of ears and nose normal.  Neck: Trachea midline. No mass   Resp:  No crackles. No wheezes. No rhonchi. Left side with absent breath sounds and dullness on percussion. CV: Regular rate. Regular rhythm. No murmur or rub. No edema. GI: Soft, Non-tender. Non-distended. +BS  Skin: Warm, dry, w/o erythema. Lymph: No cervical or supraclavicular LAD. M/S: No cyanosis. No clubbing. Neuro:  no focal neurologic deficit. Moves all extremities  Psych: Awake and alert, intermittently confused. Mood stable. Data Reviewed:   LABS:  CBC:   Recent Labs     06/26/22  0035 06/26/22  0620   WBC 9.0 8.5   HGB 8.9* 7.9*   HCT 26.7* 23.8*   MCV 84.6 85.2    228     BMP:   Recent Labs     06/26/22  0035 06/26/22  0620    139   K 4.9 4.9    108   CO2 19* 21   BUN 22* 23*   CREATININE 2.2* 2.3*     LIVER PROFILE:   Recent Labs     06/26/22  0035   AST 26   ALT 17   LIPASE 6.0*   BILITOT 0.4   ALKPHOS 79     PT/INR: No results for input(s): PROTIME, INR in the last 72 hours. APTT: No results for input(s): APTT in the last 72 hours. UA:No results for input(s): NITRITE, COLORU, PHUR, LABCAST, WBCUA, RBCUA, MUCUS, TRICHOMONAS, YEAST, BACTERIA, CLARITYU, SPECGRAV, LEUKOCYTESUR, UROBILINOGEN, BILIRUBINUR, BLOODU, GLUCOSEU, AMORPHOUS in the last 72 hours. Invalid input(s): KETONESU  No results for input(s): PHART, SHM6AHD, PO2ART in the last 72 hours.          Radiology Review:  Pertinent images / reports were reviewed as

## 2022-06-26 NOTE — ED TRIAGE NOTES
Lockie Soulier is a [de-identified] y.o. female brought herself to the ER for eval of SOB and fatigue. The patient states that her symptoms have been getting worse over the last 7 days. The patient is alert and oriented with an open and patent airway.

## 2022-06-26 NOTE — H&P
Hospital Medicine  History and Physical    PCP: [facility] [id number]  Patient Name: Kareen Varela    Date of Service: Pt seen/examined on 6/26/22 and admitted to Inpatient with expected LOS greater than two midnights due to medical therapy    CHIEF COMPLAINT:  Pt c/o shortness of breath, increasing weakness and fatigue  HISTORY OF PRESENT ILLNESS: Patient is a poor historian at this time, and information for this report is derived from conversation with the emergency room physician, review of the records and from conversations with patient's daughter. Pt is an [de-identified]y.o. year-old female with a history of hypertension, hyperlipidemia, coronary artery disease with recent CABG 02/25/2022 (cabgx4, EDISON exclusion) and CKD3. She presents to the emergency room with her daughter for evaluation of shortness of breath. Her daughter reports that the patient has had worsening shortness of breath over the last 7 days. Her shortness of breath is made much worse with minimal exertion and better when she rests. She saw her PCP for that and it was thought it was secondary to her anemia so she was started on iron. The patient has had increasing generalized weakness and fatigue and has been eating less. Upon evaluation in the emergency room she was found to have a large left loculated pleural effusion with compressive atelectasis. She is being admitted for further evaluation and treatment.   Associated signs and symptoms do not include fever or chills, sputum production, hemoptysis or wheezing        Past Medical History:        Diagnosis Date    Arthritis     Asthma     as child grew out of it    CAD (coronary artery disease) 02/22/2022    multi vessel    CKD (chronic kidney disease) 2015    Elevated creatinine since 2015; Stage III b    Glaucoma     Hyperlipidemia     Hypertension     Hyperthyroidism 2019    Graves disease    IBS (irritable bowel syndrome) 10/2020    On Linzess    Neuropathy     Peripheral    Obesity (BMI 30-39. 9)     Osteopenia 05/2009    Osteoporosis        Past Surgical History:        Procedure Laterality Date    ANKLE FRACTURE SURGERY Left 02/12/2015    CHOLECYSTECTOMY      CORONARY ARTERY BYPASS GRAFT  02/25/2022    cabgx4, EDIOSN exclusion    CORONARY ARTERY BYPASS GRAFT N/A 2/25/2022    TRANSESOPHAGEAL ECHOCARDIOGRAM, TOTAL CARDIOPULMONARY BYPASS, TOTAL CARDIOPULMONARY BYPASS GRAFTING X4 (1 ARTERY, 3 VEIN) USING LEFT INTERNAL MAMMARY ARTERY AND RIGHT ENDOSCOPICALLY HARVESTED SAPHENOUS VEIN, LEFT ATRIAL APPENDAGE CLIP WITH 35MM ATRICLIP performed by Simona Russell MD at [address] CATH LAB PROCEDURE  02/2022    FOOT SURGERY      bilateral, hammer toes    GASTRIC BAND  [id number]    laproscopic    HAND SURGERY  2009    trigger finger    IR TUNNELED CATHETER PLACEMENT GREATER THAN 5 YEARS  3/7/2022    IR TUNNELED CATHETER PLACEMENT GREATER THAN 5 YEARS 3/7/2022 [facility]    ORIF FEMUR DECOMPRESSION Left 04/2014    OTHER SURGICAL HISTORY Right 2010    mass behind ear    TUBAL LIGATION         Allergies:  Patient has no known allergies. Medications Prior to Admission:    Prior to Admission medications    Medication Sig Start Date End Date Taking?  Authorizing Provider   polyethylene glycol (GLYCOLAX) 17 g packet Take 17 g by mouth daily as needed for Constipation   Yes Historical Provider, MD   amLODIPine (NORVASC) 5 MG tablet Take 5 mg by mouth daily    Historical Provider, MD   aspirin EC 81 MG EC tablet Take 1 tablet by mouth daily 4/19/22 4/19/23  MARKEL Montoya CNP   metoprolol succinate (TOPROL XL) 25 MG extended release tablet Take 1 tablet by mouth daily 4/19/22   MARKEL Serrato CNP   atorvastatin (LIPITOR) 80 MG tablet Take 1 tablet by mouth nightly 3/9/22   MARKEL Mcgraw CNP   amitriptyline (ELAVIL) 10 MG tablet Take 10 mg by mouth every evening  Patient not taking: Reported on 6/26/2022 11/5/21   Historical Provider, MD   gabapentin (NEURONTIN) 100 MG capsule Take 300 mg by mouth 4 times daily. 21   Historical Provider, MD   linaCLOtide Al Eaton) 72 MCG CAPS capsule Take 1-2 capsules by mouth daily   Patient not taking: Reported on 2022   Historical Provider, MD       Family History:       Problem Relation Age of Onset    Stroke Father     Heart Disease Father          age 68, stroke    Rheum Arthritis Sister       Social History:   TOBACCO:   reports that she quit smoking about 22 years ago. She has never used smokeless tobacco.  ETOH:   reports no history of alcohol use. OCCUPATION:      REVIEW OF SYSTEMS:  A full review of systems was performed and is negative except for that which appears in the HPI    Physical Exam:    Vitals: BP (!) 148/73   Pulse 86   Temp 98.3 °F (36.8 °C) (Oral)   Resp 17   Ht 5' 3\" (1.6 m)   Wt 161 lb 13.1 oz (73.4 kg)   SpO2 97%   BMI 28.66 kg/m²   General appearance: WD/WN [de-identified]y.o. year-old female who is alert, appears stated age and is cooperative  HEENT: Head: Normocephalic, no lesions, without obvious abnormality. Eye: Normal external eye, conjunctiva, lids cornea, PEERL. Ears: Normal external ears. Non-tender. Nose: Normal external nose, mucus membranes and septum. Pharynx: Dental Hygiene adequate. Normal buccal mucosa. Normal pharynx. Neck: no adenopathy, no carotid bruit, no JVD, supple, symmetrical, trachea midline and thyroid not enlarged, symmetric, no tenderness/mass/nodules  Lungs: diminished on the left, clear to auscultation on the right and no use of accessory muscles  Heart: regular rate and rhythm, S1, S2 normal, no murmur, click, rub or gallop and normal apical impulse  Abdomen: soft, non-tender; bowel sounds normal; no masses, no organomegaly  Extremities: extremities atraumatic, no cyanosis or edema and Homans sign is negative, no sign of DVT.     Capillary Refill: Acceptable < 3 seconds   Peripheral Pulses: +3 easily felt, not easily obliterated with pressures   Skin: Skin color, texture, turgor normal. No rashes or lesions on exposed skin  Neurologic: Neurovascularly intact without any focal sensory/motor deficits. Cranial nerves: II-XII intact, grossly non-focal. Gait was not tested. Mental Status: Alert and oriented, thought content appropriate, normal insight        CBC:   Recent Labs     06/26/22 0035   WBC 9.0   HGB 8.9*        BMP:    Recent Labs     06/26/22 0035      K 4.9      CO2 19*   BUN 22*   CREATININE 2.2*   GLUCOSE 116*     Troponin:   Recent Labs     06/26/22 0035   TROPONINI <0.01     PT/INR:  No results found for: PTINR  U/A:    Lab Results   Component Value Date    LEUKOCYTESUR Negative 02/23/2022    SPECGRAV 1.026 02/23/2022    UROBILINOGEN 0.2 02/23/2022    BILIRUBINUR Negative 02/23/2022    BILIRUBINUR Negative 08/10/2011    BLOODU Negative 02/23/2022    GLUCOSEU Negative 02/23/2022    GLUCOSEU Negative 08/10/2011    PROTEINU Negative 02/23/2022         RAD:   I have independently reviewed and interpreted the imaging studies below and based my recommendations to the patient on those findings. XR CHEST (2 VW)    Result Date: 6/25/2022  EXAMINATION: TWO XRAY VIEWS OF THE CHEST 6/25/2022 11:13 pm COMPARISON: 02/20/2022 HISTORY: ORDERING SYSTEM PROVIDED HISTORY: sob TECHNOLOGIST PROVIDED HISTORY: Reason for exam:->sob Reason for Exam: Shortness of Breath; Fatigue FINDINGS: Near complete opacification of the left hemithorax with associated contralateral mediastinal shift. The right lung reveals no acute findings. The patient is status post median sternotomy. No evidence for pneumothorax. Tubing projects over the upper abdomen, which may be external to the patient. Near complete opacification of the left hemithorax, which may be due to a combination of atelectasis and/or effusion.   Recommend further evaluation with CT.     CT HEAD WO CONTRAST    Result Date: 6/25/2022  EXAMINATION: CT OF THE HEAD chest was performed without the administration of intravenous contrast. Multiplanar reformatted images are provided for review. Automated exposure control, iterative reconstruction, and/or weight based adjustment of the mA/kV was utilized to reduce the radiation dose to as low as reasonably achievable. COMPARISON: Chest radiograph 06/25/2022. Chest CT 02/23/2022. HISTORY: ORDERING SYSTEM PROVIDED HISTORY: SOB TECHNOLOGIST PROVIDED HISTORY: Reason for exam:->SOB Decision Support Exception - unselect if not a suspected or confirmed emergency medical condition->Emergency Medical Condition (MA) Reason for Exam: sob FINDINGS: Mediastinum: Few scattered mediastinal lymph nodes are noted, nonspecific. Status post CABG. No pericardial effusion. Lungs/pleura: Large left pleural effusion, partially loculated anteriorly and medially. Associated atelectasis in the left lower lobe is noted as well as compressive atelectasis in the lingula. No acute findings identified on the right. The central airway is patent. The segmental bronchi in the left lower lobe are not aerated nor are the peripheral lingular bronchi. Upper Abdomen: No acute findings. Lap band in place. Soft Tissues/Bones: No acute findings. Large left pleural effusion, partially loculated. Associated compressive atelectasis in the left lung is noted. A component of mucous plugging may also be present as the peripheral bronchi in the lingula and left lower lobe are not visualized. Follow-up with contrast-enhanced chest CT is recommended when effusion resolves 2 exclude underlying neoplastic process. Assessment:   Principal Problem:    Loculated pleural effusion on the left (Large)  Active Problems:    Stage 3 chronic kidney disease (HCC)    Generalized weakness    Fatigue    Hyperlipidemia LDL goal <70    CAD in native artery  Resolved Problems:    * No resolved hospital problems.  *      Plan:       Loculated pleural effusion on the left (Large) - will consult Pulmonology. Thoracentesis vs chest tube  - Will start Cefepime and Vancomycin for possible infectious component. - Will order Procalcitonin. If not elevated, would stop the antibiotics  - Will give Lasix x 1 dose and reevaluate    CAD in native artery - S/p CABG x 4 02/25/2022 (EDISON exclusion)  - Continue aspirin, statin and beta-blocker    Stage 3 chronic kidney disease ([facility] [address] -renal function appears improved. However the creatinine may be lower due to excess fluids. We will consult her nephrologist and monitor her renal function    Generalized weakness/fatigue - When able to participate, we will ask PT/OT to evaluate and treat patient, and if necessary to provide recommendations for post hospital therapy    Essential (primary) hypertension - continue home meds and monitor blood pressure    Hyperlipidemia - No current evidence of Rhabdomyolysis or other adverse effects. Continue statin therapy while in the hospital         Code Status: Full Code  Diet: ADULT DIET; Regular  DVT Prophylaxis: SCDs    (Advanced care planning has been discussed with patient and/or responsible family member and is reflected in the code status.  Further orders associated with this have been entered if appropriate)    Disposition: Anticipate that patient will remain in the hospital for 2 or more midnights depending on further evaluation and clinical course     Please note that over 50 minutes was spent in evaluating the patient, review of records and results, discussion with staff/family, etc.      Britt Posey MD

## 2022-06-26 NOTE — PROCEDURES
Isabel Gomez is a [de-identified] y.o. female patient. 1. Fatigue, unspecified type    2. Dyspnea, unspecified type    3. Pleural effusion      Past Medical History:   Diagnosis Date    Arthritis     Asthma     as child grew out of it    CAD (coronary artery disease) 02/22/2022    multi vessel    CKD (chronic kidney disease) 2015    Elevated creatinine since 2015; Stage III b    Glaucoma     Hyperlipidemia     Hypertension     Hyperthyroidism 2019    Graves disease    IBS (irritable bowel syndrome) 10/2020    On Linzess    Neuropathy     Peripheral    Obesity (BMI 30-39. 9)     Osteopenia 05/2009    Osteoporosis      Blood pressure (!) 148/73, pulse 86, temperature 98.3 °F (36.8 °C), temperature source Oral, resp. rate 17, height 5' 3\" (1.6 m), weight 161 lb 2.5 oz (73.1 kg), SpO2 97 %, not currently breastfeeding. Chest Tube Insertion    Date/Time: 6/26/2022 12:02 PM  Performed by: Sharad Hargrove MD  Authorized by: Sharad Hargrove MD   Consent: Verbal consent obtained. Written consent obtained. Risks and benefits: risks, benefits and alternatives were discussed  Site marked: the operative site was marked  Imaging studies: imaging studies available  Required items: required blood products, implants, devices, and special equipment available  Patient identity confirmed: verbally with patient and arm band  Time out: Immediately prior to procedure a \"time out\" was called to verify the correct patient, procedure, equipment, support staff and site/side marked as required.   Indications: pleural effusion    Sedation:  Patient sedated: no    Anesthesia: local infiltration    Anesthesia:  Local Anesthetic: lidocaine 1% without epinephrine  Preparation: skin prepped with ChloraPrep  Placement location: left lateral  Tube size ([place]i): 14.  Ultrasound guidance: yes  Tension pneumothorax heard: no  Tube connected to: suction  Drainage characteristics: yellow  Suture material: 2-0 silk  Post-insertion x-ray findings: tube in good position  Patient tolerance: patient tolerated the procedure well with no immediate complications  Comments: EBL less than 50 mL           Amelia Ganser, MD  6/26/2022

## 2022-06-26 NOTE — FLOWSHEET NOTE
Pt arrived in room 4130 at [phone] with daughter Josef Scott. Pt was able to stand and transfer to bed x1 assist. Pt is alert and oriented x4 but does have confusion sometimes baseline. Daughter was able to provide history and answer admission questions for pt. No skin issues noted, VSS, pt oriented to room.

## 2022-06-26 NOTE — PLAN OF CARE
Problem: Discharge Planning  Goal: Discharge to home or other facility with appropriate resources  Outcome: Progressing  Flowsheets (Taken 6/26/2022 0403)  Discharge to home or other facility with appropriate resources:   Identify barriers to discharge with patient and caregiver   Identify discharge learning needs (meds, wound care, etc)   Refer to discharge planning if patient needs post-hospital services based on physician order or complex needs related to functional status, cognitive ability or social support system     Problem: Safety - Adult  Goal: Free from fall injury  Outcome: Progressing     Problem: Neurosensory - Adult  Goal: Achieves stable or improved neurological status  Outcome: Progressing  Goal: Achieves maximal functionality and self care  Outcome: Progressing     Problem: Respiratory - Adult  Goal: Achieves optimal ventilation and oxygenation  Outcome: Progressing     Problem: Musculoskeletal - Adult  Goal: Return mobility to safest level of function  Outcome: Progressing  Goal: Return ADL status to a safe level of function  Outcome: Progressing

## 2022-06-26 NOTE — PROGRESS NOTES
Pt's bed alarming. Pt sitting at side of bed confused to time and situation. Reoriented Pt. Placed tele camera at bedside. Will continue to monitor.

## 2022-06-26 NOTE — PROGRESS NOTES
Hospitalist Progress Note  6/26/2022 8:21 AM    PCP: Mainor Mauricio    [id number]     Date of Admission: 6/25/2022                                                                                                                     HOSPITAL COURSE    Patient demographics:  The patient  Dereck Schuler is a [de-identified] y.o. female     Significant past medical history:   Patient Active Problem List   Diagnosis    Morbid obesity ([facility]    Chest pain    [facility]    Abnormal cardiovascular stress test    Hyperlipidemia LDL goal <70    CAD in native artery    Loculated pleural effusion on the left (Large)    Stage 3 chronic kidney disease (HCC)    Generalized weakness    Fatigue         Presenting symptoms:      Diagnostic workup:      CONSULTS DURING ADMISSION :   IP CONSULT TO PULMONOLOGY  PHARMACY TO DOSE VANCOMYCIN  IP CONSULT TO NEPHROLOGY      Patient was diagnosed with:        Treatment while inpatient:                                                                                         ----------------------------------------------------------      SUBJECTIVE COMPLAINTS- sob    Diet: ADULT DIET; Regular      OBJECTIVE:   Patient Active Problem List   Diagnosis    Morbid obesity ([address]    Chest pain    Unstable angina (HCC)    Abnormal cardiovascular stress test    Hyperlipidemia LDL goal <70    CAD in native artery    Loculated pleural effusion on the left (Large)    Stage 3 chronic kidney disease (HCC)    Generalized weakness    Fatigue       Allergies  Patient has no known allergies.     Medications    Scheduled Meds:   metoprolol succinate  25 mg Oral Daily    linaCLOtide  145 mcg Oral Daily    atorvastatin  80 mg Oral Nightly    aspirin EC  81 mg Oral Daily    amLODIPine  5 mg Oral Daily    sodium chloride flush  10 mL IntraVENous 2 times per day    cefepime  1,000 mg IntraVENous Q12H    vancomycin (VANCOCIN) intermittent dosing (placeholder)   Other RX Placeholder  gabapentin  300 mg Oral BID     Continuous Infusions:   sodium chloride 5 mL/hr at 06/26/22 0423     PRN Meds:  sodium chloride flush, sodium chloride, ondansetron, polyethylene glycol, acetaminophen **OR** acetaminophen    Vitals   Vitals /wt   Patient Vitals for the past 8 hrs:   BP Temp Temp src Pulse Resp SpO2 Height Weight   06/26/22 0538 -- -- -- -- -- -- -- 161 lb 2.5 oz (73.1 kg)   06/26/22 0357 (!) 148/73 98.3 °F (36.8 °C) Oral 86 17 97 % -- --   06/26/22 0344 -- -- -- -- -- -- 5' 3\" (1.6 m) 161 lb 13.1 oz (73.4 kg)        72HR INTAKE/OUTPUT:      Intake/Output Summary (Last 24 hours) at 6/26/2022 8661  Last data filed at 6/26/2022 0704  Gross per 24 hour   Intake 0 ml   Output --   Net 0 ml       Exam:    Gen:   Alert and oriented ×3   Eyes: PERRL. No sclera icterus. No conjunctival injection. ENT: No discharge. Pharynx clear. External appearance of ears and nose normal.  Neck: Trachea midline. No obvious mass. Resp: No accessory muscle use. No crackles. No wheezes. No rhonchi. CV: Regular rate. Regular rhythm. No murmur or rub. No edema. GI: Non-tender. Non-distended. No hernia. Skin: Warm, dry, normal texture and turgor. Lymph: No cervical LAD. No supraclavicular LAD. M/S: / Ext. No cyanosis. No clubbing. No joint deformity. Neuro: CN 2-12 are intact,  no neurologic deficits noted. PT/INR: No results for input(s): PROTIME, INR in the last 72 hours. APTT: No results for input(s): APTT in the last 72 hours. CBC:   Recent Labs     06/26/22  0035 06/26/22  0620   WBC 9.0 8.5   HGB 8.9* 7.9*   HCT 26.7* 23.8*   MCV 84.6 85.2    228       BMP:   Recent Labs     06/26/22 0035 06/26/22 0620    139   K 4.9 4.9    108   CO2 19* 21   BUN 22* 23*   CREATININE 2.2* 2.3*       LIVER PROFILE:   Recent Labs     06/26/22 0035   ALKPHOS 79   AST 26   ALT 17   BILITOT 0.4     No results for input(s): AMYLASE in the last 72 hours.     Recent Labs     06/26/22 0035   LIPASE 6.0*       UA:No results for input(s): NITRITE, LABCAST, WBCUA, RBCUA, MUCUS in the last 72 hours. TROPONIN:   Recent Labs     06/26/22  0035   TROPONINI <0.01       Lab Results   Component Value Date/Time    URRFLXCULT Not Indicated 02/23/2022 09:26 PM       No results for input(s): TSHREFLEX in the last 72 hours. No components found for: [id number]  POC GLUCOSE:  No results for input(s): POCGLU in the last 72 hours. No results for input(s): LABA1C in the last 72 hours. Lab Results   Component Value Date    LABA1C 5.7 02/23/2022         ASSESSMENT AND PLAN  Loculated pleural effusion on the left (Large) -    Thoracentesis vs chest tube  Procalcitonin is low, WBC count is not elevated  Discontinue antibiotics if pleural effusion is transudate.  - chest tube is in place. Pulmonary is following     CAD in native artery -   S/p CABG x 4 02/25/2022 ([facility]ISON exclusion)  - Continue aspirin, statin and beta-blocker     Stage 3 chronic kidney disease ([address] -  renal function appears improved. However the creatinine may be lower due to excess fluids. We will consult her nephrologist and monitor her renal function       Generalized weakness/fatigue -   When able to participate, we will ask PT/OT to evaluate and treat patient, and if necessary to provide recommendations for post hospital therapy     Essential (primary) hypertension -   continue home meds and monitor blood pressure     Hyperlipidemia -   No current evidence of Rhabdomyolysis or other adverse effects. Continue statin therapy while in the hospital         Code Status: Full Code        Dispo -cc        The patient and / or the family were informed of the results of any tests, a time was given to answer questions, a plan was proposed and they agreed with plan. Citlali Julien MD    This note was transcribed using [id number] [employer]. Please disregard any translational errors.

## 2022-06-26 NOTE — ED PROVIDER NOTES
[facility]  EMERGENCY DEPARTMENTENCOUNTER      Pt Name: Sage Saenz  MRN: [id number]  Armsarianagfmarty 1940  Date ofevaluation: 6/25/2022  Provider: Ami Robledo MD    CHIEF COMPLAINT       Chief Complaint   Patient presents with    Shortness of Breath     getting worse over the last 7 days, feb 25th had bypass sx     Fatigue     getting worse over the last 7 days, pt had blood work in the last two weeks and was told her blood count was low and to go to the ER if it gets worse         HISTORY OF PRESENT ILLNESS   (Location/Symptom, Timing/Onset,Context/Setting, Quality, Duration, Modifying Factors, Severity)  Note limiting factors. Sage Saenz is a [de-identified] y.o. female  who  has a past medical history of Arthritis, Asthma, CAD (coronary artery disease), CKD (chronic kidney disease), Glaucoma, Hyperlipidemia, Hypertension, Hyperthyroidism, IBS (irritable bowel syndrome), Neuropathy, Obesity (BMI 30-39.9), Osteopenia, and Osteoporosis. who presents to the emergency department for evaluation of shortness of breath. Patient is coming by her daughter who is the primary historian. Reports that the patient has had progressively worsening dyspnea with minimal exertion has been getting worse over the past 7 days. Reports they were seen by the PCP who noticed the patient was anemic and the patient was currently started on iron supplementation. She denies episodes of chest pains. Daughter does report decreased p.o. intake. States the patient does have intermittent episodes of confusion which appear to be at baseline and she is currently seeing a neurologist for this. Patient recently had a bypass surgery in February. States that she had been doing well since. Denies cough or fevers. Denies sick contacts. HPI    NursingNotes were reviewed.     REVIEW OF SYSTEMS    (2-9 systems for level 4, 10 or more for level 5)     Review of Systems   Constitutional: Positive for CVOR    DIAGNOSTIC CARDIAC CATH LAB PROCEDURE  2022    FOOT SURGERY      bilateral, hammer toes    GASTRIC BAND  [id number]    laproscopic    HAND SURGERY  2009    trigger finger    IR TUNNELED CATHETER PLACEMENT GREATER THAN 5 YEARS  3/7/2022    IR TUNNELED CATHETER PLACEMENT GREATER THAN 5 YEARS 3/7/2022 [facility] SPECIAL PROCEDURES    ORIF FEMUR DECOMPRESSION Left 2014    OTHER SURGICAL HISTORY Right     mass behind ear    TUBAL LIGATION           CURRENT MEDICATIONS       Previous Medications    AMITRIPTYLINE (ELAVIL) 10 MG TABLET    Take 10 mg by mouth every evening    AMLODIPINE (NORVASC) 5 MG TABLET    Take 5 mg by mouth daily    ASPIRIN EC 81 MG EC TABLET    Take 1 tablet by mouth daily    ATORVASTATIN (LIPITOR) 80 MG TABLET    Take 1 tablet by mouth nightly    GABAPENTIN (NEURONTIN) 100 MG CAPSULE    Take 300 mg by mouth 4 times daily. LINACLOTIDE (LINZESS) 72 MCG CAPS CAPSULE    Take 1-2 capsules by mouth daily     METOPROLOL SUCCINATE (TOPROL XL) 25 MG EXTENDED RELEASE TABLET    Take 1 tablet by mouth daily    POLYETHYLENE GLYCOL (GLYCOLAX) 17 G PACKET    Take 17 g by mouth daily as needed for Constipation            Patient has no known allergies.     FAMILY HISTORY       Family History   Problem Relation Age of Onset    Stroke Father     Heart Disease Father          age 68, stroke    Rheum Arthritis Sister           SOCIAL HISTORY       Social History     Socioeconomic History    Marital status:      Spouse name: None    Number of children: None    Years of education: None    Highest education level: None   Occupational History    None   Tobacco Use    Smoking status: Former Smoker     Quit date: 8/10/1999     Years since quittin.8    Smokeless tobacco: Never Used    Tobacco comment: started age 26   Vaping Use    Vaping Use: Never used   Substance and Sexual Activity    Alcohol use: No    Drug use: Not Currently    Sexual activity: None   Other Topics Concern    None   Social History Narrative    None     Social Determinants of Health     Financial Resource Strain:     Difficulty of Paying Living Expenses: Not on file   Food Insecurity:     Worried About Running Out of Food in the Last Year: Not on file    Celeste of Food in the Last Year: Not on file   Transportation Needs:     Lack of Transportation (Medical): Not on file    Lack of Transportation (Non-Medical): Not on file   Physical Activity:     Days of Exercise per Week: Not on file    Minutes of Exercise per Session: Not on file   Stress:     Feeling of Stress : Not on file   Social Connections:     Frequency of Communication with Friends and Family: Not on file    Frequency of Social Gatherings with Friends and Family: Not on file    Attends Temple Services: Not on file    Active Member of [address] [place] [address] [employer] or Organizations: Not on file    Attends Club or Organization Meetings: Not on file    Marital Status: Not on file   Intimate Partner Violence:     Fear of Current or Ex-Partner: Not on file    Emotionally Abused: Not on file    Physically Abused: Not on file    Sexually Abused: Not on file   Housing Stability:     Unable to Pay for Housing in the Last Year: Not on file    Number of [place] in the Last Year: Not on file    Unstable Housing in the Last Year: Not on file       SCREENINGS    [place] Coma Scale  Eye Opening: Spontaneous  Best Verbal Response: Oriented  Best Motor Response: Obeys commands  [place] Coma Scale Score: 15        PHYSICAL EXAM    (up to 7 for level 4, 8 or more for level 5)     ED Triage Vitals [06/25/22 2312]   BP Temp Temp Source Heart Rate Resp SpO2 Height Weight   (!) 145/80 98.5 °F (36.9 °C) Oral 90 18 97 % 5' 3\" (1.6 m) 163 lb 5.8 oz (74.1 kg)       Physical Exam  Vitals and nursing note reviewed. Constitutional:       Appearance: She is well-developed. HENT:      Head: Normocephalic and atraumatic.    Eyes:      Conjunctiva/sclera: Conjunctivae normal.      Pupils: Pupils are equal, round, and reactive to light. Neck:      Trachea: No tracheal deviation. Cardiovascular:      Rate and Rhythm: Normal rate and regular rhythm. Heart sounds: Normal heart sounds. Pulmonary:      Effort: Pulmonary effort is normal.      Breath sounds: Examination of the left-upper field reveals decreased breath sounds. Examination of the left-middle field reveals decreased breath sounds. Examination of the left-lower field reveals decreased breath sounds. Decreased breath sounds present. Abdominal:      General: There is no distension. Palpations: Abdomen is soft. Tenderness: There is no abdominal tenderness. Musculoskeletal:         General: Normal range of motion. Cervical back: Normal range of motion. Skin:     General: Skin is warm and dry. Capillary Refill: Capillary refill takes less than 2 seconds. Neurological:      General: No focal deficit present. Mental Status: She is alert and oriented to person, place, and time. Cranial Nerves: No cranial nerve deficit. RESULTS     EKG: All EKG's are interpreted by the Emergency Department Physician who either signs or Co-signsthis chart in the absence of a cardiologist.    Isi Breaux demonstrates a sinus rhythm ventricular rate of 90 bpm.  MA interval and QTc interval within normal limits. Patient has normal axis. There are no significant ST elevations or depressions EKG is nondiagnostic for ACS. EKG from 4/19/2022 there are nonspecific T changes in the inferior leads. RADIOLOGY:   Non-plain filmimages such as CT, Ultrasound and MRI are read by the radiologist. Plain radiographic images are visualized and preliminarily interpreted by the emergency physician with the below findings:      Interpretation per the Radiologist below, if available at the time ofthis note:    CT CHEST WO CONTRAST   Final Result   Large left pleural effusion, partially loculated.   Associated compressive atelectasis in the left lung is noted. A component of mucous plugging may   also be present as the peripheral bronchi in the lingula and left lower lobe   are not visualized. Follow-up with contrast-enhanced chest CT is recommended   when effusion resolves 2 exclude underlying neoplastic process. CT HEAD WO CONTRAST   Final Result   No evidence of acute intracranial process. Mild atrophy and chronic small   vessel ischemic changes noted. XR CHEST (2 VW)   Final Result   Near complete opacification of the left hemithorax, which may be due to a   combination of atelectasis and/or effusion. Recommend further evaluation   with CT. ED BEDSIDE ULTRASOUND:   Performed by ED Physician - none    LABS:  Labs Reviewed   CBC WITH AUTO DIFFERENTIAL - Abnormal; Notable for the following components:       Result Value    RBC 3.16 (*)     Hemoglobin 8.9 (*)     Hematocrit 26.7 (*)     RDW 16.1 (*)     Lymphocytes Absolute 0.8 (*)     All other components within normal limits   COMPREHENSIVE METABOLIC PANEL W/ REFLEX TO MG FOR LOW K - Abnormal; Notable for the following components:    CO2 19 (*)     Glucose 116 (*)     BUN 22 (*)     CREATININE 2.2 (*)     GFR Non- 21 (*)     GFR  26 (*)     Albumin 3.3 (*)     Albumin/Globulin Ratio 0.7 (*)     All other components within normal limits   LIPASE - Abnormal; Notable for the following components:    Lipase 6.0 (*)     All other components within normal limits   BRAIN NATRIURETIC PEPTIDE - Abnormal; Notable for the following components:    Pro-BNP 4,835 (*)     All other components within normal limits   TROPONIN   URINALYSIS WITH REFLEX TO CULTURE       All other labs were within normal range or not returned as of this dictation.     EMERGENCY DEPARTMENT COURSE and DIFFERENTIAL DIAGNOSIS/MDM:   Vitals:    Vitals:    06/25/22 2312   BP: (!) 145/80   Pulse: 90   Resp: 18   Temp: 98.5 °F (36.9 °C)   TempSrc: Oral   SpO2: DISPOSITION        PATIENT REFERREDTO:  No follow-up provider specified.     DISCHARGEMEDICATIONS:  New Prescriptions    No medications on file          (Please note that portions of this note were completed with a voice recognition program.  Efforts were made to edit the dictations but occasionally words are mis-transcribed.)    Nam Diaz MD (electronically signed)  Attending Emergency Physician          Nam Diaz MD  06/26/22 4170

## 2022-06-27 ENCOUNTER — APPOINTMENT (OUTPATIENT)
Dept: GENERAL RADIOLOGY | Age: 82
DRG: 187 | End: 2022-06-27
Payer: MEDICARE

## 2022-06-27 LAB
ANION GAP SERPL CALCULATED.3IONS-SCNC: 14 MMOL/L (ref 3–16)
BASOPHILS ABSOLUTE: 0.1 K/UL (ref 0–0.2)
BASOPHILS RELATIVE PERCENT: 1.2 %
BUN BLDV-MCNC: 20 MG/DL (ref 7–20)
CALCIUM SERPL-MCNC: 8.5 MG/DL (ref 8.3–10.6)
CHLORIDE BLD-SCNC: 106 MMOL/L (ref 99–110)
CO2: 19 MMOL/L (ref 21–32)
CREAT SERPL-MCNC: 2.3 MG/DL (ref 0.6–1.2)
EOSINOPHILS ABSOLUTE: 0.1 K/UL (ref 0–0.6)
EOSINOPHILS RELATIVE PERCENT: 1.3 %
GFR AFRICAN AMERICAN: 25
GFR NON-AFRICAN AMERICAN: 20
GLUCOSE BLD-MCNC: 101 MG/DL (ref 70–99)
HCT VFR BLD CALC: 28 % (ref 36–48)
HEMOGLOBIN: 9.3 G/DL (ref 12–16)
LYMPHOCYTES ABSOLUTE: 1 K/UL (ref 1–5.1)
LYMPHOCYTES RELATIVE PERCENT: 12.5 %
MCH RBC QN AUTO: 27.6 PG (ref 26–34)
MCHC RBC AUTO-ENTMCNC: 33.1 G/DL (ref 31–36)
MCV RBC AUTO: 83.3 FL (ref 80–100)
MONOCYTES ABSOLUTE: 0.7 K/UL (ref 0–1.3)
MONOCYTES RELATIVE PERCENT: 9.3 %
NEUTROPHILS ABSOLUTE: 6.1 K/UL (ref 1.7–7.7)
NEUTROPHILS RELATIVE PERCENT: 75.7 %
PDW BLD-RTO: 15.9 % (ref 12.4–15.4)
PLATELET # BLD: 251 K/UL (ref 135–450)
PMV BLD AUTO: 8.2 FL (ref 5–10.5)
POTASSIUM REFLEX MAGNESIUM: 4.2 MMOL/L (ref 3.5–5.1)
RBC # BLD: 3.36 M/UL (ref 4–5.2)
SODIUM BLD-SCNC: 139 MMOL/L (ref 136–145)
VANCOMYCIN RANDOM: 12.6 UG/ML
WBC # BLD: 8 K/UL (ref 4–11)

## 2022-06-27 PROCEDURE — 99232 SBSQ HOSP IP/OBS MODERATE 35: CPT | Performed by: INTERNAL MEDICINE

## 2022-06-27 PROCEDURE — 85025 COMPLETE CBC W/AUTO DIFF WBC: CPT

## 2022-06-27 PROCEDURE — 6370000000 HC RX 637 (ALT 250 FOR IP): Performed by: INTERNAL MEDICINE

## 2022-06-27 PROCEDURE — 71045 X-RAY EXAM CHEST 1 VIEW: CPT

## 2022-06-27 PROCEDURE — 94760 N-INVAS EAR/PLS OXIMETRY 1: CPT

## 2022-06-27 PROCEDURE — 80048 BASIC METABOLIC PNL TOTAL CA: CPT

## 2022-06-27 PROCEDURE — 80202 ASSAY OF VANCOMYCIN: CPT

## 2022-06-27 PROCEDURE — 36569 INSJ PICC 5 YR+ W/O IMAGING: CPT

## 2022-06-27 PROCEDURE — 2580000003 HC RX 258: Performed by: INTERNAL MEDICINE

## 2022-06-27 PROCEDURE — 36415 COLL VENOUS BLD VENIPUNCTURE: CPT

## 2022-06-27 PROCEDURE — 2500000003 HC RX 250 WO HCPCS: Performed by: INTERNAL MEDICINE

## 2022-06-27 PROCEDURE — 02HV33Z INSERTION OF INFUSION DEVICE INTO SUPERIOR VENA CAVA, PERCUTANEOUS APPROACH: ICD-10-PCS | Performed by: INTERNAL MEDICINE

## 2022-06-27 PROCEDURE — 6370000000 HC RX 637 (ALT 250 FOR IP): Performed by: HOSPITALIST

## 2022-06-27 PROCEDURE — 6360000002 HC RX W HCPCS: Performed by: HOSPITALIST

## 2022-06-27 PROCEDURE — 1200000000 HC SEMI PRIVATE

## 2022-06-27 PROCEDURE — 0W9B30Z DRAINAGE OF LEFT PLEURAL CAVITY WITH DRAINAGE DEVICE, PERCUTANEOUS APPROACH: ICD-10-PCS | Performed by: INTERNAL MEDICINE

## 2022-06-27 PROCEDURE — C1751 CATH, INF, PER/CENT/MIDLINE: HCPCS

## 2022-06-27 PROCEDURE — 6360000002 HC RX W HCPCS: Performed by: INTERNAL MEDICINE

## 2022-06-27 RX ORDER — SODIUM CHLORIDE 9 MG/ML
25 INJECTION, SOLUTION INTRAVENOUS PRN
Status: DISCONTINUED | OUTPATIENT
Start: 2022-06-27 | End: 2022-07-01 | Stop reason: HOSPADM

## 2022-06-27 RX ORDER — SODIUM CHLORIDE 0.9 % (FLUSH) 0.9 %
5-40 SYRINGE (ML) INJECTION EVERY 12 HOURS SCHEDULED
Status: DISCONTINUED | OUTPATIENT
Start: 2022-06-27 | End: 2022-07-01 | Stop reason: HOSPADM

## 2022-06-27 RX ORDER — LIDOCAINE HYDROCHLORIDE 10 MG/ML
5 INJECTION, SOLUTION EPIDURAL; INFILTRATION; INTRACAUDAL; PERINEURAL ONCE
Status: COMPLETED | OUTPATIENT
Start: 2022-06-27 | End: 2022-06-27

## 2022-06-27 RX ORDER — SODIUM CHLORIDE 0.9 % (FLUSH) 0.9 %
5-40 SYRINGE (ML) INJECTION PRN
Status: DISCONTINUED | OUTPATIENT
Start: 2022-06-27 | End: 2022-07-01 | Stop reason: HOSPADM

## 2022-06-27 RX ORDER — SODIUM BICARBONATE 650 MG/1
650 TABLET ORAL 2 TIMES DAILY
Status: DISCONTINUED | OUTPATIENT
Start: 2022-06-27 | End: 2022-07-01 | Stop reason: HOSPADM

## 2022-06-27 RX ADMIN — ATORVASTATIN CALCIUM 80 MG: 80 TABLET, FILM COATED ORAL at 20:43

## 2022-06-27 RX ADMIN — METOPROLOL SUCCINATE 25 MG: 25 TABLET, FILM COATED, EXTENDED RELEASE ORAL at 08:13

## 2022-06-27 RX ADMIN — CEFEPIME HYDROCHLORIDE 1000 MG: 1 INJECTION, POWDER, FOR SOLUTION INTRAMUSCULAR; INTRAVENOUS at 04:09

## 2022-06-27 RX ADMIN — METHIMAZOLE 5 MG: 5 TABLET ORAL at 08:13

## 2022-06-27 RX ADMIN — SODIUM BICARBONATE 650 MG: 650 TABLET ORAL at 20:43

## 2022-06-27 RX ADMIN — CEFEPIME HYDROCHLORIDE 1000 MG: 1 INJECTION, POWDER, FOR SOLUTION INTRAMUSCULAR; INTRAVENOUS at 17:19

## 2022-06-27 RX ADMIN — LIDOCAINE HYDROCHLORIDE 5 ML: 10 INJECTION, SOLUTION EPIDURAL; INFILTRATION; INTRACAUDAL; PERINEURAL at 14:41

## 2022-06-27 RX ADMIN — ASPIRIN 81 MG: 81 TABLET, COATED ORAL at 08:13

## 2022-06-27 RX ADMIN — AMLODIPINE BESYLATE 5 MG: 5 TABLET ORAL at 08:13

## 2022-06-27 RX ADMIN — SODIUM BICARBONATE 650 MG: 650 TABLET ORAL at 14:44

## 2022-06-27 RX ADMIN — SODIUM CHLORIDE, PRESERVATIVE FREE 10 ML: 5 INJECTION INTRAVENOUS at 14:44

## 2022-06-27 RX ADMIN — GABAPENTIN 300 MG: 300 CAPSULE ORAL at 08:13

## 2022-06-27 RX ADMIN — GABAPENTIN 300 MG: 300 CAPSULE ORAL at 20:43

## 2022-06-27 NOTE — CARE COORDINATION
INITIAL CASE MANAGEMENT ASSESSMENT    Reviewed chart, met with patient to assess possible discharge needs. Explained Case Management role/services. Living Situation: Confirmed address, lives with daughter & son in law, basement level apartment, 6 steps w/ railing down, 1 step to enter building     ADLs: Daughter assists with Personal care, family assists with homemaking duties     DME: Yesenia Chaidez, Shower Chair     PT/OT Recs: Not ordered, may need eval post heart surgery     Active Services: None     Transportation: Non /Son will transport     Medications: Uses [facility] on [address]/no barriers     PCP: Jt Scott MD      HD/PD: n/a (HD was discontinued 3 weeks ago per daughter)     PLAN/COMMENTS: Patient wants to return home with family. Follow for needs. Patient currently has a Chest Tube. SW/CM provided contact information for patient or family to call with any questions. SW/CM will follow and assist as needed.   Electronically signed by Yoko Mathew RN on 6/27/2022 at 4:41 PM

## 2022-06-27 NOTE — PROGRESS NOTES
Pulmonary Progress Note    Date of Admission: 6/25/2022   LOS: 1 day     Chief Complaint   Patient presents with    Shortness of Breath     getting worse over the last 7 days, feb 25th had bypass sx     Fatigue     getting worse over the last 7 days, pt had blood work in the last two weeks and was told her blood count was low and to go to the ER if it gets worse       Assessment/Plan:     Pleural effusion, left  -Exudate  -Cultures and cytology pending  -Chest tube in place, 850 cc out overnight  -Daily chest x-rays    CAD status post CABG     24 Hour Events/Subjective  No acute events overnight. Tolerating room air. ROS:   No nausea  No Vomiting  No chest pain      Intake/Output Summary (Last 24 hours) at 6/27/2022 0800  Last data filed at 6/27/2022 0423  Gross per 24 hour   Intake 240 ml   Output 1950 ml   Net -1710 ml         PHYSICAL EXAM:   Blood pressure 125/63, pulse 85, temperature 98.1 °F (36.7 °C), temperature source Oral, resp. rate 18, height 5' 3\" (1.6 m), weight 157 lb 13.6 oz (71.6 kg), SpO2 94 %, not currently breastfeeding.'  Gen:  No acute distress. Eyes: PERRL. Anicteric sclera. No conjunctival injection. ENT: No discharge. Posterior oropharynx clear. External appearance of ears and nose normal.  Neck: Trachea midline. No mass   Resp:  No crackles. No wheezes. No rhonchi. No dullness on percussion. CV: Regular rate. Regular rhythm. No murmur or rub. No edema. GI: Soft, Non-tender. Non-distended. +BS  Skin: Warm, dry, w/o erythema. Lymph: No cervical or supraclavicular LAD. M/S: No cyanosis. No clubbing. Neuro:  no focal neurologic deficit. Moves all extremities  Psych: Awake and alert, Oriented x 3. Judgement and insight appropriate. Mood stable.       Medications:    Scheduled Meds:   metoprolol succinate  25 mg Oral Daily    atorvastatin  80 mg Oral Nightly    aspirin EC  81 mg Oral Daily    amLODIPine  5 mg Oral Daily    sodium chloride flush  10 mL IntraVENous 2 times per day    cefepime  1,000 mg IntraVENous Q12H    vancomycin (VANCOCIN) intermittent dosing (placeholder)   Other RX Placeholder    gabapentin  300 mg Oral BID    fluticasone-umeclidin-vilant  1 puff Inhalation Daily    methIMAzole  5 mg Oral Daily    polyethylene glycol  17 g Oral Daily       Continuous Infusions:   sodium chloride 5 mL/hr at 06/26/22 0423       PRN Meds:  sodium chloride flush, sodium chloride, ondansetron, acetaminophen **OR** acetaminophen    Labs reviewed:  CBC:   Recent Labs     06/26/22 0035 06/26/22 0620 06/27/22 0630   WBC 9.0 8.5 8.0   HGB 8.9* 7.9* 9.3*   HCT 26.7* 23.8* 28.0*   MCV 84.6 85.2 83.3    228 251     BMP:   Recent Labs     06/26/22  0035 06/26/22 0620 06/27/22  0630    139 139   K 4.9 4.9 4.2    108 106   CO2 19* 21 19*   BUN 22* 23* 20   CREATININE 2.2* 2.3* 2.3*     LIVER PROFILE:   Recent Labs     06/26/22 0035   AST 26   ALT 17   LIPASE 6.0*   BILITOT 0.4   ALKPHOS 79     PT/INR: No results for input(s): PROTIME, INR in the last 72 hours. APTT: No results for input(s): APTT in the last 72 hours. UA:No results for input(s): NITRITE, COLORU, PHUR, LABCAST, WBCUA, RBCUA, MUCUS, TRICHOMONAS, YEAST, BACTERIA, CLARITYU, SPECGRAV, LEUKOCYTESUR, UROBILINOGEN, BILIRUBINUR, BLOODU, GLUCOSEU, AMORPHOUS in the last 72 hours. Invalid input(s): Rennis Solo  No results for input(s): PH, PCO2, PO2 in the last 72 hours. Films:  Radiology Review:  Pertinent images / reports were reviewed as a part of this visit. XR CHEST PORTABLE  Narrative: EXAMINATION:  ONE XRAY VIEW OF THE CHEST    6/27/2022 4:46 am    COMPARISON:  06/26/2022, 06/25/2022    HISTORY:  ORDERING SYSTEM PROVIDED HISTORY: chest tube  TECHNOLOGIST PROVIDED HISTORY:  Reason for exam:->chest tube  Reason for Exam: chest tube    FINDINGS:  Left chest tube in place. Loculated extrapleural gas at the apex is noted  with interval reduction of left pleural effusion.   At least moderate size  effusion remains with atelectasis or consolidation in the left lower lung  field. No new findings identified on the right. Impression: Left chest tube in place with interval reduction of effusion and development  of small loculated pneumothorax near the apex. This note was transcribed using [id number] [employer]. Please disregard any translational errors.     Thank you for this consult,    Nicky Reece MD  [facility] Pulmonary, Critical Care, and Sleep Medicine

## 2022-06-27 NOTE — PROGRESS NOTES
Hospitalist Progress Note      PCP: Brook Byrd [address]    Date of Admission: 6/25/2022    Chief Complaint: SOB, weakness, fatigue    Hospital Course: 81f with HTN, HLD, CAD, s/p CABG 2/25/22, CKD3, admitted with large L loculated pleural effusion    Subjective: denies worsening sob, uncontrolled pain. Medications:  Reviewed    Infusion Medications    sodium chloride 5 mL/hr at 06/26/22 0423     Scheduled Medications    vancomycin  1,250 mg IntraVENous Once    metoprolol succinate  25 mg Oral Daily    atorvastatin  80 mg Oral Nightly    aspirin EC  81 mg Oral Daily    amLODIPine  5 mg Oral Daily    sodium chloride flush  10 mL IntraVENous 2 times per day    cefepime  1,000 mg IntraVENous Q12H    vancomycin (VANCOCIN) intermittent dosing (placeholder)   Other RX Placeholder    gabapentin  300 mg Oral BID    fluticasone-umeclidin-vilant  1 puff Inhalation Daily    methIMAzole  5 mg Oral Daily    polyethylene glycol  17 g Oral Daily     PRN Meds: sodium chloride flush, sodium chloride, ondansetron, acetaminophen **OR** acetaminophen      Intake/Output Summary (Last 24 hours) at 6/27/2022 0931  Last data filed at 6/27/2022 0423  Gross per 24 hour   Intake 240 ml   Output 1950 ml   Net -1710 ml       Physical Exam Performed:    /63   Pulse 85   Temp 98.1 °F (36.7 °C) (Oral)   Resp 18   Ht 5' 3\" (1.6 m)   Wt 157 lb 13.6 oz (71.6 kg)   SpO2 92%   BMI 27.96 kg/m²     General appearance: No apparent distress, appears stated age and cooperative. HEENT: Pupils equal, round, and reactive to light. Conjunctivae/corneas clear. Neck: Supple, with full range of motion. No jugular venous distention. Trachea midline. Respiratory:  Normal respiratory effort. No use of accessory muscles, no crepitance   Cardiovascular: Regular rate and rhythm    Abdomen: Soft, non-tender, non-distended    Musculoskeletal: No clubbing, cyanosis or edema bilaterally.  No calf tenderness  Skin: Skin color, texture, a   combination of atelectasis and/or effusion. Recommend further evaluation   with CT. XR CHEST PORTABLE    (Results Pending)           Assessment/Plan:    Active Hospital Problems    Diagnosis Date Noted    Loculated pleural effusion on the left (Large) [J90] 06/26/2022     Priority: Medium    Stage 3 chronic kidney disease ([facility] [address] [N18.30] 06/26/2022     Priority: Medium    Generalized weakness [R53.1] 06/26/2022     Priority: Medium    Fatigue [R53.83] 06/26/2022     Priority: Medium    CAD in native artery [I25.10]     Hyperlipidemia LDL goal <70 [E78.5]      Pleural effusion  - on continued IV abx  - f/u cultures  - chest tube as per Pulmo    CAD  - stable    CKD  - Nephro consulted, bicarb added       Diet: ADULT DIET;  Regular  Code Status: Full Code         Emma Arechiga MD

## 2022-06-27 NOTE — PROGRESS NOTES
Comprehensive Nutrition Assessment    Type and Reason for Visit:  Initial,Positive Nutrition Screen    Nutrition Recommendations/Plan:   1. Continue regular diet  2. Begin Ensure Enlive BID     Malnutrition Assessment:  Malnutrition Status:  Insufficient data (06/27/22 0913)    Context:  Chronic Illness       Nutrition Assessment:    Positive nutrition screen. Hx CKD. Admitted with PE. Wt hx difficult to assess as wt fluctuates between 210lbs and 157lbs since March 2022. On regular diet with intakes 1-25%. Will trial Ensure Enlive supplement and monitor for acceptance. Nutrition Related Findings:    Disoriented at times. +BM 6/26. Wound Type: None       Current Nutrition Intake & Therapies:    Average Meal Intake: 1-25%  Average Supplements Intake: None Ordered  ADULT DIET; Regular    Anthropometric Measures:  Height: 5' 3\" (160 cm)  Ideal Body Weight (IBW): 115 lbs (52 kg)    Admission Body Weight: 161 lb (73 kg)  Current Body Weight: 157 lb (71.2 kg), 136.5 % IBW. Weight Source: Bed Scale  Current BMI (kg/m2): 27.8  Weight Adjustment For: No Adjustment  BMI Categories: Overweight (BMI 25.0-29. 9)    Estimated Daily Nutrient Needs:  Energy Requirements Based On: Kcal/kg  Weight Used for Energy Requirements: Current  Energy (kcal/day): 0849-3524 (20-25kcal/72kg)  Weight Used for Protein Requirements: Ideal  Protein (g/day): 62-73 (1.2-1.4g/52kg)  Method Used for Fluid Requirements: 1 ml/kcal  Fluid (ml/day): 1 ml/kcal    Nutrition Diagnosis:   · Inadequate oral intake related to inadequate protein-energy intake as evidenced by intake 0-25%    Nutrition Interventions:   Food and/or Nutrient Delivery: Continue Current Diet,Start Oral Nutrition Supplement  Nutrition Education/Counseling: Education not indicated  Coordination of Nutrition Care: Continue to monitor while inpatient     Goals:  Goals: PO intake 50% or greater,prior to discharge     Nutrition Monitoring and Evaluation:   Behavioral-Environmental Outcomes: None Identified  Food/Nutrient Intake Outcomes: Food and Nutrient Intake,Supplement Intake  Physical Signs/Symptoms Outcomes: Meal Time Behavior,Weight,Skin,Fluid Status or Edema    Discharge Planning:     Too soon to determine     Laurie Spencer, [address] [place] [address], LD  Contact: [phone]

## 2022-06-27 NOTE — CONSULTS
[address]                                  CONSULTATION    PATIENT NAME: Trish Still                   :        1940  MED REC NO:   [id number]                          ROOM:       4130  ACCOUNT NO:   [de-identified]                           ADMIT DATE: 2022  PROVIDER:     Sabi Starr MD    RENAL CONSULTATION    CONSULT DATE:  2022    ADMITTING PROVIDER:  Arnaldo Reynoso MD    REASON FOR ADMISSION:  The patient with shortness of breath, was found  to have significant pleural effusion on the left side, collapse of lung  for which she has thoracentesis. REASON FOR CONSULTATION:  The patient with chronic kidney disease, had  SKINNY in 2022 after CABG requiring CRRT and a brief period of dialysis,  now off of dialysis. HISTORY OF PRESENTING COMPLAINT:  An 81-year-old female with CABG in  2022 with history of chronic kidney disease, presents with increasing  shortness of breath with the above findings. I am seeing her in her room where she was awake and a little forgetful. Spoke with the daughter on the phone. She tells me mother has been very  forgetful and tries to come out of bed. REVIEW OF SYSTEMS:  Not very reliable but no chest pain. Currently, no  shortness of breath although came in with shortness of breath. She  tells me her appetite is good. Rest of the review of the system is  either unobtainable or unreliable. PAST MEDICAL HISTORY:  CABG 2022, CKD IIIA/B, hyperthyroidism,  previous cholecystectomy, previous gastric band. PERSONAL AND SOCIAL HISTORY:  Stopped smoking 22 years ago. No alcohol. ADMISSION MEDICATIONS:  Include metoprolol, Lipitor, aspirin,  amlodipine, Neurontin, methimazole. PHYSICAL EXAMINATION:  GENERAL:  On examination, awake, alert, no distress. VITAL SIGNS:  Blood pressure 148/70, pulse 86, temperature 98. 3.   HEENT: Head, normocephalic, atraumatic. Conjunctivae:  No icterus. CARDIAC:  Normal heart sounds. NECK:  JVD not raised. CHEST:  Clear to auscultation. ABDOMEN:  Soft, nontender. Bowel sounds are presents. EXTREMITIES:  Bilateral lower extremities, she has no edema. NEUROLOGICAL:  Awake and reasonably alert. No focal neurological signs. LABORATORY DATA:  White cell 8.5, hemoglobin 7.9, platelet 616. Sodium  139, potassium 4.9, chloride 100, CO2 21, BUN 23, creatinine 2.3. Liver  function tests are normal.    ASSESSMENT AND PLAN:  CKD IV, currently stable. Continue to monitor. No need for dialysis. SKINNY requiring CRT and dialysis in 02/2022 after CABG, now recovered but  advanced CKD. Massive left pleural effusion with collapse of the lung for which she  has thoracentesis today. Coronary artery disease. CABG 2022. Normal ejection fraction. The  patient will be monitored during her stay. Currently, she seems to be  _____ on the dry side. History of hypothyroidism. She is on methimazole.   Further management  as per team.        Conception MD Josselin    D: 06/26/2022 19:07:18       T: 06/26/2022 21:13:19     [email or web address]  Job#: [id number]     Doc#: [id number]    CC:

## 2022-06-27 NOTE — PROGRESS NOTES
(L) 06/27/2022    HCT 28.0 (L) 06/27/2022    MCV 83.3 06/27/2022     06/27/2022     Lab Results   Component Value Date    CALCIUM 8.5 06/27/2022    CAION 0.99 (L) 03/05/2022    PHOS 1.4 (L) 03/05/2022

## 2022-06-27 NOTE — ACP (ADVANCE CARE PLANNING)
wishes  [] New Advance Directive completed  [] Portable Do Not Rescitate prepared for Provider review and signature  [] POLST/POST/MOLST/MOST prepared for Provider review and signature      Follow-up plan:    [] Schedule follow-up conversation to continue planning  [x] Referred individual to Provider for additional questions/concerns   [] Advised patient/agent/surrogate to review completed ACP document and update if needed with changes in condition, patient preferences or care setting    [] This note routed to one or more involved healthcare providers  Patient and daughter request that patient be a DNR. MD notified.

## 2022-06-27 NOTE — PROGRESS NOTES
Arrived to place PICC line in patient with, Karen Side RN at bedside, pre procedure and allergies reviewed, no issues accessing basilic vein, pt tolerated well, blood return and flushed well, tip verified with 3cg technology. Pt left in stable condition and bed braked and in lowest position. Pt call light within reach.  Handoff to RN

## 2022-06-27 NOTE — PROGRESS NOTES
Clinical Pharmacy Note  Vancomycin Consult    Daylin Ngo is a [de-identified] y.o. female ordered Vancomycin for pneumonia; consult received from Dr. Marisol Fletcher to manage therapy. Also receiving cefepime. Patient Active Problem List   Diagnosis    Morbid obesity ([address]    Chest pain    Unstable angina (HCC)    Abnormal cardiovascular stress test    Hyperlipidemia LDL goal <70    CAD in native artery    Loculated pleural effusion on the left (Large)    Stage 3 chronic kidney disease (HCC)    Generalized weakness    Fatigue       Allergies:  Patient has no known allergies. Temp max:  Temp (24hrs), Av.1 °F (36.7 °C), Min:97.5 °F (36.4 °C), Max:98.7 °F (37.1 °C)      Recent Labs     22  0035 22  0620 22  0630   WBC 9.0 8.5 8.0       Recent Labs     22  0035 22  0620 22  0630   BUN 22* 23* 20   CREATININE 2.2* 2.3* 2.3*         Intake/Output Summary (Last 24 hours) at 2022 6759  Last data filed at 2022 0423  Gross per 24 hour   Intake 240 ml   Output 1950 ml   Net -1710 ml       Culture Results:  Pending    Ht Readings from Last 1 Encounters:   22 5' 3\" (1.6 m)        Wt Readings from Last 1 Encounters:   22 157 lb 13.6 oz (71.6 kg)         Estimated Creatinine Clearance: 19 mL/min (A) (based on SCr of 2.3 mg/dL (H)). Assessment/Plan:    Vanco day # 2  Random vanco level = 12.6 mg/L this am  SCr maintained at 2.3 mg/dL (unsure of baseline at this time)  Will redose 1250 mg x 1 now  Random vanco tomorrow am with morning labs    Thank you for the consult.      Summer WigginsD.  2022  9:10 AM

## 2022-06-28 ENCOUNTER — APPOINTMENT (OUTPATIENT)
Dept: CT IMAGING | Age: 82
DRG: 187 | End: 2022-06-28
Payer: MEDICARE

## 2022-06-28 ENCOUNTER — APPOINTMENT (OUTPATIENT)
Dept: GENERAL RADIOLOGY | Age: 82
DRG: 187 | End: 2022-06-28
Payer: MEDICARE

## 2022-06-28 LAB
ANION GAP SERPL CALCULATED.3IONS-SCNC: 12 MMOL/L (ref 3–16)
BASOPHILS ABSOLUTE: 0.1 K/UL (ref 0–0.2)
BASOPHILS RELATIVE PERCENT: 1.1 %
BUN BLDV-MCNC: 20 MG/DL (ref 7–20)
CALCIUM SERPL-MCNC: 8.3 MG/DL (ref 8.3–10.6)
CHLORIDE BLD-SCNC: 104 MMOL/L (ref 99–110)
CO2: 22 MMOL/L (ref 21–32)
CREAT SERPL-MCNC: 2.2 MG/DL (ref 0.6–1.2)
EOSINOPHILS ABSOLUTE: 0.2 K/UL (ref 0–0.6)
EOSINOPHILS RELATIVE PERCENT: 2.3 %
FERRITIN: 341.8 NG/ML (ref 15–150)
GFR AFRICAN AMERICAN: 26
GFR NON-AFRICAN AMERICAN: 21
GLUCOSE BLD-MCNC: 90 MG/DL (ref 70–99)
HCT VFR BLD CALC: 25.8 % (ref 36–48)
HEMOGLOBIN: 8.7 G/DL (ref 12–16)
IRON SATURATION: 15 % (ref 15–50)
IRON: 19 UG/DL (ref 37–145)
LYMPHOCYTES ABSOLUTE: 1.6 K/UL (ref 1–5.1)
LYMPHOCYTES RELATIVE PERCENT: 20.4 %
MCH RBC QN AUTO: 27.7 PG (ref 26–34)
MCHC RBC AUTO-ENTMCNC: 33.6 G/DL (ref 31–36)
MCV RBC AUTO: 82.6 FL (ref 80–100)
MONOCYTES ABSOLUTE: 1 K/UL (ref 0–1.3)
MONOCYTES RELATIVE PERCENT: 12.3 %
NEUTROPHILS ABSOLUTE: 5 K/UL (ref 1.7–7.7)
NEUTROPHILS RELATIVE PERCENT: 63.9 %
PDW BLD-RTO: 15.7 % (ref 12.4–15.4)
PLATELET # BLD: 247 K/UL (ref 135–450)
PMV BLD AUTO: 8.4 FL (ref 5–10.5)
POTASSIUM REFLEX MAGNESIUM: 4.2 MMOL/L (ref 3.5–5.1)
RBC # BLD: 3.12 M/UL (ref 4–5.2)
SODIUM BLD-SCNC: 138 MMOL/L (ref 136–145)
TOTAL IRON BINDING CAPACITY: 130 UG/DL (ref 260–445)
VANCOMYCIN RANDOM: 22.8 UG/ML
WBC # BLD: 7.8 K/UL (ref 4–11)

## 2022-06-28 PROCEDURE — 6360000002 HC RX W HCPCS: Performed by: INTERNAL MEDICINE

## 2022-06-28 PROCEDURE — 1200000000 HC SEMI PRIVATE

## 2022-06-28 PROCEDURE — 71045 X-RAY EXAM CHEST 1 VIEW: CPT

## 2022-06-28 PROCEDURE — 97116 GAIT TRAINING THERAPY: CPT

## 2022-06-28 PROCEDURE — 6370000000 HC RX 637 (ALT 250 FOR IP): Performed by: INTERNAL MEDICINE

## 2022-06-28 PROCEDURE — 99233 SBSQ HOSP IP/OBS HIGH 50: CPT | Performed by: INTERNAL MEDICINE

## 2022-06-28 PROCEDURE — 6370000000 HC RX 637 (ALT 250 FOR IP): Performed by: HOSPITALIST

## 2022-06-28 PROCEDURE — 2580000003 HC RX 258: Performed by: INTERNAL MEDICINE

## 2022-06-28 PROCEDURE — 82728 ASSAY OF FERRITIN: CPT

## 2022-06-28 PROCEDURE — 80202 ASSAY OF VANCOMYCIN: CPT

## 2022-06-28 PROCEDURE — 83550 IRON BINDING TEST: CPT

## 2022-06-28 PROCEDURE — 80048 BASIC METABOLIC PNL TOTAL CA: CPT

## 2022-06-28 PROCEDURE — 36415 COLL VENOUS BLD VENIPUNCTURE: CPT

## 2022-06-28 PROCEDURE — 83540 ASSAY OF IRON: CPT

## 2022-06-28 PROCEDURE — 71250 CT THORAX DX C-: CPT

## 2022-06-28 PROCEDURE — 85025 COMPLETE CBC W/AUTO DIFF WBC: CPT

## 2022-06-28 PROCEDURE — 97166 OT EVAL MOD COMPLEX 45 MIN: CPT

## 2022-06-28 PROCEDURE — 97530 THERAPEUTIC ACTIVITIES: CPT

## 2022-06-28 PROCEDURE — 97535 SELF CARE MNGMENT TRAINING: CPT

## 2022-06-28 PROCEDURE — 97162 PT EVAL MOD COMPLEX 30 MIN: CPT

## 2022-06-28 PROCEDURE — 94760 N-INVAS EAR/PLS OXIMETRY 1: CPT

## 2022-06-28 RX ADMIN — GABAPENTIN 300 MG: 300 CAPSULE ORAL at 08:50

## 2022-06-28 RX ADMIN — POLYETHYLENE GLYCOL 3350 17 G: 17 POWDER, FOR SOLUTION ORAL at 08:50

## 2022-06-28 RX ADMIN — SODIUM CHLORIDE, PRESERVATIVE FREE 10 ML: 5 INJECTION INTRAVENOUS at 21:11

## 2022-06-28 RX ADMIN — METOPROLOL SUCCINATE 25 MG: 25 TABLET, FILM COATED, EXTENDED RELEASE ORAL at 08:50

## 2022-06-28 RX ADMIN — AMLODIPINE BESYLATE 5 MG: 5 TABLET ORAL at 08:50

## 2022-06-28 RX ADMIN — METHIMAZOLE 5 MG: 5 TABLET ORAL at 10:13

## 2022-06-28 RX ADMIN — GABAPENTIN 300 MG: 300 CAPSULE ORAL at 21:07

## 2022-06-28 RX ADMIN — EPOETIN ALFA-EPBX 10000 UNITS: 10000 INJECTION, SOLUTION INTRAVENOUS; SUBCUTANEOUS at 16:27

## 2022-06-28 RX ADMIN — SODIUM BICARBONATE 650 MG: 650 TABLET ORAL at 21:07

## 2022-06-28 RX ADMIN — ATORVASTATIN CALCIUM 80 MG: 80 TABLET, FILM COATED ORAL at 21:07

## 2022-06-28 RX ADMIN — ASPIRIN 81 MG: 81 TABLET, COATED ORAL at 08:49

## 2022-06-28 RX ADMIN — CEFEPIME HYDROCHLORIDE 1000 MG: 1 INJECTION, POWDER, FOR SOLUTION INTRAMUSCULAR; INTRAVENOUS at 16:26

## 2022-06-28 RX ADMIN — SODIUM CHLORIDE, PRESERVATIVE FREE 10 ML: 5 INJECTION INTRAVENOUS at 21:12

## 2022-06-28 RX ADMIN — SODIUM BICARBONATE 650 MG: 650 TABLET ORAL at 08:50

## 2022-06-28 RX ADMIN — CEFEPIME HYDROCHLORIDE 1000 MG: 1 INJECTION, POWDER, FOR SOLUTION INTRAMUSCULAR; INTRAVENOUS at 04:59

## 2022-06-28 ASSESSMENT — PAIN SCALES - GENERAL
PAINLEVEL_OUTOF10: 0
PAINLEVEL_OUTOF10: 0

## 2022-06-28 NOTE — PROGRESS NOTES
Physical Therapy  Facility/Department: [address] MED SURG  Physical Therapy Initial Assessment  If patient discharges prior to next session this note will serve as a discharge summary. Please see below for the latest assessment towards goals. Name: Karen Alcantara  : 1940  MRN: [id number]  Date of Service: 2022    Discharge Recommendations:  24 hour supervision or assist,Home with Home health PT,Patient would benefit from continued therapy after discharge   Karen Alcantara scored a 17/24 on the AM-PAC short mobility form. Current research shows that an AM-PAC score of 18 or greater is typically associated with a discharge to the patient's home setting. Based on the patient's AM-PAC score and their current functional mobility deficits, it is recommended that the patient have 2-3 sessions per week of Physical Therapy at d/c to increase the patient's independence. At this time, this patient demonstrates the endurance and safety to discharge home with 24/7 assist and home PT and a follow up treatment frequency of 2-3x/wk. Please see assessment section for further patient specific details. PT Equipment Recommendations  Equipment Needed: No      Patient Diagnosis(es): The primary encounter diagnosis was Fatigue, unspecified type. Diagnoses of Dyspnea, unspecified type, Pleural effusion, and Loculated pleural effusion on the left (Large) were also pertinent to this visit. Past Medical History:  has a past medical history of Arthritis, Asthma, CAD (coronary artery disease), CKD (chronic kidney disease), Glaucoma, Hyperlipidemia, Hypertension, Hyperthyroidism, IBS (irritable bowel syndrome), Neuropathy, Obesity (BMI 30-39.9), Osteopenia, and Osteoporosis. Past Surgical History:  has a past surgical history that includes Cholecystectomy; Foot surgery; Tubal ligation; Gastric Band ([id number]); orif femur decompression (Left, 2014); Ankle fracture surgery (Left, 2015);  Hand surgery (); other surgical history (Right, 2010); Coronary artery bypass graft (02/25/2022); Coronary artery bypass graft (N/A, 2/25/2022); IR TUNNELED CVC PLACE WO SQ PORT/PUMP > 5 YEARS (3/7/2022); and Diagnostic Cardiac Cath Lab Procedure (02/2022). Assessment   Assessment: The pt is an [de-identified] yo female who presented to the ED with SOB and lethargy. The pt is s/p a CABG on 2-25. Per chest imaging: showed a massive L sided pleural effusion w/almost complete collapse of the L lung. A chest tube was placed on 6-. The pt lives with her dgt in basement apartment. Her dgt provides 24/7 care for the pt and PTA, the pt was not using a device for ambulation but was getting asisst for mobility and for self-care. Per dgt, the pt's memory has been worse since her heart sx. PMHx: arth, asthma, CAD, CKD, glaucoma, HTN, Graves disease, peripheral neuropathy, osteoporosis, L ankle fx, CABG, L femur ORIF      Today, the pt demonstrated that she is functioning below her baseline and is limited by her decreased activity tolerance, her weakness and her cog. The pt is requiring CGA/min A for transfers and for ambulation with the walker. Anticipate that the pt will progress and be safe for home with dgt's assist. Discussed d/c plans with the dgt and she is agreeable. The pt will probably benefit from home PT but the dgt is unsure if she wants that or not. Will con't to follow while on the acute care floor but recommedations now are for home with 24/7 asisst and home PT; no equipment is needed at d/c.   Therapy Prognosis: Good  Decision Making: Medium Complexity  Barriers to Learning: cog, vision  Requires PT Follow-Up: Yes  Activity Tolerance  Activity Tolerance: Patient limited by fatigue;Patient limited by endurance     Plan   Plan  Plan: 3-5 times per week  Current Treatment Recommendations: Functional mobility training,Strengthening,ROM,Gait training,Transfer training,Stair training,Safety education & training,Patient/Caregiver education & training,Equipment evaluation, education, & procurement  Safety Devices  Type of Devices: Call light within reach,Gait belt,Chair alarm in place,Left in chair,Nurse notified     Restrictions  Restrictions/Precautions  Restrictions/Precautions: Fall Risk  Position Activity Restriction  Other position/activity restrictions: chest tube; IV; tele-sitter     Subjective   General  Chart Reviewed: Yes  Additional Pertinent Hx: Per Kavin Rizo MD consult note from 6-: The pt is an [de-identified] yo female who presented to the ED with SOB and lethargy. The pt is s/p a CABG on 2-25. Per chest imaging: showed a massive L sided pleural effusion w/almost complete collapse of the L lung. A chest tube was placed on 6-.       PMHx: arth, asthma, CAD, CKD, glaucoma, HTN, Graves disease, peripheral neuropathy, osteoporosis, L ankle fx, CABG, L femur ORIF  Response To Previous Treatment: Not applicable  Family / Caregiver Present: Yes Ladradhames Shannons)  Referring Practitioner: Kavin Rizo MD  Referral Date : 06/28/22  Diagnosis: Pleural effusion, left; Generalized weakness/fatigue  Follows Commands: Impaired  Subjective  Subjective: the pt was found to be already up in the chair; very limited conversation, does not initiate; trouble following directions at times, reported pain in her L shoulder and under her L arm (chest tube placement)         Social/Functional History  Social/Functional History  Lives With: Family (Dtr; ZOHRA)  Type of Home: Apartment (basement level apartment : 6 steps with R handrail)  Home Layout: One level  Home Access: Stairs to enter without rails  Entrance Stairs - Number of Steps: 1 BRITTANY to enter building with no handrail, then 6 BRITTANY to basement apartment with R handrail  Bathroom Shower/Tub: Tub/Shower unit,Shower chair with back  Bathroom Toilet: Standard (vanity nearby)  Bathroom Equipment: Shower chair,Hand-held shower,Grab bars in shower  Bathroom Accessibility: Walker accessible  Home Equipment: [place], rolling (access to w/c)  ADL Assistance: Needs assistance (daughter assists with dressing, showering, and toileting. Pt independent grooming and feeding)  Homemaking Assistance: Needs assistance (daughter manages all homemaking)  Ambulation Assistance: Needs assistance (no AD and SBA of daughter)  Transfer Assistance: Independent  Active : No  Mode of Transportation: Family  Occupation: Retired  Type of Occupation: Retired : worked [employer]. Additional Comments: daughter reports no recent falls  Vision/Hearing  Vision  Vision: Impaired  Vision Exceptions: Wears glasses at all times (hx of glaucoma)  Hearing  Hearing: Within functional limits    Cognition   Orientation  Overall Orientation Status: Impaired  Orientation Level: Oriented to person;Disoriented to time;Disoriented to situation;Disoriented to place (knew name not birthday)  Cognition  Overall Cognitive Status: Exceptions  Arousal/Alertness: Delayed responses to stimuli  Following Commands: Follows one step commands with increased time; Follows one step commands with repetition  Attention Span: Attends with cues to redirect  Memory: Decreased recall of recent events;Decreased short term memory  Safety Judgement: Decreased awareness of need for assistance;Decreased awareness of need for safety  Problem Solving: Decreased awareness of errors;Assistance required to implement solutions;Assistance required to generate solutions  Insights: Decreased awareness of deficits  Initiation: Requires cues for some  Sequencing: Requires cues for some     Objective      Observation/Palpation  Observation: chest tube on L lateral rib cage under L arm pit  Gross Assessment  AROM: Generally decreased, functional  Strength: Generally decreased, functional  Tone: Normal  Sensation: Intact        Transfers  Sit to Stand: Contact guard assistance;Minimal Assistance  Stand to sit: Contact guard assistance;Minimal Assistance (cues for hand placement)  Ambulation  Surface: level tile  Device: Rolling Walker  Other Apparatus:  (chest tube and IV managed by therapist)  Assistance: Contact guard assistance;Minimal assistance  Quality of Gait: slowed pace, unsteady but no LOB  Distance: 20 feet x 2  More Ambulation?: No  Stairs/Curb  Stairs?: No     Balance  Sitting - Static: Good  Sitting - Dynamic: Good;-  Standing - Static: Fair (with walker)  Comments: the pt sat at the bathroom sink for a sponge bath; but with standing for paulo-area the pt was initially min A for balance and progressing to CGA           AM-PAC Score  AM-PAC Inpatient Mobility Raw Score : 17 (06/28/22 1138)  AM-PAC Inpatient T-Scale Score : 42.13 (06/28/22 1138)  Mobility Inpatient CMS 0-100% Score: 50.57 (06/28/22 1138)  Mobility Inpatient CMS G-Code Modifier : CK (06/28/22 1138)          Goals  Short Term Goals  Time Frame for Short term goals: upon d/c  Short term goal 1: Bed mobility with CGA. Short term goal 2: Transfers sit <> stand with SBA. Short term goal 3: Ambulate with wh walker 50 feet with SBA.   Short term goal 4: Negotiate steps with rail with CGA  Patient Goals   Patient goals : dgt's goal is for her to go home at d/c with her assist       Education  Patient Education  Education Given To: Patient;Caregiver  Education Provided: Role of Therapy;Plan of Care;Transfer Training;Equipment  Education Method: Demonstration;Verbal  Barriers to Learning: Cognition;Vision  Education Outcome: Continued education needed      Therapy Time   Individual Concurrent Group Co-treatment   Time In 1113         Time Out 1208         Minutes 55         Timed Code Treatment Minutes: 40 Minutes     Electronically signed by Shad Winn, PT [id number] on 6/28/2022 at 12:08 PM

## 2022-06-28 NOTE — PROGRESS NOTES
[facility]                                               Progress note    Summary:   Elba Sheth is being seen by nephrology for SKINNY. Admitted with SOB and weakness. Ms. [facility] had a CABG done on 2/25/2022. She developed SKINNY requiring dialysis at that time. She came with increased shortness of breath. Interval History  Patient was seen and examined at bedside  Her daughter is at bedside,  She remains confused and not oriented to situation or place. She does not have any edema no chest pain or shortness of breath  Daughter reports that she was taken off dialysis a couple weeks ago by Dr. Asha Sotomayro with     Blood pressure 133/63  She satting 97% on room air  Urine output 100 cc  Chest tube output to 30 cc yesterday    Creatinine stable at 2.2  Sodium 138 potassium 4.2 bicarb 22 BUN 20  Vancomycin trough 22.8  Hemoglobin 8.7    Plan:   -Stable CKD stage IV with no acute electrolyte abnormalities. - sodium bicarb at 650 mg twice daily.  -Clinically she does not appear volume overloaded so we will hold off on diuretics  -Blood pressure is acceptable  -Ferritin 341 in the setting of likely infection, TSAT 15%. We will give Retacrit 10,000 units weekly    Magaly De Paz MD  [address] [facility]  Office: [phone]    Assessment:     #CKD stage IV  Stable with a baseline creatinine around 2-2.5  She did require dialysis during her last admission. But has recovered. Likely secondary to hypertension and vascular disease    #Metabolic acidosis  In the setting of chronic renal failure  Bicarb 19  Sodium bicarb 650 mg twice daily    #Left pleural effusion  Very large status post pleurocentesis  Status post chest tube now with small pneumothorax    #History of CAD  Status post CABG in February 2022      ROS:   Patient is confused, seen with bedside nurses. Positives [employer]. All other remaining systems are negative.     Constitutional:  fever, chills, weakness, weight change, fatigue,      Skin: rash, pruritus, hair loss, bruising, dry skin, petechiae. Head, Face, Neck   headaches, swelling,  cervical adenopathy. Respiratory: shortness of breath, cough, or wheezing  Cardiovascular: chest pain, palpitations, dizzy, edema  Gastrointestinal: nausea, vomiting, diarrhea, constipation,belly pain    Yellow skin, blood in stool  Musculoskeletal:  back pain, muscle weakness, gait problems,       joint pain or swelling. Genitourinary:  dysuria, poor urine flow, flank pain, blood in urine  Neurologic:  vertigo, TIA'S, syncope, seizures, focal weakness  Psychosocial:  insomnia, anxiety, or depression. Additional positive findings: -     PMH:   Past medical history, surgical history, social history, family history are reviewed and updated as appropriate. Reviewed current medication list.   Allergies reviewed and updated as needed. PE:   Vitals:    06/28/22 0807   BP:    Pulse:    Resp:    Temp:    SpO2: 97%       General appearance:  in NAD, awake, feels comfortable but not oriented  HEENT: EOM intact, no icterus. Trachea is midline. Neck : No masses, appears symmetrical, no JVD  Respiratory: Respiratory effort appears normal, bilateral equal chest rise, no wheeze, diminished breath sounds at the left  Cardiovascular: Ausculation shows RRR no edema  Abdomen: No visible mass or tenderness, non distended. Musculoskeletal:  Joints with no swelling or deformity. Skin:no rashes, ulcers, induration, no jaundice.    Neuro: face symmetric, slow responses, confused not oriented      Lab Results   Component Value Date    CREATININE 2.2 (H) 06/28/2022    BUN 20 06/28/2022     06/28/2022    K 4.2 06/28/2022     06/28/2022    CO2 22 06/28/2022      Lab Results   Component Value Date    WBC 7.8 06/28/2022    HGB 8.7 (L) 06/28/2022    HCT 25.8 (L) 06/28/2022    MCV 82.6 06/28/2022     06/28/2022     Lab Results   Component Value Date    CALCIUM 8.3 06/28/2022    CAION 0.99 (L) 03/05/2022    PHOS 1.4 (L) 03/05/2022

## 2022-06-28 NOTE — PLAN OF CARE
Problem: Discharge Planning  Goal: Discharge to home or other facility with appropriate resources  Outcome: Progressing     Problem: Safety - Adult  Goal: Free from fall injury  Outcome: Progressing     Problem: Neurosensory - Adult  Goal: Achieves stable or improved neurological status  Outcome: Progressing     Problem: Neurosensory - Adult  Goal: Achieves maximal functionality and self care  Outcome: Progressing     Problem: Nutrition Deficit:  Goal: Optimize nutritional status  Outcome: Progressing

## 2022-06-28 NOTE — PLAN OF CARE
Problem: Discharge Planning  Goal: Discharge to home or other facility with appropriate resources  6/28/2022 1059 by Foreign Padilla RN  Outcome: Progressing     Problem: Safety - Adult  Goal: Free from fall injury  6/28/2022 1059 by Foreign Padilla RN  Outcome: Progressing     Problem: Neurosensory - Adult  Goal: Achieves stable or improved neurological status  6/28/2022 1059 by Foreign Padilla RN  Outcome: Progressing     Problem: Musculoskeletal - Adult  Goal: Return mobility to safest level of function  Outcome: Progressing     Problem: Nutrition Deficit:  Goal: Optimize nutritional status  6/28/2022 1059 by Foreign Padilla RN  Outcome: Progressing

## 2022-06-28 NOTE — PROGRESS NOTES
Clinical Pharmacy Note  Vancomycin Consult    Ghazala Berrios is a [de-identified] y.o. female ordered Vancomycin for pneumonia; consult received from Dr. Fan Hoff to manage therapy. Also receiving cefepime. Patient Active Problem List   Diagnosis    Morbid obesity ([address]    Chest pain    Unstable angina (HCC)    Abnormal cardiovascular stress test    Hyperlipidemia LDL goal <70    CAD in native artery    Loculated pleural effusion on the left (Large)    Stage 3 chronic kidney disease (HCC)    Generalized weakness    Fatigue       Allergies:  Patient has no known allergies. Temp max:  Temp (24hrs), Av.9 °F (37.2 °C), Min:98.5 °F (36.9 °C), Max:99.5 °F (37.5 °C)      Recent Labs     22  0620 22  0622  0500   WBC 8.5 8.0 7.8       Recent Labs     22  0622  0622  0500   BUN 23* 20 20   CREATININE 2.3* 2.3* 2.2*         Intake/Output Summary (Last 24 hours) at 2022 0805  Last data filed at 2022 0453  Gross per 24 hour   Intake 710 ml   Output 330 ml   Net 380 ml       Culture Results:  Pending    Ht Readings from Last 1 Encounters:   22 5' 3\" (1.6 m)        Wt Readings from Last 1 Encounters:   22 155 lb 13.8 oz (70.7 kg)         Estimated Creatinine Clearance: 19 mL/min (A) (based on SCr of 2.2 mg/dL (H)). Assessment/Plan:    Vanco day # 3  Random vanco level = 22.8 mg/L this am  SCr maintained at 2.2 mg/dL (unsure of baseline at this time)  No need to re dose today  Random vanco tomorrow am with morning labs    Thank you for the consult.    Luz Bear, [address] [place] [address]  2022  8:05 AM

## 2022-06-28 NOTE — PROGRESS NOTES
Pulmonary Progress Note    Date of Admission: 6/25/2022   LOS: 2 days     Chief Complaint   Patient presents with    Shortness of Breath     getting worse over the last 7 days, feb 25th had bypass sx     Fatigue     getting worse over the last 7 days, pt had blood work in the last two weeks and was told her blood count was low and to go to the ER if it gets worse       Assessment/Plan:     Pleural effusion, left  -Exudate  -Cultures and cytology pending  -Chest tube in place, minimal output overnight. Sterile aspiration at bedside with evacuation of 150 cc reapplied wall suction  -Daily chest x-rays    CAD status post CABG    I spent 37 minutes on this case today, >50% was face-to-face in discussion of the diagnosis and the coordination of care in regards to the treatment plan. All questions answered.       24 Hour Events/Subjective  No acute events overnight. Tolerating room air. Minimal chest tube output    ROS:   No nausea  No Vomiting  No chest pain      Intake/Output Summary (Last 24 hours) at 6/28/2022 0721  Last data filed at 6/28/2022 0453  Gross per 24 hour   Intake 710 ml   Output 330 ml   Net 380 ml         PHYSICAL EXAM:   Blood pressure (!) 146/83, pulse 81, temperature 98.5 °F (36.9 °C), temperature source Oral, resp. rate 17, height 5' 3\" (1.6 m), weight 155 lb 13.8 oz (70.7 kg), SpO2 97 %, not currently breastfeeding.'  Gen:  No acute distress. Eyes: PERRL. Anicteric sclera. No conjunctival injection. ENT: No discharge. Posterior oropharynx clear. External appearance of ears and nose normal.  Neck: Trachea midline. No mass   Resp:  No crackles. No wheezes. No rhonchi.  + Left-sided dullness on percussion. CV: Regular rate. Regular rhythm. No murmur or rub. No edema. GI: Soft, Non-tender. Non-distended. +BS  Skin: Warm, dry, w/o erythema. Lymph: No cervical or supraclavicular LAD. M/S: No cyanosis. No clubbing. Neuro:  no focal neurologic deficit.   Moves all extremities  Psych: Awake and alert, Oriented x 3. Judgement and insight appropriate. Mood stable. Medications:    Scheduled Meds:   vancomycin  1,250 mg IntraVENous Once    sodium chloride flush  5-40 mL IntraVENous 2 times per day    sodium bicarbonate  650 mg Oral BID    metoprolol succinate  25 mg Oral Daily    atorvastatin  80 mg Oral Nightly    aspirin EC  81 mg Oral Daily    amLODIPine  5 mg Oral Daily    sodium chloride flush  10 mL IntraVENous 2 times per day    cefepime  1,000 mg IntraVENous Q12H    vancomycin (VANCOCIN) intermittent dosing (placeholder)   Other RX Placeholder    gabapentin  300 mg Oral BID    fluticasone-umeclidin-vilant  1 puff Inhalation Daily    methIMAzole  5 mg Oral Daily    polyethylene glycol  17 g Oral Daily       Continuous Infusions:   sodium chloride      sodium chloride 5 mL/hr at 06/26/22 0423       PRN Meds:  sodium chloride flush, sodium chloride, sodium chloride flush, sodium chloride, ondansetron, acetaminophen **OR** acetaminophen    Labs reviewed:  CBC:   Recent Labs     06/26/22  0620 06/27/22  0630 06/28/22  0500   WBC 8.5 8.0 7.8   HGB 7.9* 9.3* 8.7*   HCT 23.8* 28.0* 25.8*   MCV 85.2 83.3 82.6    251 247     BMP:   Recent Labs     06/26/22  0620 06/27/22  0630 06/28/22  0500    139 138   K 4.9 4.2 4.2    106 104   CO2 21 19* 22   BUN 23* 20 20   CREATININE 2.3* 2.3* 2.2*     LIVER PROFILE:   Recent Labs     06/26/22  0035   AST 26   ALT 17   LIPASE 6.0*   BILITOT 0.4   ALKPHOS 79     PT/INR: No results for input(s): PROTIME, INR in the last 72 hours. APTT: No results for input(s): APTT in the last 72 hours. UA:No results for input(s): NITRITE, COLORU, PHUR, LABCAST, WBCUA, RBCUA, MUCUS, TRICHOMONAS, YEAST, BACTERIA, CLARITYU, SPECGRAV, LEUKOCYTESUR, UROBILINOGEN, BILIRUBINUR, BLOODU, GLUCOSEU, AMORPHOUS in the last 72 hours. Invalid input(s): Dyan Johnston  No results for input(s): PH, PCO2, PO2 in the last 72 hours.       Films:  Radiology Review:  Pertinent images / reports were reviewed as a part of this visit. XR CHEST PORTABLE  Narrative: EXAMINATION:  ONE XRAY VIEW OF THE CHEST    6/28/2022 5:17 am    COMPARISON:  06/27/2022    HISTORY:  ORDERING SYSTEM PROVIDED HISTORY: chest tube  TECHNOLOGIST PROVIDED HISTORY:  Reason for exam:->chest tube  Reason for Exam: chest tube    FINDINGS:  The patient is rotated. Sternotomy wire, left atrial appendage clip, and laparoscopy band are noted. Right PICC, placed in the interval, terminates over the superior vena cava. Left apical chest tube is present. The cardiac silhouette is normal.  The lower 2/3 of the left hemithorax are  opacified, with meniscoid interface. There is patchy opacity in the left  upper lung. Right lung is clear. Impression: Supportive tubing projects in normal position, including left chest tube. Continued opacity in the left hemithorax, likely combination of pleural  effusion and atelectasis; underlying malignancy is still a differential  concern. This note was transcribed using [id number]. Please disregard any translational errors.     Thank you for this consult,    Lani Galeas MD  [facility] Pulmonary, Critical Care, and Sleep Medicine

## 2022-06-28 NOTE — PROGRESS NOTES
Lab called for a critical result of vancomycin random = 22.8. Pharmacy made aware.  Electronically signed by Jamie Espinoza RN on 6/28/2022 at 6:39 AM

## 2022-06-28 NOTE — PROGRESS NOTES
Occupational Therapy  Facility/Department: [facility]  Occupational Therapy Initial Assessment    Name: Cheryle Meraz  : 1940  MRN: [id number]  Date of Service: 2022    Discharge Recommendations:  24 hour supervision or assist,Home with Home health OT          Patient Diagnosis(es): The primary encounter diagnosis was Fatigue, unspecified type. Diagnoses of Dyspnea, unspecified type, Pleural effusion, and Loculated pleural effusion on the left (Large) were also pertinent to this visit. Past Medical History:  has a past medical history of Arthritis, Asthma, CAD (coronary artery disease), CKD (chronic kidney disease), Glaucoma, Hyperlipidemia, Hypertension, Hyperthyroidism, IBS (irritable bowel syndrome), Neuropathy, Obesity (BMI 30-39.9), Osteopenia, and Osteoporosis. Past Surgical History:  has a past surgical history that includes Cholecystectomy; Foot surgery; Tubal ligation; Gastric Band ([id number]); orif femur decompression (Left, 2014); Ankle fracture surgery (Left, 2015); Hand surgery (); other surgical history (Right, ); Coronary artery bypass graft (2022); Coronary artery bypass graft (N/A, 2022); IR TUNNELED CVC PLACE WO SQ PORT/PUMP > 5 YEARS (3/7/2022); and Diagnostic Cardiac Cath Lab Procedure (2022). Assessment   Performance deficits / Impairments: Decreased functional mobility ; Decreased ADL status; Decreased strength;Decreased cognition;Decreased safe awareness;Decreased balance;Decreased endurance;Decreased vision/visual deficit  Assessment: Pt is an [de-identified] y.o. female admitted with L pleural effusion. PTA, pt living with daughter and ZOHRA in basement level apartment, has assist for ADLs and SBA for fxl mobility with no AD. Pt currentioning functioning below baseline d/t the above deficits, today requiring mod/max A bathing and dressing, set-up/SBA for seated grooming, and CGA/min A fxl transfers/mobility with RW.  Pt easily fatigued with minimal activity. Will cont to see on acute to address the above limitations and maximize pt's safety and independence. Discussed d/c plans with daughter, who reports she plans to take pt home and comfortable providing level of assist pt is requiring. Recommend 24 hour hands-on assist and home OT at d/c. Prognosis: Fair  Decision Making: Medium Complexity  History: see above  REQUIRES OT FOLLOW-UP: Yes  Activity Tolerance  Activity Tolerance: Patient limited by fatigue;Treatment limited secondary to decreased cognition        Plan   Plan  Times per Week: 3-5  Current Treatment Recommendations: Balance training,Functional mobility training,Endurance training,Safety education & training,Equipment evaluation, education, & procurement,Self-Care / ADL,Cognitive reorientation,Patient/Caregiver education & training,Cognitive/Perceptual training,Strengthening,ROM     Restrictions  Restrictions/Precautions  Restrictions/Precautions: Fall Risk  Position Activity Restriction  Other position/activity restrictions: chest tube; IV; tele-sitter    Subjective   General  Chart Reviewed: Yes  Additional Pertinent Hx: Per Dr. Bj Varela H&P: Jennifer Arias is an [de-identified]y.o. year-old female with a history of hypertension, hyperlipidemia, coronary artery disease with recent CABG 02/25/2022 (cabgx4, EDISON exclusion) and CKD3. She presents to the emergency room with her daughter for evaluation of shortness of breath. Her daughter reports that the patient has had worsening shortness of breath over the last 7 days. Her shortness of breath is made much worse with minimal exertion and better when she rests. She saw her PCP for that and it was thought it was secondary to her anemia so she was started on iron. The patient has had increasing generalized weakness and fatigue and has been eating less. Upon evaluation in the emergency room she was found to have a large left loculated pleural effusion with compressive atelectasis.   She is being admitted for further evaluation and treatment. \" Chest tube placed 6/26. Family / Caregiver Present: Yes (daughter)  Referring Practitioner: Amelia Ganser, MD  Diagnosis: left pleural effusion  Subjective  Subjective: Pt met b/s for OT eval/tx. Pt seated in recliner on arrival, agreeable to participate in therapy. Pt c/o fatigue and pain at chest tube site--did not rate. Social/Functional History  Social/Functional History  Lives With: Family (Dtr; ZOHRA)  Type of Home: Apartment (basement level apartment : 6 steps with R handrail)  Home Layout: One level  Home Access: Stairs to enter without rails  Entrance Stairs - Number of Steps: 1 BRITTANY to enter building with no handrail, then 6 BRITTANY to basement apartment with R handrail  Bathroom Shower/Tub: Tub/Shower unit,Shower chair with back  Bathroom Toilet: Standard (vanity nearby)  Bathroom Equipment: Shower chair,Hand-held shower,Grab bars in shower  Bathroom Accessibility: Walker accessible  Home Equipment: Walker, rolling (access to w/c)  ADL Assistance: Needs assistance (daughter assists with dressing, showering, and toileting. Pt independent grooming and feeding)  Homemaking Assistance: Needs assistance (daughter manages all homemaking)  Ambulation Assistance: Needs assistance (no AD and SBA of daughter)  Transfer Assistance: Independent  Active : No  Mode of Transportation: Family  Occupation: Retired  Type of Occupation: Retired : worked [employer].   Additional Comments: daughter reports no recent falls       Objective     Vision Exceptions: Wears glasses at all times (hx of glaucoma)  Hearing: Within functional limits      Observation/Palpation  Observation: chest tube on L lateral rib cage under L arm pit     Balance  Sitting: Intact  Standing: With support (CGA/min A at RW and at sink)  Gait  Overall Level of Assistance: Minimum assistance (Pt completed fxl mobility ~25 ft x2 to/from BR with RW and CGA/min A.)     AROM: Generally decreased, functional  Strength: Generally decreased, functional     ADL  Grooming: Setup;Stand by assistance  Grooming Skilled Clinical Factors: seated at sink to wash face, brush teeth  UE Bathing: Minimal assistance  UE Bathing Skilled Clinical Factors: sponge bath seated at sink  LE Bathing: Moderate assistance  LE Bathing Skilled Clinical Factors: sponge bath seated/standing at sink. Pt stood at sink to wash periarea/buttocks with CGA/min A for balance, assist to wash distal LE's  UE Dressing Skilled Clinical Factors: assist to change hospital gown  LE Dressing: Maximum assistance;Dependent/Total  LE Dressing Skilled Clinical Factors: assist to change depends, anticipate max A for complete LB dressing  Additional Comments: Anticipate pt is set-up feeding, max A toileting based on ROM/strength, balance, endurance. Transfers  Sit to stand: Minimal assistance;Contact guard assistance (min A initially progressing to CGA)  Stand to sit: Contact guard assistance  Transfer Comments: to/from RW, VC's for hand placement     Cognition  Overall Cognitive Status: Exceptions  Arousal/Alertness: Delayed responses to stimuli  Following Commands: Follows one step commands with increased time; Follows one step commands with repetition  Attention Span: Attends with cues to redirect  Memory: Decreased recall of recent events;Decreased short term memory  Safety Judgement: Decreased awareness of need for assistance;Decreased awareness of need for safety  Problem Solving: Decreased awareness of errors;Assistance required to implement solutions;Assistance required to generate solutions  Insights: Decreased awareness of deficits  Initiation: Requires cues for some  Sequencing: Requires cues for some     Education Given To: Patient  Education Provided: Role of Therapy;Plan of Care;ADL Adaptive Strategies;Transfer Training  Barriers to Learning: Cognition;Vision  Education Outcome: Continued education needed     Activity Tolerance  Activity

## 2022-06-29 ENCOUNTER — APPOINTMENT (OUTPATIENT)
Dept: GENERAL RADIOLOGY | Age: 82
DRG: 187 | End: 2022-06-29
Payer: MEDICARE

## 2022-06-29 ENCOUNTER — APPOINTMENT (OUTPATIENT)
Dept: CT IMAGING | Age: 82
DRG: 187 | End: 2022-06-29
Payer: MEDICARE

## 2022-06-29 LAB
ANION GAP SERPL CALCULATED.3IONS-SCNC: 11 MMOL/L (ref 3–16)
BASOPHILS ABSOLUTE: 0.1 K/UL (ref 0–0.2)
BASOPHILS RELATIVE PERCENT: 1.2 %
BODY FLUID CULTURE, STERILE: NORMAL
BUN BLDV-MCNC: 19 MG/DL (ref 7–20)
CALCIUM SERPL-MCNC: 8.3 MG/DL (ref 8.3–10.6)
CHLORIDE BLD-SCNC: 105 MMOL/L (ref 99–110)
CO2: 23 MMOL/L (ref 21–32)
CREAT SERPL-MCNC: 1.9 MG/DL (ref 0.6–1.2)
EOSINOPHILS ABSOLUTE: 0.1 K/UL (ref 0–0.6)
EOSINOPHILS RELATIVE PERCENT: 2 %
GFR AFRICAN AMERICAN: 31
GFR NON-AFRICAN AMERICAN: 25
GLUCOSE BLD-MCNC: 96 MG/DL (ref 70–99)
GRAM STAIN RESULT: NORMAL
HCT VFR BLD CALC: 24.9 % (ref 36–48)
HEMOGLOBIN: 8.4 G/DL (ref 12–16)
INR BLD: 1.28 (ref 0.87–1.14)
LYMPHOCYTES ABSOLUTE: 1.3 K/UL (ref 1–5.1)
LYMPHOCYTES RELATIVE PERCENT: 19.1 %
MCH RBC QN AUTO: 27.7 PG (ref 26–34)
MCHC RBC AUTO-ENTMCNC: 33.6 G/DL (ref 31–36)
MCV RBC AUTO: 82.2 FL (ref 80–100)
MONOCYTES ABSOLUTE: 0.9 K/UL (ref 0–1.3)
MONOCYTES RELATIVE PERCENT: 12.5 %
NEUTROPHILS ABSOLUTE: 4.6 K/UL (ref 1.7–7.7)
NEUTROPHILS RELATIVE PERCENT: 65.2 %
PDW BLD-RTO: 15.9 % (ref 12.4–15.4)
PLATELET # BLD: 213 K/UL (ref 135–450)
PMV BLD AUTO: 8.2 FL (ref 5–10.5)
POTASSIUM REFLEX MAGNESIUM: 4.1 MMOL/L (ref 3.5–5.1)
PROTHROMBIN TIME: 16 SEC (ref 11.7–14.5)
RBC # BLD: 3.03 M/UL (ref 4–5.2)
SODIUM BLD-SCNC: 139 MMOL/L (ref 136–145)
VANCOMYCIN RANDOM: 16.4 UG/ML
WBC # BLD: 7 K/UL (ref 4–11)

## 2022-06-29 PROCEDURE — 6370000000 HC RX 637 (ALT 250 FOR IP): Performed by: INTERNAL MEDICINE

## 2022-06-29 PROCEDURE — 6360000002 HC RX W HCPCS: Performed by: INTERNAL MEDICINE

## 2022-06-29 PROCEDURE — 85025 COMPLETE CBC W/AUTO DIFF WBC: CPT

## 2022-06-29 PROCEDURE — 94760 N-INVAS EAR/PLS OXIMETRY 1: CPT

## 2022-06-29 PROCEDURE — 99233 SBSQ HOSP IP/OBS HIGH 50: CPT | Performed by: INTERNAL MEDICINE

## 2022-06-29 PROCEDURE — 6360000002 HC RX W HCPCS: Performed by: HOSPITALIST

## 2022-06-29 PROCEDURE — 2580000003 HC RX 258: Performed by: INTERNAL MEDICINE

## 2022-06-29 PROCEDURE — 97110 THERAPEUTIC EXERCISES: CPT

## 2022-06-29 PROCEDURE — 85610 PROTHROMBIN TIME: CPT

## 2022-06-29 PROCEDURE — 80202 ASSAY OF VANCOMYCIN: CPT

## 2022-06-29 PROCEDURE — 80048 BASIC METABOLIC PNL TOTAL CA: CPT

## 2022-06-29 PROCEDURE — 97116 GAIT TRAINING THERAPY: CPT

## 2022-06-29 PROCEDURE — 1200000000 HC SEMI PRIVATE

## 2022-06-29 PROCEDURE — 71045 X-RAY EXAM CHEST 1 VIEW: CPT

## 2022-06-29 PROCEDURE — 6370000000 HC RX 637 (ALT 250 FOR IP): Performed by: HOSPITALIST

## 2022-06-29 PROCEDURE — 2580000003 HC RX 258: Performed by: HOSPITALIST

## 2022-06-29 RX ADMIN — SODIUM BICARBONATE 650 MG: 650 TABLET ORAL at 20:26

## 2022-06-29 RX ADMIN — GABAPENTIN 300 MG: 300 CAPSULE ORAL at 08:01

## 2022-06-29 RX ADMIN — CEFEPIME HYDROCHLORIDE 1000 MG: 1 INJECTION, POWDER, FOR SOLUTION INTRAMUSCULAR; INTRAVENOUS at 17:04

## 2022-06-29 RX ADMIN — METHIMAZOLE 5 MG: 5 TABLET ORAL at 08:01

## 2022-06-29 RX ADMIN — METOPROLOL SUCCINATE 25 MG: 25 TABLET, FILM COATED, EXTENDED RELEASE ORAL at 08:01

## 2022-06-29 RX ADMIN — GABAPENTIN 300 MG: 300 CAPSULE ORAL at 20:26

## 2022-06-29 RX ADMIN — CEFEPIME HYDROCHLORIDE 1000 MG: 1 INJECTION, POWDER, FOR SOLUTION INTRAMUSCULAR; INTRAVENOUS at 04:17

## 2022-06-29 RX ADMIN — VANCOMYCIN HYDROCHLORIDE 1000 MG: 1 INJECTION, POWDER, LYOPHILIZED, FOR SOLUTION INTRAVENOUS at 15:40

## 2022-06-29 RX ADMIN — SODIUM CHLORIDE: 9 INJECTION, SOLUTION INTRAVENOUS at 04:17

## 2022-06-29 RX ADMIN — ATORVASTATIN CALCIUM 80 MG: 80 TABLET, FILM COATED ORAL at 20:26

## 2022-06-29 RX ADMIN — SODIUM CHLORIDE, PRESERVATIVE FREE 10 ML: 5 INJECTION INTRAVENOUS at 08:01

## 2022-06-29 RX ADMIN — POLYETHYLENE GLYCOL 3350 17 G: 17 POWDER, FOR SOLUTION ORAL at 08:01

## 2022-06-29 RX ADMIN — SODIUM BICARBONATE 650 MG: 650 TABLET ORAL at 08:01

## 2022-06-29 RX ADMIN — ASPIRIN 81 MG: 81 TABLET, COATED ORAL at 08:01

## 2022-06-29 RX ADMIN — AMLODIPINE BESYLATE 5 MG: 5 TABLET ORAL at 08:01

## 2022-06-29 RX ADMIN — SODIUM CHLORIDE, PRESERVATIVE FREE 10 ML: 5 INJECTION INTRAVENOUS at 11:01

## 2022-06-29 RX ADMIN — ACETAMINOPHEN 650 MG: 325 TABLET ORAL at 20:26

## 2022-06-29 ASSESSMENT — PAIN SCALES - GENERAL
PAINLEVEL_OUTOF10: 0
PAINLEVEL_OUTOF10: 0

## 2022-06-29 NOTE — ACP (ADVANCE CARE PLANNING)
Advance Care Planning     Advance Care Planning Activator (Inpatient)  Conversation Note      Date of ACP Conversation: 6/27/2022     Conversation Conducted with: Patient with Decision Making Capacity    ACP Activator: Willow Arroy[address] 46 Decision Maker:     Current Designated Health Care Decision Maker:     Primary Decision Maker: Terry Hyatt - Child - [phone]    Secondary Decision Maker: Sabine Borden - Child - [phone]    Care Preferences    Ventilation: \"If you were in your present state of health and suddenly became very ill and were unable to breathe on your own, what would your preference be about the use of a ventilator (breathing machine) if it were available to you? \"      Would the patient desire the use of ventilator (breathing machine)?: no    \"If your health worsens and it becomes clear that your chance of recovery is unlikely, what would your preference be about the use of a ventilator (breathing machine) if it were available to you? \"     Would the patient desire the use of ventilator (breathing machine)?: No      Resuscitation  \"CPR works best to restart the heart when there is a sudden event, like a heart attack, in someone who is otherwise healthy. Unfortunately, CPR does not typically restart the heart for people who have serious health conditions or who are very sick. \"    \"In the event your heart stopped as a result of an underlying serious health condition, would you want attempts to be made to restart your heart (answer \"yes\" for attempt to resuscitate) or would you prefer a natural death (answer \"no\" for do not attempt to resuscitate)? \" no       [] Yes   [x] No   Educated Patient / Elfredia Latin regarding differences between Advance Directives and portable DNR orders.     Length of ACP Conversation in minutes:  5    Conversation Outcomes:  [x] ACP discussion completed  [] Existing advance directive reviewed with patient; no changes to patient's previously recorded wishes  [] New Advance Directive completed  [] Portable Do Not Rescitate prepared for Provider review and signature  [] POLST/POST/MOLST/MOST prepared for Provider review and signature      Follow-up plan:    [] Schedule follow-up conversation to continue planning  [x] Referred individual to Provider for additional questions/concerns   [] Advised patient/agent/surrogate to review completed ACP document and update if needed with changes in condition, patient preferences or care setting    [] This note routed to one or more involved healthcare providers  Patient and daughter request that patient be a DNR. MD notified.

## 2022-06-29 NOTE — PROGRESS NOTES
Physical Therapy  Facility/Department: [facility]  Physical Therapy Daily Treatment Note    Name: Kareen Varela  : 1940  MRN: [id number]  Date of Service: 2022    Discharge Recommendations: Kareen Varela scored a 17/24 on the AM-PAC short mobility form. Current research shows that an AM-PAC score of 18 or greater is typically associated with a discharge to the patient's home setting. Based on the patient's AM-PAC score and their current functional mobility deficits, it is recommended that the patient have 2-3 sessions per week of Physical Therapy at d/c to increase the patient's independence. At this time, this patient demonstrates the endurance and safety to discharge home with home health PT and a follow up treatment frequency of 2-3x/wk. Please see assessment section for further patient specific details. If patient discharges prior to next session this note will serve as a discharge summary. Please see below for the latest assessment towards goals. 24 hour supervision or assist,Home with Home health PT,Patient would benefit from continued therapy after discharge   PT Equipment Recommendations  Equipment Needed: No       Patient Diagnosis(es): The primary encounter diagnosis was Fatigue, unspecified type. Diagnoses of Dyspnea, unspecified type, Pleural effusion, and Loculated pleural effusion on the left (Large) were also pertinent to this visit. Past Medical History:  has a past medical history of Arthritis, Asthma, CAD (coronary artery disease), CKD (chronic kidney disease), Glaucoma, Hyperlipidemia, Hypertension, Hyperthyroidism, IBS (irritable bowel syndrome), Neuropathy, Obesity (BMI 30-39.9), Osteopenia, and Osteoporosis. Past Surgical History:  has a past surgical history that includes Cholecystectomy; Foot surgery; Tubal ligation; Gastric Band ([id number]); orif femur decompression (Left, 2014); Ankle fracture surgery (Left, 2015);  Hand surgery (); other surgical history (Right, 2010); Coronary artery bypass graft (02/25/2022); Coronary artery bypass graft (N/A, 2/25/2022); IR TUNNELED CVC PLACE WO SQ PORT/PUMP > 5 YEARS (3/7/2022); and Diagnostic Cardiac Cath Lab Procedure (02/2022). Assessment   Body Structures, Functions, Activity Limitations Requiring Skilled Therapeutic Intervention: Decreased functional mobility ; Decreased balance;Decreased endurance;Decreased cognition;Decreased safe awareness;Decreased strength  Assessment: Pt presents today with slight improvement in functional mobility. Pt able to perform all functional mobility tasks with CGA and demonstrated fair standing dynamic balance. Pt continues to be limited by cognition and impaired safety awareness and requires verbal cues for safety throughout the treatment session. Recommend the pt utilize a RW upon discharge and recieve 24 hour supervision/assistance from her daughter. Pt will continue to benefit from skilled PT to facilitate return to PLOF. Therapy Prognosis: Good  Barriers to Learning: cog, vision  Requires PT Follow-Up: Yes  Activity Tolerance  Activity Tolerance Comments: During ambulation within the room, the pt reported SOB. Pt returned to a seated position and SpO2 measured at 95%. SOB subsided within 1 minute. Pt declined additional ambulation and agreed to seated therapeutic exercises. Plan   Plan  Plan: 3-5 times per week  Current Treatment Recommendations: Functional mobility training,Strengthening,ROM,Gait training,Transfer training,Stair training,Safety education & training,Patient/Caregiver education & training,Equipment evaluation, education, & procurement  Safety Devices  Type of Devices:  All fall risk precautions in place,Call light within reach,Chair alarm in place,Left in chair,Gait belt  Restraints  Restraints Initially in Place: No     Restrictions  Restrictions/Precautions  Restrictions/Precautions: Fall Risk,Up as Tolerated (High fall risk)  Required Braces or Orthoses?: No  Position Activity Restriction  Other position/activity restrictions: chest tube; IV; tele-sitter     Subjective   General  Chart Reviewed: Yes  Patient assessed for rehabilitation services?: Yes  Additional Pertinent Hx: Per Amy Jeong MD consult note from 6-: The pt is an [de-identified] yo female who presented to the ED with SOB and lethargy. The pt is s/p a CABG on 2-25. Per chest imaging: showed a massive L sided pleural effusion w/almost complete collapse of the L lung. A chest tube was placed on 6-. PMHx: arth, asthma, CAD, CKD, glaucoma, HTN, Graves disease, peripheral neuropathy, osteoporosis, L ankle fx, CABG, L femur ORIF  Response To Previous Treatment: Not applicable  Family / Caregiver Present: Yes Álvaroeliel Perez)  Referring Practitioner: Amy Jeong MD  Referral Date : 06/28/22  Diagnosis: Pleural effusion, left; Generalized weakness/fatigue  Follows Commands: Impaired (Follows one step commands with increased time and repetition of instruction)  Subjective  Subjective: Pt seated in recliner on arrival, agreeable to participate in PT treatment session. Pt reporting L shoulder pain however does not rate it upon questioning. Social/Functional History  Social/Functional History  Lives With: Family (Dtr; ZOHRA)  Type of Home: Apartment (basement level apartment : 6 steps with R handrail)  Home Layout: One level  Home Access: Stairs to enter without rails  Entrance Stairs - Number of Steps: 1 BRITTANY to enter building with no handrail, then 6 BRITTANY to basement apartment with R handrail  Bathroom Shower/Tub: Tub/Shower unit,Shower chair with back  Bathroom Toilet: Standard (vanity nearby)  Bathroom Equipment: Shower chair,Hand-held shower,Grab bars in shower  Bathroom Accessibility: Walker accessible  Home Equipment: Walker, rolling (access to w/c)  ADL Assistance: Needs assistance (daughter assists with dressing, showering, and toileting.  Pt independent grooming and feeding)  Homemaking Assistance: Needs assistance (daughter manages all homemaking)  Ambulation Assistance: Needs assistance (no AD and SBA of daughter)  Transfer Assistance: Independent  Active : No  Mode of Transportation: Family  Occupation: Retired  Type of Occupation: Retired : worked [employer]. Additional Comments: daughter reports no recent falls    Cognition   Orientation  Overall Orientation Status: Impaired  Orientation Level: Oriented to person;Disoriented to time;Disoriented to situation;Disoriented to place (Oriented to place with options)  Cognition  Overall Cognitive Status: Exceptions  Arousal/Alertness: Appropriate responses to stimuli  Following Commands: Follows one step commands with increased time; Follows one step commands with repetition  Attention Span: Attends with cues to redirect  Memory: Decreased recall of recent events;Decreased short term memory  Safety Judgement: Decreased awareness of need for assistance;Decreased awareness of need for safety  Problem Solving: Decreased awareness of errors;Assistance required to implement solutions;Assistance required to generate solutions  Insights: Decreased awareness of deficits  Initiation: Requires cues for some  Sequencing: Requires cues for some     Objective   Observation/Palpation  Observation: Chest tube in place in the L lateral chest wall at the beginning and end of the treatment session- chest tube dressing clean and dry      Bed mobility  Bed Mobility Comments: Bed mobility not completed on this date. Pt seated in recliner on arrival and left in the recliner at the end of the treatment session.   Transfers  Sit to Stand: Contact guard assistance  Stand to sit: Contact guard assistance  Bed to Chair: Contact guard assistance  Comment: Use of RW for external support, cues for hand placement for safety  Ambulation  Surface: level tile  Device: Rolling Walker  Assistance: Contact guard assistance  Quality of Gait: Decreased kale, decreased step

## 2022-06-29 NOTE — PROGRESS NOTES
midline. Respiratory:  Normal respiratory effort. No use of accessory muscles, no crepitance   Cardiovascular: Regular rate and rhythm    Abdomen: Soft, non-tender, non-distended    Musculoskeletal: No clubbing, cyanosis or edema bilaterally. No calf tenderness  Skin: Skin color, texture, turgor normal.  No rashes or lesions. Neurologic:  , grossly non-focal.  Psychiatric: Alert and oriented, thought content appropriate, normal insight         Labs:   Recent Labs     06/26/22  0620 06/27/22  0630 06/28/22  0500   WBC 8.5 8.0 7.8   HGB 7.9* 9.3* 8.7*   HCT 23.8* 28.0* 25.8*    251 247     Recent Labs     06/26/22  0620 06/27/22  0630 06/28/22  0500    139 138   K 4.9 4.2 4.2    106 104   CO2 21 19* 22   BUN 23* 20 20   CREATININE 2.3* 2.3* 2.2*   CALCIUM 8.2* 8.5 8.3     Recent Labs     06/26/22  0035   AST 26   ALT 17   BILITOT 0.4   ALKPHOS 79     No results for input(s): INR in the last 72 hours. Recent Labs     06/26/22  0035   TROPONINI <0.01       Urinalysis:      Lab Results   Component Value Date    NITRU Negative 02/23/2022    BLOODU Negative 02/23/2022    SPECGRAV 1.026 02/23/2022    GLUCOSEU Negative 02/23/2022    GLUCOSEU Negative 08/10/2011       Radiology:  CT CHEST WO CONTRAST   Final Result   Large pleural effusion on the left with complete collapse identified of the   left lower lung field. Chest tube identified on the left with small   pneumothorax identified as well on the left. Findings suggesting a fibrotic   left lower lung. Extensive coronary artery calcifications identified. Renal vascular   calcification present with lap band in place. Extensive vascular   calcifications seen within the thoracic aorta. XR CHEST PORTABLE   Final Result   Supportive tubing projects in normal position, including left chest tube.       Continued opacity in the left hemithorax, likely combination of pleural   effusion and atelectasis; underlying malignancy is still a differential   concern. XR CHEST PORTABLE   Final Result   Left chest tube in place with interval reduction of effusion and development   of small loculated pneumothorax near the apex. XR CHEST PORTABLE   Final Result   Interval placement of a left chest tube with persistent opacification of the   left hemithorax, compatible with large pleural effusion. CT CHEST WO CONTRAST   Final Result   Large left pleural effusion, partially loculated. Associated compressive   atelectasis in the left lung is noted. A component of mucous plugging may   also be present as the peripheral bronchi in the lingula and left lower lobe   are not visualized. Follow-up with contrast-enhanced chest CT is recommended   when effusion resolves 2 exclude underlying neoplastic process. CT HEAD WO CONTRAST   Final Result   No evidence of acute intracranial process. Mild atrophy and chronic small   vessel ischemic changes noted. XR CHEST (2 VW)   Final Result   Near complete opacification of the left hemithorax, which may be due to a   combination of atelectasis and/or effusion. Recommend further evaluation   with CT. XR CHEST PORTABLE    (Results Pending)           Assessment/Plan:    Active Hospital Problems    Diagnosis Date Noted    Loculated pleural effusion on the left (Large) [J90] 06/26/2022     Priority: Medium    Stage 3 chronic kidney disease ([address] [N18.30] 06/26/2022     Priority: Medium    Generalized weakness [R53.1] 06/26/2022     Priority: Medium    Fatigue [R53.83] 06/26/2022     Priority: Medium    CAD in native artery [I25.10]     Hyperlipidemia LDL goal <70 [E78.5]      Pleural effusion  - on continued IV abx  - f/u cultures  - chest tube as per Pulmo    CAD  - stable    CKD  - Nephro consulted, bicarb added    Code status  - DNR cca     Diet: ADULT DIET;  Regular  ADULT ORAL NUTRITION SUPPLEMENT; Lunch, Dinner; Standard High Calorie/High Protein Oral Supplement  Code Status: Full Loly Sanchez MD

## 2022-06-29 NOTE — PROGRESS NOTES
midline. Respiratory:  Normal respiratory effort. No use of accessory muscles, no crepitance   Cardiovascular: Regular rate and rhythm    Abdomen: Soft, non-tender, non-distended    Musculoskeletal: No clubbing, cyanosis or edema bilaterally. No calf tenderness  Skin: Skin color, texture, turgor normal.  No rashes or lesions. Neurologic:  , grossly non-focal.  Psychiatric: Alert and oriented, thought content appropriate, normal insight         Labs:   Recent Labs     06/27/22  0630 06/28/22  0500 06/29/22  0502   WBC 8.0 7.8 7.0   HGB 9.3* 8.7* 8.4*   HCT 28.0* 25.8* 24.9*    247 213     Recent Labs     06/27/22  0630 06/28/22  0500 06/29/22  0502    138 139   K 4.2 4.2 4.1    104 105   CO2 19* 22 23   BUN 20 20 19   CREATININE 2.3* 2.2* 1.9*   CALCIUM 8.5 8.3 8.3     No results for input(s): AST, ALT, BILIDIR, BILITOT, ALKPHOS in the last 72 hours. Recent Labs     06/29/22  1244   INR 1.28*     No results for input(s): Sneha Olsen in the last 72 hours. Urinalysis:      Lab Results   Component Value Date    NITRU Negative 02/23/2022    BLOODU Negative 02/23/2022    SPECGRAV 1.026 02/23/2022    GLUCOSEU Negative 02/23/2022    GLUCOSEU Negative 08/10/2011       Radiology:  XR CHEST PORTABLE   Final Result   Supportive tubing, including left chest tube are in stable positions. Stable left hydropneumothorax. Stable left perihilar atelectasis or airspace   disease. CT CHEST WO CONTRAST   Final Result   Large pleural effusion on the left with complete collapse identified of the   left lower lung field. Chest tube identified on the left with small   pneumothorax identified as well on the left. Findings suggesting a fibrotic   left lower lung. Extensive coronary artery calcifications identified. Renal vascular   calcification present with lap band in place. Extensive vascular   calcifications seen within the thoracic aorta.          XR CHEST PORTABLE   Final Result Supportive tubing projects in normal position, including left chest tube. Continued opacity in the left hemithorax, likely combination of pleural   effusion and atelectasis; underlying malignancy is still a differential   concern. XR CHEST PORTABLE   Final Result   Left chest tube in place with interval reduction of effusion and development   of small loculated pneumothorax near the apex. XR CHEST PORTABLE   Final Result   Interval placement of a left chest tube with persistent opacification of the   left hemithorax, compatible with large pleural effusion. CT CHEST WO CONTRAST   Final Result   Large left pleural effusion, partially loculated. Associated compressive   atelectasis in the left lung is noted. A component of mucous plugging may   also be present as the peripheral bronchi in the lingula and left lower lobe   are not visualized. Follow-up with contrast-enhanced chest CT is recommended   when effusion resolves 2 exclude underlying neoplastic process. CT HEAD WO CONTRAST   Final Result   No evidence of acute intracranial process. Mild atrophy and chronic small   vessel ischemic changes noted. XR CHEST (2 VW)   Final Result   Near complete opacification of the left hemithorax, which may be due to a   combination of atelectasis and/or effusion. Recommend further evaluation   with CT.          XR CHEST PORTABLE    (Results Pending)   CT GUIDED CHEST TUBE    (Results Pending)   CT GUIDED NEEDLE PLACEMENT    (Results Pending)           Assessment/Plan:    Active Hospital Problems    Diagnosis Date Noted    Loculated pleural effusion on the left (Large) [J90] 06/26/2022     Priority: Medium    Stage 3 chronic kidney disease ([address] [N18.30] 06/26/2022     Priority: Medium    Generalized weakness [R53.1] 06/26/2022     Priority: Medium    Fatigue [R53.83] 06/26/2022     Priority: Medium    CAD in native artery [I25.10]     Hyperlipidemia LDL goal <70 [E78.5] Pleural effusion  - on continued IV abx  - f/u cultures  - chest tube as per Pulmo  - poss CTS consult  CAD  - stable    CKD  - Nephro consulted     Code status  - DNR cca     Diet: ADULT DIET;  Regular  ADULT ORAL NUTRITION SUPPLEMENT; Lunch, Dinner; Standard High Calorie/High Protein Oral Supplement  Code Status: Full Code         Benny Stevens MD

## 2022-06-29 NOTE — PROGRESS NOTES
Clinical Pharmacy Note  Vancomycin Consult    Becka Gutierrez is a [de-identified] y.o. female ordered Vancomycin for pneumonia; consult received from Dr. Landon Fong to manage therapy. Also receiving cefepime. Patient Active Problem List   Diagnosis    Morbid obesity ([address]    Chest pain    Unstable angina (HCC)    Abnormal cardiovascular stress test    Hyperlipidemia LDL goal <70    CAD in native artery    Loculated pleural effusion on the left (Large)    Stage 3 chronic kidney disease (HCC)    Generalized weakness    Fatigue       Allergies:  Patient has no known allergies. Temp max:  Temp (24hrs), Av.5 °F (36.9 °C), Min:98.1 °F (36.7 °C), Max:99.1 °F (37.3 °C)      Recent Labs     22  0630 22  0500 22  0502   WBC 8.0 7.8 7.0       Recent Labs     22  0630 22  0500 22  0502   BUN 20 20 19   CREATININE 2.3* 2.2* 1.9*         Intake/Output Summary (Last 24 hours) at 2022 1424  Last data filed at 2022 0901  Gross per 24 hour   Intake 506.27 ml   Output 563 ml   Net -56.73 ml       Culture Results:  Pending    Ht Readings from Last 1 Encounters:   22 5' 3\" (1.6 m)        Wt Readings from Last 1 Encounters:   22 158 lb 8.2 oz (71.9 kg)         Estimated Creatinine Clearance: 22 mL/min (A) (based on SCr of 1.9 mg/dL (H)). Assessment/Plan:    Vanco day # 4  Random vanco level = 16.4 mg/L this am  Discussed in rounds this morning. Dr Pieter Sousa would like to con't or now. SCr -> 1.9mg/dL (unsure of baseline at this time)  Vancomycin 1 gm today   Random vanco tomorrow am with morning labs    Thank you for the consult.    Juan J Law, [address] [place] [address]  2022  2:24 PM

## 2022-06-29 NOTE — PROGRESS NOTES
Pulmonary Progress Note    Date of Admission: 6/25/2022   LOS: 3 days     Chief Complaint   Patient presents with    Shortness of Breath     getting worse over the last 7 days, feb 25th had bypass sx     Fatigue     getting worse over the last 7 days, pt had blood work in the last two weeks and was told her blood count was low and to go to the ER if it gets worse       Assessment/Plan:     Pleural effusion, left  -Exudate  -Cultures and cytology unremarkable  -Chest tube in place, minimal output  -We will discuss with IR about possible placement of second chest tube for complete evacuation may be due to loculations. If lung does not fully expand may need CTS consult.  -Daily chest x-rays    CAD status post CABG    I spent 35 minutes on this case today, >50% was face-to-face in discussion of the diagnosis and the coordination of care in regards to the treatment plan. All questions answered.       24 Hour Events/Subjective  No acute events overnight. CT yesterday shows large pleural effusion persists. ROS:   No nausea  No Vomiting  No chest pain      Intake/Output Summary (Last 24 hours) at 6/29/2022 0813  Last data filed at 6/29/2022 0731  Gross per 24 hour   Intake 266.27 ml   Output 563 ml   Net -296.73 ml         PHYSICAL EXAM:   Blood pressure (!) 159/82, pulse 87, temperature 98.4 °F (36.9 °C), temperature source Oral, resp. rate 16, height 5' 3\" (1.6 m), weight 158 lb 8.2 oz (71.9 kg), SpO2 95 %, not currently breastfeeding.'  Gen:  No acute distress. Eyes: PERRL. Anicteric sclera. No conjunctival injection. ENT: No discharge. Posterior oropharynx clear. External appearance of ears and nose normal.  Neck: Trachea midline. No mass   Resp:  No crackles. No wheezes. No rhonchi.  + Left-sided dullness on percussion. CV: Regular rate. Regular rhythm. No murmur or rub. No edema. GI: Soft, Non-tender. Non-distended. +BS  Skin: Warm, dry, w/o erythema. Lymph: No cervical or supraclavicular LAD.    M/S: No cyanosis. No clubbing. Neuro:  no focal neurologic deficit. Moves all extremities  Psych: Awake and alert, Oriented x 3. Judgement and insight appropriate. Mood stable. Medications:    Scheduled Meds:   epoetin xander-epbx  10,000 Units SubCUTAneous Q7 Days    sodium chloride flush  5-40 mL IntraVENous 2 times per day    sodium bicarbonate  650 mg Oral BID    metoprolol succinate  25 mg Oral Daily    atorvastatin  80 mg Oral Nightly    aspirin EC  81 mg Oral Daily    amLODIPine  5 mg Oral Daily    sodium chloride flush  10 mL IntraVENous 2 times per day    cefepime  1,000 mg IntraVENous Q12H    vancomycin (VANCOCIN) intermittent dosing (placeholder)   Other RX Placeholder    gabapentin  300 mg Oral BID    fluticasone-umeclidin-vilant  1 puff Inhalation Daily    methIMAzole  5 mg Oral Daily    polyethylene glycol  17 g Oral Daily       Continuous Infusions:   sodium chloride      sodium chloride 5 mL/hr at 06/29/22 0417       PRN Meds:  sodium chloride flush, sodium chloride, sodium chloride flush, sodium chloride, ondansetron, acetaminophen **OR** acetaminophen    Labs reviewed:  CBC:   Recent Labs     06/27/22  0630 06/28/22  0500 06/29/22  0502   WBC 8.0 7.8 7.0   HGB 9.3* 8.7* 8.4*   HCT 28.0* 25.8* 24.9*   MCV 83.3 82.6 82.2    247 213     BMP:   Recent Labs     06/27/22  0630 06/28/22  0500 06/29/22  0502    138 139   K 4.2 4.2 4.1    104 105   CO2 19* 22 23   BUN 20 20 19   CREATININE 2.3* 2.2* 1.9*     LIVER PROFILE:   No results for input(s): AST, ALT, LIPASE, BILIDIR, BILITOT, ALKPHOS in the last 72 hours. Invalid input(s): AMYLASE,  ALB  PT/INR: No results for input(s): PROTIME, INR in the last 72 hours. APTT: No results for input(s): APTT in the last 72 hours.   UA:No results for input(s): NITRITE, [address], PHUR, LABCAST, [address] [facility], [address], MUCUS, TRICHOMONAS, YEAST, BACTERIA, CLARITYU, SPECGRAV, LEUKOCYTESUR, [address], [address] GLUCOSEU, AMORPHOUS in the last 72 hours. Invalid input(s): Lyly Standing  No results for input(s): PH, PCO2, PO2 in the last 72 hours. Films:  Radiology Review:  Pertinent images / reports were reviewed as a part of this visit. XR CHEST PORTABLE  Narrative: EXAMINATION:  ONE XRAY VIEW OF THE CHEST    6/29/2022 5:05 am    COMPARISON:  CT chest, 6/28/2022    HISTORY:  ORDERING SYSTEM PROVIDED HISTORY: chest tube  TECHNOLOGIST PROVIDED HISTORY:  Reason for exam:->chest tube  Reason for Exam: chest tube    FINDINGS:  Sternotomy wires, left atrial appendage clip, and cholecystectomy clips are  noted. There is a laparoscopic gastric band in horizontal position,  unchanged. Right PICC and left apical chest tube are noted. The cardiac silhouette and is stable. There is a left hydropneumothorax. The fluid component is moderate in size extending approximately to the maribel  level. Pneumothorax component is small over the apex. There is patchy  opacity in the left perihilar region. Right lung is clear. Impression: Supportive tubing, including left chest tube are in stable positions. Stable left hydropneumothorax. Stable left perihilar atelectasis or airspace  disease. This note was transcribed using [id number]. Please disregard any translational errors.     Thank you for this consult,    Simeon Gonzalez MD  [facility] Pulmonary, Critical Care, and Sleep Medicine

## 2022-06-29 NOTE — PLAN OF CARE
Problem: Discharge Planning  Goal: Discharge to home or other facility with appropriate resources  6/28/2022 2257 by Ben Jernigan RN  Outcome: Progressing  6/28/2022 1059 by Ania Zhou RN  Outcome: Progressing     Problem: Safety - Adult  Goal: Free from fall injury  6/28/2022 2257 by Ben Jernigan RN  Outcome: Progressing  Flowsheets (Taken 6/28/2022 2255)  Free From Fall Injury: Instruct family/caregiver on patient safety  6/28/2022 1059 by Ania Zhou RN  Outcome: Progressing     Problem: Neurosensory - Adult  Goal: Achieves stable or improved neurological status  6/28/2022 2257 by Ben Jernigan RN  Outcome: Progressing  6/28/2022 1059 by Ania Zhou RN  Outcome: Progressing  Goal: Achieves maximal functionality and self care  6/28/2022 2257 by Ben Jernigan RN  Outcome: Progressing  6/28/2022 1059 by Ania Zhou RN  Outcome: Progressing     Problem: Respiratory - Adult  Goal: Achieves optimal ventilation and oxygenation  6/28/2022 2257 by Ben Jernigan RN  Outcome: Progressing  6/28/2022 1059 by Ania Zhou RN  Outcome: Progressing     Problem: Musculoskeletal - Adult  Goal: Return mobility to safest level of function  6/28/2022 2257 by Ben Jernigan RN  Outcome: Progressing  6/28/2022 1059 by Ania Zhou RN  Outcome: Progressing  Goal: Return ADL status to a safe level of function  6/28/2022 2257 by Ben Jernigan RN  Outcome: Progressing  6/28/2022 1059 by Ania Zhou RN  Outcome: Progressing     Problem: Nutrition Deficit:  Goal: Optimize nutritional status  6/28/2022 2257 by Ben Jernigan RN  Outcome: Progressing  6/28/2022 1059 by Ania Zhou RN  Outcome: Progressing     Problem: Skin/Tissue Integrity  Goal: Absence of new skin breakdown  Description: 1. Monitor for areas of redness and/or skin breakdown  2. Assess vascular access sites hourly  3.   Every 4-6 hours minimum:  Change oxygen saturation probe site  4. Every 4-6 hours:  If on nasal continuous positive airway pressure, respiratory therapy assess nares and determine need for appliance change or resting period.   6/28/2022 2257 by Ben Jernigan RN  Outcome: Progressing  6/28/2022 1059 by Ania Zhou RN  Outcome: Progressing     Problem: Pain  Goal: Verbalizes/displays adequate comfort level or baseline comfort level  6/28/2022 2257 by Ben Jernigan RN  Outcome: Progressing  6/28/2022 1059 by Ania Zhou RN  Outcome: Progressing

## 2022-06-29 NOTE — PROGRESS NOTES
Pt attempting to get out of bed on her own, chest tube spontaneously starting flowing excessively. Started shift at 13ml on wall, now at 400ml for total of 387 ml output this shift.

## 2022-06-29 NOTE — PLAN OF CARE
Problem: Discharge Planning  Goal: Discharge to home or other facility with appropriate resources  6/29/2022 1108 by Jono Garcia RN  Outcome: Progressing     Problem: Safety - Adult  Goal: Free from fall injury  6/29/2022 1108 by Jono Garcia RN  Outcome: Progressing     Problem: Neurosensory - Adult  Goal: Achieves stable or improved neurological status  6/29/2022 1108 by Jono Garcia RN  Outcome: Progressing     Problem: Musculoskeletal - Adult  Goal: Return mobility to safest level of function  6/28/2022 2257 by Vern Rosenbaum RN  Outcome: Progressing     Problem: Nutrition Deficit:  Goal: Optimize nutritional status  6/29/2022 1108 by Jono Garcia RN  Outcome: Progressing     Problem: Pain  Goal: Verbalizes/displays adequate comfort level or baseline comfort level  6/29/2022 1108 by Jono Garcia RN  Outcome: Progressing

## 2022-06-29 NOTE — PROGRESS NOTES
[facility]                                               Progress note    Summary:   Dereck Schuler is being seen by nephrology for SKINNY. Admitted with SOB and weakness. [facility] had a CABG done on 2/25/2022. She developed SKINNY requiring dialysis at that time. She came with increased shortness of breath. Daughter reports that she was taken off dialysis a couple weeks ago by Dr. Ga Viera with     Interval History  Patient was seen and examined at bedside  Her daughter is at bedside  She remains confused and not oriented to situation or place. She does not have any edema no chest pain or shortness of breathDaughter reports that she was taken off dialysis a couple weeks ago by Dr. Ga Viera with     Blood pressure 140/71  Satting 96% on room air  Urine output not recorded  Chest tube 550 cc output yesterday    Creatinine trending down 1.9  No electrolyte abnormalities  Random Vanco level 16.4  Globin 8.4    Creatinine stable at 2.2  Sodium 138 potassium 4.2 bicarb 22 BUN 20  Vancomycin trough 22.8  Hemoglobin 8.7    Plan:   -Stable CKD stage IV, recovering from SKINNY with creatinine downtrending  - sodium bicarb at 650 mg twice daily. Continue  -No signs of volume overload, hold off on diuretics  -Blood pressure is acceptable, no changes today  -Ferritin 341 in the setting of likely infection, TSAT 15%. We will give Retacrit 10,000 units weekly    Luis Daniel Deutsch MD  [facility]  Office: [phone]    Assessment:     #CKD stage IV  Stable with a baseline creatinine around 2-2.5  She did require dialysis during her last admission. But has recovered.   Likely secondary to hypertension and vascular disease    #Metabolic acidosis  In the setting of chronic renal failure  Bicarb 19  Sodium bicarb 650 mg twice daily    #Left pleural effusion  Very large status post pleurocentesis  Status post chest tube now with small pneumothorax    #History of CAD  Status post CABG in February 2022      ROS: Patient is confused, seen with bedside nurses. Positives Ahmet Delgado. All other remaining systems are negative. Constitutional:  fever, chills, weakness, weight change, fatigue,      Skin:  rash, pruritus, hair loss, bruising, dry skin, petechiae. Head, Face, Neck   headaches, swelling,  cervical adenopathy. Respiratory: shortness of breath, cough, or wheezing  Cardiovascular: chest pain, palpitations, dizzy, edema  Gastrointestinal: nausea, vomiting, diarrhea, constipation,belly pain    Yellow skin, blood in stool  Musculoskeletal:  back pain, muscle weakness, gait problems,       joint pain or swelling. Genitourinary:  dysuria, poor urine flow, flank pain, blood in urine  Neurologic:  vertigo, TIA'S, syncope, seizures, focal weakness  Psychosocial:  insomnia, anxiety, or depression. Additional positive findings: -     PMH:   Past medical history, surgical history, social history, family history are reviewed and updated as appropriate. Reviewed current medication list.   Allergies reviewed and updated as needed. PE:   Vitals:    06/29/22 1230   BP: (!) 140/71   Pulse: 85   Resp: 16   Temp:    SpO2:        General appearance:  in NAD, awake, feels comfortable but not oriented  HEENT: EOM intact, no icterus. Trachea is midline. Neck : No masses, appears symmetrical, no JVD  Respiratory: Respiratory effort appears normal, bilateral equal chest rise, no wheeze, diminished breath sounds at the left  Cardiovascular: Ausculation shows RRR no edema  Abdomen: No visible mass or tenderness, non distended. Musculoskeletal:  Joints with no swelling or deformity. Skin:no rashes, ulcers, induration, no jaundice.    Neuro: face symmetric, slow responses, confused not oriented      Lab Results   Component Value Date    CREATININE 1.9 (H) 06/29/2022    BUN 19 06/29/2022     06/29/2022    K 4.1 06/29/2022     06/29/2022    CO2 23 06/29/2022      Lab Results   Component Value Date    WBC 7.0 06/29/2022    HGB 8.4 (L) 06/29/2022    HCT 24.9 (L) 06/29/2022    MCV 82.2 06/29/2022     06/29/2022     Lab Results   Component Value Date    CALCIUM 8.3 06/29/2022    CAION 0.99 (L) 03/05/2022    PHOS 1.4 (L) 03/05/2022

## 2022-06-30 ENCOUNTER — APPOINTMENT (OUTPATIENT)
Dept: GENERAL RADIOLOGY | Age: 82
DRG: 187 | End: 2022-06-30
Payer: MEDICARE

## 2022-06-30 LAB
ANION GAP SERPL CALCULATED.3IONS-SCNC: 15 MMOL/L (ref 3–16)
BASOPHILS ABSOLUTE: 0.1 K/UL (ref 0–0.2)
BASOPHILS RELATIVE PERCENT: 0.8 %
BUN BLDV-MCNC: 17 MG/DL (ref 7–20)
CALCIUM SERPL-MCNC: 8.8 MG/DL (ref 8.3–10.6)
CHLORIDE BLD-SCNC: 102 MMOL/L (ref 99–110)
CO2: 21 MMOL/L (ref 21–32)
CREAT SERPL-MCNC: 1.8 MG/DL (ref 0.6–1.2)
EOSINOPHILS ABSOLUTE: 0.1 K/UL (ref 0–0.6)
EOSINOPHILS RELATIVE PERCENT: 1.8 %
FOLATE: 4.55 NG/ML (ref 4.78–24.2)
GFR AFRICAN AMERICAN: 33
GFR NON-AFRICAN AMERICAN: 27
GLUCOSE BLD-MCNC: 100 MG/DL (ref 70–99)
HCT VFR BLD CALC: 29.2 % (ref 36–48)
HEMOGLOBIN: 9.5 G/DL (ref 12–16)
LYMPHOCYTES ABSOLUTE: 1 K/UL (ref 1–5.1)
LYMPHOCYTES RELATIVE PERCENT: 12.5 %
MCH RBC QN AUTO: 27.3 PG (ref 26–34)
MCHC RBC AUTO-ENTMCNC: 32.4 G/DL (ref 31–36)
MCV RBC AUTO: 84.3 FL (ref 80–100)
MONOCYTES ABSOLUTE: 0.9 K/UL (ref 0–1.3)
MONOCYTES RELATIVE PERCENT: 11.6 %
NEUTROPHILS ABSOLUTE: 5.6 K/UL (ref 1.7–7.7)
NEUTROPHILS RELATIVE PERCENT: 73.3 %
PDW BLD-RTO: 16 % (ref 12.4–15.4)
PLATELET # BLD: 233 K/UL (ref 135–450)
PMV BLD AUTO: 8.1 FL (ref 5–10.5)
POTASSIUM REFLEX MAGNESIUM: 4.3 MMOL/L (ref 3.5–5.1)
RBC # BLD: 3.47 M/UL (ref 4–5.2)
SODIUM BLD-SCNC: 138 MMOL/L (ref 136–145)
T4 FREE: 1.6 NG/DL (ref 0.9–1.8)
TSH REFLEX FT4: 4.87 UIU/ML (ref 0.27–4.2)
VANCOMYCIN RANDOM: 22.1 UG/ML
VITAMIN B-12: 276 PG/ML (ref 211–911)
WBC # BLD: 7.7 K/UL (ref 4–11)

## 2022-06-30 PROCEDURE — 6370000000 HC RX 637 (ALT 250 FOR IP): Performed by: INTERNAL MEDICINE

## 2022-06-30 PROCEDURE — 97535 SELF CARE MNGMENT TRAINING: CPT

## 2022-06-30 PROCEDURE — 94761 N-INVAS EAR/PLS OXIMETRY MLT: CPT

## 2022-06-30 PROCEDURE — 80048 BASIC METABOLIC PNL TOTAL CA: CPT

## 2022-06-30 PROCEDURE — 71045 X-RAY EXAM CHEST 1 VIEW: CPT

## 2022-06-30 PROCEDURE — 82746 ASSAY OF FOLIC ACID SERUM: CPT

## 2022-06-30 PROCEDURE — 6370000000 HC RX 637 (ALT 250 FOR IP): Performed by: HOSPITALIST

## 2022-06-30 PROCEDURE — 84443 ASSAY THYROID STIM HORMONE: CPT

## 2022-06-30 PROCEDURE — 80202 ASSAY OF VANCOMYCIN: CPT

## 2022-06-30 PROCEDURE — 97530 THERAPEUTIC ACTIVITIES: CPT

## 2022-06-30 PROCEDURE — 36415 COLL VENOUS BLD VENIPUNCTURE: CPT

## 2022-06-30 PROCEDURE — 97110 THERAPEUTIC EXERCISES: CPT

## 2022-06-30 PROCEDURE — 82607 VITAMIN B-12: CPT

## 2022-06-30 PROCEDURE — 1200000000 HC SEMI PRIVATE

## 2022-06-30 PROCEDURE — 99233 SBSQ HOSP IP/OBS HIGH 50: CPT | Performed by: INTERNAL MEDICINE

## 2022-06-30 PROCEDURE — 84439 ASSAY OF FREE THYROXINE: CPT

## 2022-06-30 PROCEDURE — 94640 AIRWAY INHALATION TREATMENT: CPT

## 2022-06-30 PROCEDURE — 85025 COMPLETE CBC W/AUTO DIFF WBC: CPT

## 2022-06-30 RX ADMIN — METHIMAZOLE 5 MG: 5 TABLET ORAL at 12:33

## 2022-06-30 RX ADMIN — POLYETHYLENE GLYCOL 3350 17 G: 17 POWDER, FOR SOLUTION ORAL at 12:03

## 2022-06-30 RX ADMIN — AMLODIPINE BESYLATE 5 MG: 5 TABLET ORAL at 12:03

## 2022-06-30 RX ADMIN — ATORVASTATIN CALCIUM 80 MG: 80 TABLET, FILM COATED ORAL at 20:36

## 2022-06-30 RX ADMIN — GABAPENTIN 300 MG: 300 CAPSULE ORAL at 12:03

## 2022-06-30 RX ADMIN — GABAPENTIN 300 MG: 300 CAPSULE ORAL at 20:36

## 2022-06-30 RX ADMIN — ASPIRIN 81 MG: 81 TABLET, COATED ORAL at 12:03

## 2022-06-30 RX ADMIN — SODIUM BICARBONATE 650 MG: 650 TABLET ORAL at 12:03

## 2022-06-30 RX ADMIN — METOPROLOL SUCCINATE 25 MG: 25 TABLET, FILM COATED, EXTENDED RELEASE ORAL at 12:03

## 2022-06-30 RX ADMIN — SODIUM BICARBONATE 650 MG: 650 TABLET ORAL at 20:36

## 2022-06-30 NOTE — PROGRESS NOTES
Comprehensive Nutrition Assessment    Type and Reason for Visit:  Reassess    Nutrition Recommendations/Plan:   1. Continue regular diet  2. Continue Ensure Enlive BID     Malnutrition Assessment:  Malnutrition Status:  Insufficient data (06/27/22 1146)    Context:  Chronic Illness       Nutrition Assessment:    Follow-up. On regular diet with variable intakes. Ensure Enlive supplement onboard twice daily. Will continue current nutrition interventions. Nutrition Related Findings:    +BM 6/26. No edema. Wound Type: None       Current Nutrition Intake & Therapies:    Average Meal Intake:  (variable but improving)  Average Supplements Intake: Unable to assess  ADULT DIET; Regular  ADULT ORAL NUTRITION SUPPLEMENT; Lunch, Dinner; Standard High Calorie/High Protein Oral Supplement    Anthropometric Measures:  Height: 5' 3\" (160 cm)  Ideal Body Weight (IBW): 115 lbs (52 kg)    Admission Body Weight: 161 lb (73 kg)  Current Body Weight: 148 lb (67.1 kg), 128.7 % IBW. Weight Source: Bed Scale  Current BMI (kg/m2): 26.2  Weight Adjustment For: No Adjustment  BMI Categories: Overweight (BMI 25.0-29. 9)    Estimated Daily Nutrient Needs:  Energy Requirements Based On: Kcal/kg  Weight Used for Energy Requirements: Current  Energy (kcal/day): 0524-0142 (20-25kcal/72kg)  Weight Used for Protein Requirements: Ideal  Protein (g/day): 62-73 (1.2-1.4g/52kg)  Method Used for Fluid Requirements: 1 ml/kcal  Fluid (ml/day): 1 ml/kcal    Nutrition Diagnosis:   · Inadequate oral intake related to inadequate protein-energy intake as evidenced by intake 26-50%    Nutrition Interventions:   Food and/or Nutrient Delivery: Continue Current Diet,Continue Oral Nutrition Supplement  Nutrition Education/Counseling: Education not indicated  Coordination of Nutrition Care: Continue to monitor while inpatient     Goals:  Previous Goal Met: Progressing toward Goal(s)  Goals: PO intake 50% or greater,prior to discharge     Nutrition Monitoring and Evaluation:   Behavioral-Environmental Outcomes: None Identified  Food/Nutrient Intake Outcomes: Food and Nutrient Intake,Supplement Intake  Physical Signs/Symptoms Outcomes: Weight,Skin,Meal Time Behavior    Discharge Planning:     Too soon to determine     Gianna Maya, [address] [place] [address],   Contact: [phone]

## 2022-06-30 NOTE — CARE COORDINATION
6/30 Spoke with patient's daughter Arina Arenas regarding [facility] She is in agreement and chose [facility]. A referral was faxed to [facility]. The Plan for Transition of Care is related to the following treatment goals:  increase the patient's independence,     The Patient and/or patient representative daughter Alexandrea Jensen was provided with a choice of provider and agrees   with the discharge plan. [x] Yes [] No    Freedom of choice list was provided with basic dialogue that supports the patient's individualized plan of care/goals, treatment preferences and shares the quality data associated with the providers.  [x] Yes [] No.  Electronically signed by Syeda Bauman RN on 6/30/2022 at 3:45 PM

## 2022-06-30 NOTE — PROGRESS NOTES
Pulmonary Progress Note    Date of Admission: 6/25/2022   LOS: 4 days     Chief Complaint   Patient presents with    Shortness of Breath     getting worse over the last 7 days, feb 25th had bypass sx     Fatigue     getting worse over the last 7 days, pt had blood work in the last two weeks and was told her blood count was low and to go to the ER if it gets worse       Assessment/Plan:     Pleural effusion, left  -Exudate  -Cultures and cytology unremarkable  -Chest tube removed overnight but cxr this morning much improved. CT output documented at ~1L    CAD status post CABG    I spent 36 minutes on this case today, >50% was face-to-face in discussion of the diagnosis and the coordination of care in regards to the treatment plan. All questions answered. Pulmonary standpoint okay for discharge. We will follow-up as below. Future Appointments   Date Time Provider Jacquelyn Shearer   7/26/2022  1:00 PM Adriana Wynn MD [facility]   7/28/2022  1:20 PM Andry Chaidez MD [facility]            24 Hour Events/Subjective  No acute events overnight. Chest tube pulled out overnight but apparently had dumped 9 00 cc prior. Dyspnea much improved. Chest x-ray improved    ROS:   No nausea  No Vomiting  No chest pain      Intake/Output Summary (Last 24 hours) at 6/30/2022 1051  Last data filed at 6/30/2022 0555  Gross per 24 hour   Intake 817.28 ml   Output 987 ml   Net -169.72 ml         PHYSICAL EXAM:   Blood pressure (!) 167/76, pulse 91, temperature 98.3 °F (36.8 °C), temperature source Oral, resp. rate 20, height 5' 3\" (1.6 m), weight 148 lb 9.4 oz (67.4 kg), SpO2 98 %, not currently breastfeeding.'  Gen:  No acute distress. Eyes: PERRL. Anicteric sclera. No conjunctival injection. ENT: No discharge. Posterior oropharynx clear. External appearance of ears and nose normal.  Neck: Trachea midline. No mass   Resp:  No crackles. No wheezes. No rhonchi.  + Left-sided dullness on percussion.   CV: Regular rate. Regular rhythm. No murmur or rub. No edema. GI: Soft, Non-tender. Non-distended. +BS  Skin: Warm, dry, w/o erythema. Lymph: No cervical or supraclavicular LAD. M/S: No cyanosis. No clubbing. Neuro:  no focal neurologic deficit. Moves all extremities  Psych: Awake and alert, Oriented x 3. Judgement and insight appropriate. Mood stable. Medications:    Scheduled Meds:   epoetin xander-epbx  10,000 Units SubCUTAneous Q7 Days    sodium chloride flush  5-40 mL IntraVENous 2 times per day    sodium bicarbonate  650 mg Oral BID    metoprolol succinate  25 mg Oral Daily    atorvastatin  80 mg Oral Nightly    aspirin EC  81 mg Oral Daily    amLODIPine  5 mg Oral Daily    sodium chloride flush  10 mL IntraVENous 2 times per day    cefepime  1,000 mg IntraVENous Q12H    vancomycin (VANCOCIN) intermittent dosing (placeholder)   Other RX Placeholder    gabapentin  300 mg Oral BID    fluticasone-umeclidin-vilant  1 puff Inhalation Daily    methIMAzole  5 mg Oral Daily    polyethylene glycol  17 g Oral Daily       Continuous Infusions:   sodium chloride      sodium chloride Stopped (06/29/22 0417)       PRN Meds:  sodium chloride flush, sodium chloride, sodium chloride flush, sodium chloride, ondansetron, acetaminophen **OR** acetaminophen    Labs reviewed:  CBC:   Recent Labs     06/28/22  0500 06/29/22  0502 06/30/22  0550   WBC 7.8 7.0 7.7   HGB 8.7* 8.4* 9.5*   HCT 25.8* 24.9* 29.2*   MCV 82.6 82.2 84.3    213 233     BMP:   Recent Labs     06/28/22  0500 06/29/22  0502 06/30/22  0550    139 138   K 4.2 4.1 4.3    105 102   CO2 22 23 21   BUN 20 19 17   CREATININE 2.2* 1.9* 1.8*     LIVER PROFILE:   No results for input(s): AST, ALT, LIPASE, BILIDIR, BILITOT, ALKPHOS in the last 72 hours. Invalid input(s): AMYLASE,  ALB  PT/INR:   Recent Labs     06/29/22  1244   PROTIME 16.0*   INR 1.28*     APTT: No results for input(s): APTT in the last 72 hours.   UA:No results for input(s): NITRITE, COLORU, PHUR, LABCAST, WBCUA, RBCUA, MUCUS, TRICHOMONAS, YEAST, BACTERIA, CLARITYU, SPECGRAV, LEUKOCYTESUR, UROBILINOGEN, BILIRUBINUR, BLOODU, GLUCOSEU, AMORPHOUS in the last 72 hours. Invalid input(s): Tammi Boor  No results for input(s): PH, PCO2, PO2 in the last 72 hours. Films:  Radiology Review:  Pertinent images / reports were reviewed as a part of this visit. XR CHEST PORTABLE  Narrative: EXAMINATION:  ONE XRAY VIEW OF THE CHEST    6/30/2022 5:46 am    COMPARISON:  6/29/2022    HISTORY:  ORDERING SYSTEM PROVIDED HISTORY: chest tube  TECHNOLOGIST PROVIDED HISTORY:  Reason for exam:->chest tube  Reason for Exam: pulled out chest tube    FINDINGS:  Sternotomy wires, left atrial appendage clip, laparoscopic gastric band and  cholecystectomy clips are again noted. Right PICC and left chest tube have been removed in the interval.    Cardiac silhouette is normal. There is a small left hydropneumothorax. There  is also hazy opacity in the left mid and lower lung, improved in the interval.  Impression: Interval removal of right PICC and left chest tube. Small residual left hydropneumothorax. Improving aeration of the left lung with residual atelectasis or airspace  disease in the mid and lower lung. This note was transcribed using [id number]. Please disregard any translational errors.     Thank you for this consult,    Lani Galeas MD  [facility], Critical Care, and Sleep Medicine

## 2022-06-30 NOTE — PROGRESS NOTES
Pt has been confused and impulsive through out the whole night. Refusing to stay in the bed even with reorientation, frequent rounding, and tele cam. Chair alarm sounded and staff immediately responded to find pt exiting the chair with the chest tube laying on floor. Charge RN, and MD aware. VS as documented. Occlusive dressing placed on chest tube incision.

## 2022-06-30 NOTE — PROGRESS NOTES
Occupational Therapy  Facility/Department: [facility]  Occupational Therapy Daily Note    Name: Elizabeth Cortes  : 1940  MRN: [id number]  Date of Service: 2022    Discharge Recommendations:  24 hour supervision or assist,Home with Home health OT    Elizabeth Cotres scored a 15/24 on the -[facility] ADL Inpatient form. It is recommended that the patient have 2-3 sessions per week of Occupational Therapy at d/c to increase the patient's independence. At this time, this patient demonstrates the endurance and safety to discharge home with [facility] and a follow up treatment frequency of 2-3x/wk. Please see assessment section for further patient specific details. If patient discharges prior to next session this note will serve as a discharge summary. Please see below for the latest assessment towards goals. Patient Diagnosis(es): The primary encounter diagnosis was Fatigue, unspecified type. Diagnoses of Dyspnea, unspecified type, Pleural effusion, and Loculated pleural effusion on the left (Large) were also pertinent to this visit. Past Medical History:  has a past medical history of Arthritis, Asthma, CAD (coronary artery disease), CKD (chronic kidney disease), Glaucoma, Hyperlipidemia, Hypertension, Hyperthyroidism, IBS (irritable bowel syndrome), Neuropathy, Obesity (BMI 30-39.9), Osteopenia, and Osteoporosis. Past Surgical History:  has a past surgical history that includes Cholecystectomy; Foot surgery; Tubal ligation; Gastric Band (); orif femur decompression (Left, 2014); Ankle fracture surgery (Left, 2015); Hand surgery (); other surgical history (Right, ); Coronary artery bypass graft (2022); Coronary artery bypass graft (N/A, 2022); IR TUNNELED CVC PLACE WO SQ PORT/PUMP > 5 YEARS (3/7/2022); and Diagnostic Cardiac Cath Lab Procedure (2022). Assessment   Performance deficits / Impairments: Decreased functional mobility ; Decreased ADL status; Decreased strength;Decreased cognition;Decreased safe awareness;Decreased balance;Decreased endurance;Decreased vision/visual deficit  Assessment: Discussed with OTR: Pt tolerated session fair. Pt limited by decreased cognition(chair and sitter alarms sounding 2x shortly after sitting up in chair at end of tx), decreased vision. Pt required up to CG, CG/Min A for safe transfers, mob. Pt cued to initiate grooming tasks and UE ex's. Anticipate pt would require up to Mod/Max A for ADL's. Cont poc and anticpate pt will be safe to return home with daughter providing 24/7 assist. Chirag Foster recommended also. Prognosis: Fair  History: Pt is an [de-identified] y.o. female admitted with L pleural effusion. PTA, pt living with daughter and ZOHRA in basement level apartment, has assist for ADLs and SBA for fxl mobility with no AD. REQUIRES OT FOLLOW-UP: Yes  Activity Tolerance  Activity Tolerance: Treatment limited secondary to decreased cognition        Plan   Plan  Times per Week: 3-5  Current Treatment Recommendations: Balance training,Functional mobility training,Endurance training,Safety education & training,Equipment evaluation, education, & procurement,Self-Care / ADL,Cognitive reorientation,Patient/Caregiver education & training,Cognitive/Perceptual training,Strengthening,ROM     Restrictions  Restrictions/Precautions  Restrictions/Precautions: Fall Risk,Up as Tolerated (high fall risk)  Required Braces or Orthoses?: No  Position Activity Restriction  Other position/activity restrictions: tele-sitter    Subjective   General  Chart Reviewed: Yes,Orders,Progress Notes  Patient assessed for rehabilitation services?: Yes  Additional Pertinent Hx: Per Dr. Dana Iqbal H&P: \"Pt is an [de-identified]y.o. year-old female with a history of hypertension, hyperlipidemia, coronary artery disease with recent CABG 02/25/2022 (cabgx4, EDISON exclusion) and CKD3. She presents to the emergency room with her daughter for evaluation of shortness of breath.   Her daughter reports that the patient has had worsening shortness of breath over the last 7 days. Her shortness of breath is made much worse with minimal exertion and better when she rests. She saw her PCP for that and it was thought it was secondary to her anemia so she was started on iron. The patient has had increasing generalized weakness and fatigue and has been eating less. Upon evaluation in the emergency room she was found to have a large left loculated pleural effusion with compressive atelectasis. She is being admitted for further evaluation and treatment. \" Chest tube placed 6/26. Family / Caregiver Present: No  Referring Practitioner: Abby Price MD  Diagnosis: left pleural effusion  Subjective  Subjective: Pt met BS, in recliner. Pt without complaints, agreeable to therapy. Pt with eyes closed, cues to keep open for transfers, mob. Social/Functional History  Social/Functional History  Lives With: Family (Dtr; ZOHRA)  Type of Home: Apartment (basement level apartment : 6 steps with R handrail)  Home Layout: One level  Home Access: Stairs to enter without rails  Entrance Stairs - Number of Steps: 1 BRITTANY to enter building with no handrail, then 6 BRITTANY to basement apartment with R handrail  Bathroom Shower/Tub: Tub/Shower unit,Shower chair with back  Bathroom Toilet: Standard (vanity nearby)  Bathroom Equipment: Shower chair,Hand-held shower,Grab bars in shower  Bathroom Accessibility: Walker accessible  Home Equipment: Walker, rolling (access to w/c)  ADL Assistance: Needs assistance (daughter assists with dressing, showering, and toileting. Pt independent grooming and feeding)  Homemaking Assistance: Needs assistance (daughter manages all homemaking)  Ambulation Assistance: Needs assistance (no AD and SBA of daughter)  Transfer Assistance: Independent  Active : No  Mode of Transportation: Family  Occupation: Retired  Type of Occupation: Retired : worked [employer].   Additional Comments: daughter reports no recent falls       Objective         Safety Devices  Type of Devices: All fall risk precautions in place;Call light within reach; Chair alarm in place; Left in chair;Gait belt     Wheelchair Bed Transfers  Wheelchair/Bed - Technique: Ambulating  Equipment Used: Bed;Other (recliner)  Level of Asssistance: Contact guard assistance  Wheelchair Transfers Comments: RW, cues, assist for hand placement. Later, pt had gotten herself up off recliner and was standing w/arm on the bed. Pt cued, assisted to sit onto the bed(declined laying down). RN present and assisted GEENA, hand hold assist of pt for bed to recliner, CGA     ADL  Grooming: Setup  Grooming Skilled Clinical Factors: stance at sink to wash hands. Cues and assist needed to initiate task. Additional Comments: Anticipate pt is set-up feeding, max A toileting based on ROM/strength, balance, endurance. Cognition  Overall Cognitive Status: Exceptions  Arousal/Alertness: Delayed responses to stimuli  Following Commands: Follows one step commands with increased time; Follows one step commands with repetition  Attention Span: Attends with cues to redirect  Memory: Decreased recall of recent events;Decreased short term memory  Safety Judgement: Decreased awareness of need for assistance;Decreased awareness of need for safety  Problem Solving: Decreased awareness of errors;Assistance required to implement solutions;Assistance required to generate solutions  Insights: Not aware of deficits  Initiation: Requires cues for all  Sequencing: Requires cues for all  Orientation  Overall Orientation Status: Impaired  Orientation Level: Oriented to person               A/AROM Exercises: Max Cues, assist to participate in BUE ex's. Pt completing shoulder and elbow ex's. Functional Mobility: Pt amb in room with RW, CG/Min A cues for direction, d/t keeping eyes closed.  Pt later assisted back to sit onto recliner (had gotten up w/o assist, setting off chair and telesitter alarms, to stand near bed), with hand held assist x2. Static Sitting Balance Exercises: SBA at EOB      Education Given To: Patient  Education Provided: Role of Therapy;Plan of Care;Transfer Training  Barriers to Learning: Cognition;Vision  Education Outcome: Continued education needed        AM-PAC Score        AM-PAC Inpatient Daily Activity Raw Score: 15 (06/30/22 1148)  AM-PAC Inpatient ADL T-Scale Score : 34.69 (06/30/22 1148)  ADL Inpatient CMS 0-100% Score: 56.46 (06/30/22 1148)  ADL Inpatient CMS G-Code Modifier : CK (06/30/22 1148)    Goals  Short Term Goals  Time Frame for Short term goals: Prior to d/c. Status: goals ongoing  Short Term Goal 1: Pt will bathe with min A. Short Term Goal 2: Pt will dress with min A. Short Term Goal 3: Pt will toilet with CGA/min A. Short Term Goal 4: Pt will complete fxl mobility and fxl transfers to/from ADL surfaces with SBA using AD. Short Term Goal 5: Pt will tolerate 10-15 minutes of B UE therex in all planes to improve strengt/endurance for ADLs. Long Term Goals  Time Frame for Long term goals : STGs=LTGs  Patient Goals   Patient goals : to return home.        Therapy Time   Individual Concurrent Group Co-treatment   Time In 1121         Time Out 1200         Minutes [address] [facility] Carlos VALENTINEA/L,515

## 2022-06-30 NOTE — PLAN OF CARE
Problem: Discharge Planning  Goal: Discharge to home or other facility with appropriate resources  Outcome: Progressing     Problem: Safety - Adult  Goal: Free from fall injury  Outcome: Progressing  Flowsheets (Taken 6/30/2022 1955)  Free From Fall Injury: Instruct family/caregiver on patient safety     Problem: Neurosensory - Adult  Goal: Achieves stable or improved neurological status  Outcome: Progressing  Goal: Achieves maximal functionality and self care  Outcome: Progressing     Problem: Respiratory - Adult  Goal: Achieves optimal ventilation and oxygenation  Outcome: Progressing     Problem: Musculoskeletal - Adult  Goal: Return mobility to safest level of function  Outcome: Progressing  Goal: Return ADL status to a safe level of function  Outcome: Progressing     Problem: Nutrition Deficit:  Goal: Optimize nutritional status  6/30/2022 1956 by Jonatan Singh RN  Outcome: Progressing  6/30/2022 1331 by Cheo Flores RD, LD  Outcome: Progressing     Problem: Skin/Tissue Integrity  Goal: Absence of new skin breakdown  Description: 1. Monitor for areas of redness and/or skin breakdown  2. Assess vascular access sites hourly  3. Every 4-6 hours minimum:  Change oxygen saturation probe site  4. Every 4-6 hours:  If on nasal continuous positive airway pressure, respiratory therapy assess nares and determine need for appliance change or resting period.   Outcome: Progressing     Problem: Pain  Goal: Verbalizes/displays adequate comfort level or baseline comfort level  Outcome: Progressing     Problem: ABCDS Injury Assessment  Goal: Absence of physical injury  Outcome: Progressing  Flowsheets (Taken 6/30/2022 1955)  Absence of Physical Injury: Implement safety measures based on patient assessment

## 2022-06-30 NOTE — PROGRESS NOTES
[facility]                                               Progress note    Summary:   Leeroy Paz is being seen by nephrology for SKINNY. Admitted with SOB and weakness. Ms. Echo Hoover had a CABG done on 2/25/2022. She developed SKINNY requiring dialysis at that time. She came with increased shortness of breath. Daughter reports that she was taken off dialysis a couple weeks ago by Dr. Jasper Patton with     Interval History  Patient was seen and examined at bedside  Family at bedside. She remains confused and pulled out pICC and chest tube  No edema  Eating a little. Had some boost     Blood pressure 167/76 ,mostly 871-762 systolic  11% on room air. Cr 1.8  Electrolytes acceptable  vanc rm 22.1    CXR  Interval removal of right PICC and left chest tube.     Small residual left hydropneumothorax.     Improving aeration of the left lung with residual atelectasis or airspace  disease in the mid and lower lung. Plan:   -Stable renal function, some improvement in creatinine.   - sodium bicarb at 650 mg twice daily for metabolic acidosis  - No need for diuretics. Appears euvolemic.   - Retacrit 10,000 units weekly    Malik Wagoner MD  [facility]  Office: [phone]    Assessment:     #CKD stage IV  Stable with a baseline creatinine around 2-2.5  She did require dialysis during her last admission. But has recovered. Likely secondary to hypertension and vascular disease    #Metabolic acidosis  In the setting of chronic renal failure  Bicarb 19  Sodium bicarb 650 mg twice daily    #Left pleural effusion  Very large status post pleurocentesis  Status post chest tube now with small pneumothorax    #History of CAD  Status post CABG in February 2022      ROS:   Patient is confused, seen with bedside nurses. Positives [employer]. All other remaining systems are negative.     Constitutional:  fever, chills, weakness, weight change, fatigue,      Skin:  rash, pruritus, hair loss, bruising, dry skin, petechiae. Head, Face, Neck   headaches, swelling,  cervical adenopathy. Respiratory: shortness of breath, cough, or wheezing  Cardiovascular: chest pain, palpitations, dizzy, edema  Gastrointestinal: nausea, vomiting, diarrhea, constipation,belly pain    Yellow skin, blood in stool  Musculoskeletal:  back pain, muscle weakness, gait problems,       joint pain or swelling. Genitourinary:  dysuria, poor urine flow, flank pain, blood in urine  Neurologic:  vertigo, TIA'S, syncope, seizures, focal weakness  Psychosocial:  insomnia, anxiety, or depression. Additional positive findings: -     PMH:   Past medical history, surgical history, social history, family history are reviewed and updated as appropriate. Reviewed current medication list.   Allergies reviewed and updated as needed. PE:   Vitals:    06/30/22 0750   BP:    Pulse:    Resp:    Temp:    SpO2: 98%       General appearance:  in NAD, awake, feels comfortable but not oriented  HEENT: EOM intact, no icterus. Trachea is midline. Neck : No masses, appears symmetrical, no JVD  Respiratory: Respiratory effort appears normal, bilateral equal chest rise, no wheeze, diminished breath sounds at the left  Cardiovascular: Ausculation shows RRR no edema  Abdomen: No visible mass or tenderness, non distended. Musculoskeletal:  Joints with no swelling or deformity. Skin:no rashes, ulcers, induration, no jaundice.    Neuro: face symmetric, slow responses, confused not oriented      Lab Results   Component Value Date    CREATININE 1.8 (H) 06/30/2022    BUN 17 06/30/2022     06/30/2022    K 4.3 06/30/2022     06/30/2022    CO2 21 06/30/2022      Lab Results   Component Value Date    WBC 7.7 06/30/2022    HGB 9.5 (L) 06/30/2022    HCT 29.2 (L) 06/30/2022    MCV 84.3 06/30/2022     06/30/2022     Lab Results   Component Value Date    CALCIUM 8.8 06/30/2022    CAION 0.99 (L) 03/05/2022    PHOS 1.4 (L) 03/05/2022

## 2022-06-30 NOTE — PROGRESS NOTES
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, CKD3, admitted with large L loculated pleural effusion    Subjective: denies worsening sob, uncontrolled pain, is in no distress on room air, confused / disoriented      Medications:  Reviewed    Infusion Medications    sodium chloride      sodium chloride Stopped (06/29/22 7279)     Scheduled Medications    epoetin xander-epbx  10,000 Units SubCUTAneous Q7 Days    sodium chloride flush  5-40 mL IntraVENous 2 times per day    sodium bicarbonate  650 mg Oral BID    metoprolol succinate  25 mg Oral Daily    atorvastatin  80 mg Oral Nightly    aspirin EC  81 mg Oral Daily    amLODIPine  5 mg Oral Daily    sodium chloride flush  10 mL IntraVENous 2 times per day    cefepime  1,000 mg IntraVENous Q12H    vancomycin (VANCOCIN) intermittent dosing (placeholder)   Other RX Placeholder    gabapentin  300 mg Oral BID    fluticasone-umeclidin-vilant  1 puff Inhalation Daily    methIMAzole  5 mg Oral Daily    polyethylene glycol  17 g Oral Daily     PRN Meds: sodium chloride flush, sodium chloride, sodium chloride flush, sodium chloride, ondansetron, acetaminophen **OR** acetaminophen      Intake/Output Summary (Last 24 hours) at 6/30/2022 1613  Last data filed at 6/30/2022 1110  Gross per 24 hour   Intake 827.28 ml   Output 987 ml   Net -159.72 ml       Physical Exam Performed:    BP (!) 167/76   Pulse 91   Temp 98.3 °F (36.8 °C) (Oral)   Resp 20   Ht 5' 3\" (1.6 m)   Wt 148 lb 9.4 oz (67.4 kg)   SpO2 98%   BMI 26.32 kg/m²     General appearance: No apparent distress, appears stated age and cooperative. HEENT: Pupils equal, round, and reactive to light. Conjunctivae/corneas clear. Neck: Supple, with full range of motion. No jugular venous distention. Trachea midline. Respiratory:  Normal respiratory effort. No use of accessory muscles, no crepitance   Cardiovascular: Regular rate and rhythm    Abdomen: Soft, non-tender, non-distended    Musculoskeletal: No clubbing, cyanosis or edema bilaterally. No calf tenderness  Skin: Skin color, texture, turgor normal.  No rashes or lesions. Neurologic:  , grossly non-focal.  Psychiatric: Alert and oriented, thought content appropriate, normal insight         Labs:   Recent Labs     06/28/22  0500 06/29/22  0502 06/30/22  0550   WBC 7.8 7.0 7.7   HGB 8.7* 8.4* 9.5*   HCT 25.8* 24.9* 29.2*    213 233     Recent Labs     06/28/22  0500 06/29/22  0502 06/30/22  0550    139 138   K 4.2 4.1 4.3    105 102   CO2 22 23 21   BUN 20 19 17   CREATININE 2.2* 1.9* 1.8*   CALCIUM 8.3 8.3 8.8     No results for input(s): AST, ALT, BILIDIR, BILITOT, ALKPHOS in the last 72 hours. Recent Labs     06/29/22  1244   INR 1.28*     No results for input(s): Curry Jakes in the last 72 hours. Urinalysis:      Lab Results   Component Value Date/Time    NITRU Negative 02/23/2022 09:26 PM    BLOODU Negative 02/23/2022 09:26 PM    SPECGRAV 1.026 02/23/2022 09:26 PM    GLUCOSEU Negative 02/23/2022 09:26 PM    GLUCOSEU Negative 08/10/2011 09:00 AM       Radiology:  XR CHEST PORTABLE   Final Result   Interval removal of right PICC and left chest tube. Small residual left hydropneumothorax. Improving aeration of the left lung with residual atelectasis or airspace   disease in the mid and lower lung. XR CHEST PORTABLE   Final Result   Supportive tubing, including left chest tube are in stable positions. Stable left hydropneumothorax. Stable left perihilar atelectasis or airspace   disease. CT CHEST WO CONTRAST   Final Result   Large pleural effusion on the left with complete collapse identified of the   left lower lung field. Chest tube identified on the left with small   pneumothorax identified as well on the left.   Findings suggesting a fibrotic left lower lung. Extensive coronary artery calcifications identified. Renal vascular   calcification present with lap band in place. Extensive vascular   calcifications seen within the thoracic aorta. XR CHEST PORTABLE   Final Result   Supportive tubing projects in normal position, including left chest tube. Continued opacity in the left hemithorax, likely combination of pleural   effusion and atelectasis; underlying malignancy is still a differential   concern. XR CHEST PORTABLE   Final Result   Left chest tube in place with interval reduction of effusion and development   of small loculated pneumothorax near the apex. XR CHEST PORTABLE   Final Result   Interval placement of a left chest tube with persistent opacification of the   left hemithorax, compatible with large pleural effusion. CT CHEST WO CONTRAST   Final Result   Large left pleural effusion, partially loculated. Associated compressive   atelectasis in the left lung is noted. A component of mucous plugging may   also be present as the peripheral bronchi in the lingula and left lower lobe   are not visualized. Follow-up with contrast-enhanced chest CT is recommended   when effusion resolves 2 exclude underlying neoplastic process. CT HEAD WO CONTRAST   Final Result   No evidence of acute intracranial process. Mild atrophy and chronic small   vessel ischemic changes noted. XR CHEST (2 VW)   Final Result   Near complete opacification of the left hemithorax, which may be due to a   combination of atelectasis and/or effusion. Recommend further evaluation   with CT.          XR CHEST PORTABLE    (Results Pending)           Assessment/Plan:    Active Hospital Problems    Diagnosis Date Noted    Loculated pleural effusion on the left (Large) [J90] 06/26/2022     Priority: Medium    Stage 3 chronic kidney disease ([facility] [address] [N18.30] 06/26/2022     Priority: Medium    Generalized weakness [R53.1] 06/26/2022     Priority: Medium    Fatigue [R53.83] 06/26/2022     Priority: Medium    CAD in native artery [I25.10]     Hyperlipidemia LDL goal <70 [E78.5]      Pleural effusion  - pt pulled out ct /picc  - serial cxr  - ptx L    CAD  - stable    CKD  - Nephro consulted     Code status  - DNR cca     Diet: ADULT DIET;  Regular  ADULT ORAL NUTRITION SUPPLEMENT; Lunch, Dinner; Standard High Calorie/High Protein Oral Supplement  Code Status: Full Code         Gibran Valladares MD

## 2022-07-01 ENCOUNTER — APPOINTMENT (OUTPATIENT)
Dept: GENERAL RADIOLOGY | Age: 82
DRG: 187 | End: 2022-07-01
Payer: MEDICARE

## 2022-07-01 VITALS
DIASTOLIC BLOOD PRESSURE: 66 MMHG | SYSTOLIC BLOOD PRESSURE: 132 MMHG | TEMPERATURE: 98.1 F | BODY MASS INDEX: 26.33 KG/M2 | RESPIRATION RATE: 16 BRPM | WEIGHT: 148.59 LBS | OXYGEN SATURATION: 98 % | HEIGHT: 63 IN | HEART RATE: 82 BPM

## 2022-07-01 LAB
ANION GAP SERPL CALCULATED.3IONS-SCNC: 11 MMOL/L (ref 3–16)
BASOPHILS ABSOLUTE: 0.1 K/UL (ref 0–0.2)
BASOPHILS RELATIVE PERCENT: 1 %
BUN BLDV-MCNC: 17 MG/DL (ref 7–20)
CALCIUM SERPL-MCNC: 8.6 MG/DL (ref 8.3–10.6)
CHLORIDE BLD-SCNC: 106 MMOL/L (ref 99–110)
CO2: 25 MMOL/L (ref 21–32)
CREAT SERPL-MCNC: 1.9 MG/DL (ref 0.6–1.2)
EOSINOPHILS ABSOLUTE: 0.2 K/UL (ref 0–0.6)
EOSINOPHILS RELATIVE PERCENT: 3.2 %
GFR AFRICAN AMERICAN: 31
GFR NON-AFRICAN AMERICAN: 25
GLUCOSE BLD-MCNC: 94 MG/DL (ref 70–99)
HCT VFR BLD CALC: 25.8 % (ref 36–48)
HEMOGLOBIN: 8.6 G/DL (ref 12–16)
LYMPHOCYTES ABSOLUTE: 1.5 K/UL (ref 1–5.1)
LYMPHOCYTES RELATIVE PERCENT: 21.6 %
MCH RBC QN AUTO: 27.8 PG (ref 26–34)
MCHC RBC AUTO-ENTMCNC: 33.4 G/DL (ref 31–36)
MCV RBC AUTO: 83.2 FL (ref 80–100)
MONOCYTES ABSOLUTE: 0.8 K/UL (ref 0–1.3)
MONOCYTES RELATIVE PERCENT: 11.7 %
NEUTROPHILS ABSOLUTE: 4.4 K/UL (ref 1.7–7.7)
NEUTROPHILS RELATIVE PERCENT: 62.5 %
PDW BLD-RTO: 15.7 % (ref 12.4–15.4)
PLATELET # BLD: 233 K/UL (ref 135–450)
PMV BLD AUTO: 8.3 FL (ref 5–10.5)
POTASSIUM REFLEX MAGNESIUM: 4.2 MMOL/L (ref 3.5–5.1)
PROCALCITONIN: 0.15 NG/ML (ref 0–0.15)
RBC # BLD: 3.1 M/UL (ref 4–5.2)
SODIUM BLD-SCNC: 142 MMOL/L (ref 136–145)
WBC # BLD: 7 K/UL (ref 4–11)

## 2022-07-01 PROCEDURE — 2580000003 HC RX 258: Performed by: INTERNAL MEDICINE

## 2022-07-01 PROCEDURE — 80048 BASIC METABOLIC PNL TOTAL CA: CPT

## 2022-07-01 PROCEDURE — 6370000000 HC RX 637 (ALT 250 FOR IP): Performed by: INTERNAL MEDICINE

## 2022-07-01 PROCEDURE — 6370000000 HC RX 637 (ALT 250 FOR IP): Performed by: HOSPITALIST

## 2022-07-01 PROCEDURE — 94760 N-INVAS EAR/PLS OXIMETRY 1: CPT

## 2022-07-01 PROCEDURE — 36415 COLL VENOUS BLD VENIPUNCTURE: CPT

## 2022-07-01 PROCEDURE — 84145 PROCALCITONIN (PCT): CPT

## 2022-07-01 PROCEDURE — 71045 X-RAY EXAM CHEST 1 VIEW: CPT

## 2022-07-01 PROCEDURE — 99232 SBSQ HOSP IP/OBS MODERATE 35: CPT | Performed by: INTERNAL MEDICINE

## 2022-07-01 PROCEDURE — 97116 GAIT TRAINING THERAPY: CPT

## 2022-07-01 PROCEDURE — 85025 COMPLETE CBC W/AUTO DIFF WBC: CPT

## 2022-07-01 RX ORDER — SODIUM BICARBONATE 650 MG/1
650 TABLET ORAL 2 TIMES DAILY
Qty: 60 TABLET | Refills: 0 | Status: ON HOLD | OUTPATIENT
Start: 2022-07-01 | End: 2022-07-22 | Stop reason: HOSPADM

## 2022-07-01 RX ADMIN — GABAPENTIN 300 MG: 300 CAPSULE ORAL at 10:28

## 2022-07-01 RX ADMIN — METOPROLOL SUCCINATE 25 MG: 25 TABLET, FILM COATED, EXTENDED RELEASE ORAL at 10:29

## 2022-07-01 RX ADMIN — METHIMAZOLE 5 MG: 5 TABLET ORAL at 12:36

## 2022-07-01 RX ADMIN — AMLODIPINE BESYLATE 5 MG: 5 TABLET ORAL at 10:29

## 2022-07-01 RX ADMIN — POLYETHYLENE GLYCOL 3350 17 G: 17 POWDER, FOR SOLUTION ORAL at 10:29

## 2022-07-01 RX ADMIN — ASPIRIN 81 MG: 81 TABLET, COATED ORAL at 10:28

## 2022-07-01 RX ADMIN — SODIUM BICARBONATE 650 MG: 650 TABLET ORAL at 10:29

## 2022-07-01 RX ADMIN — SODIUM CHLORIDE, PRESERVATIVE FREE 10 ML: 5 INJECTION INTRAVENOUS at 10:30

## 2022-07-01 ASSESSMENT — PAIN SCALES - GENERAL: PAINLEVEL_OUTOF10: 0

## 2022-07-01 NOTE — DISCHARGE SUMMARY
Medications      These medications were sent to [address], [address] [place] [address]  [id number] [place] [id number]    Hours: 24-hours Phone: [phone]   · sodium bicarbonate 650 MG tablet           Physical Exam:    Vitals:  Vitals:    07/01/22 0922 07/01/22 1015 07/01/22 1029 07/01/22 1249   BP:   121/66 132/66   Pulse:  85  82   Resp:  18  16   Temp:  98.6 °F (37 °C)  98.1 °F (36.7 °C)   TempSrc:  Oral  Oral   SpO2: 97% 97%  98%   Weight:       Height:         Weight: Weight: 148 lb 9.4 oz (67.4 kg)     24 hour intake/output:    Intake/Output Summary (Last 24 hours) at 7/1/2022 1745  Last data filed at 7/1/2022 1030  Gross per 24 hour   Intake 10 ml   Output --   Net 10 ml       General appearance - alert, well appearing, and in no distress  Chest - no use of accessory muscles, no crepitance  Heart - normal rate, regular rhythm   Abdomen - soft, nontender, nondistended  Obese: No; Protuberant: No   Neurological - alert, oriented, normal speech, no focal findings or movement disorder noted  Extremities - peripheral pulses normal, no pedal edema, no calf tenderness  Skin - normal coloration and turgor     Radiology reports as per the Radiologist  Radiology: XR CHEST (2 VW)    Result Date: 6/25/2022  EXAMINATION: TWO XRAY VIEWS OF THE CHEST 6/25/2022 11:13 pm COMPARISON: 02/20/2022 HISTORY: ORDERING SYSTEM PROVIDED HISTORY: sob TECHNOLOGIST PROVIDED HISTORY: Reason for exam:->sob Reason for Exam: Shortness of Breath; Fatigue FINDINGS: Near complete opacification of the left hemithorax with associated contralateral mediastinal shift. The right lung reveals no acute findings. The patient is status post median sternotomy. No evidence for pneumothorax. Tubing projects over the upper abdomen, which may be external to the patient.      Near complete opacification of the left hemithorax, which may be due to a combination of atelectasis and/or effusion. Recommend further evaluation with CT.     CT HEAD WO CONTRAST    Result Date: 6/25/2022  EXAMINATION: CT OF THE HEAD WITHOUT CONTRAST  6/25/2022 10:37 pm TECHNIQUE: CT of the head was performed without the administration of intravenous contrast. Automated exposure control, iterative reconstruction, and/or weight based adjustment of the mA/kV was utilized to reduce the radiation dose to as low as reasonably achievable. COMPARISON: October 18, 2011. HISTORY: ORDERING SYSTEM PROVIDED HISTORY: confusion TECHNOLOGIST PROVIDED HISTORY: Reason for exam:->confusion Has a \"code stroke\" or \"stroke alert\" been called? ->No Decision Support Exception - unselect if not a suspected or confirmed emergency medical condition->Emergency Medical Condition (MA) Reason for Exam: confusion FINDINGS: BRAIN/VENTRICLES: The gyri and sulci have a normal appearance. There is mild cortical and cerebellar volume loss with associated ex vacuo ventricular dilation. Mild diffuse periventricular and subcortical deep white matter hypoattenuation noted, findings compatible with chronic small vessel ischemic disease. The gray-white matter differentiation is otherwise preserved throughout. There is no acute hemorrhage, mass, or mass effect. No evidence of acute territorial infarct. No abnormal extraaxial fluid collections. ORBITS:  The visualized portion of the orbits demonstrate no acute abnormality. SINUSES:  The mastoid air cells are normally aerated. The visualized left maxillary sinuses nearly completely opacified with fluid, likely on a chronic basis. The paranasal sinuses otherwise clear. SOFT TISSUES/SKULL:  No significant abnormality of the calvarium, skull base, or soft tissues. There is cortical thickening the left maxillary sinus which is most likely chronic in nature. No acute fracture. No scalp hematoma. No evidence of acute intracranial process. Mild atrophy and chronic small vessel ischemic changes noted.      CT CHEST WO CONTRAST    Result Date: 6/26/2022  EXAMINATION: CT OF THE CHEST WITHOUT CONTRAST 6/26/2022 1:21 am TECHNIQUE: CT of the chest was performed without the administration of intravenous contrast. Multiplanar reformatted images are provided for review. Automated exposure control, iterative reconstruction, and/or weight based adjustment of the mA/kV was utilized to reduce the radiation dose to as low as reasonably achievable. COMPARISON: Chest radiograph 06/25/2022. Chest CT 02/23/2022. HISTORY: ORDERING SYSTEM PROVIDED HISTORY: SOB TECHNOLOGIST PROVIDED HISTORY: Reason for exam:->SOB Decision Support Exception - unselect if not a suspected or confirmed emergency medical condition->Emergency Medical Condition (MA) Reason for Exam: sob FINDINGS: Mediastinum: Few scattered mediastinal lymph nodes are noted, nonspecific. Status post CABG. No pericardial effusion. Lungs/pleura: Large left pleural effusion, partially loculated anteriorly and medially. Associated atelectasis in the left lower lobe is noted as well as compressive atelectasis in the lingula. No acute findings identified on the right. The central airway is patent. The segmental bronchi in the left lower lobe are not aerated nor are the peripheral lingular bronchi. Upper Abdomen: No acute findings. Lap band in place. Soft Tissues/Bones: No acute findings. Large left pleural effusion, partially loculated. Associated compressive atelectasis in the left lung is noted. A component of mucous plugging may also be present as the peripheral bronchi in the lingula and left lower lobe are not visualized. Follow-up with contrast-enhanced chest CT is recommended when effusion resolves 2 exclude underlying neoplastic process.      XR CHEST PORTABLE    Result Date: 6/26/2022  EXAMINATION: ONE XRAY VIEW OF THE CHEST 6/26/2022 11:14 am COMPARISON: Chest x-ray dated 06/25/2022 HISTORY: ORDERING SYSTEM PROVIDED HISTORY: CT placement TECHNOLOGIST PROVIDED HISTORY: Reason for exam:->CT placement FINDINGS: Median sternotomy wires are noted. Chest tube has been placed overlying mid and upper left lung. There is persistent opacification of the left hemithorax with mediastinal shift to the right. No evidence of pneumothorax. Right lung is clear. No free air beneath the diaphragm. Interval placement of a left chest tube with persistent opacification of the left hemithorax, compatible with large pleural effusion. Results for orders placed or performed during the hospital encounter of 06/25/22   Culture, Body Fluid    Specimen: Thoracentesis;  Body Fluid   Result Value Ref Range    Body Fluid Culture, Sterile No growth 60 to 72 hours     Gram Stain Result       1+ WBC's (Polymorphonuclear)  No Epithelial Cells seen  No organisms seen     CBC with Auto Differential   Result Value Ref Range    WBC 9.0 4.0 - 11.0 K/uL    RBC 3.16 (L) 4.00 - 5.20 M/uL    Hemoglobin 8.9 (L) 12.0 - 16.0 g/dL    Hematocrit 26.7 (L) 36.0 - 48.0 %    MCV 84.6 80.0 - 100.0 fL    MCH 28.0 26.0 - 34.0 pg    MCHC 33.1 31.0 - 36.0 g/dL    RDW 16.1 (H) 12.4 - 15.4 %    Platelets 179 902 - 484 K/uL    MPV 8.4 5.0 - 10.5 fL    Neutrophils % 82.8 %    Lymphocytes % 8.7 %    Monocytes % 7.2 %    Eosinophils % 0.5 %    Basophils % 0.8 %    Neutrophils Absolute 7.4 1.7 - 7.7 K/uL    Lymphocytes Absolute 0.8 (L) 1.0 - 5.1 K/uL    Monocytes Absolute 0.6 0.0 - 1.3 K/uL    Eosinophils Absolute 0.0 0.0 - 0.6 K/uL    Basophils Absolute 0.1 0.0 - 0.2 K/uL   Comprehensive Metabolic Panel w/ Reflex to MG   Result Value Ref Range    Sodium 142 136 - 145 mmol/L    Potassium reflex Magnesium 4.9 3.5 - 5.1 mmol/L    Chloride 108 99 - 110 mmol/L    CO2 19 (L) 21 - 32 mmol/L    Anion Gap 15 3 - 16    Glucose 116 (H) 70 - 99 mg/dL    BUN 22 (H) 7 - 20 mg/dL    CREATININE 2.2 (H) 0.6 - 1.2 mg/dL    GFR Non-African American 21 (A) >60    GFR  26 (A) >60    Calcium 8.7 8.3 - 10.6 mg/dL    Total Protein 7.8 6.4 - 8.2 g/dL    Albumin 3.3 (L) 3.4 - 5.0 g/dL    Albumin/Globulin Ratio 0.7 (L) 1.1 - 2.2    Total Bilirubin 0.4 0.0 - 1.0 mg/dL    Alkaline Phosphatase 79 40 - 129 U/L    ALT 17 10 - 40 U/L    AST 26 15 - 37 U/L   Troponin   Result Value Ref Range    Troponin <0.01 <0.01 ng/mL   Lipase   Result Value Ref Range    Lipase 6.0 (L) 13.0 - 60.0 U/L   Brain Natriuretic Peptide   Result Value Ref Range    Pro-BNP 4,835 (H) 0 - 449 pg/mL   Basic Metabolic Panel w/ Reflex to MG   Result Value Ref Range    Sodium 139 136 - 145 mmol/L    Potassium reflex Magnesium 4.9 3.5 - 5.1 mmol/L    Chloride 108 99 - 110 mmol/L    CO2 21 21 - 32 mmol/L    Anion Gap 10 3 - 16    Glucose 130 (H) 70 - 99 mg/dL    BUN 23 (H) 7 - 20 mg/dL    CREATININE 2.3 (H) 0.6 - 1.2 mg/dL    GFR Non-African American 20 (A) >60    GFR  25 (A) >60    Calcium 8.2 (L) 8.3 - 10.6 mg/dL   CBC auto differential   Result Value Ref Range    WBC 8.5 4.0 - 11.0 K/uL    RBC 2.79 (L) 4.00 - 5.20 M/uL    Hemoglobin 7.9 (L) 12.0 - 16.0 g/dL    Hematocrit 23.8 (L) 36.0 - 48.0 %    MCV 85.2 80.0 - 100.0 fL    MCH 28.2 26.0 - 34.0 pg    MCHC 33.0 31.0 - 36.0 g/dL    RDW 15.9 (H) 12.4 - 15.4 %    Platelets 991 477 - 725 K/uL    MPV 8.0 5.0 - 10.5 fL    Neutrophils % 74.6 %    Lymphocytes % 14.0 %    Monocytes % 9.9 %    Eosinophils % 0.6 %    Basophils % 0.9 %    Neutrophils Absolute 6.3 1.7 - 7.7 K/uL    Lymphocytes Absolute 1.2 1.0 - 5.1 K/uL    Monocytes Absolute 0.8 0.0 - 1.3 K/uL    Eosinophils Absolute 0.0 0.0 - 0.6 K/uL    Basophils Absolute 0.1 0.0 - 0.2 K/uL   Procalcitonin   Result Value Ref Range    Procalcitonin 0.06 0.00 - 0.15 ng/mL   Cell Count with Differential, Body Fluid   Result Value Ref Range    Cell Count Fluid Type Thoracentesis     Color, Fluid Pale Yellow     Appearance, Fluid Clear     Clot Eval. see below     Nucl Cell, Fluid 123 /cumm    RBC, Fluid 0 /cumm    Neutrophil Count, Fluid 64 %    Lymphocytes, Body Fluid 16 %    Monocyte Count, Fluid 14 %    Eos, Fluid 2 %    Macrophages 4 %    Number of Cells Counted Fluid 44     Volume 100.0 mL   Protein, Total   Result Value Ref Range    Total Protein 8.3 (H) 6.4 - 8.2 g/dL   Lactate Dehydrogenase   Result Value Ref Range     (H) 100 - 190 U/L   Lactate Dehydrogenase, Body Fluid   Result Value Ref Range    LD, Fluid 101 Not Established U/L    Fluid Type Thoracentesis    Protein, Body Fluid   Result Value Ref Range    Protein, Fluid 5.2 Not Established g/dL   Basic Metabolic Panel w/ Reflex to MG   Result Value Ref Range    Sodium 139 136 - 145 mmol/L    Potassium reflex Magnesium 4.2 3.5 - 5.1 mmol/L    Chloride 106 99 - 110 mmol/L    CO2 19 (L) 21 - 32 mmol/L    Anion Gap 14 3 - 16    Glucose 101 (H) 70 - 99 mg/dL    BUN 20 7 - 20 mg/dL    CREATININE 2.3 (H) 0.6 - 1.2 mg/dL    GFR Non-African American 20 (A) >60    GFR  25 (A) >60    Calcium 8.5 8.3 - 10.6 mg/dL   CBC auto differential   Result Value Ref Range    WBC 8.0 4.0 - 11.0 K/uL    RBC 3.36 (L) 4.00 - 5.20 M/uL    Hemoglobin 9.3 (L) 12.0 - 16.0 g/dL    Hematocrit 28.0 (L) 36.0 - 48.0 %    MCV 83.3 80.0 - 100.0 fL    MCH 27.6 26.0 - 34.0 pg    MCHC 33.1 31.0 - 36.0 g/dL    RDW 15.9 (H) 12.4 - 15.4 %    Platelets 166 657 - 357 K/uL    MPV 8.2 5.0 - 10.5 fL    Neutrophils % 75.7 %    Lymphocytes % 12.5 %    Monocytes % 9.3 %    Eosinophils % 1.3 %    Basophils % 1.2 %    Neutrophils Absolute 6.1 1.7 - 7.7 K/uL    Lymphocytes Absolute 1.0 1.0 - 5.1 K/uL    Monocytes Absolute 0.7 0.0 - 1.3 K/uL    Eosinophils Absolute 0.1 0.0 - 0.6 K/uL    Basophils Absolute 0.1 0.0 - 0.2 K/uL   Vancomycin Level, Random   Result Value Ref Range    Vancomycin Rm 12.6 ug/mL   Basic Metabolic Panel w/ Reflex to MG   Result Value Ref Range    Sodium 138 136 - 145 mmol/L    Potassium reflex Magnesium 4.2 3.5 - 5.1 mmol/L    Chloride 104 99 - 110 mmol/L    CO2 22 21 - 32 mmol/L    Anion Gap 12 3 - 16    Glucose 90 70 - 99 mg/dL    BUN 20 7 - 20 mg/dL    CREATININE 2.2 (H) 0.6 - 1.2 mg/dL    GFR Non-African American 21 (A) >60    GFR  26 (A) >60    Calcium 8.3 8.3 - 10.6 mg/dL   CBC auto differential   Result Value Ref Range    WBC 7.8 4.0 - 11.0 K/uL    RBC 3.12 (L) 4.00 - 5.20 M/uL    Hemoglobin 8.7 (L) 12.0 - 16.0 g/dL    Hematocrit 25.8 (L) 36.0 - 48.0 %    MCV 82.6 80.0 - 100.0 fL    MCH 27.7 26.0 - 34.0 pg    MCHC 33.6 31.0 - 36.0 g/dL    RDW 15.7 (H) 12.4 - 15.4 %    Platelets 879 028 - 195 K/uL    MPV 8.4 5.0 - 10.5 fL    Neutrophils % 63.9 %    Lymphocytes % 20.4 %    Monocytes % 12.3 %    Eosinophils % 2.3 %    Basophils % 1.1 %    Neutrophils Absolute 5.0 1.7 - 7.7 K/uL    Lymphocytes Absolute 1.6 1.0 - 5.1 K/uL    Monocytes Absolute 1.0 0.0 - 1.3 K/uL    Eosinophils Absolute 0.2 0.0 - 0.6 K/uL    Basophils Absolute 0.1 0.0 - 0.2 K/uL   Vancomycin Level, Random   Result Value Ref Range    Vancomycin Rm 22.8 (HH) ug/mL   Ferritin   Result Value Ref Range    Ferritin 341.8 (H) 15.0 - 150.0 ng/mL   Iron and TIBC   Result Value Ref Range    Iron 19 (L) 37 - 145 ug/dL    TIBC 130 (L) 260 - 445 ug/dL    Iron Saturation 15 15 - 50 %   Basic Metabolic Panel w/ Reflex to MG   Result Value Ref Range    Sodium 139 136 - 145 mmol/L    Potassium reflex Magnesium 4.1 3.5 - 5.1 mmol/L    Chloride 105 99 - 110 mmol/L    CO2 23 21 - 32 mmol/L    Anion Gap 11 3 - 16    Glucose 96 70 - 99 mg/dL    BUN 19 7 - 20 mg/dL    CREATININE 1.9 (H) 0.6 - 1.2 mg/dL    GFR Non-African American 25 (A) >60    GFR  31 (A) >60    Calcium 8.3 8.3 - 10.6 mg/dL   CBC auto differential   Result Value Ref Range    WBC 7.0 4.0 - 11.0 K/uL    RBC 3.03 (L) 4.00 - 5.20 M/uL    Hemoglobin 8.4 (L) 12.0 - 16.0 g/dL    Hematocrit 24.9 (L) 36.0 - 48.0 %    MCV 82.2 80.0 - 100.0 fL    MCH 27.7 26.0 - 34.0 pg    MCHC 33.6 31.0 - 36.0 g/dL    RDW 15.9 (H) 12.4 - 15.4 %    Platelets 321 376 - 984 K/uL    MPV 8.2 5.0 - 10.5 fL    Neutrophils % 65.2 %    Lymphocytes % 19.1 %    Monocytes % 12.5 %    Eosinophils % 2.0 %    Basophils % 1.2 %    Neutrophils Absolute 4.6 1.7 - 7.7 K/uL    Lymphocytes Absolute 1.3 1.0 - 5.1 K/uL    Monocytes Absolute 0.9 0.0 - 1.3 K/uL    Eosinophils Absolute 0.1 0.0 - 0.6 K/uL    Basophils Absolute 0.1 0.0 - 0.2 K/uL   Vancomycin Level, Random   Result Value Ref Range    Vancomycin Rm 16.4 ug/mL   Protime-INR   Result Value Ref Range    Protime 16.0 (H) 11.7 - 14.5 sec    INR 1.28 (H) 0.87 - 6.41   Basic Metabolic Panel w/ Reflex to MG   Result Value Ref Range    Sodium 138 136 - 145 mmol/L    Potassium reflex Magnesium 4.3 3.5 - 5.1 mmol/L    Chloride 102 99 - 110 mmol/L    CO2 21 21 - 32 mmol/L    Anion Gap 15 3 - 16    Glucose 100 (H) 70 - 99 mg/dL    BUN 17 7 - 20 mg/dL    CREATININE 1.8 (H) 0.6 - 1.2 mg/dL    GFR Non-African American 27 (A) >60    GFR  33 (A) >60    Calcium 8.8 8.3 - 10.6 mg/dL   CBC auto differential   Result Value Ref Range    WBC 7.7 4.0 - 11.0 K/uL    RBC 3.47 (L) 4.00 - 5.20 M/uL    Hemoglobin 9.5 (L) 12.0 - 16.0 g/dL    Hematocrit 29.2 (L) 36.0 - 48.0 %    MCV 84.3 80.0 - 100.0 fL    MCH 27.3 26.0 - 34.0 pg    MCHC 32.4 31.0 - 36.0 g/dL    RDW 16.0 (H) 12.4 - 15.4 %    Platelets 234 249 - 885 K/uL    MPV 8.1 5.0 - 10.5 fL    Neutrophils % 73.3 %    Lymphocytes % 12.5 %    Monocytes % 11.6 %    Eosinophils % 1.8 %    Basophils % 0.8 %    Neutrophils Absolute 5.6 1.7 - 7.7 K/uL    Lymphocytes Absolute 1.0 1.0 - 5.1 K/uL    Monocytes Absolute 0.9 0.0 - 1.3 K/uL    Eosinophils Absolute 0.1 0.0 - 0.6 K/uL    Basophils Absolute 0.1 0.0 - 0.2 K/uL   Vancomycin Level, Random   Result Value Ref Range    Vancomycin Rm 22.1 (HH) ug/mL   TSH with Reflex to FT4   Result Value Ref Range    TSH Reflex FT4 4.87 (H) 0.27 - 4.20 uIU/mL   Vitamin B12 & Folate   Result Value Ref Range    Vitamin B-12 276 211 - 911 pg/mL    Folate 4.55 (L) 4.78 - 24.20 ng/mL   Basic Metabolic Panel w/ Reflex to MG Result Value Ref Range    Sodium 142 136 - 145 mmol/L    Potassium reflex Magnesium 4.2 3.5 - 5.1 mmol/L    Chloride 106 99 - 110 mmol/L    CO2 25 21 - 32 mmol/L    Anion Gap 11 3 - 16    Glucose 94 70 - 99 mg/dL    BUN 17 7 - 20 mg/dL    CREATININE 1.9 (H) 0.6 - 1.2 mg/dL    GFR Non-African American 25 (A) >60    GFR  31 (A) >60    Calcium 8.6 8.3 - 10.6 mg/dL   CBC auto differential   Result Value Ref Range    WBC 7.0 4.0 - 11.0 K/uL    RBC 3.10 (L) 4.00 - 5.20 M/uL    Hemoglobin 8.6 (L) 12.0 - 16.0 g/dL    Hematocrit 25.8 (L) 36.0 - 48.0 %    MCV 83.2 80.0 - 100.0 fL    MCH 27.8 26.0 - 34.0 pg    MCHC 33.4 31.0 - 36.0 g/dL    RDW 15.7 (H) 12.4 - 15.4 %    Platelets 623 459 - 367 K/uL    MPV 8.3 5.0 - 10.5 fL    Neutrophils % 62.5 %    Lymphocytes % 21.6 %    Monocytes % 11.7 %    Eosinophils % 3.2 %    Basophils % 1.0 %    Neutrophils Absolute 4.4 1.7 - 7.7 K/uL    Lymphocytes Absolute 1.5 1.0 - 5.1 K/uL    Monocytes Absolute 0.8 0.0 - 1.3 K/uL    Eosinophils Absolute 0.2 0.0 - 0.6 K/uL    Basophils Absolute 0.1 0.0 - 0.2 K/uL   T4, Free   Result Value Ref Range    T4 Free 1.6 0.9 - 1.8 ng/dL   Procalcitonin   Result Value Ref Range    Procalcitonin 0.15 0.00 - 0.15 ng/mL   EKG 12 Lead   Result Value Ref Range    Ventricular Rate 90 BPM    Atrial Rate 90 BPM    P-R Interval 154 ms    QRS Duration 60 ms    Q-T Interval 336 ms    QTc Calculation (Bazett) 411 ms    P Axis 52 degrees    R Axis 12 degrees    T Axis 19 degrees    Diagnosis       Normal sinus rhythmLow voltage QRS in precordial leadsPoor R wave progressionNonspecific ST and T wave abnormalityAbnormal ECGWhen compared with ECG of 28-FEB-2022 08:13,Questionable change in initial forces of Anterior leadsConfirmed by LIYA RCAVEN MD ([id number]) on 6/26/2022 11:10:22 AM       Diet:  ADULT DIET;  Regular  ADULT ORAL NUTRITION SUPPLEMENT; Lunch, Dinner; Standard High Calorie/High Protein Oral Supplement    Activity:  Activity as tolerated (Patient may move about with assist as indicated or with supervision.)    Follow-up:  in the next few days with Norma Marilu [address] [place] [address]    Disposition: home    Condition: Stable      Time Spent: 35 minutes    Electronically signed by Bennett Bertrand MD on 7/1/2022 at 5:45 PM    Discharging Hospitalist

## 2022-07-01 NOTE — PROGRESS NOTES
Pulmonary Progress Note    Date of Admission: 6/25/2022   LOS: 5 days     Chief Complaint   Patient presents with    Shortness of Breath     getting worse over the last 7 days, feb 25th had bypass sx     Fatigue     getting worse over the last 7 days, pt had blood work in the last two weeks and was told her blood count was low and to go to the ER if it gets worse       Assessment/Plan:     Pleural effusion, left  -Exudate  -Cultures and cytology unremarkable  -Repeat chest x-ray this morning stable    CAD status post CABG      Pulmonary standpoint okay for discharge. We will follow-up as below. Future Appointments   Date Time Provider Jacquelyn Janki   7/26/2022  1:00 PM Pascual Hollis MD [address]   7/28/2022  1:20 PM Young Cordero MD [facility] PULM [facility]       No further inpatient recommendations, we will sign off at this time. Please let us know if we can be of any further assistance.      24 Hour Events/Subjective  Patient remained stable. Chest x-ray slightly improved. No complaints    ROS:   No nausea  No Vomiting  No chest pain      Intake/Output Summary (Last 24 hours) at 7/1/2022 0827  Last data filed at 6/30/2022 1110  Gross per 24 hour   Intake 10 ml   Output --   Net 10 ml         PHYSICAL EXAM:   Blood pressure 106/60, pulse 75, temperature 97.5 °F (36.4 °C), temperature source Oral, resp. rate 18, height 5' 3\" (1.6 m), weight 148 lb 9.4 oz (67.4 kg), SpO2 97 %, not currently breastfeeding.'  Gen:  No acute distress. Eyes: PERRL. Anicteric sclera. No conjunctival injection. ENT: No discharge. Posterior oropharynx clear. External appearance of ears and nose normal.  Neck: Trachea midline. No mass   Resp:  No crackles. No wheezes. No rhonchi.  + Left-sided dullness on percussion. CV: Regular rate. Regular rhythm. No murmur or rub. No edema. GI: Soft, Non-tender. Non-distended. +BS  Skin: Warm, dry, w/o erythema. Lymph: No cervical or supraclavicular LAD. M/S: No cyanosis.  No clubbing. Neuro:  no focal neurologic deficit. Moves all extremities  Psych: Awake and alert, Oriented x 3. Judgement and insight appropriate. Mood stable. Medications:    Scheduled Meds:   epoetin xander-epbx  10,000 Units SubCUTAneous Q7 Days    sodium chloride flush  5-40 mL IntraVENous 2 times per day    sodium bicarbonate  650 mg Oral BID    metoprolol succinate  25 mg Oral Daily    atorvastatin  80 mg Oral Nightly    aspirin EC  81 mg Oral Daily    amLODIPine  5 mg Oral Daily    sodium chloride flush  10 mL IntraVENous 2 times per day    gabapentin  300 mg Oral BID    fluticasone-umeclidin-vilant  1 puff Inhalation Daily    methIMAzole  5 mg Oral Daily    polyethylene glycol  17 g Oral Daily       Continuous Infusions:   sodium chloride      sodium chloride Stopped (06/29/22 0417)       PRN Meds:  sodium chloride flush, sodium chloride, sodium chloride flush, sodium chloride, ondansetron, acetaminophen **OR** acetaminophen    Labs reviewed:  CBC:   Recent Labs     06/29/22  0502 06/30/22  0550 07/01/22  0608   WBC 7.0 7.7 7.0   HGB 8.4* 9.5* 8.6*   HCT 24.9* 29.2* 25.8*   MCV 82.2 84.3 83.2    233 233     BMP:   Recent Labs     06/29/22  0502 06/30/22  0550 07/01/22  0608    138 142   K 4.1 4.3 4.2    102 106   CO2 23 21 25   BUN 19 17 17   CREATININE 1.9* 1.8* 1.9*     LIVER PROFILE:   No results for input(s): AST, ALT, LIPASE, BILIDIR, BILITOT, ALKPHOS in the last 72 hours. Invalid input(s): AMYLASE,  ALB  PT/INR:   Recent Labs     06/29/22  1244   PROTIME 16.0*   INR 1.28*     APTT: No results for input(s): APTT in the last 72 hours. UA:No results for input(s): NITRITE, COLORU, PHUR, LABCAST, WBCUA, RBCUA, MUCUS, TRICHOMONAS, YEAST, BACTERIA, CLARITYU, SPECGRAV, LEUKOCYTESUR, UROBILINOGEN, BILIRUBINUR, BLOODU, GLUCOSEU, AMORPHOUS in the last 72 hours.     Invalid input(s): Lyly Brumfield  No results for input(s): PH, PCO2, PO2 in the last 72 hours.      Films:  Radiology Review:  Pertinent images / reports were reviewed as a part of this visit. XR CHEST PORTABLE  Narrative: EXAMINATION:  ONE XRAY VIEW OF THE CHEST    7/1/2022 4:27 am    COMPARISON:  06/30/2022 and 06/29/2022    HISTORY:  ORDERING SYSTEM PROVIDED HISTORY: chest tube  TECHNOLOGIST PROVIDED HISTORY:  Reason for exam:->chest tube  Reason for Exam: chest tube    FINDINGS:  Sternotomy wires, clips, and left atrial appendage clip are redemonstrated. Opacities in the left lower lung are still present with pleural fluid  thickening the left lateral pleural line. Left hemidiaphragm is partially  obscured. The right lung is acceptable. The cardiac silhouette is stable. No acute fractures are identified. Impression: Residual left basilar opacity with pleural fluid thickening left lower  pleural line. The pneumothorax component of the hydropneumothorax appears  decreased. The right lung is acceptable. This note was transcribed using [id number]. Please disregard any translational errors.     Thank you for this consult,    Piotr Sanchez MD  [facility] Pulmonary, Critical Care, and Sleep Medicine

## 2022-07-01 NOTE — PROGRESS NOTES
medical history of Arthritis, Asthma, CAD (coronary artery disease), CKD (chronic kidney disease), Glaucoma, Hyperlipidemia, Hypertension, Hyperthyroidism, IBS (irritable bowel syndrome), Neuropathy, Obesity (BMI 30-39.9), Osteopenia, and Osteoporosis. Past Surgical History:  has a past surgical history that includes Cholecystectomy; Foot surgery; Tubal ligation; Gastric Band ([id number]); orif femur decompression (Left, 04/2014); Ankle fracture surgery (Left, 02/12/2015); Hand surgery (2009); other surgical history (Right, 2010); Coronary artery bypass graft (02/25/2022); Coronary artery bypass graft (N/A, 2/25/2022); IR TUNNELED CVC PLACE WO SQ PORT/PUMP > 5 YEARS (3/7/2022); and Diagnostic Cardiac Cath Lab Procedure (02/2022). Assessment   Body Structures, Functions, Activity Limitations Requiring Skilled Therapeutic Intervention: Decreased functional mobility ; Decreased balance;Decreased endurance;Decreased cognition;Decreased safe awareness;Decreased strength  Assessment: Pt presents today with increased confusion and emotional liability limiting the PT treatment session. Pt able to perform ambulation tasks with no physical assistance and no LOB. Pt unable to increase ambulation distance today due to cognition. Recommend that the pt D/C home with her daughter providing 24 hour supervision/assistance and HHPT. Pt will continue to benefit from skilled PT to facilitate return to PLOF and to promote independence. Therapy Prognosis: Good  Barriers to Learning: cog, vision  Requires PT Follow-Up: Yes  Activity Tolerance  Activity Tolerance: Treatment limited secondary to decreased cognition  Activity Tolerance Comments: Pt demonstrating increased confusion and emotional liability this date limiting the PT treatment session.      Plan   Plan  Plan: 3-5 times per week  Current Treatment Recommendations: Functional mobility training,Strengthening,ROM,Gait training,Transfer training,Stair training,Safety education & training,Patient/Caregiver education & training,Balance training,Endurance training,Home exercise program,Therapeutic activities,Cognitive reorientation  Safety Devices  Type of Devices: Call light within reach,Chair alarm in place,Left in chair,Gait belt,All fall risk precautions in place  Restraints  Restraints Initially in Place: No     Restrictions  Restrictions/Precautions  Restrictions/Precautions: Up as Tolerated,Fall Risk (High fall risk)  Required Braces or Orthoses?: No  Position Activity Restriction  Other position/activity restrictions: tele-sitter     Subjective   General  Chart Reviewed: Yes  Patient assessed for rehabilitation services?: Yes  Additional Pertinent Hx: Per Sal Bowers MD consult note from 6-: The pt is an [de-identified] yo female who presented to the ED with SOB and lethargy. The pt is s/p a CABG on 2-25. Per chest imaging: showed a massive L sided pleural effusion w/almost complete collapse of the L lung. A chest tube was placed on 6-. PMHx: arth, asthma, CAD, CKD, glaucoma, HTN, Graves disease, peripheral neuropathy, osteoporosis, L ankle fx, CABG, L femur ORIF  Response To Previous Treatment: Patient with no complaints from previous session. Family / Caregiver Present: Yes Midge Seats)  Referring Practitioner: Sal Bowers MD  Referral Date : 06/28/22  Diagnosis: Pleural effusion, left; Generalized weakness/fatigue  Follows Commands: Impaired (Follows one step commands with increased time and repetition of instruction)  General Comment  Comments: Pt denies pain. Subjective  Subjective: Pt seated in recliner on arrival, agreeable to participate in PT treatment session.      Social/Functional History  Social/Functional History  Lives With: Family (Dtr; ZOHRA)  Type of Home: Apartment (basement level apartment : 6 steps with R handrail)  Home Layout: One level  Home Access: Stairs to enter without rails  Entrance Stairs - Number of Steps: 1 BRITTANY to enter building with no handrail, then 6 BRITTANY to basement apartment with R handrail  Bathroom Shower/Tub: Tub/Shower unit,Shower chair with back  Bathroom Toilet: Standard (vanity nearby)  Bathroom Equipment: Shower chair,Hand-held shower,Grab bars in shower  Bathroom Accessibility: Walker accessible  Home Equipment: Walker, rolling (access to w/c)  ADL Assistance: Needs assistance (daughter assists with dressing, showering, and toileting. Pt independent grooming and feeding)  Homemaking Assistance: Needs assistance (daughter manages all homemaking)  Ambulation Assistance: Needs assistance (no AD and SBA of daughter)  Transfer Assistance: Independent  Active : No  Mode of Transportation: Family  Occupation: Retired  Type of Occupation: Retired : worked [employer]. Additional Comments: daughter reports no recent falls    Cognition   Orientation  Overall Orientation Status: Impaired  Orientation Level: Oriented to person (Requires frequent reorientation this date)  Cognition  Overall Cognitive Status: Exceptions  Arousal/Alertness: Appropriate responses to stimuli  Following Commands: Follows one step commands with increased time; Follows one step commands with repetition  Attention Span: Attends with cues to redirect  Memory: Decreased recall of recent events;Decreased short term memory  Safety Judgement: Decreased awareness of need for assistance;Decreased awareness of need for safety  Problem Solving: Decreased awareness of errors;Assistance required to implement solutions;Assistance required to generate solutions  Insights: Not aware of deficits  Initiation: Requires cues for all  Sequencing: Requires cues for all     Objective   Bed mobility  Bed Mobility Comments: Not completed this date as the pt was seated in the recliner on arrival and at the end of the treatment session  Transfers  Sit to Stand: Contact guard assistance  Stand to sit: Contact guard assistance  Bed to Chair: Contact guard assistance  Comment: Use of RW for external

## 2022-07-01 NOTE — PROGRESS NOTES
adenopathy. Respiratory: shortness of breath, cough, or wheezing  Cardiovascular: chest pain, palpitations, dizzy, edema  Gastrointestinal: nausea, vomiting, diarrhea, constipation,belly pain    Yellow skin, blood in stool  Musculoskeletal:  back pain, muscle weakness, gait problems,       joint pain or swelling. Genitourinary:  dysuria, poor urine flow, flank pain, blood in urine  Neurologic:  vertigo, TIA'S, syncope, seizures, focal weakness  Psychosocial:  insomnia, anxiety, or depression. Additional positive findings: -     PMH:   Past medical history, surgical history, social history, family history are reviewed and updated as appropriate. Reviewed current medication list.   Allergies reviewed and updated as needed. PE:   Vitals:    07/01/22 1249   BP: 132/66   Pulse: 82   Resp: 16   Temp: 98.1 °F (36.7 °C)   SpO2: 98%       General appearance:  in NAD, awake, feels comfortable but not oriented  HEENT: EOM intact, no icterus. Trachea is midline. Neck : No masses, appears symmetrical, no JVD  Respiratory: Respiratory effort appears normal, bilateral equal chest rise, no wheeze, diminished breath sounds at the left  Cardiovascular: Ausculation shows RRR no edema  Abdomen: No visible mass or tenderness, non distended. Musculoskeletal:  Joints with no swelling or deformity. Skin:no rashes, ulcers, induration, no jaundice.    Neuro: face symmetric, slow responses, confused not oriented      Lab Results   Component Value Date    CREATININE 1.9 (H) 07/01/2022    BUN 17 07/01/2022     07/01/2022    K 4.2 07/01/2022     07/01/2022    CO2 25 07/01/2022      Lab Results   Component Value Date    WBC 7.0 07/01/2022    HGB 8.6 (L) 07/01/2022    HCT 25.8 (L) 07/01/2022    MCV 83.2 07/01/2022     07/01/2022     Lab Results   Component Value Date    CALCIUM 8.6 07/01/2022    CAION 0.99 (L) 03/05/2022    PHOS 1.4 (L) 03/05/2022

## 2022-07-01 NOTE — PLAN OF CARE
Problem: Discharge Planning  Goal: Discharge to home or other facility with appropriate resources  Outcome: Progressing     Problem: Safety - Adult  Goal: Free from fall injury  Outcome: Progressing  Flowsheets (Taken 7/1/2022 1533)  Free From Fall Injury: Instruct family/caregiver on patient safety     Problem: Neurosensory - Adult  Goal: Achieves stable or improved neurological status  Outcome: Progressing  Goal: Achieves maximal functionality and self care  Outcome: Progressing     Problem: Respiratory - Adult  Goal: Achieves optimal ventilation and oxygenation  Outcome: Progressing     Problem: Musculoskeletal - Adult  Goal: Return mobility to safest level of function  Outcome: Progressing  Goal: Return ADL status to a safe level of function  Outcome: Progressing     Problem: Nutrition Deficit:  Goal: Optimize nutritional status  Outcome: Progressing     Problem: Skin/Tissue Integrity  Goal: Absence of new skin breakdown  Description: 1. Monitor for areas of redness and/or skin breakdown  2. Assess vascular access sites hourly  3. Every 4-6 hours minimum:  Change oxygen saturation probe site  4. Every 4-6 hours:  If on nasal continuous positive airway pressure, respiratory therapy assess nares and determine need for appliance change or resting period.   Outcome: Progressing     Problem: Pain  Goal: Verbalizes/displays adequate comfort level or baseline comfort level  Outcome: Progressing     Problem: ABCDS Injury Assessment  Goal: Absence of physical injury  Outcome: Progressing  Flowsheets (Taken 7/1/2022 1533)  Absence of Physical Injury: Implement safety measures based on patient assessment

## 2022-07-01 NOTE — CARE COORDINATION
DISCHARGE SUMMARY     DATE OF DISCHARGE: 7/1/22    DISCHARGE DESTINATION: Home with family    HOME CARE: Yes  Discharging to Facility/ Agency   · Name: [facility]  · Address:   [place] [address] [address] [place]  · Phone:  [phone]  · Fax:  [phone]    HEMODIALYSIS: No    TRANSPORTATION: Private Car    NEW DME ORDERED: no  Electronically signed by Ghazala Bryant RN on 7/1/2022 at 3:15 PM

## 2022-07-01 NOTE — PROGRESS NOTES
Physician Progress Note      Ronel Shannon  CSN #:                  [id number]  :                       1940  ADMIT DATE:       2022 10:55 PM  [address] [place] DATE:  RESPONDING  PROVIDER #:        Daniel Mackey MD          QUERY TEXT:    Patient admitted with Pleural effusion. If possible, please document in   progress notes and d/c summary further specificity regarding the   type/underlying cause of pleural effusion: The medical record reflects the following:  Risk Factors: CABG 22  , CKD 3 HTN   SIKNNY  Clinical Indicators: per Pulmonology consult \"Pleural effusion, left -Very   large. Would be atypical for postcardiac injury/CABG but no other clear   etiologies. \"  per H&P \"Loculated pleural effusion on the left (Large) -- Will   give Lasix x 1 dose and reevaluate\"  Pro bnp ?22  4,835 and procalcitonin   0.06  Treatment: Chest tube placement, thoracentesis, IV Lasix x 1 , Serial CXR,   Pulmonology consult  Options provided:  -- Empyema/Bacterial pleural effusion due to organism, if known, please   specify organism, if known. -- Pleural effusion due to CHF  -- Hydropneumothorax  -- Pleural effusion?is a postoperative complication  -- Other - I will add my own diagnosis  -- Disagree - Not applicable / Not valid  -- Disagree - Clinically unable to determine / Unknown  -- Refer to Clinical Documentation Reviewer    PROVIDER RESPONSE TEXT:    Patient has a hydropneumothorax.     Query created by: Ti Hopkins on 2022 10:46 AM      Electronically signed by:  Daniel Mackey MD 2022 2:31 PM

## 2022-07-14 ENCOUNTER — HOSPITAL ENCOUNTER (INPATIENT)
Age: 82
LOS: 8 days | Discharge: HOME | DRG: 177 | End: 2022-07-22
Attending: EMERGENCY MEDICINE | Admitting: STUDENT IN AN ORGANIZED HEALTH CARE EDUCATION/TRAINING PROGRAM
Payer: MEDICARE

## 2022-07-14 ENCOUNTER — APPOINTMENT (OUTPATIENT)
Dept: CT IMAGING | Age: 82
DRG: 177 | End: 2022-07-14
Payer: MEDICARE

## 2022-07-14 ENCOUNTER — APPOINTMENT (OUTPATIENT)
Dept: GENERAL RADIOLOGY | Age: 82
DRG: 177 | End: 2022-07-14
Payer: MEDICARE

## 2022-07-14 DIAGNOSIS — N18.9 CHRONIC KIDNEY DISEASE, UNSPECIFIED CKD STAGE: ICD-10-CM

## 2022-07-14 DIAGNOSIS — D64.9 ANEMIA, UNSPECIFIED TYPE: ICD-10-CM

## 2022-07-14 DIAGNOSIS — Z71.89 GOALS OF CARE, COUNSELING/DISCUSSION: ICD-10-CM

## 2022-07-14 DIAGNOSIS — E88.09 HYPOALBUMINEMIA: ICD-10-CM

## 2022-07-14 DIAGNOSIS — R10.12 LEFT UPPER QUADRANT ABDOMINAL PAIN: ICD-10-CM

## 2022-07-14 DIAGNOSIS — J90 RECURRENT LEFT PLEURAL EFFUSION: Primary | ICD-10-CM

## 2022-07-14 LAB
A/G RATIO: 0.6 (ref 1.1–2.2)
ALBUMIN SERPL-MCNC: 3 G/DL (ref 3.4–5)
ALP BLD-CCNC: 75 U/L (ref 40–129)
ALT SERPL-CCNC: 11 U/L (ref 10–40)
ANION GAP SERPL CALCULATED.3IONS-SCNC: 13 MMOL/L (ref 3–16)
AST SERPL-CCNC: 19 U/L (ref 15–37)
BASOPHILS ABSOLUTE: 0.1 K/UL (ref 0–0.2)
BASOPHILS RELATIVE PERCENT: 1 %
BILIRUB SERPL-MCNC: 0.5 MG/DL (ref 0–1)
BUN BLDV-MCNC: 17 MG/DL (ref 7–20)
CALCIUM SERPL-MCNC: 8.6 MG/DL (ref 8.3–10.6)
CHLORIDE BLD-SCNC: 104 MMOL/L (ref 99–110)
CO2: 24 MMOL/L (ref 21–32)
CREAT SERPL-MCNC: 1.9 MG/DL (ref 0.6–1.2)
EOSINOPHILS ABSOLUTE: 0.2 K/UL (ref 0–0.6)
EOSINOPHILS RELATIVE PERCENT: 1.7 %
GFR AFRICAN AMERICAN: 31
GFR NON-AFRICAN AMERICAN: 25
GLUCOSE BLD-MCNC: 99 MG/DL (ref 70–99)
HCT VFR BLD CALC: 25.5 % (ref 36–48)
HEMOGLOBIN: 8.3 G/DL (ref 12–16)
LACTIC ACID, SEPSIS: 1 MMOL/L (ref 0.4–1.9)
LACTIC ACID, SEPSIS: 1.1 MMOL/L (ref 0.4–1.9)
LIPASE: 8 U/L (ref 13–60)
LYMPHOCYTES ABSOLUTE: 1.7 K/UL (ref 1–5.1)
LYMPHOCYTES RELATIVE PERCENT: 17.8 %
MCH RBC QN AUTO: 26.9 PG (ref 26–34)
MCHC RBC AUTO-ENTMCNC: 32.5 G/DL (ref 31–36)
MCV RBC AUTO: 83 FL (ref 80–100)
MONOCYTES ABSOLUTE: 0.9 K/UL (ref 0–1.3)
MONOCYTES RELATIVE PERCENT: 9.7 %
NEUTROPHILS ABSOLUTE: 6.8 K/UL (ref 1.7–7.7)
NEUTROPHILS RELATIVE PERCENT: 69.8 %
PDW BLD-RTO: 16.6 % (ref 12.4–15.4)
PLATELET # BLD: 210 K/UL (ref 135–450)
PMV BLD AUTO: 8.7 FL (ref 5–10.5)
POTASSIUM REFLEX MAGNESIUM: 4.5 MMOL/L (ref 3.5–5.1)
RBC # BLD: 3.08 M/UL (ref 4–5.2)
SARS-COV-2, NAAT: NOT DETECTED
SODIUM BLD-SCNC: 141 MMOL/L (ref 136–145)
TOTAL PROTEIN: 7.8 G/DL (ref 6.4–8.2)
TROPONIN: <0.01 NG/ML
WBC # BLD: 9.7 K/UL (ref 4–11)

## 2022-07-14 PROCEDURE — 74176 CT ABD & PELVIS W/O CONTRAST: CPT

## 2022-07-14 PROCEDURE — 93005 ELECTROCARDIOGRAM TRACING: CPT | Performed by: EMERGENCY MEDICINE

## 2022-07-14 PROCEDURE — 2580000003 HC RX 258: Performed by: EMERGENCY MEDICINE

## 2022-07-14 PROCEDURE — 83605 ASSAY OF LACTIC ACID: CPT

## 2022-07-14 PROCEDURE — 36415 COLL VENOUS BLD VENIPUNCTURE: CPT

## 2022-07-14 PROCEDURE — 87635 SARS-COV-2 COVID-19 AMP PRB: CPT

## 2022-07-14 PROCEDURE — 1200000000 HC SEMI PRIVATE

## 2022-07-14 PROCEDURE — 87040 BLOOD CULTURE FOR BACTERIA: CPT

## 2022-07-14 PROCEDURE — 83690 ASSAY OF LIPASE: CPT

## 2022-07-14 PROCEDURE — 71046 X-RAY EXAM CHEST 2 VIEWS: CPT

## 2022-07-14 PROCEDURE — 99285 EMERGENCY DEPT VISIT HI MDM: CPT

## 2022-07-14 PROCEDURE — 6360000002 HC RX W HCPCS: Performed by: EMERGENCY MEDICINE

## 2022-07-14 PROCEDURE — 80053 COMPREHEN METABOLIC PANEL: CPT

## 2022-07-14 PROCEDURE — 84484 ASSAY OF TROPONIN QUANT: CPT

## 2022-07-14 PROCEDURE — 85025 COMPLETE CBC W/AUTO DIFF WBC: CPT

## 2022-07-14 RX ADMIN — PIPERACILLIN SODIUM AND TAZOBACTAM SODIUM 3375 MG: 3; .375 INJECTION, POWDER, FOR SOLUTION INTRAVENOUS at 23:35

## 2022-07-14 ASSESSMENT — ENCOUNTER SYMPTOMS
VOMITING: 0
RHINORRHEA: 0
EYE REDNESS: 0
ABDOMINAL PAIN: 1
SHORTNESS OF BREATH: 1
NAUSEA: 1
SORE THROAT: 0

## 2022-07-14 ASSESSMENT — PAIN DESCRIPTION - FREQUENCY: FREQUENCY: CONTINUOUS

## 2022-07-14 ASSESSMENT — PAIN DESCRIPTION - ONSET: ONSET: ON-GOING

## 2022-07-14 ASSESSMENT — PAIN SCALES - GENERAL: PAINLEVEL_OUTOF10: 8

## 2022-07-14 ASSESSMENT — PAIN DESCRIPTION - ORIENTATION: ORIENTATION: LEFT;RIGHT

## 2022-07-14 ASSESSMENT — PAIN DESCRIPTION - DESCRIPTORS: DESCRIPTORS: TIGHTNESS

## 2022-07-14 ASSESSMENT — PAIN DESCRIPTION - LOCATION: LOCATION: ABDOMEN

## 2022-07-14 ASSESSMENT — PAIN DESCRIPTION - PAIN TYPE: TYPE: ACUTE PAIN

## 2022-07-14 NOTE — ED PROVIDER NOTES
eMERGENCY dEPARTMENT eNCOUnter      Pt Name: Isabel Gomez  MRN: [id number]  Armsarianagfmarty 1940  Date of evaluation: 7/14/2022  Provider: Margie Bowden MD    CHIEF COMPLAINT       Chief Complaint   Patient presents with    Abdominal Pain     Pt reports right and left sided abdominal pain that radiates to \"middle of stomach\". Pt alert and oriented at this time and rates pain as a 8/10. HISTORY OF PRESENT ILLNESS   (Location/Symptom, Timing/Onset,Context/Setting, Quality, Duration, Modifying Factors, Severity)  Note limiting factors. Isabel Gomez is a 81 y.o. female who presents to the emergency department complaining of epigastric discomfort. The patient was admitted to the hospital from June 25 to 1 July. During that time she had a large loculated left pleural effusion. She has a history of stage III chronic kidney disease and coronary artery disease. She comes in today complaining of pain towards the left side of her abdomen that radiates to the middle of her stomach. This is been going on for a few days. Some nausea. No vomiting. No chest pain. She states her shortness of breath is a little bit worse than when she left the hospital.    HPI    NursingNotes were reviewed. REVIEW OF SYSTEMS    (2-9 systems for level 4, 10 or more for level 5)     Review of Systems   Constitutional:  Positive for fatigue. Negative for fever. HENT:  Negative for rhinorrhea and sore throat. Eyes:  Negative for redness. Respiratory:  Positive for shortness of breath. Cardiovascular:  Negative for chest pain. Gastrointestinal:  Positive for abdominal pain and nausea. Negative for vomiting. Genitourinary:  Negative for flank pain. Musculoskeletal:  Negative for myalgias. Skin:  Negative for rash. Neurological:  Negative for headaches. Hematological:  Negative for adenopathy. Psychiatric/Behavioral:  Negative for confusion.       Except as noted above the remainder of the review of systems was reviewed and negative. PAST MEDICAL HISTORY     Past Medical History:   Diagnosis Date    Arthritis     Asthma     as child grew out of it    CAD (coronary artery disease) 02/22/2022    multi vessel    CKD (chronic kidney disease) 2015    Elevated creatinine since 2015; Stage III b    Glaucoma     Hyperlipidemia     Hypertension     Hyperthyroidism 2019    Graves disease    IBS (irritable bowel syndrome) 10/2020    On Linzess    Neuropathy     Peripheral    Obesity (BMI 30-39. 9)     Osteopenia 05/2009    Osteoporosis          SURGICALHISTORY       Past Surgical History:   Procedure Laterality Date    ANKLE FRACTURE SURGERY Left 02/12/2015    CHOLECYSTECTOMY      CORONARY ARTERY BYPASS GRAFT  02/25/2022    cabgx4, EDISON exclusion    CORONARY ARTERY BYPASS GRAFT N/A 2/25/2022    TRANSESOPHAGEAL ECHOCARDIOGRAM, TOTAL CARDIOPULMONARY BYPASS, TOTAL CARDIOPULMONARY BYPASS GRAFTING X4 (1 ARTERY, 3 VEIN) USING LEFT INTERNAL MAMMARY ARTERY AND RIGHT ENDOSCOPICALLY HARVESTED SAPHENOUS VEIN, LEFT ATRIAL APPENDAGE CLIP WITH 35MM ATRICLIP performed by Dominic López MD at [address] [facility] CATH LAB PROCEDURE  02/2022    FOOT SURGERY      bilateral, hammer toes    GASTRIC BAND  [id number]    laproscopic    HAND SURGERY  2009    trigger finger    IR TUNNELED CATHETER PLACEMENT GREATER THAN 5 YEARS  3/7/2022    IR TUNNELED CATHETER PLACEMENT GREATER THAN 5 YEARS 3/7/2022 [facility] SPECIAL PROCEDURES    ORIF FEMUR DECOMPRESSION Left 04/2014    OTHER SURGICAL HISTORY Right 2010    mass behind ear    TUBAL LIGATION           CURRENT MEDICATIONS       Current Discharge Medication List        CONTINUE these medications which have NOT CHANGED    Details   ferrous sulfate (IRON 325) 325 (65 Fe) MG tablet Take 325 mg by mouth daily (with breakfast)      sodium bicarbonate 650 MG tablet Take 1 tablet by mouth 2 times daily  Qty: 60 tablet, Refills: 0      methIMAzole (TAPAZOLE) 5 MG tablet Take 5 mg by mouth daily      fluticasone-umeclidin-vilant (TRELEGY ELLIPTA) 100-62.5-25 MCG/INH AEPB Inhale 1 puff into the lungs daily      Wheat Dextrin (BENEFIBER DRINK MIX) PACK Take 1 packet by mouth daily      polyethylene glycol (GLYCOLAX) 17 g packet Take 17 g by mouth daily       amLODIPine (NORVASC) 5 MG tablet Take 5 mg by mouth daily      aspirin EC 81 MG EC tablet Take 1 tablet by mouth daily  Qty: 90 tablet, Refills: 3    Associated Diagnoses: S/P CABG x 4; Hyperlipidemia LDL goal <70      metoprolol succinate (TOPROL XL) 25 MG extended release tablet Take 1 tablet by mouth daily  Qty: 30 tablet, Refills: 3    Associated Diagnoses: CAD in native artery; Primary hypertension      atorvastatin (LIPITOR) 80 MG tablet Take 1 tablet by mouth nightly  Qty: 30 tablet, Refills: 3      gabapentin (NEURONTIN) 300 MG capsule Take 300 mg by mouth 4 times daily. ALLERGIES     Patient has no known allergies.     FAMILY HISTORY       Family History   Problem Relation Age of Onset    Stroke Father     Heart Disease Father          age 68, stroke    Rheum Arthritis Sister           SOCIAL HISTORY       Social History     Socioeconomic History    Marital status:      Spouse name: None    Number of children: None    Years of education: None    Highest education level: None   Tobacco Use    Smoking status: Former     Types: Cigarettes     Quit date: 8/10/1999     Years since quittin.9    Smokeless tobacco: Never    Tobacco comments:     started age 26   Vaping Use    Vaping Use: Never used   Substance and Sexual Activity    Alcohol use: No    Drug use: Not Currently       SCREENINGS    [place] Coma Scale  Eye Opening: Spontaneous  Best Verbal Response: Oriented  Best Motor Response: Obeys commands  [place] Coma Scale Score: 15        PHYSICAL EXAM    (up to 7 for level 4, 8 or more for level 5)     ED Triage Vitals [22]   BP Temp Temp Source Heart Rate Resp SpO2 Height Weight   (!) 145/73 100.2 °F (37.9 °C) Oral 85 22 96 % -- --       Physical Exam  Vitals and nursing note reviewed. Constitutional:       Appearance: She is well-developed. She is not diaphoretic. HENT:      Head: Normocephalic and atraumatic. Mouth/Throat:      Mouth: Mucous membranes are moist.   Eyes:      General:         Right eye: No discharge. Left eye: No discharge. Conjunctiva/sclera: Conjunctivae normal.   Cardiovascular:      Rate and Rhythm: Normal rate. Pulses: Normal pulses. Heart sounds: No murmur heard. Pulmonary:      Effort: No respiratory distress. Breath sounds: No wheezing. Comments: Mild tachypnea. Marked decreased breath sounds on the left side. Abdominal:      Palpations: Abdomen is soft. Comments: Epigastric tenderness palpation. No rebound or guarding   Musculoskeletal:         General: Normal range of motion. Cervical back: Neck supple. Skin:     General: Skin is warm and dry. Capillary Refill: Capillary refill takes less than 2 seconds. Neurological:      Mental Status: She is alert and oriented to person, place, and time. Psychiatric:         Behavior: Behavior normal.       DIAGNOSTIC RESULTS     EKG: All EKG's are interpreted by the Emergency Department Physician who either signs or Co-signsthis chart in the absence of a cardiologist.    The Ekg interpreted by me shows  normal sinus rhythm with a rate of 83  Axis is   45  QTc is  within an acceptable range  Intervals and Durations are unremarkable. ST Segments: nonspecific changes          RADIOLOGY:   Non-plain filmimages such as CT, Ultrasound and MRI are read by the radiologist. Plain radiographic images are visualized and preliminarily interpreted by the emergency physician with the below findings:        Interpretation per the Radiologist below, if available at the time ofthis note:    CT CHEST WO CONTRAST   Final Result   1.  Smaller size but persistent moderate left pleural effusion with a few   socially gas bubbles. Suboptimally characterized without IV contrast.  This   could reflect empyema. 2. Mild associated relaxation atelectasis lingula left upper lobe and   prominent consolidation left lower lobe. 3.  Anemia is likely given that the blood pool density of the heart is lower   than that of the myocardium. Cardiomegaly. 4. Sequela from prior open heart surgery. US THORACENTESIS Which side should the procedure be performed? Left   Final Result   Successful ultrasound guided left thoracentesis. XR CHEST 1 VIEW   Final Result   No appreciable pneumothorax status post left thoracentesis. CT ABDOMEN PELVIS WO CONTRAST Additional Contrast? None   Final Result   1. No acute process in the abdomen or pelvis. 2. Large volume stool throughout the colon. 3. Partially imaged left pleural effusion. XR CHEST (2 VW)   Final Result   Increase in volume of left pleural effusion, now moderate to large.          CT GUIDED CHEST TUBE    (Results Pending)         ED BEDSIDE ULTRASOUND:   Performed by ED Physician - none    LABS:  Labs Reviewed   CBC WITH AUTO DIFFERENTIAL - Abnormal; Notable for the following components:       Result Value    RBC 3.08 (*)     Hemoglobin 8.3 (*)     Hematocrit 25.5 (*)     RDW 16.6 (*)     All other components within normal limits   COMPREHENSIVE METABOLIC PANEL W/ REFLEX TO MG FOR LOW K - Abnormal; Notable for the following components:    CREATININE 1.9 (*)     GFR Non- 25 (*)     GFR  31 (*)     Albumin 3.0 (*)     Albumin/Globulin Ratio 0.6 (*)     All other components within normal limits   LIPASE - Abnormal; Notable for the following components:    Lipase 8.0 (*)     All other components within normal limits   CBC WITH AUTO DIFFERENTIAL - Abnormal; Notable for the following components:    RBC 2.99 (*)     Hemoglobin 8.1 (*)     Hematocrit 24.4 (*)     RDW 16.5 (*)     All other components within normal limits   COMPREHENSIVE METABOLIC PANEL - Abnormal; Notable for the following components:    Glucose 105 (*)     CREATININE 1.8 (*)     GFR Non- 27 (*)     GFR  33 (*)     Albumin 3.2 (*)     Albumin/Globulin Ratio 0.8 (*)     All other components within normal limits   PROTIME-INR - Abnormal; Notable for the following components:    Protime 16.2 (*)     INR 1.30 (*)     All other components within normal limits   ALBUMIN - Abnormal; Notable for the following components:    Albumin 2.9 (*)     All other components within normal limits    Narrative:     Collection has been rescheduled by OCHOA at 07/15/2022 09:02 Reason: Add   on   LACTATE DEHYDROGENASE - Abnormal; Notable for the following components:     (*)     All other components within normal limits    Narrative:     Collection has been rescheduled by OCHOA at 07/15/2022 09:02 Reason: Add   on   CBC WITH AUTO DIFFERENTIAL - Abnormal; Notable for the following components:    RBC 2.62 (*)     Hemoglobin 7.1 (*)     Hematocrit 21.4 (*)     RDW 16.4 (*)     All other components within normal limits    Narrative:     Collection has been rescheduled by Crys Segura at 07/16/2022 06:35 Reason: Pt   sleeping come back later    COMPREHENSIVE METABOLIC PANEL - Abnormal; Notable for the following components:    Glucose 109 (*)     CREATININE 1.9 (*)     GFR Non- 25 (*)     GFR  31 (*)     Calcium 8.1 (*)     Total Protein 6.0 (*)     Albumin 2.3 (*)     Albumin/Globulin Ratio 0.6 (*)     AST 14 (*)     All other components within normal limits    Narrative:     Collection has been rescheduled by Crys Segura at 07/16/2022 06:35 Reason: Pt   sleeping come back later    HEMOGLOBIN AND HEMATOCRIT - Abnormal; Notable for the following components:    Hemoglobin 8.6 (*)     Hematocrit 26.2 (*)     All other components within normal limits    Narrative:     Collection has been rescheduled by [facility] at 07/16/2022 11:07 Reason:   Patient not in room  Collection has been rescheduled by OCHOA at 07/16/2022 11:07 Reason:   Patient not in room   CBC WITH AUTO DIFFERENTIAL - Abnormal; Notable for the following components:    RBC 2.77 (*)     Hemoglobin 7.5 (*)     Hematocrit 22.6 (*)     RDW 16.7 (*)     All other components within normal limits   COMPREHENSIVE METABOLIC PANEL - Abnormal; Notable for the following components:    CREATININE 2.2 (*)     GFR Non- 21 (*)     GFR  26 (*)     Calcium 8.2 (*)     Total Protein 6.3 (*)     Albumin 2.5 (*)     Albumin/Globulin Ratio 0.7 (*)     ALT 8 (*)     AST 13 (*)     All other components within normal limits   COVID-19, RAPID   CULTURE, BLOOD 1    Narrative:     ORDER#: [id number]                          ORDERED BY: Freddy Carpenter  SOURCE: Blood                              COLLECTED:  07/14/22 20:10  ANTIBIOTICS AT [facility].:                      RECEIVED :  07/14/22 20:24  If child <=2 yrs old please draw pediatric bottle. ~Blood Culture 1   CULTURE, BLOOD 2    Narrative:     ORDER#: [id number]                          ORDERED BY: Freddy Carpenter  SOURCE: Blood                              COLLECTED:  07/14/22 21:15  ANTIBIOTICS AT [place].:                      RECEIVED :  07/14/22 21:24  If child <=2 yrs old please draw pediatric bottle. ~Blood Culture #2   CULTURE, BODY FLUID    Narrative:     ORDER#: [id number]                          ORDERED BY: ROSALBA MUSTAFA  SOURCE: Pleural                            COLLECTED:  07/15/22 14:00  ANTIBIOTICS AT [place].:                      RECEIVED :  07/15/22 15:33   MRSA DNA PROBE, NASAL    Narrative:     ORDER#: [id number]                          ORDERED BY: RAKEL SINGLETON  SOURCE: Nares                              COLLECTED:  07/16/22 12:05  ANTIBIOTICS AT [facility].:                      RECEIVED :  07/16/22 12:11   TROPONIN   LACTATE, SEPSIS   LACTATE, SEPSIS   MAGNESIUM   AMYLASE, BODY FLUID   CELL COUNT WITH DIFFERENTIAL, BODY FLUID   LACTATE DEHYDROGENASE, BODY FLUID   CHOLESTEROL, BODY FLUID   PROTEIN, BODY FLUID   PROTEIN, TOTAL    Narrative:     Collection has been rescheduled by OCHOA at 07/15/2022 09:02 Reason: Add   on   VANCOMYCIN LEVEL, RANDOM    Narrative:     Collection has been rescheduled by BRIGID at 07/15/2022 21:43 Reason:   Failed attempt at venipuncture   MAGNESIUM    Narrative:     Collection has been rescheduled by Scott Lopez at 2022 06:35 Reason: Pt   sleeping come back later    [facility], RANDOM   FLOW CYTOMETRY LEUKEMIA/LYMPHOMA NODES OR FLUIDS    Narrative:                                                        [facility] [employer]                                                     [address] [place] [address]                                                     Fax [phone]                                                     Ph. [phone]  Department of Pathology  FINAL FLOW CYTOMETRY REPORT  Patient       Al Brennan             Accession     [id number]  Name:                                       No:   Age Sex:  1940    81 Y / F        Location:     [place] [address]  Account No:   [de-identified]                   Collected:    07/15/2022  Med Rec No:   [id number]                  Received:     2022  Attend Phys:  Gena Garcia MD               Completed:    2022  Request       ROSALBA MUSTAFA DO  Phys:      INTERPRETATION:    Pleural Fluid: No phenotypic evidence of non-Hodgkin lymphoma. Suggest correlation of phenotype with clinical morphologic and other  pertinent laboratory findings, as well as other ancillary studies, as  appropriate.     CLINICAL INFORMATION:  Clinical Diagnosis: Pleural Effusion  Specimen: Pleural Fluid  Cell Count: 1.5 x 105  Viability: 66%    DATA ANALYSIS: :  Lymphocytes  B-cells are polyclonal and T-cells are heterogeneous with no aberrant  antigen loss or expression. CD4/CD8 ratio is 4.38.    ANTIBODIES USED/RESULTS:  FMC7 [*], CD10 [*], CD19 [*], CD20 [*], CD23 [*], Kappa light chain [*],  Lambda light chain [*], CD2 [*], CD3 [*],  CD4 [*], CD5 [*], CD7 [*], CD8 [*], CD16 [*], CD56 [*],  [*], HLA-DR  [*]    * = No abnormal population identified    Note: These tests were developed and their performance characteristics  determined by [facility]. They have not been cleared or  approved by the U.S. Food and Drug Administration. The FDA has  determined that such clearance or approval is not necessary. Case signed out at [facility],  [address] [facility] [address]  Technical processing at [facility], [address] [address]  Phone [phone]  CPT: Sven Morton M.D.  (Electronic Signature)  07/17/2022                                                                     Page 1 of 1   CYTOLOGY, NON-GYN   FLOW CYTOMETRY LEUKEMIA/LYMPHOMA NODES OR FLUIDS   CBC WITH AUTO DIFFERENTIAL   COMPREHENSIVE METABOLIC PANEL   MAGNESIUM   VANCOMYCIN LEVEL, RANDOM       All other labs were within normal range or not returned as of this dictation.     EMERGENCY DEPARTMENT COURSE and DIFFERENTIAL DIAGNOSIS/MDM:   Vitals:    Vitals:    07/17/22 2240 07/17/22 2339 07/18/22 0450 07/18/22 0645   BP: (!) 96/59 116/69 (!) 146/66    Pulse: 77 76 75    Resp: 21 19 21    Temp: 99 °F (37.2 °C) 98.5 °F (36.9 °C) 98.5 °F (36.9 °C)    TempSrc: Oral Oral Oral    SpO2: 98% 96% 95%    Weight:    157 lb 10.1 oz (71.5 kg)   Height:               MDM  Number of Diagnoses or Management Options  Anemia, unspecified type  Chronic kidney disease, unspecified CKD stage  Goals of care, counseling/discussion  Hypoalbuminemia  Left upper quadrant abdominal pain  Recurrent left pleural effusion  Diagnosis management comments: DDx: Large left pleural effusion, pancreatitis, gastritis, colitis, atypical presentation of myocardial ischemia, COVID, other      This patient was turned over to Dr. Jeremy Weir to await her work up results, re-evaluate and dispo. CRITICAL CARE TIME   Total Critical Care time was 15 minutes, excluding separately reportable procedures. There was a high probability of clinically significant/life threatening deterioration in the patient's condition which required my urgent intervention. CONSULTS:  IP CONSULT TO PULMONOLOGY  PHARMACY TO DOSE VANCOMYCIN  PHARMACY TO DOSE VANCOMYCIN  PHARMACY TO DOSE MEDICATION  IP CONSULT TO NEPHROLOGY  IP CONSULT TO INTERVENTIONAL RADIOLOGY  IP CONSULT TO CARDIOTHORACIC SURGERY    PROCEDURES:  Unless otherwise noted below, none     Procedures    FINAL IMPRESSION      1. Recurrent left pleural effusion    2. Left upper quadrant abdominal pain    3. Anemia, unspecified type    4. Chronic kidney disease, unspecified CKD stage    5. Hypoalbuminemia    6. Goals of care, counseling/discussion          DISPOSITION/PLAN   DISPOSITION Admitted 07/14/2022 11:37:26 PM      PATIENT REFERRED TO:  No follow-up provider specified.     DISCHARGE MEDICATIONS:  Current Discharge Medication List             (Please note that portions of this note were completed with a voice recognition program.Efforts were made to edit the dictations but occasionally words are mis-transcribed.)    Glenn Mckay MD (electronically signed)  Attending Emergency Physician         Glenn Mckay MD  07/18/22 4905

## 2022-07-15 ENCOUNTER — APPOINTMENT (OUTPATIENT)
Dept: ULTRASOUND IMAGING | Age: 82
DRG: 177 | End: 2022-07-15
Payer: MEDICARE

## 2022-07-15 ENCOUNTER — APPOINTMENT (OUTPATIENT)
Dept: GENERAL RADIOLOGY | Age: 82
DRG: 177 | End: 2022-07-15
Payer: MEDICARE

## 2022-07-15 LAB
A/G RATIO: 0.8 (ref 1.1–2.2)
ALBUMIN SERPL-MCNC: 2.9 G/DL (ref 3.4–5)
ALBUMIN SERPL-MCNC: 3.2 G/DL (ref 3.4–5)
ALP BLD-CCNC: 73 U/L (ref 40–129)
ALT SERPL-CCNC: 10 U/L (ref 10–40)
AMYLASE FLUID: 36 U/L
ANION GAP SERPL CALCULATED.3IONS-SCNC: 11 MMOL/L (ref 3–16)
APPEARANCE FLUID: CLEAR
AST SERPL-CCNC: 16 U/L (ref 15–37)
BASO FLUID: 1 %
BASOPHILS ABSOLUTE: 0.1 K/UL (ref 0–0.2)
BASOPHILS RELATIVE PERCENT: 0.7 %
BILIRUB SERPL-MCNC: 0.5 MG/DL (ref 0–1)
BUN BLDV-MCNC: 16 MG/DL (ref 7–20)
CALCIUM SERPL-MCNC: 8.5 MG/DL (ref 8.3–10.6)
CELL COUNT FLUID TYPE: NORMAL
CHLORIDE BLD-SCNC: 104 MMOL/L (ref 99–110)
CHOLESTEROL FLUID: 58 MG/DL
CLOT EVALUATION: NORMAL
CO2: 24 MMOL/L (ref 21–32)
COLOR FLUID: YELLOW
CREAT SERPL-MCNC: 1.8 MG/DL (ref 0.6–1.2)
EKG ATRIAL RATE: 83 BPM
EKG DIAGNOSIS: NORMAL
EKG P AXIS: 45 DEGREES
EKG P-R INTERVAL: 144 MS
EKG Q-T INTERVAL: 360 MS
EKG QRS DURATION: 60 MS
EKG QTC CALCULATION (BAZETT): 423 MS
EKG R AXIS: 7 DEGREES
EKG T AXIS: 32 DEGREES
EKG VENTRICULAR RATE: 83 BPM
EOSINOPHILS ABSOLUTE: 0.2 K/UL (ref 0–0.6)
EOSINOPHILS RELATIVE PERCENT: 2.1 %
FLUID DIFF COMMENT: NORMAL
FLUID TYPE: NORMAL
GFR AFRICAN AMERICAN: 33
GFR NON-AFRICAN AMERICAN: 27
GLUCOSE BLD-MCNC: 105 MG/DL (ref 70–99)
HCT VFR BLD CALC: 24.4 % (ref 36–48)
HEMOGLOBIN: 8.1 G/DL (ref 12–16)
INR BLD: 1.3 (ref 0.87–1.14)
LACTATE DEHYDROGENASE: 220 U/L (ref 100–190)
LD, FLUID: 114 U/L
LYMPHOCYTES ABSOLUTE: 1.3 K/UL (ref 1–5.1)
LYMPHOCYTES RELATIVE PERCENT: 16 %
LYMPHOCYTES, BODY FLUID: 6 %
MACROPHAGE FLUID: 7 %
MAGNESIUM: 2.2 MG/DL (ref 1.8–2.4)
MCH RBC QN AUTO: 27.1 PG (ref 26–34)
MCHC RBC AUTO-ENTMCNC: 33.2 G/DL (ref 31–36)
MCV RBC AUTO: 81.7 FL (ref 80–100)
MONOCYTE, FLUID: 6 %
MONOCYTES ABSOLUTE: 0.9 K/UL (ref 0–1.3)
MONOCYTES RELATIVE PERCENT: 10.5 %
NEUTROPHIL, FLUID: 80 %
NEUTROPHILS ABSOLUTE: 5.8 K/UL (ref 1.7–7.7)
NEUTROPHILS RELATIVE PERCENT: 70.7 %
NUCLEATED CELLS FLUID: 6 /CUMM
NUMBER OF CELLS COUNTED FLUID: 100
PDW BLD-RTO: 16.5 % (ref 12.4–15.4)
PLATELET # BLD: 204 K/UL (ref 135–450)
PMV BLD AUTO: 8.7 FL (ref 5–10.5)
POTASSIUM SERPL-SCNC: 4.1 MMOL/L (ref 3.5–5.1)
PROTEIN FLUID: 4.3 G/DL
PROTHROMBIN TIME: 16.2 SEC (ref 11.7–14.5)
RBC # BLD: 2.99 M/UL (ref 4–5.2)
RBC FLUID: <2000 /CUMM
SODIUM BLD-SCNC: 139 MMOL/L (ref 136–145)
TOTAL PROTEIN: 7.1 G/DL (ref 6.4–8.2)
TOTAL PROTEIN: 7.1 G/DL (ref 6.4–8.2)
VANCOMYCIN RANDOM: 12.2 UG/ML
WBC # BLD: 8.2 K/UL (ref 4–11)

## 2022-07-15 PROCEDURE — 88112 CYTOPATH CELL ENHANCE TECH: CPT

## 2022-07-15 PROCEDURE — 6370000000 HC RX 637 (ALT 250 FOR IP): Performed by: STUDENT IN AN ORGANIZED HEALTH CARE EDUCATION/TRAINING PROGRAM

## 2022-07-15 PROCEDURE — 84157 ASSAY OF PROTEIN OTHER: CPT

## 2022-07-15 PROCEDURE — 82040 ASSAY OF SERUM ALBUMIN: CPT

## 2022-07-15 PROCEDURE — 84155 ASSAY OF PROTEIN SERUM: CPT

## 2022-07-15 PROCEDURE — 80202 ASSAY OF VANCOMYCIN: CPT

## 2022-07-15 PROCEDURE — 80053 COMPREHEN METABOLIC PANEL: CPT

## 2022-07-15 PROCEDURE — 82150 ASSAY OF AMYLASE: CPT

## 2022-07-15 PROCEDURE — 88185 FLOWCYTOMETRY/TC ADD-ON: CPT

## 2022-07-15 PROCEDURE — 0W9B3ZX DRAINAGE OF LEFT PLEURAL CAVITY, PERCUTANEOUS APPROACH, DIAGNOSTIC: ICD-10-PCS | Performed by: RADIOLOGY

## 2022-07-15 PROCEDURE — 85025 COMPLETE CBC W/AUTO DIFF WBC: CPT

## 2022-07-15 PROCEDURE — C1729 CATH, DRAINAGE: HCPCS

## 2022-07-15 PROCEDURE — 94761 N-INVAS EAR/PLS OXIMETRY MLT: CPT

## 2022-07-15 PROCEDURE — 2580000003 HC RX 258: Performed by: STUDENT IN AN ORGANIZED HEALTH CARE EDUCATION/TRAINING PROGRAM

## 2022-07-15 PROCEDURE — 97162 PT EVAL MOD COMPLEX 30 MIN: CPT

## 2022-07-15 PROCEDURE — 83615 LACTATE (LD) (LDH) ENZYME: CPT

## 2022-07-15 PROCEDURE — 87070 CULTURE OTHR SPECIMN AEROBIC: CPT

## 2022-07-15 PROCEDURE — 88305 TISSUE EXAM BY PATHOLOGIST: CPT

## 2022-07-15 PROCEDURE — 71045 X-RAY EXAM CHEST 1 VIEW: CPT

## 2022-07-15 PROCEDURE — 88184 FLOWCYTOMETRY/ TC 1 MARKER: CPT

## 2022-07-15 PROCEDURE — 85610 PROTHROMBIN TIME: CPT

## 2022-07-15 PROCEDURE — 82465 ASSAY BLD/SERUM CHOLESTEROL: CPT

## 2022-07-15 PROCEDURE — 6360000002 HC RX W HCPCS: Performed by: STUDENT IN AN ORGANIZED HEALTH CARE EDUCATION/TRAINING PROGRAM

## 2022-07-15 PROCEDURE — 1200000000 HC SEMI PRIVATE

## 2022-07-15 PROCEDURE — 99223 1ST HOSP IP/OBS HIGH 75: CPT | Performed by: INTERNAL MEDICINE

## 2022-07-15 PROCEDURE — 89051 BODY FLUID CELL COUNT: CPT

## 2022-07-15 PROCEDURE — 6360000002 HC RX W HCPCS: Performed by: EMERGENCY MEDICINE

## 2022-07-15 PROCEDURE — 87205 SMEAR GRAM STAIN: CPT

## 2022-07-15 PROCEDURE — 36415 COLL VENOUS BLD VENIPUNCTURE: CPT

## 2022-07-15 PROCEDURE — 97116 GAIT TRAINING THERAPY: CPT

## 2022-07-15 PROCEDURE — 93010 ELECTROCARDIOGRAM REPORT: CPT | Performed by: INTERNAL MEDICINE

## 2022-07-15 PROCEDURE — 94640 AIRWAY INHALATION TREATMENT: CPT

## 2022-07-15 PROCEDURE — 2580000003 HC RX 258: Performed by: EMERGENCY MEDICINE

## 2022-07-15 PROCEDURE — 83735 ASSAY OF MAGNESIUM: CPT

## 2022-07-15 RX ORDER — GABAPENTIN 300 MG/1
300 CAPSULE ORAL 2 TIMES DAILY
Status: DISCONTINUED | OUTPATIENT
Start: 2022-07-15 | End: 2022-07-22 | Stop reason: HOSPADM

## 2022-07-15 RX ORDER — SODIUM BICARBONATE 650 MG/1
650 TABLET ORAL 2 TIMES DAILY
Status: DISCONTINUED | OUTPATIENT
Start: 2022-07-15 | End: 2022-07-16

## 2022-07-15 RX ORDER — ENOXAPARIN SODIUM 100 MG/ML
30 INJECTION SUBCUTANEOUS DAILY
Status: DISCONTINUED | OUTPATIENT
Start: 2022-07-15 | End: 2022-07-22 | Stop reason: HOSPADM

## 2022-07-15 RX ORDER — ACETAMINOPHEN 650 MG/1
650 SUPPOSITORY RECTAL EVERY 6 HOURS PRN
Status: DISCONTINUED | OUTPATIENT
Start: 2022-07-15 | End: 2022-07-22 | Stop reason: HOSPADM

## 2022-07-15 RX ORDER — ATORVASTATIN CALCIUM 80 MG/1
80 TABLET, FILM COATED ORAL NIGHTLY
Status: DISCONTINUED | OUTPATIENT
Start: 2022-07-15 | End: 2022-07-22 | Stop reason: HOSPADM

## 2022-07-15 RX ORDER — SODIUM CHLORIDE 0.9 % (FLUSH) 0.9 %
5-40 SYRINGE (ML) INJECTION EVERY 12 HOURS SCHEDULED
Status: DISCONTINUED | OUTPATIENT
Start: 2022-07-15 | End: 2022-07-22 | Stop reason: HOSPADM

## 2022-07-15 RX ORDER — SODIUM CHLORIDE 9 MG/ML
INJECTION, SOLUTION INTRAVENOUS PRN
Status: DISCONTINUED | OUTPATIENT
Start: 2022-07-15 | End: 2022-07-22 | Stop reason: HOSPADM

## 2022-07-15 RX ORDER — ACETAMINOPHEN 325 MG/1
650 TABLET ORAL EVERY 6 HOURS PRN
Status: DISCONTINUED | OUTPATIENT
Start: 2022-07-15 | End: 2022-07-22 | Stop reason: HOSPADM

## 2022-07-15 RX ORDER — FERROUS SULFATE 325(65) MG
325 TABLET ORAL
COMMUNITY

## 2022-07-15 RX ORDER — METHIMAZOLE 5 MG/1
5 TABLET ORAL DAILY
Status: DISCONTINUED | OUTPATIENT
Start: 2022-07-15 | End: 2022-07-22 | Stop reason: HOSPADM

## 2022-07-15 RX ORDER — SODIUM CHLORIDE 0.9 % (FLUSH) 0.9 %
5-40 SYRINGE (ML) INJECTION PRN
Status: DISCONTINUED | OUTPATIENT
Start: 2022-07-15 | End: 2022-07-22 | Stop reason: HOSPADM

## 2022-07-15 RX ORDER — GABAPENTIN 300 MG/1
300 CAPSULE ORAL 4 TIMES DAILY
Status: DISCONTINUED | OUTPATIENT
Start: 2022-07-15 | End: 2022-07-15

## 2022-07-15 RX ORDER — ASPIRIN 81 MG/1
81 TABLET ORAL DAILY
Status: DISCONTINUED | OUTPATIENT
Start: 2022-07-15 | End: 2022-07-22 | Stop reason: HOSPADM

## 2022-07-15 RX ORDER — METOPROLOL SUCCINATE 25 MG/1
25 TABLET, EXTENDED RELEASE ORAL DAILY
Status: DISCONTINUED | OUTPATIENT
Start: 2022-07-15 | End: 2022-07-22 | Stop reason: HOSPADM

## 2022-07-15 RX ORDER — ENOXAPARIN SODIUM 100 MG/ML
40 INJECTION SUBCUTANEOUS DAILY
Status: DISCONTINUED | OUTPATIENT
Start: 2022-07-15 | End: 2022-07-15 | Stop reason: DRUGHIGH

## 2022-07-15 RX ADMIN — MOMETASONE FUROATE AND FORMOTEROL FUMARATE DIHYDRATE 2 PUFF: 100; 5 AEROSOL RESPIRATORY (INHALATION) at 09:21

## 2022-07-15 RX ADMIN — PIPERACILLIN AND TAZOBACTAM 3375 MG: 3; .375 INJECTION, POWDER, LYOPHILIZED, FOR SOLUTION INTRAVENOUS at 09:14

## 2022-07-15 RX ADMIN — VANCOMYCIN HYDROCHLORIDE 1250 MG: 10 INJECTION, POWDER, LYOPHILIZED, FOR SOLUTION INTRAVENOUS at 03:58

## 2022-07-15 RX ADMIN — MOMETASONE FUROATE AND FORMOTEROL FUMARATE DIHYDRATE 2 PUFF: 100; 5 AEROSOL RESPIRATORY (INHALATION) at 19:29

## 2022-07-15 RX ADMIN — ATORVASTATIN CALCIUM 80 MG: 80 TABLET, FILM COATED ORAL at 21:08

## 2022-07-15 RX ADMIN — GABAPENTIN 300 MG: 300 CAPSULE ORAL at 09:09

## 2022-07-15 RX ADMIN — GABAPENTIN 300 MG: 300 CAPSULE ORAL at 21:08

## 2022-07-15 RX ADMIN — METOPROLOL SUCCINATE 25 MG: 25 TABLET, EXTENDED RELEASE ORAL at 09:09

## 2022-07-15 RX ADMIN — PIPERACILLIN AND TAZOBACTAM 3375 MG: 3; .375 INJECTION, POWDER, LYOPHILIZED, FOR SOLUTION INTRAVENOUS at 16:41

## 2022-07-15 RX ADMIN — TIOTROPIUM BROMIDE INHALATION SPRAY 2 PUFF: 3.12 SPRAY, METERED RESPIRATORY (INHALATION) at 09:21

## 2022-07-15 RX ADMIN — SODIUM BICARBONATE 650 MG: 650 TABLET ORAL at 09:09

## 2022-07-15 RX ADMIN — SODIUM BICARBONATE 650 MG: 650 TABLET ORAL at 21:08

## 2022-07-15 RX ADMIN — SODIUM CHLORIDE, PRESERVATIVE FREE 10 ML: 5 INJECTION INTRAVENOUS at 09:13

## 2022-07-15 RX ADMIN — SODIUM CHLORIDE, PRESERVATIVE FREE 10 ML: 5 INJECTION INTRAVENOUS at 21:09

## 2022-07-15 ASSESSMENT — PAIN SCALES - GENERAL
PAINLEVEL_OUTOF10: 0

## 2022-07-15 NOTE — ED PROVIDER NOTES
Emergency Department Encounter  Location: [address] [place] [address] EMERGENCY DEPARTMENT    Patient: Amalia Garcia  MRN: [id number]  : 1940  Date of evaluation: 2022  ED Provider: Melissa Villalba MD    Amalia Garcia was checked out to me by Dr. Ahmet Mendiola. Please see his initial documentation for details of the patient's initial ED presentation, physical exam and completed studies.     I have reviewed and interpreted all of the currently available lab results and diagnostics from this visit:  Results for orders placed or performed during the hospital encounter of 22   COVID-19, Rapid    Specimen: Nasopharyngeal Swab   Result Value Ref Range    SARS-CoV-2, NAAT Not Detected Not Detected   CBC with Auto Differential   Result Value Ref Range    WBC 9.7 4.0 - 11.0 K/uL    RBC 3.08 (L) 4.00 - 5.20 M/uL    Hemoglobin 8.3 (L) 12.0 - 16.0 g/dL    Hematocrit 25.5 (L) 36.0 - 48.0 %    MCV 83.0 80.0 - 100.0 fL    MCH 26.9 26.0 - 34.0 pg    MCHC 32.5 31.0 - 36.0 g/dL    RDW 16.6 (H) 12.4 - 15.4 %    Platelets 156 495 - 385 K/uL    MPV 8.7 5.0 - 10.5 fL    Neutrophils % 69.8 %    Lymphocytes % 17.8 %    Monocytes % 9.7 %    Eosinophils % 1.7 %    Basophils % 1.0 %    Neutrophils Absolute 6.8 1.7 - 7.7 K/uL    Lymphocytes Absolute 1.7 1.0 - 5.1 K/uL    Monocytes Absolute 0.9 0.0 - 1.3 K/uL    Eosinophils Absolute 0.2 0.0 - 0.6 K/uL    Basophils Absolute 0.1 0.0 - 0.2 K/uL   Comprehensive Metabolic Panel w/ Reflex to MG   Result Value Ref Range    Sodium 141 136 - 145 mmol/L    Potassium reflex Magnesium 4.5 3.5 - 5.1 mmol/L    Chloride 104 99 - 110 mmol/L    CO2 24 21 - 32 mmol/L    Anion Gap 13 3 - 16    Glucose 99 70 - 99 mg/dL    BUN 17 7 - 20 mg/dL    CREATININE 1.9 (H) 0.6 - 1.2 mg/dL    GFR Non-African American 25 (A) >60    GFR  31 (A) >60    Calcium 8.6 8.3 - 10.6 mg/dL    Total Protein 7.8 6.4 - 8.2 g/dL    Albumin 3.0 (L) 3.4 - 5.0 g/dL    Albumin/Globulin Ratio 0.6 (L) 1.1 - 2. 2    Total Bilirubin 0.5 0.0 - 1.0 mg/dL    Alkaline Phosphatase 75 40 - 129 U/L    ALT 11 10 - 40 U/L    AST 19 15 - 37 U/L   Lipase   Result Value Ref Range    Lipase 8.0 (L) 13.0 - 60.0 U/L   Troponin   Result Value Ref Range    Troponin <0.01 <0.01 ng/mL   Lactate, Sepsis   Result Value Ref Range    Lactic Acid, Sepsis 1.0 0.4 - 1.9 mmol/L   Lactate, Sepsis   Result Value Ref Range    Lactic Acid, Sepsis 1.1 0.4 - 1.9 mmol/L     LABS:  Labs Reviewed   CBC WITH AUTO DIFFERENTIAL - Abnormal; Notable for the following components:       Result Value    RBC 3.08 (*)     Hemoglobin 8.3 (*)     Hematocrit 25.5 (*)     RDW 16.6 (*)     All other components within normal limits   COMPREHENSIVE METABOLIC PANEL W/ REFLEX TO MG FOR LOW K - Abnormal; Notable for the following components:    CREATININE 1.9 (*)     GFR Non- 25 (*)     GFR  31 (*)     Albumin 3.0 (*)     Albumin/Globulin Ratio 0.6 (*)     All other components within normal limits   LIPASE - Abnormal; Notable for the following components:    Lipase 8.0 (*)     All other components within normal limits   COVID-19, RAPID   CULTURE, BLOOD 1   CULTURE, BLOOD 2   TROPONIN   LACTATE, SEPSIS   LACTATE, SEPSIS       RADIOLOGY:   Interpretation per Radiologist below, if available at the time of this note:  CT ABDOMEN PELVIS WO CONTRAST Additional Contrast? None   Final Result   1. No acute process in the abdomen or pelvis. 2. Large volume stool throughout the colon. 3. Partially imaged left pleural effusion. XR CHEST (2 VW)   Final Result   Increase in volume of left pleural effusion, now moderate to large.              Final ED Course and MDM:  ED Medication Orders (From admission, onward)    Start Ordered     Status Ordering Provider    07/14/22 3284 07/14/22 4555  piperacillin-tazobactam (ZOSYN) 3,375 mg in dextrose 5 % 50 mL IVPB (mini-bag)  ONCE        Question Answer Comment   Antimicrobial Indications Other    Other Abx Indication pleural effusion        Acknowledged ADALGISA MA          Amalia Garcia is a 81 y.o. female whose care was signed out to me by the outgoing provider. In brief, Amalia Garcia presented to the emergency department abdominal pain. Initial exam notable for decreased left lung sounds. At the time of signout the following is pending:  -CT imaging, labs, reassessment and disposition    On my assessment the patient is awake and alert. She has difficulty answering questions and the daughter at bedside states that she is like this at baseline. I asked about dementia specifically and she states she is \"not all there\". Despite this she is able to answer some questions appropriately, she tells me she still has pain in her abdomen. We discussed the results of her imaging and findings of the pleural effusion having returned. I discussed the case with Dr. Barbie Weber, pulmonology, who recommended admission with IV antibiotics and potential thoracentesis tomorrow. Antibiotics ordered. I did discuss at length with the daughter this plan as the patient previously did not allow them to replace her chest tube which she pulled out in addition to pulling out her PICC line. The patient does state to me and to her daughter she will allow them to do what ever is necessary at this time. Despite the large pleural effusion her oxygen saturation remained stable with a level between 96 to 98%. She does appear to be in pain with some grunting noted. Discussed goals of care, the patient is in agreement with the daughter states she would want intubation but not CPR. Discussed admission with the hospitalist, Dr. Rolf Freeman, who kindly accepted. Final Impression      1. Recurrent left pleural effusion    2. Left upper quadrant abdominal pain    3. Anemia, unspecified type    4. Chronic kidney disease, unspecified CKD stage    5. Hypoalbuminemia    6.  Goals of care, counseling/discussion          DISPOSITION/PLAN DISPOSITION Decision To Admit 07/14/2022 11:24:21 PM    (Please note that portions of this note may have been completed with a voice recognition program. Efforts were made to edit the dictations but occasionally words are mis-transcribed.)         Jasmyn Lewis MD (electronically signed)  Attending Emergency Physician  [address] Molina MD  07/15/22 0040

## 2022-07-15 NOTE — CARE COORDINATION
07/15/22 1110   Readmission Assessment   Number of Days since last admission? 8-30 days   Previous Disposition Home with Home Health   Who is being Interviewed Caregiver  (Dgtr Darryll Hodgkins)   What was the patient's/caregiver's perception as to why they think they needed to return back to the hospital? Other (Comment)  (Pleural Effusion)   Did you visit your Primary Care Physician after you left the hospital, before you returned this time? No   Why weren't you able to visit your PCP? Did not have an appointment   Did you see a specialist, such as Cardiac, Pulmonary, Orthopedic Physician, etc. after you left the hospital? No   Who advised the patient to return to the hospital? Caregiver   Does the patient report anything that got in the way of taking their medications?  No   ASHWINI Santos [facility]  [phone]  Electronically signed by Ani Padron on 7/15/2022 at 11:11 AM

## 2022-07-15 NOTE — ACP (ADVANCE CARE PLANNING)
Advance Care Planning     Advance Care Planning Activator (Inpatient)  Conversation Note      Date of ACP Conversation: 7/15/2022     Conversation Conducted with:  Healthcare Decision Maker: Next of Kin by law (only applies in absence of above) (name) Isael Simosn    ACP Activator: [address] Decision Maker:     Current Designated Health Care Decision Maker:     Primary Decision Maker: Buffyeddie Kristina - [phone]    Secondary Decision Maker: Dashawn Elise Child - [phone]    Care Preferences    Ventilation: \"If you were in your present state of health and suddenly became very ill and were unable to breathe on your own, what would your preference be about the use of a ventilator (breathing machine) if it were available to you? \"      Would the patient desire the use of ventilator (breathing machine)?: no    \"If your health worsens and it becomes clear that your chance of recovery is unlikely, what would your preference be about the use of a ventilator (breathing machine) if it were available to you? \"     Would the patient desire the use of ventilator (breathing machine)?: No      Resuscitation  \"CPR works best to restart the heart when there is a sudden event, like a heart attack, in someone who is otherwise healthy. Unfortunately, CPR does not typically restart the heart for people who have serious health conditions or who are very sick. \"    \"In the event your heart stopped as a result of an underlying serious health condition, would you want attempts to be made to restart your heart (answer \"yes\" for attempt to resuscitate) or would you prefer a natural death (answer \"no\" for do not attempt to resuscitate)? \" no       [] Yes   [x] No   Educated Patient / Erica Yves regarding differences between Advance Directives and portable DNR orders.     Length of ACP Conversation in minutes:  10    Conversation Outcomes:  [x] ACP discussion completed  [] Existing advance directive reviewed with patient; no changes to patient's previously recorded wishes  [] New Advance Directive completed  [] Portable Do Not Rescitate prepared for Provider review and signature  [] POLST/POST/MOLST/MOST prepared for Provider review and signature      Follow-up plan:    [] Schedule follow-up conversation to continue planning  [] Referred individual to Provider for additional questions/concerns   [] Advised patient/agent/surrogate to review completed ACP document and update if needed with changes in condition, patient preferences or care setting    [x] This note routed to one or more involved healthcare providers    Electronically signed by Alicia Childs on 7/15/2022 at 11:09 AM

## 2022-07-15 NOTE — PROGRESS NOTES
Comprehensive Nutrition Assessment    Type and Reason for Visit:  Initial, Positive Nutrition Screen    Nutrition Recommendations/Plan:   Continue current diet  Add Nepro bid   Monitor meal and supplement intake  Monitor pertinent labs     Malnutrition Assessment:  Malnutrition Status:  Insufficient data (07/15/22 5864)    Context:  Chronic Illness       Nutrition Assessment:    Positive nutrition screen for wt loss and poor po. Pt admitted with recurrent left pleural effusion and PMH of CKD 3 and CAD. Pt out of room at time of visit. Difficult to determine wt loss as wt ha been fluctuating from 198-160's. Will add Nepro bid due to wt and pta poor intake. Nutrition Related Findings:    BLE +1 edema,  Wound Type: None       Current Nutrition Intake & Therapies:    Average Meal Intake: Unable to assess  Average Supplements Intake: None Ordered  ADULT DIET; Regular; 4 carb choices (60 gm/meal); Low Sodium (2 gm)    Anthropometric Measures:  Height: 5' 3\" (160 cm)  Ideal Body Weight (IBW): 115 lbs (52 kg)       Current Body Weight: 156 lb 1.4 oz (70.8 kg), 135.7 % IBW. Weight Source: Bed Scale  Current BMI (kg/m2): 27.7        BMI Categories: Overweight (BMI 25.0-29. 9)  Conitnue    Nutrition Diagnosis:   Inadequate oral intake related to inadequate protein-energy intake as evidenced by poor intake prior to admission    Nutrition Interventions:   Food and/or Nutrient Delivery: Continue Current Diet, Start Oral Nutrition Supplement  Nutrition Education/Counseling: No recommendation at this time  Coordination of Nutrition Care: Continue to monitor while inpatient       Goals:     Goals: PO intake 50% or greater       Nutrition Monitoring and Evaluation:   Behavioral-Environmental Outcomes: None Identified  Food/Nutrient Intake Outcomes: Food and Nutrient Intake, Supplement Intake  Physical Signs/Symptoms Outcomes: Biochemical Data, Fluid Status or Edema, Nutrition Focused Physical Findings, Skin, Weight    Discharge Planning:     Too soon to determine     Husam Davalos, [address] [place] [address], LD  Contact: [phone]

## 2022-07-15 NOTE — H&P
[facility] Medicine History & Physical      PCP: Glenn Jones [address]    Date of Admission: 7/14/2022    Date of Service: Pt seen/examined on 7/15/22 and Admitted to Inpatient with expected LOS greater than two midnights due to medical therapy. Chief Complaint:    Shortness of breath      History Of Present Illness:   Lyndon mercado an [de-identified] with HTN, HLD, CAD, s/p CABG 2/25/22, CKD3. She is admitted for recurrent left-sided pleural effusion. She was discharged on 1 July for the same problem. She complains of epigastric pain associated with some nausea. Some shortness of breath. Has some swelling of ankles. No orthopnea or PND. Was seen in the ED and found to have a large left-sided pleural effusion. No fevers or chills. She has been admitted for thoracentesis. Past Medical History:          Diagnosis Date    Arthritis     Asthma     as child grew out of it    CAD (coronary artery disease) 02/22/2022    multi vessel    CKD (chronic kidney disease) 2015    Elevated creatinine since 2015; Stage III b    Glaucoma     Hyperlipidemia     Hypertension     Hyperthyroidism 2019    Graves disease    IBS (irritable bowel syndrome) 10/2020    On Linzess    Neuropathy     Peripheral    Obesity (BMI 30-39. 9)     Osteopenia 05/2009    Osteoporosis        Past Surgical History:          Procedure Laterality Date    ANKLE FRACTURE SURGERY Left 02/12/2015    CHOLECYSTECTOMY      CORONARY ARTERY BYPASS GRAFT  02/25/2022    cabgx4, EDISON exclusion    CORONARY ARTERY BYPASS GRAFT N/A 2/25/2022    TRANSESOPHAGEAL ECHOCARDIOGRAM, TOTAL CARDIOPULMONARY BYPASS, TOTAL CARDIOPULMONARY BYPASS GRAFTING X4 (1 ARTERY, 3 VEIN) USING LEFT INTERNAL MAMMARY ARTERY AND RIGHT ENDOSCOPICALLY HARVESTED SAPHENOUS VEIN, LEFT ATRIAL APPENDAGE CLIP WITH 35MM ATRICLIP performed by Irene Ray MD at [facility] CATH LAB PROCEDURE  02/2022    FOOT SURGERY      bilateral, hammer toes    GASTRIC BAND  [id number] laproscopic    HAND SURGERY  2009    trigger finger    IR TUNNELED CATHETER PLACEMENT GREATER THAN 5 YEARS  3/7/2022    IR TUNNELED CATHETER PLACEMENT GREATER THAN 5 YEARS 3/7/2022 [facility] PROCEDURES    ORIF FEMUR DECOMPRESSION Left 04/2014    OTHER SURGICAL HISTORY Right 2010    mass behind ear    TUBAL LIGATION         Medications Prior to Admission:      Prior to Admission medications    Medication Sig Start Date End Date Taking? Authorizing Provider   ferrous sulfate (IRON 325) 325 (65 Fe) MG tablet Take 325 mg by mouth daily (with breakfast)   Yes Historical Provider, MD   sodium bicarbonate 650 MG tablet Take 1 tablet by mouth 2 times daily 7/1/22   Karyna Rogers MD   methIMAzole (TAPAZOLE) 5 MG tablet Take 5 mg by mouth daily    Historical Provider, MD   fluticasone-umeclidin-vilant (TRELEGY ELLIPTA) 100-62.5-25 MCG/INH AEPB Inhale 1 puff into the lungs daily    Historical Provider, MD   Wheat Dextrin (BENEFIBER DRINK MIX) PACK Take 1 packet by mouth daily    Historical Provider, MD   polyethylene glycol (GLYCOLAX) 17 g packet Take 17 g by mouth daily     Historical Provider, MD   amLODIPine (NORVASC) 5 MG tablet Take 5 mg by mouth daily    Historical Provider, MD   aspirin EC 81 MG EC tablet Take 1 tablet by mouth daily 4/19/22 4/19/23  MARKEL Tate CNP   metoprolol succinate (TOPROL XL) 25 MG extended release tablet Take 1 tablet by mouth daily 4/19/22   MARKEL Hartley - CNP   atorvastatin (LIPITOR) 80 MG tablet Take 1 tablet by mouth nightly 3/9/22   MARKEL Santiago CNP   gabapentin (NEURONTIN) 300 MG capsule Take 300 mg by mouth 4 times daily. 11/5/21   Historical Provider, MD       Allergies:  Patient has no known allergies. Social History:      The patient currently lives independently at home    TOBACCO:   reports that she quit smoking about 22 years ago. She has never used smokeless tobacco.  ETOH:   reports no history of alcohol use.   E-cigarette/Vaping Questions Responses    E-cigarette/Vaping Use Never User    Start Date     Passive Exposure     Quit Date     Counseling Given     Comments               Family History:       Reviewed and negative in regards to presenting illness/complaint. Problem Relation Age of Onset    Stroke Father     Heart Disease Father          age 68, stroke    Rheum Arthritis Sister        REVIEW OF SYSTEMS COMPLETED:   Pertinent positives as noted in the HPI. All other systems reviewed and negative. PHYSICAL EXAM PERFORMED:    /68   Pulse 83   Temp 98.2 °F (36.8 °C) (Oral)   Resp 16   Ht 5' 3\" (1.6 m)   Wt 156 lb 1.4 oz (70.8 kg)   SpO2 100%   BMI 27.65 kg/m²     General appearance:  No apparent distress, appears stated age and cooperative. HEENT:  Normal cephalic, atraumatic without obvious deformity. Pupils equal, round, and reactive to light. Extra ocular muscles intact. Conjunctivae/corneas clear. Neck: Supple, with full range of motion. No jugular venous distention. Trachea midline. Respiratory: Stony dull left hemithorax with reduced breath sounds no conversational dyspnea   Cardiovascular:  Regular rate and rhythm with normal S1/S2 without murmurs, rubs or gallops. Abdomen: Soft, non-tender, non-distended with normal bowel sounds. Musculoskeletal:  No clubbing, cyanosis or edema bilaterally. Full range of motion without deformity. Skin: Skin color, texture, turgor normal.  No rashes or lesions. Neurologic:  Neurovascularly intact without any focal sensory/motor deficits.  Cranial nerves: II-XII intact, grossly non-focal.  Psychiatric:  Alert and oriented, thought content appropriate, normal insight  Capillary Refill: Brisk,3 seconds, normal  Peripheral Pulses: +2 palpable, equal bilaterally       Labs:     Recent Labs     07/14/22  1935 07/15/22  0630   WBC 9.7 8.2   HGB 8.3* 8.1*   HCT 25.5* 24.4*    204     Recent Labs     07/14/22  1935 07/15/22  0630    139   K 4.5 4.1    104   CO2 24 24   BUN 17 16   CREATININE 1.9* 1.8*   CALCIUM 8.6 8.5     Recent Labs     07/14/22  1935 07/15/22  0630   AST 19 16   ALT 11 10   BILITOT 0.5 0.5   ALKPHOS 75 73     Recent Labs     07/15/22  0630   INR 1.30*     Recent Labs     07/14/22 1935   Yolis Humphries <0.01       Urinalysis:      Lab Results   Component Value Date/Time    NITRU Negative 02/23/2022 09:26 PM    BLOODU Negative 02/23/2022 09:26 PM    SPECGRAV 1.026 02/23/2022 09:26 PM    GLUCOSEU Negative 02/23/2022 09:26 PM    GLUCOSEU Negative 08/10/2011 09:00 AM       Radiology:     CXR: I have reviewed the CXR with the following interpretation: Left pleural effusion  EKG:  I have reviewed the EKG with the following interpretation: Normal sinus rhythm    CT ABDOMEN PELVIS WO CONTRAST Additional Contrast? None   Final Result   1. No acute process in the abdomen or pelvis. 2. Large volume stool throughout the colon. 3. Partially imaged left pleural effusion. XR CHEST (2 VW)   Final Result   Increase in volume of left pleural effusion, now moderate to large. US THORACENTESIS Which side should the procedure be performed? Left    (Results Pending)       Consults:    IP CONSULT TO PULMONOLOGY  PHARMACY TO DOSE VANCOMYCIN  PHARMACY TO DOSE VANCOMYCIN  PHARMACY TO DOSE MEDICATION    ASSESSMENT:    Active Hospital Problems    Diagnosis Date Noted    Recurrent left pleural effusion [J90] 07/14/2022     Priority: Medium     1. Left pleural effusion  2. CAD status postcardiac surgery/CABG  3. Chronic kidney disease stage III  4. Hyperlipidemia  5. Generalized weakness  6. Essential hypertension      PLAN:  1.  IR consult for left thoracentesis  2. Send fluid for cytology/microscopy/culture sensitivity/protein/LDH  3. Hold off antibiotics-no evidence of infection  4. Continue home medications  5. Daily CBC/CMP  6. Nephrology consult      DVT Prophylaxis: Lovenox  Diet: ADULT DIET; Regular; 4 carb choices (60 gm/meal);  Low Sodium (2 gm)  Code Status: Limited    PT/OT Eval Status: Ordered    Dispo -pending clinical improvement       Rahul Roger MD    Thank you Orlin Whitehead for the opportunity to be involved in this patient's care. If you have any questions or concerns please feel free to contact me at [phone].

## 2022-07-15 NOTE — ED NOTES
Pt pants and pull up taken off wet , pt cleaned up , dry diaper placed along with a perwic .  Pt daughter at bed side     Kervin Mail, NAZANIN  07/14/22 1410

## 2022-07-15 NOTE — PROGRESS NOTES
Nutrition Assessment     Type and Reason for Visit: Initial, Positive Nutrition Screen    Nutrition Recommendations/Plan:   Continue on current diet  Add Nepor bid  Monitor intake  Monitor pertinent labs     Malnutrition Assessment:  Malnutrition Status: Insufficient data    Nutrition Assessment:  Positive nutrition screen for wt loss and poor po. Pt admitted with recurrent left pleural effusion and PMH of CKD 3 and CAD. Pt out of room at time of visit. Difficult to determine wt loss as wt ha been fluctuating from 198-160's. Will add Nepro bid due to wt and pta poor intake. Estimated Daily Nutrient Needs:  Energy (kcal):          Protein (g):             Fluid (ml/day):         Nutrition Related Findings:   BLE +1 edema,  Wound Type: None    Current Nutrition Therapies:    ADULT DIET; Regular; 4 carb choices (60 gm/meal); Low Sodium (2 gm)    Anthropometric Measures:  Height: 5' 3\" (160 cm)  Current Body Wt: 156 lb 1.4 oz (70.8 kg)   BMI: 27.7    Nutrition Diagnosis:   Inadequate oral intake related to inadequate protein-energy intake as evidenced by poor intake prior to admission    Nutrition Interventions:   Food and/or Nutrient Delivery: Continue Current Diet, Start Oral Nutrition Supplement  Nutrition Education/Counseling: No recommendation at this time  Coordination of Nutrition Care: Continue to monitor while inpatient       Goals:     Goals: PO intake 50% or greater       Nutrition Monitoring and Evaluation:   Behavioral-Environmental Outcomes: None Identified  Food/Nutrient Intake Outcomes: Food and Nutrient Intake, Supplement Intake  Physical Signs/Symptoms Outcomes: Biochemical Data, Fluid Status or Edema, Nutrition Focused Physical Findings, Skin, Weight    Discharge Planning:     Too soon to determine     Catalina Santiago, [address],   Contact: [phone]

## 2022-07-15 NOTE — PLAN OF CARE
Problem: Discharge Planning  Goal: Discharge to home or other facility with appropriate resources  Outcome: Progressing  Flowsheets (Taken 7/15/2022 0034)  Discharge to home or other facility with appropriate resources: Identify barriers to discharge with patient and caregiver     Problem: Pain  Goal: Verbalizes/displays adequate comfort level or baseline comfort level  Outcome: Progressing     Problem: Skin/Tissue Integrity  Goal: Absence of new skin breakdown  Description: 1. Monitor for areas of redness and/or skin breakdown  2. Assess vascular access sites hourly  3. Every 4-6 hours minimum:  Change oxygen saturation probe site  4. Every 4-6 hours:  If on nasal continuous positive airway pressure, respiratory therapy assess nares and determine need for appliance change or resting period.   Outcome: Progressing     Problem: Safety - Adult  Goal: Free from fall injury  Outcome: Progressing

## 2022-07-15 NOTE — PROGRESS NOTES
Physical Therapy  Facility/Department: [facility]  Physical Therapy Initial Assessment    Name: Stephan Abernathy  : 1940  MRN: [id number]  Date of Service: 7/15/2022    Discharge Recommendations:  Home with Home health PT, 24 hour supervision or assist   PT Equipment Recommendations  Equipment Needed: No    Stephan Abernathy scored a 18/24 on the AM-PAC short mobility form. Current research shows that an AM-PAC score of 18 or greater is typically associated with a discharge to the patient's home setting. Based on the patient's AM-PAC score and their current functional mobility deficits, it is recommended that the patient have 2-3 sessions per week of Physical Therapy at d/c to increase the patient's independence. At this time, this patient demonstrates the endurance and safety to discharge home with home PT and a follow up treatment frequency of 2-3x/wk. Please see assessment section for further patient specific details. If patient discharges prior to next session this note will serve as a discharge summary. Please see below for the latest assessment towards goals. Patient Diagnosis(es): The primary encounter diagnosis was Recurrent left pleural effusion. Diagnoses of Left upper quadrant abdominal pain, Anemia, unspecified type, Chronic kidney disease, unspecified CKD stage, Hypoalbuminemia, and Goals of care, counseling/discussion were also pertinent to this visit. Past Medical History:  has a past medical history of Arthritis, Asthma, CAD (coronary artery disease), CKD (chronic kidney disease), Glaucoma, Hyperlipidemia, Hypertension, Hyperthyroidism, IBS (irritable bowel syndrome), Neuropathy, Obesity (BMI 30-39.9), Osteopenia, and Osteoporosis. Past Surgical History:  has a past surgical history that includes Cholecystectomy; Foot surgery; Tubal ligation; Gastric Band ([id number]); orif femur decompression (Left, 2014); Ankle fracture surgery (Left, 2015);  Hand surgery (); other surgical history (Right, 2010); Coronary artery bypass graft (02/25/2022); Coronary artery bypass graft (N/A, 2/25/2022); IR TUNNELED CVC PLACE WO SQ PORT/PUMP > 5 YEARS (3/7/2022); and Diagnostic Cardiac Cath Lab Procedure (02/2022). Assessment   Body Structures, Functions, Activity Limitations Requiring Skilled Therapeutic Intervention: Decreased functional mobility ; Decreased endurance  Assessment: Pt is a 82 y/o female who lives with her daughter; who is also her caregiver. At baseline pt amb typically no AD with SBA from daughter. She is currently recieving home PT. Daughter states pt has been informed to start using RW more frequently. Pt presented to ED with abdominal pain, found with left pleural effusion. US-guided left thoracentesis on 7/15. Today pt was able to transfer sit<->stand CGA. Pt amb in room without AD CGA; no LOB, stated it was slower than her baseline. Pt then amb with RW CGA with faster kale and more stability. Pt would benefit from coninuing home PT upon d/c to help with functional mobility and endurance.   Therapy Prognosis: Good;Fair  Decision Making: Medium Complexity  Requires PT Follow-Up: Yes  Activity Tolerance  Activity Tolerance: Patient tolerated treatment well;Patient limited by fatigue;Patient limited by endurance     Plan   Plan  Plan: 3-5 times per week  Current Treatment Recommendations: Strengthening, ROM, Balance training, Functional mobility training, Gait training, Endurance training, Stair training  Safety Devices  Type of Devices: Chair alarm in place, Left in chair, Gait belt, Call light within reach, Nurse notified  Restraints  Restraints Initially in Place: No     Restrictions  Restrictions/Precautions  Restrictions/Precautions: Fall Risk     Subjective   General  Chart Reviewed: Yes  Patient assessed for rehabilitation services?: Yes  Additional Pertinent Hx: left pleural effusion  Response To Previous Treatment: Not applicable  Family / Caregiver Present: Yes (Son and Daughter)  Referring Practitioner: Sadia Lawrence MD  Referral Date : 07/15/22  Follows Commands: Within Functional Limits  Subjective  Subjective: Pt was in recliner chair upon arrival. Son and daughter present in the room. Pt was agreeable to PT eval and treat. Social/Functional History  Social/Functional History  Lives With: Family (Daughter; ZOHRA)  Type of Home: Apartment (basement level apartment; 6 steps with R handrail)  Home Layout: One level  Home Access: Stairs to enter with rails  Entrance Stairs - Number of Steps: 6 steps  Entrance Stairs - Rails: Right  Bathroom Shower/Tub: Shower chair with back, Tub/Shower unit  Bathroom Toilet: Standard ([place] nearby)  Bathroom Equipment: Shower chair, [place], Grab bars in shower  Bathroom Accessibility: Accessible  Home Equipment: Walker, rolling  ADL Assistance: Needs assistance (Daughter assits with dressing, bathing, tolieting.  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    Vision/Hearing  Vision  Vision: Impaired  Vision Exceptions: Wears glasses at all times  Hearing  Hearing: Within functional limits      Cognition   Orientation  Orientation Level: Oriented to time;Oriented to person;Oriented to place (Daughter gave situation to pt.)     Objective   AROM RLE (degrees)  RLE AROM: WFL  AROM LLE (degrees)  LLE AROM : WFL  Strength RLE  R Hip Flexion: 4/5  R Knee Flexion: 4-/5  R Knee Extension: 4/5  R Ankle Dorsiflexion: 4+/5  Strength LLE  L Hip Flexion: 4/5  L Knee Flexion: 4/5  L Knee Extension: 4/5  L Ankle Dorsiflexion: 4+/5        Bed mobility  Bed Mobility Comments: Not Assessed: Pt was in recliner chair at beginning and end of assessment and treatment of the patient.      Electronically signed by Ban Pleitez, PT [id number] on 7/15/2022 at 3:56 PM

## 2022-07-15 NOTE — CONSULTS
Clinical Pharmacy Note  Vancomycin Consult    Pharmacy consult received for one-time dose of vancomycin in the Emergency Department per Bryan Lakhani. Ht Readings from Last 1 Encounters:   06/30/22 5' 3\" (1.6 m)        Wt Readings from Last 1 Encounters:   07/01/22 148 lb 9.4 oz (67.4 kg)         Assessment/Plan:   Vancomycin 1250 mg IV x 1 in ED.  If Vancomycin is to continue on admission and pharmacy is to manage dosing, please re-consult with admission orders.       Ester Dean [facility],7/15/2022,12:35 AM

## 2022-07-15 NOTE — CONSULTS
Clinical Pharmacy Note  Vancomycin Consult    Isabel Gomez is a 81 y.o. female ordered Vancomycin for sepsis; consult received from Dr. Surekha Snow to manage therapy. Also receiving Zosyn. Allergies:  Patient has no known allergies. Temp max:  Temp (24hrs), Av °F (37.2 °C), Min:98.2 °F (36.8 °C), Max:100.2 °F (37.9 °C)      Recent Labs     22  193   WBC 9.7       Recent Labs     22   BUN 17   CREATININE 1.9*       No intake or output data in the 24 hours ending 07/15/22 0509        Ht Readings from Last 1 Encounters:   07/15/22 5' 3\" (1.6 m)        Wt Readings from Last 1 Encounters:   07/15/22 156 lb 1.4 oz (70.8 kg)         Estimated Creatinine Clearance: 22 mL/min (A) (based on SCr of 1.9 mg/dL (H)). Assessment/Plan:  Vancomycin 1250 mg IV once ordered. Regimen projects a trough level of 15-20 mg/L. Level ordered for 7/15/22 2200. Thank you for the consult.    Ester Dean [facility],7/15/2022,5:10 AM

## 2022-07-15 NOTE — CARE COORDINATION
DISCHARGE PLAN: Pt plans to return home with her dgtr and son in law. Will need to resume HC through [facility]. PT/OT/RN. Dgtr to transport pt home.   ____________________________________  INITIAL ASSESSMENT  S[poke with pts dgtr, Maria to address barriers to dc. HOME: Pt resides in a single family home with her dgtr and son in law. There is 1 BRITTANY. Disease Specific: Recurrent pleural effusion    COVID Vaccination: Yes, with booster    DME/O2: Pt has a walker, shower chair, grab bars in the shower, clamp on tb bar and family is working on getting a w/c. No other DME needs noted at this time. ACTIVE SERVICES: Pt is active with [facility] for PT/OT. Pt may benefit from having a RN as well at d/c.  Dgtr assists with bathing, dressing, medication management, cooking, cleaning, laundry. SW talked with tr about [place] [facility] Aging services. Information provided. TRANSPORTATION: Pt is not an active . Dgtr or son in law assist with all transport needs. PHARMACY: Denies difficulty obtaining/taking meds. Pt has all meds filled at [place] on [address] PCP: Ryne Kim    DEMOGRAPHICS: Verified address/phone number as correct    INSURANCE:  [place] /Medicaid  PRESCRIPTION COVERAGE: Medicaid    HD/PD: No      THERAPY RECS Requested-Will await recommendations    Discharge planning team will remain available for needs. Please consult for any specifics not addressed in this note.     ASHWINI Campuzano  [phone]  Electronically signed by Husam Welhc on 7/15/2022 at 11:05 AM

## 2022-07-15 NOTE — PLAN OF CARE
Problem: Discharge Planning  Goal: Discharge to home or other facility with appropriate resources  7/15/2022 1023 by Fabrizio Miller RN  Outcome: Progressing  7/15/2022 0416 by Deneen Johnson RN  Outcome: Progressing  Flowsheets (Taken 7/15/2022 0034)  Discharge to home or other facility with appropriate resources: Identify barriers to discharge with patient and caregiver     Problem: Pain  Goal: Verbalizes/displays adequate comfort level or baseline comfort level  7/15/2022 1023 by Fabrizio Miller RN  Outcome: Progressing  7/15/2022 0416 by Deneen Johnson RN  Outcome: Progressing     Problem: Skin/Tissue Integrity  Goal: Absence of new skin breakdown  Description: 1. Monitor for areas of redness and/or skin breakdown  2. Assess vascular access sites hourly  3. Every 4-6 hours minimum:  Change oxygen saturation probe site  4. Every 4-6 hours:  If on nasal continuous positive airway pressure, respiratory therapy assess nares and determine need for appliance change or resting period.   7/15/2022 1023 by Fabrizio Miller RN  Outcome: Progressing  7/15/2022 0416 by Deneen Johnson RN  Outcome: Progressing     Problem: Safety - Adult  Goal: Free from fall injury  7/15/2022 1023 by Fabrizio Miller RN  Outcome: Progressing  7/15/2022 0416 by Deneen Johnson RN  Outcome: Progressing

## 2022-07-15 NOTE — CONSULTS
PATIENT IS SEEN AT THE REQUEST OF DR. Danyell Wiggins for recurrent pleural effusion    CONSULTING PHYSICIAN: Denise    HISTORY OF PRESENT ILLNESS:  This is a 81 y.o. female who presented to the ED on 7/15 with a CC of abdominal pain. Per ED notes she was having left sided abdominal pain. Imaging showed a large left effusion. She was recently in the hospital for a large effusion that had a chest tube in place. She pulled out the chest tube while admitted. Daughter said last time she was very confused and anxious. She does no recall what happened. Main issue before was SOB and fatigue. This time it is more pain than anything. Daughter denies her mother has any underlying lung disease. Established Pulmonologist:  None    PAST MEDICAL HISTORY:  Past Medical History:   Diagnosis Date    Arthritis     Asthma     as child grew out of it    CAD (coronary artery disease) 02/22/2022    multi vessel    CKD (chronic kidney disease) 2015    Elevated creatinine since 2015; Stage III b    Glaucoma     Hyperlipidemia     Hypertension     Hyperthyroidism 2019    Graves disease    IBS (irritable bowel syndrome) 10/2020    On Linzess    Neuropathy     Peripheral    Obesity (BMI 30-39. 9)     Osteopenia 05/2009    Osteoporosis        PAST SURGICAL HISTORY:  Past Surgical History:   Procedure Laterality Date    ANKLE FRACTURE SURGERY Left 02/12/2015    CHOLECYSTECTOMY      CORONARY ARTERY BYPASS GRAFT  02/25/2022    cabgx4, EDISON exclusion    CORONARY ARTERY BYPASS GRAFT N/A 2/25/2022    TRANSESOPHAGEAL ECHOCARDIOGRAM, TOTAL CARDIOPULMONARY BYPASS, TOTAL CARDIOPULMONARY BYPASS GRAFTING X4 (1 ARTERY, 3 VEIN) USING LEFT INTERNAL MAMMARY ARTERY AND RIGHT ENDOSCOPICALLY HARVESTED SAPHENOUS VEIN, LEFT ATRIAL APPENDAGE CLIP WITH 35MM ATRICLIP performed by Amanda Kumar MD at [address] [facility] CATH LAB PROCEDURE  02/2022    FOOT SURGERY      bilateral, hammer toes    GASTRIC BAND  [id number]    laproscopic    HAND SURGERY 2009    trigger finger    IR TUNNELED CATHETER PLACEMENT GREATER THAN 5 YEARS  3/7/2022    IR TUNNELED CATHETER PLACEMENT GREATER THAN 5 YEARS 3/7/2022 [facility] SPECIAL PROCEDURES    ORIF FEMUR DECOMPRESSION Left 04/2014    OTHER SURGICAL HISTORY Right 2010    mass behind ear    TUBAL LIGATION         FAMILY HISTORY:  family history includes Heart Disease in her father; Rheum Arthritis in her sister; Stroke in her father. SOCIAL HISTORY:   reports that she quit smoking about 22 years ago. She has never used smokeless tobacco.    Scheduled Meds:   [Held by provider] aspirin EC  81 mg Oral Daily    atorvastatin  80 mg Oral Nightly    methIMAzole  5 mg Oral Daily    metoprolol succinate  25 mg Oral Daily    sodium bicarbonate  650 mg Oral BID    sodium chloride flush  5-40 mL IntraVENous 2 times per day    piperacillin-tazobactam  3,375 mg IntraVENous Q8H    [Held by provider] enoxaparin  30 mg SubCUTAneous Daily    vancomycin (VANCOCIN) intermittent dosing (placeholder)   Other RX Placeholder    gabapentin  300 mg Oral BID       Continuous Infusions:   sodium chloride         PRN Meds:  sodium chloride flush, sodium chloride, acetaminophen **OR** acetaminophen    ALLERGIES:  Patient has No Known Allergies. REVIEW OF SYSTEMS:  Constitutional: Negative for fever or chills  HENT: Negative for sore throat  Eyes: Negative for redness   Respiratory: Some SOB, chest wall pain   Cardiovascular: Negative for chest pain  Gastrointestinal: Ab pain  Genitourinary: Negative for hematuria, negative for dysuria  Musculoskeletal: Negative for arthralgias   Skin: Negative for rash  Neurological: Negative for syncope  Hematological: Negative for adenopathy  Extremities:  Negative for swelling    PHYSICAL EXAM:  Blood pressure (!) 144/77, pulse 76, temperature 98.3 °F (36.8 °C), temperature source Oral, resp. rate 18, height 5' 3\" (1.6 m), weight 156 lb 1.4 oz (70.8 kg), SpO2 97 %, not currently breastfeeding.'  Gen: No distress. Mildly demented   Eyes: PERRL. No sclera icterus. No conjunctival injection. ENT: No discharge. Pharynx clear. Neck: Trachea midline. No obvious mass. Resp: No accessory muscle use. No crackles. No wheezes. No rhonchi. CV: Regular rate. Regular rhythm. No murmur or rub. GI: Non-tender. Non-distended. No hernia. BS present. Skin: Warm and dry. No nodule on exposed extremities. Lymph: No cervical LAD. No supraclavicular LAD. M/S: No cyanosis. No joint deformity. Neuro: Slightly confused, poor historian  EXT:   no edema, no clubbing    LABS:  CBC:   Recent Labs     07/14/22  1935 07/15/22  0630   WBC 9.7 8.2   HGB 8.3* 8.1*   HCT 25.5* 24.4*   MCV 83.0 81.7    204     BMP:   Recent Labs     07/14/22  1935 07/15/22  0630    139   K 4.5 4.1    104   CO2 24 24   BUN 17 16   CREATININE 1.9* 1.8*     LIVER PROFILE:   Recent Labs     07/14/22  1935 07/15/22  0630   AST 19 16   ALT 11 10   LIPASE 8.0*  --    BILITOT 0.5 0.5   ALKPHOS 75 73     PT/INR:   Recent Labs     07/15/22  0630   PROTIME 16.2*   INR 1.30*     APTT: No results for input(s): APTT in the last 72 hours. UA:No results for input(s): NITRITE, COLORU, PHUR, LABCAST, WBCUA, RBCUA, MUCUS, TRICHOMONAS, YEAST, BACTERIA, CLARITYU, SPECGRAV, LEUKOCYTESUR, UROBILINOGEN, BILIRUBINUR, BLOODU, GLUCOSEU, AMORPHOUS in the last 72 hours. Invalid input(s): KETONESU  No results for input(s): PHART, TIU5XHT, PO2ART in the last 72 hours. Cultures:   None    PFTs:   No obstruction      ECHO: 2/22   Normal left ventricle size, wall thickness, and systolic function with an   estimated ejection fraction of 55-60%. No regional wall motion abnormalities   are seen. grade 1 diastolic dysfunction   The right ventricle is normal in size and function.    No significant valvular heart disease     ABG:  None    Chest X-ray:  Chest imaging was reviewed by me and showed moderate to large left effusion    Chest CT:  Chest imaging was reviewed by me and showed moderate left effusion with atelectasis     I reviewed all the above labs and studies pertaining to this visit. ASSESSMENT/PLAN:  Recurrent left pleural effusion, possible trapped lung (neutrophilic in June with negative cytology )  US guided thoracentesis (she pulled out her chest tube last time due to confusion). Try to avoid chest tube placement at this time.   Fluid does not seem to be loculated on imaging  R/o other causes of effusion such as gastric./esophageal perf (had gastric bypass surgery) and chylothorax along with routine causes   Continue with Zosyn for now    DVT prophylaxis  Lovenox placed on hold    Daughter agreeable to proceed with thoracentesis      Curtisia Cassette, DO  [facility]

## 2022-07-15 NOTE — PROGRESS NOTES
Pts daughter present at bedside. This RN informed her that she will need to bring the trelegy inhaler from home and she is agreeable to do so. I also asked to verify the dosage for gabapentin. She states that the pt is taking 300 mg four times daily.

## 2022-07-15 NOTE — BRIEF OP NOTE
Brief Postoperative Note    Elizabeth Cortes  YOB: 1940  [id number]    Pre-operative Diagnosis: left pleural effusion    Post-operative Diagnosis: Same    Procedure: US-guided left thoracentesis    Anesthesia: Local    Surgeons: Katarzyna Fay MD    Estimated Blood Loss: Less than 5 mL    Complications: None    Specimens: Was Obtained: left pleural fluid drained and sent for labs    Findings: Successful US-guided left thoracentesis.     Electronically signed by Katarzyna Fay MD on 7/15/2022 at 2:04 PM

## 2022-07-15 NOTE — PROGRESS NOTES
Pt admitted from ED. Daughter, Ashkan Holley at bedside to help with admitting questionaire. Pt forgetful at baseline. Per daughter pt able to ambulate with walker and able to feed self. Pt currently resting in bed with no complaints, call light within reach.

## 2022-07-16 ENCOUNTER — APPOINTMENT (OUTPATIENT)
Dept: CT IMAGING | Age: 82
DRG: 177 | End: 2022-07-16
Payer: MEDICARE

## 2022-07-16 PROBLEM — I10 HTN (HYPERTENSION): Status: ACTIVE | Noted: 2022-07-16

## 2022-07-16 PROBLEM — E05.90 HYPERTHYROIDISM: Status: ACTIVE | Noted: 2022-07-16

## 2022-07-16 PROBLEM — N18.32 CHRONIC KIDNEY DISEASE (CKD) STAGE G3B/A2, MODERATELY DECREASED GLOMERULAR FILTRATION RATE (GFR) BETWEEN 30-44 ML/MIN/1.73 SQUARE METER AND ALBUMINURIA CREATININE RATIO BETWEEN 30-299 MG/G (HCC): Status: ACTIVE | Noted: 2022-06-26

## 2022-07-16 PROBLEM — Z95.1 HX OF CABG: Status: ACTIVE | Noted: 2022-07-16

## 2022-07-16 LAB
A/G RATIO: 0.6 (ref 1.1–2.2)
ALBUMIN SERPL-MCNC: 2.3 G/DL (ref 3.4–5)
ALP BLD-CCNC: 59 U/L (ref 40–129)
ALT SERPL-CCNC: 10 U/L (ref 10–40)
ANION GAP SERPL CALCULATED.3IONS-SCNC: 10 MMOL/L (ref 3–16)
AST SERPL-CCNC: 14 U/L (ref 15–37)
BASOPHILS ABSOLUTE: 0 K/UL (ref 0–0.2)
BASOPHILS RELATIVE PERCENT: 0.6 %
BILIRUB SERPL-MCNC: 0.5 MG/DL (ref 0–1)
BUN BLDV-MCNC: 15 MG/DL (ref 7–20)
CALCIUM SERPL-MCNC: 8.1 MG/DL (ref 8.3–10.6)
CHLORIDE BLD-SCNC: 102 MMOL/L (ref 99–110)
CO2: 25 MMOL/L (ref 21–32)
CREAT SERPL-MCNC: 1.9 MG/DL (ref 0.6–1.2)
EOSINOPHILS ABSOLUTE: 0.2 K/UL (ref 0–0.6)
EOSINOPHILS RELATIVE PERCENT: 2.2 %
GFR AFRICAN AMERICAN: 31
GFR NON-AFRICAN AMERICAN: 25
GLUCOSE BLD-MCNC: 109 MG/DL (ref 70–99)
HCT VFR BLD CALC: 21.4 % (ref 36–48)
HCT VFR BLD CALC: 26.2 % (ref 36–48)
HEMOGLOBIN: 7.1 G/DL (ref 12–16)
HEMOGLOBIN: 8.6 G/DL (ref 12–16)
LYMPHOCYTES ABSOLUTE: 1 K/UL (ref 1–5.1)
LYMPHOCYTES RELATIVE PERCENT: 14.8 %
MAGNESIUM: 2.1 MG/DL (ref 1.8–2.4)
MCH RBC QN AUTO: 27.2 PG (ref 26–34)
MCHC RBC AUTO-ENTMCNC: 33.3 G/DL (ref 31–36)
MCV RBC AUTO: 81.5 FL (ref 80–100)
MONOCYTES ABSOLUTE: 0.8 K/UL (ref 0–1.3)
MONOCYTES RELATIVE PERCENT: 10.9 %
NEUTROPHILS ABSOLUTE: 5 K/UL (ref 1.7–7.7)
NEUTROPHILS RELATIVE PERCENT: 71.5 %
PDW BLD-RTO: 16.4 % (ref 12.4–15.4)
PLATELET # BLD: 182 K/UL (ref 135–450)
PMV BLD AUTO: 8.5 FL (ref 5–10.5)
POTASSIUM SERPL-SCNC: 3.9 MMOL/L (ref 3.5–5.1)
PROCALCITONIN: 0.09 NG/ML (ref 0–0.15)
RBC # BLD: 2.62 M/UL (ref 4–5.2)
SODIUM BLD-SCNC: 137 MMOL/L (ref 136–145)
TOTAL PROTEIN: 6 G/DL (ref 6.4–8.2)
WBC # BLD: 7 K/UL (ref 4–11)

## 2022-07-16 PROCEDURE — 99233 SBSQ HOSP IP/OBS HIGH 50: CPT | Performed by: INTERNAL MEDICINE

## 2022-07-16 PROCEDURE — 83735 ASSAY OF MAGNESIUM: CPT

## 2022-07-16 PROCEDURE — 2580000003 HC RX 258: Performed by: INTERNAL MEDICINE

## 2022-07-16 PROCEDURE — 97535 SELF CARE MNGMENT TRAINING: CPT

## 2022-07-16 PROCEDURE — 6360000002 HC RX W HCPCS: Performed by: STUDENT IN AN ORGANIZED HEALTH CARE EDUCATION/TRAINING PROGRAM

## 2022-07-16 PROCEDURE — 94760 N-INVAS EAR/PLS OXIMETRY 1: CPT

## 2022-07-16 PROCEDURE — 97166 OT EVAL MOD COMPLEX 45 MIN: CPT

## 2022-07-16 PROCEDURE — 87641 MR-STAPH DNA AMP PROBE: CPT

## 2022-07-16 PROCEDURE — 84145 PROCALCITONIN (PCT): CPT

## 2022-07-16 PROCEDURE — 93005 ELECTROCARDIOGRAM TRACING: CPT | Performed by: INTERNAL MEDICINE

## 2022-07-16 PROCEDURE — 6360000002 HC RX W HCPCS: Performed by: INTERNAL MEDICINE

## 2022-07-16 PROCEDURE — 85025 COMPLETE CBC W/AUTO DIFF WBC: CPT

## 2022-07-16 PROCEDURE — 85014 HEMATOCRIT: CPT

## 2022-07-16 PROCEDURE — 71250 CT THORAX DX C-: CPT

## 2022-07-16 PROCEDURE — 94640 AIRWAY INHALATION TREATMENT: CPT

## 2022-07-16 PROCEDURE — 1200000000 HC SEMI PRIVATE

## 2022-07-16 PROCEDURE — 85018 HEMOGLOBIN: CPT

## 2022-07-16 PROCEDURE — 2580000003 HC RX 258: Performed by: STUDENT IN AN ORGANIZED HEALTH CARE EDUCATION/TRAINING PROGRAM

## 2022-07-16 PROCEDURE — 97530 THERAPEUTIC ACTIVITIES: CPT

## 2022-07-16 PROCEDURE — 36415 COLL VENOUS BLD VENIPUNCTURE: CPT

## 2022-07-16 PROCEDURE — 80053 COMPREHEN METABOLIC PANEL: CPT

## 2022-07-16 PROCEDURE — 6370000000 HC RX 637 (ALT 250 FOR IP): Performed by: STUDENT IN AN ORGANIZED HEALTH CARE EDUCATION/TRAINING PROGRAM

## 2022-07-16 RX ADMIN — VANCOMYCIN HYDROCHLORIDE 1000 MG: 1 INJECTION, POWDER, LYOPHILIZED, FOR SOLUTION INTRAVENOUS at 01:20

## 2022-07-16 RX ADMIN — SODIUM BICARBONATE 650 MG: 650 TABLET ORAL at 08:10

## 2022-07-16 RX ADMIN — MOMETASONE FUROATE AND FORMOTEROL FUMARATE DIHYDRATE 2 PUFF: 100; 5 AEROSOL RESPIRATORY (INHALATION) at 09:11

## 2022-07-16 RX ADMIN — ATORVASTATIN CALCIUM 80 MG: 80 TABLET, FILM COATED ORAL at 19:50

## 2022-07-16 RX ADMIN — PIPERACILLIN AND TAZOBACTAM 3375 MG: 3; .375 INJECTION, POWDER, LYOPHILIZED, FOR SOLUTION INTRAVENOUS at 08:35

## 2022-07-16 RX ADMIN — MOMETASONE FUROATE AND FORMOTEROL FUMARATE DIHYDRATE 2 PUFF: 100; 5 AEROSOL RESPIRATORY (INHALATION) at 20:11

## 2022-07-16 RX ADMIN — GABAPENTIN 300 MG: 300 CAPSULE ORAL at 19:50

## 2022-07-16 RX ADMIN — TIOTROPIUM BROMIDE INHALATION SPRAY 2 PUFF: 3.12 SPRAY, METERED RESPIRATORY (INHALATION) at 09:11

## 2022-07-16 RX ADMIN — PIPERACILLIN AND TAZOBACTAM 3375 MG: 3; .375 INJECTION, POWDER, LYOPHILIZED, FOR SOLUTION INTRAVENOUS at 01:08

## 2022-07-16 RX ADMIN — METHIMAZOLE 5 MG: 5 TABLET ORAL at 08:10

## 2022-07-16 RX ADMIN — GABAPENTIN 300 MG: 300 CAPSULE ORAL at 08:10

## 2022-07-16 RX ADMIN — PIPERACILLIN AND TAZOBACTAM 3375 MG: 3; .375 INJECTION, POWDER, LYOPHILIZED, FOR SOLUTION INTRAVENOUS at 16:11

## 2022-07-16 RX ADMIN — METOPROLOL SUCCINATE 25 MG: 25 TABLET, EXTENDED RELEASE ORAL at 08:10

## 2022-07-16 RX ADMIN — SODIUM CHLORIDE, PRESERVATIVE FREE 10 ML: 5 INJECTION INTRAVENOUS at 10:34

## 2022-07-16 NOTE — PROGRESS NOTES
Pulmonary Critical Care Progress Note     Patient's name:  Karen Alcantara  Medical Record Number: [id number]  Patient's account/billing number: [de-identified]  Patient's YOB: 1940  Age: 81 y.o. Date of Admission: 7/14/2022  7:04 PM  Date of Consult: 7/16/2022      Primary Care Physician: John Guillory      Code Status: Limited    Chief complaint: left effusion     Assessment and Plan     Recurrent left effusion, neutrophilic, exudate, s/p US thoracentesis 500cc. LLL PNA       Plan:  Fluid seems to be para pneumonic, follow up cytology and culture results, follow up  CT results, if there is large remaining fluid we might have to consider chest tube placement with close patient monitoring to insure it stays in place. Continue antibiotics          Overnight:  S/p thoracentsis  500 drained      REVIEW OF SYSTEMS:  Review of Systems -   General ROS: negative  Psychological ROS: negative  Ophthalmic ROS: negative  ENT ROS: negative  Allergy and Immunology ROS: negative  Hematological and Lymphatic ROS: negative  Endocrine ROS: negative  Breast ROS: negative  Respiratory ROS: cough left side tightness   Cardiovascular ROS: no chest pain or dyspnea on exertion  Gastrointestinal ROS:negative  Genito-Urinary ROS: negative  Musculoskeletal ROS: negative  Neurological ROS: on and off confused   Dermatological ROS: negative        Physical Exam:    Vitals: /68   Pulse 64   Temp 97.8 °F (36.6 °C) (Oral)   Resp 18   Ht 5' 3\" (1.6 m)   Wt 162 lb 0.6 oz (73.5 kg)   SpO2 96%   BMI 28.70 kg/m²     Last Body weight:   Wt Readings from Last 3 Encounters:   07/16/22 162 lb 0.6 oz (73.5 kg)   07/01/22 148 lb 9.4 oz (67.4 kg)   04/19/22 155 lb (70.3 kg)       Body Mass Index : Body mass index is 28.7 kg/m².       Intake and Output summary:   Intake/Output Summary (Last 24 hours) at 7/16/2022 1159  Last data filed at 7/16/2022 0100  Gross per 24 hour   Intake 480 ml   Output 1500 ml   Net -1020 ml       Physical Examination:     Gen:  No acute distress. Eyes: PERRL. Anicteric sclera. No conjunctival injection. ENT: No discharge. Posterior oropharynx clear. External appearance of ears and nose normal.  Neck: Trachea midline. No mass   Resp:  diminished with dull percussion notes on the left side. CV: Regular rate. Regular rhythm. No murmur or rub. No edema. GI: Soft, Non-tender. Non-distended. +BS  Skin: Warm, dry, w/o erythema. Lymph: No cervical or supraclavicular LAD. M/S: No cyanosis. No clubbing. Neuro:  CN 2-12 tested, no focal neurologic deficit. Moves all extremities  Psych: Awake and alert, Oriented x 3. Judgement and insight appropriate. Mood stable. Laboratory findings:-    CBC:   Recent Labs     07/16/22  0641   WBC 7.0   HGB 7.1*        BMP:    Recent Labs     07/14/22  1935 07/14/22  1935 07/15/22  0630 07/16/22  0641     --  139 137   K 4.5   < > 4.1 3.9     --  104 102   CO2 24  --  24 25   BUN 17  --  16 15   CREATININE 1.9*  --  1.8* 1.9*   GLUCOSE 99  --  105* 109*    < > = values in this interval not displayed. S. Calcium:  Recent Labs     07/16/22  0641   CALCIUM 8.1*       S. Magnesium:  Recent Labs     07/16/22  0641   MG 2.10         Radiology Review:  Pertinent images / reports were reviewed as a part of this visit.     CT reviewed                  Daniel Culver MD, MROSETTE.            7/16/2022, 11:59 AM

## 2022-07-16 NOTE — PROGRESS NOTES
Occupational Therapy  Facility/Department: [facility] SURG  Occupational Therapy Initial Assessment    Name: Elizabeth Cortes  : 1940  MRN: [id number]  Date of Service: 2022    Discharge Recommendations:  24 hour supervision or assist, Home with Home health OT     Despite AMPAC score, pt has adequate assistance to return home with home care and 24/7 supervision and hands on assistance from her daughter. Patient Diagnosis(es): The primary encounter diagnosis was Recurrent left pleural effusion. Diagnoses of Left upper quadrant abdominal pain, Anemia, unspecified type, Chronic kidney disease, unspecified CKD stage, Hypoalbuminemia, and Goals of care, counseling/discussion were also pertinent to this visit. Past Medical History:  has a past medical history of Arthritis, Asthma, CAD (coronary artery disease), CKD (chronic kidney disease), Glaucoma, Hyperlipidemia, Hypertension, Hyperthyroidism, IBS (irritable bowel syndrome), Neuropathy, Obesity (BMI 30-39.9), Osteopenia, and Osteoporosis. Past Surgical History:  has a past surgical history that includes Cholecystectomy; Foot surgery; Tubal ligation; Gastric Band ([id number]); orif femur decompression (Left, 2014); Ankle fracture surgery (Left, 2015); Hand surgery (); other surgical history (Right, ); Coronary artery bypass graft (2022); Coronary artery bypass graft (N/A, 2022); IR TUNNELED CVC PLACE WO SQ PORT/PUMP > 5 YEARS (3/7/2022); and Diagnostic Cardiac Cath Lab Procedure (2022). Assessment   Performance deficits / Impairments: Decreased functional mobility ; Decreased ADL status; Decreased balance;Decreased endurance  Assessment: Pt is 81 y.o. F who presents with epigastric pain, mild shortness of breath. Pt found to have left sided pleural effusion. Pt is s/p US-guided left thoracentesis. PTA pt lives with daughter, who is pt caregiver in one story home with 6 BRITTANY.  Pt reports daughter assistance for self-care and homemaking responsibilities. Pt completes functional mobility at baseline with SBA from daughter no AD. Currently, pt presents with ROM/strength in [facility] for self-care and transfers. Pt completed sit <> stand transfers with SBA/CGA and functional mobility with RW with SBA/CGA. Pt required max A for toileting/LB dressing after episode of incontinence - pt daughter can assist with this at d/c. Anticipate pt safe to d/c home with 24/7 supervision/hands on assist. Pt would benefit from home health OT. Prognosis: Fair  Decision Making: Medium Complexity  History: PMH: CAD, glaucoma, CKD, neuropathy, IBS, osteoporosis, CABG  Exam: ADLs, transfers, func mob  Assistance / Modification: SBA/CGA for mobility, max A for ADLs  REQUIRES OT FOLLOW-UP: Yes  Activity Tolerance  Activity Tolerance: Patient Tolerated treatment well        Plan   Plan  Times per Week: 3-5  Current Treatment Recommendations: Functional mobility training, Endurance training, Safety education & training, Patient/Caregiver education & training, Equipment evaluation, education, & procurement, Self-Care / ADL     Restrictions  Restrictions/Precautions  Restrictions/Precautions: Fall Risk    Subjective   General  Chart Reviewed: Yes  Patient assessed for rehabilitation services?: Yes  Additional Pertinent Hx: Pt is 81 y.o. F who presents with epigastric pain, mild shortness of breath. Pt found to have left sided pleural effusion. Pt is s/p US-guided left thoracentesis. PMH: CAD, glaucoma, CKD, neuropathy, IBS, osteoporosis, CABG  Family / Caregiver Present: Yes (daughter)  Referring Practitioner: Ian Amin MD  Diagnosis: pleural effusion  Subjective  Subjective: Pt met bedside, agreeable for therapy evaluation. Pt seated upright in recliner chair talking with daughter. Pt concerned about not wearing proper clothing - daughter reports \"I have been talking with her about this\" unsure baseline cognition.      Social/Functional History  Social/Functional History  Lives With: Family (Daughter; ZOHRA)  Type of Home: Apartment (basement level apartment; 6 steps with R handrail)  Home Layout: One level  Home Access: Stairs to enter with rails  Entrance Stairs - Number of Steps: 6 steps  Entrance Stairs - Rails: Right  Bathroom Shower/Tub: Shower chair with back, Tub/Shower unit  Bathroom Toilet: Standard (vanity nearby)  Bathroom Equipment: Shower chair, Campus Shift, Grab bars in shower  Bathroom Accessibility: Accessible  Home Equipment: Walker, rolling  ADL Assistance: Needs assistance (Daughter assits with dressing, bathing, tolieting. Pt independent grooming and feeding.)  Homemaking Assistance: Needs assistance (Daughter manages housemaking.)  Ambulation Assistance: Needs assistance (Not always using AD; SBA of daughter)  Active : No  Mode of Transportation: Family  Occupation: Retired  Type of Occupation: worked [address] [place] [address] [employer]       Objective   Heart Rate: 64  [address] [facility]: Monitor  BP: 115/68  BP Location: Right Arm  MAP (Calculated): 83.67  Resp: 18  SpO2: 96 %  O2 Device: None (Room air)             Safety Devices  Type of Devices: Chair alarm in place; Left in chair;Gait belt;Call light within reach;Nurse notified  Restraints  Restraints Initially in Place: No    Gait  Overall Level of Assistance: Stand-by assistance (Pt completed functional mobility with RW with SBA for balance and assist to manage IV pole.)    Toilet Transfers  Toilet - Technique: Ambulating  Equipment Used:  (comfort heigth commode)  Toilet Transfer: Stand by assistance;Contact guard assistance    AROM: Generally decreased, functional  Strength: Generally decreased, functional    ADL  Grooming Skilled Clinical Factors: Washed hands with wipe, with setup  Toileting: Maximum assistance  Toileting Skilled Clinical Factors: Assistance to manage depends on/off and complete pericare  Additional Comments: PTA pt receives assistance from daughter for self-care tasks. Anticipate pt to require mod/max A for ADL needs at this time - daughter present and reports pt is slightly below baseline but is able to assist patient as needed. Bed mobility  Bed Mobility Comments: Not Assessed: Pt was in recliner chair at beginning and end of session. Transfers  Sit to stand: Stand by assistance;Contact guard assistance  Stand to sit: Stand by assistance;Contact guard assistance  Transfer Comments: Increased cueing for safe hand placement - sit <> stand from recliner chair to RW and back to recliner chair with SBA/CGA     Cognition  Cognition Comment: Overall responding appropriately to questions - continue to assess for safety awareness. Likely some deficits in memory. Education Given To: Patient;Caregiver  Education Provided: Role of Therapy; ADL Adaptive Strategies; Plan of Care;Transfer Training;Precautions             [facility] Score   Rayola Cheryle Showman [facility]. Based on the patient's -PAC score, and their current ADL deficits, it is recommended that the patient have 2-3 sessions per week of Occupational Therapy at d/c to increase the patient's independence. At this time, this patient demonstrates the endurance and safety to discharge home with home health OT (home vs OP services) and a follow up treatment frequency of 2-3x/wk. Please see assessment section for further patient specific details. DESPITE AMPAC SCORE, Pt has adequate assistance to d/c home with 24/7 supervision/hands on assistance from daughter. If patient discharges prior to next session this note will serve as a discharge summary. Please see below for the latest assessment towards goals.        [facility] Inpatient Daily Activity Raw Score: 16 (07/16/22 1206)  AM-PAC Inpatient ADL T-Scale Score : 35.96 (07/16/22 1206)  ADL Inpatient CMS 0-100% Score: 53.32 (07/16/22 1206)  ADL Inpatient CMS G-Code Modifier : CK (07/16/22 1206)    Goals  Short Term Goals  Time Frame for Short term goals: prior to d/c  Short Term Goal 1: Pt will complete functional mobility and transfers with SPV  Short Term Goal 2: Pt will complete toileting with SPV  Short Term Goal 3: Pt will complete bathing/dressing with min A  Short Term Goal 4: Pt will tolerate 4+ mintues of standing activity to increase strength and activity tolerance for self-care and transfers. Patient Goals   Patient goals : \"to get home\"       Therapy Time   Individual Concurrent Group Co-treatment   Time In 0930         Time Out 1010         Minutes 40         Timed Code Treatment Minutes: 25 Minutes (15 min eval)     If pt is discharged prior to next OT session, this note will serve as the discharge summary.     Arvind Cruz, [facility]/H#[id number]

## 2022-07-16 NOTE — CONSULTS
Clinical Pharmacy Note  Vancomycin Consult    Kareen Varela is a 81 y.o. female ordered Vancomycin for pleural effusion; consult received from Dr. Karely Centeno to manage therapy. Also receiving Zosyn. Allergies:  Patient has no known allergies. Temp max:  Temp (24hrs), Av.5 °F (36.9 °C), Min:98.2 °F (36.8 °C), Max:98.7 °F (37.1 °C)      Recent Labs     07/14/22  1935 07/15/22  0630   WBC 9.7 8.2       Recent Labs     07/14/22  1935 07/15/22  0630   BUN 17 16   CREATININE 1.9* 1.8*         Intake/Output Summary (Last 24 hours) at 2022 0044  Last data filed at 7/15/2022 2111  Gross per 24 hour   Intake 600 ml   Output 1000 ml   Net -400 ml       Culture Results:  pending    Ht Readings from Last 1 Encounters:   07/15/22 5' 3\" (1.6 m)        Wt Readings from Last 1 Encounters:   07/15/22 156 lb 1.4 oz (70.8 kg)         Estimated Creatinine Clearance: 23 mL/min (A) (based on SCr of 1.8 mg/dL (H)). Assessment/Plan:  Vancomycin level = 12.2 ug/mL  Will re-dose Vancomycin 1,000 mg IVPB x 1 dose now  Repeat Vancomycin level on 22 with AM labs    Thank you for the consult. Will continue to follow.     Jenean Opitz, RPH, PharmD, BCPS  2022 12:46 AM

## 2022-07-16 NOTE — PROGRESS NOTES
Hospitalist Progress Note      PCP: Rosalina Osborn [address]    Date of Admission: 7/14/2022      Hospital Course: pt with hx of ASHD, s/p cabg readmitted with SOB in setting of recurrent left pleural effusion. Work up ongoing. Subjective: pt seen and examined. Sitting up in chair, daughter at bedside. Concerned about left UE swelling for infiltrated IV. Reassured, elevate forearm. Medications:  Reviewed    Infusion Medications    sodium chloride       Scheduled Medications    [Held by provider] aspirin EC  81 mg Oral Daily    atorvastatin  80 mg Oral Nightly    methIMAzole  5 mg Oral Daily    metoprolol succinate  25 mg Oral Daily    sodium bicarbonate  650 mg Oral BID    sodium chloride flush  5-40 mL IntraVENous 2 times per day    piperacillin-tazobactam  3,375 mg IntraVENous Q8H    [Held by provider] enoxaparin  30 mg SubCUTAneous Daily    vancomycin (VANCOCIN) intermittent dosing (placeholder)   Other RX Placeholder    gabapentin  300 mg Oral BID    mometasone-formoterol  2 puff Inhalation BID    tiotropium  2 puff Inhalation Daily     PRN Meds: sodium chloride flush, sodium chloride, acetaminophen **OR** acetaminophen      Intake/Output Summary (Last 24 hours) at 7/16/2022 1059  Last data filed at 7/16/2022 0100  Gross per 24 hour   Intake 480 ml   Output 1500 ml   Net -1020 ml       Physical Exam Performed:    /63   Pulse 70   Temp 97.8 °F (36.6 °C) (Oral)   Resp 18   Ht 5' 3\" (1.6 m)   Wt 162 lb 0.6 oz (73.5 kg)   SpO2 97%   BMI 28.70 kg/m²     General appearance: No apparent distress, appears stated age and cooperative. HEENT: Pupils equal, round, and reactive to light. Conjunctivae/corneas clear. Respiratory:  Normal respiratory effort. No BS appreciated in the left lung fields  Cardiovascular: Regular rate and rhythm with normal S1/S2 without murmurs, rubs or gallops. Abdomen: Soft, non-tender, non-distended with normal bowel sounds.   Musculoskeletal: No clubbing, cyanosis or edema bilaterally. Neurologic:  grossly non-focal.  Psychiatric: Alert and oriented x3  Peripheral Pulses: +2 palpable, equal bilaterally       Labs:   Recent Labs     07/14/22  1935 07/15/22  0630 07/16/22  0641   WBC 9.7 8.2 7.0   HGB 8.3* 8.1* 7.1*   HCT 25.5* 24.4* 21.4*    204 182     Recent Labs     07/14/22  1935 07/15/22  0630 07/16/22  0641    139 137   K 4.5 4.1 3.9    104 102   CO2 24 24 25   BUN 17 16 15   CREATININE 1.9* 1.8* 1.9*   CALCIUM 8.6 8.5 8.1*     Recent Labs     07/14/22  1935 07/15/22  0630 07/16/22  0641   AST 19 16 14*   ALT 11 10 10   BILITOT 0.5 0.5 0.5   ALKPHOS 75 73 59     Recent Labs     07/15/22  0630   INR 1.30*     Recent Labs     07/14/22 1935   TROPONINI <0.01       Urinalysis:      Lab Results   Component Value Date/Time    NITRU Negative 02/23/2022 09:26 PM    BLOODU Negative 02/23/2022 09:26 PM    SPECGRAV 1.026 02/23/2022 09:26 PM    GLUCOSEU Negative 02/23/2022 09:26 PM    GLUCOSEU Negative 08/10/2011 09:00 AM       Radiology:  US THORACENTESIS Which side should the procedure be performed? Left   Final Result   Successful ultrasound guided left thoracentesis. XR CHEST 1 VIEW   Final Result   No appreciable pneumothorax status post left thoracentesis. CT ABDOMEN PELVIS WO CONTRAST Additional Contrast? None   Final Result   1. No acute process in the abdomen or pelvis. 2. Large volume stool throughout the colon. 3. Partially imaged left pleural effusion. XR CHEST (2 VW)   Final Result   Increase in volume of left pleural effusion, now moderate to large.          CT CHEST WO CONTRAST    (Results Pending)           Assessment/Plan:    Active Hospital Problems    Diagnosis     Hx of CABG [Z95.1]      Priority: Medium    Hyperthyroidism [E05.90]      Priority: Medium    HTN (hypertension) [I10]      Priority: Medium    Recurrent left pleural effusion [J90]      Priority: Medium    Chronic kidney disease (CKD) stage G3b/A2, moderately decreased glomerular filtration rate (GFR) between 30-44 mL/min/1.73 square meter and albuminuria creatinine ratio between  mg/g (HCC) [N18.32]      Priority: Medium    CAD in native artery [I25.10]      Pt readmitted with SOB, recurrent left pleural effusion  Exudative fluid , cont abx pending fluid cultures   Concern for loculated fluid, may need chest tube vrs CT surgery intervention , will repeat CT chest today   Repeat H/H   Cont home meds  Monitor BP  Monitor Is AND Os   Left forearm IVF infiltration : elevate arm     DVT Prophylaxis: resume lovenox  Diet: ADULT DIET; Regular; 4 carb choices (60 gm/meal);  Low Sodium (2 gm)  Code Status: Limited        Dispo - possible dc home in 1-2 days pending planned tests and pts progress     Sage Cornejo MD

## 2022-07-16 NOTE — PLAN OF CARE
Problem: Discharge Planning  Goal: Discharge to home or other facility with appropriate resources  7/16/2022 1032 by Dipak Song RN  Outcome: Progressing  7/16/2022 0332 by Michelet Sweet RN  Outcome: Progressing     Problem: Pain  Goal: Verbalizes/displays adequate comfort level or baseline comfort level  7/16/2022 1032 by Dipak Song RN  Outcome: Progressing  7/16/2022 0332 by Michelet Sweet RN  Outcome: Progressing     Problem: Skin/Tissue Integrity  Goal: Absence of new skin breakdown  Description: 1. Monitor for areas of redness and/or skin breakdown  2. Assess vascular access sites hourly  3. Every 4-6 hours minimum:  Change oxygen saturation probe site  4. Every 4-6 hours:  If on nasal continuous positive airway pressure, respiratory therapy assess nares and determine need for appliance change or resting period.   7/16/2022 1032 by Dipak Song RN  Outcome: Progressing  7/16/2022 0332 by Michelet Sweet RN  Outcome: Progressing     Problem: Safety - Adult  Goal: Free from fall injury  7/16/2022 1032 by Dipak Song RN  Outcome: Progressing  7/16/2022 0332 by Michelet Sweet RN  Outcome: Progressing

## 2022-07-16 NOTE — CONSULTS
NEPHROLOGY CONSULT NOTE  [facility]    Assessment and plan  Renal failure: This time she presented with a creatinine of 1.9,  According to the information in the computer, she has history of renal failure already for a while, in February 2022, the patient creatinine was 1.3. Then she suffered 1 episode of SKINNY towards the end of February 2022, serum creatinine was peaked at 6.5. Then she had another episode of SKINNY in March 2022, creatinine was up to 6  After that, the patient's serum creatinine has been around 1.8-1.9    Therefore, the patient kidney function appears to be at his baseline level  The patient UA was last time done in February, which was bland    Therefore should repeat a UA  She had a CT scan of her abdomen on this presentation, which showed \"kidneys are unremarkable\". There is no need for any further testing for the patient renal failure  Continue supportive care and avoid nephrotoxins. CKD MBD:  The patient's calcium level is low, so I will check the patient's PTH and 25-hydroxy vitamin D level    Anemia: This time she presented with a hemoglobin level of 8.3  Anemia has been going on for quite some time  Her ferritin level was 341 and T sat was 15 on 6/20/2022 therefore, she is not iron deficient. Hypertension:   The patient blood pressure has been labile, with the most recent blood pressure at 122/65, earlier, her blood pressure was 159/76. As of now, she is getting metoprolol 25 mg p.o. once daily    Acidosis? The patient most recent bicarb was 25 and she is bicarb supplementation, so I will discontinue patient bicarb for the time being. S: Abdominal pain    HPI:  The patient is 81years old, initially presented to the [facility] on 7/14/2022 at about 6:50 PM for evaluation for abdominal pain. She apparently has some dementia and a poor historian. Therefore, the information was provided by the patient's daughter who is at her bedside.   She was recently hospitalized for similar problem per the patient daughter. She was found to have \"water around her lung\". According to a note from the emergency room physician, on her previous hospitalization, she was found to have pleural effusion. According to the patient daughter, after she left hospital, she went to her house. The patient was able to get around with a walker slowly inside her house. She was able to go to bathroom in the kitchen. She was also able to climb a few steps of stairs. Her appetite was okay. 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 I saw the patient in her room with her daughter and multiple grandchildren at her bedside  She was up to chair awake, and conversant, she looked pale and frail, but she was under no acute distress.   Problem list:  Patient Active Problem List   Diagnosis    Morbid obesity ([facility] [address]    Chest pain    Unstable angina (HCC)    Abnormal cardiovascular stress test    Hyperlipidemia LDL goal <70    CAD in native artery    Loculated pleural effusion on the left (Large)    Chronic kidney disease (CKD) stage G3b/A2, moderately decreased glomerular filtration rate (GFR) between 30-44 mL/min/1.73 square meter and albuminuria creatinine ratio between  mg/g (HCC)    Generalized weakness    Fatigue    Recurrent left pleural effusion    Hx of CABG    Hyperthyroidism    HTN (hypertension)     Current Facility-Administered Medications   Medication Dose Route Frequency Provider Last Rate Last Admin    aspirin EC tablet 81 mg  81 mg Oral Daily Aki [facility], DO        atorvastatin (LIPITOR) tablet 80 mg  80 mg Oral Nightly Aki [facility], DO   80 mg at 07/15/22 2108    methIMAzole (TAPAZOLE) tablet 5 mg  5 mg Oral Daily Aki [facility], DO   5 mg at 07/16/22 0810    metoprolol succinate (TOPROL XL) extended release tablet 25 mg  [facility]   25 mg at 07/16/22 0810    sodium bicarbonate tablet 650 mg 650 mg Oral BID [facility]   650 mg at 07/16/22 0810    sodium chloride flush 0.9 % injection 5-40 mL  5-40 mL IntraVENous 2 times per day [facility]   10 mL at 07/16/22 1034    sodium chloride flush 0.9 % injection 5-40 mL  5-40 mL IntraVENous PRN [facility]        0.9 % sodium chloride infusion   IntraVENous PRN [facility], [facility]        acetaminophen (TYLENOL) tablet 650 mg  650 mg Oral Q6H PRN [facility]        Or    acetaminophen (TYLENOL) suppository 650 mg  650 mg Rectal Q6H PRN [facility], DO        piperacillin-tazobactam (ZOSYN) 3,375 mg in dextrose 5 % 100 mL IVPB extended infusion (mini-bag)  3,375 mg IntraVENous Q8H [facility], DO 25 mL/hr at 07/16/22 1611 3,375 mg at 07/16/22 1611    enoxaparin Sodium (LOVENOX) injection 30 mg  30 mg SubCUTAneous Daily Aki M Jenna, DO        vancomycin (VANCOCIN) intermittent dosing (placeholder)   Other RX Placeholder Aki Marvin Cabrales DO        gabapentin (NEURONTIN) capsule 300 mg  300 mg Oral BID [facility], DO   300 mg at 07/16/22 0810    mometasone-formoterol (DULERA) 100-5 MCG/ACT inhaler 2 puff  2 puff Inhalation BID [facility], DO   2 puff at 07/16/22 0911    tiotropium (SPIRIVA RESPIMAT) 2.5 MCG/ACT inhaler 2 puff  2 puff Inhalation Daily [facility], DO   2 puff at 07/16/22 9020       Review of Systems -   Not available in detail  But according to patient daughter, she is able to ambulate, but is slow and unsteady, requires walker  She has some shortness of breath  Her appetite is okay  She has dementia.       EXAM  /65   Pulse 80   Temp 98.6 °F (37 °C) (Oral)   Resp 18   Ht 5' 3\" (1.6 m)   Wt 162 lb 0.6 oz (73.5 kg)   SpO2 100%   BMI 28.70 kg/m²   Physical Examination:   General appearance - pale and frail  Mental status - awake and somewhat conversant  Mouth - mucous membranes moist,   Chest - clear to auscultation,   Heart - normal rate, regular rhythm, normal S1, S2, no murmurs, rubs, clicks or gallops  Abdomen - firm, but benign  Neurological - her hands strength is good  Musculoskeletal - no joint tenderness, deformity or swelling  Extremities - peripheral pulses normal, trace edema,  Skin - poor skin turgor, no rashes    STUDIES:  Lab Results   Component Value Date    CREATININE 1.9 (H) 07/16/2022    BUN 15 07/16/2022     07/16/2022    K 3.9 07/16/2022     07/16/2022    CO2 25 07/16/2022     Lab Results   Component Value Date    CALCIUM 8.1 (L) 07/16/2022    PHOS 1.4 (L) 03/05/2022     Lab Results   Component Value Date    WBC 7.0 07/16/2022    HGB 8.6 (L) 07/16/2022    HCT 26.2 (L) 07/16/2022    MCV 81.5 07/16/2022     07/16/2022       Thank you for allowing me to participate in this patient's care. Please contact me for any questions.     Guerline Car MD  [phone]

## 2022-07-17 LAB
A/G RATIO: 0.7 (ref 1.1–2.2)
ALBUMIN SERPL-MCNC: 2.5 G/DL (ref 3.4–5)
ALP BLD-CCNC: 59 U/L (ref 40–129)
ALT SERPL-CCNC: 8 U/L (ref 10–40)
ANION GAP SERPL CALCULATED.3IONS-SCNC: 12 MMOL/L (ref 3–16)
AST SERPL-CCNC: 13 U/L (ref 15–37)
BASOPHILS ABSOLUTE: 0.1 K/UL (ref 0–0.2)
BASOPHILS RELATIVE PERCENT: 1.1 %
BILIRUB SERPL-MCNC: 0.5 MG/DL (ref 0–1)
BUN BLDV-MCNC: 12 MG/DL (ref 7–20)
CALCIUM SERPL-MCNC: 8.2 MG/DL (ref 8.3–10.6)
CHLORIDE BLD-SCNC: 102 MMOL/L (ref 99–110)
CO2: 25 MMOL/L (ref 21–32)
CREAT SERPL-MCNC: 2.2 MG/DL (ref 0.6–1.2)
EKG ATRIAL RATE: 73 BPM
EKG DIAGNOSIS: NORMAL
EKG P AXIS: 58 DEGREES
EKG P-R INTERVAL: 148 MS
EKG Q-T INTERVAL: 396 MS
EKG QRS DURATION: 64 MS
EKG QTC CALCULATION (BAZETT): 436 MS
EKG R AXIS: 19 DEGREES
EKG T AXIS: 44 DEGREES
EKG VENTRICULAR RATE: 73 BPM
EOSINOPHILS ABSOLUTE: 0.2 K/UL (ref 0–0.6)
EOSINOPHILS RELATIVE PERCENT: 2.7 %
GFR AFRICAN AMERICAN: 26
GFR NON-AFRICAN AMERICAN: 21
GLUCOSE BLD-MCNC: 93 MG/DL (ref 70–99)
HCT VFR BLD CALC: 22.6 % (ref 36–48)
HEMOGLOBIN: 7.5 G/DL (ref 12–16)
LYMPHOCYTES ABSOLUTE: 1.4 K/UL (ref 1–5.1)
LYMPHOCYTES RELATIVE PERCENT: 23.4 %
MAGNESIUM: 2.2 MG/DL (ref 1.8–2.4)
MCH RBC QN AUTO: 27 PG (ref 26–34)
MCHC RBC AUTO-ENTMCNC: 33.1 G/DL (ref 31–36)
MCV RBC AUTO: 81.6 FL (ref 80–100)
MONOCYTES ABSOLUTE: 0.7 K/UL (ref 0–1.3)
MONOCYTES RELATIVE PERCENT: 11.3 %
MRSA SCREEN RT-PCR: NORMAL
NEUTROPHILS ABSOLUTE: 3.8 K/UL (ref 1.7–7.7)
NEUTROPHILS RELATIVE PERCENT: 61.5 %
PDW BLD-RTO: 16.7 % (ref 12.4–15.4)
PLATELET # BLD: 190 K/UL (ref 135–450)
PMV BLD AUTO: 8.5 FL (ref 5–10.5)
POTASSIUM SERPL-SCNC: 4 MMOL/L (ref 3.5–5.1)
RBC # BLD: 2.77 M/UL (ref 4–5.2)
SODIUM BLD-SCNC: 139 MMOL/L (ref 136–145)
TOTAL PROTEIN: 6.3 G/DL (ref 6.4–8.2)
VANCOMYCIN RANDOM: 16.8 UG/ML
WBC # BLD: 6.1 K/UL (ref 4–11)

## 2022-07-17 PROCEDURE — 6360000002 HC RX W HCPCS: Performed by: STUDENT IN AN ORGANIZED HEALTH CARE EDUCATION/TRAINING PROGRAM

## 2022-07-17 PROCEDURE — 1200000000 HC SEMI PRIVATE

## 2022-07-17 PROCEDURE — 94760 N-INVAS EAR/PLS OXIMETRY 1: CPT

## 2022-07-17 PROCEDURE — 93010 ELECTROCARDIOGRAM REPORT: CPT | Performed by: INTERNAL MEDICINE

## 2022-07-17 PROCEDURE — 6370000000 HC RX 637 (ALT 250 FOR IP): Performed by: STUDENT IN AN ORGANIZED HEALTH CARE EDUCATION/TRAINING PROGRAM

## 2022-07-17 PROCEDURE — 99232 SBSQ HOSP IP/OBS MODERATE 35: CPT | Performed by: INTERNAL MEDICINE

## 2022-07-17 PROCEDURE — 2580000003 HC RX 258: Performed by: STUDENT IN AN ORGANIZED HEALTH CARE EDUCATION/TRAINING PROGRAM

## 2022-07-17 PROCEDURE — 83735 ASSAY OF MAGNESIUM: CPT

## 2022-07-17 PROCEDURE — 80053 COMPREHEN METABOLIC PANEL: CPT

## 2022-07-17 PROCEDURE — 80202 ASSAY OF VANCOMYCIN: CPT

## 2022-07-17 PROCEDURE — 94640 AIRWAY INHALATION TREATMENT: CPT

## 2022-07-17 PROCEDURE — 85025 COMPLETE CBC W/AUTO DIFF WBC: CPT

## 2022-07-17 PROCEDURE — 36415 COLL VENOUS BLD VENIPUNCTURE: CPT

## 2022-07-17 RX ADMIN — METHIMAZOLE 5 MG: 5 TABLET ORAL at 08:31

## 2022-07-17 RX ADMIN — ATORVASTATIN CALCIUM 80 MG: 80 TABLET, FILM COATED ORAL at 19:47

## 2022-07-17 RX ADMIN — SODIUM CHLORIDE, PRESERVATIVE FREE 10 ML: 5 INJECTION INTRAVENOUS at 00:01

## 2022-07-17 RX ADMIN — SODIUM CHLORIDE: 9 INJECTION, SOLUTION INTRAVENOUS at 23:38

## 2022-07-17 RX ADMIN — PIPERACILLIN AND TAZOBACTAM 3375 MG: 3; .375 INJECTION, POWDER, LYOPHILIZED, FOR SOLUTION INTRAVENOUS at 13:22

## 2022-07-17 RX ADMIN — SODIUM CHLORIDE, PRESERVATIVE FREE 5 ML: 5 INJECTION INTRAVENOUS at 23:25

## 2022-07-17 RX ADMIN — MOMETASONE FUROATE AND FORMOTEROL FUMARATE DIHYDRATE 2 PUFF: 100; 5 AEROSOL RESPIRATORY (INHALATION) at 19:31

## 2022-07-17 RX ADMIN — GABAPENTIN 300 MG: 300 CAPSULE ORAL at 19:47

## 2022-07-17 RX ADMIN — VANCOMYCIN HYDROCHLORIDE 1000 MG: 1 INJECTION, POWDER, LYOPHILIZED, FOR SOLUTION INTRAVENOUS at 19:47

## 2022-07-17 RX ADMIN — PIPERACILLIN AND TAZOBACTAM 3375 MG: 3; .375 INJECTION, POWDER, LYOPHILIZED, FOR SOLUTION INTRAVENOUS at 00:04

## 2022-07-17 RX ADMIN — ASPIRIN 81 MG: 81 TABLET, COATED ORAL at 08:30

## 2022-07-17 RX ADMIN — SODIUM CHLORIDE: 9 INJECTION, SOLUTION INTRAVENOUS at 19:45

## 2022-07-17 RX ADMIN — TIOTROPIUM BROMIDE INHALATION SPRAY 2 PUFF: 3.12 SPRAY, METERED RESPIRATORY (INHALATION) at 08:53

## 2022-07-17 RX ADMIN — SODIUM CHLORIDE, PRESERVATIVE FREE 10 ML: 5 INJECTION INTRAVENOUS at 09:40

## 2022-07-17 RX ADMIN — METOPROLOL SUCCINATE 25 MG: 25 TABLET, EXTENDED RELEASE ORAL at 08:31

## 2022-07-17 RX ADMIN — SODIUM CHLORIDE: 9 INJECTION, SOLUTION INTRAVENOUS at 00:03

## 2022-07-17 RX ADMIN — GABAPENTIN 300 MG: 300 CAPSULE ORAL at 08:30

## 2022-07-17 RX ADMIN — MOMETASONE FUROATE AND FORMOTEROL FUMARATE DIHYDRATE 2 PUFF: 100; 5 AEROSOL RESPIRATORY (INHALATION) at 08:53

## 2022-07-17 RX ADMIN — PIPERACILLIN AND TAZOBACTAM 3375 MG: 3; .375 INJECTION, POWDER, LYOPHILIZED, FOR SOLUTION INTRAVENOUS at 23:42

## 2022-07-17 RX ADMIN — ENOXAPARIN SODIUM 30 MG: 100 INJECTION SUBCUTANEOUS at 08:31

## 2022-07-17 ASSESSMENT — PAIN SCALES - GENERAL: PAINLEVEL_OUTOF10: 0

## 2022-07-17 NOTE — PROGRESS NOTES
Hospitalist Progress Note      PCP: Marcella Iyer [address]    Date of Admission: 7/14/2022    Chief Complaint: Shortness of breath    Hospital Course:      Subjective: Shortness of breath improving. Medications:  Reviewed    Infusion Medications    sodium chloride 5 mL/hr at 07/17/22 0004     Scheduled Medications    piperacillin-tazobactam  3,375 mg IntraVENous Q12H    aspirin EC  81 mg Oral Daily    atorvastatin  80 mg Oral Nightly    methIMAzole  5 mg Oral Daily    metoprolol succinate  25 mg Oral Daily    sodium chloride flush  5-40 mL IntraVENous 2 times per day    enoxaparin  30 mg SubCUTAneous Daily    vancomycin (VANCOCIN) intermittent dosing (placeholder)   Other RX Placeholder    gabapentin  300 mg Oral BID    mometasone-formoterol  2 puff Inhalation BID    tiotropium  2 puff Inhalation Daily     PRN Meds: sodium chloride flush, sodium chloride, acetaminophen **OR** acetaminophen      Intake/Output Summary (Last 24 hours) at 7/17/2022 0926  Last data filed at 7/17/2022 0629  Gross per 24 hour   Intake 1247.52 ml   Output --   Net 1247.52 ml       Exam:    BP (!) 111/56   Pulse 72   Temp 98.3 °F (36.8 °C) (Oral)   Resp 20   Ht 5' 3\" (1.6 m)   Wt 162 lb 0.6 oz (73.5 kg)   SpO2 98%   BMI 28.70 kg/m²     General appearance: No apparent distress, appears stated age and cooperative. HEENT: Pupils equal, round, and reactive to light. Conjunctivae/corneas clear. Neck: Supple, with full range of motion. No jugular venous distention. Trachea midline. Respiratory:  Normal respiratory effort. Clear to auscultation, bilaterally without Rales/Wheezes/Rhonchi. Cardiovascular: Regular rate and rhythm with normal S1/S2 without murmurs, rubs or gallops. Abdomen: Soft, non-tender, non-distended with normal bowel sounds. Musculoskeletal: No clubbing, cyanosis or edema bilaterally. Full range of motion without deformity. Skin: Skin color, texture, turgor normal.  No rashes or lesions.   Neurologic: PELVIS WO CONTRAST Additional Contrast? None   Final Result   1. No acute process in the abdomen or pelvis. 2. Large volume stool throughout the colon. 3. Partially imaged left pleural effusion. XR CHEST (2 VW)   Final Result   Increase in volume of left pleural effusion, now moderate to large. Assessment/Plan:    Active Hospital Problems    Diagnosis Date Noted    Hx of CABG [Z95.1] 07/16/2022     Priority: Medium    Hyperthyroidism [E05.90] 07/16/2022     Priority: Medium    HTN (hypertension) [I10] 07/16/2022     Priority: Medium    Recurrent left pleural effusion [J90] 07/14/2022     Priority: Medium    Chronic kidney disease (CKD) stage G3b/A2, moderately decreased glomerular filtration rate (GFR) between 30-44 mL/min/1.73 square meter and albuminuria creatinine ratio between  mg/g ([facility] [address] [N18.32] 06/26/2022     Priority: Medium    CAD in native artery [I25.10]      Recurrent left pleural effusion:  -Concern for loculated infection with entrapment  - s/p thoracentesis on 7/15  -Continue Zosyn and vancomycin  -CT surgery consulted: Patient and daughter would like to hold off on surgery for now. Place IR guided chest tube. -Doxycycline pleurodesis if lung reexpands with chest tube   -If lung partially reexpands will consider tPA instillation to help lung entrapment   -VATS if all else fails  -Holding aspirin    COPD: Continue chronic inhalers  Hyperthyroidism: Continue methimazole beta-blocker    DVT Prophylaxis: Lovenox  Diet: ADULT DIET; Regular; 4 carb choices (60 gm/meal);  Low Sodium (2 gm)  Code Status: Limited    PT/OT Eval Status: Cheo Mcginnis MD

## 2022-07-17 NOTE — PROGRESS NOTES
Clinical Pharmacy Note  Vancomycin Consult    Scott Downey is a 81 y.o. female ordered vancomycin for pleural effusion; consult received from Dr. Farideh Nair to manage therapy. Also receiving Zosyn. Allergies:  Patient has no known allergies. Temp max:  Temp (24hrs), Av.6 °F (37 °C), Min:98.3 °F (36.8 °C), Max:98.8 °F (37.1 °C)      Recent Labs     07/15/22  0630 22  0641 22  0646   WBC 8.2 7.0 6.1     Recent Labs     07/15/22  0630 22  0641 22  0646   BUN 16 15 12   CREATININE 1.8* 1.9* 2.2*     Intake/Output Summary (Last 24 hours) at 2022 1347  Last data filed at 2022 1010  Gross per 24 hour   Intake 1247.52 ml   Output --   Net 1247.52 ml     Ht Readings from Last 1 Encounters:   07/15/22 5' 3\" (1.6 m)        Wt Readings from Last 1 Encounters:   22 162 lb 0.6 oz (73.5 kg)         Estimated Creatinine Clearance: 19 mL/min (A) (based on SCr of 2.2 mg/dL (H)). Assessment/Plan:  Vancomycin level = 16.8 ug/mL  Will re-dose vancomycin 1,000 mg IVPB x 1 at 1800  Repeat random level on tomorrow at 1800    Thank you for the consult. Will continue to follow.     [address] [facility] RICK Hope [facility] [place], PharmD 2022 1:47 PM

## 2022-07-17 NOTE — CONSULTS
Thoracic Surgery Consult Note    HPI: Kirby Rivers is an 81-year-old woman with CAD s/p CABG (2/2022) followed by recurrent pleural effusions who is currently admitted for shortness of breath. Last month, she had a hospital admission for the same reason, at which time a chest tube was placed. This time, she has had a thoracentesis followed by fluid reaccumulation. She denies any chest pain or fevers. Physical Exam  /89   Pulse 72   Temp 98.6 °F (37 °C) (Oral)   Resp 18   Ht 5' 3\" (1.6 m)   Wt 162 lb 0.6 oz (73.5 kg)   SpO2 98%   BMI 28.70 kg/m²   General: elderly, no acute distress  CV: regular rhythm, normal rate  Resp: decreased left-sided breath sounds, no crackles  Extremities: no edema, no deformity  Head: normocephalic, atraumatic  Neuro: no focal deficit, AAOx3  Psych: apprehensive mood, normal behavior    Imaging  Chest CT: moderate to large left pleural effusion    Assessment  Kirby Rivers is an 81-year-old woman with CAD s/p CABG in 2/2022 who now has recurrent left pleural effusions which are very symptomatic. She and her daughter are very apprehensive about her undergoing another surgery. I would therefore recommend a conservative treatment strategy to start, which would require a left-sided chest tube to start. Plan  - IR-guided left-sided chest tube (at least 14F)  - If lung fully expands with chest tube drainage and suction, will consider doxycycline pleurodesis to maintain the lung expansion  - If lung does not fully expand, will consider tPA instillation to help alleviate lung entrapment  - if above maneuvers fail, will consider VATS decortication    Dr. Doni French will follow up on this issue this week.     Ramya Gorman MD

## 2022-07-17 NOTE — PROGRESS NOTES
Pulmonary Critical Care Progress Note     Patient's name:  Jennifer Potter  Medical Record Number: [id number]  Patient's account/billing number: [de-identified]  Patient's YOB: 1940  Age: 81 y.o. Date of Admission: 7/14/2022  7:04 PM  Date of Consult: 7/17/2022      Primary Care Physician: Manolo Lazaro      Code Status: Limited    Chief complaint: left effusion     Assessment and Plan     Recurrent left effusion, neutrophilic, exudate, s/p US thoracentesis 500cc. LLL PNA       Plan:  Fluid seems to be para pneumonic, follow up cytology and culture results NTD  Continue antibiotics   CT chest reviewed, there is still moderate amount of left effusion with evidence of pleura thickening, atelectasis and consolidation of LLL and some lingula, I believe without intervention lung trapping is very likely and potentially might already started given pleural thickening, discussed with patient and daughter, they both agree with chest tube placement to try to evacuate remaining fluid and hopefully expand the lung.   NPO after midnight, IR guided chest tube  Will give tPA and pulmozyme if needed  Systemic steroid           Overnight:  No acute events overnight        REVIEW OF SYSTEMS:  Review of Systems -   General ROS: negative  Psychological ROS: negative  Ophthalmic ROS: negative  ENT ROS: negative  Allergy and Immunology ROS: negative  Hematological and Lymphatic ROS: negative  Endocrine ROS: negative  Breast ROS: negative  Respiratory ROS: cough left side tightness   Cardiovascular ROS: no chest pain or dyspnea on exertion  Gastrointestinal ROS:negative  Genito-Urinary ROS: negative  Musculoskeletal ROS: negative  Neurological ROS: on and off confused   Dermatological ROS: negative        Physical Exam:    Vitals: /89   Pulse 72   Temp 98.6 °F (37 °C) (Oral)   Resp 18   Ht 5' 3\" (1.6 m)   Wt 162 lb 0.6 oz (73.5 kg)   SpO2 98%   BMI 28.70 kg/m²     Last Body weight:   Wt Readings from Last 3 Encounters:   07/17/22 162 lb 0.6 oz (73.5 kg)   07/01/22 148 lb 9.4 oz (67.4 kg)   04/19/22 155 lb (70.3 kg)       Body Mass Index : Body mass index is 28.7 kg/m². Intake and Output summary:   Intake/Output Summary (Last 24 hours) at 7/17/2022 1059  Last data filed at 7/17/2022 1010  Gross per 24 hour   Intake 1487.52 ml   Output --   Net 1487.52 ml         Physical Examination:     Gen:  No acute distress. Eyes: PERRL. Anicteric sclera. No conjunctival injection. ENT: No discharge. Posterior oropharynx clear. External appearance of ears and nose normal.  Neck: Trachea midline. No mass   Resp:  diminished with dull percussion notes on the left side. CV: Regular rate. Regular rhythm. No murmur or rub. No edema. GI: Soft, Non-tender. Non-distended. +BS  Skin: Warm, dry, w/o erythema. Lymph: No cervical or supraclavicular LAD. M/S: No cyanosis. No clubbing. Neuro:  CN 2-12 tested, no focal neurologic deficit. Moves all extremities  Psych: Awake and alert, Oriented x 3. Judgement and insight appropriate. Mood stable. Laboratory findings:-    CBC:   Recent Labs     07/17/22  0646   WBC 6.1   HGB 7.5*          BMP:    Recent Labs     07/15/22  0630 07/16/22  0641 07/17/22  0646    137 139   K 4.1 3.9 4.0    102 102   CO2 24 25 25   BUN 16 15 12   CREATININE 1.8* 1.9* 2.2*   GLUCOSE 105* 109* 93       S. Calcium:  Recent Labs     07/17/22  0646   CALCIUM 8.2*         S. Magnesium:  Recent Labs     07/17/22  0646   MG 2.20           Radiology Review:  Pertinent images / reports were reviewed as a part of this visit.     CT reviewed         Danae Bello MD, MROSETTE.            7/17/2022, 10:59 AM

## 2022-07-17 NOTE — PROGRESS NOTES
Clinical Pharmacy Note  Renal Dose Adjustment    Suhas Cash is receiving Zosyn. This medication is renally eliminated. Based on the patient's Estimated Creatinine Clearance of 19 mL/min (A) (based on SCr of 2.2 mg/dL (H)) and urine output, the dose has been adjusted to Zosyn 3.375 mg IV every 12 hours per protocol. Pharmacy will continue to monitor and adjust dose as needed for changes in renal function.        [address] [facility] Dimitry Hope  7/17/2022 8:26 AM

## 2022-07-17 NOTE — PLAN OF CARE
Problem: Discharge Planning  Goal: Discharge to home or other facility with appropriate resources  7/16/2022 2335 by Paulett Habermann, RN  Outcome: Progressing  7/16/2022 1032 by Nadine Obrien RN  Outcome: Progressing     Problem: Pain  Goal: Verbalizes/displays adequate comfort level or baseline comfort level  7/16/2022 2335 by Paulett Habermann, RN  Outcome: Progressing  7/16/2022 1032 by Nadine Obrien RN  Outcome: Progressing     Problem: Skin/Tissue Integrity  Goal: Absence of new skin breakdown  Description: 1. Monitor for areas of redness and/or skin breakdown  2. Assess vascular access sites hourly  3. Every 4-6 hours minimum:  Change oxygen saturation probe site  4. Every 4-6 hours:  If on nasal continuous positive airway pressure, respiratory therapy assess nares and determine need for appliance change or resting period.   7/16/2022 2335 by Paulett Habermann, RN  Outcome: Progressing  7/16/2022 1032 by Nadine Obrien RN  Outcome: Progressing     Problem: Safety - Adult  Goal: Free from fall injury  7/16/2022 2335 by Paulett Habermann, RN  Outcome: Progressing  Flowsheets (Taken 7/16/2022 2254)  Free From Fall Injury: Instruct family/caregiver on patient safety  7/16/2022 1032 by Nadine Obrien RN  Outcome: Progressing

## 2022-07-18 ENCOUNTER — APPOINTMENT (OUTPATIENT)
Dept: CT IMAGING | Age: 82
DRG: 177 | End: 2022-07-18
Payer: MEDICARE

## 2022-07-18 ENCOUNTER — APPOINTMENT (OUTPATIENT)
Dept: GENERAL RADIOLOGY | Age: 82
DRG: 177 | End: 2022-07-18
Payer: MEDICARE

## 2022-07-18 LAB
A/G RATIO: 0.7 (ref 1.1–2.2)
ALBUMIN SERPL-MCNC: 2.6 G/DL (ref 3.4–5)
ALP BLD-CCNC: 61 U/L (ref 40–129)
ALT SERPL-CCNC: 8 U/L (ref 10–40)
ANION GAP SERPL CALCULATED.3IONS-SCNC: 12 MMOL/L (ref 3–16)
AST SERPL-CCNC: 13 U/L (ref 15–37)
BASOPHILS ABSOLUTE: 0.1 K/UL (ref 0–0.2)
BASOPHILS RELATIVE PERCENT: 0.9 %
BILIRUB SERPL-MCNC: 0.4 MG/DL (ref 0–1)
BLOOD CULTURE, ROUTINE: NORMAL
BODY FLUID CULTURE, STERILE: NORMAL
BUN BLDV-MCNC: 12 MG/DL (ref 7–20)
CALCIUM SERPL-MCNC: 8.2 MG/DL (ref 8.3–10.6)
CHLORIDE BLD-SCNC: 103 MMOL/L (ref 99–110)
CO2: 23 MMOL/L (ref 21–32)
CREAT SERPL-MCNC: 2.1 MG/DL (ref 0.6–1.2)
CULTURE, BLOOD 2: NORMAL
EOSINOPHILS ABSOLUTE: 0.2 K/UL (ref 0–0.6)
EOSINOPHILS RELATIVE PERCENT: 2.3 %
GFR AFRICAN AMERICAN: 27
GFR NON-AFRICAN AMERICAN: 23
GLUCOSE BLD-MCNC: 89 MG/DL (ref 70–99)
GRAM STAIN RESULT: NORMAL
HCT VFR BLD CALC: 23.6 % (ref 36–48)
HEMOGLOBIN: 7.7 G/DL (ref 12–16)
LYMPHOCYTES ABSOLUTE: 1.5 K/UL (ref 1–5.1)
LYMPHOCYTES RELATIVE PERCENT: 20.6 %
MAGNESIUM: 2.3 MG/DL (ref 1.8–2.4)
MCH RBC QN AUTO: 26.7 PG (ref 26–34)
MCHC RBC AUTO-ENTMCNC: 32.5 G/DL (ref 31–36)
MCV RBC AUTO: 82.2 FL (ref 80–100)
MONOCYTES ABSOLUTE: 0.8 K/UL (ref 0–1.3)
MONOCYTES RELATIVE PERCENT: 10.2 %
NEUTROPHILS ABSOLUTE: 4.9 K/UL (ref 1.7–7.7)
NEUTROPHILS RELATIVE PERCENT: 66 %
PDW BLD-RTO: 17 % (ref 12.4–15.4)
PLATELET # BLD: 224 K/UL (ref 135–450)
PMV BLD AUTO: 8.3 FL (ref 5–10.5)
POTASSIUM SERPL-SCNC: 3.8 MMOL/L (ref 3.5–5.1)
RBC # BLD: 2.87 M/UL (ref 4–5.2)
SODIUM BLD-SCNC: 138 MMOL/L (ref 136–145)
TOTAL PROTEIN: 6.6 G/DL (ref 6.4–8.2)
VANCOMYCIN RANDOM: 21.2 UG/ML
WBC # BLD: 7.4 K/UL (ref 4–11)

## 2022-07-18 PROCEDURE — 77012 CT SCAN FOR NEEDLE BIOPSY: CPT

## 2022-07-18 PROCEDURE — 6370000000 HC RX 637 (ALT 250 FOR IP): Performed by: INTERNAL MEDICINE

## 2022-07-18 PROCEDURE — 74018 RADEX ABDOMEN 1 VIEW: CPT

## 2022-07-18 PROCEDURE — 80053 COMPREHEN METABOLIC PANEL: CPT

## 2022-07-18 PROCEDURE — 6370000000 HC RX 637 (ALT 250 FOR IP): Performed by: STUDENT IN AN ORGANIZED HEALTH CARE EDUCATION/TRAINING PROGRAM

## 2022-07-18 PROCEDURE — 80202 ASSAY OF VANCOMYCIN: CPT

## 2022-07-18 PROCEDURE — 0W9B30Z DRAINAGE OF LEFT PLEURAL CAVITY WITH DRAINAGE DEVICE, PERCUTANEOUS APPROACH: ICD-10-PCS | Performed by: RADIOLOGY

## 2022-07-18 PROCEDURE — 2700000000 HC OXYGEN THERAPY PER DAY

## 2022-07-18 PROCEDURE — 94760 N-INVAS EAR/PLS OXIMETRY 1: CPT

## 2022-07-18 PROCEDURE — 94640 AIRWAY INHALATION TREATMENT: CPT

## 2022-07-18 PROCEDURE — 99232 SBSQ HOSP IP/OBS MODERATE 35: CPT | Performed by: INTERNAL MEDICINE

## 2022-07-18 PROCEDURE — 99153 MOD SED SAME PHYS/QHP EA: CPT

## 2022-07-18 PROCEDURE — 6360000002 HC RX W HCPCS: Performed by: STUDENT IN AN ORGANIZED HEALTH CARE EDUCATION/TRAINING PROGRAM

## 2022-07-18 PROCEDURE — 1200000000 HC SEMI PRIVATE

## 2022-07-18 PROCEDURE — 97530 THERAPEUTIC ACTIVITIES: CPT

## 2022-07-18 PROCEDURE — 6360000002 HC RX W HCPCS: Performed by: RADIOLOGY

## 2022-07-18 PROCEDURE — 2580000003 HC RX 258: Performed by: STUDENT IN AN ORGANIZED HEALTH CARE EDUCATION/TRAINING PROGRAM

## 2022-07-18 PROCEDURE — 99232 SBSQ HOSP IP/OBS MODERATE 35: CPT | Performed by: THORACIC SURGERY (CARDIOTHORACIC VASCULAR SURGERY)

## 2022-07-18 PROCEDURE — 83735 ASSAY OF MAGNESIUM: CPT

## 2022-07-18 PROCEDURE — 85025 COMPLETE CBC W/AUTO DIFF WBC: CPT

## 2022-07-18 PROCEDURE — 97116 GAIT TRAINING THERAPY: CPT

## 2022-07-18 PROCEDURE — 71045 X-RAY EXAM CHEST 1 VIEW: CPT

## 2022-07-18 PROCEDURE — 36415 COLL VENOUS BLD VENIPUNCTURE: CPT

## 2022-07-18 RX ORDER — FENTANYL CITRATE 50 UG/ML
INJECTION, SOLUTION INTRAMUSCULAR; INTRAVENOUS DAILY PRN
Status: COMPLETED | OUTPATIENT
Start: 2022-07-18 | End: 2022-07-18

## 2022-07-18 RX ORDER — MIDAZOLAM HYDROCHLORIDE 1 MG/ML
INJECTION INTRAMUSCULAR; INTRAVENOUS DAILY PRN
Status: COMPLETED | OUTPATIENT
Start: 2022-07-18 | End: 2022-07-18

## 2022-07-18 RX ORDER — PREDNISONE 20 MG/1
40 TABLET ORAL DAILY
Status: COMPLETED | OUTPATIENT
Start: 2022-07-18 | End: 2022-07-22

## 2022-07-18 RX ADMIN — MIDAZOLAM 1 MG: 1 INJECTION INTRAMUSCULAR; INTRAVENOUS at 11:34

## 2022-07-18 RX ADMIN — METHIMAZOLE 5 MG: 5 TABLET ORAL at 16:32

## 2022-07-18 RX ADMIN — TIOTROPIUM BROMIDE INHALATION SPRAY 2 PUFF: 3.12 SPRAY, METERED RESPIRATORY (INHALATION) at 09:02

## 2022-07-18 RX ADMIN — GABAPENTIN 300 MG: 300 CAPSULE ORAL at 20:02

## 2022-07-18 RX ADMIN — PIPERACILLIN AND TAZOBACTAM 3375 MG: 3; .375 INJECTION, POWDER, LYOPHILIZED, FOR SOLUTION INTRAVENOUS at 13:15

## 2022-07-18 RX ADMIN — METOPROLOL SUCCINATE 25 MG: 25 TABLET, EXTENDED RELEASE ORAL at 16:32

## 2022-07-18 RX ADMIN — FENTANYL CITRATE 50 MCG: 50 INJECTION INTRAMUSCULAR; INTRAVENOUS at 11:38

## 2022-07-18 RX ADMIN — ACETAMINOPHEN 650 MG: 325 TABLET ORAL at 20:02

## 2022-07-18 RX ADMIN — GABAPENTIN 300 MG: 300 CAPSULE ORAL at 16:31

## 2022-07-18 RX ADMIN — ATORVASTATIN CALCIUM 80 MG: 80 TABLET, FILM COATED ORAL at 20:01

## 2022-07-18 RX ADMIN — PREDNISONE 40 MG: 20 TABLET ORAL at 16:32

## 2022-07-18 RX ADMIN — ASPIRIN 81 MG: 81 TABLET, COATED ORAL at 16:31

## 2022-07-18 RX ADMIN — MOMETASONE FUROATE AND FORMOTEROL FUMARATE DIHYDRATE 2 PUFF: 100; 5 AEROSOL RESPIRATORY (INHALATION) at 09:04

## 2022-07-18 RX ADMIN — MOMETASONE FUROATE AND FORMOTEROL FUMARATE DIHYDRATE 2 PUFF: 100; 5 AEROSOL RESPIRATORY (INHALATION) at 19:43

## 2022-07-18 RX ADMIN — SODIUM CHLORIDE, PRESERVATIVE FREE 10 ML: 5 INJECTION INTRAVENOUS at 20:03

## 2022-07-18 ASSESSMENT — PAIN DESCRIPTION - ORIENTATION: ORIENTATION: RIGHT

## 2022-07-18 ASSESSMENT — PAIN SCALES - GENERAL: PAINLEVEL_OUTOF10: 0

## 2022-07-18 ASSESSMENT — PAIN DESCRIPTION - DESCRIPTORS: DESCRIPTORS: ACHING;DISCOMFORT

## 2022-07-18 ASSESSMENT — PAIN DESCRIPTION - LOCATION: LOCATION: ABDOMEN;BACK

## 2022-07-18 NOTE — PROGRESS NOTES
Hospitalist Progress Note      PCP: Kenneth Jacob [address]    Date of Admission: 7/14/2022    Chief Complaint: Shortness of breath    Hospital Course:      Subjective: Breathing comfortably at rest.  No complaints      Medications:  Reviewed    Infusion Medications    sodium chloride 5 mL/hr at 07/18/22 0647     Scheduled Medications    predniSONE  40 mg Oral Daily    piperacillin-tazobactam  3,375 mg IntraVENous Q12H    aspirin EC  81 mg Oral Daily    atorvastatin  80 mg Oral Nightly    methIMAzole  5 mg Oral Daily    metoprolol succinate  25 mg Oral Daily    sodium chloride flush  5-40 mL IntraVENous 2 times per day    [Held by provider] enoxaparin  30 mg SubCUTAneous Daily    vancomycin (VANCOCIN) intermittent dosing (placeholder)   Other RX Placeholder    gabapentin  300 mg Oral BID    mometasone-formoterol  2 puff Inhalation BID    tiotropium  2 puff Inhalation Daily     PRN Meds: sodium chloride flush, sodium chloride, acetaminophen **OR** acetaminophen      Intake/Output Summary (Last 24 hours) at 7/18/2022 0915  Last data filed at 7/18/2022 0829  Gross per 24 hour   Intake 1211.31 ml   Output 1200 ml   Net 11.31 ml         Exam:    BP (!) 153/80   Pulse 75   Temp 98.5 °F (36.9 °C) (Oral)   Resp 21   Ht 5' 3\" (1.6 m)   Wt 157 lb 10.1 oz (71.5 kg)   SpO2 96%   BMI 27.92 kg/m²     General appearance: No apparent distress, appears stated age and cooperative. HEENT: Pupils equal, round, and reactive to light. Conjunctivae/corneas clear. Neck: Supple, with full range of motion. No jugular venous distention. Trachea midline. Respiratory:  Normal respiratory effort. Clear to auscultation, bilaterally without Rales/Wheezes/Rhonchi. Cardiovascular: Regular rate and rhythm with normal S1/S2 without murmurs, rubs or gallops. Abdomen: Soft, non-tender, non-distended with normal bowel sounds. Musculoskeletal: No clubbing, cyanosis or edema bilaterally. Full range of motion without deformity.   Skin: Skin color, texture, turgor normal.  No rashes or lesions. Neurologic:  Neurovascularly intact without any focal sensory/motor deficits. Cranial nerves: II-XII intact, grossly non-focal.  Psychiatric: Alert and oriented, thought content appropriate, normal insight  Capillary Refill: Brisk,< 3 seconds   Peripheral Pulses: +2 palpable, equal bilaterally       Labs:   Recent Labs     07/16/22  0641 07/16/22  1145 07/17/22  0646 07/18/22  0822   WBC 7.0  --  6.1 7.4   HGB 7.1* 8.6* 7.5* 7.7*   HCT 21.4* 26.2* 22.6* 23.6*     --  190 224       Recent Labs     07/16/22  0641 07/17/22  0646    139   K 3.9 4.0    102   CO2 25 25   BUN 15 12   CREATININE 1.9* 2.2*   CALCIUM 8.1* 8.2*       Recent Labs     07/16/22  0641 07/17/22  0646   AST 14* 13*   ALT 10 8*   BILITOT 0.5 0.5   ALKPHOS 59 59       No results for input(s): INR in the last 72 hours. No results for input(s): Magdalene Andrade in the last 72 hours. Urinalysis:      Lab Results   Component Value Date/Time    NITRU Negative 02/23/2022 09:26 PM    BLOODU Negative 02/23/2022 09:26 PM    SPECGRAV 1.026 02/23/2022 09:26 PM    GLUCOSEU Negative 02/23/2022 09:26 PM    GLUCOSEU Negative 08/10/2011 09:00 AM       Radiology:  CT CHEST WO CONTRAST   Final Result   1. Smaller size but persistent moderate left pleural effusion with a few   socially gas bubbles. Suboptimally characterized without IV contrast.  This   could reflect empyema. 2. Mild associated relaxation atelectasis lingula left upper lobe and   prominent consolidation left lower lobe. 3.  Anemia is likely given that the blood pool density of the heart is lower   than that of the myocardium. Cardiomegaly. 4. Sequela from prior open heart surgery. US THORACENTESIS Which side should the procedure be performed? Left   Final Result   Successful ultrasound guided left thoracentesis.          XR CHEST 1 VIEW   Final Result   No appreciable pneumothorax status post left thoracentesis. CT ABDOMEN PELVIS WO CONTRAST Additional Contrast? None   Final Result   1. No acute process in the abdomen or pelvis. 2. Large volume stool throughout the colon. 3. Partially imaged left pleural effusion. XR CHEST (2 VW)   Final Result   Increase in volume of left pleural effusion, now moderate to large. CT GUIDED CHEST TUBE    (Results Pending)           Assessment/Plan:    Active Hospital Problems    Diagnosis Date Noted    Hx of CABG [Z95.1] 07/16/2022     Priority: Medium    Hyperthyroidism [E05.90] 07/16/2022     Priority: Medium    HTN (hypertension) [I10] 07/16/2022     Priority: Medium    Recurrent left pleural effusion [J90] 07/14/2022     Priority: Medium    Chronic kidney disease (CKD) stage G3b/A2, moderately decreased glomerular filtration rate (GFR) between 30-44 mL/min/1.73 square meter and albuminuria creatinine ratio between  mg/g ([facility] [address] [N18.32] 06/26/2022     Priority: Medium    CAD in native artery [I25.10]      Recurrent left pleural effusion:  -Concern for loculated infection with entrapment  - s/p thoracentesis on 7/15  -Continue Zosyn and vancomycin  -CT surgery consulted: Patient and daughter would like to hold off on surgery for now. Place IR guided chest tube.    -Doxycycline pleurodesis if lung reexpands with chest tube   -If lung partially reexpands will consider tPA instillation to help lung entrapment   -VATS if all else fails  -Holding aspirin  - Chest tube placed 7/18    COPD: Continue chronic inhalers  Hyperthyroidism: Continue methimazole beta-blocker    DVT Prophylaxis: Lovenox  Diet: Diet NPO  Code Status: Limited    PT/OT Eval Status: Kathy Chand MD

## 2022-07-18 NOTE — PLAN OF CARE
Problem: Discharge Planning  Goal: Discharge to home or other facility with appropriate resources  Outcome: Progressing Towards Goal  Flowsheets (Taken 7/18/2022 3046)  Discharge to home or other facility with appropriate resources: Identify barriers to discharge with patient and caregiver     Problem: Pain  Goal: Verbalizes/displays adequate comfort level or baseline comfort level  Outcome: Progressing Towards Goal     Problem: Skin/Tissue Integrity  Goal: Absence of new skin breakdown  Description: 1. Monitor for areas of redness and/or skin breakdown  2. Assess vascular access sites hourly  3. Every 4-6 hours minimum:  Change oxygen saturation probe site  4. Every 4-6 hours:  If on nasal continuous positive airway pressure, respiratory therapy assess nares and determine need for appliance change or resting period.   Outcome: Progressing Towards Goal     Problem: Safety - Adult  Goal: Free from fall injury  Outcome: Progressing Towards Goal     Problem: ABCDS Injury Assessment  Goal: Absence of physical injury  Outcome: Progressing Towards Goal

## 2022-07-18 NOTE — PRE SEDATION
Sedation Pre-Procedure Note    Patient Name: Wesly Erickson   YOB: 1940  Room/Bed: F6R-0634/7103-01  Medical Record Number: [id number]  Date: 7/18/2022   Time: 12:03 PM       Indication:  Loculated left pleural effusion. Consent: I have discussed with the patient and/or the patient representative the indication, alternatives, and the possible risks and/or complications of the planned procedure and the anesthesia methods. The patient and/or patient representative appear to understand and agree to proceed. Vital Signs:   Vitals:    07/18/22 1139   BP: (!) 161/60   Pulse: 82   Resp: 22   Temp:    SpO2: 100%       Past Medical History:   has a past medical history of Arthritis, Asthma, CAD (coronary artery disease), CKD (chronic kidney disease), Glaucoma, Hyperlipidemia, Hypertension, Hyperthyroidism, IBS (irritable bowel syndrome), Neuropathy, Obesity (BMI 30-39.9), Osteopenia, and Osteoporosis. Past Surgical History:   has a past surgical history that includes Cholecystectomy; Foot surgery; Tubal ligation; Gastric Band ([id number]); orif femur decompression (Left, 04/2014); Ankle fracture surgery (Left, 02/12/2015); Hand surgery (2009); other surgical history (Right, 2010); Coronary artery bypass graft (02/25/2022); Coronary artery bypass graft (N/A, 2/25/2022); IR TUNNELED CVC PLACE WO SQ PORT/PUMP > 5 YEARS (3/7/2022); and Diagnostic Cardiac Cath Lab Procedure (02/2022).     Medications:   Scheduled Meds:    predniSONE  40 mg Oral Daily    piperacillin-tazobactam  3,375 mg IntraVENous Q12H    aspirin EC  81 mg Oral Daily    atorvastatin  80 mg Oral Nightly    methIMAzole  5 mg Oral Daily    metoprolol succinate  25 mg Oral Daily    sodium chloride flush  5-40 mL IntraVENous 2 times per day    [Held by provider] enoxaparin  30 mg SubCUTAneous Daily    vancomycin (VANCOCIN) intermittent dosing (placeholder)   Other RX Placeholder    gabapentin  300 mg Oral BID    mometasone-formoterol  2 puff Inhalation BID    tiotropium  2 puff Inhalation Daily     Continuous Infusions:    sodium chloride 5 mL/hr at 07/18/22 0647     PRN Meds: sodium chloride flush, sodium chloride, acetaminophen **OR** acetaminophen  Home Meds:   Prior to Admission medications    Medication Sig Start Date End Date Taking? Authorizing Provider   ferrous sulfate (IRON 325) 325 (65 Fe) MG tablet Take 325 mg by mouth daily (with breakfast)   Yes Historical Provider, MD   sodium bicarbonate 650 MG tablet Take 1 tablet by mouth 2 times daily 7/1/22   García Curran MD   methIMAzole (TAPAZOLE) 5 MG tablet Take 5 mg by mouth daily    Historical Provider, MD   fluticasone-umeclidin-vilant (TRELEGY ELLIPTA) 100-62.5-25 MCG/INH AEPB Inhale 1 puff into the lungs daily    Historical Provider, MD   Wheat Dextrin (BENEFIBER DRINK MIX) PACK Take 1 packet by mouth daily    Historical Provider, MD   polyethylene glycol (GLYCOLAX) 17 g packet Take 17 g by mouth daily     Historical Provider, MD   amLODIPine (NORVASC) 5 MG tablet Take 5 mg by mouth daily    Historical Provider, MD   aspirin EC 81 MG EC tablet Take 1 tablet by mouth daily 4/19/22 4/19/23  MARKEL Alva - CNP   metoprolol succinate (TOPROL XL) 25 MG extended release tablet Take 1 tablet by mouth daily 4/19/22   MARKEL Flowers CNP   atorvastatin (LIPITOR) 80 MG tablet Take 1 tablet by mouth nightly 3/9/22   MARKEL Ely - CNP   gabapentin (NEURONTIN) 300 MG capsule Take 300 mg by mouth 4 times daily. 11/5/21   Historical Provider, MD     Coumadin Use Last 7 Days:  no  Antiplatelet drug therapy use last 7 days: no  Other anticoagulant use last 7 days: no  Additional Medication Information:  n/a      Pre-Sedation Documentation and Exam:   I have reviewed the patient's history and review of systems.     Mallampati Airway Assessment:  Mallampati Class II - (soft palate, fauces & uvula are visible)    Prior History of Anesthesia Complications:   none    ASA Classification:  Class 2 - A normal healthy patient with mild systemic disease    Sedation/ Anesthesia Plan:   intravenous sedation    Medications Planned:   midazolam (Versed) intravenously and fentanyl intravenously    Patient is an appropriate candidate for plan of sedation: yes    Electronically signed by Alonzo Myers MD on 7/18/2022 at 12:03 PM

## 2022-07-18 NOTE — PROGRESS NOTES
Progress Note    7/14/22: SOB, recurrent left sided effusion  7/15/22: Thora: 500 ml removed    Vital Signs:                                                 BP (!) 146/66   Pulse 75   Temp 98.5 °F (36.9 °C) (Oral)   Resp 21   Ht 5' 3\" (1.6 m)   Wt 157 lb 10.1 oz (71.5 kg)   SpO2 95%   BMI 27.92 kg/m²           Admission Weight: 156 lb 1.4 oz (70.8 kg)      Vent Settings:    I/O:    Intake/Output Summary (Last 24 hours) at 7/18/2022 0733  Last data filed at 7/18/2022 0647  Gross per 24 hour   Intake 1211.31 ml   Output 900 ml   Net 311.31 ml     General: Awake, confused to person, place and time, cooperative  CV: regular  Pulm: decreased left side  Abd: soft, + BS  Ext: warm and dry  Neuro: moves all extremities with equal strength bilat    Data Review:  CBC:   Recent Labs     07/16/22  0641 07/16/22  1145 07/17/22  0646   WBC 7.0  --  6.1   HGB 7.1* 8.6* 7.5*   HCT 21.4* 26.2* 22.6*   MCV 81.5  --  81.6     --  190     BMP:   Recent Labs     07/16/22  0641 07/17/22  0646    139   K 3.9 4.0    102   CO2 25 25   BUN 15 12   CREATININE 1.9* 2.2*   CALCIUM 8.1* 8.2*   MG 2.10 2.20     Cardiac Enzymes: No results for input(s): CKTOTAL, CKMB, CKMBINDEX, TROPONINI in the last 72 hours. PT/INR: No results for input(s): PROTIME, INR in the last 72 hours. APTT: No results for input(s): APTT in the last 72 hours. Cytology  6/25/2022  No malignant cells identified    7/15/2022  Pending    CXR  7/14/2022  Cardiomediastinal silhouette is stable. Increase in volume of the left pleural effusion, now moderate to large. No pneumothorax. The right lung is clear. No gross bony abnormality. 7/15/2022  The cardiomediastinal silhouette is stable. Atrial appendage clip is again noted. Decrease in size of the left pleural effusion s/p left thoracentesis. There is no appreciable pneumothorax.     CT chest w/o contrast  7/16/2022  Smaller size but persistent moderate left pleural effusion with a few gas bubbles. Mild associated relaxation atelectasis ligula left upper lobe and prominent consolidation left lower lobe. Anemia is likely given that the blood pool density of the heart is lower than that of the myocardium. Cardiomegaly. Sequela form prior open heart surgery. Assessment/Plan:  CV - SR  pulm - pulmonary toilet, RA  Renal - chronic kidney disease; creatinine 1.9. hx of HD, neph following  Heme - anemia, Hgb 7.7    Discussed patient with Dr. Herminio Aragon; current CT of chest reviewed; patient admitted 6/25 with left pleural effusion; chest tube placed at that time. Effusion has re accumulated; US thoracentesis with chest tube placement today per IR. Possible future TPA/Dornase instillation; will discuss with physician. Patient is a poor surgical candidate. .   Lovenox on hold for procedure. MARKEL Miguel - CNP  7/18/2022  7:33 AM    Note reviewed, events of last 24 hours reviewed along with vital signs and I/Os and patient examined. Assessment and plans discussed and are as outlined above.      Dorina Lopez MD, FACS, [facility], FACCP, Alan

## 2022-07-18 NOTE — BRIEF OP NOTE
Brief Postoperative Note    Scott Downey  YOB: 1940  [id number]    Pre-operative Diagnosis: left pleural effusion    Post-operative Diagnosis: Same    Procedure: CT-guided left chest tube placement    Anesthesia: Moderate Sedation    Surgeons: Annie Moulton MD    Estimated Blood Loss: Less than 5 mL    Complications: None    Specimens: Was Not Obtained    Findings: Successful placement of a left 10F chest tube.     Electronically signed by Annie Moulton MD on 7/18/2022 at 12:04 PM

## 2022-07-18 NOTE — PLAN OF CARE
Problem: Discharge Planning  Goal: Discharge to home or other facility with appropriate resources  Outcome: Progressing     Problem: Pain  Goal: Verbalizes/displays adequate comfort level or baseline comfort level  Outcome: Progressing     Problem: Skin/Tissue Integrity  Goal: Absence of new skin breakdown  Description: 1. Monitor for areas of redness and/or skin breakdown  2. Assess vascular access sites hourly  3. Every 4-6 hours minimum:  Change oxygen saturation probe site  4. Every 4-6 hours:  If on nasal continuous positive airway pressure, respiratory therapy assess nares and determine need for appliance change or resting period.   Outcome: Progressing     Problem: Safety - Adult  Goal: Free from fall injury  Outcome: Progressing  Flowsheets (Taken 7/17/2022 2350)  Free From Fall Injury: Instruct family/caregiver on patient safety     Problem: ABCDS Injury Assessment  Goal: Absence of physical injury  Outcome: Progressing  Flowsheets (Taken 7/17/2022 2350)  Absence of Physical Injury: Implement safety measures based on patient assessment

## 2022-07-18 NOTE — PROGRESS NOTES
Pulmonary Critical Care Progress Note     Patient's name:  Radha Zaragoza  Medical Record Number: [id number]  Patient's account/billing number: [de-identified]  Patient's YOB: 1940  Age: 81 y.o. Date of Admission: 7/14/2022  7:04 PM  Date of Consult: 7/18/2022      Primary Care Physician: Elmira Gottlieb      Code Status: Limited    Chief complaint: left effusion     Assessment and Plan     Recurrent left effusion, neutrophilic, exudate, s/p US thoracentesis 500cc. LLL PNA       Plan:  Fluid seems to be para pneumonic, follow up cytology and culture results NTD  Continue antibiotics   Going for chest tube today. Overnight:  No acute events overnight  Afebrile  Cytology pending  Cultures NTD      REVIEW OF SYSTEMS:  Review of Systems -   General ROS: negative  Psychological ROS: negative  Ophthalmic ROS: negative  ENT ROS: negative  Allergy and Immunology ROS: negative  Hematological and Lymphatic ROS: negative  Endocrine ROS: negative  Breast ROS: negative  Respiratory ROS: cough left side tightness   Cardiovascular ROS: no chest pain or dyspnea on exertion  Gastrointestinal ROS:negative  Genito-Urinary ROS: negative  Musculoskeletal ROS: negative  Neurological ROS: on and off confused   Dermatological ROS: negative        Physical Exam:    Vitals: BP (!) 146/66   Pulse 75   Temp 98.5 °F (36.9 °C) (Oral)   Resp 21   Ht 5' 3\" (1.6 m)   Wt 157 lb 10.1 oz (71.5 kg)   SpO2 95%   BMI 27.92 kg/m²     Last Body weight:   Wt Readings from Last 3 Encounters:   07/18/22 157 lb 10.1 oz (71.5 kg)   07/01/22 148 lb 9.4 oz (67.4 kg)   04/19/22 155 lb (70.3 kg)       Body Mass Index : Body mass index is 27.92 kg/m². Intake and Output summary:   Intake/Output Summary (Last 24 hours) at 7/18/2022 0824  Last data filed at 7/18/2022 0647  Gross per 24 hour   Intake 1211.31 ml   Output 900 ml   Net 311.31 ml         Physical Examination:     Gen:  No acute distress. Eyes: PERRL. Anicteric sclera. No conjunctival injection. ENT: No discharge. Posterior oropharynx clear. External appearance of ears and nose normal.  Neck: Trachea midline. No mass   Resp:  diminished with dull percussion notes on the left side. CV: Regular rate. Regular rhythm. No murmur or rub. No edema. GI: Soft, Non-tender. Non-distended. +BS  Skin: Warm, dry, w/o erythema. Lymph: No cervical or supraclavicular LAD. M/S: No cyanosis. No clubbing. Neuro:  CN 2-12 tested, no focal neurologic deficit. Moves all extremities  Psych: Awake and alert, Oriented x 3. Judgement and insight appropriate. Mood stable. Laboratory findings:-    CBC:   Recent Labs     07/17/22  0646   WBC 6.1   HGB 7.5*          BMP:    Recent Labs     07/16/22  0641 07/17/22  0646    139   K 3.9 4.0    102   CO2 25 25   BUN 15 12   CREATININE 1.9* 2.2*   GLUCOSE 109* 93       S. Calcium:  Recent Labs     07/17/22  0646   CALCIUM 8.2*         S. Magnesium:  Recent Labs     07/17/22  0646   MG 2.20           Radiology Review:  Pertinent images / reports were reviewed as a part of this visit.     CT reviewed         Venkata Martinez MD, MROSETTE.            7/18/2022, 8:24 AM

## 2022-07-18 NOTE — PROGRESS NOTES
NEPHROLOGY CONSULT NOTE  [facility]    Interim history and the plan for today:  The patient's serum creatinine today was 2.2, slightly up, doubt this is significant  No urine output was documented  Discussed this issue with the patient nurse, will need to have urine output for this lady  Her other electrolytes are good. Her hemoglobin was down to 7.5. Back on 6/20/2022, her ferritin level was 341 and TSAT was 15%  So she is not iron deficient, but her iron stores on the low side  Before we will give her erythropoietin, I would like to push her TSAT down to 20% which will make her bone marrow response to erythropoietin better. She is on Zosyn and vancomycin, therefore I would prefer to hold off on IV Venofer until the infection is being cleared out. I reviewed her medications, and I do not see any concerning medications. The patient was seen and examined in her room  She was by herself  She was awake and conversant  She to me that she is worried about the procedure for tomorrow  Apparently she is scheduled to have a chest tube placed tomorrow  She has no other complaints  According to patient nurse, she did void several times today  Her most recent set of vital signs showed temperature 90.1, heart rate 88 and blood pressure 127/68    Lab work from this morning showed sodium 139, potassium 4, bicarb 25, BUN 12, creatinine was 2.2, magnesium 2.2, calcium 8.2, albumin 2.5    WBC 6.1, hemoglobin 7.5 and the platelet was 304. Assessment and plan  Renal failure: This time she presented with a creatinine of 1.9,  According to the information in the computer, she has history of renal failure already for a while, in February 2022, the patient creatinine was 1.3. Then she suffered 1 episode of SKINNY towards the end of February 2022, serum creatinine was peaked at 6.5.   Then she had another episode of SKINNY in March 2022, creatinine was up to 6  After that, the patient's serum creatinine has been around 1.8-1.9    Therefore, the patient kidney function appears to be at his baseline level  The patient UA was last time done in February, which was bland    Therefore should repeat a UA  She had a CT scan of her abdomen on this presentation, which showed \"kidneys are unremarkable\". There is no need for any further testing for the patient renal failure  Continue supportive care and avoid nephrotoxins. CKD MBD:  The patient's calcium level is low, so I will check the patient's PTH and 25-hydroxy vitamin D level    Anemia: This time she presented with a hemoglobin level of 8.3  Anemia has been going on for quite some time  Her ferritin level was 341 and T sat was 15 on 6/20/2022 therefore, she is not iron deficient. Hypertension:   The patient blood pressure has been labile, with the most recent blood pressure at 122/65, earlier, her blood pressure was 159/76. As of now, she is getting metoprolol 25 mg p.o. once daily    Acidosis? The patient most recent bicarb was 25 and she is bicarb supplementation, so I will discontinue patient bicarb for the time being. S: Abdominal pain    HPI:  The patient is 81years old, initially presented to the [facility] on 7/14/2022 at about 6:50 PM for evaluation for abdominal pain. She apparently has some dementia and a poor historian. Therefore, the information was provided by the patient's daughter who is at her bedside. She was recently hospitalized for similar problem per the patient daughter. She was found to have \"water around her lung\". According to a note from the emergency room physician, on her previous hospitalization, she was found to have pleural effusion. According to the patient daughter, after she left hospital, she went to her house. The patient was able to get around with a walker slowly inside her house. She was able to go to bathroom in the kitchen. She was also able to climb a few steps of stairs. Her appetite was okay.     She has inhaler 2 puff  2 puff Inhalation BID Aki West, DO   2 puff at 07/17/22 1931    tiotropium (SPIRIVA RESPIMAT) 2.5 MCG/ACT inhaler 2 puff  2 puff Inhalation Daily Aki West, DO   2 puff at 07/17/22 0853       EXAM  /68   Pulse 70   Temp 98.1 °F (36.7 °C) (Oral)   Resp 16   Ht 5' 3\" (1.6 m)   Wt 162 lb 0.6 oz (73.5 kg)   SpO2 94%   BMI 28.70 kg/m²   Physical Examination:   General appearance - pale and frail  Mental status - awake and somewhat conversant  Mouth - mucous membranes moist,   Chest - clear to auscultation,   Heart - normal rate, regular rhythm, normal S1, S2, no murmurs, rubs, clicks or gallops  Abdomen - firm, but benign  Neurological - her hands strength is good  Musculoskeletal - no joint tenderness, deformity or swelling  Extremities - peripheral pulses normal, trace edema,  Skin - poor skin turgor, no rashes    STUDIES:  Lab Results   Component Value Date    CREATININE 2.2 (H) 07/17/2022    BUN 12 07/17/2022     07/17/2022    K 4.0 07/17/2022     07/17/2022    CO2 25 07/17/2022     Lab Results   Component Value Date    CALCIUM 8.2 (L) 07/17/2022    PHOS 1.4 (L) 03/05/2022     Lab Results   Component Value Date    WBC 6.1 07/17/2022    HGB 7.5 (L) 07/17/2022    HCT 22.6 (L) 07/17/2022    MCV 81.6 07/17/2022     07/17/2022       Thank you for allowing me to participate in this patient's care. Please contact me for any questions.     Duncan Gore MD  [phone]

## 2022-07-18 NOTE — PROGRESS NOTES
NEPHROLOGY PROGRESS  NOTE  [facility]    Interim history and the plan for today:  Patient feel fine. Cr, lytes stable. BP labile. Patient denied SOB. On 2 liter oxygen. Monitor labs  Encouraged oral hydration. No changes from renal perspective. Assessment and plan  Renal failure: This time she presented with a creatinine of 1.9,  According to the information in the computer, she has history of renal failure already for a while, in February 2022, the patient creatinine was 1.3. Then she suffered 1 episode of SKINNY towards the end of February 2022, serum creatinine was peaked at 6.5. Then she had another episode of SKINNY in March 2022, creatinine was up to 6  After that, the patient's serum creatinine has been around 1.8-1.9    Therefore, the patient kidney function appears to be at his baseline level  The patient UA was last time done in February, which was bland    She had a CT scan of her abdomen on this presentation, which showed \"kidneys are unremarkable\". There is no need for any further testing for the patient renal failure  Continue supportive care and avoid nephrotoxins. CKD MBD:  The patient's calcium level is low, so I will check the patient's PTH and 25-hydroxy vitamin D level    Anemia: This time she presented with a hemoglobin level of 8.3  Anemia has been going on for quite some time  Her ferritin level was 341 and T sat was 15 on 6/20/2022 therefore, she is not iron deficient. Hypertension:   The patient blood pressure has been labile    Acidosis? The patient most recent bicarb was 25 and she is bicarb supplementation, so I will discontinue patient bicarb for the time being. S: Abdominal pain    HPI:  The patient is 81years old, initially presented to [facility] on 7/14/2022 at about 6:50 PM for evaluation for abdominal pain. She apparently has some dementia and a poor historian.   Therefore, the information was provided by the patient's daughter who is at her bedside. She was recently hospitalized for similar problem per the patient daughter. She was found to have \"water around her lung\". According to a note from the emergency room physician, on her previous hospitalization, she was found to have pleural effusion. According to the patient daughter, after she left hospital, she went to her house. The patient was able to get around with a walker slowly inside her house. She was able to go to bathroom in the kitchen. She was also able to climb a few steps of stairs. Her appetite was okay. 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 I saw the patient in her room with her daughter and multiple grandchildren at her bedside  She was up to chair awake, and conversant, she looked pale and frail, but she was under no acute distress.   Problem list:    Current Facility-Administered Medications   Medication Dose Route Frequency Provider Last Rate Last Admin    predniSONE (DELTASONE) tablet 40 mg  40 mg Oral Daily Sidra Arizmendi MD        piperacillin-tazobactam (ZOSYN) 3,375 mg in dextrose 5 % 100 mL IVPB extended infusion (mini-bag)  3,375 mg IntraVENous Q12H Aki  Jenna, DO   Stopped at 07/18/22 8164    aspirin EC tablet 81 mg  81 mg Oral Daily [facility], DO   81 mg at 07/17/22 0830    atorvastatin (LIPITOR) tablet 80 mg  80 mg Oral Nightly Aki [facility], DO   80 mg at 07/17/22 1947    methIMAzole (TAPAZOLE) tablet 5 mg  5 mg Oral Daily [facility]   5 mg at 07/17/22 0831    metoprolol succinate (TOPROL XL) extended release tablet 25 mg  25 mg [facility]   25 mg at 07/17/22 0831    sodium chloride flush 0.9 % injection 5-40 mL  5-40 mL IntraVENous 2 times per day [facility]   5 mL at 07/17/22 2325    sodium chloride flush 0.9 % injection 5-40 mL  5-40 mL IntraVENous PRN Aki  Jenna, DO        0.9 % sodium chloride infusion   IntraVENous PRN Erasmo Steve, DO 5 mL/hr at 07/18/22 0647 Rate Verify at 07/18/22 0647    acetaminophen (TYLENOL) tablet 650 mg  650 mg Oral Q6H PRN Aki Kyle Kewadin, DO        Or    acetaminophen (TYLENOL) suppository 650 mg  650 mg Rectal Q6H PRN [facility]        [Held by provider] enoxaparin Sodium (LOVENOX) injection 30 mg  30 mg SubCUTAneous Daily [facility], DO   30 mg at 07/17/22 0831    vancomycin (VANCOCIN) intermittent dosing (placeholder)   Other RX Placeholder Aki Kyle Kewadin, DO        gabapentin (NEURONTIN) capsule 300 mg  300 mg Oral BID [facility], DO   300 mg at 07/17/22 1947    mometasone-formoterol (DULERA) 100-5 MCG/ACT inhaler 2 puff  2 puff Inhalation BID [facility], DO   2 puff at 07/18/22 0904    tiotropium (SPIRIVA RESPIMAT) 2.5 MCG/ACT inhaler 2 puff  2 puff Inhalation Daily [facility], DO   2 puff at 07/18/22 0902       EXAM  BP (!) 153/80   Pulse 75   Temp 98.5 °F (36.9 °C) (Oral)   Resp 21   Ht 5' 3\" (1.6 m)   Wt 157 lb 10.1 oz (71.5 kg)   SpO2 96%   BMI 27.92 kg/m²   Physical Examination:   General appearance - NAD  Mental status - awake, alert and oriented x 3  Mouth - mucous membranes moist,   Chest - clear to auscultation, No RD  Heart - normal rate, regular rhythm, normal S1, S2, no murmurs, rubs, clicks or gallops  Abdomen - soft, ND, NT  Musculoskeletal - no joint tenderness, deformity or swelling  Extremities - Not much edema.    Skin - poor skin turgor, no rashes    STUDIES:  Lab Results   Component Value Date    CREATININE 2.1 (H) 07/18/2022    BUN 12 07/18/2022     07/18/2022    K 3.8 07/18/2022     07/18/2022    CO2 23 07/18/2022     Lab Results   Component Value Date    CALCIUM 8.2 (L) 07/18/2022    PHOS 1.4 (L) 03/05/2022     Lab Results   Component Value Date    WBC 7.4 07/18/2022    HGB 7.7 (L) 07/18/2022    HCT 23.6 (L) 07/18/2022    MCV 82.2 07/18/2022     07/18/2022       Thank you for allowing me to participate in this patient's care. Please contact me for any questions.     Tabitha Pearson, [address]

## 2022-07-18 NOTE — PROGRESS NOTES
Pt returned to room from IR after chest tube placement. Pt dressing clean dry and intact, pt is alert and daughter at bedside. Pt denies any pain at this time. Pt has call light in reach, bed alarm in place, bed side table and belongings in reach. Will continue to monitor.

## 2022-07-18 NOTE — PROGRESS NOTES
Physical Therapy  Facility/Department: [address] MED SURG  Physical Therapy Progress Note    Name: Elba Sheth  : 1940  MRN: [id number]  Date of Service: 2022    Discharge Recommendations:  Home with Home health PT, 24 hour supervision or assist   PT Equipment Recommendations  Equipment Needed: No      Patient Diagnosis(es): The primary encounter diagnosis was Recurrent left pleural effusion. Diagnoses of Left upper quadrant abdominal pain, Anemia, unspecified type, Chronic kidney disease, unspecified CKD stage, Hypoalbuminemia, and Goals of care, counseling/discussion were also pertinent to this visit. Past Medical History:  has a past medical history of Arthritis, Asthma, CAD (coronary artery disease), CKD (chronic kidney disease), Glaucoma, Hyperlipidemia, Hypertension, Hyperthyroidism, IBS (irritable bowel syndrome), Neuropathy, Obesity (BMI 30-39.9), Osteopenia, and Osteoporosis. Past Surgical History:  has a past surgical history that includes Cholecystectomy; Foot surgery; Tubal ligation; Gastric Band ([id number]); orif femur decompression (Left, 2014); Ankle fracture surgery (Left, 2015); Hand surgery (); other surgical history (Right, ); Coronary artery bypass graft (2022); Coronary artery bypass graft (N/A, 2022); IR TUNNELED CVC PLACE WO SQ PORT/PUMP > 5 YEARS (3/7/2022); and Diagnostic Cardiac Cath Lab Procedure (2022). Assessment   Body Structures, Functions, Activity Limitations Requiring Skilled Therapeutic Intervention: Decreased functional mobility ; Decreased endurance  Assessment: Pt is an 82 y/o female who lives with her daughter; who is also her caregiver. At baseline pt amb typically no AD with SBA from daughter. She is currently receiving home PT. Daughter states pt has been informed to start using RW more frequently. Pt presented to ED with abdominal pain, found with left pleural effusion.   US-guided left thoracentesis on 7/15 and now scheduled for chest tube placement at 11 AM (7/18). Today pt is complaining of increased discomfort/pressure under breast are, more on left side. She is able to transfer sit<->stand CGA-SBA. Patient ambulating with wheeled walker in room and hallway with SBA-CGA. She had no LOB but stands with a guarded positioning as she ambulates. Pt would benefit from continuing home PT upon d/c to help with functional mobility and endurance, but it is anticipated she will be safe to return home when medically stable  Therapy Prognosis: Good;Fair  Decision Making: Medium Complexity  Requires PT Follow-Up: Yes  Activity Tolerance  Activity Tolerance: Patient limited by fatigue;Patient limited by endurance; Patient limited by pain  Activity Tolerance Comments: discomfort in flank, described as \"pressure\"     Plan   Plan  Plan: 3-5 times per week  Current Treatment Recommendations: Strengthening, ROM, Balance training, Functional mobility training, Gait training, Endurance training, Stair training  Safety Devices  Type of Devices: Chair alarm in place, Left in chair, Gait belt, Call light within reach, Nurse notified (RNSuzanne, in room assessing patient)  Restraints  Restraints Initially in Place: No     Restrictions  Restrictions/Precautions  Restrictions/Precautions: Fall Risk  Position Activity Restriction  Other position/activity restrictions: scheduled for chest tube placement 7/18 at 11AM     Subjective   General  Chart Reviewed: Yes  Patient assessed for rehabilitation services?: Yes  Additional Pertinent Hx: left pleural effusion  Response To Previous Treatment: Patient with no complaints from previous session. Family / Caregiver Present: Yes (Daughter)  Referring Practitioner: Mundo Hoang MD  Referral Date : 07/15/22  Diagnosis: left pleural effusion  Follows Commands: Within Functional Limits  General Comment  Comments: Patient stood at sink and brushed her teeth.  She was supervision for balance and able to complete activity on her own. As she was going to wash her face, patient reporting the tightness was getting worse and she needed to sit down. Patient took seated rest break. Ambulated to recliner and RN, Brina Shah, arrived to assess patient and apply O2  Subjective  Subjective: Pt was in recliner chair upon arrival. Daughter present in the room and reporting the patient is feeling very uncomfortable. Patient reporting it feels as if she has a tight band around her lower ribs. She is grunting frequently and by end of session states it is affecting her breathing (which was a change from beginning)         Social/Functional History  Social/Functional History  Lives With: Family (Daughter; ZOHRA)  Type of Home: Apartment (basement level apartment; 6 steps with R handrail)  Home Layout: One level  Home Access: Stairs to enter with rails  Entrance Stairs - Number of Steps: 6 steps  Entrance Stairs - Rails: Right  Bathroom Shower/Tub: Shower chair with back, Tub/Shower unit  Bathroom Toilet: Standard ([place] nearby)  Bathroom Equipment: Shower chair, Hand-held shower, Grab bars in shower  Bathroom Accessibility: Accessible  Home Equipment: Walker, rolling  ADL Assistance: Needs assistance (Daughter assits with dressing, bathing, tolieting. 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     Transfers  Sit to Stand: Stand by assistance  Stand to sit: Stand by assistance  Bed to Chair: Stand by assistance (with wheeled walker)    Ambulation  Surface: level tile  Device: Rolling Walker  Assistance: Stand by assistance;Contact guard assistance  Quality of Gait: flexed and guarded trunk positioning. Grunting throughout gait. slow kale but even step length  Gait Deviations: Slow Kale;Decreased step length;Decreased step height  Distance: 110', 15' in room  Comments: After activity, patient reporting she felt it was becoming more difficult to breath. RN, Fe Mathew, was notified and came to room to assess. Stairs/Curb  Stairs?: No     Balance  Posture: Fair (guarded trunk positioning secondary to discomfort in mid flank, more on left side)  Sitting - Static: Good  Sitting - Dynamic: Good  Standing - Static: Good  Standing - Dynamic: Fair;+  Comments: standing balance with wheeled walker       AM-PAC Score  AM-PAC Inpatient Mobility Raw Score : 18 (07/18/22 0953)  AM-PAC Inpatient T-Scale Score : 43.63 (07/18/22 0953)  Mobility Inpatient CMS 0-100% Score: 46.58 (07/18/22 0953)  Mobility Inpatient CMS G-Code Modifier : CK (07/18/22 0953)          Goals  Short Term Goals  Time Frame for Short term goals: upon d/c  Short term goal 1: bed mobility SBA  Short term goal 2: transfers SBA  Short term goal 3: amb 76' RW SBA  Short term goal 4: 6 steps SBA  Patient Goals   Patient goals : No goal stated at this time. Education  Patient Education  Education Given To: Patient; Family  Education Provided: Role of Therapy;Plan of Care  Education Method: Verbal  Barriers to Learning: Cognition  Education Outcome: Continued education needed;Verbalized understanding      Therapy Time   Individual Concurrent Group Co-treatment   Time In 0915         Time Out 0945         Minutes [id number], [id number]

## 2022-07-19 LAB
A/G RATIO: 0.6 (ref 1.1–2.2)
ALBUMIN SERPL-MCNC: 2.6 G/DL (ref 3.4–5)
ALP BLD-CCNC: 61 U/L (ref 40–129)
ALT SERPL-CCNC: 9 U/L (ref 10–40)
ANION GAP SERPL CALCULATED.3IONS-SCNC: 14 MMOL/L (ref 3–16)
AST SERPL-CCNC: 16 U/L (ref 15–37)
BASOPHILS ABSOLUTE: 0 K/UL (ref 0–0.2)
BASOPHILS RELATIVE PERCENT: 0.4 %
BILIRUB SERPL-MCNC: 0.4 MG/DL (ref 0–1)
BUN BLDV-MCNC: 12 MG/DL (ref 7–20)
CALCIUM SERPL-MCNC: 8.5 MG/DL (ref 8.3–10.6)
CHLORIDE BLD-SCNC: 103 MMOL/L (ref 99–110)
CO2: 19 MMOL/L (ref 21–32)
CREAT SERPL-MCNC: 2 MG/DL (ref 0.6–1.2)
EOSINOPHILS ABSOLUTE: 0 K/UL (ref 0–0.6)
EOSINOPHILS RELATIVE PERCENT: 0 %
GFR AFRICAN AMERICAN: 29
GFR NON-AFRICAN AMERICAN: 24
GLUCOSE BLD-MCNC: 139 MG/DL (ref 70–99)
HCT VFR BLD CALC: 23.8 % (ref 36–48)
HEMOGLOBIN: 7.8 G/DL (ref 12–16)
LYMPHOCYTES ABSOLUTE: 0.6 K/UL (ref 1–5.1)
LYMPHOCYTES RELATIVE PERCENT: 9.3 %
MAGNESIUM: 2.4 MG/DL (ref 1.8–2.4)
MCH RBC QN AUTO: 26.9 PG (ref 26–34)
MCHC RBC AUTO-ENTMCNC: 32.7 G/DL (ref 31–36)
MCV RBC AUTO: 82 FL (ref 80–100)
MONOCYTES ABSOLUTE: 0.1 K/UL (ref 0–1.3)
MONOCYTES RELATIVE PERCENT: 1.8 %
NEUTROPHILS ABSOLUTE: 5.9 K/UL (ref 1.7–7.7)
NEUTROPHILS RELATIVE PERCENT: 88.5 %
PDW BLD-RTO: 17 % (ref 12.4–15.4)
PLATELET # BLD: 241 K/UL (ref 135–450)
PMV BLD AUTO: 8.3 FL (ref 5–10.5)
POTASSIUM SERPL-SCNC: 3.9 MMOL/L (ref 3.5–5.1)
RBC # BLD: 2.91 M/UL (ref 4–5.2)
SODIUM BLD-SCNC: 136 MMOL/L (ref 136–145)
TOTAL PROTEIN: 7 G/DL (ref 6.4–8.2)
VANCOMYCIN RANDOM: 17.1 UG/ML
WBC # BLD: 6.7 K/UL (ref 4–11)

## 2022-07-19 PROCEDURE — 97530 THERAPEUTIC ACTIVITIES: CPT

## 2022-07-19 PROCEDURE — 6370000000 HC RX 637 (ALT 250 FOR IP): Performed by: STUDENT IN AN ORGANIZED HEALTH CARE EDUCATION/TRAINING PROGRAM

## 2022-07-19 PROCEDURE — 36415 COLL VENOUS BLD VENIPUNCTURE: CPT

## 2022-07-19 PROCEDURE — 6360000002 HC RX W HCPCS: Performed by: STUDENT IN AN ORGANIZED HEALTH CARE EDUCATION/TRAINING PROGRAM

## 2022-07-19 PROCEDURE — 2580000003 HC RX 258: Performed by: INTERNAL MEDICINE

## 2022-07-19 PROCEDURE — 80053 COMPREHEN METABOLIC PANEL: CPT

## 2022-07-19 PROCEDURE — 97116 GAIT TRAINING THERAPY: CPT

## 2022-07-19 PROCEDURE — 2580000003 HC RX 258: Performed by: STUDENT IN AN ORGANIZED HEALTH CARE EDUCATION/TRAINING PROGRAM

## 2022-07-19 PROCEDURE — 1200000000 HC SEMI PRIVATE

## 2022-07-19 PROCEDURE — 80202 ASSAY OF VANCOMYCIN: CPT

## 2022-07-19 PROCEDURE — 6370000000 HC RX 637 (ALT 250 FOR IP): Performed by: INTERNAL MEDICINE

## 2022-07-19 PROCEDURE — 94760 N-INVAS EAR/PLS OXIMETRY 1: CPT

## 2022-07-19 PROCEDURE — 2580000003 HC RX 258: Performed by: NURSE PRACTITIONER

## 2022-07-19 PROCEDURE — 85025 COMPLETE CBC W/AUTO DIFF WBC: CPT

## 2022-07-19 PROCEDURE — 94640 AIRWAY INHALATION TREATMENT: CPT

## 2022-07-19 PROCEDURE — 6360000002 HC RX W HCPCS: Performed by: NURSE PRACTITIONER

## 2022-07-19 PROCEDURE — 6360000002 HC RX W HCPCS: Performed by: INTERNAL MEDICINE

## 2022-07-19 PROCEDURE — 99232 SBSQ HOSP IP/OBS MODERATE 35: CPT | Performed by: INTERNAL MEDICINE

## 2022-07-19 PROCEDURE — 83735 ASSAY OF MAGNESIUM: CPT

## 2022-07-19 PROCEDURE — 99232 SBSQ HOSP IP/OBS MODERATE 35: CPT | Performed by: THORACIC SURGERY (CARDIOTHORACIC VASCULAR SURGERY)

## 2022-07-19 RX ORDER — SODIUM BICARBONATE 650 MG/1
650 TABLET ORAL 3 TIMES DAILY
Status: DISCONTINUED | OUTPATIENT
Start: 2022-07-19 | End: 2022-07-22 | Stop reason: HOSPADM

## 2022-07-19 RX ADMIN — ACETAMINOPHEN 650 MG: 325 TABLET ORAL at 12:28

## 2022-07-19 RX ADMIN — METHIMAZOLE 5 MG: 5 TABLET ORAL at 09:00

## 2022-07-19 RX ADMIN — SODIUM BICARBONATE 650 MG: 650 TABLET ORAL at 18:31

## 2022-07-19 RX ADMIN — CEFEPIME HYDROCHLORIDE 1000 MG: 1 INJECTION, POWDER, FOR SOLUTION INTRAMUSCULAR; INTRAVENOUS at 20:16

## 2022-07-19 RX ADMIN — SODIUM BICARBONATE 650 MG: 650 TABLET ORAL at 11:23

## 2022-07-19 RX ADMIN — ACETAMINOPHEN 650 MG: 325 TABLET ORAL at 19:59

## 2022-07-19 RX ADMIN — MOMETASONE FUROATE AND FORMOTEROL FUMARATE DIHYDRATE 2 PUFF: 100; 5 AEROSOL RESPIRATORY (INHALATION) at 09:55

## 2022-07-19 RX ADMIN — SODIUM CHLORIDE, PRESERVATIVE FREE 10 ML: 5 INJECTION INTRAVENOUS at 20:17

## 2022-07-19 RX ADMIN — TIOTROPIUM BROMIDE INHALATION SPRAY 2 PUFF: 3.12 SPRAY, METERED RESPIRATORY (INHALATION) at 09:55

## 2022-07-19 RX ADMIN — DORNASE ALFA 5 MG: 1 SOLUTION RESPIRATORY (INHALATION) at 10:00

## 2022-07-19 RX ADMIN — GABAPENTIN 300 MG: 300 CAPSULE ORAL at 19:59

## 2022-07-19 RX ADMIN — SODIUM BICARBONATE 650 MG: 650 TABLET ORAL at 19:58

## 2022-07-19 RX ADMIN — ASPIRIN 81 MG: 81 TABLET, COATED ORAL at 09:01

## 2022-07-19 RX ADMIN — METOPROLOL SUCCINATE 25 MG: 25 TABLET, EXTENDED RELEASE ORAL at 09:01

## 2022-07-19 RX ADMIN — GABAPENTIN 300 MG: 300 CAPSULE ORAL at 09:01

## 2022-07-19 RX ADMIN — CEFEPIME HYDROCHLORIDE 2000 MG: 2 INJECTION, POWDER, FOR SOLUTION INTRAVENOUS at 11:23

## 2022-07-19 RX ADMIN — ALTEPLASE 10 MG: 2.2 INJECTION, POWDER, LYOPHILIZED, FOR SOLUTION INTRAVENOUS at 10:00

## 2022-07-19 RX ADMIN — PREDNISONE 40 MG: 20 TABLET ORAL at 09:01

## 2022-07-19 RX ADMIN — PIPERACILLIN AND TAZOBACTAM 3375 MG: 3; .375 INJECTION, POWDER, LYOPHILIZED, FOR SOLUTION INTRAVENOUS at 00:41

## 2022-07-19 RX ADMIN — ATORVASTATIN CALCIUM 80 MG: 80 TABLET, FILM COATED ORAL at 19:58

## 2022-07-19 RX ADMIN — MOMETASONE FUROATE AND FORMOTEROL FUMARATE DIHYDRATE 2 PUFF: 100; 5 AEROSOL RESPIRATORY (INHALATION) at 19:57

## 2022-07-19 ASSESSMENT — PAIN DESCRIPTION - ORIENTATION
ORIENTATION: RIGHT;LEFT
ORIENTATION: LEFT

## 2022-07-19 ASSESSMENT — PAIN DESCRIPTION - ONSET: ONSET: ON-GOING

## 2022-07-19 ASSESSMENT — PAIN DESCRIPTION - LOCATION
LOCATION: BACK
LOCATION: LEG

## 2022-07-19 ASSESSMENT — PAIN DESCRIPTION - PAIN TYPE: TYPE: ACUTE PAIN

## 2022-07-19 ASSESSMENT — PAIN SCALES - GENERAL
PAINLEVEL_OUTOF10: 3
PAINLEVEL_OUTOF10: 5
PAINLEVEL_OUTOF10: 8

## 2022-07-19 ASSESSMENT — PAIN DESCRIPTION - FREQUENCY: FREQUENCY: INTERMITTENT

## 2022-07-19 ASSESSMENT — PAIN DESCRIPTION - DESCRIPTORS: DESCRIPTORS: DISCOMFORT

## 2022-07-19 NOTE — PROGRESS NOTES
Pulmonary Critical Care Progress Note     Patient's name:  Anu Yanez  Medical Record Number: [id number]  Patient's account/billing number: [de-identified]  Patient's YOB: 1940  Age: 81 y.o. Date of Admission: 7/14/2022  7:04 PM  Date of Consult: 7/19/2022      Primary Care Physician: Merissa Hart      Code Status: Limited    Chief complaint: left effusion     Assessment and Plan     Recurrent left effusion, neutrophilic, exudate, with evidence of trapped lung   LLL PNA       Plan:  Chest tube in place, tPA and pulmozym  CXR reviewed trapped lung with pneumothorax ex vacuo   Antibiotics change to Cefepime   IS  Wean O2 keep sat > 90%           Overnight:  No acute events overnight  Afebrile  Cytology pending  Cultures NTD      REVIEW OF SYSTEMS:  Review of Systems -   General ROS: negative  Psychological ROS: negative  Ophthalmic ROS: negative  ENT ROS: negative  Allergy and Immunology ROS: negative  Hematological and Lymphatic ROS: negative  Endocrine ROS: negative  Breast ROS: negative  Respiratory ROS: cough left side tightness   Cardiovascular ROS: no chest pain or dyspnea on exertion  Gastrointestinal ROS:negative  Genito-Urinary ROS: negative  Musculoskeletal ROS: negative  Neurological ROS: on and off confused   Dermatological ROS: negative        Physical Exam:    Vitals: /69   Pulse 80   Temp 98.5 °F (36.9 °C) (Oral)   Resp 18   Ht 5' 3\" (1.6 m)   Wt 154 lb 8.7 oz (70.1 kg)   SpO2 100%   BMI 27.38 kg/m²     Last Body weight:   Wt Readings from Last 3 Encounters:   07/19/22 154 lb 8.7 oz (70.1 kg)   07/01/22 148 lb 9.4 oz (67.4 kg)   04/19/22 155 lb (70.3 kg)       Body Mass Index : Body mass index is 27.38 kg/m².       Intake and Output summary:   Intake/Output Summary (Last 24 hours) at 7/19/2022 0910  Last data filed at 7/19/2022 0621  Gross per 24 hour   Intake 600 ml   Output 514 ml   Net 86 ml         Physical Examination:     Gen:  No acute distress. Eyes: PERRL. Anicteric sclera. No conjunctival injection. ENT: No discharge. Posterior oropharynx clear. External appearance of ears and nose normal.  Neck: Trachea midline. No mass   Resp:  diminished on the left chest tube in place. CV: Regular rate. Regular rhythm. No murmur or rub. No edema. GI: Soft, Non-tender. Non-distended. +BS  Skin: Warm, dry, w/o erythema. Lymph: No cervical or supraclavicular LAD. M/S: No cyanosis. No clubbing. Neuro:  CN 2-12 tested, no focal neurologic deficit. Moves all extremities  Psych: Awake and alert, Oriented x 3. Judgement and insight appropriate. Mood stable. Laboratory findings:-    CBC:   Recent Labs     07/19/22  0600   WBC 6.7   HGB 7.8*          BMP:    Recent Labs     07/17/22  0646 07/18/22  0822 07/19/22  0600    138 136   K 4.0 3.8 3.9    103 103   CO2 25 23 19*   BUN 12 12 12   CREATININE 2.2* 2.1* 2.0*   GLUCOSE 93 89 139*       S. Calcium:  Recent Labs     07/19/22  0600   CALCIUM 8.5         S. Magnesium:  Recent Labs     07/19/22  0600   MG 2.40           Radiology Review:  Pertinent images / reports were reviewed as a part of this visit.     CT reviewed         Nichole Browning MD, M.D.            7/19/2022, 9:10 AM

## 2022-07-19 NOTE — PLAN OF CARE
Problem: Discharge Planning  Goal: Discharge to home or other facility with appropriate resources  7/19/2022 0043 by Avery Card RN  Outcome: Progressing Towards Goal     Problem: Pain  Goal: Verbalizes/displays adequate comfort level or baseline comfort level  7/19/2022 0043 by Avery Card RN  Outcome: Progressing Towards Goal     Problem: Skin/Tissue Integrity  Goal: Absence of new skin breakdown  Description: 1. Monitor for areas of redness and/or skin breakdown  2. Assess vascular access sites hourly  3. Every 4-6 hours minimum:  Change oxygen saturation probe site  4. Every 4-6 hours:  If on nasal continuous positive airway pressure, respiratory therapy assess nares and determine need for appliance change or resting period.   7/19/2022 0043 by Avery Card RN  Outcome: Progressing Towards Goal

## 2022-07-19 NOTE — PROGRESS NOTES
Hospitalist Progress Note      PCP: Devaughn Chicas [address]    Date of Admission: 7/14/2022    Chief Complaint: Shortness of breath    Hospital Course:      Subjective: tPA infusing through chest tube. No complaints      Medications:  Reviewed    Infusion Medications    sodium chloride 5 mL/hr at 07/18/22 6692     Scheduled Medications    vancomycin  500 mg IntraVENous Q24H    predniSONE  40 mg Oral Daily    piperacillin-tazobactam  3,375 mg IntraVENous Q12H    aspirin EC  81 mg Oral Daily    atorvastatin  80 mg Oral Nightly    methIMAzole  5 mg Oral Daily    metoprolol succinate  25 mg Oral Daily    sodium chloride flush  5-40 mL IntraVENous 2 times per day    [Held by provider] enoxaparin  30 mg SubCUTAneous Daily    vancomycin (VANCOCIN) intermittent dosing (placeholder)   Other RX Placeholder    gabapentin  300 mg Oral BID    mometasone-formoterol  2 puff Inhalation BID    tiotropium  2 puff Inhalation Daily     PRN Meds: sodium chloride flush, sodium chloride, acetaminophen **OR** acetaminophen      Intake/Output Summary (Last 24 hours) at 7/19/2022 0850  Last data filed at 7/19/2022 0356  Gross per 24 hour   Intake 600 ml   Output 514 ml   Net 86 ml         Exam:    BP (!) 142/68   Pulse 78   Temp 98.1 °F (36.7 °C) (Oral)   Resp 17   Ht 5' 3\" (1.6 m)   Wt 154 lb 8.7 oz (70.1 kg)   SpO2 100%   BMI 27.38 kg/m²     General appearance: No apparent distress, appears stated age and cooperative. HEENT: Pupils equal, round, and reactive to light. Conjunctivae/corneas clear. Neck: Supple, with full range of motion. No jugular venous distention. Trachea midline. Respiratory:  Normal respiratory effort. Clear to auscultation, bilaterally without Rales/Wheezes/Rhonchi. Cardiovascular: Regular rate and rhythm with normal S1/S2 without murmurs, rubs or gallops. Abdomen: Soft, non-tender, non-distended with normal bowel sounds. Musculoskeletal: No clubbing, cyanosis or edema bilaterally.   Full range of placement of a 10 [place] left chest tube         US CHEST INCLUDING MEDIASTINUM   Final Result   Loculated left pleural effusion. No thoracentesis performed. Patient will   require a chest tube. CT CHEST WO CONTRAST   Final Result   1. Smaller size but persistent moderate left pleural effusion with a few   socially gas bubbles. Suboptimally characterized without IV contrast.  This   could reflect empyema. 2. Mild associated relaxation atelectasis lingula left upper lobe and   prominent consolidation left lower lobe. 3.  Anemia is likely given that the blood pool density of the heart is lower   than that of the myocardium. Cardiomegaly. 4. Sequela from prior open heart surgery. US THORACENTESIS Which side should the procedure be performed? Left   Final Result   Successful ultrasound guided left thoracentesis. XR CHEST 1 VIEW   Final Result   No appreciable pneumothorax status post left thoracentesis. CT ABDOMEN PELVIS WO CONTRAST Additional Contrast? None   Final Result   1. No acute process in the abdomen or pelvis. 2. Large volume stool throughout the colon. 3. Partially imaged left pleural effusion. XR CHEST (2 VW)   Final Result   Increase in volume of left pleural effusion, now moderate to large.                  Assessment/Plan:    Active Hospital Problems    Diagnosis Date Noted    Hx of CABG [Z95.1] 07/16/2022     Priority: Medium    Hyperthyroidism [E05.90] 07/16/2022     Priority: Medium    HTN (hypertension) [I10] 07/16/2022     Priority: Medium    Recurrent left pleural effusion [J90] 07/14/2022     Priority: Medium    Chronic kidney disease (CKD) stage G3b/A2, moderately decreased glomerular filtration rate (GFR) between 30-44 mL/min/1.73 square meter and albuminuria creatinine ratio between  mg/g (RUSTca 75.) [N18.32] 06/26/2022     Priority: Medium    CAD in native artery [I25.10]      Recurrent left pleural effusion:  -Concern for loculated infection with entrapment  - s/p thoracentesis on 7/15  -Continue Zosyn and vancomycin  -CT surgery consulted: Patient and daughter would like to hold off on surgery for now. Place IR guided chest tube. -Doxycycline pleurodesis if lung reexpands with chest tube   -If lung partially reexpands will consider tPA instillation to help lung entrapment   -VATS if all else fails  -Holding aspirin  - Chest tube placed 7/18   - tPA infusing starting 7/19    COPD: Continue chronic inhalers  Hyperthyroidism: Continue methimazole beta-blocker    DVT Prophylaxis: Lovenox  Diet: ADULT DIET;  Regular  Code Status: Limited    PT/OT Eval Status: Tio Brock MD

## 2022-07-19 NOTE — PROGRESS NOTES
NEPHROLOGY PROGRESS  NOTE  [facility]    Interim history and the plan for today:  Patient feel fine. Cr slowly trending down  K fine  Bicarbonate mild low  BP labile. Patient denied SOB. On room air now  Vancomycin level was high    Monitor labs and I/O  Start oral bicarbonate  Please Keep Vancomycin level in therapeutic range  Encouraged oral hydration. Avoid nephrotoxins if possible. Assessment and plan  Renal failure: This time she presented with a creatinine of 1.9,  According to the information in the computer, she has history of renal failure already for a while, in February 2022, the patient creatinine was 1.3. Then she suffered 1 episode of SKINNY towards the end of February 2022, serum creatinine was peaked at 6.5. Then she had another episode of SKINNY in March 2022, creatinine was up to 6  After that, the patient's serum creatinine has been around 1.8-1.9    Therefore, the patient kidney function appears to be at his baseline level  The patient UA was last time done in February, which was bland    She had a CT scan of her abdomen on this presentation, which showed \"kidneys are unremarkable\". There is no need for any further testing for the patient renal failure  Continue supportive care and avoid nephrotoxins. CKD MBD:  The patient's calcium level is low, so I will check the patient's PTH and 25-hydroxy vitamin D level    Anemia: This time she presented with a hemoglobin level of 8.3  Anemia has been going on for quite some time  Her ferritin level was 341 and T sat was 15 on 6/20/2022 therefore, she is not iron deficient. Hypertension:   The patient blood pressure has been labile    Acidosis? The patient most recent bicarb was 25 and she is bicarb supplementation, so I will discontinue patient bicarb for the time being.     S: Abdominal pain    HPI:  The patient is 81years old, initially presented to the [facility] on 7/14/2022 at about 6:50 PM for evaluation for abdominal pain. She apparently has some dementia and a poor historian. Therefore, the information was provided by the patient's daughter who is at her bedside. She was recently hospitalized for similar problem per the patient daughter. She was found to have \"water around her lung\". According to a note from the emergency room physician, on her previous hospitalization, she was found to have pleural effusion. According to the patient daughter, after she left hospital, she went to her house. The patient was able to get around with a walker slowly inside her house. She was able to go to bathroom in the kitchen. She was also able to climb a few steps of stairs. Her appetite was okay. 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 I saw the patient in her room with her daughter and multiple grandchildren at her bedside  She was up to chair awake, and conversant, she looked pale and frail, but she was under no acute distress.   Problem list:    Current Facility-Administered Medications   Medication Dose Route Frequency Provider Last Rate Last Admin    vancomycin (VANCOCIN) 500 mg in dextrose 5 % 250 mL IVPB  500 mg IntraVENous Q24H Shawn Salmeron MD        predniSONE (DELTASONE) tablet 40 mg  40 mg Oral Daily Sidra Arizmendi MD   40 mg at 07/18/22 1632    piperacillin-tazobactam (ZOSYN) 3,375 mg in dextrose 5 % 100 mL IVPB extended infusion (mini-bag)  3,375 mg IntraVENous Q12H [facility]   Stopped at 07/19/22 0558    aspirin EC tablet 81 mg  81 mg Oral Daily [facility]   81 mg at 07/18/22 1631    atorvastatin (LIPITOR) tablet 80 mg  80 mg Oral Nightly [facility]   80 mg at 07/18/22 2001    methIMAzole (TAPAZOLE) tablet 5 mg  5 mg Oral Daily [facility]   5 mg at 07/18/22 1632    metoprolol succinate (TOPROL XL) extended release tablet 25 mg  25 mg Oral Daily [facility] DO   25 mg at 07/18/22 1632    sodium chloride flush 0.9 % injection 5-40 mL  5-40 mL IntraVENous 2 times per day Count includes the [facility]   10 mL at 07/18/22 2003    sodium chloride flush 0.9 % injection 5-40 mL  5-40 mL IntraVENous PRN [facility], DO        0.9 % sodium chloride infusion   IntraVENous PRN Aki Diane Car, DO 5 mL/hr at 07/18/22 0647 Rate Verify at 07/18/22 0647    acetaminophen (TYLENOL) tablet 650 mg  650 mg Oral Q6H PRN [facility], DO   650 mg at 07/18/22 2002    Or    acetaminophen (TYLENOL) suppository 650 mg  650 mg Rectal Q6H PRN [facility] Jenna, DO        [Held by provider] enoxaparin Sodium (LOVENOX) injection 30 mg  30 mg SubCUTAneous Daily Count includes the [facility], [facility]   30 mg at 07/17/22 0831    vancomycin (VANCOCIN) intermittent dosing (placeholder)   Other RX Placeholder Aki Diane Orts, DO        gabapentin (NEURONTIN) capsule 300 mg  300 mg Oral BID Count includes the [facility], [facility]   300 mg at 07/18/22 2002    mometasone-formoterol (DULERA) 100-5 MCG/ACT inhaler 2 puff  2 puff Inhalation BID [facility], DO   2 puff at 07/18/22 1943    tiotropium (SPIRIVA RESPIMAT) 2.5 MCG/ACT inhaler 2 puff  2 puff Inhalation Daily [facility], DO   2 puff at 07/18/22 0902       EXAM  BP (!) 142/68   Pulse 78   Temp 98.1 °F (36.7 °C) (Oral)   Resp 17   Ht 5' 3\" (1.6 m)   Wt 154 lb 8.7 oz (70.1 kg)   SpO2 100%   BMI 27.38 kg/m²   Physical Examination:   General appearance - NAD  Mental status - awake, alert and oriented x 3  Mouth - mucous membranes moist,   Chest - clear to auscultation, No RD  Heart - normal rate, regular rhythm, normal S1, S2, no murmurs, rubs, clicks or gallops  Abdomen - soft, ND, NT  Musculoskeletal - no joint tenderness, deformity or swelling  Extremities - Not much edema.    Skin - poor skin turgor, no rashes    STUDIES:  Lab Results   Component Value Date    CREATININE 2.0 (H) 07/19/2022    BUN 12 07/19/2022     07/19/2022    K 3.9 07/19/2022     07/19/2022    CO2 19 (L) 07/19/2022     Lab Results   Component Value Date    CALCIUM 8.5 07/19/2022    PHOS 1.4 (L) 03/05/2022     Lab Results   Component Value Date    WBC 6.7 07/19/2022    HGB 7.8 (L) 07/19/2022    HCT 23.8 (L) 07/19/2022    MCV 82.0 07/19/2022     07/19/2022       Thank you for allowing me to participate in this patient's care. Please contact me for any questions.     Kevin Norris, [address]

## 2022-07-19 NOTE — PROGRESS NOTES
Clinical Pharmacy Note  Vancomycin Consult    Jason Britton is a 81 y.o. female ordered Vancomycin for parapneumonic effusion; consult received from Dr. Ollie Medellin to manage therapy. Also receiving Zosyn. Allergies:  Patient has no known allergies. Temp max:  Temp (24hrs), Av.3 °F (36.8 °C), Min:98.1 °F (36.7 °C), Max:98.7 °F (37.1 °C)      Recent Labs     22  0646 22  0822 22  0600   WBC 6.1 7.4 6.7       Recent Labs     22  0646 22  0822 22  0600   BUN 12 12 12   CREATININE 2.2* 2.1* 2.0*         Intake/Output Summary (Last 24 hours) at 2022 0732  Last data filed at 2022 6504  Gross per 24 hour   Intake 600 ml   Output 814 ml   Net -214 ml       Culture Results:  NGTD    Ht Readings from Last 1 Encounters:   07/15/22 5' 3\" (1.6 m)        Wt Readings from Last 1 Encounters:   22 154 lb 8.7 oz (70.1 kg)         Estimated Creatinine Clearance: 21 mL/min (A) (based on SCr of 2 mg/dL (H)). Assessment:  Day # 5 of vancomycin. Current regimen: Dosing intermittent based on levels  Vancomycin level: 17.1 mg/L this am    Plan:  Renal function at baseline SCr of around 2  Based on doses/levels this admission, will  Change regimen to 500 mg every 24 hours. Starting today at 1400  Predicted AUC = 459    Vanco random on 22 at 0600    Thank you for the consult.      Dorothea Gerber, SummerD.  2022  7:36 AM

## 2022-07-19 NOTE — PLAN OF CARE
Problem: Discharge Planning  Goal: Discharge to home or other facility with appropriate resources  7/19/2022 1431 by Xochitl Hodge RN  Outcome: Progressing Towards Goal  Flowsheets (Taken 7/19/2022 1428)  Discharge to home or other facility with appropriate resources: Identify barriers to discharge with patient and caregiver  7/19/2022 0043 by Simona Mckinnon RN  Outcome: Progressing Towards Goal     Problem: Pain  Goal: Verbalizes/displays adequate comfort level or baseline comfort level  7/19/2022 1431 by Xochitl Hodge RN  Outcome: Progressing Towards Goal  7/19/2022 0043 by Simona Mckinnon RN  Outcome: Progressing Towards Goal     Problem: Skin/Tissue Integrity  Goal: Absence of new skin breakdown  Description: 1. Monitor for areas of redness and/or skin breakdown  2. Assess vascular access sites hourly  3. Every 4-6 hours minimum:  Change oxygen saturation probe site  4. Every 4-6 hours:  If on nasal continuous positive airway pressure, respiratory therapy assess nares and determine need for appliance change or resting period.   7/19/2022 1431 by Xochitl Hodge RN  Outcome: Progressing Towards Goal  7/19/2022 0043 by Simona Mckinnon RN  Outcome: Progressing Towards Goal     Problem: Safety - Adult  Goal: Free from fall injury  Outcome: Progressing Towards Goal     Problem: ABCDS Injury Assessment  Goal: Absence of physical injury  Outcome: Progressing Towards Goal

## 2022-07-19 NOTE — PROGRESS NOTES
Assisted patient to bathroom using walker, patient had large soft BM and then became short of breath and had to sit back down. Patient was unable to communicate her feelings at this time. Daughter at bedside states she thinks it's her anxiety. Patient encouraged to take deep breaths.  Assisted patient in brushing teeth, then back to bed without difficulty

## 2022-07-19 NOTE — PROGRESS NOTES
Alteplase 10 mg and dornase 5 mg instilled per left sided chest tube at 10:00 am.. Chest tube clamped. Patient tolerated without difficulties.

## 2022-07-19 NOTE — PROGRESS NOTES
Physical Therapy  Facility/Department: [facility]  Physical Therapy Treatment Session    Name: Amalia Garcia  : 1940  MRN: [id number]  Date of Service: 2022    Discharge Recommendations:  Home with Home health PT, 24 hour supervision or assist   PT Equipment Recommendations  Equipment Needed: No      Amalia Garcia scored a 17/24 on the AM-PAC short mobility form. Current research shows that an AM-PAC score of 18 or greater is typically associated with a discharge to the patient's home setting. Based on the patient's AM-PAC score and their current functional mobility deficits, it is recommended that the patient have 2-3 sessions per week of Physical Therapy at d/c to increase the patient's independence. At this time, this patient demonstrates the endurance and safety to discharge home with HHPT and a follow up treatment frequency of 2-3x/wk. Please see assessment section for further patient specific details. If patient discharges prior to next session this note will serve as a discharge summary. Please see below for the latest assessment towards goals. Patient Diagnosis(es): The primary encounter diagnosis was Recurrent left pleural effusion. Diagnoses of Left upper quadrant abdominal pain, Anemia, unspecified type, Chronic kidney disease, unspecified CKD stage, Hypoalbuminemia, and Goals of care, counseling/discussion were also pertinent to this visit. Past Medical History:  has a past medical history of Arthritis, Asthma, CAD (coronary artery disease), CKD (chronic kidney disease), Glaucoma, Hyperlipidemia, Hypertension, Hyperthyroidism, IBS (irritable bowel syndrome), Neuropathy, Obesity (BMI 30-39.9), Osteopenia, and Osteoporosis. Past Surgical History:  has a past surgical history that includes Cholecystectomy; Foot surgery; Tubal ligation; Gastric Band ([id number]); orif femur decompression (Left, 2014); Ankle fracture surgery (Left, 2015);  Hand surgery (); other surgical history (Right, 2010); Coronary artery bypass graft (02/25/2022); Coronary artery bypass graft (N/A, 2/25/2022); IR TUNNELED CVC PLACE WO SQ PORT/PUMP > 5 YEARS (3/7/2022); Diagnostic Cardiac Cath Lab Procedure (02/2022); and CT GUIDED CHEST TUBE (7/18/2022). Assessment   Body Structures, Functions, Activity Limitations Requiring Skilled Therapeutic Intervention: Decreased functional mobility ; Decreased endurance  Assessment: Pt is an 80 y/o female who lives with her daughter; who is also her caregiver. At baseline pt amb typically no AD with SBA from daughter. She is currently recieving home PT. Daughter states pt has been informed to start using RW more frequently. Pt presented to ED with abdominal pain, found with left pleural effusion. US-guided left thoracentesis on 7/15 and chest tube placement at 7/18. Today pt seemed more anxious. She was CGA/SBA for transfers. Pt amb ~25' RW CGA with PT managing chest tube. Pt gruntimg througout session, daughter states it is normal for patient. Pt would benefit from continuing home PT upon d/c to help with functional mobility and endurance, but it is anticipated she will be safe to return home with assist from daughter when medically stable. Therapy Prognosis: Good;Fair  Decision Making: Medium Complexity  Requires PT Follow-Up: Yes  Activity Tolerance  Activity Tolerance: Patient limited by endurance; Patient limited by pain; Patient tolerated treatment well     Plan   Plan  Plan: 3-5 times per week  Current Treatment Recommendations: Strengthening, ROM, Balance training, Functional mobility training, Gait training, Endurance training, Stair training  Safety Devices  Type of Devices: Chair alarm in place, Left in chair, Gait belt, Call light within reach, Nurse notified  Restraints  Restraints Initially in Place: No     Restrictions  Restrictions/Precautions  Restrictions/Precautions: Fall Risk  Position Activity Restriction  Other position/activity height  Distance: ~25' x2        Exercise Treatment: Seated Exercises: x10 alterating marches, x10 alternating LAQ      Other Activities: Pt amb RW CGA to bathroom to allow pt to void. She was able to don/doff brief with Christina, also requiring assistance with pericare. AM-PAC Score  AM-PAC Inpatient Mobility Raw Score : 17 (07/19/22 1112)  AM-PAC Inpatient T-Scale Score : 42.13 (07/19/22 1112)  Mobility Inpatient CMS 0-100% Score: 50.57 (07/19/22 1112)  Mobility Inpatient CMS G-Code Modifier : CK (07/19/22 1112)          Goals  Short Term Goals  Time Frame for Short term goals: upon d/c  Short term goal 1: bed mobility SBA  Short term goal 2: transfers SBA  Short term goal 3: amb 76' RW SBA  Short term goal 4: 6 steps SBA  Patient Goals   Patient goals : No goal stated at this time. Education  Patient Education  Education Given To: Patient; Family  Education Provided: Role of Therapy;Plan of Care;Equipment  Education Method: Verbal  Barriers to Learning: Cognition  Education Outcome: Continued education needed;Verbalized understanding      Therapy Time   Individual Concurrent Group Co-treatment   Time In 1037         Time Out 1110         Minutes 33         Timed Code Treatment Minutes: 33 Minutes       Electronically signed by JERO Kirkland on 7/19/2022 at 11:17 AM  Therapist was present, directed the patient's care, made skilled judgement, and was responsible for assessment and treatment of the patient.       Electronically signed by Simona Long PT [id number] on 7/19/2022 at 1:18 PM

## 2022-07-20 LAB
A/G RATIO: 0.8 (ref 1.1–2.2)
ALBUMIN SERPL-MCNC: 3.3 G/DL (ref 3.4–5)
ALP BLD-CCNC: 61 U/L (ref 40–129)
ALT SERPL-CCNC: 11 U/L (ref 10–40)
ANION GAP SERPL CALCULATED.3IONS-SCNC: 14 MMOL/L (ref 3–16)
AST SERPL-CCNC: 17 U/L (ref 15–37)
BASOPHILS ABSOLUTE: 0 K/UL (ref 0–0.2)
BASOPHILS RELATIVE PERCENT: 0.2 %
BILIRUB SERPL-MCNC: 0.4 MG/DL (ref 0–1)
BUN BLDV-MCNC: 18 MG/DL (ref 7–20)
CALCIUM SERPL-MCNC: 8.6 MG/DL (ref 8.3–10.6)
CHLORIDE BLD-SCNC: 101 MMOL/L (ref 99–110)
CO2: 23 MMOL/L (ref 21–32)
CREAT SERPL-MCNC: 2.2 MG/DL (ref 0.6–1.2)
EOSINOPHILS ABSOLUTE: 0 K/UL (ref 0–0.6)
EOSINOPHILS RELATIVE PERCENT: 0 %
GFR AFRICAN AMERICAN: 26
GFR NON-AFRICAN AMERICAN: 21
GLUCOSE BLD-MCNC: 111 MG/DL (ref 70–99)
HCT VFR BLD CALC: 24.9 % (ref 36–48)
HEMOGLOBIN: 8 G/DL (ref 12–16)
LYMPHOCYTES ABSOLUTE: 1 K/UL (ref 1–5.1)
LYMPHOCYTES RELATIVE PERCENT: 7.2 %
MAGNESIUM: 2.2 MG/DL (ref 1.8–2.4)
MCH RBC QN AUTO: 26.7 PG (ref 26–34)
MCHC RBC AUTO-ENTMCNC: 32.3 G/DL (ref 31–36)
MCV RBC AUTO: 82.7 FL (ref 80–100)
MONOCYTES ABSOLUTE: 1 K/UL (ref 0–1.3)
MONOCYTES RELATIVE PERCENT: 7.2 %
NEUTROPHILS ABSOLUTE: 11.5 K/UL (ref 1.7–7.7)
NEUTROPHILS RELATIVE PERCENT: 85.4 %
PDW BLD-RTO: 17.1 % (ref 12.4–15.4)
PLATELET # BLD: 289 K/UL (ref 135–450)
PMV BLD AUTO: 8.5 FL (ref 5–10.5)
POTASSIUM SERPL-SCNC: 3.7 MMOL/L (ref 3.5–5.1)
RBC # BLD: 3.01 M/UL (ref 4–5.2)
SODIUM BLD-SCNC: 138 MMOL/L (ref 136–145)
TOTAL PROTEIN: 7.5 G/DL (ref 6.4–8.2)
WBC # BLD: 13.5 K/UL (ref 4–11)

## 2022-07-20 PROCEDURE — 6370000000 HC RX 637 (ALT 250 FOR IP): Performed by: INTERNAL MEDICINE

## 2022-07-20 PROCEDURE — 85025 COMPLETE CBC W/AUTO DIFF WBC: CPT

## 2022-07-20 PROCEDURE — 1200000000 HC SEMI PRIVATE

## 2022-07-20 PROCEDURE — 6360000002 HC RX W HCPCS: Performed by: INTERNAL MEDICINE

## 2022-07-20 PROCEDURE — 2580000003 HC RX 258: Performed by: INTERNAL MEDICINE

## 2022-07-20 PROCEDURE — 2580000003 HC RX 258: Performed by: NURSE PRACTITIONER

## 2022-07-20 PROCEDURE — 2580000003 HC RX 258: Performed by: STUDENT IN AN ORGANIZED HEALTH CARE EDUCATION/TRAINING PROGRAM

## 2022-07-20 PROCEDURE — 6370000000 HC RX 637 (ALT 250 FOR IP): Performed by: STUDENT IN AN ORGANIZED HEALTH CARE EDUCATION/TRAINING PROGRAM

## 2022-07-20 PROCEDURE — 83735 ASSAY OF MAGNESIUM: CPT

## 2022-07-20 PROCEDURE — 2700000000 HC OXYGEN THERAPY PER DAY

## 2022-07-20 PROCEDURE — 94640 AIRWAY INHALATION TREATMENT: CPT

## 2022-07-20 PROCEDURE — 80053 COMPREHEN METABOLIC PANEL: CPT

## 2022-07-20 PROCEDURE — 6360000002 HC RX W HCPCS: Performed by: NURSE PRACTITIONER

## 2022-07-20 PROCEDURE — 36415 COLL VENOUS BLD VENIPUNCTURE: CPT

## 2022-07-20 PROCEDURE — 94761 N-INVAS EAR/PLS OXIMETRY MLT: CPT

## 2022-07-20 PROCEDURE — 99232 SBSQ HOSP IP/OBS MODERATE 35: CPT | Performed by: INTERNAL MEDICINE

## 2022-07-20 RX ADMIN — SODIUM BICARBONATE 650 MG: 650 TABLET ORAL at 08:15

## 2022-07-20 RX ADMIN — METOPROLOL SUCCINATE 25 MG: 25 TABLET, EXTENDED RELEASE ORAL at 08:17

## 2022-07-20 RX ADMIN — PREDNISONE 40 MG: 20 TABLET ORAL at 08:15

## 2022-07-20 RX ADMIN — SODIUM CHLORIDE: 9 INJECTION, SOLUTION INTRAVENOUS at 08:32

## 2022-07-20 RX ADMIN — ACETAMINOPHEN 650 MG: 325 TABLET ORAL at 17:22

## 2022-07-20 RX ADMIN — GABAPENTIN 300 MG: 300 CAPSULE ORAL at 20:57

## 2022-07-20 RX ADMIN — ACETAMINOPHEN 650 MG: 325 TABLET ORAL at 08:22

## 2022-07-20 RX ADMIN — ASPIRIN 81 MG: 81 TABLET, COATED ORAL at 08:15

## 2022-07-20 RX ADMIN — SODIUM CHLORIDE, PRESERVATIVE FREE 10 ML: 5 INJECTION INTRAVENOUS at 08:17

## 2022-07-20 RX ADMIN — TIOTROPIUM BROMIDE INHALATION SPRAY 2 PUFF: 3.12 SPRAY, METERED RESPIRATORY (INHALATION) at 08:35

## 2022-07-20 RX ADMIN — SODIUM BICARBONATE 650 MG: 650 TABLET ORAL at 14:51

## 2022-07-20 RX ADMIN — ATORVASTATIN CALCIUM 80 MG: 80 TABLET, FILM COATED ORAL at 20:57

## 2022-07-20 RX ADMIN — SODIUM BICARBONATE 650 MG: 650 TABLET ORAL at 20:56

## 2022-07-20 RX ADMIN — SODIUM CHLORIDE, PRESERVATIVE FREE 10 ML: 5 INJECTION INTRAVENOUS at 20:59

## 2022-07-20 RX ADMIN — MOMETASONE FUROATE AND FORMOTEROL FUMARATE DIHYDRATE 2 PUFF: 100; 5 AEROSOL RESPIRATORY (INHALATION) at 19:32

## 2022-07-20 RX ADMIN — GABAPENTIN 300 MG: 300 CAPSULE ORAL at 08:15

## 2022-07-20 RX ADMIN — ALTEPLASE 10 MG: 2.2 INJECTION, POWDER, LYOPHILIZED, FOR SOLUTION INTRAVENOUS at 08:00

## 2022-07-20 RX ADMIN — CEFEPIME HYDROCHLORIDE 1000 MG: 1 INJECTION, POWDER, FOR SOLUTION INTRAMUSCULAR; INTRAVENOUS at 08:34

## 2022-07-20 RX ADMIN — DORNASE ALFA 5 MG: 1 SOLUTION RESPIRATORY (INHALATION) at 08:00

## 2022-07-20 RX ADMIN — METHIMAZOLE 5 MG: 5 TABLET ORAL at 08:26

## 2022-07-20 RX ADMIN — CEFEPIME HYDROCHLORIDE 1000 MG: 1 INJECTION, POWDER, FOR SOLUTION INTRAMUSCULAR; INTRAVENOUS at 21:01

## 2022-07-20 RX ADMIN — MOMETASONE FUROATE AND FORMOTEROL FUMARATE DIHYDRATE 2 PUFF: 100; 5 AEROSOL RESPIRATORY (INHALATION) at 08:35

## 2022-07-20 ASSESSMENT — PAIN SCALES - GENERAL
PAINLEVEL_OUTOF10: 3

## 2022-07-20 NOTE — PROGRESS NOTES
Patient woke this morning more confused, pulling at chest tube dressing and starting to hit at staff members. Attempted to call daughter Keira Bowers to try and comfort patient, but her line stays busy. PCAs are taking patient for a walk around the unit via wheelchair to try and calm patient.  Chest tube dressing reinforced

## 2022-07-20 NOTE — PROGRESS NOTES
Hospitalist Progress Note      PCP: Buffy Gibbons [id number]    Date of Admission: 7/14/2022    Chief Complaint: Shortness of breath    Hospital Course:      Subjective: Breathing comfortably, no issues with chest tube. Continues with tPA infusions. Medications:  Reviewed    Infusion Medications    sodium chloride 100 mL/hr at 07/20/22 8105     Scheduled Medications    alteplase (ACTIVASE) syringe  10 mg IntraPLEUral Daily    And    dornase (PULMOZYME) syringe  5 mg IntraPLEUral Daily    cefepime  1,000 mg IntraVENous Q12H    sodium bicarbonate  650 mg Oral TID    predniSONE  40 mg Oral Daily    aspirin EC  81 mg Oral Daily    atorvastatin  80 mg Oral Nightly    methIMAzole  5 mg Oral Daily    metoprolol succinate  25 mg Oral Daily    sodium chloride flush  5-40 mL IntraVENous 2 times per day    [Held by provider] enoxaparin  30 mg SubCUTAneous Daily    gabapentin  300 mg Oral BID    mometasone-formoterol  2 puff Inhalation BID    tiotropium  2 puff Inhalation Daily     PRN Meds: sodium chloride flush, sodium chloride, acetaminophen **OR** acetaminophen      Intake/Output Summary (Last 24 hours) at 7/20/2022 1225  Last data filed at 7/20/2022 1223  Gross per 24 hour   Intake 660 ml   Output 300 ml   Net 360 ml         Exam:    BP (!) 143/81   Pulse 70   Temp 97.8 °F (36.6 °C) (Oral)   Resp 18   Ht 5' 3\" (1.6 m)   Wt 151 lb 3.8 oz (68.6 kg)   SpO2 100%   BMI 26.79 kg/m²     General appearance: No apparent distress, appears stated age and cooperative. HEENT: Pupils equal, round, and reactive to light. Conjunctivae/corneas clear. Neck: Supple, with full range of motion. No jugular venous distention. Trachea midline. Respiratory:  Normal respiratory effort. Clear to auscultation, bilaterally without Rales/Wheezes/Rhonchi. Cardiovascular: Regular rate and rhythm with normal S1/S2 without murmurs, rubs or gallops. Abdomen: Soft, non-tender, non-distended with normal bowel sounds.   Musculoskeletal: No clubbing, cyanosis or edema bilaterally. Full range of motion without deformity. Skin: Skin color, texture, turgor normal.  No rashes or lesions. Neurologic:  Neurovascularly intact without any focal sensory/motor deficits. Cranial nerves: II-XII intact, grossly non-focal.  Psychiatric: Alert and oriented, thought content appropriate, normal insight  Capillary Refill: Brisk,< 3 seconds   Peripheral Pulses: +2 palpable, equal bilaterally       Labs:   Recent Labs     07/18/22 0822 07/19/22 0600 07/20/22  0655   WBC 7.4 6.7 13.5*   HGB 7.7* 7.8* 8.0*   HCT 23.6* 23.8* 24.9*    241 289       Recent Labs     07/18/22 0822 07/19/22 0600 07/20/22  0655    136 138   K 3.8 3.9 3.7    103 101   CO2 23 19* 23   BUN 12 12 18   CREATININE 2.1* 2.0* 2.2*   CALCIUM 8.2* 8.5 8.6       Recent Labs     07/18/22 0822 07/19/22 0600 07/20/22  0655   AST 13* 16 17   ALT 8* 9* 11   BILITOT 0.4 0.4 0.4   ALKPHOS 61 61 61       No results for input(s): INR in the last 72 hours. No results for input(s): Curlie Lat in the last 72 hours. Urinalysis:      Lab Results   Component Value Date/Time    NITRU Negative 02/23/2022 09:26 PM    BLOODU Negative 02/23/2022 09:26 PM    SPECGRAV 1.026 02/23/2022 09:26 PM    GLUCOSEU Negative 02/23/2022 09:26 PM    GLUCOSEU Negative 08/10/2011 09:00 AM       Radiology:  XR ABDOMEN (KUB) (SINGLE AP VIEW)   Final Result   No significant distention of bowel loops      Pigtail catheter projects over the left upper quadrant, possibly at the left   lung base         XR CHEST PORTABLE   Final Result   Left pleural catheter in place      Decreased left pleural effusion, loculated left basilar pneumothorax      The findings were sent to the Radiology Results [address] at 5:16   pm on 7/18/2022 to be communicated to a licensed caregiver.          CT GUIDED CHEST TUBE   Final Result   Successful CT guided placement of a 10 [place] left chest tube         CT GUIDED NEEDLE PLACEMENT   Final Result   Successful CT guided placement of a 10 [place]ian left chest tube         US CHEST INCLUDING MEDIASTINUM   Final Result   Loculated left pleural effusion. No thoracentesis performed. Patient will   require a chest tube. CT CHEST WO CONTRAST   Final Result   1. Smaller size but persistent moderate left pleural effusion with a few   socially gas bubbles. Suboptimally characterized without IV contrast.  This   could reflect empyema. 2. Mild associated relaxation atelectasis lingula left upper lobe and   prominent consolidation left lower lobe. 3.  Anemia is likely given that the blood pool density of the heart is lower   than that of the myocardium. Cardiomegaly. 4. Sequela from prior open heart surgery. US THORACENTESIS Which side should the procedure be performed? Left   Final Result   Successful ultrasound guided left thoracentesis. XR CHEST 1 VIEW   Final Result   No appreciable pneumothorax status post left thoracentesis. CT ABDOMEN PELVIS WO CONTRAST Additional Contrast? None   Final Result   1. No acute process in the abdomen or pelvis. 2. Large volume stool throughout the colon. 3. Partially imaged left pleural effusion. XR CHEST (2 VW)   Final Result   Increase in volume of left pleural effusion, now moderate to large.          XR CHEST PORTABLE    (Results Pending)           Assessment/Plan:    Active Hospital Problems    Diagnosis Date Noted    Hx of CABG [Z95.1] 07/16/2022     Priority: Medium    Hyperthyroidism [E05.90] 07/16/2022     Priority: Medium    HTN (hypertension) [I10] 07/16/2022     Priority: Medium    Recurrent left pleural effusion [J90] 07/14/2022     Priority: Medium    Chronic kidney disease (CKD) stage G3b/A2, moderately decreased glomerular filtration rate (GFR) between 30-44 mL/min/1.73 square meter and albuminuria creatinine ratio between  mg/g ([facility] [address] [N18.32] 06/26/2022     Priority: Medium    CAD in native artery [I25.10]      Recurrent left pleural effusion:  -Concern for loculated infection with entrapment  - s/p thoracentesis on 7/15  -Continue Zosyn and vancomycin  -CT surgery consulted: Patient and daughter would like to hold off on surgery for now. Place IR guided chest tube. -Doxycycline pleurodesis if lung reexpands with chest tube   -If lung partially reexpands will consider tPA instillation to help lung entrapment   -VATS if all else fails  -Holding aspirin  - Chest tube placed 7/18   - tPA infusing starting 7/19, reassess on 7/21 for improvement    COPD: Continue chronic inhalers  Hyperthyroidism: Continue methimazole beta-blocker    DVT Prophylaxis: Lovenox  Diet: ADULT DIET;  Regular  Code Status: Limited    PT/OT Eval Status: Tio Brock MD

## 2022-07-20 NOTE — PROGRESS NOTES
Occupational Therapy Attempt  Eliceo Acosta    Attempted to see pt for OT. Pt was in bed asleep, her dtr at the bedside requested to let pt sleep at this time and try again tomorrow. Dtr reports she plans to take pt home at d/c and is not concerned w/ this plan. Will re-attempt as able.     Electronically signed by Estrada Reilly OT on 7/20/22 at 3:27 PM EDT

## 2022-07-20 NOTE — PROGRESS NOTES
Patient with increased confusion, getting out of bed frequently, appears short of breath and in pain. Tylenol given with evening meds, O2 placed for comfort. New IV placed in right forearm with coban due to patient pulling at it. PCA assisted patient to bathroom to get cleaned up and sit with patient until more calm.

## 2022-07-20 NOTE — PROGRESS NOTES
0800  Alteplase 10 mg, Dornase 5 mg instilled per left sided chest tube. Patient confused to person, place and time but cooperative with procedure. Instillation tolerated well. Chest tube clamped.

## 2022-07-20 NOTE — PLAN OF CARE
Problem: Discharge Planning  Goal: Discharge to home or other facility with appropriate resources  Outcome: Progressing  Flowsheets (Taken 7/20/2022 0815)  Discharge to home or other facility with appropriate resources:   Identify barriers to discharge with patient and caregiver   Arrange for needed discharge resources and transportation as appropriate   Identify discharge learning needs (meds, wound care, etc)   Refer to discharge planning if patient needs post-hospital services based on physician order or complex needs related to functional status, cognitive ability or social support system     Problem: Pain  Goal: Verbalizes/displays adequate comfort level or baseline comfort level  Outcome: Progressing     Problem: Skin/Tissue Integrity  Goal: Absence of new skin breakdown  Description: 1. Monitor for areas of redness and/or skin breakdown  2. Assess vascular access sites hourly  3. Every 4-6 hours minimum:  Change oxygen saturation probe site  4. Every 4-6 hours:  If on nasal continuous positive airway pressure, respiratory therapy assess nares and determine need for appliance change or resting period.   Outcome: Progressing     Problem: Safety - Adult  Goal: Free from fall injury  Outcome: Progressing  Flowsheets (Taken 7/20/2022 1054)  Free From Fall Injury:   Cheikh Bean family/caregiver on patient safety   Based on caregiver fall risk screen, instruct family/caregiver to ask for assistance with transferring infant if caregiver noted to have fall risk factors     Problem: ABCDS Injury Assessment  Goal: Absence of physical injury  Outcome: Progressing  Flowsheets (Taken 7/20/2022 1054)  Absence of Physical Injury: Implement safety measures based on patient assessment

## 2022-07-20 NOTE — PROGRESS NOTES
1000  Chest tube flushed with sterile saline and unclamped. Patient remains confused and wants to go home. Will continue to monitor.

## 2022-07-20 NOTE — PROGRESS NOTES
Pulmonary Critical Care Progress Note     Patient's name:  Hafsa Mccabe  Medical Record Number: [id number]  Patient's account/billing number: [de-identified]  Patient's YOB: 1940  Age: 81 y.o. Date of Admission: 7/14/2022  7:04 PM  Date of Consult: 7/20/2022      Primary Care Physician: Alicia Olmos      Code Status: Limited    Chief complaint: left effusion     Assessment and Plan     Recurrent left effusion, parapneumonic, neutrophilic, exudate, with evidence of trapped lung, cytology negative  LLL PNA   SKINNY      Plan:  Chest tube in place, tPA and pulmozym   Follow up CXR tomorrow. Antibiotics Cefepime x 7 days   IS  Wean O2 keep sat > 90%           Overnight:  No acute events overnight  Afebrile  Cultures NTD      REVIEW OF SYSTEMS:  Review of Systems -   General ROS: negative  Psychological ROS: negative  Ophthalmic ROS: negative  ENT ROS: negative  Allergy and Immunology ROS: negative  Hematological and Lymphatic ROS: negative  Endocrine ROS: negative  Breast ROS: negative  Respiratory ROS: cough left side tightness   Cardiovascular ROS: no chest pain or dyspnea on exertion  Gastrointestinal ROS:negative  Genito-Urinary ROS: negative  Musculoskeletal ROS: negative  Neurological ROS: on and off confused   Dermatological ROS: negative        Physical Exam:    Vitals: BP (!) 138/96   Pulse 87   Temp 97.8 °F (36.6 °C) (Oral)   Resp 18   Ht 5' 3\" (1.6 m)   Wt 151 lb 3.8 oz (68.6 kg)   SpO2 97%   BMI 26.79 kg/m²     Last Body weight:   Wt Readings from Last 3 Encounters:   07/20/22 151 lb 3.8 oz (68.6 kg)   07/01/22 148 lb 9.4 oz (67.4 kg)   04/19/22 155 lb (70.3 kg)       Body Mass Index : Body mass index is 26.79 kg/m². Intake and Output summary:   Intake/Output Summary (Last 24 hours) at 7/20/2022 1116  Last data filed at 7/20/2022 0905  Gross per 24 hour   Intake 660 ml   Output 300 ml   Net 360 ml       Physical Examination:     Gen:  No acute distress.   Eyes: PERRL. Anicteric sclera. No conjunctival injection. ENT: No discharge. Posterior oropharynx clear. External appearance of ears and nose normal.  Neck: Trachea midline. No mass   Resp:  diminished on the left chest tube in place. CV: Regular rate. Regular rhythm. No murmur or rub. No edema. GI: Soft, Non-tender. Non-distended. +BS  Skin: Warm, dry, w/o erythema. Lymph: No cervical or supraclavicular LAD. M/S: No cyanosis. No clubbing. Neuro:  CN 2-12 tested, no focal neurologic deficit. Moves all extremities  Psych: Awake and alert, confused on and off  Mood stable. Laboratory findings:-    CBC:   Recent Labs     07/20/22  0655   WBC 13.5*   HGB 8.0*        BMP:    Recent Labs     07/18/22  0822 07/19/22  0600 07/20/22  0655    136 138   K 3.8 3.9 3.7    103 101   CO2 23 19* 23   BUN 12 12 18   CREATININE 2.1* 2.0* 2.2*   GLUCOSE 89 139* 111*     S. Calcium:  Recent Labs     07/20/22  0655   CALCIUM 8.6       S. Magnesium:  Recent Labs     07/20/22  0655   MG 2.20         Radiology Review:  Pertinent images / reports were reviewed as a part of this visit.     CT reviewed         Saleem Campos MD, M.D.            7/20/2022, 11:16 AM

## 2022-07-20 NOTE — PROGRESS NOTES
NEPHROLOGY PROGRESS  NOTE  [facility]    Interim history   No complaints  Sitting  Left pleural cathter   Cr 2-->2.2  /81   Vancomycin level was high    PLAN  Check U/A   Urine protein  Urine Na     SKINNY  presented with a creatinine of 1.9,   1.3. 2/2022   SKINNY the end of February 2022,  Creatinine  peaked at 6.5.     another episode of SKINNY in March 2022, creatinine was up to 6  After that, the patient's serum creatinine has been around 1.8-1.9  UA  2/2022 bland  CT scan  \"kidneys are unremarkable\". Echo 2/2022 normal EF  grade1 DD    CAD, CABG in February 2022    Recurrent left pleural effusion:  - s/p thoracentesis on 7/15  Zosyn and vancomycin  R guided chest tube. -Doxycycline pleurodesis if lung reexpands with chest tube                      -VATS if all else fails  - Chest tube placed 7/18              - tPA infusing starting 7/19,       COPD:    Hyperthyroidism:  Methimazole beta-blocker       CKD MBD:  PTH    25-hydroxy vitamin D level    Anemia:    hemoglobin level of 8.3  ferritin 341    T sats 15 on 6/20/2022     Hypertension:   The patient blood pressure has been labile    S: Abdominal pain    HPI:  The patient is 81years old, initially presented to the [facility] on 7/14/2022 afor evaluation for abdominal pain. She apparently has some dementia and a poor historian. Therefore, the information was provided by the patient's daughter who is at her bedside. She was recently hospitalized for similar problem per the patient daughter. previous hospitalization, e was found to have pleural effusion. 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 I saw the patient in her room with her daughter and multiple grandchildren at her bedside  She was up to chair awake, and conversant, she looked pale and frail, but she was under no acute distress.   Problem list:    Current Facility-Administered Medications   Medication Dose Route Frequency Provider Last Rate Last Admin    alteplase (CATHFLO) 10 mg in sodium chloride 0.9 % 30 mL  10 mg IntraPLEUral Daily Cy Sidle, APRN - CNP   10 mg at 07/20/22 0800    And    dornase alpha (PULMOZYME) 5 mg in sterile water 30 mL  5 mg IntraPLEUral Daily Cy Sidle, APRN - CNP   5 mg at 07/20/22 0800    cefepime (MAXIPIME) 1000 mg IVPB minibag  1,000 mg IntraVENous Q12H Sidra Arizmendi MD 12.5 mL/hr at 07/20/22 0834 1,000 mg at 07/20/22 1648    sodium bicarbonate tablet 650 mg  650 mg Oral TID Ani Haywood MD   650 mg at 07/20/22 0815    predniSONE (DELTASONE) tablet 40 mg  40 mg Oral Daily Kenneth Felix MD   40 mg at 07/20/22 0815    aspirin EC tablet 81 mg  81 mg Oral Daily [facility], [facility]   81 mg at 07/20/22 0815    atorvastatin (LIPITOR) tablet 80 mg  80 mg Oral Nightly [facility]   80 mg at 07/19/22 1958    methIMAzole (TAPAZOLE) tablet 5 mg  5 mg Oral Daily [facility]   5 mg at 07/20/22 0712    metoprolol succinate (TOPROL XL) extended release tablet 25 mg  25 mg Oral Daily [facility]   25 mg at 07/20/22 0817    sodium chloride flush 0.9 % injection 5-40 mL  5-40 mL IntraVENous 2 times per day [facility], DO   10 mL at 07/20/22 0817    sodium chloride flush 0.9 % injection 5-40 mL  5-40 mL IntraVENous PRN [facility], DO        0.9 % sodium chloride infusion   IntraVENous PRN A[facility],  mL/hr at 07/20/22 0832 New Bag at 07/20/22 0832    acetaminophen (TYLENOL) tablet 650 mg  650 mg Oral Q6H PRN [facility], DO   650 mg at 07/20/22 2787    Or    acetaminophen (TYLENOL) suppository 650 mg  650 mg Rectal Q6H PRN Aki West DO        [Held by provider] enoxaparin Sodium (LOVENOX) injection 30 mg  30 mg SubCUTAneous Daily Aki West DO   30 mg at 07/17/22 0831    gabapentin (NEURONTIN) capsule 300 mg  300 mg Oral BID Aki West DO   300 mg at 07/20/22 0815    mometasone-formoterol (DULERA) 100-5 MCG/ACT inhaler 2 puff  2 puff Inhalation BID Aki MADYSON West, DO   2 puff at 07/20/22 0835    tiotropium (SPIRIVA RESPIMAT) 2.5 MCG/ACT inhaler 2 puff  2 puff Inhalation Daily Aki MADYSON West, DO   2 puff at 07/20/22 0835       EXAM  BP (!) 143/81   Pulse 70   Temp 97.8 °F (36.6 °C) (Oral)   Resp 18   Ht 5' 3\" (1.6 m)   Wt 151 lb 3.8 oz (68.6 kg)   SpO2 100%   BMI 26.79 kg/m²   Physical Examination:   General appearance - NAD  Mental status - awake, alert and oriented x 3  Mouth - mucous membranes moist,   Chest - clear to auscultation, No RD  Heart - normal rate, regular rhythm, normal S1, S2, no murmurs, rubs, clicks or gallops  Abdomen - soft, ND, NT  Musculoskeletal - no joint tenderness, deformity or swelling  Extremities - Not much edema. Skin - poor skin turgor, no rashes    STUDIES:  Lab Results   Component Value Date    CREATININE 2.2 (H) 07/20/2022    BUN 18 07/20/2022     07/20/2022    K 3.7 07/20/2022     07/20/2022    CO2 23 07/20/2022     Lab Results   Component Value Date    CALCIUM 8.6 07/20/2022    PHOS 1.4 (L) 03/05/2022     Lab Results   Component Value Date    WBC 13.5 (H) 07/20/2022    HGB 8.0 (L) 07/20/2022    HCT 24.9 (L) 07/20/2022    MCV 82.7 07/20/2022     07/20/2022       Thank you for allowing me to participate in this patient's care. Please contact me for any questions.     Caroline Mckeon MD  [phone]

## 2022-07-20 NOTE — PROGRESS NOTES
Progress Note    7/14/22: SOB, recurrent left sided effusion  7/15/22: Thora: 500 ml removed  7/18/22:  10 Bengali chest tube placed by IR  7/19/22:  Dornase/Alteplase instilled  7/20/22:  Dornase/Alteplase instilled    Vital Signs:                                                 BP (!) 144/82   Pulse 79   Temp 97.7 °F (36.5 °C) (Axillary)   Resp 18   Ht 5' 3\" (1.6 m)   Wt 151 lb 3.8 oz (68.6 kg)   SpO2 100%   BMI 26.79 kg/m²  O2 Flow Rate (L/min): 2 L/min        Admission Weight: 156 lb 1.4 oz (70.8 kg)        I/O:    Intake/Output Summary (Last 24 hours) at 7/20/2022 0700  Last data filed at 7/19/2022 2211  Gross per 24 hour   Intake 540 ml   Output 300 ml   Net 240 ml     Chest tube:  -, 300, 0    General: Awake but remains confused to person, place and time. CV: regular  Pulm: decreased left side, dressing dry and intact, no crepitus  Abd: soft, + BS  Ext: warm and dry  Neuro: moves all extremities with equal strength bilat    Data Review:  CBC:   Recent Labs     07/18/22  0822 07/19/22  0600   WBC 7.4 6.7   HGB 7.7* 7.8*   HCT 23.6* 23.8*   MCV 82.2 82.0    241       BMP:   Recent Labs     07/18/22  0822 07/19/22  0600    136   K 3.8 3.9    103   CO2 23 19*   BUN 12 12   CREATININE 2.1* 2.0*   CALCIUM 8.2* 8.5   MG 2.30 2.40       Cardiac Enzymes: No results for input(s): CKTOTAL, CKMB, CKMBINDEX, TROPONINI in the last 72 hours. PT/INR: No results for input(s): PROTIME, INR in the last 72 hours. APTT: No results for input(s): APTT in the last 72 hours. Cytology  6/25/2022  No malignant cells identified    7/15/2022  No malignant cells identified    CXR  7/14/2022  Cardiomediastinal silhouette is stable. Increase in volume of the left pleural effusion, now moderate to large. No pneumothorax. The right lung is clear. No gross bony abnormality. 7/15/2022  The cardiomediastinal silhouette is stable. Atrial appendage clip is again noted.  Decrease in size of the left pleural effusion s/p left thoracentesis. There is no appreciable pneumothorax. 7/18/2022  Left pleural catheter in place. Decreased left pleural effusion, loculated left basilar pneumothorax. Heart size stable. Right lung clear. KUB  7/18/2022  No significant distention of bowel loops. Pigtail catheter projects over the left upper quadrant, possibly at the left lung base. CT chest w/o contrast  7/16/2022  Smaller size but persistent moderate left pleural effusion with a few gas bubbles. Mild associated relaxation atelectasis ligula left upper lobe and prominent consolidation left lower lobe. Anemia is likely given that the blood pool density of the heart is lower than that of the myocardium. Cardiomegaly. Sequela form prior open heart surgery. Assessment/Plan:  CV - SR  pulm - pulmonary toilet, RA             - 10 [place]vian chest tube in place             - Lytic instillation today and for 3 days              - CXR in am  Renal - chronic kidney disease; creatinine 2.2 hx of HD, neph following  Heme - anemia, Hgb stable              - Lovenox on hold pending lytic instillation; SCD's for DVT prophylaxis    MARKEL Pena - CNP  7/20/2022  7:00 AM   Note reviewed, events of last 24 hours reviewed along with vital signs and I/Os and patient examined. Assessment and plans discussed and are as outlined above.      Keith Xie MD, FACS, [facility], FACCP, [place]

## 2022-07-21 ENCOUNTER — APPOINTMENT (OUTPATIENT)
Dept: GENERAL RADIOLOGY | Age: 82
DRG: 177 | End: 2022-07-21
Payer: MEDICARE

## 2022-07-21 LAB
A/G RATIO: 0.7 (ref 1.1–2.2)
ALBUMIN SERPL-MCNC: 2.9 G/DL (ref 3.4–5)
ALP BLD-CCNC: 54 U/L (ref 40–129)
ALT SERPL-CCNC: 12 U/L (ref 10–40)
ANION GAP SERPL CALCULATED.3IONS-SCNC: 15 MMOL/L (ref 3–16)
AST SERPL-CCNC: 17 U/L (ref 15–37)
BACTERIA: ABNORMAL /HPF
BASOPHILS ABSOLUTE: 0 K/UL (ref 0–0.2)
BASOPHILS RELATIVE PERCENT: 0.4 %
BILIRUB SERPL-MCNC: 0.3 MG/DL (ref 0–1)
BILIRUBIN URINE: NEGATIVE
BLOOD, URINE: NEGATIVE
BUN BLDV-MCNC: 21 MG/DL (ref 7–20)
CALCIUM SERPL-MCNC: 8.7 MG/DL (ref 8.3–10.6)
CHLORIDE BLD-SCNC: 104 MMOL/L (ref 99–110)
CLARITY: CLEAR
CO2: 20 MMOL/L (ref 21–32)
COLOR: YELLOW
CREAT SERPL-MCNC: 2 MG/DL (ref 0.6–1.2)
CREATININE URINE: 88.5 MG/DL (ref 28–259)
EOSINOPHILS ABSOLUTE: 0 K/UL (ref 0–0.6)
EOSINOPHILS RELATIVE PERCENT: 0 %
EPITHELIAL CELLS, UA: 6 /HPF (ref 0–5)
GFR AFRICAN AMERICAN: 29
GFR NON-AFRICAN AMERICAN: 24
GLUCOSE BLD-MCNC: 100 MG/DL (ref 70–99)
GLUCOSE URINE: NEGATIVE MG/DL
HCT VFR BLD CALC: 24.5 % (ref 36–48)
HEMOGLOBIN: 8 G/DL (ref 12–16)
HYALINE CASTS: 1 /LPF (ref 0–8)
KETONES, URINE: NEGATIVE MG/DL
LEUKOCYTE ESTERASE, URINE: NEGATIVE
LYMPHOCYTES ABSOLUTE: 1.7 K/UL (ref 1–5.1)
LYMPHOCYTES RELATIVE PERCENT: 14.8 %
MAGNESIUM: 2.3 MG/DL (ref 1.8–2.4)
MCH RBC QN AUTO: 26.6 PG (ref 26–34)
MCHC RBC AUTO-ENTMCNC: 32.9 G/DL (ref 31–36)
MCV RBC AUTO: 80.9 FL (ref 80–100)
MICROSCOPIC EXAMINATION: YES
MONOCYTES ABSOLUTE: 0.9 K/UL (ref 0–1.3)
MONOCYTES RELATIVE PERCENT: 8.3 %
NEUTROPHILS ABSOLUTE: 8.6 K/UL (ref 1.7–7.7)
NEUTROPHILS RELATIVE PERCENT: 76.5 %
NITRITE, URINE: NEGATIVE
PDW BLD-RTO: 16.7 % (ref 12.4–15.4)
PH UA: 6.5 (ref 5–8)
PLATELET # BLD: 293 K/UL (ref 135–450)
PMV BLD AUTO: 7.5 FL (ref 5–10.5)
POTASSIUM SERPL-SCNC: 3.7 MMOL/L (ref 3.5–5.1)
PRO-BNP: ABNORMAL PG/ML (ref 0–449)
PROTEIN PROTEIN: 26 MG/DL
PROTEIN UA: ABNORMAL MG/DL
PROTEIN/CREAT RATIO: 0.3 MG/DL
RBC # BLD: 3.02 M/UL (ref 4–5.2)
RBC UA: 1 /HPF (ref 0–4)
SODIUM BLD-SCNC: 139 MMOL/L (ref 136–145)
SODIUM URINE: 40 MMOL/L
SPECIFIC GRAVITY UA: 1.01 (ref 1–1.03)
TOTAL PROTEIN: 7.1 G/DL (ref 6.4–8.2)
URINE TYPE: ABNORMAL
UROBILINOGEN, URINE: 0.2 E.U./DL
WBC # BLD: 11.3 K/UL (ref 4–11)
WBC UA: 0 /HPF (ref 0–5)

## 2022-07-21 PROCEDURE — 84300 ASSAY OF URINE SODIUM: CPT

## 2022-07-21 PROCEDURE — 6360000002 HC RX W HCPCS: Performed by: NURSE PRACTITIONER

## 2022-07-21 PROCEDURE — 97530 THERAPEUTIC ACTIVITIES: CPT

## 2022-07-21 PROCEDURE — 6370000000 HC RX 637 (ALT 250 FOR IP): Performed by: STUDENT IN AN ORGANIZED HEALTH CARE EDUCATION/TRAINING PROGRAM

## 2022-07-21 PROCEDURE — 6370000000 HC RX 637 (ALT 250 FOR IP): Performed by: INTERNAL MEDICINE

## 2022-07-21 PROCEDURE — 2580000003 HC RX 258: Performed by: NURSE PRACTITIONER

## 2022-07-21 PROCEDURE — 81001 URINALYSIS AUTO W/SCOPE: CPT

## 2022-07-21 PROCEDURE — 2580000003 HC RX 258: Performed by: INTERNAL MEDICINE

## 2022-07-21 PROCEDURE — 1200000000 HC SEMI PRIVATE

## 2022-07-21 PROCEDURE — 94760 N-INVAS EAR/PLS OXIMETRY 1: CPT

## 2022-07-21 PROCEDURE — 94640 AIRWAY INHALATION TREATMENT: CPT

## 2022-07-21 PROCEDURE — 6360000002 HC RX W HCPCS: Performed by: INTERNAL MEDICINE

## 2022-07-21 PROCEDURE — 97535 SELF CARE MNGMENT TRAINING: CPT

## 2022-07-21 PROCEDURE — 99232 SBSQ HOSP IP/OBS MODERATE 35: CPT | Performed by: THORACIC SURGERY (CARDIOTHORACIC VASCULAR SURGERY)

## 2022-07-21 PROCEDURE — 2580000003 HC RX 258: Performed by: STUDENT IN AN ORGANIZED HEALTH CARE EDUCATION/TRAINING PROGRAM

## 2022-07-21 PROCEDURE — 71045 X-RAY EXAM CHEST 1 VIEW: CPT

## 2022-07-21 PROCEDURE — 83735 ASSAY OF MAGNESIUM: CPT

## 2022-07-21 PROCEDURE — 36415 COLL VENOUS BLD VENIPUNCTURE: CPT

## 2022-07-21 PROCEDURE — 80053 COMPREHEN METABOLIC PANEL: CPT

## 2022-07-21 PROCEDURE — 84156 ASSAY OF PROTEIN URINE: CPT

## 2022-07-21 PROCEDURE — 83880 ASSAY OF NATRIURETIC PEPTIDE: CPT

## 2022-07-21 PROCEDURE — 82570 ASSAY OF URINE CREATININE: CPT

## 2022-07-21 PROCEDURE — 99232 SBSQ HOSP IP/OBS MODERATE 35: CPT | Performed by: INTERNAL MEDICINE

## 2022-07-21 PROCEDURE — 85025 COMPLETE CBC W/AUTO DIFF WBC: CPT

## 2022-07-21 RX ADMIN — METOPROLOL SUCCINATE 25 MG: 25 TABLET, EXTENDED RELEASE ORAL at 08:09

## 2022-07-21 RX ADMIN — ACETAMINOPHEN 650 MG: 325 TABLET ORAL at 14:29

## 2022-07-21 RX ADMIN — DORNASE ALFA 5 MG: 1 SOLUTION RESPIRATORY (INHALATION) at 08:40

## 2022-07-21 RX ADMIN — MOMETASONE FUROATE AND FORMOTEROL FUMARATE DIHYDRATE 2 PUFF: 100; 5 AEROSOL RESPIRATORY (INHALATION) at 07:53

## 2022-07-21 RX ADMIN — GABAPENTIN 300 MG: 300 CAPSULE ORAL at 08:09

## 2022-07-21 RX ADMIN — SODIUM BICARBONATE 650 MG: 650 TABLET ORAL at 08:09

## 2022-07-21 RX ADMIN — ALTEPLASE 10 MG: 2.2 INJECTION, POWDER, LYOPHILIZED, FOR SOLUTION INTRAVENOUS at 08:40

## 2022-07-21 RX ADMIN — PREDNISONE 40 MG: 20 TABLET ORAL at 08:09

## 2022-07-21 RX ADMIN — SODIUM BICARBONATE 650 MG: 650 TABLET ORAL at 14:01

## 2022-07-21 RX ADMIN — METHIMAZOLE 5 MG: 5 TABLET ORAL at 08:09

## 2022-07-21 RX ADMIN — SODIUM CHLORIDE, PRESERVATIVE FREE 10 ML: 5 INJECTION INTRAVENOUS at 10:10

## 2022-07-21 RX ADMIN — CEFEPIME HYDROCHLORIDE 1000 MG: 1 INJECTION, POWDER, FOR SOLUTION INTRAMUSCULAR; INTRAVENOUS at 10:10

## 2022-07-21 RX ADMIN — TIOTROPIUM BROMIDE INHALATION SPRAY 2 PUFF: 3.12 SPRAY, METERED RESPIRATORY (INHALATION) at 07:53

## 2022-07-21 ASSESSMENT — PAIN SCALES - GENERAL: PAINLEVEL_OUTOF10: 0

## 2022-07-21 NOTE — PROGRESS NOTES
Comprehensive Nutrition Assessment    Type and Reason for Visit:  Reassess    Nutrition Recommendations/Plan:   Continue current diet  Begin Ensure Enlive BID     Malnutrition Assessment:  Malnutrition Status:  Insufficient data (07/15/22 6101)    Context:  Chronic Illness       Nutrition Assessment:    Follow-up. Recurrent PE s/p thoracentesis. Chest tube in place. On regular diet with intakes mostly <50%. Will trial Ensure Enlive supplement for additional energy intake. Nutrition Related Findings:    +BM 7/19. +1 BLE edema. Wound Type: None       Current Nutrition Intake & Therapies:    Average Meal Intake: Unable to assess  Average Supplements Intake: None Ordered  ADULT DIET; Regular    Anthropometric Measures:  Height: 5' 3\" (160 cm)  Ideal Body Weight (IBW): 115 lbs (52 kg)    Admission Body Weight: 162 lb (73.5 kg)  Current Body Weight: 151 lb (68.5 kg), 131.3 % IBW. Weight Source: Standing Scale  Current BMI (kg/m2): 26.8  BMI Categories: Overweight (BMI 25.0-29. 9)    Estimated Daily Nutrient Needs:  Energy Requirements Based On: Kcal/kg  Weight Used for Energy Requirements: Current  Energy (kcal/day): 4204-7237 (20-25kcal/69kg)  Weight Used for Protein Requirements: Ideal  Protein (g/day): 62-73 (1.2-1.4g/52kg)  Method Used for Fluid Requirements: 1 ml/kcal  Fluid (ml/day): 1 ml/kcal    Nutrition Diagnosis:   Inadequate oral intake related to inadequate protein-energy intake as evidenced by intake 0-25%, intake 26-50%    Nutrition Interventions:   Food and/or Nutrient Delivery: Continue Current Diet, Start Oral Nutrition Supplement  Nutrition Education/Counseling: No recommendation at this time  Coordination of Nutrition Care: Continue to monitor while inpatient    Goals:  Previous Goal Met: No Progress toward Goal(s)  Goals: PO intake 50% or greater    Nutrition Monitoring and Evaluation:   Behavioral-Environmental Outcomes: None Identified  Food/Nutrient Intake Outcomes: Food and Nutrient Intake, Supplement Intake  Physical Signs/Symptoms Outcomes: Biochemical Data, Fluid Status or Edema, Nutrition Focused Physical Findings, Skin, Weight    Discharge Planning:     Too soon to determine     Neida Brandon, [address], [place]  Contact: [phone]

## 2022-07-21 NOTE — PROGRESS NOTES
Pulmonary Critical Care Progress Note     Patient's name:  Kevin Davidson  Medical Record Number: [id number]  Patient's account/billing number: [de-identified]  Patient's YOB: 1940  Age: 81 y.o. Date of Admission: 7/14/2022  7:04 PM  Date of Consult: 7/21/2022      Primary Care Physician: Isaac Wilkinson      Code Status: Limited    Chief complaint: left effusion     Assessment and Plan     Recurrent left effusion, parapneumonic, neutrophilic, exudate, with evidence of trapped lung, cytology negative  LLL PNA   SKINNY      Plan:  Chest tube in place, tPA and pulmozym per CT surgery third dose today. CXR effusion resolved but Left lung trapped with pneumothorax ex vacuo  Antibiotics Cefepime x 7 days   IS  Wean O2 keep sat > 90%        Overnight:  No acute events overnight  Afebrile  Cultures NTD      REVIEW OF SYSTEMS:  Review of Systems -   General ROS: negative  Psychological ROS: negative  Ophthalmic ROS: negative  ENT ROS: negative  Allergy and Immunology ROS: negative  Hematological and Lymphatic ROS: negative  Endocrine ROS: negative  Breast ROS: negative  Respiratory ROS: cough left side tightness   Cardiovascular ROS: no chest pain or dyspnea on exertion  Gastrointestinal ROS:negative  Genito-Urinary ROS: negative  Musculoskeletal ROS: negative  Neurological ROS: on and off confused   Dermatological ROS: negative        Physical Exam:    Vitals: BP (!) 146/77   Pulse 76   Temp 98 °F (36.7 °C) (Oral)   Resp 18   Ht 5' 3\" (1.6 m)   Wt 151 lb 7.3 oz (68.7 kg)   SpO2 99%   BMI 26.83 kg/m²     Last Body weight:   Wt Readings from Last 3 Encounters:   07/21/22 151 lb 7.3 oz (68.7 kg)   07/01/22 148 lb 9.4 oz (67.4 kg)   04/19/22 155 lb (70.3 kg)       Body Mass Index : Body mass index is 26.83 kg/m².       Intake and Output summary:   Intake/Output Summary (Last 24 hours) at 7/21/2022 0957  Last data filed at 7/21/2022 0917  Gross per 24 hour   Intake 780 ml   Output 180 ml   Net 600 ml       Physical Examination:     Gen:  No acute distress. Eyes: PERRL. Anicteric sclera. No conjunctival injection. ENT: No discharge. Posterior oropharynx clear. External appearance of ears and nose normal.  Neck: Trachea midline. No mass   Resp:  diminished on the left chest tube in place. CV: Regular rate. Regular rhythm. No murmur or rub. No edema. GI: Soft, Non-tender. Non-distended. +BS  Skin: Warm, dry, w/o erythema. Lymph: No cervical or supraclavicular LAD. M/S: No cyanosis. No clubbing. Neuro:  CN 2-12 tested, no focal neurologic deficit. Moves all extremities  Psych: Awake and alert, confused on and off  Mood stable. Laboratory findings:-    CBC:   Recent Labs     07/21/22  0836   WBC 11.3*   HGB 8.0*        BMP:    Recent Labs     07/19/22  0600 07/20/22  0655 07/21/22  0836    138 139   K 3.9 3.7 3.7    101 104   CO2 19* 23 20*   BUN 12 18 21*   CREATININE 2.0* 2.2* 2.0*   GLUCOSE 139* 111* 100*     S. Calcium:  Recent Labs     07/21/22  0836   CALCIUM 8.7       S. Magnesium:  Recent Labs     07/21/22  0836   MG 2.30         Radiology Review:  Pertinent images / reports were reviewed as a part of this visit.     CT reviewed         Peggy Banegas MD, MROSETTE.            7/21/2022, 9:57 AM

## 2022-07-21 NOTE — PROGRESS NOTES
Progress Note    7/14/22: SOB, recurrent left sided effusion  7/15/22: Thoracentesis: 500 ml removed  7/18/22: 10 Kazakh chest tube placed by IR  7/19/22: Dornase/Alteplase instilled  7/20/22: Dornase/Alteplase instilled    Vital Signs:                                                 /65   Pulse 73   Temp 97.7 °F (36.5 °C) (Axillary)   Resp 16   Ht 5' 3\" (1.6 m)   Wt 151 lb 7.3 oz (68.7 kg)   SpO2 100%   BMI 26.83 kg/m²  O2 Flow Rate (L/min): (S) 2 L/min (decreased to 1lpm at this time)        Admission Weight: 156 lb 1.4 oz (70.8 kg)        I/O:    Intake/Output Summary (Last 24 hours) at 7/21/2022 8178  Last data filed at 7/20/2022 1812  Gross per 24 hour   Intake 660 ml   Output 160 ml   Net 500 ml     Chest Tube: 0, 160, 0    General: awake, alert, confused conversation. CV: regular  Pulm: decreased  Abd: soft  Ext: warm and dry  Neuro: No focal deficits, moves all extremities with equal strength bilat    Data Review:  CBC:   Recent Labs     07/18/22  0822 07/19/22  0600 07/20/22  0655   WBC 7.4 6.7 13.5*   HGB 7.7* 7.8* 8.0*   HCT 23.6* 23.8* 24.9*   MCV 82.2 82.0 82.7    241 289     BMP:   Recent Labs     07/18/22  0822 07/19/22  0600 07/20/22  0655    136 138   K 3.8 3.9 3.7    103 101   CO2 23 19* 23   BUN 12 12 18   CREATININE 2.1* 2.0* 2.2*   CALCIUM 8.2* 8.5 8.6   MG 2.30 2.40 2.20     Cardiac Enzymes: No results for input(s): CKTOTAL, CKMB, CKMBINDEX, TROPONINI in the last 72 hours. PT/INR: No results for input(s): PROTIME, INR in the last 72 hours. APTT: No results for input(s): APTT in the last 72 hours. CXR  7/21/2022  Small bore pigtail thoracostomy tube remains coiled within the left inferior pleural space. There is a small loculated left basilar pneumothorax with interval resolution of the pleural effusion. Minimal left basilar atelectasis has also improved. The right lung is clear. Heart size and vascularity are stable.        CT Chest w/o contrast  7/16/2022  Smaller size but persistent moderate left pleural effusion with a few gas bubbles. Suboptimally characterized without IV contrast. This could reflect empyema. Mild associated relaxation atelectasis lingula left upper lobe and prominent consolidation left lower lobe. Anemia is likely given that the blood pool density of the heart is lower than that of the myocardium. Cardiomegaly. Assessment/Plan:  CV - SR             - BB, ASA, and statin for CAD             - No ACE/ARB due to elevated creatinine  pulm - pulmonary toilet, RA             - left sided 10 [place] Korean chest tube in place              - Third dose of lytic instillation today             - CXR improved. - prednisone per pulm  Renal - CKD; neph following             - creatinine 2.2  Heme - anemic; H&H stable              - Lovenox on hold during lytic instillation               - SCD's for DVT prophylaxis     MARKEL Miranda - CNP  7/21/2022  7:22 AM    Note reviewed, events of last 24 hours reviewed along with vital signs and I/Os and patient examined. Assessment and plans discussed and are as outlined above.      Linda Gold MD, FACS, [facility], FACCP, [place]

## 2022-07-21 NOTE — PROGRESS NOTES
Hospitalist Progress Note      PCP: Marcella Iyer [id number]    Date of Admission: 7/14/2022    Chief Complaint: Shortness of breath    Hospital Course:      Subjective: CXR shows decrease in pleural effusion      Medications:  Reviewed    Infusion Medications    sodium chloride 100 mL/hr at 07/20/22 7629     Scheduled Medications    cefepime  1,000 mg IntraVENous Q12H    sodium bicarbonate  650 mg Oral TID    predniSONE  40 mg Oral Daily    aspirin EC  81 mg Oral Daily    atorvastatin  80 mg Oral Nightly    methIMAzole  5 mg Oral Daily    metoprolol succinate  25 mg Oral Daily    sodium chloride flush  5-40 mL IntraVENous 2 times per day    [Held by provider] enoxaparin  30 mg SubCUTAneous Daily    gabapentin  300 mg Oral BID    mometasone-formoterol  2 puff Inhalation BID    tiotropium  2 puff Inhalation Daily     PRN Meds: sodium chloride flush, sodium chloride, acetaminophen **OR** acetaminophen      Intake/Output Summary (Last 24 hours) at 7/21/2022 1813  Last data filed at 7/21/2022 9972  Gross per 24 hour   Intake 240 ml   Output 20 ml   Net 220 ml         Exam:    BP (!) 151/81   Pulse 74   Temp 98 °F (36.7 °C) (Oral)   Resp 18   Ht 5' 3\" (1.6 m)   Wt 151 lb 7.3 oz (68.7 kg)   SpO2 99%   BMI 26.83 kg/m²     General appearance: No apparent distress, appears stated age and cooperative. HEENT: Pupils equal, round, and reactive to light. Conjunctivae/corneas clear. Neck: Supple, with full range of motion. No jugular venous distention. Trachea midline. Respiratory:  Normal respiratory effort. Clear to auscultation, bilaterally without Rales/Wheezes/Rhonchi. Cardiovascular: Regular rate and rhythm with normal S1/S2 without murmurs, rubs or gallops. Abdomen: Soft, non-tender, non-distended with normal bowel sounds. Musculoskeletal: No clubbing, cyanosis or edema bilaterally. Full range of motion without deformity. Skin: Skin color, texture, turgor normal.  No rashes or lesions.   Neurologic: placement of a 10 Finnish left chest tube         CT GUIDED NEEDLE PLACEMENT   Final Result   Successful CT guided placement of a 10 Finnish left chest tube         US CHEST INCLUDING MEDIASTINUM   Final Result   Loculated left pleural effusion. No thoracentesis performed. Patient will   require a chest tube. CT CHEST WO CONTRAST   Final Result   1. Smaller size but persistent moderate left pleural effusion with a few   socially gas bubbles. Suboptimally characterized without IV contrast.  This   could reflect empyema. 2. Mild associated relaxation atelectasis lingula left upper lobe and   prominent consolidation left lower lobe. 3.  Anemia is likely given that the blood pool density of the heart is lower   than that of the myocardium. Cardiomegaly. 4. Sequela from prior open heart surgery. US THORACENTESIS Which side should the procedure be performed? Left   Final Result   Successful ultrasound guided left thoracentesis. XR CHEST 1 VIEW   Final Result   No appreciable pneumothorax status post left thoracentesis. CT ABDOMEN PELVIS WO CONTRAST Additional Contrast? None   Final Result   1. No acute process in the abdomen or pelvis. 2. Large volume stool throughout the colon. 3. Partially imaged left pleural effusion. XR CHEST (2 VW)   Final Result   Increase in volume of left pleural effusion, now moderate to large.                  Assessment/Plan:    Active Hospital Problems    Diagnosis Date Noted    Hx of CABG [Z95.1] 07/16/2022     Priority: Medium    Hyperthyroidism [E05.90] 07/16/2022     Priority: Medium    HTN (hypertension) [I10] 07/16/2022     Priority: Medium    Recurrent left pleural effusion [J90] 07/14/2022     Priority: Medium    Chronic kidney disease (CKD) stage G3b/A2, moderately decreased glomerular filtration rate (GFR) between 30-44 mL/min/1.73 square meter and albuminuria creatinine ratio between  mg/g ([facility] [address] [N18.32] 06/26/2022 Priority: Medium    CAD in native artery [I25.10]      Recurrent left pleural effusion:  -Concern for loculated infection with entrapment  - s/p thoracentesis on 7/15  -Continue Zosyn and vancomycin  -CT surgery consulted: Patient and daughter would like to hold off on surgery for now. Place IR guided chest tube. -Doxycycline pleurodesis if lung reexpands with chest tube   -If lung partially reexpands will consider tPA instillation to help lung entrapment   -VATS if all else fails  -Holding aspirin  - Chest tube placed 7/18   - tPA infusing starting 7/19, reassess on 7/21 for improvement  - 7/21:  effusion improving but lung trapped    COPD: Continue chronic inhalers  Hyperthyroidism: Continue methimazole beta-blocker    DVT Prophylaxis: Lovenox  Diet: ADULT DIET;  Regular  ADULT ORAL NUTRITION SUPPLEMENT; Lunch, Dinner; Standard High Calorie/High Protein Oral Supplement  Code Status: Limited    PT/OT Eval Status: Stephanie Perez MD

## 2022-07-21 NOTE — PROGRESS NOTES
NEPHROLOGY PROGRESS  NOTE  [facility]    Interim history   No complaints  Asking nic can go home   Sitting  Left pleural cathter   Cr 2-->2.2-->2  PRO-BNP 58047  /81   Vancomycin level was high    PLAN  Check U/A   Urine protein  Urine Na     SKINNY  presented with a creatinine of 1.9,   1.3. 2/2022   SKINNY the end of February 2022,  Creatinine  peaked at 6.5.     another episode of SKINNY in March 2022, creatinine was up to 6  After that, the patient's serum creatinine has been around 1.8-1.9  UA  2/2022 bland  CT scan  \"kidneys are unremarkable\". Echo 2/2022 normal EF  grade1 DD    CAD, CABG in February 2022    Recurrent left pleural effusion:  - s/p thoracentesis on 7/15  Zosyn and vancomycin  R guided chest tube. -Doxycycline pleurodesis if lung reexpands with chest tube                      -VATS if all else fails  - Chest tube placed 7/18              - tPA infusing starting 7/19,       COPD:    Hyperthyroidism:  Methimazole beta-blocker       CKD MBD:  PTH    25-hydroxy vitamin D level    Anemia:    hemoglobin level of 8.3  ferritin 341    T sats 15 on 6/20/2022     Hypertension:   The patient blood pressure has been labile    S: Abdominal pain    HPI:  The patient is 81years old, initially presented to the [facility] on 7/14/2022 afor evaluation for abdominal pain. She apparently has some dementia and a poor historian. Therefore, the information was provided by the patient's daughter who is at her bedside. She was recently hospitalized for similar problem per the patient daughter. previous hospitalization, e was found to have pleural effusion. 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 I saw the patient in her room with her daughter and multiple grandchildren at her bedside  She was up to chair awake, and conversant, she looked pale and frail, but she was under no acute distress.   Problem list:    Current Facility-Administered Medications   Medication Dose Route Frequency Provider Last Rate Last Admin    cefepime (MAXIPIME) 1000 mg IVPB minibag  1,000 mg IntraVENous Q12H Sidra Arizmendi MD 12.5 mL/hr at 07/21/22 1010 1,000 mg at 07/21/22 1010    sodium bicarbonate tablet 650 mg  650 mg Oral TID Bob Paz MD   650 mg at 07/21/22 0809    predniSONE (DELTASONE) tablet 40 mg  40 mg Oral Daily Sidra Arizmendi MD   40 mg at 07/21/22 0809    aspirin EC tablet 81 mg  81 mg Oral Daily [facility]   81 mg at 07/20/22 0815    atorvastatin (LIPITOR) tablet 80 mg  80 mg Oral Nightly [facility], DO   80 mg at 07/20/22 2057    methIMAzole (TAPAZOLE) tablet 5 mg  5 mg Oral Daily [facility], DO   5 mg at 07/21/22 0809    metoprolol succinate (TOPROL XL) extended release tablet 25 mg  25 mg [facility]   25 mg at 07/21/22 0809    sodium chloride flush 0.9 % injection 5-40 mL  5-40 mL IntraVENous 2 times per day [facility]   10 mL at 07/21/22 1010    sodium chloride flush 0.9 % injection 5-40 mL  5-40 mL IntraVENous PRN [facility]        0.9 % sodium chloride infusion   IntraVENous PRN Aki Keysha Green,  mL/hr at 07/20/22 0832 New Bag at 07/20/22 0832    acetaminophen (TYLENOL) tablet 650 mg  650 mg Oral Q6H PRN [facility], DO   650 mg at 07/20/22 1722    Or    acetaminophen (TYLENOL) suppository 650 mg  650 mg Rectal Q6H PRN [facility], DO        [Held by provider] enoxaparin Sodium (LOVENOX) injection 30 mg  30 mg SubCUTAneous Daily [facility], DO   30 mg at 07/17/22 0831    gabapentin (NEURONTIN) capsule 300 mg  300 mg Oral BID [facility], DO   300 mg at 07/21/22 0809    mometasone-formoterol (DULERA) 100-5 MCG/ACT inhaler 2 puff  2 puff Inhalation BID Aki West DO   2 puff at 07/21/22 0753    tiotropium (SPIRIVA RESPIMAT) 2.5 MCG/ACT inhaler 2 puff  2 puff Inhalation Daily Aki West DO   2 puff at 07/21/22 0753       EXAM  BP (!) 151/81   Pulse 74   Temp 98 °F (36.7 °C) (Oral)   Resp 18   Ht 5' 3\" (1.6 m)   Wt 151 lb 7.3 oz (68.7 kg)   SpO2 99%   BMI 26.83 kg/m²   Physical Examination:   General appearance - NAD  Mental status - awake, alert and oriented x 3  Mouth - mucous membranes moist,   Chest - clear to auscultation, No RD  Heart - normal rate, regular rhythm, normal S1, S2, no murmurs, rubs, clicks or gallops  Abdomen - soft, ND, NT  Musculoskeletal - no joint tenderness, deformity or swelling  Extremities - Not much edema. Skin - poor skin turgor, no rashes    STUDIES:  Lab Results   Component Value Date    CREATININE 2.0 (H) 07/21/2022    BUN 21 (H) 07/21/2022     07/21/2022    K 3.7 07/21/2022     07/21/2022    CO2 20 (L) 07/21/2022     Lab Results   Component Value Date    CALCIUM 8.7 07/21/2022    PHOS 1.4 (L) 03/05/2022     Lab Results   Component Value Date    WBC 11.3 (H) 07/21/2022    HGB 8.0 (L) 07/21/2022    HCT 24.5 (L) 07/21/2022    MCV 80.9 07/21/2022     07/21/2022       Thank you for allowing me to participate in this patient's care. Please contact me for any questions.     Vishal Coleman MD  [phone]

## 2022-07-21 NOTE — PROGRESS NOTES
Physical Therapy  Facility/Department: [facility]  Physical Therapy Initial Assessment    Name: Elizabeth Cortes  : 1940  MRN: [id number]  Date of Service: 2022    Discharge Recommendations:  Home with Home health PT, 24 hour supervision or assist   PT Equipment Recommendations  Equipment Needed: No    Elizabeth Cortes scored a 17/24 on the AM-PAC short mobility form. Current research shows that an AM-PAC score of 18 or greater is typically associated with a discharge to the patient's home setting. Based on the patient's AM-PAC score and their current functional mobility deficits, it is recommended that the patient have 2-3 sessions per week of Physical Therapy at d/c to increase the patient's independence. At this time, this patient demonstrates the endurance and safety to discharge home with HHPT and a follow up treatment frequency of 2-3x/wk. Please see assessment section for further patient specific details. If patient discharges prior to next session this note will serve as a discharge summary. Please see below for the latest assessment towards goals. HOME HEALTH CARE: LEVEL 1 STANDARD     -Initial home health evaluation to occur within 24-48 hours, in patient home    -Home health agency to establish plan of care for patient over 60 day period    -Medication Reconciliation    -PCP Visit scheduled within seven days of discharge    -PT/OT to evaluate with goal of regaining prior level of functioning    -OT to evaluate if patient has [address] needs for personal care     Patient Diagnosis(es): The primary encounter diagnosis was Recurrent left pleural effusion. Diagnoses of Left upper quadrant abdominal pain, Anemia, unspecified type, Chronic kidney disease, unspecified CKD stage, Hypoalbuminemia, and Goals of care, counseling/discussion were also pertinent to this visit.   Past Medical History:  has a past medical history of Arthritis, Asthma, CAD (coronary artery disease), CKD (chronic kidney disease), Glaucoma, Hyperlipidemia, Hypertension, Hyperthyroidism, IBS (irritable bowel syndrome), Neuropathy, Obesity (BMI 30-39.9), Osteopenia, and Osteoporosis. Past Surgical History:  has a past surgical history that includes Cholecystectomy; Foot surgery; Tubal ligation; Gastric Band ([id number]); orif femur decompression (Left, 04/2014); Ankle fracture surgery (Left, 02/12/2015); Hand surgery (2009); other surgical history (Right, 2010); Coronary artery bypass graft (02/25/2022); Coronary artery bypass graft (N/A, 2/25/2022); IR TUNNELED CVC PLACE WO SQ PORT/PUMP > 5 YEARS (3/7/2022); Diagnostic Cardiac Cath Lab Procedure (02/2022); and CT GUIDED CHEST TUBE (7/18/2022). Assessment   Body Structures, Functions, Activity Limitations Requiring Skilled Therapeutic Intervention: Decreased functional mobility ; Decreased endurance  Assessment: Pt is an 80 y/o female who lives with her daughter; who is also her caregiver. At baseline pt amb typically no AD with SBA from daughter. She is currently recieving home PT. Daughter states pt has been informed to start using RW more frequently. Pt presented to ED with abdominal pain, found with left pleural effusion. US-guided left thoracentesis on 7/15 and chest tube placement at 7/18. Today pt seemed more anxious. She was CGA for transfers. Pt amb ~25' RW CGA with PT managing chest tube. Pt grunting througout session, daughter states it is normal for patient. Pt would benefit from continuing home PT upon d/c to help with functional mobility and endurance, but it is anticipated she will be safe to return home when medically stable. Therapy Prognosis: Good;Fair  Decision Making: Medium Complexity  Activity Tolerance  Activity Tolerance: Patient limited by endurance; Patient limited by pain; Patient tolerated treatment well     Plan   Plan  Plan: 3-5 times per week  Current Treatment Recommendations: Strengthening, ROM, Balance training, Functional mobility training, Gait training, Endurance training, Stair training  Safety Devices  Type of Devices: All fall risk precautions in place, Call light within reach, Gait belt, Patient at risk for falls, Left in bed, Bed alarm in place, Nurse notified  Restraints  Restraints Initially in Place: No     Restrictions  Restrictions/Precautions  Restrictions/Precautions: Fall Risk  Position Activity Restriction  Other position/activity restrictions: Chest tube placed 7/18     Subjective   General  Chart Reviewed: Yes  Patient assessed for rehabilitation services?: Yes  Additional Pertinent Hx: left pleural effusion  Response To Previous Treatment: Patient with no complaints from previous session. Family / Caregiver Present: Yes (family in room)  Referring Practitioner: Jagruti Murphy MD  Referral Date : 07/15/22  Diagnosis: left pleural effusion  Follows Commands: Within Functional Limits  Subjective  Subjective: Pt is agreeable to PT - anxious and fixated on wanting to go home. Social/Functional History  Social/Functional History  Lives With: Family (Daughter; ZOHRA)  Type of Home: Apartment (basement level apartment; 6 steps with R handrail)  Home Layout: One level  Home Access: Stairs to enter with rails  Entrance Stairs - Number of Steps: 6 steps  Entrance Stairs - Rails: Right  Bathroom Shower/Tub: Shower chair with back, Tub/Shower unit  Bathroom Toilet: Standard ([place] nearby)  Bathroom Equipment: Shower chair, Shopcade, Grab bars in shower  Bathroom Accessibility: Accessible  Home Equipment: Walker, rolling  ADL Assistance: Needs assistance (Daughter assits with dressing, bathing, tolieting.  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 Objective   Gross Assessment  Strength: Generally decreased, functional     Bed mobility  Supine to Sit: Unable to assess  Sit to Supine: Contact guard assistance;Minimal assistance  Transfers  Sit to Stand: Stand by assistance;Contact guard assistance  Stand to sit: Stand by assistance;Contact guard assistance  Ambulation  Surface: level tile  Device: Rolling Walker  Assistance: Contact guard assistance  Quality of Gait: flexed and guarded trunk positioning. Grunting throughout gait. slow kale but even step length  Gait Deviations: Slow Kale;Decreased step length;Decreased step height  Distance: ~25'  Comments: C/o significant pain at chest tube site.      Balance  Posture: Fair (guarded trunk positioning secondary to discomfort in mid flank, more on left side)  Sitting - Static: Good  Sitting - Dynamic: Good  Standing - Static: Good  Standing - Dynamic: Fair;+  Comments: standing balance with wheeled walker             AM-PAC Score  AM-PAC Inpatient Mobility Raw Score : 17 (07/21/22 1439)  AM-PAC Inpatient T-Scale Score : 42.13 (07/21/22 1439)  Mobility Inpatient CMS 0-100% Score: 50.57 (07/21/22 1439)  Mobility Inpatient CMS G-Code Modifier : CK (07/21/22 1439)          Goals  Short Term Goals  Time Frame for Short term goals: upon d/c - all goals ongoing as of 7/21/22  Short term goal 1: bed mobility SBA  Short term goal 2: transfers SBA  Short term goal 3: amb 76' RW SBA  Short term goal 4: 6 steps

## 2022-07-21 NOTE — PLAN OF CARE
Problem: Discharge Planning  Goal: Discharge to home or other facility with appropriate resources  7/20/2022 2339 by Tiffanie Beauchamp RN  Outcome: Progressing  7/20/2022 1100 by Maricruz Garzon RN  Outcome: Progressing  Flowsheets (Taken 7/20/2022 0815)  Discharge to home or other facility with appropriate resources:   Identify barriers to discharge with patient and caregiver   Arrange for needed discharge resources and transportation as appropriate   Identify discharge learning needs (meds, wound care, etc)   Refer to discharge planning if patient needs post-hospital services based on physician order or complex needs related to functional status, cognitive ability or social support system     Problem: Pain  Goal: Verbalizes/displays adequate comfort level or baseline comfort level  7/20/2022 2339 by Tiffanie Beauchamp RN  Outcome: Progressing  7/20/2022 1100 by Maricruz Garzon RN  Outcome: Progressing     Problem: Skin/Tissue Integrity  Goal: Absence of new skin breakdown  Description: 1. Monitor for areas of redness and/or skin breakdown  2. Assess vascular access sites hourly  3. Every 4-6 hours minimum:  Change oxygen saturation probe site  4. Every 4-6 hours:  If on nasal continuous positive airway pressure, respiratory therapy assess nares and determine need for appliance change or resting period.   7/20/2022 2339 by Tiffanie Beauchamp RN  Outcome: Progressing  7/20/2022 1100 by Maricruz Garzon RN  Outcome: Progressing     Problem: Safety - Adult  Goal: Free from fall injury  7/20/2022 2339 by Tiffanie Beauchamp RN  Outcome: Progressing  Flowsheets (Taken 7/20/2022 2330)  Free From Fall Injury: Instruct family/caregiver on patient safety  7/20/2022 1100 by Maricruz Garzon RN  Outcome: Progressing  Flowsheets (Taken 7/20/2022 1054)  Free From Fall Injury:   Cecille Rivera family/caregiver on patient safety   Based on caregiver fall risk screen, instruct family/caregiver to ask for assistance with transferring infant if caregiver noted to have fall risk factors     Problem: ABCDS Injury Assessment  Goal: Absence of physical injury  7/20/2022 2339 by Yanci Thomas RN  Outcome: Progressing  Flowsheets (Taken 7/20/2022 2330)  Absence of Physical Injury: Implement safety measures based on patient assessment  7/20/2022 1100 by Melissa Beltrán RN  Outcome: Progressing  Flowsheets (Taken 7/20/2022 1054)  Absence of Physical Injury: Implement safety measures based on patient assessment

## 2022-07-21 NOTE — PROGRESS NOTES
Occupational Therapy  Facility/Department: [facility]  Occupational Therapy Daily Treatment    Name: Elizabeth Cortes  : 1940  MRN: [id number]  Date of Service: 2022    Discharge Recommendations:  24 hour supervision or assist, Home with Home health OT     Elizabeth Cortes scored a 18/24 on the AM-PAC ADL Inpatient form. Current research shows that an AM-PAC score of 18 or greater is typically associated with a discharge to the patient's home setting. Based on the patient's AM-PAC score, and their current ADL deficits, it is recommended that the patient have 2-3 sessions per week of Occupational Therapy at d/c to increase the patient's independence. At this time, this patient demonstrates the endurance and safety to discharge home with home OT and a follow up treatment frequency of 2-3x/wk. Please see assessment section for further patient specific details. If patient discharges prior to next session this note will serve as a discharge summary. Please see below for the latest assessment towards goals. Patient Diagnosis(es): The primary encounter diagnosis was Recurrent left pleural effusion. Diagnoses of Left upper quadrant abdominal pain, Anemia, unspecified type, Chronic kidney disease, unspecified CKD stage, Hypoalbuminemia, and Goals of care, counseling/discussion were also pertinent to this visit. Past Medical History:  has a past medical history of Arthritis, Asthma, CAD (coronary artery disease), CKD (chronic kidney disease), Glaucoma, Hyperlipidemia, Hypertension, Hyperthyroidism, IBS (irritable bowel syndrome), Neuropathy, Obesity (BMI 30-39.9), Osteopenia, and Osteoporosis. Past Surgical History:  has a past surgical history that includes Cholecystectomy; Foot surgery; Tubal ligation; Gastric Band ([id number]); orif femur decompression (Left, 2014); Ankle fracture surgery (Left, 2015); Hand surgery (); other surgical history (Right, );  Coronary artery bypass graft (02/25/2022); Coronary artery bypass graft (N/A, 2/25/2022); IR TUNNELED CVC PLACE WO SQ PORT/PUMP > 5 YEARS (3/7/2022); Diagnostic Cardiac Cath Lab Procedure (02/2022); and CT GUIDED CHEST TUBE (7/18/2022). Assessment   Performance deficits / Impairments: Decreased functional mobility ; Decreased ADL status; Decreased balance;Decreased endurance  Assessment: Pt is 81 y.o. F who presents with epigastric pain, mild shortness of breath. Pt found to have left sided pleural effusion. Pt is s/p US-guided left thoracentesis. PTA pt lives with daughter, who is pt caregiver in one story home with 6 BRITTANY. Pt reports daughter assistance for self-care and homemaking responsibilities. Pt completes functional mobility at baseline with SBA from daughter no AD. Pt remains below baseline - she required CGA for fxl transfers and mobility using RW. She was limited by onset dizziness and pain after ambulating and required several mins seated rest break for symptoms to resolve and was able to complete seated grooming w/ setup. Her dtr was present for tx and very supportive. Anticipate pt will be safe to return home w/ 24/7 supervision and home OT when medically stable. Prognosis: Fair  REQUIRES OT FOLLOW-UP: Yes  Activity Tolerance  Activity Tolerance: Patient limited by pain;Treatment limited secondary to medical complications (free text)  Activity Tolerance Comments: Pt c/o onset dizziness after ambulating, began breathing heavy but also reporting this is d/t pain.  Symptoms appeared to resolve after a few minutes, though pt seemed uncomfortable for remainder of session (NP, Wayne Rodas, in at end of session and aware of symptoms)        Plan   Plan  Times per Week: 3-5  Current Treatment Recommendations: Functional mobility training, Endurance training, Safety education & training, Patient/Caregiver education & training, Equipment evaluation, education, & procurement, Self-Care / ADL Restrictions  Restrictions/Precautions  Restrictions/Precautions: Fall Risk  Position Activity Restriction  Other position/activity restrictions: Chest tube placed 7/18    Subjective   General  Chart Reviewed: Yes  Patient assessed for rehabilitation services?: Yes  Additional Pertinent Hx: Pt is 81 y.o. F who presents with epigastric pain, mild shortness of breath. Pt found to have left sided pleural effusion. Pt is s/p US-guided left thoracentesis. PMH: CAD, glaucoma, CKD, neuropathy, IBS, osteoporosis, CABG  Family / Caregiver Present: Yes (Dtr)  Referring Practitioner: Niki Nelson MD  Diagnosis: pleural effusion  Subjective  Subjective: Pt met b/s for OT. Pt in recliner, agreeable to therapy. No complaints at rest         Objective   Safety Devices  Type of Devices: Chair alarm in place; Left in chair;Gait belt;Call light within reach;Nurse notified  Balance  Sitting: Intact  Standing: With support (RW)  Gait  Overall Level of Assistance: Contact-guard assistance (Fxl mobility from recliner <> sink w/ CGA using RW. Close CGA toward the end d/t c/o dizziness)        ADL  Grooming: Setup  Grooming Skilled Clinical Factors: Pt performed oral care and washed face seated at sink. Mod vc's to initiate - pt distracted by pain        Bed mobility  Bed Mobility Comments: No assessed; pt in recliner chair at beginning and end of session. Transfers  Sit to stand: Contact guard assistance  Stand to sit: Contact guard assistance  Transfer Comments: RW. Min cueing for hand placement     Cognition  Overall Cognitive Status: Exceptions  Safety Judgement: Decreased awareness of need for safety  Insights: Decreased awareness of deficits  Initiation: Requires cues for some  Sequencing: Does not require cues  Cognition Comment: Some forgetfulness.   Orientation  Orientation Level: Oriented to time;Oriented to person;Oriented to place                  Education Given To: Patient;Caregiver  Education Provided: Role of Therapy; ADL Adaptive Strategies; Plan of Care;Transfer Training;Precautions          AM-PAC Score  AM-PAC Inpatient Daily Activity Raw Score: 18 (07/21/22 0916)  AM-PAC Inpatient ADL T-Scale Score : 38.66 (07/21/22 0916)  ADL Inpatient CMS 0-100% Score: 46.65 (07/21/22 0916)  ADL Inpatient CMS G-Code Modifier : CK (07/21/22 0916)    Goals  Short Term Goals  Time Frame for Short term goals: prior to d/c; goals ongoing  Short Term Goal 1: Pt will complete functional mobility and transfers with SPV  Short Term Goal 2: Pt will complete toileting with SPV  Short Term Goal 3: Pt will complete bathing/dressing with min A  Short Term Goal 4: Pt will tolerate 4+ mintues of standing activity to increase strength and activity tolerance for self-care and transfers.   Patient Goals   Patient goals : \"to get home\"       Therapy Time   Individual Concurrent Group Co-treatment   Time In 0800         Time Out 0830         Minutes [address] [place] [address]  [place] [address]

## 2022-07-22 VITALS
RESPIRATION RATE: 18 BRPM | WEIGHT: 150.35 LBS | BODY MASS INDEX: 26.64 KG/M2 | HEART RATE: 70 BPM | HEIGHT: 63 IN | SYSTOLIC BLOOD PRESSURE: 168 MMHG | TEMPERATURE: 98.4 F | OXYGEN SATURATION: 99 % | DIASTOLIC BLOOD PRESSURE: 81 MMHG

## 2022-07-22 DIAGNOSIS — J90 PLEURAL EFFUSION: Primary | ICD-10-CM

## 2022-07-22 LAB
A/G RATIO: 0.8 (ref 1.1–2.2)
ALBUMIN SERPL-MCNC: 2.7 G/DL (ref 3.4–5)
ALP BLD-CCNC: 46 U/L (ref 40–129)
ALT SERPL-CCNC: 15 U/L (ref 10–40)
ANION GAP SERPL CALCULATED.3IONS-SCNC: 11 MMOL/L (ref 3–16)
AST SERPL-CCNC: 22 U/L (ref 15–37)
BASOPHILS ABSOLUTE: 0 K/UL (ref 0–0.2)
BASOPHILS RELATIVE PERCENT: 0.1 %
BILIRUB SERPL-MCNC: 0.3 MG/DL (ref 0–1)
BUN BLDV-MCNC: 22 MG/DL (ref 7–20)
CALCIUM SERPL-MCNC: 8.2 MG/DL (ref 8.3–10.6)
CHLORIDE BLD-SCNC: 107 MMOL/L (ref 99–110)
CO2: 23 MMOL/L (ref 21–32)
CREAT SERPL-MCNC: 1.8 MG/DL (ref 0.6–1.2)
EOSINOPHILS ABSOLUTE: 0 K/UL (ref 0–0.6)
EOSINOPHILS RELATIVE PERCENT: 0 %
GFR AFRICAN AMERICAN: 33
GFR NON-AFRICAN AMERICAN: 27
GLUCOSE BLD-MCNC: 95 MG/DL (ref 70–99)
HCT VFR BLD CALC: 21.5 % (ref 36–48)
HEMOGLOBIN: 7.2 G/DL (ref 12–16)
LYMPHOCYTES ABSOLUTE: 1.7 K/UL (ref 1–5.1)
LYMPHOCYTES RELATIVE PERCENT: 16.1 %
MAGNESIUM: 2.2 MG/DL (ref 1.8–2.4)
MCH RBC QN AUTO: 27.3 PG (ref 26–34)
MCHC RBC AUTO-ENTMCNC: 33.5 G/DL (ref 31–36)
MCV RBC AUTO: 81.5 FL (ref 80–100)
MONOCYTES ABSOLUTE: 1.3 K/UL (ref 0–1.3)
MONOCYTES RELATIVE PERCENT: 12 %
NEUTROPHILS ABSOLUTE: 7.6 K/UL (ref 1.7–7.7)
NEUTROPHILS RELATIVE PERCENT: 71.8 %
PDW BLD-RTO: 16.5 % (ref 12.4–15.4)
PLATELET # BLD: 243 K/UL (ref 135–450)
PMV BLD AUTO: 8 FL (ref 5–10.5)
POTASSIUM SERPL-SCNC: 3.2 MMOL/L (ref 3.5–5.1)
RBC # BLD: 2.64 M/UL (ref 4–5.2)
SODIUM BLD-SCNC: 141 MMOL/L (ref 136–145)
TOTAL PROTEIN: 6.3 G/DL (ref 6.4–8.2)
WBC # BLD: 10.6 K/UL (ref 4–11)

## 2022-07-22 PROCEDURE — 6370000000 HC RX 637 (ALT 250 FOR IP): Performed by: INTERNAL MEDICINE

## 2022-07-22 PROCEDURE — 6370000000 HC RX 637 (ALT 250 FOR IP): Performed by: STUDENT IN AN ORGANIZED HEALTH CARE EDUCATION/TRAINING PROGRAM

## 2022-07-22 PROCEDURE — 83735 ASSAY OF MAGNESIUM: CPT

## 2022-07-22 PROCEDURE — 85025 COMPLETE CBC W/AUTO DIFF WBC: CPT

## 2022-07-22 PROCEDURE — 6360000002 HC RX W HCPCS: Performed by: INTERNAL MEDICINE

## 2022-07-22 PROCEDURE — 97530 THERAPEUTIC ACTIVITIES: CPT

## 2022-07-22 PROCEDURE — 94760 N-INVAS EAR/PLS OXIMETRY 1: CPT

## 2022-07-22 PROCEDURE — 36415 COLL VENOUS BLD VENIPUNCTURE: CPT

## 2022-07-22 PROCEDURE — 97116 GAIT TRAINING THERAPY: CPT

## 2022-07-22 PROCEDURE — 80053 COMPREHEN METABOLIC PANEL: CPT

## 2022-07-22 PROCEDURE — 2580000003 HC RX 258: Performed by: STUDENT IN AN ORGANIZED HEALTH CARE EDUCATION/TRAINING PROGRAM

## 2022-07-22 PROCEDURE — 99232 SBSQ HOSP IP/OBS MODERATE 35: CPT | Performed by: INTERNAL MEDICINE

## 2022-07-22 PROCEDURE — 99233 SBSQ HOSP IP/OBS HIGH 50: CPT | Performed by: THORACIC SURGERY (CARDIOTHORACIC VASCULAR SURGERY)

## 2022-07-22 PROCEDURE — 2580000003 HC RX 258: Performed by: INTERNAL MEDICINE

## 2022-07-22 PROCEDURE — 94640 AIRWAY INHALATION TREATMENT: CPT

## 2022-07-22 RX ORDER — SODIUM BICARBONATE 650 MG/1
650 TABLET ORAL 3 TIMES DAILY
Qty: 90 TABLET | Refills: 1 | Status: SHIPPED | OUTPATIENT
Start: 2022-07-22 | End: 2022-08-21

## 2022-07-22 RX ORDER — LEVOFLOXACIN 750 MG/1
750 TABLET ORAL EVERY OTHER DAY
Qty: 2 TABLET | Refills: 0 | Status: SHIPPED | OUTPATIENT
Start: 2022-07-24 | End: 2022-07-27

## 2022-07-22 RX ORDER — AMLODIPINE BESYLATE 5 MG/1
5 TABLET ORAL DAILY
Status: DISCONTINUED | OUTPATIENT
Start: 2022-07-22 | End: 2022-07-22 | Stop reason: HOSPADM

## 2022-07-22 RX ORDER — LEVOFLOXACIN 5 MG/ML
750 INJECTION, SOLUTION INTRAVENOUS
Status: DISCONTINUED | OUTPATIENT
Start: 2022-07-22 | End: 2022-07-22

## 2022-07-22 RX ORDER — LEVOFLOXACIN 500 MG/1
500 TABLET, FILM COATED ORAL
Status: DISCONTINUED | OUTPATIENT
Start: 2022-07-22 | End: 2022-07-22 | Stop reason: DRUGHIGH

## 2022-07-22 RX ADMIN — GABAPENTIN 300 MG: 300 CAPSULE ORAL at 00:02

## 2022-07-22 RX ADMIN — PREDNISONE 40 MG: 20 TABLET ORAL at 08:39

## 2022-07-22 RX ADMIN — METOPROLOL SUCCINATE 25 MG: 25 TABLET, EXTENDED RELEASE ORAL at 08:39

## 2022-07-22 RX ADMIN — SODIUM CHLORIDE, PRESERVATIVE FREE 10 ML: 5 INJECTION INTRAVENOUS at 08:45

## 2022-07-22 RX ADMIN — SODIUM BICARBONATE 650 MG: 650 TABLET ORAL at 14:42

## 2022-07-22 RX ADMIN — METHIMAZOLE 5 MG: 5 TABLET ORAL at 10:05

## 2022-07-22 RX ADMIN — SODIUM BICARBONATE 650 MG: 650 TABLET ORAL at 00:02

## 2022-07-22 RX ADMIN — MOMETASONE FUROATE AND FORMOTEROL FUMARATE DIHYDRATE 2 PUFF: 100; 5 AEROSOL RESPIRATORY (INHALATION) at 11:04

## 2022-07-22 RX ADMIN — ATORVASTATIN CALCIUM 80 MG: 80 TABLET, FILM COATED ORAL at 00:02

## 2022-07-22 RX ADMIN — GABAPENTIN 300 MG: 300 CAPSULE ORAL at 08:39

## 2022-07-22 RX ADMIN — ASPIRIN 81 MG: 81 TABLET, COATED ORAL at 08:39

## 2022-07-22 RX ADMIN — AMLODIPINE BESYLATE 5 MG: 5 TABLET ORAL at 14:42

## 2022-07-22 RX ADMIN — CEFEPIME HYDROCHLORIDE 1000 MG: 1 INJECTION, POWDER, FOR SOLUTION INTRAMUSCULAR; INTRAVENOUS at 01:28

## 2022-07-22 RX ADMIN — SODIUM BICARBONATE 650 MG: 650 TABLET ORAL at 08:39

## 2022-07-22 RX ADMIN — LEVOFLOXACIN 750 MG: 500 TABLET, FILM COATED ORAL at 10:05

## 2022-07-22 RX ADMIN — TIOTROPIUM BROMIDE INHALATION SPRAY 2 PUFF: 3.12 SPRAY, METERED RESPIRATORY (INHALATION) at 11:04

## 2022-07-22 NOTE — PROGRESS NOTES
NEPHROLOGY PROGRESS  NOTE  [facility]    Interim history   No complaints  Asking nic can go home   Laying   Cr 2-->2.2-->1.8  K 3.2  PRO-BNP 95946  /83   Vancomycin level was high  Urine Na 40    PLAN  Add Amlodipine 5 mg and titrate   Replace K         SKINNY  presented with a creatinine of 1.9,   1.3. 2/2022   SKINNY the end of February 2022,  Creatinine  peaked at 6.5.     another episode of SKINNY in March 2022, creatinine was up to 6  After that, the patient's serum creatinine has been around 1.8-1.9   U/A inactive   P/C 300 mg  protein  CT scan  \"kidneys are unremarkable\". Echo 2/2022 normal EF  grade1 DD    CAD, CABG in February 2022    Recurrent left pleural effusion:  - s/p thoracentesis on 7/15  Zosyn and vancomycin  R guided chest tube. -Doxycycline pleurodesis if lung reexpands with chest tube                      -VATS if all else fails  - Chest tube placed 7/18              - tPA infusing starting 7/19,       COPD:    Hyperthyroidism:  Methimazole beta-blocker     CKD MBD:  PTH    25-hydroxy vitamin D level    Anemia:    hemoglobin level of 8.3  ferritin 341    T sats 15 on 6/20/2022     Hypertension:   The patient blood pressure has been labile    S: Abdominal pain    HPI:  The patient is 81years old, initially presented to the [facility] on 7/14/2022 afor evaluation for abdominal pain. She apparently has some dementia and a poor historian. Therefore, the information was provided by the patient's daughter who is at her bedside. She was recently hospitalized for similar problem per the patient daughter. previous hospitalization, e was found to have pleural effusion. 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     I saw the patient in her room with her daughter and multiple grandchildren at her bedside  She was up to chair awake, and conversant, she looked pale and frail, but she was under no acute distress.   Problem list:    Current Facility-Administered Medications   Medication Dose Route Frequency Provider Last Rate Last Admin    levoFLOXacin (LEVAQUIN) tablet 750 mg  750 mg Oral Every Other Day Nichole Browning MD   750 mg at 07/22/22 1005    sodium bicarbonate tablet 650 mg  650 mg Oral TID Jordan Freeman MD   650 mg at 07/22/22 3392    aspirin EC tablet 81 mg  81 mg Oral Daily [facility]   81 mg at 07/22/22 0839    atorvastatin (LIPITOR) tablet 80 mg  80 mg Oral Nightly [facility] DO   80 mg at 07/22/22 0002    methIMAzole (TAPAZOLE) tablet 5 mg  5 mg Oral Daily [facility] DO   5 mg at 07/22/22 1005    metoprolol succinate (TOPROL XL) extended release tablet 25 mg  25 mg Oral Daily [facility] DO   25 mg at 07/22/22 0839    sodium chloride flush 0.9 % injection 5-40 mL  5-40 mL IntraVENous 2 times per day [facility], DO   10 mL at 07/22/22 0845    sodium chloride flush 0.9 % injection 5-40 mL  5-40 mL IntraVENous PRN [facility], DO        0.9 % sodium chloride infusion   IntraVENous PRN [facility],  mL/hr at 07/20/22 0832 New Bag at 07/20/22 0832    acetaminophen (TYLENOL) tablet 650 mg  650 mg Oral Q6H [facility]N [facility]   650 mg at 07/21/22 1429    Or    acetaminophen (TYLENOL) suppository 650 mg  650 mg Rectal Q6H PRN [facility] DO        [Held by provider] enoxaparin Sodium (LOVENOX) injection 30 mg  30 mg SubCUTAneous Daily [facility] DO   30 mg at 07/17/22 0831    gabapentin (NEURONTIN) capsule 300 mg  300 mg Oral BID [facility] DO   300 mg at 07/22/22 0839    mometasone-formoterol (DULERA) 100-5 MCG/ACT inhaler 2 puff  2 puff Inhalation BID [facility], DO   2 puff at 07/22/22 1104    tiotropium (SPIRIVA RESPIMAT) 2.5 MCG/ACT inhaler 2 puff  2 puff Inhalation Daily Aki West, DO   2 puff at 07/22/22 1104       EXAM  BP (!) 153/92   Pulse 70   Temp 98.4 °F (36.9 °C) (Oral)   Resp 18   Ht 5' 3\" (1.6 m)   Wt 150 lb 5.7 oz (68.2 kg)   SpO2 99%   BMI 26.63 kg/m²   Physical Examination:   General appearance - NAD  Mental status - awake, alert and oriented x 3  Mouth - mucous membranes moist,   Chest - clear to auscultation, No RD  Heart - normal rate, regular rhythm, normal S1, S2, no murmurs, rubs, clicks or gallops  Abdomen - soft, ND, NT  Musculoskeletal - no joint tenderness, deformity or swelling  Extremities - Not much edema. Skin - poor skin turgor, no rashes    STUDIES:  Lab Results   Component Value Date    CREATININE 1.8 (H) 07/22/2022    BUN 22 (H) 07/22/2022     07/22/2022    K 3.2 (L) 07/22/2022     07/22/2022    CO2 23 07/22/2022     Lab Results   Component Value Date    CALCIUM 8.2 (L) 07/22/2022    PHOS 1.4 (L) 03/05/2022     Lab Results   Component Value Date    WBC 10.6 07/22/2022    HGB 7.2 (L) 07/22/2022    HCT 21.5 (L) 07/22/2022    MCV 81.5 07/22/2022     07/22/2022       Thank you for allowing me to participate in this patient's care. Please contact me for any questions.     Wendie Escobar MD  [phone]

## 2022-07-22 NOTE — PROGRESS NOTES
Physical Therapy  Facility/Department: [facility]  Physical Therapy Daily Treatment    Name: Mitra Jacob  : 1940  MRN: [id number]  Date of Service: 2022    Discharge Recommendations:  Home with Home health PT, 24 hour supervision or assist   PT Equipment Recommendations  Equipment Needed: No  Mitra Friend scored a 17/24 on the AM-PAC short mobility form. Current research shows that an AM-PAC score of 18 or greater is typically associated with a discharge to the patient's home setting. Based on the patient's AM-PAC score and their current functional mobility deficits, it is recommended that the patient have 2-3 sessions per week of Physical Therapy at d/c to increase the patient's independence. At this time, this patient demonstrates the endurance and safety to discharge home with home services and a follow up treatment frequency of 2-3x/wk. Please see assessment section for further patient specific details. If patient discharges prior to next session this note will serve as a discharge summary. Please see below for the latest assessment towards goals. Patient Diagnosis(es): The primary encounter diagnosis was Recurrent left pleural effusion. Diagnoses of Left upper quadrant abdominal pain, Anemia, unspecified type, Chronic kidney disease, unspecified CKD stage, Hypoalbuminemia, and Goals of care, counseling/discussion were also pertinent to this visit. Past Medical History:  has a past medical history of Arthritis, Asthma, CAD (coronary artery disease), CKD (chronic kidney disease), Glaucoma, Hyperlipidemia, Hypertension, Hyperthyroidism, IBS (irritable bowel syndrome), Neuropathy, Obesity (BMI 30-39.9), Osteopenia, and Osteoporosis. Past Surgical History:  has a past surgical history that includes Cholecystectomy; Foot surgery; Tubal ligation; Gastric Band ([id number]); orif femur decompression (Left, 2014); Ankle fracture surgery (Left, 2015);  Hand surgery (); other surgical history (Right, 2010); Coronary artery bypass graft (02/25/2022); Coronary artery bypass graft (N/A, 2/25/2022); IR TUNNELED CVC PLACE WO SQ PORT/PUMP > 5 YEARS (3/7/2022); Diagnostic Cardiac Cath Lab Procedure (02/2022); and CT GUIDED CHEST TUBE (7/18/2022). Assessment   Body Structures, Functions, Activity Limitations Requiring Skilled Therapeutic Intervention: Decreased functional mobility ; Decreased endurance  Assessment: Pt is an 80 y/o female who lives with her daughter; who is also her caregiver. At baseline pt amb typically no AD with SBA from daughter. She is currently recieving home PT. Daughter states pt has been informed to start using RW more frequently. Pt presented to ED with abdominal pain, found with left pleural effusion. US-guided left thoracentesis on 7/15 and chest tube placement at 7/18. Today, 7/22, pt with increased anxiety and with poor command following throughout the session. Pt required SBA for bed mobility,  CGA for transfers, and CGA with RW to amb 25' with PT managing chest tube. Pt with increased agitation and perseverating on going home. Pt would benefit from continuing home PT upon d/c to help with functional mobility and endurance, but it is anticipated she will be safe to return home when medically stable. Therapy Prognosis: Good;Fair  Decision Making: Medium Complexity  Activity Tolerance  Activity Tolerance: Patient limited by endurance; Patient limited by pain  Activity Tolerance Comments: Pt with slight agitation and poor awareness of deficits and safety. Arguing to therapist that she needs to go home now and cannot stay in bed, attempting to put feet over railing. Wrist restrainingts placed back on pt and bed alarm on.  Pt relaxed in bed after max encouragement     Plan   Plan  Plan: 3-5 times per week  Current Treatment Recommendations: Strengthening, ROM, Balance training, Functional mobility training, Gait training, Endurance training, Stair training  Safety Devices  Type of Devices: All fall risk precautions in place, Call light within reach, Gait belt, Patient at risk for falls, Bed alarm in place, Nurse notified, Left in bed, Telesitter in use  Restraints  Restraints Initially in Place: No     Restrictions  Restrictions/Precautions  Restrictions/Precautions: Fall Risk  Position Activity Restriction  Other position/activity restrictions: Chest tube placed 7/18; wrist restraints     Subjective   General  Chart Reviewed: Yes  Patient assessed for rehabilitation services?: Yes  Additional Pertinent Hx: left pleural effusion  Response To Previous Treatment: Patient with no complaints from previous session. Family / Caregiver Present: Yes (family in room)  Referring Practitioner: Juan Miguel Olmedo MD  Referral Date : 07/15/22  Diagnosis: left pleural effusion  Follows Commands: Within Functional Limits  General Comment  Comments: Pt reported she wanted to go to the bathroom, amb to commode x2 but refused to sit down. Sat in bed to wash her face and brush her teeth with set-up assist.  Subjective  Subjective: Pt supine in bed with RN present giving meds. Pt continues to be anxious and fixated on wanting to go home. Social/Functional History  Social/Functional History  Lives With: Family (Daughter; ZOHRA)  Type of Home: Apartment (basement level apartment; 6 steps with R handrail)  Home Layout: One level  Home Access: Stairs to enter with rails  Entrance Stairs - Number of Steps: 6 steps  Entrance Stairs - Rails: Right  Bathroom Shower/Tub: Shower chair with back, Tub/Shower unit  Bathroom Toilet: Standard (vanity nearby)  Bathroom Equipment: Shower chair, [employer], Grab bars in shower  Bathroom Accessibility: Accessible  Home Equipment: Walker, rolling  ADL Assistance: Needs assistance (Daughter assits with dressing, bathing, tolieting.  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 Objective Bed mobility  Supine to Sit: Contact guard assistance  Sit to Supine: Contact guard assistance;Minimal assistance  Transfers  Sit to Stand: Stand by assistance;Contact guard assistance  Stand to sit: Stand by assistance;Contact guard assistance  Ambulation  Surface: level tile  Other Apparatus:  (Chest tube)  Assistance: Contact guard assistance  Quality of Gait: flexed and guarded trunk positioning. Grunting throughout gait.  slow kale but even step length  Gait Deviations: Slow Kale;Decreased step length;Decreased step height  Distance: ~25' x2  Comments: Poor command following     AM-PAC Score  AM-PAC Inpatient Mobility Raw Score : 17 (07/22/22 0922)  AM-PAC Inpatient T-Scale Score : 42.13 (07/22/22 0922)  Mobility Inpatient CMS 0-100% Score: 50.57 (07/22/22 0950)  Mobility Inpatient CMS G-Code Modifier : CK (07/22/22 5475)     Goals  Short Term Goals  Time Frame for Short term goals: upon d/c - all goals ongoing as of 7/21/22  Short term goal 1: bed mobility SBA  Short term goal 2: transfers SBA  Short term goal 3: amb 75' RW SBA  Short term goal 4: 6 steps SBA  Patient Goals   Patient goals : No goal stated at this time.       Education  Patient Education  Education Given To: Patient; Family  Education Provided: Role of Therapy;Plan of Care;Equipment  Education Method: Verbal  Barriers to Learning: Cognition  Education Outcome: Continued education needed;Verbalized understanding      Therapy Time   Individual Concurrent Group Co-treatment   Time In 0820         Time Out 0915         Minutes 54               Electronically signed by Cinthia Hastings on 7/22/2022 at 10:08 AM

## 2022-07-22 NOTE — PROGRESS NOTES
Pt with discharge orders. AVS reviewed with patient's daughter (pt has some confusion), all questions and concerns answered and acknowledged. AVS signed and copy sent with pt. TELLO completed and instructed daughter to provide to home care agency. PIV removed w/o complication. Taken down to car by this RN and transported home by family.

## 2022-07-22 NOTE — PROGRESS NOTES
Pt A/Ox1 and impulsive. Pt removed PIV x1. New PIV placed. Pt continues to tug at LT chest tube. This RN messaged team inquiring about soft wrist restraints, to decrease the risk of unintentional self harm. Order placed, per MD authorization. Bilateral Soft wrist restraints applied with two finger space between wrist and restraints.   Gaviota Epstein RN

## 2022-07-22 NOTE — PROGRESS NOTES
Clinical Pharmacy Note  Renal Dose Adjustment    Jennifer Potter is receiving levofloxacin. This medication is renally eliminated. Based on the patient's estimated creatinine clearance of 23 mL/min (A) (based on SCr of 1.8 mg/dL (H)) and urine output, the dose has been adjusted to levofloxacin 750 mg every 48 hours per protocol. Pharmacy will continue to monitor and adjust dose as needed for changes in renal function.        Summer RhodesD  7/22/2022 9:23 AM

## 2022-07-22 NOTE — CARE COORDINATION
CASE MANAGEMENT DISCHARGE SUMMARY:    DISCHARGE DATE: 07/22/22    DISCHARGED TO: home     HOME CARE AGENCY: Discharging to Facility/ Agency   Name: [facility]  Address:   [place] [address].  [facility] [place] [address]  Phone:  [phone]  Fax:  [phone]    TRANSPORTATION: family    Brianna Marte RN, BSN, Case Management  [phone]    Electronically signed by Brianna Marte RN on 7/22/2022 at 2:45 PM

## 2022-07-22 NOTE — DISCHARGE INSTR - COC
Continuity of Care Form    Patient Name: Stephan Abernathy   :  1940  MRN:  [id number]    [facility] date:  2022  Discharge date:  2022    Code Status Order: Limited   Advance Directives:     Admitting Physician:  Jayleen Vanegas DO  PCP: Tali Carreon [address]    Discharging Nurse: Kane Putnam [facility] Unit/Room#: H3S-4949/5535-10  Discharging Unit Phone Number: [phone]    Emergency Contact:   Extended Emergency Contact Information  Primary Emergency Contact: [place] Orlando [address] Phone: [phone]  Mobile Phone: [phone]  Relation: Child  Secondary Emergency Contact: [address] Phone: [phone]  Relation: Child    Past Surgical History:  Past Surgical History:   Procedure Laterality Date    ANKLE FRACTURE SURGERY Left 2015    CHOLECYSTECTOMY      CORONARY ARTERY BYPASS GRAFT  2022    cabgx4, EDISON exclusion    CORONARY ARTERY BYPASS GRAFT N/A 2022    TRANSESOPHAGEAL ECHOCARDIOGRAM, TOTAL CARDIOPULMONARY BYPASS, TOTAL CARDIOPULMONARY BYPASS GRAFTING X4 (1 ARTERY, 3 VEIN) USING LEFT INTERNAL MAMMARY ARTERY AND RIGHT ENDOSCOPICALLY HARVESTED SAPHENOUS VEIN, LEFT ATRIAL APPENDAGE CLIP WITH 35MM ATRICLIP performed by Maria Teresa Sanders MD at [place] [address]  2022    CT GUIDED CHEST TUBE 2022 [facility] CT    DIAGNOSTIC CARDIAC CATH LAB PROCEDURE  2022    FOOT SURGERY      bilateral, hammer toes    GASTRIC BAND  [id number]    laproscopic    HAND SURGERY  2009    trigger finger    IR TUNNELED CATHETER PLACEMENT GREATER THAN 5 YEARS  3/7/2022    IR TUNNELED CATHETER PLACEMENT GREATER THAN 5 YEARS 3/7/2022 [facility] SPECIAL PROCEDURES    ORIF FEMUR DECOMPRESSION Left 2014    OTHER SURGICAL HISTORY Right     mass behind ear    TUBAL LIGATION         Immunization History:   Immunization History   Administered Date(s) Administered    COVID-19, PFIZER GRAY top, DO NOT Dilute, (age 15 y+), IM, 30 mcg/0.3 mL 2022    COVID-19, PFIZER PURPLE top, DILUTE for use, (age 15 y+), 30mcg/0.3mL 02/13/2021, 03/02/2021, 10/05/2021    Td, unspecified formulation 10/18/2011       Active Problems:  Patient Active Problem List   Diagnosis Code    Morbid obesity ([facility] [address] E66.01    Chest pain R07.9    Unstable angina (HCC) I20.0    Abnormal cardiovascular stress test R94.39    Hyperlipidemia LDL goal <70 E78.5    CAD in native artery I25.10    Loculated pleural effusion on the left (Large) J90    Chronic kidney disease (CKD) stage G3b/A2, moderately decreased glomerular filtration rate (GFR) between 30-44 mL/min/1.73 square meter and albuminuria creatinine ratio between  mg/g (HCC) N18.32    Generalized weakness R53.1    Fatigue R53.83    Recurrent left pleural effusion J90    Hx of CABG Z95.1    Hyperthyroidism E05.90    HTN (hypertension) I10       Isolation/Infection:   Isolation            No Isolation          Patient Infection Status       Infection Onset Added Last Indicated Last Indicated By Review Planned Expiration Resolved Resolved By    None active    Resolved    COVID-19 (Rule Out) 07/14/22 07/14/22 07/14/22 COVID-19, Rapid (Ordered)   07/14/22 Rule-Out Test Resulted            Nurse Assessment:  Last Vital Signs: BP (!) 153/92   Pulse 70   Temp 98.4 °F (36.9 °C) (Oral)   Resp 18   Ht 5' 3\" (1.6 m)   Wt 150 lb 5.7 oz (68.2 kg)   SpO2 99%   BMI 26.63 kg/m²     Last documented pain score (0-10 scale): Pain Level: 0  Last Weight:   Wt Readings from Last 1 Encounters:   07/22/22 150 lb 5.7 oz (68.2 kg)     Mental Status:  alert and intermittent confusion/disorientation    IV Access:  - None    Nursing Mobility/ADLs:  Walking   Assisted  Transfer  Assisted  Bathing  Assisted  Dressing  Assisted  Toileting  Assisted  Feeding  Independent  Med Admin  Independent  Med Delivery   whole and one at a time    Wound Care Documentation and Therapy:  Incision 08/23/11 Abdomen (Active)   Number of days: 3986       Incision 02/25/22 Sternum (Active)   Number of days: 147       Incision 02/25/22 Knee Right; Inner (Active)   Number of days: 147       Incision 02/25/22 Femoral Right; Anterior (Active)   Number of days: 147       Incision 02/25/22 Pretibial Right; Inner;Mid (Active)   Number of days: 147        Elimination:  Continence: Bowel: Yes  Bladder: Yes  Urinary Catheter: None   Colostomy/Ileostomy/Ileal Conduit: No       Date of Last BM: 7/21/2022    Intake/Output Summary (Last 24 hours) at 7/22/2022 1417  Last data filed at 7/22/2022 1239  Gross per 24 hour   Intake 170 ml   Output 200 ml   Net -30 ml     I/O last 3 completed shifts: In: 360 [P.O.:360]  Out: 220 [Chest Tube:220]    Safety Concerns: At Risk for Falls and confused    Impairments/Disabilities:      None    Nutrition Therapy:  Current Nutrition Therapy:   - Oral Diet:  General    Routes of Feeding: Oral  Liquids: No Restrictions  Daily Fluid Restriction: no  Last Modified Barium Swallow with Video (Video Swallowing Test): not done    Treatments at the Time of Hospital Discharge:   Respiratory Treatments: none  Oxygen Therapy:  is not on home oxygen therapy. Ventilator:    - No ventilator support    Rehab Therapies: Physical Therapy  Weight Bearing Status/Restrictions: No weight bearing restrictions  Other Medical Equipment (for information only, NOT a DME order):  walker  Other Treatments: none    Patient's personal belongings (please select all that are sent with patient):  None    RN SIGNATURE:  Electronically signed by Daphnie Cuevas RN on 7/22/22 at 3:44 PM EDT    CASE MANAGEMENT/SOCIAL WORK SECTION    Inpatient Status Date: 7/14/2022    Readmission Risk Assessment Score:  Readmission Risk              Risk of Unplanned Readmission:  [id number]           Discharging to Facility/ Agency   Name: [facility]  Address:   [place] [address]  Phone:  [phone]  Fax:  [phone]      / signature: Electronically signed by Emily Agustin Debbi Mendez on 7/22/22 at 2:45 PM EDT    PHYSICIAN SECTION    Prognosis: Good    Condition at Discharge: Stable    Rehab Potential (if transferring to Rehab): Good    Recommended Labs or Other Treatments After Discharge: PT and OT:  Evaluate and treat. Home nursing:  Vital sign checks. Physician Certification: I certify the above information and transfer of Karen Alcantara  is necessary for the continuing treatment of the diagnosis listed and that she requires Home Care for less 30 days.      Update Admission H&P: No change in H&P    PHYSICIAN SIGNATURE:  Electronically signed by Laura Mills MD on 7/22/22 at 2:17 PM EDT

## 2022-07-22 NOTE — PROGRESS NOTES
Progress Note    7/14/22: SOB, recurrent left sided effusion  7/15/22: Thoracentesis: 500 ml removed  7/18/22: 10 Serbian chest tube placed by IR  7/19/22: Dornase/Alteplase instilled  7/20/22: Dornase/Alteplase instilled  7/21/22:  Dornase/Alteplase instilled    Vital Signs:                                                 BP (!) 213/97   Pulse 79   Temp 98.5 °F (36.9 °C) (Oral)   Resp 18   Ht 5' 3\" (1.6 m)   Wt 150 lb 5.7 oz (68.2 kg)   SpO2 100%   BMI 26.63 kg/m²  O2 Flow Rate (L/min): (S) 2 L/min (decreased to 1lpm at this time)        Admission Weight: 156 lb 1.4 oz (70.8 kg)        I/O:    Intake/Output Summary (Last 24 hours) at 7/22/2022 1021  Last data filed at 7/21/2022 1850  Gross per 24 hour   Intake 120 ml   Output 200 ml   Net -80 ml       Chest Tube: 0, 220, 0    General: awake, alert, confused conversation. CV: regular  Pulm: decreased  Abd: soft  Ext: warm and dry  Neuro: No focal deficits, moves all extremities with equal strength bilat    Data Review:  CBC:   Recent Labs     07/20/22  0655 07/21/22  0836 07/22/22  0653   WBC 13.5* 11.3* 10.6   HGB 8.0* 8.0* 7.2*   HCT 24.9* 24.5* 21.5*   MCV 82.7 80.9 81.5    293 243       BMP:   Recent Labs     07/20/22  0655 07/21/22  0836 07/22/22  0653    139 141   K 3.7 3.7 3.2*    104 107   CO2 23 20* 23   BUN 18 21* 22*   CREATININE 2.2* 2.0* 1.8*   CALCIUM 8.6 8.7 8.2*   MG 2.20 2.30 2.20       Cardiac Enzymes: No results for input(s): CKTOTAL, CKMB, CKMBINDEX, TROPONINI in the last 72 hours. PT/INR: No results for input(s): PROTIME, INR in the last 72 hours. APTT: No results for input(s): APTT in the last 72 hours. CXR  7/21/2022  Small bore pigtail thoracostomy tube remains coiled within the left inferior pleural space. There is a small loculated left basilar pneumothorax with interval resolution of the pleural effusion. Minimal left basilar atelectasis has also improved. The right lung is clear.  Heart size and vascularity are stable. CT Chest w/o contrast  7/16/2022  Smaller size but persistent moderate left pleural effusion with a few gas bubbles. Suboptimally characterized without IV contrast. This could reflect empyema. Mild associated relaxation atelectasis lingula left upper lobe and prominent consolidation left lower lobe. Anemia is likely given that the blood pool density of the heart is lower than that of the myocardium. Cardiomegaly. Assessment/Plan:  CV - SR             - BB, ASA, and statin for CAD             - No ACE/ARB due to elevated creatinine  pulm - pulmonary toilet, RA             - left sided 10 Syriac chest tube in place              - Will remove CT today             - CXR improved. - prednisone per pulm  Renal - CKD; neph following             - creatinine 1.8  Heme - anemic; H&H stable              - Lovenox on hold during lytic instillation               - SCD's for DVT prophylaxis    Discussion with pt and daughter regarding finding of trapped lung and what this will mean for long term. Options of VATS with decortication vs Pleurx catheter placement for long term. Pt very sedentary(sits in chair all day long, ambulating no more than to bathroom or kitchen on daily basis). Higher risk for extensive VATS surgery though likely tolerate Pleurx catheter placement. Will remove CT today and can be discharged home. Follow-up with Dr. Lilian Rizvi for Pleurx catheter placement considerations next Friday, 29th at 12:00 pm    Okay for discharge from my standpoint. Will sign off. Please call with questions.      Yu Mcintyre MD  7/22/2022  10:21 AM

## 2022-07-25 ENCOUNTER — APPOINTMENT (OUTPATIENT)
Dept: CT IMAGING | Age: 82
End: 2022-07-25
Payer: MEDICARE

## 2022-07-25 ENCOUNTER — APPOINTMENT (OUTPATIENT)
Dept: GENERAL RADIOLOGY | Age: 82
End: 2022-07-25
Payer: MEDICARE

## 2022-07-25 ENCOUNTER — HOSPITAL ENCOUNTER (EMERGENCY)
Age: 82
Discharge: HOME | End: 2022-07-25
Attending: EMERGENCY MEDICINE
Payer: MEDICARE

## 2022-07-25 VITALS
SYSTOLIC BLOOD PRESSURE: 144 MMHG | TEMPERATURE: 99 F | HEART RATE: 66 BPM | DIASTOLIC BLOOD PRESSURE: 66 MMHG | BODY MASS INDEX: 26.59 KG/M2 | RESPIRATION RATE: 13 BRPM | OXYGEN SATURATION: 97 % | WEIGHT: 150.13 LBS

## 2022-07-25 DIAGNOSIS — R53.83 FATIGUE, UNSPECIFIED TYPE: Primary | ICD-10-CM

## 2022-07-25 DIAGNOSIS — J94.8 HYDROPNEUMOTHORAX: ICD-10-CM

## 2022-07-25 DIAGNOSIS — R06.02 SHORTNESS OF BREATH: ICD-10-CM

## 2022-07-25 LAB
A/G RATIO: 1 (ref 1.1–2.2)
ALBUMIN SERPL-MCNC: 3.5 G/DL (ref 3.4–5)
ALP BLD-CCNC: 55 U/L (ref 40–129)
ALT SERPL-CCNC: 17 U/L (ref 10–40)
ANION GAP SERPL CALCULATED.3IONS-SCNC: 12 MMOL/L (ref 3–16)
AST SERPL-CCNC: 18 U/L (ref 15–37)
BASOPHILS ABSOLUTE: 0.1 K/UL (ref 0–0.2)
BASOPHILS RELATIVE PERCENT: 0.7 %
BILIRUB SERPL-MCNC: 0.5 MG/DL (ref 0–1)
BUN BLDV-MCNC: 19 MG/DL (ref 7–20)
CALCIUM SERPL-MCNC: 8.9 MG/DL (ref 8.3–10.6)
CHLORIDE BLD-SCNC: 106 MMOL/L (ref 99–110)
CO2: 26 MMOL/L (ref 21–32)
CREAT SERPL-MCNC: 2 MG/DL (ref 0.6–1.2)
EKG ATRIAL RATE: 80 BPM
EKG DIAGNOSIS: NORMAL
EKG P AXIS: 55 DEGREES
EKG P-R INTERVAL: 136 MS
EKG Q-T INTERVAL: 330 MS
EKG QRS DURATION: 64 MS
EKG QTC CALCULATION (BAZETT): 380 MS
EKG R AXIS: 9 DEGREES
EKG T AXIS: 10 DEGREES
EKG VENTRICULAR RATE: 80 BPM
EOSINOPHILS ABSOLUTE: 0.2 K/UL (ref 0–0.6)
EOSINOPHILS RELATIVE PERCENT: 1.6 %
GFR AFRICAN AMERICAN: 29
GFR NON-AFRICAN AMERICAN: 24
GLUCOSE BLD-MCNC: 104 MG/DL (ref 70–99)
HCT VFR BLD CALC: 25.4 % (ref 36–48)
HEMOGLOBIN: 8.3 G/DL (ref 12–16)
LYMPHOCYTES ABSOLUTE: 1.7 K/UL (ref 1–5.1)
LYMPHOCYTES RELATIVE PERCENT: 15.2 %
MCH RBC QN AUTO: 26.8 PG (ref 26–34)
MCHC RBC AUTO-ENTMCNC: 32.8 G/DL (ref 31–36)
MCV RBC AUTO: 81.8 FL (ref 80–100)
MONOCYTES ABSOLUTE: 1 K/UL (ref 0–1.3)
MONOCYTES RELATIVE PERCENT: 8.9 %
NEUTROPHILS ABSOLUTE: 8.2 K/UL (ref 1.7–7.7)
NEUTROPHILS RELATIVE PERCENT: 73.6 %
PDW BLD-RTO: 17.1 % (ref 12.4–15.4)
PLATELET # BLD: 277 K/UL (ref 135–450)
PMV BLD AUTO: 7.6 FL (ref 5–10.5)
POTASSIUM SERPL-SCNC: 3.7 MMOL/L (ref 3.5–5.1)
PRO-BNP: 2884 PG/ML (ref 0–449)
RBC # BLD: 3.1 M/UL (ref 4–5.2)
SODIUM BLD-SCNC: 144 MMOL/L (ref 136–145)
TOTAL PROTEIN: 6.9 G/DL (ref 6.4–8.2)
TROPONIN: 0.01 NG/ML
WBC # BLD: 11.2 K/UL (ref 4–11)

## 2022-07-25 PROCEDURE — 84484 ASSAY OF TROPONIN QUANT: CPT

## 2022-07-25 PROCEDURE — 80053 COMPREHEN METABOLIC PANEL: CPT

## 2022-07-25 PROCEDURE — 93005 ELECTROCARDIOGRAM TRACING: CPT | Performed by: PHYSICIAN ASSISTANT

## 2022-07-25 PROCEDURE — 71046 X-RAY EXAM CHEST 2 VIEWS: CPT

## 2022-07-25 PROCEDURE — 83880 ASSAY OF NATRIURETIC PEPTIDE: CPT

## 2022-07-25 PROCEDURE — 85025 COMPLETE CBC W/AUTO DIFF WBC: CPT

## 2022-07-25 PROCEDURE — 71250 CT THORAX DX C-: CPT

## 2022-07-25 PROCEDURE — 99285 EMERGENCY DEPT VISIT HI MDM: CPT

## 2022-07-25 PROCEDURE — 93010 ELECTROCARDIOGRAM REPORT: CPT | Performed by: INTERNAL MEDICINE

## 2022-07-25 ASSESSMENT — ENCOUNTER SYMPTOMS
COUGH: 0
NAUSEA: 0
ABDOMINAL PAIN: 0
SHORTNESS OF BREATH: 1
SORE THROAT: 0
EYE PAIN: 0
BACK PAIN: 0
VOMITING: 0

## 2022-07-25 ASSESSMENT — PAIN - FUNCTIONAL ASSESSMENT: PAIN_FUNCTIONAL_ASSESSMENT: NONE - DENIES PAIN

## 2022-07-25 NOTE — ED PROVIDER NOTES
[address] [facility]      Pt Name: Scott Downey  TRB:[id number]  Birthdate 1940  Date of evaluation: 7/25/2022  Provider: Ana Lewis PA-C    This patient was seen evaluated by attending physician Dr. Landon Luna MD      Chief Complaint:    Chief Complaint   Patient presents with    Fatigue     Was discharge on Friday from hospital was started on new med. \"Doesn't feel\" right          Nursing Notes, Past Medical Hx, Past Surgical Hx, Social Hx, Allergies, and Family Hx were all reviewed and agreed with or any disagreements were addressed in the HPI.    HPI: (Location, Duration, Timing, Severity, Quality, Assoc Sx, Context, Modifying factors)    Chief Complaint of shortness of breath and fatigue. Patient was recently released from the hospital on Friday. According to her daughter says she has been home since been having increased shortness of breath and very fatigued. And asked to new medicine that was added to her home prescription and when she takes those patient describes the loopy feeling. She denies fever, denies cough, no lightheadedness. Her daughter did state that she is not her normal self. She is more confused. Daughter also complaining bilateral eyes are bulging. Patient denies abdominal pain, no nausea vomiting. No extremity weakness. Denies swelling in legs. No other complaint. This is a  81 y.o. female who presents to emergency room with the above complaint. PastMedical/Surgical History:      Diagnosis Date    Arthritis     Asthma     as child grew out of it    CAD (coronary artery disease) 02/22/2022    multi vessel    CKD (chronic kidney disease) 2015    Elevated creatinine since 2015; Stage III b    Glaucoma     Hyperlipidemia     Hypertension     Hyperthyroidism 2019    Graves disease    IBS (irritable bowel syndrome) 10/2020    On Linzess    Neuropathy     Peripheral    Obesity (BMI 30-39. 9) Osteopenia 05/2009    Osteoporosis          Procedure Laterality Date    ANKLE FRACTURE SURGERY Left 02/12/2015    CHOLECYSTECTOMY      CORONARY ARTERY BYPASS GRAFT  02/25/2022    [id number], EDISON exclusion    CORONARY ARTERY BYPASS GRAFT N/A 2/25/2022    TRANSESOPHAGEAL ECHOCARDIOGRAM, TOTAL CARDIOPULMONARY BYPASS, TOTAL CARDIOPULMONARY BYPASS GRAFTING X4 (1 ARTERY, 3 VEIN) USING LEFT INTERNAL MAMMARY ARTERY AND RIGHT ENDOSCOPICALLY HARVESTED SAPHENOUS VEIN, LEFT ATRIAL APPENDAGE CLIP WITH 35MM ATRICLIP performed by Donis Souza MD at [place] [address]  7/18/2022    CT GUIDED CHEST TUBE 7/18/2022 [facility] CT    DIAGNOSTIC CARDIAC CATH LAB PROCEDURE  02/2022    FOOT SURGERY      bilateral, hammer toes    GASTRIC BAND  [id number]    laproscopic    HAND SURGERY  2009    trigger finger    IR TUNNELED CATHETER PLACEMENT GREATER THAN 5 YEARS  3/7/2022    IR TUNNELED CATHETER PLACEMENT GREATER THAN 5 YEARS 3/7/2022 [facility] SPECIAL PROCEDURES    ORIF FEMUR DECOMPRESSION Left 04/2014    OTHER SURGICAL HISTORY Right 2010    mass behind ear    TUBAL LIGATION         Medications:  Previous Medications    AMLODIPINE (NORVASC) 5 MG TABLET    Take 5 mg by mouth daily    ASPIRIN EC 81 MG EC TABLET    Take 1 tablet by mouth daily    ATORVASTATIN (LIPITOR) 80 MG TABLET    Take 1 tablet by mouth nightly    FERROUS SULFATE (IRON 325) 325 (65 FE) MG TABLET    Take 325 mg by mouth daily (with breakfast)    FLUTICASONE-UMECLIDIN-VILANT (TRELEGY ELLIPTA) 100-62.5-25 MCG/INH AEPB    Inhale 1 puff into the lungs daily    GABAPENTIN (NEURONTIN) 300 MG CAPSULE    Take 300 mg by mouth 4 times daily.      LEVOFLOXACIN (LEVAQUIN) 750 MG TABLET    Take 1 tablet by mouth every other day for 2 doses    METHIMAZOLE (TAPAZOLE) 5 MG TABLET    Take 5 mg by mouth daily    METOPROLOL SUCCINATE (TOPROL XL) 25 MG EXTENDED RELEASE TABLET    Take 1 tablet by mouth daily    POLYETHYLENE GLYCOL (GLYCOLAX) 17 G PACKET    Take 17 g by mouth daily     SODIUM BICARBONATE 650 MG TABLET    Take 1 tablet by mouth in the morning and 1 tablet at noon and 1 tablet before bedtime. WHEAT DEXTRIN (BENEFIBER DRINK MIX) PACK    Take 1 packet by mouth daily         Review of Systems:  (2-9 systems needed)  Review of Systems   Constitutional:  Positive for fatigue. Negative for chills and fever. HENT:  Negative for congestion and sore throat. Eyes:  Negative for pain and visual disturbance. Respiratory:  Positive for shortness of breath. Negative for cough. Cardiovascular:  Negative for chest pain and leg swelling. Gastrointestinal:  Negative for abdominal pain, nausea and vomiting. Genitourinary:  Negative for dysuria and frequency. Musculoskeletal:  Negative for back pain and neck pain. Skin:  Negative for rash and wound. Neurological:  Negative for dizziness and light-headedness. \"Positives and Pertinent negatives as per HPI\"    Physical Exam:  Physical Exam  Vitals and nursing note reviewed. Constitutional:       Appearance: She is well-developed. She is not diaphoretic. HENT:      Head: Normocephalic and atraumatic. Nose: Nose normal.      Mouth/Throat:      Mouth: Mucous membranes are moist.      Pharynx: Oropharynx is clear. No oropharyngeal exudate or posterior oropharyngeal erythema. Eyes:      General: No scleral icterus. Right eye: No discharge. Left eye: No discharge. Extraocular Movements: Extraocular movements intact. Conjunctiva/sclera: Conjunctivae normal.      Pupils: Pupils are equal, round, and reactive to light. Neck:      Comments: No nuchal rigidity  Cardiovascular:      Rate and Rhythm: Normal rate and regular rhythm. Heart sounds: Normal heart sounds. No murmur heard. No friction rub. No gallop. Pulmonary:      Effort: Pulmonary effort is normal. No respiratory distress. Breath sounds: Normal breath sounds. No wheezing or rales. Chest:      Chest wall: No tenderness.    Abdominal: General: Abdomen is flat. Bowel sounds are normal. There is no distension. Palpations: Abdomen is soft. There is no mass. Tenderness: no abdominal tenderness There is no guarding or rebound. Musculoskeletal:         General: Normal range of motion. Cervical back: Normal range of motion and neck supple. Right lower leg: No edema. Left lower leg: No edema. Skin:     General: Skin is warm and dry. Neurological:      General: No focal deficit present. Mental Status: She is alert and oriented to person, place, and time. She is not disoriented. GCS: GCS eye subscore is 4. GCS verbal subscore is 5. GCS motor subscore is 6. Cranial Nerves: No cranial nerve deficit. Sensory: No sensory deficit. Motor: No tremor, abnormal muscle tone or seizure activity.       Coordination: Coordination normal.      Comments: Finger to nose normal   Psychiatric:         Behavior: Behavior normal.       MEDICAL DECISION MAKING    Vitals:    Vitals:    07/25/22 1007 07/25/22 1049 07/25/22 1100 07/25/22 1115   BP:  (!) 150/69 (!) 152/68 (!) 144/66   Pulse: 68 74 67 66   Resp: 13 13 12 13   Temp:       TempSrc:       SpO2: 94%  98% 97%   Weight:           LABS:  Labs Reviewed   CBC WITH AUTO DIFFERENTIAL - Abnormal; Notable for the following components:       Result Value    WBC 11.2 (*)     RBC 3.10 (*)     Hemoglobin 8.3 (*)     Hematocrit 25.4 (*)     RDW 17.1 (*)     Neutrophils Absolute 8.2 (*)     All other components within normal limits   COMPREHENSIVE METABOLIC PANEL - Abnormal; Notable for the following components:    Glucose 104 (*)     Creatinine 2.0 (*)     GFR Non- 24 (*)     GFR African American 29 (*)     Albumin/Globulin Ratio 1.0 (*)     All other components within normal limits   BRAIN NATRIURETIC PEPTIDE - Abnormal; Notable for the following components:    Pro-BNP 2,884 (*)     All other components within normal limits   TROPONIN        Remainder of labs reviewed and were negative at this time or not returned at the time of this note. RADIOLOGY:   Non-plain film images such as CT, Ultrasound and MRI are read by the radiologist. Meme Marley PA-C have directly visualized the radiologic plain film image(s) with the below findings:      Interpretation per the Radiologist below, if available at the time of this note:    CT CHEST WO CONTRAST   Preliminary Result   The left-sided hydropneumothorax is re-identified and overall is similar in   size. There is less fluid than on the previous exam.  Superimposed infection   cannot be excluded. Left chest soft tissue gas, possibly related to prior intervention. However   gas-forming bacterial infection could appear similar. XR CHEST (2 VW)   Final Result   Previously noted chest tube has been removed. Small to moderate size left   hydropneumothorax. The findings were sent to the Radiology Results [id number] at 9:31   am on 7/25/2022 to be communicated to a licensed caregiver. CT ABDOMEN PELVIS WO CONTRAST Additional Contrast? None    Result Date: 7/14/2022  EXAMINATION: CT OF THE ABDOMEN AND PELVIS WITHOUT CONTRAST 7/14/2022 9:10 pm TECHNIQUE: CT of the abdomen and pelvis was performed without the administration of intravenous contrast. Multiplanar reformatted images are provided for review. Automated exposure control, iterative reconstruction, and/or weight based adjustment of the mA/kV was utilized to reduce the radiation dose to as low as reasonably achievable.  COMPARISON: 01/26/2010 HISTORY: ORDERING SYSTEM PROVIDED HISTORY: upper abdominal pain TECHNOLOGIST PROVIDED HISTORY: Reason for exam:->upper abdominal pain Additional Contrast?->None Decision Support Exception - unselect if not a suspected or confirmed emergency medical condition->Emergency Medical Condition (MA) Reason for Exam: upper abdominal pain FINDINGS: Lower Chest: Partially imaged left pleural effusion. Organs: Liver is normal in contour and attenuation. Gallbladder surgically absent. No biliary ductal dilatation. Pancreas, spleen, kidneys are unremarkable. Severe calcific atherosclerosis. Stable left adrenal thickening, benign. No further imaging is indicated for this finding. GI/Bowel: Bowel is nondilated without wall thickening. Large volume stool throughout the colon. Appendix is normal. Pelvis: Unremarkable. Peritoneum/Retroperitoneum: Trace free fluid. No free air, organized fluid collection, lymphadenopathy. Bones/Soft Tissues: No acute bone or soft tissue abnormality. 1. No acute process in the abdomen or pelvis. 2. Large volume stool throughout the colon. 3. Partially imaged left pleural effusion. XR CHEST (2 VW)    Result Date: 7/25/2022  EXAMINATION: TWO XRAY VIEWS OF THE CHEST 7/25/2022 9:16 am COMPARISON: Chest x-ray dated 07/21/2022. HISTORY: ORDERING SYSTEM PROVIDED HISTORY: Shortness of breath. Fatigue TECHNOLOGIST PROVIDED HISTORY: Reason for exam:->Shortness of breath. Fatigue Reason for Exam: Shortness of breath. Fatigue FINDINGS: HEART/MEDIASTINUM: The cardiomediastinal silhouette is within normal limits. Atrial appendage clip noted. PLEURA/LUNGS: Small to moderate size left hydropneumothorax. The chest tube is been removed. BONES/SOFT TISSUE: No acute abnormality. Median sternotomy wires are noted. Previously noted chest tube has been removed. Small to moderate size left hydropneumothorax. The findings were sent to the Radiology Results [id number] at 9:31 am on 7/25/2022 to be communicated to a licensed caregiver. XR CHEST (2 VW)    Result Date: 7/14/2022  EXAMINATION: TWO XRAY VIEWS OF THE CHEST 7/14/2022 7:24 pm COMPARISON: 07/01/2022 HISTORY: ORDERING SYSTEM PROVIDED HISTORY: Abdominal pain. Marked decreased BS on the left TECHNOLOGIST PROVIDED HISTORY: Reason for exam:->Abdominal pain.   Marked decreased BS on the left Reason for Exam: sob, 6/25/2022 10:37 pm TECHNIQUE: CT of the head was performed without the administration of intravenous contrast. Automated exposure control, iterative reconstruction, and/or weight based adjustment of the mA/kV was utilized to reduce the radiation dose to as low as reasonably achievable. COMPARISON: October 18, 2011. HISTORY: ORDERING SYSTEM PROVIDED HISTORY: confusion TECHNOLOGIST PROVIDED HISTORY: Reason for exam:->confusion Has a \"code stroke\" or \"stroke alert\" been called? ->No Decision Support Exception - unselect if not a suspected or confirmed emergency medical condition->Emergency Medical Condition (MA) Reason for Exam: confusion FINDINGS: BRAIN/VENTRICLES: The gyri and sulci have a normal appearance. There is mild cortical and cerebellar volume loss with associated ex vacuo ventricular dilation. Mild diffuse periventricular and subcortical deep white matter hypoattenuation noted, findings compatible with chronic small vessel ischemic disease. The gray-white matter differentiation is otherwise preserved throughout. There is no acute hemorrhage, mass, or mass effect. No evidence of acute territorial infarct. No abnormal extraaxial fluid collections. ORBITS:  The visualized portion of the orbits demonstrate no acute abnormality. SINUSES:  The mastoid air cells are normally aerated. The visualized left maxillary sinuses nearly completely opacified with fluid, likely on a chronic basis. The paranasal sinuses otherwise clear. SOFT TISSUES/SKULL:  No significant abnormality of the calvarium, skull base, or soft tissues. There is cortical thickening the left maxillary sinus which is most likely chronic in nature. No acute fracture. No scalp hematoma. No evidence of acute intracranial process. Mild atrophy and chronic small vessel ischemic changes noted.      CT CHEST WO CONTRAST    Result Date: 7/16/2022  EXAMINATION: CT OF THE CHEST WITHOUT CONTRAST 7/16/2022 11:12 am TECHNIQUE: CT of the chest was performed without the administration of intravenous contrast. Multiplanar reformatted images are provided for review. Automated exposure control, iterative reconstruction, and/or weight based adjustment of the mA/kV was utilized to reduce the radiation dose to as low as reasonably achievable. COMPARISON: Chest 07/15/2022 and chest CT 06/28/2022 HISTORY: ORDERING SYSTEM PROVIDED HISTORY: ? other pathology for left sided opacity FINDINGS: Mediastinum: No pericardial effusion. Posterior mediastinal structures appear unremarkable. No mediastinal or hilar adenopathy. Stable sequela from CABG with extensive calcific atherosclerosis coronary arteries. Anemia is likely given that the blood pool density of the heart is lower than that of the myocardium. Heart size is mildly enlarged. Lungs/pleura: Smaller size predominant left pleural effusion with few air bubbles in the pleural collection as well. Suboptimally characterized on noncontrast CT. Mild associated relaxation atelectasis lingula left upper lobe and prominent consolidation left lower lobe. No discrete ground-glass or soft tissue pulmonary nodule evident. No inspissated secretions or endobronchial lesion evident. Upper Abdomen: Partial visualization of gastric banding. Cholecystectomy. Otherwise unremarkable. Soft Tissues/Bones: No acute superficial soft tissue or osseous structure abnormality evident. No rib fracture evident. 1. Smaller size but persistent moderate left pleural effusion with a few socially gas bubbles. Suboptimally characterized without IV contrast.  This could reflect empyema. 2. Mild associated relaxation atelectasis lingula left upper lobe and prominent consolidation left lower lobe. 3.  Anemia is likely given that the blood pool density of the heart is lower than that of the myocardium. Cardiomegaly. 4. Sequela from prior open heart surgery.      CT CHEST WO CONTRAST    Result Date: 6/28/2022  EXAMINATION: CT OF THE CHEST WITHOUT CONTRAST 6/28/2022 2:15 pm TECHNIQUE: CT of the chest was performed without the administration of intravenous contrast. Multiplanar reformatted images are provided for review. Automated exposure control, iterative reconstruction, and/or weight based adjustment of the mA/kV was utilized to reduce the radiation dose to as low as reasonably achievable. COMPARISON: None. HISTORY: ORDERING SYSTEM PROVIDED HISTORY: persistent effusion, pneumothorax TECHNOLOGIST PROVIDED HISTORY: Reason for exam:->persistent effusion, pneumothorax Reason for Exam: persistent effusion, pneumothorax FINDINGS: Mediastinum: Patient is status post median sternotomy. Thyroid is homogeneous. Vascular calcifications seen diffusely throughout the thoracic aorta with extensive coronary artery calcification identified. Cardiac chambers are within normal limits. No pericardial effusion. Lungs/pleura: There is a small pneumothorax identified on the left with complete collapse identified of the left lower lobe. Large pleural effusion identified on the left. Findings concerning for fibrosis within the left lower lung field. Chest tube identified on the left. The right lung is clear. Minimal atelectatic changes seen within the right lung base. No pleural effusion on the right. Upper Abdomen: Lap band seen in the upper abdomen along the stomach. Surgical clips seen from prior cholecystectomy. Vascular renal calcifications identified bilaterally. There is enlargement the adrenal glands bilaterally suggesting hyperplasia. Soft Tissues/Bones: Degenerative changes seen within the spine. Patient is status post median sternotomy. No aggressive osseous abnormality. No acute chest wall abnormality. Large pleural effusion on the left with complete collapse identified of the left lower lung field. Chest tube identified on the left with small pneumothorax identified as well on the left. Findings suggesting a fibrotic left lower lung.  Extensive coronary artery calcifications identified. Renal vascular calcification present with lap band in place. Extensive vascular calcifications seen within the thoracic aorta. CT CHEST WO CONTRAST    Result Date: 6/26/2022  EXAMINATION: CT OF THE CHEST WITHOUT CONTRAST 6/26/2022 1:21 am TECHNIQUE: CT of the chest was performed without the administration of intravenous contrast. Multiplanar reformatted images are provided for review. Automated exposure control, iterative reconstruction, and/or weight based adjustment of the mA/kV was utilized to reduce the radiation dose to as low as reasonably achievable. COMPARISON: Chest radiograph 06/25/2022. Chest CT 02/23/2022. HISTORY: ORDERING SYSTEM PROVIDED HISTORY: SOB TECHNOLOGIST PROVIDED HISTORY: Reason for exam:->SOB Decision Support Exception - unselect if not a suspected or confirmed emergency medical condition->Emergency Medical Condition (MA) Reason for Exam: sob FINDINGS: Mediastinum: Few scattered mediastinal lymph nodes are noted, nonspecific. Status post CABG. No pericardial effusion. Lungs/pleura: Large left pleural effusion, partially loculated anteriorly and medially. Associated atelectasis in the left lower lobe is noted as well as compressive atelectasis in the lingula. No acute findings identified on the right. The central airway is patent. The segmental bronchi in the left lower lobe are not aerated nor are the peripheral lingular bronchi. Upper Abdomen: No acute findings. Lap band in place. Soft Tissues/Bones: No acute findings. Large left pleural effusion, partially loculated. Associated compressive atelectasis in the left lung is noted. A component of mucous plugging may also be present as the peripheral bronchi in the lingula and left lower lobe are not visualized. Follow-up with contrast-enhanced chest CT is recommended when effusion resolves 2 exclude underlying neoplastic process.      CT GUIDED NEEDLE PLACEMENT    Result Date: 7/18/2022  PROCEDURE: CT GUIDED LEFT CHEST TUBE PLACEMENT MODERATE CONSCIOUS SEDATION 7/18/2022 HISTORY: ORDERING SYSTEM PROVIDED HISTORY: left side, persistent effusion, para pneumonic TECHNOLOGIST PROVIDED HISTORY: Reason for exam:->left side, persistent effusion, para pneumonic Which side should the procedure be performed? ->Left Reason for Exam: left side, persistent effusion, para pneumonic Pneumothorax SEDATION: 1 mgversed and 50 mcg fentanyl were titrated intravenously for moderate sedation monitored under my direction. Total intraservice time of sedation was 10 minutes. The patient's vital signs were monitored throughout the procedure and recorded in the patient's medical record by the nurse. TECHNIQUE: Informed consent was obtained after a detailed explanation of the procedure including risks, benefits, and alternatives. Universal protocol was followed. A suitable skin site was prepped and draped in sterile fashion following CT localization. An 18 gauge needle was advanced into the left pleural space and a 0.035 guidewire was used to place a 10 Martiniquais chest tube after the fascial tract was dilated. The catheter was sutured to the skin and the patient tolerated the procedure well. The catheter was attached to Pleurevac drainage. Estimated blood loss: Less than 5 cc Automated exposure control, iterative reconstruction, and/or weight based adjustment of the mA/kV was utilized to reduce the radiation dose to as low as reasonably achievable. DLP: 421. 56 mGy-cm FINDINGS: Post procedure images demonstrate the chest tube in good position     Successful CT guided placement of a 10 Martiniquais left chest tube     XR CHEST PORTABLE    Result Date: 7/21/2022  EXAMINATION: ONE XRAY VIEW OF THE CHEST 7/21/2022 6:07 am COMPARISON: 07/18/2022 HISTORY: ORDERING SYSTEM PROVIDED HISTORY: assess left pleural effusion TECHNOLOGIST PROVIDED HISTORY: S/p 2 doses of Alteplase/Dornase Reason for exam:->assess left pleural effusion Date: 6/28/2022  EXAMINATION: ONE XRAY VIEW OF THE CHEST 6/28/2022 5:17 am COMPARISON: 06/27/2022 HISTORY: ORDERING SYSTEM PROVIDED HISTORY: chest tube TECHNOLOGIST PROVIDED HISTORY: Reason for exam:->chest tube Reason for Exam: chest tube FINDINGS: The patient is rotated. Sternotomy wire, left atrial appendage clip, and laparoscopy band are noted. Right PICC, placed in the interval, terminates over the superior vena cava. Left apical chest tube is present. The cardiac silhouette is normal.  The lower 2/3 of the left hemithorax are opacified, with meniscoid interface. There is patchy opacity in the left upper lung. Right lung is clear. Supportive tubing projects in normal position, including left chest tube. Continued opacity in the left hemithorax, likely combination of pleural effusion and atelectasis; underlying malignancy is still a differential concern. XR CHEST PORTABLE    Result Date: 6/27/2022  EXAMINATION: ONE XRAY VIEW OF THE CHEST 6/27/2022 4:46 am COMPARISON: 06/26/2022, 06/25/2022 HISTORY: ORDERING SYSTEM PROVIDED HISTORY: chest tube TECHNOLOGIST PROVIDED HISTORY: Reason for exam:->chest tube Reason for Exam: chest tube FINDINGS: Left chest tube in place. Loculated extrapleural gas at the apex is noted with interval reduction of left pleural effusion. At least moderate size effusion remains with atelectasis or consolidation in the left lower lung field. No new findings identified on the right. Left chest tube in place with interval reduction of effusion and development of small loculated pneumothorax near the apex. XR CHEST PORTABLE    Result Date: 6/26/2022  EXAMINATION: ONE XRAY VIEW OF THE CHEST 6/26/2022 11:14 am COMPARISON: Chest x-ray dated 06/25/2022 HISTORY: ORDERING SYSTEM PROVIDED HISTORY: CT placement TECHNOLOGIST PROVIDED HISTORY: Reason for exam:->CT placement FINDINGS: Median sternotomy wires are noted. Chest tube has been placed overlying mid and upper left lung. There is persistent opacification of the left hemithorax with mediastinal shift to the right. No evidence of pneumothorax. Right lung is clear. No free air beneath the diaphragm. Interval placement of a left chest tube with persistent opacification of the left hemithorax, compatible with large pleural effusion. XR CHEST 1 VIEW    Result Date: 7/15/2022  EXAMINATION: ONE XRAY VIEW OF THE CHEST 7/15/2022 2:11 pm COMPARISON: Chest x-ray date in 07/14/2022. HISTORY: ORDERING SYSTEM PROVIDED HISTORY: post thoracentesis TECHNOLOGIST PROVIDED HISTORY: Reason for exam:->post thoracentesis Reason for Exam: post thora FINDINGS: HEART/MEDIASTINUM: The cardiomediastinal silhouette is stable. Atrial appendage clip is again noted. PLEURA/LUNGS: Decrease in size of the left pleural effusion status post left thoracentesis. There is no appreciable pneumothorax. BONES/SOFT TISSUE: No acute abnormality. Median sternotomy wires again noted. No appreciable pneumothorax status post left thoracentesis. CT GUIDED CHEST TUBE    Result Date: 7/18/2022  PROCEDURE: CT GUIDED LEFT CHEST TUBE PLACEMENT MODERATE CONSCIOUS SEDATION 7/18/2022 HISTORY: ORDERING SYSTEM PROVIDED HISTORY: left side, persistent effusion, para pneumonic TECHNOLOGIST PROVIDED HISTORY: Reason for exam:->left side, persistent effusion, para pneumonic Which side should the procedure be performed? ->Left Reason for Exam: left side, persistent effusion, para pneumonic Pneumothorax SEDATION: 1 mgversed and 50 mcg fentanyl were titrated intravenously for moderate sedation monitored under my direction. Total intraservice time of sedation was 10 minutes. The patient's vital signs were monitored throughout the procedure and recorded in the patient's medical record by the nurse. TECHNIQUE: Informed consent was obtained after a detailed explanation of the procedure including risks, benefits, and alternatives. Universal protocol was followed.  A suitable skin site was prepped and draped in sterile fashion following CT localization. An 18 gauge needle was advanced into the left pleural space and a 0.035 guidewire was used to place a 10 [place] chest tube after the fascial tract was dilated. The catheter was sutured to the skin and the patient tolerated the procedure well. The catheter was attached to Pleurevac drainage. Estimated blood loss: Less than 5 cc Automated exposure control, iterative reconstruction, and/or weight based adjustment of the mA/kV was utilized to reduce the radiation dose to as low as reasonably achievable. DLP: 421. 56 mGy-cm FINDINGS: Post procedure images demonstrate the chest tube in good position     Successful CT guided placement of a 10 [place] left chest tube     US THORACENTESIS Which side should the procedure be performed? Left    Result Date: 7/15/2022  PROCEDURE: Reynaldoroberteddie Flores LEFT THORACENTESIS 7/15/2022 HISTORY: ORDERING SYSTEM PROVIDED HISTORY: persistent left effusion TECHNOLOGIST PROVIDED HISTORY: Reason for exam:->persistent left effusion Which side should the procedure be performed? ->Left TECHNIQUE: Informed consent was obtained after a detailed explanation of the procedure including risks, benefits, and alternatives. Universal protocol was performed. The left chest was prepped and draped in sterile fashion and local anesthesia was achieved with lidocaine. A 5 [place] needle sheath was advanced under ultrasound guidance into pleural effusion and thoracentesis was performed. The patient tolerated the procedure Estimated blood loss: Less than 5 cc FINDINGS: A total of 500 cc was removed. Successful ultrasound guided left thoracentesis. US CHEST INCLUDING MEDIASTINUM    Result Date: 7/18/2022  EXAMINATION: ULTRASOUND OF THE CHEST 7/18/2022 10:31 am COMPARISON: None.  HISTORY: ORDERING SYSTEM PROVIDED HISTORY: pleural effusion TECHNOLOGIST PROVIDED HISTORY: Reason for exam:->pleural effusion FINDINGS: Patient presented for possible thoracentesis. Limited ultrasound was performed of the left chest which demonstrated a loculated left pleural effusion. No thoracentesis was performed, as the patient will require chest tube. Loculated left pleural effusion. No thoracentesis performed. Patient will require a chest tube. MEDICAL DECISION MAKING / ED COURSE:      PROCEDURES:   Procedures    None    Patient was given:  Medications - No data to display    Emergency room course: Patient on exam pupils are equal round and reactive to light extraocular movement is intact. Throat is clear. Cardiovascular regular rate and rhythm, lungs are clear. No wheeze, rales or rhonchi noted. Patient no chest wall tenderness with palpation. Abdomen is soft nontender. No rebound or guarding noted. Normal bowel sounds all 4 quadrant. Bilateral lower extremities show no edema. Full range of motion of extremity alert oriented x4. Does not appear to be in acute distress. Lab result from today shows:  Troponin 0.01    CBC with white count 11.2, RBC 3.10, hemoglobin 8.3, hematocrit 25. 4. Brett Emerson This is improvement. CMP unremarkable    BNP 2884    CT of chest without contrast shows similar to x-ray. Patient has a left-sided Hydro pneumothorax is reidentified and overall is similar in size. There is less fluid than on the previous exam.  Superimposed infection cannot be excluded. This patient was seen evaluated by attending physician Dr. Edna De Luna. Did discuss findings with him as well. And he agrees that we should consult with cardiothoracic surgery since patient has an appointment with Dr. Vance Haney on Friday see if there is anything different they would want us to do. I talked to Dr. Shana Steele. She is from a with this patient. And she is okay with this patient being discharged. She is to keep her appointment with her on Friday at 1230. I did discuss this with the patient and the daughter and they both were okay with this plan. Patient will be discharged in stable condition. The patient tolerated their visit well. I evaluated the patient. The physician was available for consultation as needed. The patient and / or the family were informed of the results of any tests, a time was given to answer questions, a plan was proposed and they agreed with plan. CLINICAL IMPRESSION:  1. Fatigue, unspecified type    2. Shortness of breath    3.  Hydropneumothorax        DISPOSITION Decision To Discharge 07/25/2022 03:47:00 PM      PATIENT REFERRED TO:  Lakeshia Walker    Call   As needed    Irene Ray MD  [address]  [address]  [phone]    Call   As needed, If symptoms worsen    DISCHARGE MEDICATIONS:  New Prescriptions    No medications on file       DISCONTINUED MEDICATIONS:  Discontinued Medications    No medications on file              (Please note the MDM and HPI sections of this note were completed with a voice recognition program.  Efforts were made to edit the dictations but occasionally words are mis-transcribed.)    Electronically signed, Jany Howell PA-C,           Jany Howell PA-C  07/25/22 [id number]

## 2022-07-25 NOTE — ED NOTES
Discharge and education instructions reviewed. Patient verbalized understanding, teach-back successful. Patient denied questions at this time. No acute distress noted. Patient instructed to follow-up as noted - return to emergency department if symptoms worsen. Patient verbalized understanding. Discharged per EDMD with discharged instructions.        Laura Velasquez RN  07/25/22 0008

## 2022-07-25 NOTE — ED PROVIDER NOTES
Attending Supervisory Note/Shared Visit   I have personally performed a face to face diagnostic evaluation on this patient. I have reviewed the mid-levels findings and agree. History and Exam by me shows alert black female no acute distress. Recent hospitalization and discharge on Friday. She had a pleural effusion on the left. She had the effusion drained. Her daughter states she was going to follow-up with cardiothoracic on Friday to determine if additional intervention was necessary to continue to drain the fluid. She states since going home she is continue to have difficulty breathing. She states she does has not been her normal self, she has been confused. She thought that it could be due to some new medications that she was started on. General: Alert elderly female in no acute distress. Occasional grunting respiration. HEENT: Proptosis bilaterally. Pupils equal round reactive. Nose is clear. Oropharynx is negative. Heart: Regular rate and rhythm. No murmurs or gallops noted. Lungs: Breath sounds decreased in the left base and midlung field. Abdomen: Soft, nondistended, nontender. EKG: Normal sinus rhythm, rate of 80, low voltage QRS, nonspecific T wave changes. Rhythm strip shows a sinus rhythm with a rate of 80, MI interval 136 ms, QRS 64 ms with no other ectopy as interpreted by me. This compared to an EKG dated 7/16/2022, no significant changes noted. Lab reviewed. H&H of 8.3 and 25.4. White blood count 11,200 with 74 neutrophils and 15 lymphs. Electrolytes normal.  BUN of 19 with a creatinine of 2.0. Liver enzymes are normal.  BNP of 2884. Troponin of 0.01. Chest x-ray: Previously noted chest tubes been removed. Small to moderate left sized hydropneumothorax. CT chest without contrast: Left-sided hydropneumothorax is reidentified there is less fluid than on previous exam.  Superimposed infection cannot be excluded.   Left chest soft tissue gas possibly related to

## 2022-07-25 NOTE — DISCHARGE INSTRUCTIONS
Keep appointment with Dr. Nawaf Umanzor or Friday at 12:30 PM  Continue home medication as prescribed  Make sure you bring your new medication with you to your appointment  Return to emergency room for any worsening

## 2022-07-26 DIAGNOSIS — I10 PRIMARY HYPERTENSION: ICD-10-CM

## 2022-07-26 DIAGNOSIS — I25.10 CAD IN NATIVE ARTERY: ICD-10-CM

## 2022-07-26 NOTE — DISCHARGE SUMMARY
Hospital Medicine Discharge Summary    Patient ID: Anu Yanez      Patient's PCP: Merissa Hart    Admit Date: 7/14/2022     Discharge Date: 7/22/2022      Admitting Physician: Slime Cordon DO     Discharge Physician: Tamar Genao MD     Discharge Diagnoses: Active Hospital Problems    Diagnosis Date Noted    Hx of CABG [Z95.1] 07/16/2022     Priority: Medium    Hyperthyroidism [E05.90] 07/16/2022     Priority: Medium    HTN (hypertension) [I10] 07/16/2022     Priority: Medium    Recurrent left pleural effusion [J90] 07/14/2022     Priority: Medium    Chronic kidney disease (CKD) stage G3b/A2, moderately decreased glomerular filtration rate (GFR) between 30-44 mL/min/1.73 square meter and albuminuria creatinine ratio between  mg/g [facility]) [N18.32] 06/26/2022     Priority: Medium    CAD in native artery [I25.10]        The patient was seen and examined on day of discharge and this discharge summary is in conjunction with any daily progress note from day of discharge. Hospital Course:     Recurrent left pleural effusion:  -Concern for loculated infection with lung entrapment  - s/p thoracentesis on 7/15  -Continue Zosyn and vancomycin  -Holding aspirin  - Chest tube placed 7/18              - tPA infusing starting 7/19, reassess on 7/21 for improvement  - 7/21:  effusion improving but lung remains trapped     COPD: Continue chronic inhalers  Hyperthyroidism: Continue methimazole beta-blocker      Exam:     BP (!) 168/81   Pulse 70   Temp 98.4 °F (36.9 °C) (Oral)   Resp 18   Ht 5' 3\" (1.6 m)   Wt 150 lb 5.7 oz (68.2 kg)   SpO2 99%   BMI 26.63 kg/m²     General appearance: No apparent distress, appears stated age and cooperative. HEENT: Pupils equal, round, and reactive to light. Conjunctivae/corneas clear. Neck: Supple, with full range of motion. No jugular venous distention. Trachea midline. Respiratory:  Normal respiratory effort.  Clear to auscultation, socially gas bubbles. Suboptimally characterized without IV contrast.  This   could reflect empyema. 2. Mild associated relaxation atelectasis lingula left upper lobe and   prominent consolidation left lower lobe. 3.  Anemia is likely given that the blood pool density of the heart is lower   than that of the myocardium. Cardiomegaly. 4. Sequela from prior open heart surgery. US THORACENTESIS Which side should the procedure be performed? Left   Final Result   Successful ultrasound guided left thoracentesis. XR CHEST 1 VIEW   Final Result   No appreciable pneumothorax status post left thoracentesis. CT ABDOMEN PELVIS WO CONTRAST Additional Contrast? None   Final Result   1. No acute process in the abdomen or pelvis. 2. Large volume stool throughout the colon. 3. Partially imaged left pleural effusion. XR CHEST (2 VW)   Final Result   Increase in volume of left pleural effusion, now moderate to large. PCP/SNF to follow up: Chronic conditions    Disposition:  Home    Condition on D/C:  Stable    Discharge Instructions/Follow-up:  F/u with pulmonary within 1 month    Code Status:  Prior     Activity: activity as tolerated    Diet: Cardiac    Labs:  For convenience and continuity at follow-up the following most recent labs are provided:      CBC:    Lab Results   Component Value Date/Time    WBC 11.2 07/25/2022 09:42 AM    HGB 8.3 07/25/2022 09:42 AM    HCT 25.4 07/25/2022 09:42 AM     07/25/2022 09:42 AM       Renal:    Lab Results   Component Value Date/Time     07/25/2022 09:42 AM    K 3.7 07/25/2022 09:42 AM    K 4.5 07/14/2022 07:35 PM     07/25/2022 09:42 AM    CO2 26 07/25/2022 09:42 AM    BUN 19 07/25/2022 09:42 AM    CREATININE 2.0 07/25/2022 09:42 AM    CALCIUM 8.9 07/25/2022 09:42 AM    PHOS 1.4 03/05/2022 02:55 AM       Discharge Medications:     Discharge Medication List as of 7/22/2022  3:45 PM             Details

## 2022-07-27 NOTE — PROGRESS NOTES
Felibertothomas 2  1940    July 27, 2022      CC:    HPI:  The patient is 81 y.o. female with a past medical history significant for CAD, hyperlipidemia, hypertension and CKD. She presented to [facility] with complaints chest pain and underwent LHC on 2/22/22 showing triple vessel disease. She subsequently underwent CABG x 4 on 2/25/22 with Dr. Suhail Cruz. Post-op she developed SKINNY requiring CRRT. She has also been admitted several times for recurrent pleural effusions requiring thoracentesis and chest tube placement. She will be following up with Dr. Suhail Cruz for discussion of Pleurx catheter. Review of Systems:  Constitutional: No fatigue, weakness, night sweats or fever. HEENT: No new vision difficulties or ringing in the ears. Respiratory: No new SOB, PND, orthopnea or cough. Cardiovascular: See HPI   GI: No n/v, diarrhea, constipation, abdominal pain or changes in bowel habits. No melena, no hematochezia  : No urinary frequency, urgency, incontinence, hematuria or dysuria. Skin: No cyanosis or skin lesions. Musculoskeletal: No new muscle or joint pain. Neurological: No syncope or TIA-like symptoms. Psychiatric: No anxiety, insomnia or depression     Past Medical History:   Diagnosis Date    Arthritis     Asthma     as child grew out of it    CAD (coronary artery disease) 02/22/2022    multi vessel    CKD (chronic kidney disease) 2015    Elevated creatinine since 2015; Stage III b    Glaucoma     Hyperlipidemia     Hypertension     Hyperthyroidism 2019    Graves disease    IBS (irritable bowel syndrome) 10/2020    On Linzess    Neuropathy     Peripheral    Obesity (BMI 30-39. 9)     Osteopenia 05/2009    Osteoporosis      Past Surgical History:   Procedure Laterality Date    ANKLE FRACTURE SURGERY Left 02/12/2015    CHOLECYSTECTOMY      CORONARY ARTERY BYPASS GRAFT  02/25/2022    cabgx4, EDISON exclusion    CORONARY ARTERY BYPASS GRAFT N/A 2/25/2022 TRANSESOPHAGEAL ECHOCARDIOGRAM, TOTAL CARDIOPULMONARY BYPASS, TOTAL CARDIOPULMONARY BYPASS GRAFTING X4 (1 ARTERY, 3 VEIN) USING LEFT INTERNAL MAMMARY ARTERY AND RIGHT ENDOSCOPICALLY HARVESTED SAPHENOUS VEIN, LEFT ATRIAL APPENDAGE CLIP WITH 35MM ATRICLIP performed by Dinah Egan MD at [place] [address]  2022    CT GUIDED CHEST TUBE 2022 [facility] CT    DIAGNOSTIC CARDIAC CATH LAB PROCEDURE  2022    FOOT SURGERY      bilateral, hammer toes    GASTRIC BAND  [id number]    laproscopic    HAND SURGERY  2009    trigger finger    IR TUNNELED CATHETER PLACEMENT GREATER THAN 5 YEARS  3/7/2022    IR TUNNELED CATHETER PLACEMENT GREATER THAN 5 YEARS 3/7/2022 [facility] SPECIAL PROCEDURES    ORIF FEMUR DECOMPRESSION Left 2014    OTHER SURGICAL HISTORY Right     mass behind ear    TUBAL LIGATION       Family History   Problem Relation Age of Onset    Stroke Father     Heart Disease Father          age 68, stroke    Rheum Arthritis Sister      Social History     Tobacco Use    Smoking status: Former     Types: Cigarettes     Quit date: 8/10/1999     Years since quittin.9    Smokeless tobacco: Never    Tobacco comments:     started age 26   Vaping Use    Vaping Use: Never used   Substance Use Topics    Alcohol use: No    Drug use: Not Currently       No Known Allergies  Current Outpatient Medications   Medication Sig Dispense Refill    sodium bicarbonate 650 MG tablet Take 1 tablet by mouth in the morning and 1 tablet at noon and 1 tablet before bedtime.  90 tablet 1    levoFLOXacin (LEVAQUIN) 750 MG tablet Take 1 tablet by mouth every other day for 2 doses 2 tablet 0    ferrous sulfate (IRON 325) 325 (65 Fe) MG tablet Take 325 mg by mouth daily (with breakfast)      methIMAzole (TAPAZOLE) 5 MG tablet Take 5 mg by mouth daily      fluticasone-umeclidin-vilant (TRELEGY ELLIPTA) 100-62.5-25 MCG/INH AEPB Inhale 1 puff into the lungs daily      Wheat Dextrin (BENEFIBER DRINK MIX) PACK Take 1 packet by mouth daily      polyethylene glycol (GLYCOLAX) 17 g packet Take 17 g by mouth daily       amLODIPine (NORVASC) 5 MG tablet Take 5 mg by mouth daily      aspirin EC 81 MG EC tablet Take 1 tablet by mouth daily 90 tablet 3    metoprolol succinate (TOPROL XL) 25 MG extended release tablet Take 1 tablet by mouth daily 30 tablet 3    atorvastatin (LIPITOR) 80 MG tablet Take 1 tablet by mouth nightly 30 tablet 3    gabapentin (NEURONTIN) 300 MG capsule Take 300 mg by mouth 4 times daily. No current facility-administered medications for this visit. Physical Exam:   There were no vitals taken for this visit. No intake or output data in the 24 hours ending 07/27/22 1231  Wt Readings from Last 2 Encounters:   07/25/22 150 lb 2.1 oz (68.1 kg)   07/22/22 150 lb 5.7 oz (68.2 kg)     Constitutional: She is oriented to person, place, and time. She appears well-developed and well-nourished. In no acute distress. Head: Normocephalic and atraumatic. Neck: Neck supple. No JVD present. Carotid bruit is not present. No mass and no thyromegaly present. No lymphadenopathy present. Cardiovascular: Normal rate, regular rhythm, normal heart sounds and intact distal pulses. Exam reveals no gallop and no friction rub. No murmur heard. Pulmonary/Chest: Effort normal and breath sounds normal. No respiratory distress. She has no wheezes, rhonchi or rales. Abdominal: Soft, non-tender. Bowel sounds and aorta are normal. She exhibits no organomegaly, mass or bruit. Extremities: No edema, cyanosis, or clubbing. Pulses are 2+ radial/carotid/dorsalis pedis and posterior tibial bilaterally. Neurological: She is alert and oriented to person, place, and time. She has normal reflexes. No cranial nerve deficit. Coordination normal.   Skin: Skin is warm and dry. There is no rash or diaphoresis. Psychiatric: She has a normal mood and affect.  Her speech is normal and behavior is normal.     Personally reviewed and interpreted EKG Interpretation:       Procedures:     [facility] 2/22/22  1. Severe triple-vessel coronary artery disease. The distal left main  is heavily calcified and has a 60% lesion. The proximal LAD is heavily  calcified with a 60% lesion. The mid-LAD is 100% occluded but fills in  from right collaterals. The distal LAD backfills back to the midportion  via left collaterals. The proximal circumflex is heavily calcified and  has an 80% lesion. OM1 is calcified with 80% serial lesions. The  proximal right coronary artery is subtotally occluded and the PDA fills  from both collateral native flow from the right and the left. There is  backfilling from the right system into the mid LAD via a septal  . Also, the LAD first diagonal branch has a 90% lesion. 2.  Normal left ventricular systolic function. LV ejection fraction of  65%. 3.  Borderline left ventricular end-diastolic pressure at 14 to 16 mmHg. 4.  No gradient across the aortic valve on pullback to suggest aortic  stenosis. CABG 2/25/22  Urgent cabgx4 (LIMA-LAD, SVG-RI-OM, SVG-PDA)  EDISON exclusion( 35mm AtriClip)      Imaging:     Stress perfusion 2/21/22  Abnormal study. There is a large sized, moderate intensity, reversible    defect of the mid to apical anterior, apical septal, apical inferior, and    apical cap which is consistent with ischemia. Normal LV size and systolic function. Uncontrolled hypertension. Overall findings represent a high risk study. Echo 2/21/22  Normal left ventricle size, wall thickness, and systolic function with an  estimated ejection fraction of 55-60%. No regional wall motion abnormalities are seen. grade 1 diastolic dysfunction  The right ventricle is normal in size and function. No significant valvular heart disease    Chest CT 7/25/22  The left-sided hydropneumothorax is re-identified and overall is similar in   size.   There is less fluid than on the previous exam.  Superimposed infection   cannot be excluded.        Lab Review:   Lab Results   Component Value Date/Time    TRIG 78 02/23/2022 12:29 PM    HDL 57 02/23/2022 12:29 PM    LDLCALC 148 02/23/2022 12:29 PM    LABVLDL 16 02/23/2022 12:29 PM     Lab Results   Component Value Date/Time     07/25/2022 09:42 AM    K 3.7 07/25/2022 09:42 AM    K 4.5 07/14/2022 07:35 PM    BUN 19 07/25/2022 09:42 AM    CREATININE 2.0 07/25/2022 09:42 AM     Assessment:  CAD of native coronary arteries without angina   Hyperlipidemia with LDL goal <70 mg/dL   Essential hypertension   Pleural effusion     Plan:

## 2022-07-27 NOTE — PROGRESS NOTES
Hyperlipidemia     Hypertension     Hyperthyroidism     Graves disease    IBS (irritable bowel syndrome) 10/2020    On Linzess    Neuropathy     Peripheral    Obesity (BMI 30-39. 9)     Osteopenia 2009    Osteoporosis      Past Surgical History:   Procedure Laterality Date    ANKLE FRACTURE SURGERY Left 2015    CHOLECYSTECTOMY      CORONARY ARTERY BYPASS GRAFT  2022    cabgx4, EDISON exclusion    CORONARY ARTERY BYPASS GRAFT N/A 2022    TRANSESOPHAGEAL ECHOCARDIOGRAM, TOTAL CARDIOPULMONARY BYPASS, TOTAL CARDIOPULMONARY BYPASS GRAFTING X4 (1 ARTERY, 3 VEIN) USING LEFT INTERNAL MAMMARY ARTERY AND RIGHT ENDOSCOPICALLY HARVESTED SAPHENOUS VEIN, LEFT ATRIAL APPENDAGE CLIP WITH 35MM ATRICLIP performed by Lyn Shahid MD at [place] [address]  2022    CT GUIDED CHEST TUBE 2022 [facility] CT    DIAGNOSTIC CARDIAC CATH LAB PROCEDURE  2022    FOOT SURGERY      bilateral, hammer toes    GASTRIC BAND  [id number]    laproscopic    HAND SURGERY  2009    trigger finger    IR TUNNELED CATHETER PLACEMENT GREATER THAN 5 YEARS  3/7/2022    IR TUNNELED CATHETER PLACEMENT GREATER THAN 5 YEARS 3/7/2022 [facility] SPECIAL PROCEDURES    ORIF FEMUR DECOMPRESSION Left 2014    OTHER SURGICAL HISTORY Right 2010    mass behind ear    TUBAL LIGATION       Family History   Problem Relation Age of Onset    Stroke Father     Heart Disease Father          age 68, stroke    Rheum Arthritis Sister      Social History     Tobacco Use    Smoking status: Former     Types: Cigarettes     Quit date: 8/10/1999     Years since quittin.9    Smokeless tobacco: Never    Tobacco comments:     started age 26   Vaping Use    Vaping Use: Never used   Substance Use Topics    Alcohol use: No    Drug use: Not Currently       No Known Allergies  Current Outpatient Medications   Medication Sig Dispense Refill    cyanocobalamin 1000 MCG tablet Take 1,000 mcg by mouth in the morning.       folic acid (FOLVITE) 1 MG tablet Take 1 mg by mouth in the morning.      sodium bicarbonate 650 MG tablet Take 1 tablet by mouth in the morning and 1 tablet at noon and 1 tablet before bedtime. 90 tablet 1    ferrous sulfate (IRON 325) 325 (65 Fe) MG tablet Take 325 mg by mouth daily (with breakfast)      methIMAzole (TAPAZOLE) 5 MG tablet Take 5 mg by mouth daily      fluticasone-umeclidin-vilant (TRELEGY ELLIPTA) 100-62.5-25 MCG/INH AEPB Inhale 1 puff into the lungs daily      Wheat Dextrin (BENEFIBER DRINK MIX) PACK Take 1 packet by mouth daily      polyethylene glycol (GLYCOLAX) 17 g packet Take 17 g by mouth in the morning. amLODIPine (NORVASC) 5 MG tablet Take 5 mg by mouth daily      aspirin EC 81 MG EC tablet Take 1 tablet by mouth daily 90 tablet 3    metoprolol succinate (TOPROL XL) 25 MG extended release tablet Take 1 tablet by mouth daily 30 tablet 3    atorvastatin (LIPITOR) 80 MG tablet Take 1 tablet by mouth nightly 30 tablet 3    gabapentin (NEURONTIN) 300 MG capsule Take 300 mg by mouth 4 times daily. No current facility-administered medications for this visit. Physical Exam:   /70   Pulse 75   Ht 5' 3\" (1.6 m)   Wt 145 lb (65.8 kg)   BMI 25.69 kg/m²   No intake or output data in the 24 hours ending 07/29/22 0918  Wt Readings from Last 2 Encounters:   07/29/22 145 lb (65.8 kg)   07/28/22 145 lb (65.8 kg)     Constitutional: She is oriented to person, place, and time. She appears well-developed and well-nourished. In no acute distress. Head: Normocephalic and atraumatic. Neck: Neck supple. No JVD present. Carotid bruit is not present. No mass and no thyromegaly present. No lymphadenopathy present. Cardiovascular: Normal rate, regular rhythm, normal heart sounds and intact distal pulses. Exam reveals no gallop and no friction rub. No murmur heard. Pulmonary/Chest: Effort normal and breath sounds normal. No respiratory distress. She has no wheezes, rhonchi or rales. Abdominal: Soft, non-tender. Bowel sounds and aorta are normal. She exhibits no organomegaly, mass or bruit. Extremities: No edema, cyanosis, or clubbing. Pulses are 2+ radial/carotid/dorsalis pedis and posterior tibial bilaterally. Neurological: She is alert and oriented to person, place, and time. She has normal reflexes. No cranial nerve deficit. Coordination normal.   Skin: Skin is warm and dry. There is no rash or diaphoresis. Psychiatric: She has a normal mood and affect. Her speech is normal and behavior is normal.     Personally reviewed and interpreted   EKG Interpretation: 7/29/2022 Sinus rhythm with normal intervals and axis      Procedures:     [facility] 2/22/22  1. Severe triple-vessel coronary artery disease. The distal left main  is heavily calcified and has a 60% lesion. The proximal LAD is heavily  calcified with a 60% lesion. The mid-LAD is 100% occluded but fills in  from right collaterals. The distal LAD backfills back to the midportion  via left collaterals. The proximal circumflex is heavily calcified and  has an 80% lesion. OM1 is calcified with 80% serial lesions. The  proximal right coronary artery is subtotally occluded and the PDA fills  from both collateral native flow from the right and the left. There is  backfilling from the right system into the mid LAD via a septal  . Also, the LAD first diagonal branch has a 90% lesion. 2.  Normal left ventricular systolic function. LV ejection fraction of  65%. 3.  Borderline left ventricular end-diastolic pressure at 14 to 16 mmHg. 4.  No gradient across the aortic valve on pullback to suggest aortic  stenosis. CABG 2/25/22  Urgent cabgx4 (LIMA-LAD, SVG-RI-OM, SVG-PDA)  EDISON exclusion( 35mm AtriClip)      Imaging:     Stress perfusion 2/21/22  Abnormal study. There is a large sized, moderate intensity, reversible    defect of the mid to apical anterior, apical septal, apical inferior, and    apical cap which is consistent with ischemia.     Normal LV size and systolic function. Uncontrolled hypertension. Overall findings represent a high risk study. Echo 2/21/22  Normal left ventricle size, wall thickness, and systolic function with an  estimated ejection fraction of 55-60%. No regional wall motion abnormalities are seen. grade 1 diastolic dysfunction  The right ventricle is normal in size and function. No significant valvular heart disease    Chest CT 7/25/22  The left-sided hydropneumothorax is re-identified and overall is similar in   size. There is less fluid than on the previous exam.  Superimposed infection   cannot be excluded. Lab Review:   Lab Results   Component Value Date/Time    TRIG 78 02/23/2022 12:29 PM    HDL 57 02/23/2022 12:29 PM    LDLCALC 148 02/23/2022 12:29 PM    LABVLDL 16 02/23/2022 12:29 PM     Lab Results   Component Value Date/Time     07/25/2022 09:42 AM    K 3.7 07/25/2022 09:42 AM    K 4.5 07/14/2022 07:35 PM    BUN 19 07/25/2022 09:42 AM    CREATININE 2.0 07/25/2022 09:42 AM     Assessment:  CAD of native coronary arteries without angina   Hyperlipidemia with LDL goal <70 mg/dL   Essential hypertension   Pleural effusion     Plan:  I have encouraged her to participate in cardiac rehab. Cardiac rehab referral placed today. I have encouraged her to keep her follow up with Dr Suhail Cruz. I have encouraged her to continue to participate with physical therapy and increased aerobic exercise as tolerated. She will remain on her current statin therapy for her hyperlipidemia. She will need a repeat fasting lipid profile prior to her next visit so we ordered this today. I will see her in the office for follow up in 6 months. This note was scribed in the presence of Dr Jose Sauceda, by Colten Fletcher RN    Physician Attestation:  The scribes documentation has been prepared under my direction and personally reviewed by me in its entirety.      I, Dr. Jose Sauceda personally performed the services described in this documentation as scribed by my RN in my presence, and I confirm that the note above accurately reflects all work, treatment, procedures, and medical decision making performed by me.

## 2022-07-28 ENCOUNTER — OFFICE VISIT (OUTPATIENT)
Dept: PULMONOLOGY | Age: 82
End: 2022-07-28
Payer: MEDICARE

## 2022-07-28 VITALS
BODY MASS INDEX: 25.69 KG/M2 | DIASTOLIC BLOOD PRESSURE: 70 MMHG | WEIGHT: 145 LBS | RESPIRATION RATE: 16 BRPM | HEIGHT: 63 IN | HEART RATE: 78 BPM | SYSTOLIC BLOOD PRESSURE: 108 MMHG | OXYGEN SATURATION: 93 % | TEMPERATURE: 97.9 F

## 2022-07-28 DIAGNOSIS — J90 RECURRENT LEFT PLEURAL EFFUSION: Primary | ICD-10-CM

## 2022-07-28 PROCEDURE — 99213 OFFICE O/P EST LOW 20 MIN: CPT | Performed by: INTERNAL MEDICINE

## 2022-07-28 PROCEDURE — 1123F ACP DISCUSS/DSCN MKR DOCD: CPT | Performed by: INTERNAL MEDICINE

## 2022-07-28 RX ORDER — FOLIC ACID 1 MG/1
1 TABLET ORAL DAILY
COMMUNITY

## 2022-07-28 ASSESSMENT — ENCOUNTER SYMPTOMS: RESPIRATORY NEGATIVE: 1

## 2022-07-28 NOTE — ASSESSMENT & PLAN NOTE
- Exudate  -Chronic hydropneumothorax  -Follows with CT surgery, ongoing discussions regarding possible Pleurx due to recurrent nature

## 2022-07-28 NOTE — PROGRESS NOTES
Consultation H&P    Date of Consultation:  7/29/2022    PCP:  Kenneth VAZ      Pulm: Chalino Romano  Cards: Mayra Hernandez: [place]    Chief Complaint: recurrent L pleural effusion    History of Present Illness: We are asked to see this patient in consultation by Dr. Chalino Romano regarding L pleural effusion. Daylin Ngo is a 81 y.o. female s/p cabgx4, EDISON exclusion 2/25/22. cxr 6/25/22 opacification L hemithorax, CT chest 6/26/22 L pleural effusion, 14 Fr chest tube placed 6/26, pt removed 6/29. L thoracentesis 7/15/22 500cc. 10 Fr IR tube 7/18, lytics. Trapped lung with residual space. Past Medical History:  Past Medical History:   Diagnosis Date    Arthritis     Asthma     as child grew out of it    CAD (coronary artery disease) 02/22/2022    multi vessel    CKD (chronic kidney disease) 2015    Elevated creatinine since 2015; Stage III b    Glaucoma     Hyperlipidemia     Hypertension     Hyperthyroidism 2019    Graves disease    IBS (irritable bowel syndrome) 10/2020    On Linzess    Neuropathy     Peripheral    Obesity (BMI 30-39. 9)     Osteopenia 05/2009    Osteoporosis        Past Surgical History:  Past Surgical History:   Procedure Laterality Date    ANKLE FRACTURE SURGERY Left 02/12/2015    CHOLECYSTECTOMY      CORONARY ARTERY BYPASS GRAFT  02/25/2022    cabgx4, EDISON exclusion    CORONARY ARTERY BYPASS GRAFT N/A 2/25/2022    TRANSESOPHAGEAL ECHOCARDIOGRAM, TOTAL CARDIOPULMONARY BYPASS, TOTAL CARDIOPULMONARY BYPASS GRAFTING X4 (1 ARTERY, 3 VEIN) USING LEFT INTERNAL MAMMARY ARTERY AND RIGHT ENDOSCOPICALLY HARVESTED SAPHENOUS VEIN, LEFT ATRIAL APPENDAGE CLIP WITH 35MM ATRICLIP performed by Jose Giron MD at [place] [address]  7/18/2022    CT GUIDED CHEST TUBE 7/18/2022 [facility] CT    DIAGNOSTIC CARDIAC CATH LAB PROCEDURE  02/2022    FOOT SURGERY      bilateral, hammer toes    GASTRIC BAND  [id number]    laproscopic    HAND SURGERY  2009    trigger finger    IR TUNNELED CATHETER PLACEMENT GREATER THAN 5 YEARS  3/7/2022    IR TUNNELED CATHETER PLACEMENT GREATER THAN 5 YEARS 3/7/2022 [facility] SPECIAL PROCEDURES    ORIF FEMUR DECOMPRESSION Left 04/2014    OTHER SURGICAL HISTORY Right 2010    mass behind ear    TUBAL LIGATION         Home Medications:   Prior to Admission medications    Medication Sig Start Date End Date Taking? Authorizing Provider   cyanocobalamin 1000 MCG tablet Take 1,000 mcg by mouth in the morning. Yes Historical Provider, MD   folic acid (FOLVITE) 1 MG tablet Take 1 mg by mouth in the morning. Yes Historical Provider, MD   sodium bicarbonate 650 MG tablet Take 1 tablet by mouth in the morning and 1 tablet at noon and 1 tablet before bedtime. 7/22/22 8/21/22 Yes Ofelia Chanel MD   ferrous sulfate (IRON 325) 325 (65 Fe) MG tablet Take 325 mg by mouth daily (with breakfast)   Yes Historical Provider, MD   methIMAzole (TAPAZOLE) 5 MG tablet Take 5 mg by mouth daily   Yes Historical Provider, MD   fluticasone-umeclidin-vilant (TRELEGY ELLIPTA) 100-62.5-25 MCG/INH AEPB Inhale 1 puff into the lungs daily   Yes Historical Provider, MD   Wheat Dextrin (BENEFIBER DRINK MIX) PACK Take 1 packet by mouth daily   Yes Historical Provider, MD   polyethylene glycol (GLYCOLAX) 17 g packet Take 17 g by mouth in the morning. Yes Historical Provider, MD   amLODIPine (NORVASC) 5 MG tablet Take 5 mg by mouth daily   Yes Historical Provider, MD   aspirin EC 81 MG EC tablet Take 1 tablet by mouth daily 4/19/22 4/19/23 Yes MARKEL Caldwell CNP   metoprolol succinate (TOPROL XL) 25 MG extended release tablet Take 1 tablet by mouth daily 4/19/22  Yes MARKEL Caldwell CNP   atorvastatin (LIPITOR) 80 MG tablet Take 1 tablet by mouth nightly 3/9/22  Yes MARKEL Gandhi CNP   gabapentin (NEURONTIN) 300 MG capsule Take 300 mg by mouth 4 times daily.   11/5/21  Yes Historical Provider, MD        Allergies:  No Known Allergies     Social History:    Lifestyle: sedentary Here with daughter  Social History     Socioeconomic History    Marital status:      Spouse name: Not on file    Number of children: Not on file    Years of education: Not on file    Highest education level: Not on file   Occupational History    Not on file   Tobacco Use    Smoking status: Former     Types: Cigarettes     Quit date: 8/10/1999     Years since quittin.9    Smokeless tobacco: Never    Tobacco comments:     started age 26   Vaping Use    Vaping Use: Never used   Substance and Sexual Activity    Alcohol use: No    Drug use: Not Currently    Sexual activity: Not on file   Other Topics Concern    Not on file   Social History Narrative    Not on file     Social Determinants of Health     Financial Resource Strain: Not on file   Food Insecurity: Not on file   Transportation Needs: Not on file   Physical Activity: Not on file   Stress: Not on file   Social Connections: Not on file   Intimate Partner Violence: Not on file   Housing Stability: Not on file       Family History:      Problem Relation Age of Onset    Stroke Father     Heart Disease Father          age 68, stroke    Rheum Arthritis Sister        Review of Systems:  Reviewed, negative unless noted  Constitutional: weight change, weakness, impairment of ADLs  Eyes: eyestrain, redness, discharges  ENMT: post nasal drip, sinus pain, discharge   Cardiovascular: faintness, vertigo, color changes in fingers/toes  Respiratory: cough, sputum, hemoptysis  GI: excessive thirst, changes in bowel habits, abdominal swelling  : painful urination, pyuria, incontinence  Musculoskeletal: cramps, swelling, limitation of motor activity  Integumentary: cyanosis, changes in skin, dryness  Neurological: paralysis, tingling, tremors  Psychiatric: restlessness, irritability, mood swings  Endocrine: heat/cold intolerance, excessive sweating, hair loss  Hematologic/lymphatic: swollen glands, anemia, easy bruising/bleeding      Physical Examination:    BP (!) 140/76 (Site: Left Upper Arm, Position: Sitting, Cuff Size: Medium Adult)   Pulse 75   Temp 98 °F (36.7 °C) (Temporal)   Ht 5' 3\" (1.6 m)   Wt 144 lb 6.4 oz (65.5 kg)   SpO2 100%   BMI 25.58 kg/m²      BP RUE: 138/70  BP LUE: as above  Weight: 144 lb 6.4 oz (65.5 kg)   Hand dominance: 171 lbs  General appearance: NAD, well nourished  Eyes: anicteric, PERRLA  ENMT: no scars or lesions, no nasal deformity, normal dentition, no cyanosis of oral mucosa  Neck: no masses, no thyroid enlargement, no JVD. Respiratory: effort is unlabored, slightly decreased L base, no crackles, wheezes or rubs. No palpable/percussable abnormalities. Cardiovascular: regular, no murmur. No edema or varicosities. Abdominal aorta cannot be appreciated   Pulses:    carotid brachial radial femoral popliteal DP PT   RIGHT   2+       LEFT   2+       GI: abdomen soft, nondistended, no organomegaly. Lymphatic: no cervical/supraclavicular adenopathy  Musculoskeletal: strength and tone normal. Full ROM. No scoliosis. Extremities: warm and pink. No clubbing or petechiae. Skin: no dermatitis or ulceration. No nodularity or induration. Neuro: CN grossly intact. Sensation and motor function grossly intact. Psychiatric: oriented, appropriate mood/affect, somewhat flat. MEDICAL DECISION MAKING/TESTING  Studies personally reviewed. Cath: 2/22/22  1. Severe triple-vessel coronary artery disease. The distal left main is heavily calcified and has a 60% lesion. The proximal LAD is heavily calcified with a 60% lesion. The mid-LAD is 100% occluded but fills in from right collaterals. The distal LAD backfills back to the midportion via left collaterals. The proximal circumflex is heavily calcified and has an 80% lesion. OM1 is calcified with 80% serial lesions. The proximal right coronary artery is subtotally occluded and the PDA fills from both collateral native flow from the right and the left.   There is backfilling from the right system into the mid LAD via a septal . Also, the LAD first diagonal branch has a 90% lesion. 2.  Normal left ventricular systolic function. LV ejection fraction of  65%. 3.  Borderline left ventricular end-diastolic pressure at 14 to 16 mmHg. 4.  No gradient across the aortic valve on pullback to suggest aortic  stenosis. Echo: 2/20/22  Normal left ventricle size, wall thickness, and systolic function with an   estimated ejection fraction of 55-60%. No regional wall motion bnormalities are seen. grade 1 diastolic dysfunction   The right ventricle is normal in size and function. No significant valvular heart disease    CXR: 7/25/22  Previously noted chest tube has been removed. Small to moderate size left   hydropneumothorax. CT chest: 7/25/22  Mediastinum: There is dense calcification of the thoracic aorta. An atrial   appendage closure device is noted. Dense coronary calcification is present. The heart size is mildly enlarged. There is no pericardial effusion. There   is no mediastinal or hilar lymphadenopathy. Lungs/pleura: There is a moderate-sized area of fluid collection in the left   pleural space most pronounced at the left lung base. In comparison to the   previous exam there is less fluid and more air within the collection. There   is mild peripheral consolidation at the left lower lobe. The right lung remains clear. Upper Abdomen: A gastric band is re-identified. Gallbladder has been   removed. Motion degrades the quality of the exam.  There is thickening of   the adrenal glands. There is fatty replacement of the pancreas.        Soft Tissues/Bones: Subcutaneous air is noted along the left lateral chest   wall, new since the previous exam.       Labs:   CBC:   Lab Results   Component Value Date/Time    WBC 11.2 07/25/2022 09:42 AM    HGB 8.3 07/25/2022 09:42 AM    HCT 25.4 07/25/2022 09:42 AM    MCV 81.8 07/25/2022 09:42 AM     07/25/2022 09:42 AM     BMP:   Lab Results   Component Value Date/Time     07/25/2022 09:42 AM    K 3.7 07/25/2022 09:42 AM    K 4.5 07/14/2022 07:35 PM     07/25/2022 09:42 AM    CO2 26 07/25/2022 09:42 AM    PHOS 1.4 03/05/2022 02:55 AM    BUN 19 07/25/2022 09:42 AM    CREATININE 2.0 07/25/2022 09:42 AM    CALCIUM 8.9 07/25/2022 09:42 AM    MG 2.20 07/22/2022 06:53 AM     Cardiac Enzymes:   Lab Results   Component Value Date/Time    TROPONINI 0.01 07/25/2022 09:42 AM     PT/INR:   Lab Results   Component Value Date/Time    PROTIME 16.2 07/15/2022 06:30 AM    INR 1.30 07/15/2022 06:30 AM     APTT:   Lab Results   Component Value Date/Time    APTT 36.7 02/25/2022 12:00 PM     Liver Profile:  Lab Results   Component Value Date/Time    AST 18 07/25/2022 09:42 AM    ALT 17 07/25/2022 09:42 AM    BILIDIR <0.2 02/28/2022 04:06 AM    BILITOT 0.5 07/25/2022 09:42 AM    ALKPHOS 55 07/25/2022 09:42 AM    LABALBU 3.5 07/25/2022 09:42 AM     Lab Results   Component Value Date/Time    CHOL 221 02/23/2022 12:29 PM    HDL 57 02/23/2022 12:29 PM    TRIG 78 02/23/2022 12:29 PM     TSH:  No results found for: TSH  HgbA1c:  Lab Results   Component Value Date/Time    LABA1C 5.7 02/23/2022 12:29 PM     UA:   Lab Results   Component Value Date/Time    COLORU Yellow 07/21/2022 04:15 PM    PHUR 6.5 07/21/2022 04:15 PM    WBCUA 0 07/21/2022 04:15 PM    RBCUA 1 07/21/2022 04:15 PM    BACTERIA None Seen 07/21/2022 04:15 PM    CLARITYU Clear 07/21/2022 04:15 PM    SPECGRAV 1.012 07/21/2022 04:15 PM    LEUKOCYTESUR Negative 07/21/2022 04:15 PM    UROBILINOGEN 0.2 07/21/2022 04:15 PM    BILIRUBINUR Negative 07/21/2022 04:15 PM    BILIRUBINUR Negative 08/10/2011 09:00 AM    BLOODU Negative 07/21/2022 04:15 PM    GLUCOSEU Negative 07/21/2022 04:15 PM    GLUCOSEU Negative 08/10/2011 09:00 AM       History obtained: chart, pt    Diagnosis: L pleural effusion     Plan:   - recurrent L pleural effusion  Postop cabg cxr 2/28 had the typical small L pleural effusion. Increased on next study 6/25, may have been some mucus plugging too. Unfortunate that tube came out prematurely though CT chest 6/28 with tube in place had incomplete evacuation. I cannot tell from records how much suction was applied then, or to the additional tube 7/15 to try and reexpand the LLL. Trapped LLL, hydroptx. Options:  VATS/thoracotomy decortication - definitive obliteration of space. pleurX - palliative optimal evacuation of fluid, with some chance for reduction in size of space and, if pleural apposition can be obtained, potential for pleurodesis. Typical periop/postop course reviewed. Risks, benefits and postoperative complications discussed including bleeding, infection, damage to adjacent structures. Pt and daughter wish to proceed with pleurX placement. Will schedule accordingly.       Genaro Silverio MD  7/28/2022 3:07 PM - 3:32 PM chart review     Genaro Silverio MD  7/29/2022  2:00 PM

## 2022-07-28 NOTE — PROGRESS NOTES
[facility] 59, AND CRITICAL CARE    Ricardo Son (:  1940) is a 81 y.o. female,Established patient, here for evaluation of the following chief complaint(s):  Follow-Up from Hospital         ASSESSMENT/PLAN:  1. Recurrent left pleural effusion  Assessment & Plan:   - Exudate  -Chronic hydropneumothorax  -Follows with CT surgery, ongoing discussions regarding possible Pleurx due to recurrent nature      Return in about 3 months (around 10/28/2022). Future Appointments   Date Time Provider Jacquelyn Shearer   2022  9:15 AM Geovanna Holley MD [facility]   2022 12:30 PM Niles Ospina MD [facility]   10/25/2022  1:20 PM Cora Berumen MD [address] [place] [address]            Subjective   SUBJECTIVE/OBJECTIVE:  61-year-old hospital follow-up for recurrent left-sided pleural effusion. Subsequent CT imaging demonstrated chronic trapped lung with hydropneumothorax. Patient currently denies shortness of breath is awaiting CT surgery follow-up which will happen later this week. She is currently tolerating room air and has no complaints. Review of Systems   Constitutional: Negative. Respiratory: Negative. Cardiovascular: Negative. Neurological: Negative. Objective   Physical Exam     Gen:  No acute distress. Eyes: PERRL. EOMI. Anicteric sclera. No conjunctival injection. ENT: No discharge. oropharynx clear. External appearance of ears and nose normal.  Neck: Trachea midline. No mass   Resp:  No crackles. No wheezes. No rhonchi. No dullness on percussion. CV: Regular rate. Regular rhythm. No murmur or rub. No edema. GI: Soft, Non-tender. Non-distended. +BS  Skin: Warm, dry, w/o erythema. Lymph: No cervical or supraclavicular LAD. M/S: No cyanosis. No clubbing. Neuro:  no focal neurologic deficit. Moves all extremities  Psych: Awake and alert, Oriented x 3. Judgement and insight appropriate. Mood stable.       I spent 24 minutes with the

## 2022-07-29 ENCOUNTER — OFFICE VISIT (OUTPATIENT)
Dept: CARDIOTHORACIC SURGERY | Age: 82
End: 2022-07-29
Payer: MEDICARE

## 2022-07-29 ENCOUNTER — HOSPITAL ENCOUNTER (OUTPATIENT)
Age: 82
Discharge: HOME | End: 2022-07-29
Payer: MEDICARE

## 2022-07-29 ENCOUNTER — HOSPITAL ENCOUNTER (OUTPATIENT)
Dept: GENERAL RADIOLOGY | Age: 82
Discharge: HOME | End: 2022-07-29
Payer: MEDICARE

## 2022-07-29 ENCOUNTER — OFFICE VISIT (OUTPATIENT)
Dept: CARDIOLOGY CLINIC | Age: 82
End: 2022-07-29
Payer: MEDICARE

## 2022-07-29 VITALS
BODY MASS INDEX: 25.69 KG/M2 | HEART RATE: 75 BPM | WEIGHT: 145 LBS | SYSTOLIC BLOOD PRESSURE: 114 MMHG | HEIGHT: 63 IN | DIASTOLIC BLOOD PRESSURE: 70 MMHG

## 2022-07-29 VITALS
BODY MASS INDEX: 25.59 KG/M2 | TEMPERATURE: 98 F | DIASTOLIC BLOOD PRESSURE: 76 MMHG | HEART RATE: 75 BPM | OXYGEN SATURATION: 100 % | WEIGHT: 144.4 LBS | SYSTOLIC BLOOD PRESSURE: 140 MMHG | HEIGHT: 63 IN

## 2022-07-29 DIAGNOSIS — J90 PLEURAL EFFUSION: ICD-10-CM

## 2022-07-29 DIAGNOSIS — E78.5 HYPERLIPIDEMIA LDL GOAL <70: ICD-10-CM

## 2022-07-29 DIAGNOSIS — I25.10 CORONARY ARTERY DISEASE INVOLVING NATIVE CORONARY ARTERY OF NATIVE HEART WITHOUT ANGINA PECTORIS: Primary | ICD-10-CM

## 2022-07-29 DIAGNOSIS — J90 PLEURAL EFFUSION: Primary | ICD-10-CM

## 2022-07-29 DIAGNOSIS — I10 ESSENTIAL HYPERTENSION: ICD-10-CM

## 2022-07-29 PROCEDURE — 1123F ACP DISCUSS/DSCN MKR DOCD: CPT | Performed by: INTERNAL MEDICINE

## 2022-07-29 PROCEDURE — 99214 OFFICE O/P EST MOD 30 MIN: CPT | Performed by: THORACIC SURGERY (CARDIOTHORACIC VASCULAR SURGERY)

## 2022-07-29 PROCEDURE — 99214 OFFICE O/P EST MOD 30 MIN: CPT | Performed by: INTERNAL MEDICINE

## 2022-07-29 PROCEDURE — 93000 ELECTROCARDIOGRAM COMPLETE: CPT | Performed by: INTERNAL MEDICINE

## 2022-07-29 PROCEDURE — 71046 X-RAY EXAM CHEST 2 VIEWS: CPT

## 2022-07-29 PROCEDURE — 1123F ACP DISCUSS/DSCN MKR DOCD: CPT | Performed by: THORACIC SURGERY (CARDIOTHORACIC VASCULAR SURGERY)

## 2022-07-29 NOTE — LETTER
07/29/22        [facility]) Cardiovascular and Thoracic Surgeons  [address] [place] [address]        RE:    Leeroy Paz,   1940    Dear Dr. George Argueta ref. provider found Benny ,    It was my pleasure to see your patient, Leeroy Paz, in the office today with regard to recurrent L pleural effusion. I have attached a copy of the office evaluation. Thank you for allowing me to participate in the care of this patient.       Sincerely,     Simona Russell MD, MD    CC: Woody Welch MD  Via Fax: [address] [phone]  Via In [place]

## 2022-08-01 RX ORDER — METOPROLOL SUCCINATE 25 MG/1
25 TABLET, EXTENDED RELEASE ORAL DAILY
Qty: 90 TABLET | Refills: 3 | Status: SHIPPED | OUTPATIENT
Start: 2022-08-01

## 2022-08-02 ENCOUNTER — TELEPHONE (OUTPATIENT)
Dept: CARDIOTHORACIC SURGERY | Age: 82
End: 2022-08-02

## 2022-08-02 ENCOUNTER — PREP FOR PROCEDURE (OUTPATIENT)
Dept: CARDIOTHORACIC SURGERY | Age: 82
End: 2022-08-02

## 2022-08-02 DIAGNOSIS — J90 PLEURAL EFFUSION: Primary | ICD-10-CM

## 2022-08-02 NOTE — TELEPHONE ENCOUNTER
[facility] requesting a call back from pt daughter. Question she had raised regarding pt enlarged heart was forwarded to Dr. Kwesi Prasad. Await direction.

## 2022-08-03 RX ORDER — SODIUM CHLORIDE 9 MG/ML
INJECTION, SOLUTION INTRAVENOUS CONTINUOUS
Status: CANCELLED | OUTPATIENT
Start: 2022-08-03

## 2022-08-03 RX ORDER — SODIUM CHLORIDE 0.9 % (FLUSH) 0.9 %
5-40 SYRINGE (ML) INJECTION PRN
Status: CANCELLED | OUTPATIENT
Start: 2022-08-03

## 2022-08-03 RX ORDER — CHLORHEXIDINE GLUCONATE 0.12 MG/ML
15 RINSE ORAL ONCE
Status: CANCELLED | OUTPATIENT
Start: 2022-08-03 | End: 2022-08-03

## 2022-08-03 RX ORDER — SODIUM CHLORIDE 9 MG/ML
INJECTION, SOLUTION INTRAVENOUS PRN
Status: CANCELLED | OUTPATIENT
Start: 2022-08-03

## 2022-08-03 RX ORDER — SODIUM CHLORIDE 0.9 % (FLUSH) 0.9 %
5-40 SYRINGE (ML) INJECTION EVERY 12 HOURS SCHEDULED
Status: CANCELLED | OUTPATIENT
Start: 2022-08-03

## 2022-08-03 RX ORDER — CHLORHEXIDINE GLUCONATE 4 G/100ML
SOLUTION TOPICAL ONCE
Status: CANCELLED | OUTPATIENT
Start: 2022-08-03 | End: 2022-08-03

## 2022-08-03 NOTE — PROGRESS NOTES
Physician Progress Note      Amee Wilkerson  [facility] #:                  [id number]  :                       1940  ADMIT DATE:       2022 7:04 PM  Abisai Mario DATE:        2022 4:41 PM  RESPONDING  PROVIDER #:        Gerhardt Amato MD          QUERY TEXT:    Patient admitted with Pleural effusion, noted to have  para pneumonic fluid. If possible, please document in progress notes and d/c summary further   specificity regarding the type/underlying cause of pleural effusion: The medical record reflects the following:  Risk Factors: Recurrent L Pleural effusion  Clinical Indicators: per Pulmonology PN \"Recurrent left effusion,   neutrophilic, exudate, s/p US thoracentesis 500cc. LLL PNA  Fluid seems to be para pneumonic, follow up cytology and culture   results NTD Continue antibiotics\"  Treatment: Pulmonology consult, Chest Tube and IV  Zosyn and Vanco  Options provided:  -- Empyema/Bacterial pleural effusion  -- Other - I will add my own diagnosis  -- Disagree - Not applicable / Not valid  -- Disagree - Clinically unable to determine / Unknown  -- Refer to Clinical Documentation Reviewer    PROVIDER RESPONSE TEXT:    Patient has Empyema/Bacterial pleural effusion . Query created by: Alina Lowery on 2022 5:37 AM      QUERY TEXT:    Pt admitted with Pleural effusion. Noted documentation of LLL PNA  on 22   Pulmonology progress note. If possible, please document in progress notes   and discharge summary:    The medical record reflects the following:  Risk Factors: Recurrent Pleural effusion  Clinical Indicators: 22 Pulmonology PN \"Recurrent left effusion,   neutrophilic, exudate, s/p US thoracentesis 500cc.   LLL PNA Plan:   Fluid seems to be para pneumonic\"  Treatment: IV Zosyn and IV Vanco  Options provided:  -- LLL PNA treating as possible gram negative and or gram positive pneumonia   confirmed present on admission  -- LLL PNA treating as possible gram negative and or gram positive pneumonia   ruled out  -- Other - I will add my own diagnosis  -- Disagree - Not applicable / Not valid  -- Disagree - Clinically unable to determine / Unknown  -- Refer to Clinical Documentation Reviewer    PROVIDER RESPONSE TEXT:    The diagnosis of LLL PNA treating as possible gram negative and or gram   positive pneumonia was confirmed as present on admission.     Query created by: Luc Ng on 7/29/2022 5:42 AM      Electronically signed by:  Td Wells MD 8/3/2022 8:19 AM

## 2022-08-15 ENCOUNTER — TELEPHONE (OUTPATIENT)
Dept: CARDIOTHORACIC SURGERY | Age: 82
End: 2022-08-15

## 2022-08-15 NOTE — TELEPHONE ENCOUNTER
Spoke with patient in the office regarding schedule of out patient procedure on 8/18/2022 at 12:30 pm with arrival time of 10:30 am at [facility] [place] with Dr. Celine Rogers to include: left pleur-x catheter placement. Patient has been instructed not to eat or drink after midnight the day of the procedure and to call our office with any questions or concerns.

## 2022-08-16 ENCOUNTER — HOSPITAL ENCOUNTER (OUTPATIENT)
Dept: PREADMISSION TESTING | Age: 82
Setting detail: OUTPATIENT SURGERY
Discharge: HOME | End: 2022-08-16
Payer: MEDICARE

## 2022-08-16 DIAGNOSIS — J90 PLEURAL EFFUSION: ICD-10-CM

## 2022-08-16 LAB
ABO/RH: NORMAL
ANION GAP SERPL CALCULATED.3IONS-SCNC: 10 MMOL/L (ref 3–16)
ANTIBODY SCREEN: NORMAL
APTT: 30.8 SEC (ref 23–34.3)
BASOPHILS ABSOLUTE: 0.1 K/UL (ref 0–0.2)
BASOPHILS RELATIVE PERCENT: 0.8 %
BILIRUBIN URINE: NEGATIVE
BLOOD, URINE: NEGATIVE
BUN BLDV-MCNC: 13 MG/DL (ref 7–20)
CALCIUM SERPL-MCNC: 8.7 MG/DL (ref 8.3–10.6)
CHLORIDE BLD-SCNC: 106 MMOL/L (ref 99–110)
CLARITY: CLEAR
CO2: 25 MMOL/L (ref 21–32)
COLOR: YELLOW
CREAT SERPL-MCNC: 1.7 MG/DL (ref 0.6–1.2)
EOSINOPHILS ABSOLUTE: 0.2 K/UL (ref 0–0.6)
EOSINOPHILS RELATIVE PERCENT: 2.9 %
GFR AFRICAN AMERICAN: 35
GFR NON-AFRICAN AMERICAN: 29
GLUCOSE BLD-MCNC: 115 MG/DL (ref 70–99)
GLUCOSE URINE: NEGATIVE MG/DL
HCT VFR BLD CALC: 25 % (ref 36–48)
HEMOGLOBIN: 8.1 G/DL (ref 12–16)
INR BLD: 1.15 (ref 0.87–1.14)
KETONES, URINE: NEGATIVE MG/DL
LEUKOCYTE ESTERASE, URINE: NEGATIVE
LYMPHOCYTES ABSOLUTE: 1.3 K/UL (ref 1–5.1)
LYMPHOCYTES RELATIVE PERCENT: 19.2 %
MCH RBC QN AUTO: 27.3 PG (ref 26–34)
MCHC RBC AUTO-ENTMCNC: 32.2 G/DL (ref 31–36)
MCV RBC AUTO: 84.5 FL (ref 80–100)
MICROSCOPIC EXAMINATION: NORMAL
MONOCYTES ABSOLUTE: 0.7 K/UL (ref 0–1.3)
MONOCYTES RELATIVE PERCENT: 10.7 %
NEUTROPHILS ABSOLUTE: 4.4 K/UL (ref 1.7–7.7)
NEUTROPHILS RELATIVE PERCENT: 66.4 %
NITRITE, URINE: NEGATIVE
PDW BLD-RTO: 20.2 % (ref 12.4–15.4)
PH UA: 7.5 (ref 5–8)
PLATELET # BLD: 266 K/UL (ref 135–450)
PMV BLD AUTO: 7.8 FL (ref 5–10.5)
POTASSIUM SERPL-SCNC: 4.2 MMOL/L (ref 3.5–5.1)
PROTEIN UA: NEGATIVE MG/DL
PROTHROMBIN TIME: 14.7 SEC (ref 11.7–14.5)
RBC # BLD: 2.96 M/UL (ref 4–5.2)
SODIUM BLD-SCNC: 141 MMOL/L (ref 136–145)
SPECIFIC GRAVITY UA: 1.01 (ref 1–1.03)
URINE TYPE: NORMAL
UROBILINOGEN, URINE: 0.2 E.U./DL
WBC # BLD: 6.6 K/UL (ref 4–11)

## 2022-08-16 PROCEDURE — 85610 PROTHROMBIN TIME: CPT

## 2022-08-16 PROCEDURE — 86901 BLOOD TYPING SEROLOGIC RH(D): CPT

## 2022-08-16 PROCEDURE — 86850 RBC ANTIBODY SCREEN: CPT

## 2022-08-16 PROCEDURE — 81003 URINALYSIS AUTO W/O SCOPE: CPT

## 2022-08-16 PROCEDURE — 86900 BLOOD TYPING SEROLOGIC ABO: CPT

## 2022-08-16 PROCEDURE — 80048 BASIC METABOLIC PNL TOTAL CA: CPT

## 2022-08-16 PROCEDURE — 85025 COMPLETE CBC W/AUTO DIFF WBC: CPT

## 2022-08-16 PROCEDURE — 85730 THROMBOPLASTIN TIME PARTIAL: CPT

## 2022-08-16 NOTE — FLOWSHEET NOTE
DOS 22   [birth year]    Pt. Came in for PAT blood work. UA reviewed. Timo Spears in Dr. Lewis Marion office notified of completed face to face interview and PAT labs.

## 2022-08-16 NOTE — FLOWSHEET NOTE
[facility] CARDIOVASCULAR AND THORACIC SURGEONS    PREOPERATIVE INSTRUCTIONS    Arrival time _____8/18/22 1030_______        Surgery time_____1230_______    Ruslan Man do not need to see your primary care physician (PCP) for a history and physical prior to your surgery. If you are having heart surgery, stop taking your ACE (e.g. lisinopril, enalapril, ramipril, benazepril) or ARB (e.g. candesartan, losartan, olmesartan, valsartan) 2 days (48 hours) prior to your surgery. If you are having heart or lung surgery, stop taking your Aspirin 1 day (24 hours) prior to your surgery. You will need to stop blood thinners such as: clopidogrel (Plavix), ticagrelor (Brilinta), prasugrel (Effient), warfarin (Coumadin), dabigatran (Pradaxa), rivaroxaban (Xarelto), apixaban (Eliquis), edoxaban (Savaysa), and enoxaparin (Lovenox). If you have not been given instructions, please contact the cardiac surgeons' office at [phone]. Stop all over the counter blood thinners such as: ibuprofen, Advil, naproxen 1 day (24 hours) prior to your surgery. We also ask that you stop any over the counter medications such as fish oil, vitamin E, glucosamine, and garlic 1 day (24 hours). You may use your inhaler and take any seizure medication the morning (day) of your surgery. If you take sildenafil (Viagra, Revatio), tadalafil (Cialis, Adcirca), vardenafil (Levitra, Staxyn), or avanafil Nuala Sings), please contact the cardiac surgeons' office at [phone] to discuss when these will need to be stopped prior to surgery. If you are diabetic and take long-acting insulin, take half of your usual dose the night or evening prior to your surgery. Take no insulin the morning of your surgery. If you are diabetic and take oral medication, do not take these medications the evening prior to or the morning of your surgery.      Take only the following medications the morning of surgery with a sip of water: Beta Blocker (e.g. metoprolol, carvedilol or atenolol) and/or rate or rhythm controlling heart medications (e.g. diltiazem and amiodarone). Do not eat or drink anything after 12:00 midnight prior to your surgery. This includes water, chewing gum, mints and ice chips. You may brush your teeth and gargle the morning of your surgery, but do not swallow the water. Please do not smoke 24 hours prior to surgery    Please do not drink any alcoholic beverages or chamomile tea 24 hours prior to surgery    For your comfort, please wear simple loose fitting clothing to the hospital.  Please do not bring valuables. Do not wear any make-up or nail polish on your fingers or toes    For your safety, please do not wear any jewelry or body piercings on the day of surgery. All jewelry must be removed. If you have dentures, they will be removed before going to operating room. For your convenience, we will provide you with a container. If you wear contact lenses or glasses, they will be removed, please bring a case for them. If you have a living will and a durable power of  for healthcare, please bring in a copy. As part of our patient safety program to minimize surgical site infections, we ask you to do the following:    Please notify your surgeon if you develop any illness between now and the day of your surgery. This includes a cough, cold, fever, sore throat, nausea, or vomiting, and diarrhea, etc.  Please notify your surgeon if you experience dizziness, shortness of breath or blurred vision between now and the time of your surgery. Do not shave your chest, legs, or arms for 96 hours prior to surgery. An electric razor may be used on your neck and face for up to 48 hours prior to surgery. Shower the night before surgery with the bottle of Hibiclens that has been provided.      You will need to bring a photo ID and insurance card    If you use a C-pap machine, please bring your C-pap machine with you to the hospital the day of surgery. Please contact the surgeon's office at [phone] if you have any further questions regarding your surgery.

## 2022-08-18 ENCOUNTER — ANESTHESIA (OUTPATIENT)
Dept: OPERATING ROOM | Age: 82
End: 2022-08-18
Payer: MEDICARE

## 2022-08-18 ENCOUNTER — APPOINTMENT (OUTPATIENT)
Dept: GENERAL RADIOLOGY | Age: 82
End: 2022-08-18
Attending: THORACIC SURGERY (CARDIOTHORACIC VASCULAR SURGERY)
Payer: MEDICARE

## 2022-08-18 ENCOUNTER — ANESTHESIA EVENT (OUTPATIENT)
Dept: OPERATING ROOM | Age: 82
End: 2022-08-18
Payer: MEDICARE

## 2022-08-18 ENCOUNTER — HOSPITAL ENCOUNTER (OUTPATIENT)
Age: 82
Discharge: HOME | End: 2022-08-18
Attending: THORACIC SURGERY (CARDIOTHORACIC VASCULAR SURGERY) | Admitting: THORACIC SURGERY (CARDIOTHORACIC VASCULAR SURGERY)
Payer: MEDICARE

## 2022-08-18 VITALS
WEIGHT: 152.56 LBS | RESPIRATION RATE: 20 BRPM | OXYGEN SATURATION: 99 % | HEIGHT: 63 IN | BODY MASS INDEX: 27.03 KG/M2 | HEART RATE: 71 BPM | SYSTOLIC BLOOD PRESSURE: 150 MMHG | DIASTOLIC BLOOD PRESSURE: 77 MMHG | TEMPERATURE: 96.9 F

## 2022-08-18 LAB
GLUCOSE BLD-MCNC: 101 MG/DL (ref 70–99)
PERFORMED ON: ABNORMAL

## 2022-08-18 PROCEDURE — 7100000000 HC PACU RECOVERY - FIRST 15 MIN: Performed by: THORACIC SURGERY (CARDIOTHORACIC VASCULAR SURGERY)

## 2022-08-18 PROCEDURE — 2500000003 HC RX 250 WO HCPCS: Performed by: THORACIC SURGERY (CARDIOTHORACIC VASCULAR SURGERY)

## 2022-08-18 PROCEDURE — 7100000011 HC PHASE II RECOVERY - ADDTL 15 MIN: Performed by: THORACIC SURGERY (CARDIOTHORACIC VASCULAR SURGERY)

## 2022-08-18 PROCEDURE — 7100000010 HC PHASE II RECOVERY - FIRST 15 MIN: Performed by: THORACIC SURGERY (CARDIOTHORACIC VASCULAR SURGERY)

## 2022-08-18 PROCEDURE — 71045 X-RAY EXAM CHEST 1 VIEW: CPT

## 2022-08-18 PROCEDURE — 3700000001 HC ADD 15 MINUTES (ANESTHESIA): Performed by: THORACIC SURGERY (CARDIOTHORACIC VASCULAR SURGERY)

## 2022-08-18 PROCEDURE — 6360000002 HC RX W HCPCS: Performed by: THORACIC SURGERY (CARDIOTHORACIC VASCULAR SURGERY)

## 2022-08-18 PROCEDURE — C1729 CATH, DRAINAGE: HCPCS | Performed by: THORACIC SURGERY (CARDIOTHORACIC VASCULAR SURGERY)

## 2022-08-18 PROCEDURE — 2580000003 HC RX 258: Performed by: THORACIC SURGERY (CARDIOTHORACIC VASCULAR SURGERY)

## 2022-08-18 PROCEDURE — 2500000003 HC RX 250 WO HCPCS: Performed by: NURSE ANESTHETIST, CERTIFIED REGISTERED

## 2022-08-18 PROCEDURE — A4217 STERILE WATER/SALINE, 500 ML: HCPCS | Performed by: THORACIC SURGERY (CARDIOTHORACIC VASCULAR SURGERY)

## 2022-08-18 PROCEDURE — 2709999900 HC NON-CHARGEABLE SUPPLY: Performed by: THORACIC SURGERY (CARDIOTHORACIC VASCULAR SURGERY)

## 2022-08-18 PROCEDURE — 7100000001 HC PACU RECOVERY - ADDTL 15 MIN: Performed by: THORACIC SURGERY (CARDIOTHORACIC VASCULAR SURGERY)

## 2022-08-18 PROCEDURE — 3700000000 HC ANESTHESIA ATTENDED CARE: Performed by: THORACIC SURGERY (CARDIOTHORACIC VASCULAR SURGERY)

## 2022-08-18 PROCEDURE — 6360000002 HC RX W HCPCS: Performed by: NURSE ANESTHETIST, CERTIFIED REGISTERED

## 2022-08-18 PROCEDURE — 3600000005 HC SURGERY LEVEL 5 BASE: Performed by: THORACIC SURGERY (CARDIOTHORACIC VASCULAR SURGERY)

## 2022-08-18 PROCEDURE — 32550 INSERT PLEURAL CATH: CPT | Performed by: THORACIC SURGERY (CARDIOTHORACIC VASCULAR SURGERY)

## 2022-08-18 PROCEDURE — 3600000015 HC SURGERY LEVEL 5 ADDTL 15MIN: Performed by: THORACIC SURGERY (CARDIOTHORACIC VASCULAR SURGERY)

## 2022-08-18 RX ORDER — SODIUM CHLORIDE 0.9 % (FLUSH) 0.9 %
5-40 SYRINGE (ML) INJECTION EVERY 12 HOURS SCHEDULED
Status: DISCONTINUED | OUTPATIENT
Start: 2022-08-18 | End: 2022-08-18 | Stop reason: HOSPADM

## 2022-08-18 RX ORDER — CHLORHEXIDINE GLUCONATE 0.12 MG/ML
15 RINSE ORAL ONCE
Status: DISCONTINUED | OUTPATIENT
Start: 2022-08-18 | End: 2022-08-18 | Stop reason: HOSPADM

## 2022-08-18 RX ORDER — SODIUM CHLORIDE 0.9 % (FLUSH) 0.9 %
5-40 SYRINGE (ML) INJECTION PRN
Status: DISCONTINUED | OUTPATIENT
Start: 2022-08-18 | End: 2022-08-18 | Stop reason: HOSPADM

## 2022-08-18 RX ORDER — LIDOCAINE HYDROCHLORIDE 20 MG/ML
INJECTION, SOLUTION EPIDURAL; INFILTRATION; INTRACAUDAL; PERINEURAL PRN
Status: DISCONTINUED | OUTPATIENT
Start: 2022-08-18 | End: 2022-08-18 | Stop reason: SDUPTHER

## 2022-08-18 RX ORDER — SODIUM CHLORIDE 9 MG/ML
INJECTION, SOLUTION INTRAVENOUS CONTINUOUS
Status: DISCONTINUED | OUTPATIENT
Start: 2022-08-18 | End: 2022-08-18 | Stop reason: HOSPADM

## 2022-08-18 RX ORDER — HALOPERIDOL 5 MG/ML
1 INJECTION INTRAMUSCULAR
Status: DISCONTINUED | OUTPATIENT
Start: 2022-08-18 | End: 2022-08-18 | Stop reason: HOSPADM

## 2022-08-18 RX ORDER — KETAMINE HCL IN NACL, ISO-OSM 100MG/10ML
SYRINGE (ML) INJECTION PRN
Status: DISCONTINUED | OUTPATIENT
Start: 2022-08-18 | End: 2022-08-18 | Stop reason: SDUPTHER

## 2022-08-18 RX ORDER — LORAZEPAM 2 MG/ML
0.5 INJECTION INTRAMUSCULAR
Status: DISCONTINUED | OUTPATIENT
Start: 2022-08-18 | End: 2022-08-18 | Stop reason: HOSPADM

## 2022-08-18 RX ORDER — SODIUM CHLORIDE 9 MG/ML
INJECTION, SOLUTION INTRAVENOUS PRN
Status: DISCONTINUED | OUTPATIENT
Start: 2022-08-18 | End: 2022-08-18 | Stop reason: HOSPADM

## 2022-08-18 RX ORDER — MAGNESIUM HYDROXIDE 1200 MG/15ML
LIQUID ORAL CONTINUOUS PRN
Status: DISCONTINUED | OUTPATIENT
Start: 2022-08-18 | End: 2022-08-18 | Stop reason: HOSPADM

## 2022-08-18 RX ORDER — FENTANYL CITRATE 50 UG/ML
INJECTION, SOLUTION INTRAMUSCULAR; INTRAVENOUS PRN
Status: DISCONTINUED | OUTPATIENT
Start: 2022-08-18 | End: 2022-08-18 | Stop reason: SDUPTHER

## 2022-08-18 RX ORDER — PHENYLEPHRINE HCL IN 0.9% NACL 1 MG/10 ML
SYRINGE (ML) INTRAVENOUS PRN
Status: DISCONTINUED | OUTPATIENT
Start: 2022-08-18 | End: 2022-08-18 | Stop reason: SDUPTHER

## 2022-08-18 RX ORDER — OXYCODONE HYDROCHLORIDE 5 MG/1
5 TABLET ORAL
Status: DISCONTINUED | OUTPATIENT
Start: 2022-08-18 | End: 2022-08-18 | Stop reason: HOSPADM

## 2022-08-18 RX ORDER — FENTANYL CITRATE 50 UG/ML
50 INJECTION, SOLUTION INTRAMUSCULAR; INTRAVENOUS EVERY 5 MIN PRN
Status: DISCONTINUED | OUTPATIENT
Start: 2022-08-18 | End: 2022-08-18 | Stop reason: HOSPADM

## 2022-08-18 RX ORDER — PROPOFOL 10 MG/ML
INJECTION, EMULSION INTRAVENOUS PRN
Status: DISCONTINUED | OUTPATIENT
Start: 2022-08-18 | End: 2022-08-18 | Stop reason: SDUPTHER

## 2022-08-18 RX ORDER — DIPHENHYDRAMINE HYDROCHLORIDE 50 MG/ML
12.5 INJECTION INTRAMUSCULAR; INTRAVENOUS
Status: DISCONTINUED | OUTPATIENT
Start: 2022-08-18 | End: 2022-08-18 | Stop reason: HOSPADM

## 2022-08-18 RX ORDER — MIDAZOLAM HYDROCHLORIDE 1 MG/ML
INJECTION INTRAMUSCULAR; INTRAVENOUS PRN
Status: DISCONTINUED | OUTPATIENT
Start: 2022-08-18 | End: 2022-08-18 | Stop reason: SDUPTHER

## 2022-08-18 RX ORDER — CHLORHEXIDINE GLUCONATE 4 G/100ML
SOLUTION TOPICAL ONCE
Status: DISCONTINUED | OUTPATIENT
Start: 2022-08-18 | End: 2022-08-18 | Stop reason: HOSPADM

## 2022-08-18 RX ORDER — ONDANSETRON 2 MG/ML
4 INJECTION INTRAMUSCULAR; INTRAVENOUS
Status: DISCONTINUED | OUTPATIENT
Start: 2022-08-18 | End: 2022-08-18 | Stop reason: HOSPADM

## 2022-08-18 RX ORDER — LIDOCAINE HYDROCHLORIDE 10 MG/ML
INJECTION, SOLUTION EPIDURAL; INFILTRATION; INTRACAUDAL; PERINEURAL
Status: COMPLETED | OUTPATIENT
Start: 2022-08-18 | End: 2022-08-18

## 2022-08-18 RX ORDER — PROPOFOL 10 MG/ML
INJECTION, EMULSION INTRAVENOUS CONTINUOUS PRN
Status: DISCONTINUED | OUTPATIENT
Start: 2022-08-18 | End: 2022-08-18 | Stop reason: SDUPTHER

## 2022-08-18 RX ADMIN — LIDOCAINE HYDROCHLORIDE 60 MG: 20 INJECTION, SOLUTION EPIDURAL; INFILTRATION; INTRACAUDAL; PERINEURAL at 11:35

## 2022-08-18 RX ADMIN — MIDAZOLAM 1 MG: 1 INJECTION INTRAMUSCULAR; INTRAVENOUS at 11:29

## 2022-08-18 RX ADMIN — Medication 100 MCG: at 12:00

## 2022-08-18 RX ADMIN — Medication 10 MG: at 11:56

## 2022-08-18 RX ADMIN — FENTANYL CITRATE 25 MCG: 50 INJECTION INTRAMUSCULAR; INTRAVENOUS at 11:43

## 2022-08-18 RX ADMIN — SODIUM CHLORIDE: 9 INJECTION, SOLUTION INTRAVENOUS at 11:04

## 2022-08-18 RX ADMIN — Medication 10 MG: at 11:32

## 2022-08-18 RX ADMIN — PROPOFOL 100 MCG/KG/MIN: 10 INJECTION, EMULSION INTRAVENOUS at 11:35

## 2022-08-18 RX ADMIN — PROPOFOL 50 MG: 10 INJECTION, EMULSION INTRAVENOUS at 11:35

## 2022-08-18 RX ADMIN — CEFAZOLIN 2000 MG: 2 INJECTION, POWDER, FOR SOLUTION INTRAMUSCULAR; INTRAVENOUS at 11:37

## 2022-08-18 RX ADMIN — Medication 50 MCG: at 11:51

## 2022-08-18 RX ADMIN — Medication 50 MCG: at 11:45

## 2022-08-18 ASSESSMENT — ENCOUNTER SYMPTOMS: SHORTNESS OF BREATH: 1

## 2022-08-18 ASSESSMENT — PAIN SCALES - WONG BAKER
WONGBAKER_NUMERICALRESPONSE: 0
WONGBAKER_NUMERICALRESPONSE: 0

## 2022-08-18 ASSESSMENT — PAIN SCALES - GENERAL
PAINLEVEL_OUTOF10: 0

## 2022-08-18 ASSESSMENT — LIFESTYLE VARIABLES: SMOKING_STATUS: 0

## 2022-08-18 NOTE — PROGRESS NOTES
Pt sitting up in bed, given warm blanket at request. Pt denies pain of any kind at this time. Expiratory grunting still noted at this time, pts daughter at bedside states is baseline for her. Pt making funny comments at phase 2 arrival after Ketamine, appears to be improving at this time, pts daughter also reports improvement.  VSS

## 2022-08-18 NOTE — ANESTHESIA POSTPROCEDURE EVALUATION
Department of Anesthesiology  Postprocedure Note    Patient: Becka Gutierrez  MRN: [id number]  YOB: 1940  Date of evaluation: 8/18/2022      Procedure Summary     Date: 08/18/22 Room / Location: [address]    Anesthesia Start: 1288 Anesthesia Stop: 2453    Procedure: LEFT PLEUR-X CATHETER PLACEMENT (Left) Diagnosis:       Pleural effusion, left      (LEFT PLEURAL EFFUSION)    Surgeons: Claire Cobos MD Responsible Provider: Susana Flynn MD    Anesthesia Type: MAC ASA Status: 4          Anesthesia Type: No value filed.     Jordy Phase I: Jordy Score: 7    Jordy Phase II: Jordy Score: 9      Anesthesia Post Evaluation    Patient location during evaluation: bedside  Patient participation: complete - patient participated  Level of consciousness: awake and alert  Pain score: 0  Nausea & Vomiting: no nausea  Complications: no  Cardiovascular status: hemodynamically stable  Respiratory status: acceptable  Hydration status: stable

## 2022-08-18 NOTE — PROGRESS NOTES
Patient admitted to PACU # 9 from OR at 1210 post LEFT PLEUR-X CATHETER PLACEMENT - Left per MD Cathy Riley. Attached to PACU monitoring system and report received from anesthesia provider. Patient was reported to be hemodynamically stable during procedure. Patient drowsy on admission and denied pain. Drain in place and dressing is clean, dry and intact.

## 2022-08-18 NOTE — PROGRESS NOTES
Received from PACU. Admitted to Phase 2 care. Awake and alert, respirations easy, but with expiratory grunt at times. Oriented to room and surroundings. Denies pain.

## 2022-08-18 NOTE — PROGRESS NOTES
CVTS    Cxr reviewed  Catheter in good position, residual effusion well drained.   Ok to d/c once post procedure criteria met    Galdino Short MD  8/18/2022  12:34 PM

## 2022-08-18 NOTE — BRIEF OP NOTE
Date: 8/18/2022  Patient:  Marisol Ortega        Brief Operative Note       Pre-op Diagnosis:  L pleural effusion    Post-op Diagnosis:  same    Procedure:  L pleurX catheter placement    Surgeon:  Patricia Jules. Abbey Rubalcava MD    Assistant(s):  Romelia    Anesthesia:  local with iv sedation    EBL: <5cc    Specimens:  none    Findings:  250cc serous fluid    Drains:  as above    Complications:  none    Disposition: To PACU in stable condition    Post-Op Note:  Dictated.     Yvonne Jose MD  8/18/2022  12:07 PM

## 2022-08-18 NOTE — ANESTHESIA PRE PROCEDURE
Department of Anesthesiology  Preprocedure Note       Name:  Anu Yanez   Age:  81 y.o.  :  1940                                          MRN:  [id number]         Date:  2022      Surgeon: Luiz Lainez):  Gordo Webster MD    Procedure: Procedure(s):  LEFT PLEUR-X CATHETER PLACEMENT    Medications prior to admission:   Prior to Admission medications    Medication Sig Start Date End Date Taking? Authorizing Provider   metoprolol succinate (TOPROL XL) 25 MG extended release tablet TAKE 1 TABLET BY MOUTH DAILY 22   Juan Hilliard MD   cyanocobalamin 1000 MCG tablet Take 1,000 mcg by mouth in the morning. Historical Provider, MD   folic acid (FOLVITE) 1 MG tablet Take 1 mg by mouth in the morning. Historical Provider, MD   sodium bicarbonate 650 MG tablet Take 1 tablet by mouth in the morning and 1 tablet at noon and 1 tablet before bedtime. 22  Tamar Genao MD   ferrous sulfate (IRON 325) 325 (65 Fe) MG tablet Take 325 mg by mouth daily (with breakfast)    Historical Provider, MD   methIMAzole (TAPAZOLE) 5 MG tablet Take 5 mg by mouth daily    Historical Provider, MD   fluticasone-umeclidin-vilant (TRELEGY ELLIPTA) 100-62.5-25 MCG/INH AEPB Inhale 1 puff into the lungs daily    Historical Provider, MD   Wheat Dextrin (BENEFIBER DRINK MIX) PACK Take 1 packet by mouth daily    Historical Provider, MD   polyethylene glycol (GLYCOLAX) 17 g packet Take 17 g by mouth in the morning. Historical Provider, MD   amLODIPine (NORVASC) 5 MG tablet Take 5 mg by mouth daily    Historical Provider, MD   aspirin EC 81 MG EC tablet Take 1 tablet by mouth daily 22  Renae Hilliard APRN - CNP   atorvastatin (LIPITOR) 80 MG tablet Take 1 tablet by mouth nightly 3/9/22   MARKEL Farooq CNP   gabapentin (NEURONTIN) 300 MG capsule Take 300 mg by mouth 4 times daily.   21   Historical Provider, MD       Current medications:    No current facility-administered medications for this visit. No current outpatient medications on file.      Facility-Administered Medications Ordered in Other Visits   Medication Dose Route Frequency Provider Last Rate Last Admin    0.9 % sodium chloride infusion   IntraVENous Continuous Russell Cortez MD 50 mL/hr at 08/18/22 1104 New Bag at 08/18/22 1104    sodium chloride flush 0.9 % injection 5-40 mL  5-40 mL IntraVENous 2 times per day Russell Cortez MD        sodium chloride flush 0.9 % injection 5-40 mL  5-40 mL IntraVENous PRN Russell Cortez MD        0.9 % sodium chloride infusion   IntraVENous PRN Russell Cortez MD        ceFAZolin (ANCEF) 2,000 mg in dextrose 5 % 50 mL IVPB (mini-bag)  2,000 mg IntraVENous On Call to [address] [facility], MD        chlorhexidine (PERIDEX) 0.12 % solution 15 mL  15 mL Mouth/Throat Once Russell Cortez MD        chlorhexidine (HIBICLENS) 4 % liquid   Topical Once Russell Cortez MD           Allergies:  No Known Allergies    Problem List:    Patient Active Problem List   Diagnosis Code    Morbid obesity ([facility] [address] E66.01    Chest pain R07.9    Unstable angina (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    Hyperthyroidism E05.90    HTN (hypertension) I10       Past Medical History:        Diagnosis Date    Arthritis     Asthma     as child grew out of it    CAD (coronary artery disease) 02/22/2022    multi vessel    CKD (chronic kidney disease) 2015    Elevated creatinine since 2015; Stage III b    CKD (chronic kidney disease) 2019    stg 3    Glaucoma     Hyperlipidemia     Hypertension     Hyperthyroidism 2019    Graves disease    IBS (irritable bowel syndrome) 10/2020    On Linzess    Lung abnormality 2022    fluid collecting in left lung    Neuropathy     Peripheral    Obesity (BMI 30-39. 9)     Osteopenia 2009    Osteoporosis        Past Surgical History:        Procedure Laterality Date    ANKLE FRACTURE SURGERY Left 2015    CARDIAC SURGERY  2022    s/p CABG X4    CHOLECYSTECTOMY      COLONOSCOPY      CORONARY ARTERY BYPASS GRAFT  2022    cabgx4, EDISON exclusion    CORONARY ARTERY BYPASS GRAFT N/A 2022    TRANSESOPHAGEAL ECHOCARDIOGRAM, TOTAL CARDIOPULMONARY BYPASS, TOTAL CARDIOPULMONARY BYPASS GRAFTING X4 (1 ARTERY, 3 VEIN) USING LEFT INTERNAL MAMMARY ARTERY AND RIGHT ENDOSCOPICALLY HARVESTED SAPHENOUS VEIN, LEFT ATRIAL APPENDAGE CLIP WITH 35MM ATRICLIP performed by Alvaro Manuel MD at [address] [place] [address]  2022    CT GUIDED CHEST TUBE 2022 [facility] CT    DIAGNOSTIC CARDIAC CATH LAB PROCEDURE  2022    FOOT SURGERY      bilateral, hammer toes    GASTRIC BAND  2011    laproscopic    HAND SURGERY  2009    trigger finger    IR TUNNELED CATHETER PLACEMENT GREATER THAN 5 YEARS  2022    IR TUNNELED CATHETER PLACEMENT GREATER THAN 5 YEARS 3/7/2022 [facility]ED[facility]    JOINT REPLACEMENT Left 2014    hip placement    ORIF FEMUR DECOMPRESSION Left 2014    OTHER SURGICAL HISTORY Right     mass behind ear    TUBAL LIGATION         Social History:    Social History     Tobacco Use    Smoking status: Former     Types: Cigarettes     Quit date: 8/10/1999     Years since quittin.0    Smokeless tobacco: Never    Tobacco comments:     started age 26   Substance Use Topics    Alcohol use: No                                Counseling given: Not Answered  Tobacco comments: started age 26      Vital Signs (Current):    There were no vitals filed for this visit.                                            BP Readings from Last 3 Encounters:   08/18/22 134/66   07/29/22 (!) 140/76   07/29/22 114/70       NPO Status:                                                                                 BMI:   Wt Readings from Last 3 Encounters:   08/18/22 152 lb 8.9 oz (69.2 kg)   07/29/22 144 lb 6.4 oz (65.5 kg)   07/29/22 145 lb (65.8 kg)     There is no height or weight on file to calculate BMI.    CBC:   Lab Results   Component Value Date/Time    WBC 6.6 08/16/2022 11:47 AM    RBC 2.96 08/16/2022 11:47 AM    HGB 8.1 08/16/2022 11:47 AM    HCT 25.0 08/16/2022 11:47 AM    MCV 84.5 08/16/2022 11:47 AM    RDW 20.2 08/16/2022 11:47 AM     08/16/2022 11:47 AM       CMP:   Lab Results   Component Value Date/Time     08/16/2022 11:47 AM    K 4.2 08/16/2022 11:47 AM    K 4.5 07/14/2022 07:35 PM     08/16/2022 11:47 AM    CO2 25 08/16/2022 11:47 AM    BUN 13 08/16/2022 11:47 AM    CREATININE 1.7 08/16/2022 11:47 AM    GFRAA 35 08/16/2022 11:47 AM    GFRAA 66 08/10/2011 08:35 AM    AGRATIO 1.0 07/25/2022 09:42 AM    LABGLOM 29 08/16/2022 11:47 AM    GLUCOSE 115 08/16/2022 11:47 AM    PROT 6.9 07/25/2022 09:42 AM    PROT 7.2 08/10/2011 08:35 AM    CALCIUM 8.7 08/16/2022 11:47 AM    BILITOT 0.5 07/25/2022 09:42 AM    ALKPHOS 55 07/25/2022 09:42 AM    AST 18 07/25/2022 09:42 AM    ALT 17 07/25/2022 09:42 AM       POC Tests:   Recent Labs     08/18/22  1108   POCGLU 101*       Coags:   Lab Results   Component Value Date/Time    PROTIME 14.7 08/16/2022 11:47 AM    INR 1.15 08/16/2022 11:47 AM    APTT 30.8 08/16/2022 11:47 AM       HCG (If Applicable): No results found for: PREGTESTUR, PREGSERUM, HCG, HCGQUANT     ABGs:   Lab Results   Component Value Date/Time    PHART 7.410 02/27/2022 10:08 PM    PO2ART 79.3 02/27/2022 10:08 PM    RYN9LLV 28.1 02/27/2022 10:08 PM    WWS6YOC 17.8 02/27/2022 10:08 PM    BEART -7 02/27/2022 10:08 PM    G3VGWXGL 96 02/27/2022 10:08 PM        Type & Screen (If Applicable):  No results found for: LABABO, LABRH    Drug/Infectious Status (If Applicable):  No results found for: HIV, HEPCAB    COVID-19 Screening (If Applicable):   Lab Results   Component Value Date/Time    COVID19 Not Detected 07/14/2022 08:05 PM           Anesthesia Evaluation  Patient summary reviewed and Nursing notes reviewed no history of anesthetic complications:   Airway: Mallampati: III  TM distance: >3 FB   Neck ROM: full  Mouth opening: > = 3 FB   Dental:      Comment: No loose teeth    Pulmonary: breath sounds clear to auscultation  (+) shortness of breath:  asthma:     (-) pneumonia, COPD, recent URI, sleep apnea and not a current smoker                          ROS comment: Left  Pleural effusion   Cardiovascular:    (+) hypertension:, angina:, past MI:, CAD:, CABG/stent (CABG 2-):, JHA:, hyperlipidemia    (-) pacemaker, valvular problems/murmurs, dysrhythmias,  CHF, orthopnea, PND and no pulmonary hypertension    ECG reviewed  Rhythm: regular    Echocardiogram reviewed    Cleared by cardiology     Beta Blocker:  Dose within 24 Hrs         Neuro/Psych:      (-) seizures, neuromuscular disease, TIA, CVA, headaches and depression/anxiety            GI/Hepatic/Renal:        (-) hiatal hernia, GERD, PUD, hepatitis, liver disease, bowel prep and no morbid obesity       Endo/Other:    (+) hyperthyroidism::., no malignancy/cancer. (-) diabetes mellitus, hypothyroidism, blood dyscrasia, no electrolyte abnormalities, no malignancy/cancer               Abdominal:             Vascular:     - PVD, DVT and PE. Other Findings:             Anesthesia Plan      MAC     ASA 4       Induction: intravenous. MIPS: Postoperative opioids intended and Prophylactic antiemetics administered. Anesthetic plan and risks discussed with patient. Plan discussed with CRNA.                     This pre-anesthesia assessment may be used as a history and physical.    DOS STAFF ADDENDUM:    Pt seen and examined, chart reviewed (including anesthesia, drug and allergy history). No interval changes to history and physical examination. Anesthetic plan, risks, benefits, alternatives, and personnel involved discussed with patient. Patient verbalized an understanding and agrees to proceed.       Aury Morris MD  August 18, 2022  11:20 AM

## 2022-08-18 NOTE — H&P
PCP:  Niki VAZ      Pulm: Ozzie Memos  Cards: Kip Alcaraz: [place]    Previous H+P on chart 7/29/22:  Miguel Sánchez is a 81 y.o. female s/p cabgx4, EDISON exclusion 2/25/22. cxr 6/25/22 opacification L hemithorax, CT chest 6/26/22 L pleural effusion, 14 Fr chest tube placed 6/26, pt removed 6/29. L thoracentesis 7/15/22 500cc. 10 Fr IR tube 7/18, lytics. Trapped lung with residual space. \"  No changes    Past Medical History:  Past Medical History:   Diagnosis Date    Arthritis     Asthma     as child grew out of it    CAD (coronary artery disease) 02/22/2022    multi vessel    CKD (chronic kidney disease) 2015    Elevated creatinine since 2015; Stage III b    CKD (chronic kidney disease) 12/01/2019    stg 3    Glaucoma     Hyperlipidemia     Hypertension     Hyperthyroidism 2019    Graves disease    IBS (irritable bowel syndrome) 10/2020    On Linzess    Lung abnormality 08/2022    fluid collecting in left lung    Neuropathy     Peripheral    Obesity (BMI 30-39. 9)     Osteopenia 05/2009    Osteoporosis        Past Surgical History:  Past Surgical History:   Procedure Laterality Date    ANKLE FRACTURE SURGERY Left 02/12/2015    CARDIAC SURGERY  04/08/2022    s/p CABG X4    CHOLECYSTECTOMY      COLONOSCOPY      CORONARY ARTERY BYPASS GRAFT  02/25/2022    cabgx4, EDISON exclusion    CORONARY ARTERY BYPASS GRAFT N/A 02/25/2022    TRANSESOPHAGEAL ECHOCARDIOGRAM, TOTAL CARDIOPULMONARY BYPASS, TOTAL CARDIOPULMONARY BYPASS GRAFTING X4 (1 ARTERY, 3 VEIN) USING LEFT INTERNAL MAMMARY ARTERY AND RIGHT ENDOSCOPICALLY HARVESTED SAPHENOUS VEIN, LEFT ATRIAL APPENDAGE CLIP WITH 35MM ATRICLIP performed by Norma Molina MD at [place] [address]  07/18/2022    CT GUIDED CHEST TUBE 7/18/2022 [facility] CT    DIAGNOSTIC CARDIAC CATH LAB PROCEDURE  02/2022    FOOT SURGERY      bilateral, hammer toes    GASTRIC BAND  08/23/2011    laproscopic    HAND SURGERY  2009    trigger finger    IR TUNNELED CATHETER PLACEMENT GREATER THAN 5 YEARS  03/07/2022    IR TUNNELED CATHETER PLACEMENT GREATER THAN 5 YEARS 3/7/2022 [facility]    JOINT REPLACEMENT Left 04/2014    hip placement    ORIF FEMUR DECOMPRESSION Left 04/2014    OTHER SURGICAL HISTORY Right 2010    mass behind ear    TUBAL LIGATION         Home Medications:   Prior to Admission medications    Medication Sig Start Date End Date Taking? Authorizing Provider   metoprolol succinate (TOPROL XL) 25 MG extended release tablet TAKE 1 TABLET BY MOUTH DAILY 8/1/22   Haylie Meeks MD   cyanocobalamin 1000 MCG tablet Take 1,000 mcg by mouth in the morning. Historical Provider, MD   folic acid (FOLVITE) 1 MG tablet Take 1 mg by mouth in the morning. Historical Provider, MD   sodium bicarbonate 650 MG tablet Take 1 tablet by mouth in the morning and 1 tablet at noon and 1 tablet before bedtime. 7/22/22 8/21/22  Elysia Salinas MD   ferrous sulfate (IRON 325) 325 (65 Fe) MG tablet Take 325 mg by mouth daily (with breakfast)    Historical Provider, MD   methIMAzole (TAPAZOLE) 5 MG tablet Take 5 mg by mouth daily    Historical Provider, MD   fluticasone-umeclidin-vilant (TRELEGY ELLIPTA) 100-62.5-25 MCG/INH AEPB Inhale 1 puff into the lungs daily    Historical Provider, MD   Wheat Dextrin (BENEFIBER DRINK MIX) PACK Take 1 packet by mouth daily    Historical Provider, MD   polyethylene glycol (GLYCOLAX) 17 g packet Take 17 g by mouth in the morning. Historical Provider, MD   amLODIPine (NORVASC) 5 MG tablet Take 5 mg by mouth daily    Historical Provider, MD   aspirin EC 81 MG EC tablet Take 1 tablet by mouth daily 4/19/22 4/19/23  MARKEL Kearns CNP   atorvastatin (LIPITOR) 80 MG tablet Take 1 tablet by mouth nightly 3/9/22   MARKEL Jaime CNP   gabapentin (NEURONTIN) 300 MG capsule Take 300 mg by mouth 4 times daily. 11/5/21   Historical Provider, MD        Facility Administered Medications:      Allergies:  No Known Allergies Pt examined.   Vital Signs:                                                 /66   Pulse 74   Temp 97.8 °F (36.6 °C) (Temporal)   Resp 20   Ht 5' 3\" (1.6 m)   Wt 152 lb 8.9 oz (69.2 kg)   SpO2 99%   BMI 27.02 kg/m²        Admission Weight: Weight: 145 lb (65.8 kg)      CV: reg  Pulm: decreased L base  Abd: soft  Ext: warm, trace RLE ankle/pedal edema    CBC:   Lab Results   Component Value Date/Time    WBC 6.6 08/16/2022 11:47 AM    HGB 8.1 08/16/2022 11:47 AM    HCT 25.0 08/16/2022 11:47 AM    MCV 84.5 08/16/2022 11:47 AM     08/16/2022 11:47 AM     BMP:   Lab Results   Component Value Date/Time     08/16/2022 11:47 AM    K 4.2 08/16/2022 11:47 AM    K 4.5 07/14/2022 07:35 PM     08/16/2022 11:47 AM    CO2 25 08/16/2022 11:47 AM    PHOS 1.4 03/05/2022 02:55 AM    BUN 13 08/16/2022 11:47 AM    CREATININE 1.7 08/16/2022 11:47 AM    CALCIUM 8.7 08/16/2022 11:47 AM    MG 2.20 07/22/2022 06:53 AM     Cardiac Enzymes:   Lab Results   Component Value Date/Time    TROPONINI 0.01 07/25/2022 09:42 AM     PT/INR:   Lab Results   Component Value Date/Time    PROTIME 14.7 08/16/2022 11:47 AM    INR 1.15 08/16/2022 11:47 AM     APTT:   Lab Results   Component Value Date/Time    APTT 30.8 08/16/2022 11:47 AM     Liver Profile:  Lab Results   Component Value Date/Time    AST 18 07/25/2022 09:42 AM    ALT 17 07/25/2022 09:42 AM    BILIDIR <0.2 02/28/2022 04:06 AM    BILITOT 0.5 07/25/2022 09:42 AM    ALKPHOS 55 07/25/2022 09:42 AM    LABALBU 3.5 07/25/2022 09:42 AM     Lab Results   Component Value Date/Time    CHOL 221 02/23/2022 12:29 PM    HDL 57 02/23/2022 12:29 PM    TRIG 78 02/23/2022 12:29 PM     HgbA1c:  Lab Results   Component Value Date/Time    LABA1C 5.7 02/23/2022 12:29 PM     UA:   Lab Results   Component Value Date/Time    COLORU Yellow 08/16/2022 11:46 AM    PHUR 7.5 08/16/2022 11:46 AM    WBCUA 0 07/21/2022 04:15 PM    RBCUA 1 07/21/2022 04:15 PM    BACTERIA None Seen 07/21/2022 04:15 PM    CLARITYU Clear 08/16/2022 11:46 AM    SPECGRAV 1.013 08/16/2022 11:46 AM    LEUKOCYTESUR Negative 08/16/2022 11:46 AM    UROBILINOGEN 0.2 08/16/2022 11:46 AM    BILIRUBINUR Negative 08/16/2022 11:46 AM    BILIRUBINUR Negative 08/10/2011 09:00 AM    BLOODU Negative 08/16/2022 11:46 AM    GLUCOSEU Negative 08/16/2022 11:46 AM    GLUCOSEU Negative 08/10/2011 09:00 AM       Reviewed above, reason for surgery, diagnosis and treatment plan including risks, benefits and alternatives. Will proceed with L pleurX placement    Alvaro Manuel MD  8/18/2022  11:21 AM      I have discussed with the patient the rationale for blood component transfusion; its benefits in treating or preventing fatigue, organ damage, or death; and its risk which includes mild transfusion reactions, rare risk of blood borne infection, or more serious but rare reactions. I have discussed the alternatives to transfusion, including the risk and consequences of not receiving transfusion. The patient had an opportunity to ask questions and had agreed to proceed with transfusion of blood components.

## 2022-08-19 NOTE — OP NOTE
[address] [place] [address] Paul López                                 OPERATIVE REPORT    PATIENT NAME: Juan Alberto Patton                   :        1940  MED REC NO:   [id number]                          ROOM:  ACCOUNT NO:   [de-identified]                           ADMIT DATE: 2022  PROVIDER:     Alberto Marie MD      DATE OF PROCEDURE:  2022    PREOPERATIVE DIAGNOSIS:  Left pleural effusion. POSTOPERATIVE DIAGNOSIS:  Left pleural effusion. OPERATION PERFORMED:  Left PleurX catheter placement. SURGEON:  Alberto aMrie MD    ASSISTANT:  Noreene Cheadle, SA    ANESTHESIA:  Local with IV sedation. INDICATIONS:  The patient is an 71-year-old female who underwent  coronary artery bypass grafting on 2022. On 2022, she was  noted to have opacification of the left hemithorax. A 14-Polish chest  tube was placed but removed prematurely on 2022. Left  thoracentesis was required on 07/15/2022, at which time, 500 mL was  drained. A 10-Polish tube was placed on 2022 and lytics were  instilled. The patient has trapped lung with a residual small space. She declined operative intervention for decortication and therefore  presents for left PleurX catheter placement for ongoing drainage. She  and her daughter are aware of the risks and possible complications of  the procedure and wish to proceed. OPERATIVE FINDINGS:  250 mL of serous fluid was withdrawn. OPERATIVE PROCEDURE:  The patient was brought to the operating room and  placed supine on the operating table. IV sedation was administered  without complication. The left chest and upper abdomen were prepped and  draped in the usual sterile fashion.   After assuring that preoperative  antibiotics had been given, 1% lidocaine was used to infiltrate the the  skin in the left upper quadrant as well as in the eighth intercostal  space anterior axillary line. Using Seldinger technique, the left  pleural space was accessed. The guidewire passed easily but only to a  limited length given the size of the residual cavity. Further 1%  lidocaine was used to infiltrate the soft tissues between these two  points. A 5-mm counterincision was made at each site. The sheath  introducer was passed over the guidewire, which was removed along with  the obturator. There was spontaneous return of serous fluid. The  PleurX catheter was tunneled from the inferior to superior position and  inserted through the introducer, which was then peeled away. The  catheter was attached to suction and a total of 250 mL was drained. The  catheter was capped. The small counterincision was closed with a single  Monocryl stitch, and the catheter secured with a silk stitch. Dermabond  was applied at the counterincision and the catheter was sterilely  dressed. The patient tolerated the procedure well without any complications. Estimated blood loss was less than 5 mL. The patient was transferred to  the recovery room in stable condition. Sponge and needle count was  correct at the end of the case.         Tono Chopra MD    D: 08/18/2022 12:31:51       T: 08/18/2022 13:03:08     [id number]  Job#: [id number]     Doc#: [id number]    CC:

## 2022-09-22 ENCOUNTER — TELEPHONE (OUTPATIENT)
Dept: CARDIOTHORACIC SURGERY | Age: 82
End: 2022-09-22

## 2022-09-22 NOTE — TELEPHONE ENCOUNTER
Pt daughter, Keira Bowers, called regarding the result of the pleurx collection today. States scant amount of fluid and some \"thick material\" was noted in the tubing. Pt without increased shortness of breath, and weight is reportedly stable, but has some varying pedal edema for which she takes a water pill. Will collect again in one week unless needed earlier. Continue to monitor.

## 2022-09-23 DIAGNOSIS — J90 PLEURAL EFFUSION: Primary | ICD-10-CM

## 2022-09-23 NOTE — PROGRESS NOTES
Dr. Mccray Shallow aware of pleurx collection volumes and has requested a CXR.  Pt daughter, Santos Harris, notified and will obtain on Tues, 9/27/22, when pt goes for cardiac rehab eval.

## 2022-09-27 ENCOUNTER — HOSPITAL ENCOUNTER (OUTPATIENT)
Dept: CARDIAC REHAB | Age: 82
Setting detail: THERAPIES SERIES
Discharge: HOME | End: 2022-09-27
Payer: MEDICARE

## 2022-09-27 ENCOUNTER — HOSPITAL ENCOUNTER (OUTPATIENT)
Dept: CARDIAC REHAB | Age: 82
Setting detail: THERAPIES SERIES
Discharge: HOME | End: 2022-09-19
Payer: MEDICARE

## 2022-09-27 PROCEDURE — 93798 PHYS/QHP OP CAR RHAB W/ECG: CPT

## 2022-09-27 RX ORDER — FUROSEMIDE 40 MG/1
40 TABLET ORAL DAILY
COMMUNITY

## 2022-09-29 ENCOUNTER — APPOINTMENT (OUTPATIENT)
Dept: CARDIAC REHAB | Age: 82
End: 2022-09-29
Payer: MEDICARE

## 2022-09-29 ENCOUNTER — TELEPHONE (OUTPATIENT)
Dept: CARDIOTHORACIC SURGERY | Age: 82
End: 2022-09-29

## 2022-09-29 NOTE — TELEPHONE ENCOUNTER
Spoke w/ patient's daughter this morning. She reports that similar to last week, they did not get much fluid at all out of her pleur-x. She saw fluid close to the top of her tube, but nothing actually came out. Patient was supposed to have cxr on 09/27, but they forgot. Her daughter asked if it was OK to take patient tomorrow after her rehab appt. Order is in epic. I asked daughter if patient is having any symptoms like SOB or pain. She denies that she is having any issues. Gave the OK to have cxr completed tomorrow. Will route to [place], NP tomorrow for review. Patient's daughter amenable to plan. Asked her to please call if anything changes. Update:     Patient had cxr taken today. DARRELL Ortiz PA reviewed imaging. She states her xr looks fine. There is a small left pleural effusion remaining, that looks improved from prior cxr. Lungs appear clear, and no evidence of pneumothorax. Plan to review with Dr. Jasmeet Purdy nurse on Monday 10/03, to determine what the next step for patient is (removal vs. Office visit vs. Tpa/dornase vs. Extending drainage periods). I reviewed this plan with patient's daughter & she is amenable at this time. I confirmed that patient is still not having any SOB or discomfort or complaints related to this issue.

## 2022-09-30 ENCOUNTER — APPOINTMENT (OUTPATIENT)
Dept: CARDIAC REHAB | Age: 82
End: 2022-09-30
Payer: MEDICARE

## 2022-09-30 ENCOUNTER — HOSPITAL ENCOUNTER (OUTPATIENT)
Dept: CARDIAC REHAB | Age: 82
Setting detail: THERAPIES SERIES
Discharge: HOME | End: 2022-09-30
Payer: MEDICARE

## 2022-09-30 ENCOUNTER — HOSPITAL ENCOUNTER (OUTPATIENT)
Dept: GENERAL RADIOLOGY | Age: 82
Discharge: HOME | End: 2022-09-30
Payer: MEDICARE

## 2022-09-30 ENCOUNTER — HOSPITAL ENCOUNTER (OUTPATIENT)
Age: 82
Discharge: HOME | End: 2022-09-30
Payer: MEDICARE

## 2022-09-30 DIAGNOSIS — J90 PLEURAL EFFUSION: ICD-10-CM

## 2022-09-30 PROCEDURE — 71046 X-RAY EXAM CHEST 2 VIEWS: CPT

## 2022-09-30 PROCEDURE — 93798 PHYS/QHP OP CAR RHAB W/ECG: CPT

## 2022-10-03 ENCOUNTER — APPOINTMENT (OUTPATIENT)
Dept: CARDIAC REHAB | Age: 82
End: 2022-10-03
Payer: MEDICARE

## 2022-10-03 ENCOUNTER — HOSPITAL ENCOUNTER (OUTPATIENT)
Dept: CARDIAC REHAB | Age: 82
Setting detail: THERAPIES SERIES
Discharge: HOME | End: 2022-10-03
Payer: MEDICARE

## 2022-10-03 PROCEDURE — 93798 PHYS/QHP OP CAR RHAB W/ECG: CPT

## 2022-10-04 ENCOUNTER — APPOINTMENT (OUTPATIENT)
Dept: CARDIAC REHAB | Age: 82
End: 2022-10-04
Payer: MEDICARE

## 2022-10-06 ENCOUNTER — APPOINTMENT (OUTPATIENT)
Dept: CARDIAC REHAB | Age: 82
End: 2022-10-06
Payer: MEDICARE

## 2022-10-07 ENCOUNTER — HOSPITAL ENCOUNTER (OUTPATIENT)
Dept: CARDIAC REHAB | Age: 82
Setting detail: THERAPIES SERIES
Discharge: HOME | End: 2022-10-07
Payer: MEDICARE

## 2022-10-07 ENCOUNTER — APPOINTMENT (OUTPATIENT)
Dept: CARDIAC REHAB | Age: 82
End: 2022-10-07
Payer: MEDICARE

## 2022-10-07 PROCEDURE — 93798 PHYS/QHP OP CAR RHAB W/ECG: CPT

## 2022-10-10 ENCOUNTER — HOSPITAL ENCOUNTER (OUTPATIENT)
Dept: CARDIAC REHAB | Age: 82
Setting detail: THERAPIES SERIES
Discharge: HOME | End: 2022-10-10
Payer: MEDICARE

## 2022-10-10 ENCOUNTER — TELEPHONE (OUTPATIENT)
Dept: CARDIOTHORACIC SURGERY | Age: 82
End: 2022-10-10

## 2022-10-10 ENCOUNTER — APPOINTMENT (OUTPATIENT)
Dept: CARDIAC REHAB | Age: 82
End: 2022-10-10
Payer: MEDICARE

## 2022-10-10 PROCEDURE — 93798 PHYS/QHP OP CAR RHAB W/ECG: CPT

## 2022-10-10 NOTE — TELEPHONE ENCOUNTER
Dr. Rojelio Oshea reviewed recent CXR. Pt may have pleurx removed. Spoke with daughter, Ken Emanuel. Prefers to have removed in OR at [facility]. Spoke with Romana Iba, sched. He will sched and contact daughter.

## 2022-10-11 ENCOUNTER — APPOINTMENT (OUTPATIENT)
Dept: CARDIAC REHAB | Age: 82
End: 2022-10-11
Payer: MEDICARE

## 2022-10-11 ENCOUNTER — TELEPHONE (OUTPATIENT)
Dept: CARDIOTHORACIC SURGERY | Age: 82
End: 2022-10-11

## 2022-10-11 NOTE — TELEPHONE ENCOUNTER
Spoke with patient's daughter regarding schedule of out patient procedure on 10/12/2022 at 12:30 pm with arrival time of 10:30 am at [facility] [place] with Dr. Gerda Weiss to include: removal of left pleur-x catheter.

## 2022-10-12 ENCOUNTER — ANESTHESIA EVENT (OUTPATIENT)
Dept: OPERATING ROOM | Age: 82
End: 2022-10-12
Payer: MEDICARE

## 2022-10-12 ENCOUNTER — HOSPITAL ENCOUNTER (OUTPATIENT)
Age: 82
Setting detail: OUTPATIENT SURGERY
Discharge: HOME | End: 2022-10-12
Attending: THORACIC SURGERY (CARDIOTHORACIC VASCULAR SURGERY) | Admitting: THORACIC SURGERY (CARDIOTHORACIC VASCULAR SURGERY)
Payer: MEDICARE

## 2022-10-12 ENCOUNTER — ANESTHESIA (OUTPATIENT)
Dept: OPERATING ROOM | Age: 82
End: 2022-10-12
Payer: MEDICARE

## 2022-10-12 VITALS
RESPIRATION RATE: 16 BRPM | HEART RATE: 74 BPM | BODY MASS INDEX: 25.87 KG/M2 | DIASTOLIC BLOOD PRESSURE: 85 MMHG | OXYGEN SATURATION: 100 % | HEIGHT: 63 IN | WEIGHT: 146 LBS | TEMPERATURE: 97.8 F | SYSTOLIC BLOOD PRESSURE: 137 MMHG

## 2022-10-12 PROCEDURE — 7100000000 HC PACU RECOVERY - FIRST 15 MIN: Performed by: THORACIC SURGERY (CARDIOTHORACIC VASCULAR SURGERY)

## 2022-10-12 PROCEDURE — 2500000003 HC RX 250 WO HCPCS: Performed by: THORACIC SURGERY (CARDIOTHORACIC VASCULAR SURGERY)

## 2022-10-12 PROCEDURE — 3700000000 HC ANESTHESIA ATTENDED CARE: Performed by: THORACIC SURGERY (CARDIOTHORACIC VASCULAR SURGERY)

## 2022-10-12 PROCEDURE — 2500000003 HC RX 250 WO HCPCS: Performed by: NURSE ANESTHETIST, CERTIFIED REGISTERED

## 2022-10-12 PROCEDURE — C1729 CATH, DRAINAGE: HCPCS | Performed by: THORACIC SURGERY (CARDIOTHORACIC VASCULAR SURGERY)

## 2022-10-12 PROCEDURE — 7100000011 HC PHASE II RECOVERY - ADDTL 15 MIN: Performed by: THORACIC SURGERY (CARDIOTHORACIC VASCULAR SURGERY)

## 2022-10-12 PROCEDURE — 2580000003 HC RX 258: Performed by: NURSE ANESTHETIST, CERTIFIED REGISTERED

## 2022-10-12 PROCEDURE — 3700000001 HC ADD 15 MINUTES (ANESTHESIA): Performed by: THORACIC SURGERY (CARDIOTHORACIC VASCULAR SURGERY)

## 2022-10-12 PROCEDURE — 3600000002 HC SURGERY LEVEL 2 BASE: Performed by: THORACIC SURGERY (CARDIOTHORACIC VASCULAR SURGERY)

## 2022-10-12 PROCEDURE — 6360000002 HC RX W HCPCS: Performed by: NURSE ANESTHETIST, CERTIFIED REGISTERED

## 2022-10-12 PROCEDURE — 7100000010 HC PHASE II RECOVERY - FIRST 15 MIN: Performed by: THORACIC SURGERY (CARDIOTHORACIC VASCULAR SURGERY)

## 2022-10-12 PROCEDURE — 32552 REMOVE LUNG CATHETER: CPT | Performed by: THORACIC SURGERY (CARDIOTHORACIC VASCULAR SURGERY)

## 2022-10-12 PROCEDURE — 3600000012 HC SURGERY LEVEL 2 ADDTL 15MIN: Performed by: THORACIC SURGERY (CARDIOTHORACIC VASCULAR SURGERY)

## 2022-10-12 PROCEDURE — 7100000001 HC PACU RECOVERY - ADDTL 15 MIN: Performed by: THORACIC SURGERY (CARDIOTHORACIC VASCULAR SURGERY)

## 2022-10-12 PROCEDURE — 2709999900 HC NON-CHARGEABLE SUPPLY: Performed by: THORACIC SURGERY (CARDIOTHORACIC VASCULAR SURGERY)

## 2022-10-12 RX ORDER — SODIUM CHLORIDE 0.9 % (FLUSH) 0.9 %
5-40 SYRINGE (ML) INJECTION 2 TIMES DAILY
Status: DISCONTINUED | OUTPATIENT
Start: 2022-10-12 | End: 2022-10-12 | Stop reason: HOSPADM

## 2022-10-12 RX ORDER — SODIUM CHLORIDE 0.9 % (FLUSH) 0.9 %
5-40 SYRINGE (ML) INJECTION PRN
Status: DISCONTINUED | OUTPATIENT
Start: 2022-10-12 | End: 2022-10-12 | Stop reason: HOSPADM

## 2022-10-12 RX ORDER — PROPOFOL 10 MG/ML
INJECTION, EMULSION INTRAVENOUS CONTINUOUS PRN
Status: DISCONTINUED | OUTPATIENT
Start: 2022-10-12 | End: 2022-10-12 | Stop reason: SDUPTHER

## 2022-10-12 RX ORDER — SODIUM CHLORIDE 0.9 % (FLUSH) 0.9 %
5-40 SYRINGE (ML) INJECTION EVERY 12 HOURS SCHEDULED
Status: DISCONTINUED | OUTPATIENT
Start: 2022-10-12 | End: 2022-10-12 | Stop reason: HOSPADM

## 2022-10-12 RX ORDER — BUPIVACAINE HYDROCHLORIDE 5 MG/ML
INJECTION, SOLUTION EPIDURAL; INTRACAUDAL
Status: COMPLETED | OUTPATIENT
Start: 2022-10-12 | End: 2022-10-12

## 2022-10-12 RX ORDER — SODIUM CHLORIDE 9 MG/ML
INJECTION, SOLUTION INTRAVENOUS PRN
Status: DISCONTINUED | OUTPATIENT
Start: 2022-10-12 | End: 2022-10-12 | Stop reason: HOSPADM

## 2022-10-12 RX ORDER — LIDOCAINE HYDROCHLORIDE 20 MG/ML
INJECTION, SOLUTION EPIDURAL; INFILTRATION; INTRACAUDAL; PERINEURAL PRN
Status: DISCONTINUED | OUTPATIENT
Start: 2022-10-12 | End: 2022-10-12 | Stop reason: SDUPTHER

## 2022-10-12 RX ORDER — CEFAZOLIN SODIUM 1 G/3ML
INJECTION, POWDER, FOR SOLUTION INTRAMUSCULAR; INTRAVENOUS PRN
Status: DISCONTINUED | OUTPATIENT
Start: 2022-10-12 | End: 2022-10-12 | Stop reason: SDUPTHER

## 2022-10-12 RX ORDER — SODIUM CHLORIDE 9 MG/ML
INJECTION, SOLUTION INTRAVENOUS CONTINUOUS PRN
Status: DISCONTINUED | OUTPATIENT
Start: 2022-10-12 | End: 2022-10-12 | Stop reason: SDUPTHER

## 2022-10-12 RX ADMIN — CEFAZOLIN SODIUM 1 G: 1 INJECTION, POWDER, FOR SOLUTION INTRAMUSCULAR; INTRAVENOUS at 13:30

## 2022-10-12 RX ADMIN — SODIUM CHLORIDE: 9 INJECTION, SOLUTION INTRAVENOUS at 13:24

## 2022-10-12 RX ADMIN — PROPOFOL 100 MCG/KG/MIN: 10 INJECTION, EMULSION INTRAVENOUS at 13:31

## 2022-10-12 RX ADMIN — LIDOCAINE HYDROCHLORIDE 60 MG: 20 INJECTION, SOLUTION EPIDURAL; INFILTRATION; INTRACAUDAL; PERINEURAL at 13:31

## 2022-10-12 ASSESSMENT — LIFESTYLE VARIABLES: SMOKING_STATUS: 0

## 2022-10-12 ASSESSMENT — ENCOUNTER SYMPTOMS: SHORTNESS OF BREATH: 1

## 2022-10-12 ASSESSMENT — PAIN SCALES - GENERAL: PAINLEVEL_OUTOF10: 0

## 2022-10-12 ASSESSMENT — PAIN - FUNCTIONAL ASSESSMENT: PAIN_FUNCTIONAL_ASSESSMENT: 0-10

## 2022-10-12 NOTE — BRIEF OP NOTE
Date: 10/12/2022  Patient:  Tonja Encarnacion        Brief Operative Note       Pre-op Diagnosis:  s/p L pleurX    Post-op Diagnosis:  same    Procedure:  L pleurX catheter removal    Surgeon:  Annie Tidwell. Emmett Tony MD    Assistant(s):  Romelia    Anesthesia: local with iv sedation    EBL: <5cc    Specimens:  none    Findings:  cuff well incorporated    Complications:  none    Disposition: To PACU in stable condition    Post-Op Note:  Dictated.     Merline Sachs, MD  10/12/2022  1:43 PM

## 2022-10-12 NOTE — PROGRESS NOTES
Vss. Dressing and abd appear the same Tolerated sitting up and po fluids and crackers well. Daughter at bedside. Verbalized understanding of discharge instructions. Ambulate to restroom and voided.

## 2022-10-12 NOTE — H&P
Previous H+P on chart 8/18/22:  Nikky Sanchez is a 81 y.o. female s/p cabgx4, EDISON exclusion 2/25/22. cxr 6/25/22 opacification L hemithorax, CT chest 6/26/22 L pleural effusion, 14 Fr chest tube placed 6/26, pt removed 6/29. L thoracentesis 7/15/22 500cc. 10 Fr IR tube 7/18, lytics. Trapped lung with residual space. \"  5/18/22 L pleurX catheter placement  Minimal drainage, no residual on cxr. No other changes    Past Medical History:  Past Medical History:   Diagnosis Date    Arthritis     Asthma     as child grew out of it    CAD (coronary artery disease) 02/22/2022    multi vessel    CKD (chronic kidney disease) 2015    Elevated creatinine since 2015; Stage III b    CKD (chronic kidney disease) 12/01/2019    stg 3    Glaucoma     Hyperlipidemia     Hypertension     Hyperthyroidism 2019    Graves disease    IBS (irritable bowel syndrome) 10/2020    On Linzess    Lung abnormality 08/2022    fluid collecting in left lung    Neuropathy     Peripheral    Obesity (BMI 30-39. 9)     Osteopenia 05/2009    Osteoporosis        Past Surgical History:  Past Surgical History:   Procedure Laterality Date    ANKLE FRACTURE SURGERY Left 02/12/2015    CARDIAC SURGERY  04/08/2022    s/p CABG X4    CHOLECYSTECTOMY      COLONOSCOPY      CORONARY ARTERY BYPASS GRAFT  02/25/2022    cabgx4, EDISON exclusion    CORONARY ARTERY BYPASS GRAFT N/A 02/25/2022    TRANSESOPHAGEAL ECHOCARDIOGRAM, TOTAL CARDIOPULMONARY BYPASS, TOTAL CARDIOPULMONARY BYPASS GRAFTING X4 (1 ARTERY, 3 VEIN) USING LEFT INTERNAL MAMMARY ARTERY AND RIGHT ENDOSCOPICALLY HARVESTED SAPHENOUS VEIN, LEFT ATRIAL APPENDAGE CLIP WITH 35MM ATRICLIP performed by Marisol Valentine MD at [place] [id number]  07/18/2022    CT GUIDED CHEST TUBE 7/18/2022 [facility] CT    DIAGNOSTIC CARDIAC CATH LAB PROCEDURE  02/2022    FOOT SURGERY      bilateral, hammer toes    GASTRIC BAND  08/23/2011    laproscopic    HAND SURGERY  2009    trigger finger    IR TUNNELED CATHETER PLACEMENT GREATER THAN 5 YEARS  03/07/2022    IR TUNNELED CATHETER PLACEMENT GREATER THAN 5 YEARS 3/7/2022 [facility] PROC[facility]    JOINT REPLACEMENT Left 04/2014    hip placement    ORIF FEMUR DECOMPRESSION Left 04/2014    OTHER SURGICAL HISTORY Right 2010    mass behind ear    PLEURAL CATH INSERTION Left 8/18/2022    LEFT PLEUR-X CATHETER PLACEMENT performed by Dal Cabot, MD at [facility] Medications:   Prior to Admission medications    Medication Sig Start Date End Date Taking? Authorizing Provider   furosemide (LASIX) 40 MG tablet Take 40 mg by mouth daily    Historical Provider, MD   metoprolol succinate (TOPROL XL) 25 MG extended release tablet TAKE 1 TABLET BY MOUTH DAILY 8/1/22   Toma Oswald MD   cyanocobalamin 1000 MCG tablet Take 1,000 mcg by mouth in the morning. Historical Provider, MD   folic acid (FOLVITE) 1 MG tablet Take 1 mg by mouth in the morning. Historical Provider, MD   ferrous sulfate (IRON 325) 325 (65 Fe) MG tablet Take 325 mg by mouth daily (with breakfast)    Historical Provider, MD   methIMAzole (TAPAZOLE) 5 MG tablet Take 5 mg by mouth daily    Historical Provider, MD   fluticasone-umeclidin-vilant (TRELEGY ELLIPTA) 100-62.5-25 MCG/INH AEPB Inhale 1 puff into the lungs daily    Historical Provider, MD   Wheat Dextrin (BENEFIBER DRINK MIX) PACK Take 1 packet by mouth daily    Historical Provider, MD   polyethylene glycol (GLYCOLAX) 17 g packet Take 17 g by mouth in the morning. Historical Provider, MD   amLODIPine (NORVASC) 5 MG tablet Take 5 mg by mouth daily    Historical Provider, MD   aspirin EC 81 MG EC tablet Take 1 tablet by mouth daily 4/19/22 4/19/23  Renae Hernandez APRN - CNP   atorvastatin (LIPITOR) 80 MG tablet Take 1 tablet by mouth nightly 3/9/22   Juan Laguna APRN - CNP   gabapentin (NEURONTIN) 300 MG capsule Take 300 mg by mouth 4 times daily.   11/5/21   Historical Provider, MD        Facility Administered Medications:    sodium chloride flush  5-40 mL IntraVENous BID    sodium chloride flush  5-40 mL IntraVENous 2 times per day       Allergies:  No Known Allergies     Pt examined. Vital Signs:                                                 BP (!) 159/76   Pulse 59   Resp 16   Ht 5' 3\" (1.6 m)   Wt 146 lb (66.2 kg)   SpO2 97%   BMI 25.86 kg/m²        Admission Weight: Weight: 146 lb (66.2 kg)      CV: reg  Pulm: decreased L base. L pleurX.   Abd: soft  Ext: warm    CBC:   Lab Results   Component Value Date/Time    WBC 6.6 08/16/2022 11:47 AM    HGB 8.1 08/16/2022 11:47 AM    HCT 25.0 08/16/2022 11:47 AM    MCV 84.5 08/16/2022 11:47 AM     08/16/2022 11:47 AM     BMP:   Lab Results   Component Value Date/Time     08/16/2022 11:47 AM    K 4.2 08/16/2022 11:47 AM    K 4.5 07/14/2022 07:35 PM     08/16/2022 11:47 AM    CO2 25 08/16/2022 11:47 AM    PHOS 1.4 03/05/2022 02:55 AM    BUN 13 08/16/2022 11:47 AM    CREATININE 1.7 08/16/2022 11:47 AM    CALCIUM 8.7 08/16/2022 11:47 AM    MG 2.20 07/22/2022 06:53 AM     Cardiac Enzymes:   Lab Results   Component Value Date/Time    TROPONINI 0.01 07/25/2022 09:42 AM     PT/INR:   Lab Results   Component Value Date/Time    PROTIME 14.7 08/16/2022 11:47 AM    INR 1.15 08/16/2022 11:47 AM     APTT:   Lab Results   Component Value Date/Time    APTT 30.8 08/16/2022 11:47 AM     Liver Profile:  Lab Results   Component Value Date/Time    AST 18 07/25/2022 09:42 AM    ALT 17 07/25/2022 09:42 AM    BILIDIR <0.2 02/28/2022 04:06 AM    BILITOT 0.5 07/25/2022 09:42 AM    ALKPHOS 55 07/25/2022 09:42 AM    LABALBU 3.5 07/25/2022 09:42 AM     Lab Results   Component Value Date/Time    CHOL 221 02/23/2022 12:29 PM    HDL 57 02/23/2022 12:29 PM    TRIG 78 02/23/2022 12:29 PM     HgbA1c:  Lab Results   Component Value Date/Time    LABA1C 5.7 02/23/2022 12:29 PM     UA:   Lab Results   Component Value Date/Time    COLORU Yellow 08/16/2022 11:46 AM    PHUR 7.5 08/16/2022 11:46 AM    WBCUA 0 07/21/2022 04:15 PM    RBCUA 1 07/21/2022 04:15 PM    BACTERIA None Seen 07/21/2022 04:15 PM    CLARITYU Clear 08/16/2022 11:46 AM    SPECGRAV 1.013 08/16/2022 11:46 AM    LEUKOCYTESUR Negative 08/16/2022 11:46 AM    UROBILINOGEN 0.2 08/16/2022 11:46 AM    BILIRUBINUR Negative 08/16/2022 11:46 AM    BILIRUBINUR Negative 08/10/2011 09:00 AM    BLOODU Negative 08/16/2022 11:46 AM    GLUCOSEU Negative 08/16/2022 11:46 AM    GLUCOSEU Negative 08/10/2011 09:00 AM       Reviewed above, reason for surgery, diagnosis and treatment plan including risks, benefits and alternatives.   Will proceed with pleurX removal    Carolyn Lizarraga MD  10/12/2022  1:19 PM

## 2022-10-12 NOTE — ANESTHESIA PRE PROCEDURE
Department of Anesthesiology  Preprocedure Note       Name:  Salomón Bacon   Age:  81 y.o.  :  1940                                          MRN:  [id number]         Date:  10/12/2022      Surgeon: Napoleon Tobias):  Lalitha Lewis MD    Procedure: Procedure(s):  REMOVAL OF LEFT PLEUR-X CATHETER    Medications prior to admission:   Prior to Admission medications    Medication Sig Start Date End Date Taking? Authorizing Provider   furosemide (LASIX) 40 MG tablet Take 40 mg by mouth daily    Historical Provider, MD   metoprolol succinate (TOPROL XL) 25 MG extended release tablet TAKE 1 TABLET BY MOUTH DAILY 22   Kishor Marte MD   cyanocobalamin 1000 MCG tablet Take 1,000 mcg by mouth in the morning. Historical Provider, MD   folic acid (FOLVITE) 1 MG tablet Take 1 mg by mouth in the morning. Historical Provider, MD   ferrous sulfate (IRON 325) 325 (65 Fe) MG tablet Take 325 mg by mouth daily (with breakfast)    Historical Provider, MD   methIMAzole (TAPAZOLE) 5 MG tablet Take 5 mg by mouth daily    Historical Provider, MD   fluticasone-umeclidin-vilant (TRELEGY ELLIPTA) 100-62.5-25 MCG/INH AEPB Inhale 1 puff into the lungs daily    Historical Provider, MD   Wheat Dextrin (BENEFIBER DRINK MIX) PACK Take 1 packet by mouth daily    Historical Provider, MD   polyethylene glycol (GLYCOLAX) 17 g packet Take 17 g by mouth in the morning. Historical Provider, MD   amLODIPine (NORVASC) 5 MG tablet Take 5 mg by mouth daily    Historical Provider, MD   aspirin EC 81 MG EC tablet Take 1 tablet by mouth daily 22  Renae Kaba APRN - CNP   atorvastatin (LIPITOR) 80 MG tablet Take 1 tablet by mouth nightly 3/9/22   MARKEL Swanson - CNP   gabapentin (NEURONTIN) 300 MG capsule Take 300 mg by mouth 4 times daily. 21   Historical Provider, MD       Current medications:    No current facility-administered medications for this visit.      No current outpatient medications on file.     Facility-Administered Medications Ordered in Other Visits   Medication Dose Route Frequency Provider Last Rate Last Admin    sodium chloride flush 0.9 % injection 5-40 mL  5-40 mL IntraVENous BID MARKEL Swanson CNP           Allergies:  No Known Allergies    Problem List:    Patient Active Problem List   Diagnosis Code    Morbid obesity ([facility] [address] E66.01    Chest pain R07.9    Unstable angina ([facility] [address] I20.0    Abnormal cardiovascular stress test R94.39    Hyperlipidemia LDL goal <70 E78.5    CAD in native artery I25.10    Loculated pleural effusion on the left (Large) J90    Chronic kidney disease (CKD) stage G3b/A2, moderately decreased glomerular filtration rate (GFR) between 30-44 mL/min/1.73 square meter and albuminuria creatinine ratio between  mg/g ([facility]) N18.32    Generalized weakness R53.1    Fatigue R53.83    Pleural effusion J90    Hx of CABG Z95.1    Hyperthyroidism E05.90    HTN (hypertension) I10       Past Medical History:        Diagnosis Date    Arthritis     Asthma     as child grew out of it    CAD (coronary artery disease) 02/22/2022    multi vessel    CKD (chronic kidney disease) 2015    Elevated creatinine since 2015; Stage III b    CKD (chronic kidney disease) 12/01/2019    stg 3    Glaucoma     Hyperlipidemia     Hypertension     Hyperthyroidism 2019    Graves disease    IBS (irritable bowel syndrome) 10/2020    On Linzess    Lung abnormality 08/2022    fluid collecting in left lung    Neuropathy     Peripheral    Obesity (BMI 30-39. 9)     Osteopenia 05/2009    Osteoporosis        Past Surgical History:        Procedure Laterality Date    ANKLE FRACTURE SURGERY Left 02/12/2015    CARDIAC SURGERY  04/08/2022    s/p CABG X4    CHOLECYSTECTOMY      COLONOSCOPY      CORONARY ARTERY BYPASS GRAFT  02/25/2022    cabgx4, EDISON exclusion    CORONARY ARTERY BYPASS GRAFT N/A 02/25/2022    TRANSESOPHAGEAL ECHOCARDIOGRAM, TOTAL CARDIOPULMONARY BYPASS, TOTAL CARDIOPULMONARY BYPASS GRAFTING X4 (1 ARTERY, 3 VEIN) USING LEFT INTERNAL MAMMARY ARTERY AND RIGHT ENDOSCOPICALLY HARVESTED SAPHENOUS VEIN, LEFT ATRIAL APPENDAGE CLIP WITH 35MM ATRICLIP performed by Lorice Cheadle, MD at [address] [place] [address]  2022    CT GUIDED CHEST TUBE 2022 [facility] CT    DIAGNOSTIC CARDIAC CATH LAB PROCEDURE  2022    FOOT SURGERY      bilateral, hammer toes    GASTRIC BAND  2011    laproscopic    HAND SURGERY  2009    trigger finger    IR TUNNELED CATHETER PLACEMENT GREATER THAN 5 YEARS  2022    IR TUNNELED CATHETER PLACEMENT GREATER THAN 5 YEARS 3/7/2022 [facility]    JOINT REPLACEMENT Left 2014    hip placement    ORIF FEMUR DECOMPRESSION Left 2014    OTHER SURGICAL HISTORY Right     mass behind ear    PLEURAL CATH INSERTION Left 2022    LEFT PLEUR-X CATHETER PLACEMENT performed by Lorice Cheadle, MD at [place] [address] History:    Social History     Tobacco Use    Smoking status: Former     Types: Cigarettes     Quit date: 8/10/1999     Years since quittin.1    Smokeless tobacco: Never    Tobacco comments:     started age 26   Substance Use Topics    Alcohol use: No                                Counseling given: Not Answered  Tobacco comments: started age 26      Vital Signs (Current): There were no vitals filed for this visit.                                            BP Readings from Last 3 Encounters:   10/12/22 (!) 159/76   22 (!) 150/77   22 (!) 140/76       NPO Status:                                                                                 BMI:   Wt Readings from Last 3 Encounters:   10/12/22 146 lb (66.2 kg)   22 152 lb 8.9 oz (69.2 kg)   22 144 lb 6.4 oz (65.5 kg)     There is no height or weight on file to calculate BMI.    CBC:   Lab Results   Component Value Date/Time    WBC 6.6 2022 11:47 AM    RBC 2.96 2022 11:47 AM    HGB 8.1 08/16/2022 11:47 AM    HCT 25.0 08/16/2022 11:47 AM    MCV 84.5 08/16/2022 11:47 AM    RDW 20.2 08/16/2022 11:47 AM     08/16/2022 11:47 AM       CMP:   Lab Results   Component Value Date/Time     08/16/2022 11:47 AM    K 4.2 08/16/2022 11:47 AM    K 4.5 07/14/2022 07:35 PM     08/16/2022 11:47 AM    CO2 25 08/16/2022 11:47 AM    BUN 13 08/16/2022 11:47 AM    CREATININE 1.7 08/16/2022 11:47 AM    GFRAA 35 08/16/2022 11:47 AM    GFRAA 66 08/10/2011 08:35 AM    AGRATIO 1.0 07/25/2022 09:42 AM    LABGLOM 29 08/16/2022 11:47 AM    GLUCOSE 115 08/16/2022 11:47 AM    PROT 6.9 07/25/2022 09:42 AM    PROT 7.2 08/10/2011 08:35 AM    CALCIUM 8.7 08/16/2022 11:47 AM    BILITOT 0.5 07/25/2022 09:42 AM    ALKPHOS 55 07/25/2022 09:42 AM    AST 18 07/25/2022 09:42 AM    ALT 17 07/25/2022 09:42 AM       POC Tests:   No results for input(s): POCGLU, POCNA, POCK, POCCL, POCBUN, POCHEMO, POCHCT in the last 72 hours.     Coags:   Lab Results   Component Value Date/Time    PROTIME 14.7 08/16/2022 11:47 AM    INR 1.15 08/16/2022 11:47 AM    APTT 30.8 08/16/2022 11:47 AM       HCG (If Applicable): No results found for: PREGTESTUR, PREGSERUM, HCG, HCGQUANT     ABGs:   Lab Results   Component Value Date/Time    PHART 7.410 02/27/2022 10:08 PM    PO2ART 79.3 02/27/2022 10:08 PM    OBD7TUS 28.1 02/27/2022 10:08 PM    MHW9SNT 17.8 02/27/2022 10:08 PM    BEART -7 02/27/2022 10:08 PM    G5WMIUBR 96 02/27/2022 10:08 PM        Type & Screen (If Applicable):  No results found for: LABABO, LABRH    Drug/Infectious Status (If Applicable):  No results found for: HIV, HEPCAB    COVID-19 Screening (If Applicable):   Lab Results   Component Value Date/Time    COVID19 Not Detected 07/14/2022 08:05 PM           Anesthesia Evaluation  Patient summary reviewed and Nursing notes reviewed no history of anesthetic complications:   Airway: Mallampati: III  TM distance: >3 FB   Neck ROM: full  Mouth opening: > = 3 FB   Dental:      Comment: No loose teeth    Pulmonary: breath sounds clear to auscultation  (+) shortness of breath:  asthma:     (-) pneumonia, COPD, recent URI, sleep apnea and not a current smoker                          ROS comment: Left  Pleural effusion   Cardiovascular:    (+) hypertension:, angina:, past MI:, CAD:, CABG/stent (CABG 2-):, JHA:, hyperlipidemia    (-) pacemaker, valvular problems/murmurs, dysrhythmias,  CHF, orthopnea, PND and no pulmonary hypertension    ECG reviewed  Rhythm: regular    Echocardiogram reviewed    Cleared by cardiology     Beta Blocker:  Dose within 24 Hrs         Neuro/Psych:      (-) seizures, neuromuscular disease, TIA, CVA, headaches and depression/anxiety            GI/Hepatic/Renal:        (-) hiatal hernia, GERD, PUD, hepatitis, liver disease, bowel prep and no morbid obesity       Endo/Other:    (+) hyperthyroidism::., no malignancy/cancer. (-) diabetes mellitus, hypothyroidism, blood dyscrasia, no electrolyte abnormalities, no malignancy/cancer               Abdominal:             Vascular:     - PVD, DVT and PE. Other Findings:             Anesthesia Plan      MAC     ASA 4       Induction: intravenous. MIPS: Postoperative opioids intended and Prophylactic antiemetics administered. Anesthetic plan and risks discussed with patient. Plan discussed with CRNA. This pre-anesthesia assessment may be used as a history and physical.    DOS STAFF ADDENDUM:    Pt seen and examined, chart reviewed (including anesthesia, drug and allergy history). No interval changes to history and physical examination. Anesthetic plan, risks, benefits, alternatives, and personnel involved discussed with patient. Patient verbalized an understanding and agrees to proceed.       Alfonso Knight MD  October 12, 2022  12:56 PM

## 2022-10-12 NOTE — ANESTHESIA POSTPROCEDURE EVALUATION
Department of Anesthesiology  Postprocedure Note    Patient: Veronica Hope  MRN: [id number]  YOB: 1940  Date of evaluation: 10/12/2022      Procedure Summary     Date: 10/12/22 Room / Location: [address]    Anesthesia Start: 5033 Anesthesia Stop: 3650    Procedure: REMOVAL OF LEFT PLEUR-X CATHETER (Left) Diagnosis:       Pleural effusion      (LEFT PLEURAL EFFUSION)    Surgeons: Devendra Lind MD Responsible Provider: Aman Bacon MD    Anesthesia Type: MAC ASA Status: 4          Anesthesia Type: No value filed.     Jordy Phase I: Jordy Score: 10    Jordy Phase II: Jordy Score: 10      Anesthesia Post Evaluation    Patient location during evaluation: PACU  Patient participation: complete - patient participated  Level of consciousness: awake and alert  Airway patency: patent  Nausea & Vomiting: no nausea and no vomiting  Complications: no  Cardiovascular status: hemodynamically stable  Respiratory status: acceptable  Hydration status: stable

## 2022-10-13 ENCOUNTER — APPOINTMENT (OUTPATIENT)
Dept: CARDIAC REHAB | Age: 82
End: 2022-10-13
Payer: MEDICARE

## 2022-10-13 NOTE — OP NOTE
[address]                                OPERATIVE REPORT    PATIENT NAME: Yanci Lewis                   :        1940  MED REC NO:   [id number]                          ROOM:  ACCOUNT NO:   [de-identified]                           ADMIT DATE: 10/12/2022  PROVIDER:     Delfino Malin MD    DATE OF PROCEDURE:  10/12/2022    PREOPERATIVE DIAGNOSIS:  Status post left PleurX catheter placement. POSTOPERATIVE DIAGNOSIS:  Status post left PleurX catheter placement. OPERATION PERFORMED:  Left PleurX catheter removal.    ATTENDING SURGEON:  Delfino Malin MD    ASSISTANT:  Arnoldo Aguirre SA    ANESTHESIA:  Local with IV sedation. INDICATIONS:  The patient is an 81-year-old female who developed a left  pleural effusion in 2022. A 14-Bengali chest tube was placed and  removed 3 days later. Left thoracentesis was performed on 07/15/2022. It was noted that there was trapped lung. The patient then underwent  left PleurX catheter placement 2022. There has been scant  drainage and no evidence of recurrence on chest x-ray. The patient  presents today for removal of the PleurX catheter. She and her daughter  are aware of the risks and possible complications of the procedure and  wished to proceed. OPERATIVE FINDINGS:  The cuff was well incorporated. PROCEDURE:  The patient was brought to the operating room and placed  supine on the operating table. IV sedation was administered without  complication. The patient was prepped and draped in the usual sterile  fashion. After assuring that preoperative antibiotics had been given,  the PleurX catheter exit site was infiltrated with 0.5% Marcaine. The  stitch securing the catheter was cut and the residual piece removed from  the skin. The Velcro cuff was dissected out. Gentle traction was  placed on the catheter, which then slid out. Pressure was held to  achieve hemostasis. A sterile dressing was applied. The patient  tolerated the procedure well without any complications. Estimated blood  loss was less than 5 mL. The patient was transferred extubated in  stable condition on no drips to the recovery room. Sponge and needle  count was correct at the end of the case.         Arina Fernandez MD    D: 10/12/2022 15:53:36       T: 10/12/2022 17:20:11     [id number]  Job#: [id number]     Doc#: [id number]    CC: - - -

## 2022-10-14 ENCOUNTER — HOSPITAL ENCOUNTER (OUTPATIENT)
Dept: CARDIAC REHAB | Age: 82
Setting detail: THERAPIES SERIES
Discharge: HOME | End: 2022-10-14
Payer: MEDICARE

## 2022-10-14 ENCOUNTER — APPOINTMENT (OUTPATIENT)
Dept: CARDIAC REHAB | Age: 82
End: 2022-10-14
Payer: MEDICARE

## 2022-10-14 PROCEDURE — 93798 PHYS/QHP OP CAR RHAB W/ECG: CPT

## 2022-10-17 ENCOUNTER — HOSPITAL ENCOUNTER (OUTPATIENT)
Dept: CARDIAC REHAB | Age: 82
Setting detail: THERAPIES SERIES
Discharge: HOME | End: 2022-10-17
Payer: MEDICARE

## 2022-10-17 ENCOUNTER — APPOINTMENT (OUTPATIENT)
Dept: CARDIAC REHAB | Age: 82
End: 2022-10-17
Payer: MEDICARE

## 2022-10-17 PROCEDURE — 93798 PHYS/QHP OP CAR RHAB W/ECG: CPT

## 2022-10-18 ENCOUNTER — APPOINTMENT (OUTPATIENT)
Dept: CARDIAC REHAB | Age: 82
End: 2022-10-18
Payer: MEDICARE

## 2022-10-20 ENCOUNTER — APPOINTMENT (OUTPATIENT)
Dept: CARDIAC REHAB | Age: 82
End: 2022-10-20
Payer: MEDICARE

## 2022-10-21 ENCOUNTER — APPOINTMENT (OUTPATIENT)
Dept: CARDIAC REHAB | Age: 82
End: 2022-10-21
Payer: MEDICARE

## 2022-10-21 ENCOUNTER — HOSPITAL ENCOUNTER (OUTPATIENT)
Dept: CARDIAC REHAB | Age: 82
Setting detail: THERAPIES SERIES
Discharge: HOME | End: 2022-10-21
Payer: MEDICARE

## 2022-10-21 PROCEDURE — 93798 PHYS/QHP OP CAR RHAB W/ECG: CPT

## 2022-10-24 ENCOUNTER — APPOINTMENT (OUTPATIENT)
Dept: CARDIAC REHAB | Age: 82
End: 2022-10-24
Payer: MEDICARE

## 2022-10-24 ENCOUNTER — HOSPITAL ENCOUNTER (OUTPATIENT)
Dept: CARDIAC REHAB | Age: 82
Setting detail: THERAPIES SERIES
Discharge: HOME | End: 2022-10-24
Payer: MEDICARE

## 2022-10-24 PROCEDURE — 93798 PHYS/QHP OP CAR RHAB W/ECG: CPT

## 2022-10-25 ENCOUNTER — APPOINTMENT (OUTPATIENT)
Dept: CARDIAC REHAB | Age: 82
End: 2022-10-25
Payer: MEDICARE

## 2022-10-27 ENCOUNTER — APPOINTMENT (OUTPATIENT)
Dept: CARDIAC REHAB | Age: 82
End: 2022-10-27
Payer: MEDICARE

## 2022-10-28 ENCOUNTER — HOSPITAL ENCOUNTER (OUTPATIENT)
Dept: CARDIAC REHAB | Age: 82
Setting detail: THERAPIES SERIES
Discharge: HOME | End: 2022-10-28
Payer: MEDICARE

## 2022-10-28 ENCOUNTER — APPOINTMENT (OUTPATIENT)
Dept: CARDIAC REHAB | Age: 82
End: 2022-10-28
Payer: MEDICARE

## 2022-10-28 PROCEDURE — 93798 PHYS/QHP OP CAR RHAB W/ECG: CPT

## 2022-10-31 ENCOUNTER — HOSPITAL ENCOUNTER (OUTPATIENT)
Dept: CARDIAC REHAB | Age: 82
Setting detail: THERAPIES SERIES
Discharge: HOME | End: 2022-10-31
Payer: MEDICARE

## 2022-10-31 ENCOUNTER — APPOINTMENT (OUTPATIENT)
Dept: CARDIAC REHAB | Age: 82
End: 2022-10-31
Payer: MEDICARE

## 2022-10-31 PROCEDURE — 93798 PHYS/QHP OP CAR RHAB W/ECG: CPT

## 2022-11-01 ENCOUNTER — APPOINTMENT (OUTPATIENT)
Dept: CARDIAC REHAB | Age: 82
End: 2022-11-01
Payer: MEDICARE

## 2022-11-03 ENCOUNTER — APPOINTMENT (OUTPATIENT)
Dept: CARDIAC REHAB | Age: 82
End: 2022-11-03
Payer: MEDICARE

## 2022-11-04 ENCOUNTER — APPOINTMENT (OUTPATIENT)
Dept: CARDIAC REHAB | Age: 82
End: 2022-11-04
Payer: MEDICARE

## 2022-11-04 ENCOUNTER — HOSPITAL ENCOUNTER (OUTPATIENT)
Dept: CARDIAC REHAB | Age: 82
Setting detail: THERAPIES SERIES
Discharge: HOME | End: 2022-11-04
Payer: MEDICARE

## 2022-11-04 PROCEDURE — 93798 PHYS/QHP OP CAR RHAB W/ECG: CPT

## 2022-11-07 ENCOUNTER — APPOINTMENT (OUTPATIENT)
Dept: CARDIAC REHAB | Age: 82
End: 2022-11-07
Payer: MEDICARE

## 2022-11-07 ENCOUNTER — HOSPITAL ENCOUNTER (OUTPATIENT)
Dept: CARDIAC REHAB | Age: 82
Setting detail: THERAPIES SERIES
Discharge: HOME | End: 2022-11-07
Payer: MEDICARE

## 2022-11-07 PROCEDURE — 93798 PHYS/QHP OP CAR RHAB W/ECG: CPT

## 2022-11-08 ENCOUNTER — APPOINTMENT (OUTPATIENT)
Dept: CARDIAC REHAB | Age: 82
End: 2022-11-08
Payer: MEDICARE

## 2022-11-10 ENCOUNTER — APPOINTMENT (OUTPATIENT)
Dept: CARDIAC REHAB | Age: 82
End: 2022-11-10
Payer: MEDICARE

## 2022-11-11 ENCOUNTER — APPOINTMENT (OUTPATIENT)
Dept: CARDIAC REHAB | Age: 82
End: 2022-11-11
Payer: MEDICARE

## 2022-11-11 ENCOUNTER — HOSPITAL ENCOUNTER (OUTPATIENT)
Dept: CARDIAC REHAB | Age: 82
Setting detail: THERAPIES SERIES
Discharge: HOME | End: 2022-11-11
Payer: MEDICARE

## 2022-11-11 PROCEDURE — 93798 PHYS/QHP OP CAR RHAB W/ECG: CPT

## 2022-11-14 ENCOUNTER — APPOINTMENT (OUTPATIENT)
Dept: CARDIAC REHAB | Age: 82
End: 2022-11-14
Payer: MEDICARE

## 2022-11-14 ENCOUNTER — HOSPITAL ENCOUNTER (OUTPATIENT)
Dept: CARDIAC REHAB | Age: 82
Setting detail: THERAPIES SERIES
Discharge: HOME | End: 2022-11-14
Payer: MEDICARE

## 2022-11-14 PROCEDURE — 93798 PHYS/QHP OP CAR RHAB W/ECG: CPT

## 2022-11-15 ENCOUNTER — APPOINTMENT (OUTPATIENT)
Dept: CARDIAC REHAB | Age: 82
End: 2022-11-15
Payer: MEDICARE

## 2022-11-17 ENCOUNTER — APPOINTMENT (OUTPATIENT)
Dept: CARDIAC REHAB | Age: 82
End: 2022-11-17
Payer: MEDICARE

## 2022-11-18 ENCOUNTER — HOSPITAL ENCOUNTER (OUTPATIENT)
Dept: CARDIAC REHAB | Age: 82
Setting detail: THERAPIES SERIES
Discharge: HOME | End: 2022-11-18
Payer: MEDICARE

## 2022-11-18 ENCOUNTER — APPOINTMENT (OUTPATIENT)
Dept: CARDIAC REHAB | Age: 82
End: 2022-11-18
Payer: MEDICARE

## 2022-11-18 PROCEDURE — 93798 PHYS/QHP OP CAR RHAB W/ECG: CPT

## 2022-11-21 ENCOUNTER — HOSPITAL ENCOUNTER (OUTPATIENT)
Dept: CARDIAC REHAB | Age: 82
Setting detail: THERAPIES SERIES
Discharge: HOME | End: 2022-11-21
Payer: MEDICARE

## 2022-11-21 ENCOUNTER — APPOINTMENT (OUTPATIENT)
Dept: CARDIAC REHAB | Age: 82
End: 2022-11-21
Payer: MEDICARE

## 2022-11-21 PROCEDURE — 93798 PHYS/QHP OP CAR RHAB W/ECG: CPT

## 2022-11-22 ENCOUNTER — APPOINTMENT (OUTPATIENT)
Dept: CARDIAC REHAB | Age: 82
End: 2022-11-22
Payer: MEDICARE

## 2022-11-24 ENCOUNTER — APPOINTMENT (OUTPATIENT)
Dept: CARDIAC REHAB | Age: 82
End: 2022-11-24
Payer: MEDICARE

## 2022-11-25 ENCOUNTER — APPOINTMENT (OUTPATIENT)
Dept: CARDIAC REHAB | Age: 82
End: 2022-11-25
Payer: MEDICARE

## 2022-11-28 ENCOUNTER — HOSPITAL ENCOUNTER (OUTPATIENT)
Dept: CARDIAC REHAB | Age: 82
Setting detail: THERAPIES SERIES
Discharge: HOME | End: 2022-11-28
Payer: MEDICARE

## 2022-11-28 ENCOUNTER — APPOINTMENT (OUTPATIENT)
Dept: CARDIAC REHAB | Age: 82
End: 2022-11-28
Payer: MEDICARE

## 2022-11-28 PROCEDURE — 93798 PHYS/QHP OP CAR RHAB W/ECG: CPT

## 2022-11-29 ENCOUNTER — APPOINTMENT (OUTPATIENT)
Dept: CARDIAC REHAB | Age: 82
End: 2022-11-29
Payer: MEDICARE

## 2022-12-01 ENCOUNTER — APPOINTMENT (OUTPATIENT)
Dept: CARDIAC REHAB | Age: 82
End: 2022-12-01
Payer: MEDICARE

## 2022-12-02 ENCOUNTER — APPOINTMENT (OUTPATIENT)
Dept: CARDIAC REHAB | Age: 82
End: 2022-12-02
Payer: MEDICARE

## 2022-12-02 ENCOUNTER — HOSPITAL ENCOUNTER (OUTPATIENT)
Dept: CARDIAC REHAB | Age: 82
Setting detail: THERAPIES SERIES
Discharge: HOME | End: 2022-12-02
Payer: MEDICARE

## 2022-12-02 PROCEDURE — 93798 PHYS/QHP OP CAR RHAB W/ECG: CPT

## 2022-12-05 ENCOUNTER — APPOINTMENT (OUTPATIENT)
Dept: CARDIAC REHAB | Age: 82
End: 2022-12-05
Payer: MEDICARE

## 2022-12-05 ENCOUNTER — HOSPITAL ENCOUNTER (OUTPATIENT)
Dept: CARDIAC REHAB | Age: 82
Setting detail: THERAPIES SERIES
Discharge: HOME | End: 2022-12-05
Payer: MEDICARE

## 2022-12-05 PROCEDURE — 93798 PHYS/QHP OP CAR RHAB W/ECG: CPT

## 2022-12-06 ENCOUNTER — APPOINTMENT (OUTPATIENT)
Dept: CARDIAC REHAB | Age: 82
End: 2022-12-06
Payer: MEDICARE

## 2022-12-08 ENCOUNTER — APPOINTMENT (OUTPATIENT)
Dept: CARDIAC REHAB | Age: 82
End: 2022-12-08
Payer: MEDICARE

## 2022-12-09 ENCOUNTER — APPOINTMENT (OUTPATIENT)
Dept: CARDIAC REHAB | Age: 82
End: 2022-12-09
Payer: MEDICARE

## 2022-12-12 ENCOUNTER — APPOINTMENT (OUTPATIENT)
Dept: CARDIAC REHAB | Age: 82
End: 2022-12-12
Payer: MEDICARE

## 2022-12-13 ENCOUNTER — APPOINTMENT (OUTPATIENT)
Dept: CARDIAC REHAB | Age: 82
End: 2022-12-13
Payer: MEDICARE

## 2022-12-15 ENCOUNTER — APPOINTMENT (OUTPATIENT)
Dept: CARDIAC REHAB | Age: 82
End: 2022-12-15
Payer: MEDICARE

## 2022-12-16 ENCOUNTER — APPOINTMENT (OUTPATIENT)
Dept: CARDIAC REHAB | Age: 82
End: 2022-12-16
Payer: MEDICARE

## 2022-12-16 ENCOUNTER — HOSPITAL ENCOUNTER (OUTPATIENT)
Dept: CARDIAC REHAB | Age: 82
Setting detail: THERAPIES SERIES
Discharge: HOME | End: 2022-12-16
Payer: MEDICARE

## 2022-12-16 PROCEDURE — 93798 PHYS/QHP OP CAR RHAB W/ECG: CPT

## 2022-12-19 ENCOUNTER — APPOINTMENT (OUTPATIENT)
Dept: CARDIAC REHAB | Age: 82
End: 2022-12-19
Payer: MEDICARE

## 2022-12-19 ENCOUNTER — HOSPITAL ENCOUNTER (OUTPATIENT)
Dept: CARDIAC REHAB | Age: 82
Setting detail: THERAPIES SERIES
Discharge: HOME | End: 2022-12-19
Payer: MEDICARE

## 2022-12-19 PROCEDURE — 93798 PHYS/QHP OP CAR RHAB W/ECG: CPT

## 2022-12-20 ENCOUNTER — APPOINTMENT (OUTPATIENT)
Dept: CARDIAC REHAB | Age: 82
End: 2022-12-20
Payer: MEDICARE

## 2022-12-22 ENCOUNTER — APPOINTMENT (OUTPATIENT)
Dept: CARDIAC REHAB | Age: 82
End: 2022-12-22
Payer: MEDICARE

## 2022-12-23 ENCOUNTER — APPOINTMENT (OUTPATIENT)
Dept: CARDIAC REHAB | Age: 82
End: 2022-12-23
Payer: MEDICARE

## 2022-12-26 ENCOUNTER — APPOINTMENT (OUTPATIENT)
Dept: CARDIAC REHAB | Age: 82
End: 2022-12-26
Payer: MEDICARE

## 2022-12-27 ENCOUNTER — APPOINTMENT (OUTPATIENT)
Dept: CARDIAC REHAB | Age: 82
End: 2022-12-27
Payer: MEDICARE

## 2022-12-29 ENCOUNTER — APPOINTMENT (OUTPATIENT)
Dept: CARDIAC REHAB | Age: 82
End: 2022-12-29
Payer: MEDICARE

## 2022-12-30 ENCOUNTER — HOSPITAL ENCOUNTER (OUTPATIENT)
Dept: CARDIAC REHAB | Age: 82
Setting detail: THERAPIES SERIES
Discharge: HOME | End: 2022-12-30
Payer: MEDICARE

## 2022-12-30 ENCOUNTER — APPOINTMENT (OUTPATIENT)
Dept: CARDIAC REHAB | Age: 82
End: 2022-12-30
Payer: MEDICARE

## 2022-12-30 PROCEDURE — 93798 PHYS/QHP OP CAR RHAB W/ECG: CPT

## 2023-01-02 ENCOUNTER — APPOINTMENT (OUTPATIENT)
Dept: CARDIAC REHAB | Age: 83
End: 2023-01-02
Payer: MEDICARE

## 2023-01-03 ENCOUNTER — APPOINTMENT (OUTPATIENT)
Dept: CARDIAC REHAB | Age: 83
End: 2023-01-03
Payer: MEDICARE

## 2023-01-05 ENCOUNTER — APPOINTMENT (OUTPATIENT)
Dept: CARDIAC REHAB | Age: 83
End: 2023-01-05
Payer: MEDICARE

## 2023-01-06 ENCOUNTER — HOSPITAL ENCOUNTER (OUTPATIENT)
Dept: CARDIAC REHAB | Age: 83
Setting detail: THERAPIES SERIES
Discharge: HOME | End: 2023-01-06
Payer: MEDICARE

## 2023-01-06 ENCOUNTER — APPOINTMENT (OUTPATIENT)
Dept: CARDIAC REHAB | Age: 83
End: 2023-01-06
Payer: MEDICARE

## 2023-01-06 ENCOUNTER — OFFICE VISIT (OUTPATIENT)
Dept: CARDIOLOGY CLINIC | Age: 83
End: 2023-01-06
Payer: MEDICARE

## 2023-01-06 VITALS
HEIGHT: 63 IN | HEART RATE: 63 BPM | OXYGEN SATURATION: 94 % | BODY MASS INDEX: 25.34 KG/M2 | WEIGHT: 143 LBS | DIASTOLIC BLOOD PRESSURE: 70 MMHG | SYSTOLIC BLOOD PRESSURE: 138 MMHG

## 2023-01-06 DIAGNOSIS — E78.5 HYPERLIPIDEMIA LDL GOAL <70: ICD-10-CM

## 2023-01-06 DIAGNOSIS — I25.10 CORONARY ARTERY DISEASE INVOLVING NATIVE CORONARY ARTERY OF NATIVE HEART WITHOUT ANGINA PECTORIS: Primary | ICD-10-CM

## 2023-01-06 DIAGNOSIS — J90 PLEURAL EFFUSION: ICD-10-CM

## 2023-01-06 DIAGNOSIS — I10 ESSENTIAL HYPERTENSION: ICD-10-CM

## 2023-01-06 PROCEDURE — 93798 PHYS/QHP OP CAR RHAB W/ECG: CPT

## 2023-01-06 PROCEDURE — 1036F TOBACCO NON-USER: CPT | Performed by: INTERNAL MEDICINE

## 2023-01-06 PROCEDURE — G8427 DOCREV CUR MEDS BY ELIG CLIN: HCPCS | Performed by: INTERNAL MEDICINE

## 2023-01-06 PROCEDURE — 1123F ACP DISCUSS/DSCN MKR DOCD: CPT | Performed by: INTERNAL MEDICINE

## 2023-01-06 PROCEDURE — 99214 OFFICE O/P EST MOD 30 MIN: CPT | Performed by: INTERNAL MEDICINE

## 2023-01-06 PROCEDURE — G8417 CALC BMI ABV UP PARAM F/U: HCPCS | Performed by: INTERNAL MEDICINE

## 2023-01-06 PROCEDURE — 3078F DIAST BP <80 MM HG: CPT | Performed by: INTERNAL MEDICINE

## 2023-01-06 PROCEDURE — G8400 PT W/DXA NO RESULTS DOC: HCPCS | Performed by: INTERNAL MEDICINE

## 2023-01-06 PROCEDURE — 3075F SYST BP GE 130 - 139MM HG: CPT | Performed by: INTERNAL MEDICINE

## 2023-01-06 PROCEDURE — G8484 FLU IMMUNIZE NO ADMIN: HCPCS | Performed by: INTERNAL MEDICINE

## 2023-01-06 PROCEDURE — 1090F PRES/ABSN URINE INCON ASSESS: CPT | Performed by: INTERNAL MEDICINE

## 2023-01-06 RX ORDER — INCLISIRAN 284 MG/1.5ML
284 INJECTION, SOLUTION SUBCUTANEOUS ONCE
Qty: 1.5 ML | Refills: 0 | Status: SHIPPED | OUTPATIENT
Start: 2023-01-06 | End: 2023-01-06

## 2023-01-06 RX ORDER — FUROSEMIDE 40 MG/1
40 TABLET ORAL DAILY PRN
Qty: 60 TABLET | Refills: 3 | Status: SHIPPED
Start: 2023-01-06

## 2023-01-06 RX ORDER — LATANOPROST 50 UG/ML
SOLUTION/ DROPS OPHTHALMIC
COMMUNITY
Start: 2022-11-18

## 2023-01-06 NOTE — PROGRESS NOTES
[address]  1940    January 6, 2023      CC: \" I have no chest pain during cardiac rehab. \"     HPI:  The patient is 81 y.o. female with a past medical history significant for CAD, hyperlipidemia, hypertension and CKD. She presented to [facility] with complaints chest pain and underwent LHC on 2/22/22 showing triple vessel disease. She subsequently underwent CABG x 4 on 2/25/22 with Dr. Thom Mayo. Post-op she developed SKINNY requiring CRRT. She has also been admitted several times for recurrent pleural effusions requiring thoracentesis and chest tube placement. She is s/p left Pleurx catheter placement with Dr. Juan Alberto Gutierrez on 10/12/22. She presents for follow up accompanied by her daughter who states the Pleurx catheter was removed in mid November. Patieint states her breathing has somewhat improved. She reports occasional chest tightness with taking small breaths. She continues to participate in cardiac rehab and denies any chest pains or dyspnea. Daughter states the patient has occasional lower e    She continues to get physical therapy at home. She is not able to do much with them due to fatigue. Patient denies exertional chest pain/pressure, orthopnea, palpitations, lightheadedness, weight changes, changes in LE edema, and syncope. Review of Systems:  Constitutional: No fatigue, weakness, night sweats or fever. HEENT: No new vision difficulties or ringing in the ears. Respiratory: No new SOB, PND, orthopnea or cough. Cardiovascular: See HPI   GI: No n/v, diarrhea, constipation, abdominal pain or changes in bowel habits. No melena, no hematochezia  : No urinary frequency, urgency, incontinence, hematuria or dysuria. Skin: No cyanosis or skin lesions. Musculoskeletal: No new muscle or joint pain. Neurological: No syncope or TIA-like symptoms.   Psychiatric: No anxiety, insomnia or depression     Past Medical History:   Diagnosis Date    Arthritis Asthma     as child grew out of it    CAD (coronary artery disease) 02/22/2022    multi vessel    CKD (chronic kidney disease) 2015    Elevated creatinine since 2015; Stage III b    CKD (chronic kidney disease) 12/01/2019    stg 3    Glaucoma     Hyperlipidemia     Hypertension     Hyperthyroidism 2019    Graves disease    IBS (irritable bowel syndrome) 10/2020    On Linzess    Lung abnormality 08/2022    fluid collecting in left lung    Neuropathy     Peripheral    Obesity (BMI 30-39. 9)     Osteopenia 05/2009    Osteoporosis      Past Surgical History:   Procedure Laterality Date    ANKLE FRACTURE SURGERY Left 02/12/2015    CARDIAC SURGERY  04/08/2022    s/p CABG X4    CHOLECYSTECTOMY      COLONOSCOPY      CORONARY ARTERY BYPASS GRAFT  02/25/2022    cabgx4, EDISON exclusion    CORONARY ARTERY BYPASS GRAFT N/A 02/25/2022    TRANSESOPHAGEAL ECHOCARDIOGRAM, TOTAL CARDIOPULMONARY BYPASS, TOTAL CARDIOPULMONARY BYPASS GRAFTING X4 (1 ARTERY, 3 VEIN) USING LEFT INTERNAL MAMMARY ARTERY AND RIGHT ENDOSCOPICALLY HARVESTED SAPHENOUS VEIN, LEFT ATRIAL APPENDAGE CLIP WITH 35MM ATRICLIP performed by Dominic López MD at [place] [address]  07/18/2022    CT GUIDED CHEST TUBE 7/18/2022 [facility] CT    DIAGNOSTIC CARDIAC CATH LAB PROCEDURE  02/2022    FOOT SURGERY      bilateral, hammer toes    GASTRIC BAND  08/23/2011    laproscopic    HAND SURGERY  2009    trigger finger    IR TUNNELED CATHETER PLACEMENT GREATER THAN 5 YEARS  03/07/2022    IR TUNNELED CATHETER PLACEMENT GREATER THAN 5 YEARS 3/7/2022 [facility] PROC[facility]    JOINT REPLACEMENT Left 04/2014    hip placement    ORIF FEMUR DECOMPRESSION Left 04/2014    OTHER SURGICAL HISTORY Right 2010    mass behind ear    OTHER SURGICAL HISTORY Left 10/12/2022    L pleurX catheter removal    PLEURAL CATH INSERTION Left 08/18/2022    LEFT PLEUR-X CATHETER PLACEMENT performed by Dominic López MD at [address] Left 10/12/2022    REMOVAL OF LEFT PLEUR-X CATHETER performed by Lucie Schmidt MD at [facility]       Family History   Problem Relation Age of Onset    Cancer Mother     Latent Autoimmune Diabetes in Adults Mother     Stroke Father     Rheum Arthritis Sister      Social History     Tobacco Use    Smoking status: Former     Types: Cigarettes     Quit date: 8/10/1999     Years since quittin.4    Smokeless tobacco: Never    Tobacco comments:     started age 26   Vaping Use    Vaping Use: Never used   Substance Use Topics    Alcohol use: No    Drug use: Not Currently       No Known Allergies  Current Outpatient Medications   Medication Sig Dispense Refill    latanoprost (XALATAN) 0.005 % ophthalmic solution INSTILL 1 DROP IN BOTH EYES EVERY EVENING      furosemide (LASIX) 40 MG tablet Take 40 mg by mouth daily      metoprolol succinate (TOPROL XL) 25 MG extended release tablet TAKE 1 TABLET BY MOUTH DAILY 90 tablet 3    ferrous sulfate (IRON 325) 325 (65 Fe) MG tablet Take 325 mg by mouth daily (with breakfast)      methIMAzole (TAPAZOLE) 5 MG tablet Take 5 mg by mouth daily      Wheat Dextrin (BENEFIBER DRINK MIX) PACK Take 1 packet by mouth daily      polyethylene glycol (GLYCOLAX) 17 g packet Take 17 g by mouth in the morning. amLODIPine (NORVASC) 5 MG tablet Take 10 mg by mouth daily      aspirin EC 81 MG EC tablet Take 1 tablet by mouth daily 90 tablet 3    atorvastatin (LIPITOR) 80 MG tablet Take 1 tablet by mouth nightly 30 tablet 3    gabapentin (NEURONTIN) 300 MG capsule Take 300 mg by mouth 4 times daily. cyanocobalamin 1000 MCG tablet Take 1,000 mcg by mouth in the morning. (Patient not taking: Reported on 6590)      folic acid (FOLVITE) 1 MG tablet Take 1 mg by mouth in the morning.  (Patient not taking: Reported on 2023)      fluticasone-umeclidin-vilant (TRELEGY ELLIPTA) 100-62.5-25 MCG/INH AEPB Inhale 1 puff into the lungs daily (Patient not taking: Reported on 2023)       No current facility-administered medications for this visit. Physical Exam:   /70 (Site: Left Upper Arm, Position: Sitting, Cuff Size: Large Adult)   Pulse 63   Ht 5' 3\" (1.6 m)   Wt 143 lb (64.9 kg)   SpO2 94%   BMI 25.33 kg/m²   No intake or output data in the 24 hours ending 01/06/23 0938  Wt Readings from Last 2 Encounters:   01/06/23 143 lb (64.9 kg)   10/12/22 146 lb (66.2 kg)     Constitutional: She is oriented to person, place, and time. She appears well-developed and well-nourished. In no acute distress. Head: Normocephalic and atraumatic. Neck: Neck supple. No JVD present. Carotid bruit is not present. No mass and no thyromegaly present. No lymphadenopathy present. Cardiovascular: Normal rate, regular rhythm, normal heart sounds and intact distal pulses. Exam reveals no gallop and no friction rub. No murmur heard. Pulmonary/Chest: Effort normal and breath sounds normal. No respiratory distress. She has no wheezes, rhonchi or rales. Abdominal: Soft, non-tender. Bowel sounds and aorta are normal. She exhibits no organomegaly, mass or bruit. Extremities: No edema, cyanosis, or clubbing. Pulses are 2+ radial/carotid/dorsalis pedis and posterior tibial bilaterally. Neurological: She is alert and oriented to person, place, and time. She has normal reflexes. No cranial nerve deficit. Coordination normal.   Skin: Skin is warm and dry. There is no rash or diaphoresis. Psychiatric: She has a normal mood and affect. Her speech is normal and behavior is normal.     Personally reviewed and interpreted   EKG Interpretation: 7/29/2022 Sinus rhythm with normal intervals and axis      Procedures:     [facility] 2/22/22  1. Severe triple-vessel coronary artery disease. The distal left main  is heavily calcified and has a 60% lesion. The proximal LAD is heavily  calcified with a 60% lesion. The mid-LAD is 100% occluded but fills in  from right collaterals.   The distal LAD backfills back to the midportion  via left collaterals. The proximal circumflex is heavily calcified and  has an 80% lesion. OM1 is calcified with 80% serial lesions. The  proximal right coronary artery is subtotally occluded and the PDA fills  from both collateral native flow from the right and the left. There is  backfilling from the right system into the mid LAD via a septal  . Also, the LAD first diagonal branch has a 90% lesion. 2.  Normal left ventricular systolic function. LV ejection fraction of  65%. 3.  Borderline left ventricular end-diastolic pressure at 14 to 16 mmHg. 4.  No gradient across the aortic valve on pullback to suggest aortic  stenosis. CABG 2/25/22  Urgent cabgx4 (LIMA-LAD, SVG-RI-OM, SVG-PDA)  EDISON exclusion( 35mm AtriClip)      Imaging:     Stress perfusion 2/21/22  Abnormal study. There is a large sized, moderate intensity, reversible    defect of the mid to apical anterior, apical septal, apical inferior, and    apical cap which is consistent with ischemia. Normal LV size and systolic function. Uncontrolled hypertension. Overall findings represent a high risk study. Echo 2/21/22  Normal left ventricle size, wall thickness, and systolic function with an  estimated ejection fraction of 55-60%. No regional wall motion abnormalities are seen. grade 1 diastolic dysfunction  The right ventricle is normal in size and function. No significant valvular heart disease    Chest CT 7/25/22  The left-sided hydropneumothorax is re-identified and overall is similar in   size. There is less fluid than on the previous exam.  Superimposed infection   cannot be excluded.        Lab Review:   Lab Results   Component Value Date/Time    TRIG 78 02/23/2022 12:29 PM    HDL 57 02/23/2022 12:29 PM    LDLCALC 148 02/23/2022 12:29 PM    LABVLDL 16 02/23/2022 12:29 PM     Lab Results   Component Value Date/Time     08/16/2022 11:47 AM    K 4.2 08/16/2022 11:47 AM    K 4.5 07/14/2022 07:35 PM BUN 13 08/16/2022 11:47 AM    CREATININE 1.7 08/16/2022 11:47 AM     Assessment:  CAD of native coronary arteries without angina   Hyperlipidemia with LDL goal <70 mg/dL   Essential hypertension   Pleural effusion     Plan:  She appears stable on today's visit. Continue as needed furosemide for intermittent LE edema. I have also encouraged continued cardiac rehab. With an elevated LDL of 103, I will have her continue atorvastatin and arrange for Leqvio infusion at the [facility] with a repeat fasting lipid panel in 3 months. I have personally reviewed all previous testing for this visit today including imaging, lab results and EKG as detailed above. I will see her in the office for follow up in 6 months. This note was scribed in the presence of Dr. Judith Frankel MD by Elier Horton RN  Physician Attestation:  The scribes documentation has been prepared under my direction and personally reviewed by me in its entirety. I, Dr. Elisha Bray personally performed the services described in this documentation as scribed by my RN in my presence, and I confirm that the note above accurately reflects all work, treatment, procedures, and medical decision making performed by me.

## 2023-01-09 ENCOUNTER — APPOINTMENT (OUTPATIENT)
Dept: CARDIAC REHAB | Age: 83
End: 2023-01-09
Payer: MEDICARE

## 2023-01-10 ENCOUNTER — APPOINTMENT (OUTPATIENT)
Dept: CARDIAC REHAB | Age: 83
End: 2023-01-10
Payer: MEDICARE

## 2023-01-12 ENCOUNTER — TELEPHONE (OUTPATIENT)
Dept: CARDIOLOGY CLINIC | Age: 83
End: 2023-01-12

## 2023-01-12 ENCOUNTER — APPOINTMENT (OUTPATIENT)
Dept: CARDIAC REHAB | Age: 83
End: 2023-01-12
Payer: MEDICARE

## 2023-01-12 DIAGNOSIS — Z95.1 S/P CABG (CORONARY ARTERY BYPASS GRAFT): ICD-10-CM

## 2023-01-12 DIAGNOSIS — I25.9 ISCHEMIC HEART DISEASE: ICD-10-CM

## 2023-01-12 PROBLEM — E78.5 HYPERLIPEMIA: Status: ACTIVE | Noted: 2023-01-12

## 2023-01-12 RX ORDER — ACETAMINOPHEN 325 MG/1
650 TABLET ORAL
OUTPATIENT
Start: 2023-01-12

## 2023-01-12 RX ORDER — SODIUM CHLORIDE 9 MG/ML
INJECTION, SOLUTION INTRAVENOUS CONTINUOUS
OUTPATIENT
Start: 2023-01-12

## 2023-01-12 RX ORDER — ALBUTEROL SULFATE 90 UG/1
4 AEROSOL, METERED RESPIRATORY (INHALATION) PRN
OUTPATIENT
Start: 2023-01-12

## 2023-01-12 RX ORDER — ONDANSETRON 2 MG/ML
8 INJECTION INTRAMUSCULAR; INTRAVENOUS
OUTPATIENT
Start: 2023-01-12

## 2023-01-12 RX ORDER — EPINEPHRINE 1 MG/ML
0.3 INJECTION, SOLUTION, CONCENTRATE INTRAVENOUS PRN
OUTPATIENT
Start: 2023-01-12

## 2023-01-12 RX ORDER — DIPHENHYDRAMINE HYDROCHLORIDE 50 MG/ML
50 INJECTION INTRAMUSCULAR; INTRAVENOUS
OUTPATIENT
Start: 2023-01-12

## 2023-01-12 NOTE — TELEPHONE ENCOUNTER
Zita Lee from [address] called in this afternoon, she is trying to clarify  instructions and frequency of the [facility] [place] for patient. She can be reached at [phone].

## 2023-01-12 NOTE — TELEPHONE ENCOUNTER
Spoke with pharmacy. Stated that Ortega Villalba is administered and handled through our infusion center. Patient has not even been approved for the medication. Fax was sent over today. Pharmacy v/u and apologized for the confusion.

## 2023-01-13 ENCOUNTER — APPOINTMENT (OUTPATIENT)
Dept: CARDIAC REHAB | Age: 83
End: 2023-01-13
Payer: MEDICARE

## 2023-01-13 ENCOUNTER — HOSPITAL ENCOUNTER (OUTPATIENT)
Dept: CARDIAC REHAB | Age: 83
Setting detail: THERAPIES SERIES
Discharge: HOME | End: 2023-01-13
Payer: MEDICARE

## 2023-01-13 PROCEDURE — 93798 PHYS/QHP OP CAR RHAB W/ECG: CPT

## 2023-01-16 ENCOUNTER — HOSPITAL ENCOUNTER (OUTPATIENT)
Dept: CARDIAC REHAB | Age: 83
Setting detail: THERAPIES SERIES
Discharge: HOME | End: 2023-01-16
Payer: MEDICARE

## 2023-01-16 ENCOUNTER — APPOINTMENT (OUTPATIENT)
Dept: CARDIAC REHAB | Age: 83
End: 2023-01-16
Payer: MEDICARE

## 2023-01-16 PROCEDURE — 93798 PHYS/QHP OP CAR RHAB W/ECG: CPT

## 2023-01-16 RX ORDER — INCLISIRAN 284 MG/1.5ML
INJECTION, SOLUTION SUBCUTANEOUS
Qty: 1.5 ML | Refills: 0 | Status: CANCELLED | OUTPATIENT
Start: 2023-01-16

## 2023-01-17 ENCOUNTER — APPOINTMENT (OUTPATIENT)
Dept: CARDIAC REHAB | Age: 83
End: 2023-01-17
Payer: MEDICARE

## 2023-01-17 RX ORDER — INCLISIRAN 284 MG/1.5ML
INJECTION, SOLUTION SUBCUTANEOUS
Qty: 1.5 ML | Refills: 0 | OUTPATIENT
Start: 2023-01-17

## 2023-01-19 ENCOUNTER — APPOINTMENT (OUTPATIENT)
Dept: CARDIAC REHAB | Age: 83
End: 2023-01-19
Payer: MEDICARE

## 2023-01-20 ENCOUNTER — APPOINTMENT (OUTPATIENT)
Dept: CARDIAC REHAB | Age: 83
End: 2023-01-20
Payer: MEDICARE

## 2023-01-20 ENCOUNTER — HOSPITAL ENCOUNTER (OUTPATIENT)
Dept: CARDIAC REHAB | Age: 83
Setting detail: THERAPIES SERIES
Discharge: HOME | End: 2023-01-20
Payer: MEDICARE

## 2023-01-20 PROCEDURE — 93798 PHYS/QHP OP CAR RHAB W/ECG: CPT

## 2023-01-23 ENCOUNTER — HOSPITAL ENCOUNTER (OUTPATIENT)
Dept: CARDIAC REHAB | Age: 83
Setting detail: THERAPIES SERIES
Discharge: HOME | End: 2023-01-23
Payer: MEDICARE

## 2023-01-23 ENCOUNTER — APPOINTMENT (OUTPATIENT)
Dept: CARDIAC REHAB | Age: 83
End: 2023-01-23
Payer: MEDICARE

## 2023-01-23 PROCEDURE — 93798 PHYS/QHP OP CAR RHAB W/ECG: CPT

## 2023-01-24 ENCOUNTER — APPOINTMENT (OUTPATIENT)
Dept: CARDIAC REHAB | Age: 83
End: 2023-01-24
Payer: MEDICARE

## 2023-01-26 ENCOUNTER — APPOINTMENT (OUTPATIENT)
Dept: CARDIAC REHAB | Age: 83
End: 2023-01-26
Payer: MEDICARE

## 2023-01-27 ENCOUNTER — HOSPITAL ENCOUNTER (OUTPATIENT)
Dept: CARDIAC REHAB | Age: 83
Setting detail: THERAPIES SERIES
Discharge: HOME | End: 2023-01-27
Payer: MEDICARE

## 2023-01-27 ENCOUNTER — APPOINTMENT (OUTPATIENT)
Dept: CARDIAC REHAB | Age: 83
End: 2023-01-27
Payer: MEDICARE

## 2023-01-27 PROCEDURE — 93798 PHYS/QHP OP CAR RHAB W/ECG: CPT

## 2023-01-29 DIAGNOSIS — Z95.1 S/P CABG X 4: ICD-10-CM

## 2023-01-29 DIAGNOSIS — E78.5 HYPERLIPIDEMIA LDL GOAL <70: ICD-10-CM

## 2023-01-30 ENCOUNTER — HOSPITAL ENCOUNTER (OUTPATIENT)
Dept: CARDIAC REHAB | Age: 83
Setting detail: THERAPIES SERIES
Discharge: HOME | End: 2023-01-30
Payer: MEDICARE

## 2023-01-30 PROCEDURE — 93798 PHYS/QHP OP CAR RHAB W/ECG: CPT

## 2023-01-31 RX ORDER — ASPIRIN 81 MG/1
TABLET, COATED ORAL
Qty: 90 TABLET | Refills: 3 | Status: SHIPPED | OUTPATIENT
Start: 2023-01-31

## 2023-02-03 ENCOUNTER — HOSPITAL ENCOUNTER (OUTPATIENT)
Dept: CARDIAC REHAB | Age: 83
Setting detail: THERAPIES SERIES
Discharge: HOME | End: 2023-02-03
Payer: MEDICARE

## 2023-02-03 ENCOUNTER — TELEPHONE (OUTPATIENT)
Dept: CARDIOLOGY CLINIC | Age: 83
End: 2023-02-03

## 2023-02-03 PROCEDURE — 93798 PHYS/QHP OP CAR RHAB W/ECG: CPT

## 2023-02-06 ENCOUNTER — HOSPITAL ENCOUNTER (OUTPATIENT)
Dept: CARDIAC REHAB | Age: 83
Setting detail: THERAPIES SERIES
Discharge: HOME | End: 2023-02-06
Payer: MEDICARE

## 2023-02-06 PROCEDURE — 93798 PHYS/QHP OP CAR RHAB W/ECG: CPT

## 2023-02-10 ENCOUNTER — HOSPITAL ENCOUNTER (OUTPATIENT)
Dept: CARDIAC REHAB | Age: 83
Setting detail: THERAPIES SERIES
Discharge: HOME | End: 2023-02-10
Payer: MEDICARE

## 2023-02-10 PROCEDURE — 93798 PHYS/QHP OP CAR RHAB W/ECG: CPT

## 2023-02-13 ENCOUNTER — HOSPITAL ENCOUNTER (OUTPATIENT)
Dept: CARDIAC REHAB | Age: 83
Setting detail: THERAPIES SERIES
Discharge: HOME | End: 2023-02-13
Payer: MEDICARE

## 2023-02-13 PROCEDURE — 93798 PHYS/QHP OP CAR RHAB W/ECG: CPT

## 2023-02-15 ENCOUNTER — APPOINTMENT (OUTPATIENT)
Dept: CARDIAC REHAB | Age: 83
End: 2023-02-15
Payer: MEDICARE

## 2023-02-17 ENCOUNTER — HOSPITAL ENCOUNTER (OUTPATIENT)
Dept: CARDIAC REHAB | Age: 83
Setting detail: THERAPIES SERIES
Discharge: HOME | End: 2023-02-17
Payer: MEDICARE

## 2023-02-17 PROCEDURE — 93798 PHYS/QHP OP CAR RHAB W/ECG: CPT

## 2023-02-20 ENCOUNTER — HOSPITAL ENCOUNTER (OUTPATIENT)
Dept: CARDIAC REHAB | Age: 83
Setting detail: THERAPIES SERIES
Discharge: HOME | End: 2023-02-20
Payer: MEDICARE

## 2023-02-20 PROCEDURE — 93798 PHYS/QHP OP CAR RHAB W/ECG: CPT

## 2023-02-24 ENCOUNTER — TELEPHONE (OUTPATIENT)
Dept: CARDIOLOGY CLINIC | Age: 83
End: 2023-02-24

## 2023-02-24 DIAGNOSIS — E78.5 HYPERLIPIDEMIA LDL GOAL <70: Primary | ICD-10-CM

## 2023-02-24 NOTE — TELEPHONE ENCOUNTER
Dr. Liborio Glynn please review and advise when back from vacation. Received a call from [phone] Tray Childs at the infusion center. She stated that Juanis Ольга is cost prohibitive for the patient. She is currently on Lipitor 80 mg daily. Last  on 12/9/22. Scheduled for a repeat lipid profile in April.

## 2023-03-06 RX ORDER — EZETIMIBE 10 MG/1
10 TABLET ORAL DAILY
Qty: 90 TABLET | Refills: 1 | Status: SHIPPED | OUTPATIENT
Start: 2023-03-06

## 2023-03-06 NOTE — TELEPHONE ENCOUNTER
Start Zetia 10mg daily if not on it already. Otherwise, not likely anything else to do unless she qualifies for Repatha.

## 2023-03-06 NOTE — TELEPHONE ENCOUNTER
Spoke with patient's daughter Rosa Isela Hackett who handles her medications. Agreeable to start Zetia 10 mg daily and repeat lipid profile in 3 months. She v/u.

## 2023-04-24 PROBLEM — I10 HYPERTENSION: Status: ACTIVE | Noted: 2023-04-24

## 2023-04-24 PROBLEM — F02.80 ALZHEIMER DISEASE (HCC): Status: ACTIVE | Noted: 2022-10-01

## 2023-04-24 PROBLEM — E78.5 HYPERLIPIDEMIA: Status: ACTIVE | Noted: 2023-04-24

## 2023-04-24 PROBLEM — K58.9 IBS (IRRITABLE BOWEL SYNDROME): Status: ACTIVE | Noted: 2020-10-01

## 2023-04-24 PROBLEM — E78.5 HYPERLIPEMIA: Status: RESOLVED | Noted: 2023-01-12 | Resolved: 2023-04-24

## 2023-04-24 PROBLEM — M19.90 ARTHRITIS: Status: ACTIVE | Noted: 2023-04-24

## 2023-04-24 PROBLEM — G30.9 ALZHEIMER DISEASE (HCC): Status: ACTIVE | Noted: 2022-10-01

## 2023-04-24 PROBLEM — H40.9 GLAUCOMA: Status: ACTIVE | Noted: 2023-04-24

## 2023-04-24 PROBLEM — E78.5 HYPERLIPIDEMIA: Status: RESOLVED | Noted: 2023-04-24 | Resolved: 2023-04-24

## 2023-04-24 PROBLEM — K58.9 IBS (IRRITABLE BOWEL SYNDROME): Chronic | Status: ACTIVE | Noted: 2020-10-01

## 2023-04-24 PROBLEM — I25.10 CAD (CORONARY ARTERY DISEASE): Status: ACTIVE | Noted: 2022-02-22

## 2023-04-24 PROBLEM — G62.9 NEUROPATHY: Status: ACTIVE | Noted: 2023-04-24

## 2023-04-24 PROBLEM — J45.909 ASTHMA: Status: ACTIVE | Noted: 2023-04-24

## 2023-05-05 ENCOUNTER — HOSPITAL ENCOUNTER (OUTPATIENT)
Dept: ULTRASOUND IMAGING | Age: 83
Discharge: HOME | End: 2023-05-05
Payer: MEDICARE

## 2023-05-05 DIAGNOSIS — R19.05 PERIUMBILICAL MASS: ICD-10-CM

## 2023-05-05 PROCEDURE — 76705 ECHO EXAM OF ABDOMEN: CPT

## 2023-07-07 ENCOUNTER — OFFICE VISIT (OUTPATIENT)
Dept: CARDIOLOGY CLINIC | Age: 83
End: 2023-07-07
Payer: MEDICARE

## 2023-07-07 VITALS
WEIGHT: 154 LBS | OXYGEN SATURATION: 97 % | HEIGHT: 63 IN | HEART RATE: 61 BPM | SYSTOLIC BLOOD PRESSURE: 126 MMHG | DIASTOLIC BLOOD PRESSURE: 68 MMHG | BODY MASS INDEX: 27.29 KG/M2

## 2023-07-07 DIAGNOSIS — J90 PLEURAL EFFUSION: ICD-10-CM

## 2023-07-07 DIAGNOSIS — I10 ESSENTIAL HYPERTENSION: ICD-10-CM

## 2023-07-07 DIAGNOSIS — E78.5 HYPERLIPIDEMIA LDL GOAL <70: ICD-10-CM

## 2023-07-07 DIAGNOSIS — I25.10 CORONARY ARTERY DISEASE INVOLVING NATIVE CORONARY ARTERY OF NATIVE HEART WITHOUT ANGINA PECTORIS: Primary | ICD-10-CM

## 2023-07-07 LAB
CHOLEST SERPL-MCNC: 129 MG/DL (ref 0–199)
HDLC SERPL-MCNC: 61 MG/DL (ref 40–60)
LDL CHOLESTEROL CALCULATED: 57 MG/DL
TRIGL SERPL-MCNC: 55 MG/DL (ref 0–150)
VLDLC SERPL CALC-MCNC: 11 MG/DL

## 2023-07-07 PROCEDURE — 3074F SYST BP LT 130 MM HG: CPT | Performed by: INTERNAL MEDICINE

## 2023-07-07 PROCEDURE — 3078F DIAST BP <80 MM HG: CPT | Performed by: INTERNAL MEDICINE

## 2023-07-07 PROCEDURE — 1123F ACP DISCUSS/DSCN MKR DOCD: CPT | Performed by: INTERNAL MEDICINE

## 2023-07-07 PROCEDURE — 99214 OFFICE O/P EST MOD 30 MIN: CPT | Performed by: INTERNAL MEDICINE

## 2023-07-07 NOTE — PROGRESS NOTES
the LAD first diagonal branch has a 90% lesion. 2.  Normal left ventricular systolic function. LV ejection fraction of  65%. 3.  Borderline left ventricular end-diastolic pressure at 14 to 16 mmHg. 4.  No gradient across the aortic valve on pullback to suggest aortic  stenosis. CABG 2/25/22  Urgent cabgx4 (LIMA-LAD, SVG-RI-OM, SVG-PDA)  EDISON exclusion( 35mm AtriClip)      Imaging:     Stress perfusion 2/21/22  Abnormal study. There is a large sized, moderate intensity, reversible    defect of the mid to apical anterior, apical septal, apical inferior, and    apical cap which is consistent with ischemia. Normal LV size and systolic function. Uncontrolled hypertension. Overall findings represent a high risk study. Echo 2/21/22  Normal left ventricle size, wall thickness, and systolic function with an  estimated ejection fraction of 55-60%. No regional wall motion abnormalities are seen. grade 1 diastolic dysfunction  The right ventricle is normal in size and function. No significant valvular heart disease    Chest CT 7/25/22  The left-sided hydropneumothorax is re-identified and overall is similar in   size. There is less fluid than on the previous exam.  Superimposed infection   cannot be excluded. Lab Review:   Lab Results   Component Value Date/Time    TRIG 78 02/23/2022 12:29 PM    HDL 57 02/23/2022 12:29 PM    LDLCALC 148 02/23/2022 12:29 PM    LABVLDL 16 02/23/2022 12:29 PM     Lab Results   Component Value Date/Time     08/16/2022 11:47 AM    K 4.2 08/16/2022 11:47 AM    K 4.5 07/14/2022 07:35 PM    BUN 13 08/16/2022 11:47 AM    CREATININE 1.7 08/16/2022 11:47 AM     Assessment:  CAD of native coronary arteries without angina   Hyperlipidemia with LDL goal <70 mg/dL   Essential hypertension   Pleural effusion     Plan:  I think that Lewis Becker appears well from a cardiovascular standpoint. I see no need to make any changes currently in her medical regimen or pursue further testing.

## 2023-07-13 DIAGNOSIS — I10 PRIMARY HYPERTENSION: ICD-10-CM

## 2023-07-13 DIAGNOSIS — I25.10 CAD IN NATIVE ARTERY: ICD-10-CM

## 2023-07-17 RX ORDER — METOPROLOL SUCCINATE 25 MG/1
25 TABLET, EXTENDED RELEASE ORAL DAILY
Qty: 90 TABLET | Refills: 3 | Status: SHIPPED | OUTPATIENT
Start: 2023-07-17

## 2023-09-01 RX ORDER — EZETIMIBE 10 MG/1
10 TABLET ORAL DAILY
Qty: 90 TABLET | Refills: 1 | Status: SHIPPED | OUTPATIENT
Start: 2023-09-01

## 2023-09-01 NOTE — TELEPHONE ENCOUNTER
Requested Prescriptions     Pending Prescriptions Disp Refills    ezetimibe (Nadege Sensing) 10 MG tablet [Pharmacy Med Name: EZETIMIBE 10MG TABLETS] 90 tablet 1     Sig: TAKE 1 TABLET BY MOUTH DAILY          Number: 90    Refills: 1    Last Office Visit: 7/7/2023     Next Office Visit: Visit date not found     Last Refill: 3/6/2023    Last Labs: 7/7/2023

## 2023-09-27 NOTE — BRIEF OP NOTE
Brief Postoperative Note    Dillan Pimentel  YOB: 1940  [id number]    Pre-operative Diagnosis: SKINNY    Post-operative Diagnosis: Same    Procedure: Tunneled hemodialysis catheter placement    Anesthesia: Moderate Sedation    Surgeons: Lynn Mendiola MD    Estimated Blood Loss: Less than 5 mL    Complications: None    Specimens: Was Not Obtained    Findings: Successful placement of a right IJ tunneled hemodialysis catheter.     Electronically signed by Lynn Mendiola MD on 3/7/2022 at 12:34 PM Called patient scheduled 10/30/23 @ 10:30 with Dr Garcia

## 2023-11-02 DIAGNOSIS — R74.8 ABNORMAL LIVER ENZYMES: Primary | ICD-10-CM

## 2024-02-26 RX ORDER — EZETIMIBE 10 MG/1
10 TABLET ORAL DAILY
Qty: 90 TABLET | Refills: 3 | Status: SHIPPED | OUTPATIENT
Start: 2024-02-26

## 2024-02-26 NOTE — TELEPHONE ENCOUNTER
Requested Prescriptions     Pending Prescriptions Disp Refills    ezetimibe (ZETIA) 10 MG tablet [Pharmacy Med Name: EZETIMIBE 10MG TABLETS] 90 tablet 1     Sig: TAKE 1 TABLET BY MOUTH DAILY          Number: 90    Refills: 3    Last Office Visit: 7/7/2023     Next Office Visit: None scheduled     Last Refill: 09/01/2023    Last Labs: 07/07/2023

## 2024-04-04 DIAGNOSIS — E78.5 HYPERLIPIDEMIA LDL GOAL <70: ICD-10-CM

## 2024-04-04 DIAGNOSIS — Z95.1 S/P CABG X 4: ICD-10-CM

## 2024-04-04 NOTE — TELEPHONE ENCOUNTER
Last OV: 23    Last labs: 10/24/23    Last EK22    Appt scheduled : None  Return in about 1 year (around 2024).

## 2024-04-08 RX ORDER — ASPIRIN 81 MG/1
TABLET, COATED ORAL
Qty: 90 TABLET | Refills: 3 | Status: SHIPPED | OUTPATIENT
Start: 2024-04-08

## 2024-04-18 NOTE — DISCHARGE INSTR - COC
Continuity of Care Form    Patient Name: Lashonda Julien   :  1940  MRN:  [id number]    Admit date:  2022  Discharge date:  2022    Code Status Order: Full Code   Advance Directives:      Admitting Physician:  Wesley Whitney MD  PCP: Brook Byrd [address]    Discharging Nurse: Author Tracy Chi   [address] Unit/Room#: N1K-7973/8390-34  Discharging Unit Phone Number: [phone]  Emergency Contact:   Extended Emergency Contact Information  Primary Emergency Contact: Ania Childress 16 Phone: [phone]  Mobile Phone: [phone]  Relation: Child  Secondary Emergency Contact: [address] Phone: [phone]  Relation: Child    Past Surgical History:  Past Surgical History:   Procedure Laterality Date    ANKLE FRACTURE SURGERY Left 2015    CHOLECYSTECTOMY      CORONARY ARTERY BYPASS GRAFT  2022    cabgx4, EDISON exclusion    CORONARY ARTERY BYPASS GRAFT N/A 2022    TRANSESOPHAGEAL ECHOCARDIOGRAM, TOTAL CARDIOPULMONARY BYPASS, TOTAL CARDIOPULMONARY BYPASS GRAFTING X4 (1 ARTERY, 3 VEIN) USING LEFT INTERNAL MAMMARY ARTERY AND RIGHT ENDOSCOPICALLY HARVESTED SAPHENOUS VEIN, LEFT ATRIAL APPENDAGE CLIP WITH 35MM ATRICLIP performed by Teddy Osei MD at  [facility] CATH LAB PROCEDURE  2022    FOOT SURGERY      bilateral, hammer toes    GASTRIC BAND  [id number]    laproscopic    HAND SURGERY  2009    trigger finger    IR TUNNELED CATHETER PLACEMENT GREATER THAN 5 YEARS  3/7/2022    IR TUNNELED CATHETER PLACEMENT GREATER THAN 5 YEARS 3/7/2022 [facility] PROCEDURES    ORIF FEMUR DECOMPRESSION Left 2014    OTHER SURGICAL HISTORY Right     mass behind ear    TUBAL LIGATION         Immunization History:   Immunization History   Administered Date(s) Administered    COVID-19, [employer] top, DO NOT Dilute, Travis-Sucrose, 12+ yrs, PF, 30 mcg/0.3 mL dose 2022    COVID-19, Pfizer Purple top, DILUTE for use, 12+ yrs, 30mcg/0.3mL dose 2021, 03/02/2021, 10/05/2021    Td, unspecified formulation 10/18/2011       Active Problems:  Patient Active Problem List   Diagnosis Code    Morbid obesity ([facility] [address] E66.01    Chest pain R07.9    Unstable angina (HCC) I20.0    Abnormal cardiovascular stress test R94.39    Hyperlipidemia LDL goal <70 E78.5    CAD in native artery I25.10    Loculated pleural effusion on the left (Large) J90    Stage 3 chronic kidney disease (HCC) N18.30    Generalized weakness R53.1    Fatigue R53.83       Isolation/Infection:   Isolation            No Isolation          Patient Infection Status       None to display            Nurse Assessment:  Last Vital Signs: BP (!) 152/68   Pulse 80   Temp 98.7 °F (37.1 °C) (Oral)   Resp 16   Ht 5' 3\" (1.6 m)   Wt 157 lb 13.6 oz (71.6 kg)   SpO2 96%   BMI 27.96 kg/m²     Last documented pain score (0-10 scale):    Last Weight:   Wt Readings from Last 1 Encounters:   06/27/22 157 lb 13.6 oz (71.6 kg)     Mental Status:  oriented and alert    IV Access:  - None    Nursing Mobility/ADLs:  Walking   Assisted  Transfer  Assisted  Bathing  Assisted  Dressing  Assisted  Toileting  Assisted  Feeding  Assisted  Med Admin  Independent  Med Delivery   whole    Wound Care Documentation and Therapy:  Incision 08/23/11 Abdomen (Active)   Number of days: 6193       Incision 02/25/22 Sternum (Active)   Number of days: 122       Incision 02/25/22 Knee Right; Inner (Active)   Number of days: 122       Incision 02/25/22 Femoral Right; Anterior (Active)   Number of days: 122       Incision 02/25/22 Pretibial Right; Inner;Mid (Active)   Number of days: 122        Elimination:  Continence: Bowel: Yes and No  Bladder: Yes and No  Urinary Catheter: None   Colostomy/Ileostomy/Ileal Conduit: No       Date of Last BM: 06/29/2022    Intake/Output Summary (Last 24 hours) at 6/27/2022 1703  Last data filed at 6/27/2022 1400  Gross per 24 hour   Intake 600 ml   Output 1950 ml   Net -1350 ml     I/O last 3 completed shifts:   In: 240 [P.O.:240]  Out: 1950 [Urine:1100; Chest Tube:850]    Safety Concerns: At Risk for Falls and Aspiration Risk    Impairments/Disabilities:      Progressive cognitive impairments    Nutrition Therapy:  Current Nutrition Therapy:   - Oral Diet:  General    Routes of Feeding: Oral  Liquids: No Restrictions  Daily Fluid Restriction: no  Last Modified Barium Swallow with Video (Video Swallowing Test): not done    Treatments at the Time of Hospital Discharge:   Respiratory Treatments: prior left chest tube placement  Oxygen Therapy:  is not on home oxygen therapy. Ventilator:    - No ventilator support    Rehab Therapies: Physical Therapy and Occupational Therapy  Weight Bearing Status/Restrictions: No weight bearing restrictions  Other Medical Equipment (for information only, NOT a DME order):  walker  Other Treatments: N/A    Patient's personal belongings (please select all that are sent with patient):  Shantanu    RN SIGNATURE:  Electronically signed by Scott Brooke RN on 7/1/22 at 3:50 PM EDT    CASE MANAGEMENT/SOCIAL WORK SECTION    Inpatient Status Date: ***    Readmission Risk Assessment Score:  Readmission Risk              Risk of Unplanned Readmission:  16           Discharging to Facility/ Agency   Name:   Address:  Phone:  Fax:    Dialysis Facility (if applicable)   Name:  Address:  Dialysis Schedule:  Phone:  Fax:    / signature: {Esignature:[id number]}    PHYSICIAN SECTION    Prognosis: Fair    Condition at Discharge: Stable    Rehab Potential (if transferring to Rehab): Fair    Recommended Labs or Other Treatments After Discharge: PT/OT    Physician Certification: I certify the above information and transfer of Kevin Davidson  is necessary for the continuing treatment of the diagnosis listed and that she requires Home Care for less 30 days.      Update Admission H&P: No change in H&P    PHYSICIAN SIGNATURE:  Electronically signed by García Curran MD on 7/1/22 at 2:32 PM EDT No

## 2024-04-24 PROBLEM — R53.1 GENERALIZED WEAKNESS: Status: RESOLVED | Noted: 2022-06-26 | Resolved: 2024-04-24

## 2024-04-24 PROBLEM — I10 HYPERTENSION: Status: RESOLVED | Noted: 2023-04-24 | Resolved: 2024-04-24

## 2024-04-24 PROBLEM — R53.83 FATIGUE: Status: RESOLVED | Noted: 2022-06-26 | Resolved: 2024-04-24

## 2024-04-24 PROBLEM — R07.9 CHEST PAIN: Status: RESOLVED | Noted: 2022-02-20 | Resolved: 2024-04-24

## 2024-04-24 PROBLEM — Z95.1 HX OF CABG: Status: RESOLVED | Noted: 2022-07-16 | Resolved: 2024-04-24

## 2024-04-24 PROBLEM — J90 LOCULATED PLEURAL EFFUSION: Status: RESOLVED | Noted: 2022-06-26 | Resolved: 2024-04-24

## 2024-04-24 PROBLEM — J90 PLEURAL EFFUSION: Status: RESOLVED | Noted: 2022-07-14 | Resolved: 2024-04-24

## 2024-04-29 ENCOUNTER — APPOINTMENT (OUTPATIENT)
Dept: CT IMAGING | Age: 84
End: 2024-04-29
Payer: MEDICARE

## 2024-04-29 ENCOUNTER — HOSPITAL ENCOUNTER (OUTPATIENT)
Age: 84
Setting detail: OBSERVATION
Discharge: HOME | End: 2024-04-30
Attending: FAMILY MEDICINE | Admitting: FAMILY MEDICINE
Payer: MEDICARE

## 2024-04-29 DIAGNOSIS — R29.90 STROKE-LIKE EPISODE: Primary | ICD-10-CM

## 2024-04-29 DIAGNOSIS — N18.9 ACUTE KIDNEY INJURY SUPERIMPOSED ON CKD (HCC): ICD-10-CM

## 2024-04-29 DIAGNOSIS — N17.9 ACUTE KIDNEY INJURY SUPERIMPOSED ON CKD (HCC): ICD-10-CM

## 2024-04-29 DIAGNOSIS — R79.89 ELEVATED TROPONIN I LEVEL: ICD-10-CM

## 2024-04-29 PROBLEM — R29.810 FACIAL DROOP: Status: ACTIVE | Noted: 2024-04-29

## 2024-04-29 LAB
ALBUMIN SERPL-MCNC: 4 G/DL (ref 3.4–5)
ALBUMIN/GLOB SERPL: 1.2 {RATIO} (ref 1.1–2.2)
ALP SERPL-CCNC: 96 U/L (ref 40–129)
ALT SERPL-CCNC: 60 U/L (ref 10–40)
ANION GAP SERPL CALCULATED.3IONS-SCNC: 9 MMOL/L (ref 3–16)
AST SERPL-CCNC: 47 U/L (ref 15–37)
BACTERIA URNS QL MICRO: ABNORMAL /HPF
BASOPHILS # BLD: 0.1 K/UL (ref 0–0.2)
BASOPHILS NFR BLD: 1 %
BILIRUB SERPL-MCNC: 0.5 MG/DL (ref 0–1)
BILIRUB UR QL STRIP.AUTO: NEGATIVE
BUN SERPL-MCNC: 28 MG/DL (ref 7–20)
CALCIUM SERPL-MCNC: 8.8 MG/DL (ref 8.3–10.6)
CHLORIDE SERPL-SCNC: 106 MMOL/L (ref 99–110)
CLARITY UR: CLEAR
CO2 SERPL-SCNC: 27 MMOL/L (ref 21–32)
COLOR UR: YELLOW
CREAT SERPL-MCNC: 2.2 MG/DL (ref 0.6–1.2)
DEPRECATED RDW RBC AUTO: 14.5 % (ref 12.4–15.4)
EKG ATRIAL RATE: 57 BPM
EKG DIAGNOSIS: NORMAL
EKG P AXIS: 119 DEGREES
EKG P-R INTERVAL: 186 MS
EKG Q-T INTERVAL: 390 MS
EKG QRS DURATION: 62 MS
EKG QTC CALCULATION (BAZETT): 379 MS
EKG R AXIS: 13 DEGREES
EKG T AXIS: 11 DEGREES
EKG VENTRICULAR RATE: 57 BPM
EOSINOPHIL # BLD: 0.1 K/UL (ref 0–0.6)
EOSINOPHIL NFR BLD: 2.6 %
EPI CELLS #/AREA URNS AUTO: 7 /HPF (ref 0–5)
GFR SERPLBLD CREATININE-BSD FMLA CKD-EPI: 22 ML/MIN/{1.73_M2}
GLUCOSE SERPL-MCNC: 94 MG/DL (ref 70–99)
GLUCOSE UR STRIP.AUTO-MCNC: NEGATIVE MG/DL
HCT VFR BLD AUTO: 36.2 % (ref 36–48)
HGB BLD-MCNC: 12 G/DL (ref 12–16)
HGB UR QL STRIP.AUTO: NEGATIVE
HYALINE CASTS #/AREA URNS AUTO: 0 /LPF (ref 0–8)
INR PPP: 1.15 (ref 0.85–1.15)
KETONES UR STRIP.AUTO-MCNC: NEGATIVE MG/DL
LEUKOCYTE ESTERASE UR QL STRIP.AUTO: ABNORMAL
LYMPHOCYTES # BLD: 1.3 K/UL (ref 1–5.1)
LYMPHOCYTES NFR BLD: 22.9 %
MCH RBC QN AUTO: 29.9 PG (ref 26–34)
MCHC RBC AUTO-ENTMCNC: 33.1 G/DL (ref 31–36)
MCV RBC AUTO: 90.3 FL (ref 80–100)
MONOCYTES # BLD: 0.6 K/UL (ref 0–1.3)
MONOCYTES NFR BLD: 10.8 %
NEUTROPHILS # BLD: 3.6 K/UL (ref 1.7–7.7)
NEUTROPHILS NFR BLD: 62.7 %
NITRITE UR QL STRIP.AUTO: NEGATIVE
PH UR STRIP.AUTO: 7.5 [PH] (ref 5–8)
PLATELET # BLD AUTO: 138 K/UL (ref 135–450)
PMV BLD AUTO: 9.4 FL (ref 5–10.5)
POTASSIUM SERPL-SCNC: 4.7 MMOL/L (ref 3.5–5.1)
PROT SERPL-MCNC: 7.3 G/DL (ref 6.4–8.2)
PROT UR STRIP.AUTO-MCNC: NEGATIVE MG/DL
PROTHROMBIN TIME: 14.9 SEC (ref 11.9–14.9)
RBC # BLD AUTO: 4.01 M/UL (ref 4–5.2)
RBC CLUMPS #/AREA URNS AUTO: 3 /HPF (ref 0–4)
SODIUM SERPL-SCNC: 142 MMOL/L (ref 136–145)
SP GR UR STRIP.AUTO: 1.02 (ref 1–1.03)
TROPONIN, HIGH SENSITIVITY: 20 NG/L (ref 0–14)
TROPONIN, HIGH SENSITIVITY: 23 NG/L (ref 0–14)
UA COMPLETE W REFLEX CULTURE PNL UR: ABNORMAL
UA DIPSTICK W REFLEX MICRO PNL UR: YES
URN SPEC COLLECT METH UR: ABNORMAL
UROBILINOGEN UR STRIP-ACNC: 0.2 E.U./DL
WBC # BLD AUTO: 5.7 K/UL (ref 4–11)
WBC #/AREA URNS AUTO: 2 /HPF (ref 0–5)

## 2024-04-29 PROCEDURE — 80053 COMPREHEN METABOLIC PANEL: CPT

## 2024-04-29 PROCEDURE — 70450 CT HEAD/BRAIN W/O DYE: CPT

## 2024-04-29 PROCEDURE — 6360000004 HC RX CONTRAST MEDICATION: Performed by: PHYSICIAN ASSISTANT

## 2024-04-29 PROCEDURE — 93005 ELECTROCARDIOGRAM TRACING: CPT | Performed by: FAMILY MEDICINE

## 2024-04-29 PROCEDURE — 93308 TTE F-UP OR LMTD: CPT

## 2024-04-29 PROCEDURE — 81001 URINALYSIS AUTO W/SCOPE: CPT

## 2024-04-29 PROCEDURE — 99285 EMERGENCY DEPT VISIT HI MDM: CPT

## 2024-04-29 PROCEDURE — 85025 COMPLETE CBC W/AUTO DIFF WBC: CPT

## 2024-04-29 PROCEDURE — G0378 HOSPITAL OBSERVATION PER HR: HCPCS

## 2024-04-29 PROCEDURE — 84484 ASSAY OF TROPONIN QUANT: CPT

## 2024-04-29 PROCEDURE — 93321 DOPPLER ECHO F-UP/LMTD STD: CPT

## 2024-04-29 PROCEDURE — 93325 DOPPLER ECHO COLOR FLOW MAPG: CPT

## 2024-04-29 PROCEDURE — 70498 CT ANGIOGRAPHY NECK: CPT

## 2024-04-29 PROCEDURE — 85610 PROTHROMBIN TIME: CPT

## 2024-04-29 PROCEDURE — 93010 ELECTROCARDIOGRAM REPORT: CPT | Performed by: INTERNAL MEDICINE

## 2024-04-29 PROCEDURE — 6370000000 HC RX 637 (ALT 250 FOR IP): Performed by: FAMILY MEDICINE

## 2024-04-29 PROCEDURE — 2580000003 HC RX 258: Performed by: PHYSICIAN ASSISTANT

## 2024-04-29 PROCEDURE — 2580000003 HC RX 258: Performed by: FAMILY MEDICINE

## 2024-04-29 PROCEDURE — 96360 HYDRATION IV INFUSION INIT: CPT

## 2024-04-29 RX ORDER — SODIUM CHLORIDE 0.9 % (FLUSH) 0.9 %
5-40 SYRINGE (ML) INJECTION PRN
Status: DISCONTINUED | OUTPATIENT
Start: 2024-04-29 | End: 2024-04-30 | Stop reason: HOSPADM

## 2024-04-29 RX ORDER — FUROSEMIDE 40 MG/1
40 TABLET ORAL DAILY PRN
Status: DISCONTINUED | OUTPATIENT
Start: 2024-04-29 | End: 2024-04-30 | Stop reason: HOSPADM

## 2024-04-29 RX ORDER — WHEAT DEXTRIN/ASPARTAME 3 G/6 G
1 POWDER IN PACKET (EA) ORAL DAILY
Status: DISCONTINUED | OUTPATIENT
Start: 2024-04-29 | End: 2024-04-29 | Stop reason: CLARIF

## 2024-04-29 RX ORDER — RIVASTIGMINE 9.5 MG/24H
1 PATCH, EXTENDED RELEASE TRANSDERMAL DAILY
Status: DISCONTINUED | OUTPATIENT
Start: 2024-04-30 | End: 2024-04-30 | Stop reason: HOSPADM

## 2024-04-29 RX ORDER — METHIMAZOLE 5 MG/1
5 TABLET ORAL DAILY
Status: DISCONTINUED | OUTPATIENT
Start: 2024-04-30 | End: 2024-04-30 | Stop reason: HOSPADM

## 2024-04-29 RX ORDER — SODIUM CHLORIDE 0.9 % (FLUSH) 0.9 %
5-40 SYRINGE (ML) INJECTION EVERY 12 HOURS SCHEDULED
Status: DISCONTINUED | OUTPATIENT
Start: 2024-04-29 | End: 2024-04-30 | Stop reason: HOSPADM

## 2024-04-29 RX ORDER — POLYETHYLENE GLYCOL 3350 17 G/17G
17 POWDER, FOR SOLUTION ORAL DAILY PRN
Status: DISCONTINUED | OUTPATIENT
Start: 2024-04-29 | End: 2024-04-30 | Stop reason: HOSPADM

## 2024-04-29 RX ORDER — SODIUM CHLORIDE 9 MG/ML
INJECTION, SOLUTION INTRAVENOUS PRN
Status: DISCONTINUED | OUTPATIENT
Start: 2024-04-29 | End: 2024-04-30 | Stop reason: HOSPADM

## 2024-04-29 RX ORDER — ONDANSETRON 4 MG/1
4 TABLET, ORALLY DISINTEGRATING ORAL EVERY 8 HOURS PRN
Status: DISCONTINUED | OUTPATIENT
Start: 2024-04-29 | End: 2024-04-30 | Stop reason: HOSPADM

## 2024-04-29 RX ORDER — LATANOPROST 50 UG/ML
1 SOLUTION/ DROPS OPHTHALMIC NIGHTLY
Status: DISCONTINUED | OUTPATIENT
Start: 2024-04-29 | End: 2024-04-30 | Stop reason: HOSPADM

## 2024-04-29 RX ORDER — GABAPENTIN 300 MG/1
300 CAPSULE ORAL 2 TIMES DAILY
Status: DISCONTINUED | OUTPATIENT
Start: 2024-04-29 | End: 2024-04-30 | Stop reason: HOSPADM

## 2024-04-29 RX ORDER — AMLODIPINE BESYLATE 10 MG/1
10 TABLET ORAL NIGHTLY
Status: DISCONTINUED | OUTPATIENT
Start: 2024-04-29 | End: 2024-04-30 | Stop reason: HOSPADM

## 2024-04-29 RX ORDER — ASPIRIN 81 MG/1
81 TABLET ORAL DAILY
Status: DISCONTINUED | OUTPATIENT
Start: 2024-04-30 | End: 2024-04-30 | Stop reason: HOSPADM

## 2024-04-29 RX ORDER — POLYETHYLENE GLYCOL 3350 17 G/17G
17 POWDER, FOR SOLUTION ORAL DAILY
Status: DISCONTINUED | OUTPATIENT
Start: 2024-04-30 | End: 2024-04-30 | Stop reason: HOSPADM

## 2024-04-29 RX ORDER — METOPROLOL SUCCINATE 25 MG/1
25 TABLET, EXTENDED RELEASE ORAL DAILY
Status: DISCONTINUED | OUTPATIENT
Start: 2024-04-29 | End: 2024-04-30 | Stop reason: HOSPADM

## 2024-04-29 RX ORDER — GABAPENTIN 300 MG/1
300 CAPSULE ORAL 4 TIMES DAILY
Status: DISCONTINUED | OUTPATIENT
Start: 2024-04-29 | End: 2024-04-29

## 2024-04-29 RX ORDER — ENOXAPARIN SODIUM 100 MG/ML
30 INJECTION SUBCUTANEOUS DAILY
Status: DISCONTINUED | OUTPATIENT
Start: 2024-04-30 | End: 2024-04-30 | Stop reason: HOSPADM

## 2024-04-29 RX ORDER — ATORVASTATIN CALCIUM 80 MG/1
80 TABLET, FILM COATED ORAL NIGHTLY
Status: DISCONTINUED | OUTPATIENT
Start: 2024-04-29 | End: 2024-04-30 | Stop reason: HOSPADM

## 2024-04-29 RX ORDER — LOSARTAN POTASSIUM 100 MG/1
100 TABLET ORAL DAILY
Status: DISCONTINUED | OUTPATIENT
Start: 2024-04-29 | End: 2024-04-30 | Stop reason: HOSPADM

## 2024-04-29 RX ORDER — ONDANSETRON 2 MG/ML
4 INJECTION INTRAMUSCULAR; INTRAVENOUS EVERY 6 HOURS PRN
Status: DISCONTINUED | OUTPATIENT
Start: 2024-04-29 | End: 2024-04-30 | Stop reason: HOSPADM

## 2024-04-29 RX ORDER — EZETIMIBE 10 MG/1
10 TABLET ORAL DAILY
Status: DISCONTINUED | OUTPATIENT
Start: 2024-04-30 | End: 2024-04-30 | Stop reason: HOSPADM

## 2024-04-29 RX ORDER — 0.9 % SODIUM CHLORIDE 0.9 %
500 INTRAVENOUS SOLUTION INTRAVENOUS ONCE
Status: COMPLETED | OUTPATIENT
Start: 2024-04-29 | End: 2024-04-29

## 2024-04-29 RX ADMIN — GABAPENTIN 300 MG: 300 CAPSULE ORAL at 13:51

## 2024-04-29 RX ADMIN — ATORVASTATIN CALCIUM 80 MG: 80 TABLET, FILM COATED ORAL at 21:50

## 2024-04-29 RX ADMIN — LATANOPROST 1 DROP: 50 SOLUTION OPHTHALMIC at 21:52

## 2024-04-29 RX ADMIN — Medication 10 ML: at 21:50

## 2024-04-29 RX ADMIN — SODIUM CHLORIDE 500 ML: 9 INJECTION, SOLUTION INTRAVENOUS at 12:23

## 2024-04-29 RX ADMIN — GABAPENTIN 300 MG: 300 CAPSULE ORAL at 21:50

## 2024-04-29 RX ADMIN — AMLODIPINE BESYLATE 10 MG: 10 TABLET ORAL at 21:50

## 2024-04-29 RX ADMIN — IOPAMIDOL 75 ML: 755 INJECTION, SOLUTION INTRAVENOUS at 10:30

## 2024-04-29 ASSESSMENT — LIFESTYLE VARIABLES
HOW OFTEN DO YOU HAVE A DRINK CONTAINING ALCOHOL: NEVER
HOW MANY STANDARD DRINKS CONTAINING ALCOHOL DO YOU HAVE ON A TYPICAL DAY: PATIENT DOES NOT DRINK
HOW OFTEN DO YOU HAVE A DRINK CONTAINING ALCOHOL: NEVER

## 2024-04-29 ASSESSMENT — PAIN SCALES - GENERAL
PAINLEVEL_OUTOF10: 0

## 2024-04-29 NOTE — ED PROVIDER NOTES
[facility] EMERGENCY DEPARTMENT  EMERGENCY DEPARTMENT ENCOUNTER        Pt Name: Eliceo Evangelista  MRN: [id number]  Birthdate 1940  Date of evaluation: 4/29/2024  Provider: Lucian Hogan PA-C  PCP: Miguel Zamora APRN - CNP  Note Started: 10:57 AM EDT 4/29/24      MEGAN. I have evaluated this patient.        CHIEF COMPLAINT       Chief Complaint   Patient presents with    Altered Mental Status     0830 today patient was getting up out of bed felt SOB, weak, and family noted L facial droop that lasted around 5 minutes. Improved, but daughter states she is more \"delayed\" than baseline, has HX dementia.        HISTORY OF PRESENT ILLNESS: 1 or more Elements     History From: daughter and patient            Chief Complaint:face droop    Eliceo Evangelista is a 83 y.o. female who presents reportedly having had 5 minutes of neuro changes this morning at 8:30 AM.  The last time she was seen normal was 8 PM last evening.  Lives at home with her daughter.  Daughter states that when she was getting her mother up at 830 this morning patient was having a hard time standing.  Patient reports that this was a feeling of off balance at that time, no room spinning, no leaning to 1 side.  Also felt like left face was a bit droopy and felt odd to patient.  This is confirmed by her daughter.  Lasted 5 minutes and then resolved.  Patient a bit more confused than usual but has Alzheimer's.  No further acute complaint of abnormality at this time.  No difficulty breathing or chest pain, no abdominal pain or vomiting.  No urine or stool change or extremity acute change.  Again based on history, back to baseline at this time.    Nursing Notes were all reviewed and agreed with or any disagreements were addressed in the HPI.    REVIEW OF SYSTEMS :      Review of Systems  Positive as above  Positives and Pertinent negatives as per HPI.     SURGICAL HISTORY     Past Surgical History:   Procedure Laterality Date

## 2024-04-29 NOTE — PLAN OF CARE
Problem: Pain  Goal: Verbalizes/displays adequate comfort level or baseline comfort level  Outcome: Progressing     Problem: Discharge Planning  Goal: Discharge to home or other facility with appropriate resources  Outcome: Progressing     Problem: Safety - Adult  Goal: Free from fall injury  Outcome: Progressing     Problem: ABCDS Injury Assessment  Goal: Absence of physical injury  Outcome: Progressing

## 2024-04-29 NOTE — ED NOTES
ED SBAR report provider to NAZANIN De La Cruz. Patient to be transported to Room 5276 via stretcher by ED tech.Patient transported with bedside cardiac monitor and with IV medications infusing. IV site clean, dry, and intact. MEWS score and pain assessed as 0 and documented. Updated patient and family on plan of care.

## 2024-04-29 NOTE — ED NOTES
Pt arrived to ED rm 16 from CT at this time. Report received from NAZANIN KUO. Pt to ED with daughter who is caregiver. Per daughter, patient was last seen normal at 8pm last night when she went to bed. Daughter states that patient woke up this morning at 0830 with left side facial droop, increased confusion, generalized weakness and unsteady gait. Pt has hx of alzheimer's, daughter states pt seemed more confused than usual. Daughter states that symptoms lasted approx 5-10 minutes and then resolved. Pt does not have any further facial droop at current time. Pt alert and oriented to person, place and situation, disoriented to time, per daughter, pt is currently at baseline mentation.

## 2024-04-29 NOTE — ED NOTES
Pt requested and received turkey sandwich and apple juice. Tolerating well, no complications noted or reported. Ok per ED TORRIE Epstein for pt to have regular diet.

## 2024-04-29 NOTE — ACP (ADVANCE CARE PLANNING)
Advance Care Planning     Advance Care Planning Activator (Inpatient)  Conversation Note      Date of ACP Conversation: 4/29/2024     Conversation Conducted with: Legal next of kin daughter Maria    ACP Activator: Rox Costello [facility]      Health Care Decision Maker:     Current Designated Health Care Decision Maker:     Primary Decision Maker: Maria Jerome - Child - [phone]    Secondary Decision Maker: Andrew Evangelista - Child - [phone]    Today we documented Decision Maker(s) consistent with ACP documents on file.    Care Preferences    Ventilation:  \"If you were in your present state of health and suddenly became very ill and were unable to breathe on your own, what would your preference be about the use of a ventilator (breathing machine) if it were available to you?\"      Would the patient desire the use of ventilator (breathing machine)?: yes    \"If your health worsens and it becomes clear that your chance of recovery is unlikely, what would your preference be about the use of a ventilator (breathing machine) if it were available to you?\"     Would the patient desire the use of ventilator (breathing machine)?: Yes      Resuscitation  \"CPR works best to restart the heart when there is a sudden event, like a heart attack, in someone who is otherwise healthy. Unfortunately, CPR does not typically restart the heart for people who have serious health conditions or who are very sick.\"    \"In the event your heart stopped as a result of an underlying serious health condition, would you want attempts to be made to restart your heart (answer \"yes\" for attempt to resuscitate) or would you prefer a natural death (answer \"no\" for do not attempt to resuscitate)?\" yes       [] Yes   [] No   Educated Patient / Decision Maker regarding differences between Advance Directives and portable DNR orders.    Length of ACP Conversation in minutes:      Conversation Outcomes:  ACP discussion completed    Follow-up plan:    []

## 2024-04-29 NOTE — ED NOTES
Bedside swallow screen completed without difficulty. No complications noted or reported, pt tolerated well.

## 2024-04-29 NOTE — ED TRIAGE NOTES
0830 today patient was getting up out of bed felt SOB, weak, and family noted L facial droop that lasted around 5 minutes. Improved, but daughter states she is more \"delayed\" than baseline, has HX dementia.

## 2024-04-30 ENCOUNTER — APPOINTMENT (OUTPATIENT)
Dept: MRI IMAGING | Age: 84
End: 2024-04-30
Payer: MEDICARE

## 2024-04-30 VITALS
SYSTOLIC BLOOD PRESSURE: 145 MMHG | RESPIRATION RATE: 15 BRPM | BODY MASS INDEX: 29.22 KG/M2 | TEMPERATURE: 98 F | HEART RATE: 59 BPM | HEIGHT: 63 IN | WEIGHT: 164.9 LBS | DIASTOLIC BLOOD PRESSURE: 66 MMHG | OXYGEN SATURATION: 97 %

## 2024-04-30 PROBLEM — I36.1 NONRHEUMATIC TRICUSPID VALVE REGURGITATION: Status: ACTIVE | Noted: 2024-04-30

## 2024-04-30 LAB
CHOLEST SERPL-MCNC: 98 MG/DL (ref 0–199)
DEPRECATED RDW RBC AUTO: 14.3 % (ref 12.4–15.4)
EST. AVERAGE GLUCOSE BLD GHB EST-MCNC: 116.9 MG/DL
HBA1C MFR BLD: 5.7 %
HCT VFR BLD AUTO: 32.9 % (ref 36–48)
HDLC SERPL-MCNC: 50 MG/DL (ref 40–60)
HGB BLD-MCNC: 11.1 G/DL (ref 12–16)
LDLC SERPL CALC-MCNC: 40 MG/DL
MCH RBC QN AUTO: 30.4 PG (ref 26–34)
MCHC RBC AUTO-ENTMCNC: 33.8 G/DL (ref 31–36)
MCV RBC AUTO: 89.8 FL (ref 80–100)
PLATELET # BLD AUTO: 121 K/UL (ref 135–450)
PMV BLD AUTO: 9.4 FL (ref 5–10.5)
RBC # BLD AUTO: 3.67 M/UL (ref 4–5.2)
T4 FREE SERPL-MCNC: 0.7 NG/DL (ref 0.9–1.8)
TRIGL SERPL-MCNC: 39 MG/DL (ref 0–150)
TSH SERPL DL<=0.005 MIU/L-ACNC: 57.22 UIU/ML (ref 0.27–4.2)
VLDLC SERPL CALC-MCNC: 8 MG/DL
WBC # BLD AUTO: 6 K/UL (ref 4–11)

## 2024-04-30 PROCEDURE — 83036 HEMOGLOBIN GLYCOSYLATED A1C: CPT

## 2024-04-30 PROCEDURE — 97530 THERAPEUTIC ACTIVITIES: CPT

## 2024-04-30 PROCEDURE — 6360000002 HC RX W HCPCS: Performed by: FAMILY MEDICINE

## 2024-04-30 PROCEDURE — 2580000003 HC RX 258: Performed by: FAMILY MEDICINE

## 2024-04-30 PROCEDURE — G0378 HOSPITAL OBSERVATION PER HR: HCPCS

## 2024-04-30 PROCEDURE — 97165 OT EVAL LOW COMPLEX 30 MIN: CPT

## 2024-04-30 PROCEDURE — 97161 PT EVAL LOW COMPLEX 20 MIN: CPT

## 2024-04-30 PROCEDURE — 80061 LIPID PANEL: CPT

## 2024-04-30 PROCEDURE — 96372 THER/PROPH/DIAG INJ SC/IM: CPT

## 2024-04-30 PROCEDURE — 85027 COMPLETE CBC AUTOMATED: CPT

## 2024-04-30 PROCEDURE — 84443 ASSAY THYROID STIM HORMONE: CPT

## 2024-04-30 PROCEDURE — 84439 ASSAY OF FREE THYROXINE: CPT

## 2024-04-30 PROCEDURE — 99222 1ST HOSP IP/OBS MODERATE 55: CPT | Performed by: NURSE PRACTITIONER

## 2024-04-30 PROCEDURE — 70551 MRI BRAIN STEM W/O DYE: CPT

## 2024-04-30 PROCEDURE — 94760 N-INVAS EAR/PLS OXIMETRY 1: CPT

## 2024-04-30 PROCEDURE — 6370000000 HC RX 637 (ALT 250 FOR IP): Performed by: FAMILY MEDICINE

## 2024-04-30 RX ADMIN — LOSARTAN POTASSIUM 100 MG: 100 TABLET, FILM COATED ORAL at 08:53

## 2024-04-30 RX ADMIN — METHIMAZOLE 5 MG: 5 TABLET ORAL at 08:50

## 2024-04-30 RX ADMIN — METOPROLOL SUCCINATE 25 MG: 25 TABLET, FILM COATED, EXTENDED RELEASE ORAL at 08:51

## 2024-04-30 RX ADMIN — Medication 10 ML: at 08:53

## 2024-04-30 RX ADMIN — ASPIRIN 81 MG: 81 TABLET, COATED ORAL at 08:53

## 2024-04-30 RX ADMIN — PSYLLIUM HUSK 1 PACKET: 3.4 POWDER ORAL at 08:48

## 2024-04-30 RX ADMIN — ENOXAPARIN SODIUM 30 MG: 100 INJECTION SUBCUTANEOUS at 08:51

## 2024-04-30 RX ADMIN — GABAPENTIN 300 MG: 300 CAPSULE ORAL at 08:51

## 2024-04-30 RX ADMIN — EZETIMIBE 10 MG: 10 TABLET ORAL at 08:57

## 2024-04-30 ASSESSMENT — PAIN SCALES - GENERAL: PAINLEVEL_OUTOF10: 0

## 2024-04-30 NOTE — H&P
No intracerebral masses are identified.  No mass effect.  No midline shift.  No acute intracranial hemorrhage is seen.  There is intracranial atherosclerosis. Subtle periventricular hypodensity is seen ORBITS: The visualized portion of the orbits demonstrate no acute abnormality..  There is borderline radiologic proptosis SINUSES: Mild mucosal thickening is seen in left frontal sinus.  Mild mucosal thickening seen in the ethmoid air cells.  There is chronic bony wall thickening seen left maxillary sinus with near complete opacification of the left maxillary sinus No significant mastoid opacification noted. SOFT TISSUES/SKULL:  No acute abnormality of the visualized skull or soft tissues.     No acute intracranial abnormality Paranasal sinus disease with chronic appearing left maxillary sinus disease     CTA HEAD NECK W CONTRAST    Result Date: 4/29/2024  EXAMINATION: CTA OF THE HEAD AND NECK WITH CONTRAST 4/29/2024 10:27 am: TECHNIQUE: CTA of the head and neck was performed with the administration of intravenous contrast. Multiplanar reformatted images are provided for review.  MIP images are provided for review. Stenosis of the internal carotid arteries measured using NASCET criteria. Automated exposure control, iterative reconstruction, and/or weight based adjustment of the mA/kV was utilized to reduce the radiation dose to as low as reasonably achievable. This scan was analyzed using e[employer]ai contact LVO. Identification of suspected findings is not for diagnostic use beyond notification. Viz LVO is limited to analysis of imaging data and should not be used in-lieu of full patient evaluation or relied upon to make or confirm diagnosis. COMPARISON: None. HISTORY: ORDERING SYSTEM PROVIDED HISTORY: stroke/TIA symptoms FINDINGS: CTA NECK: AORTIC ARCH/ARCH VESSELS: No dissection or arterial injury.  No significant stenosis of the brachiocephalic or subclavian arteries. CAROTID ARTERIES: No dissection, arterial injury,

## 2024-04-30 NOTE — PLAN OF CARE
Problem: Pain  Goal: Verbalizes/displays adequate comfort level or baseline comfort level  4/30/2024 0138 by Carl Amaya RN  Outcome: Progressing     Problem: Discharge Planning  Goal: Discharge to home or other facility with appropriate resources  4/30/2024 0138 by Carl Amaya RN  Outcome: Progressing     Problem: Safety - Adult  Goal: Free from fall injury  4/30/2024 0138 by Carl Amaya RN  Outcome: Progressing     Problem: ABCDS Injury Assessment  Goal: Absence of physical injury  4/30/2024 0138 by Carl Amaya RN  Outcome: Progressing

## 2024-04-30 NOTE — CARE COORDINATION
Case Management Discharge Note          Date / Time of Note: 4/30/2024 3:56 PM                  Patient Name: Eliceo Evangelista   YOB: 1940  Diagnosis: Facial droop [R29.810]  Elevated troponin I level [R79.89]  Stroke-like episode [R29.90]  Acute kidney injury superimposed on CKD (HCC) [N17.9, N18.9]   Date / Time: 4/29/2024 10:03 AM    Financial:  Payor: [facility] MEDICARE / Plan: [employer] ESSENTIAL/PLUS / Product Type: *No Product type* /      Pharmacy:    [id number] [place], [place] - [address] - P [phone] - F [phone] [address] [facility] [address]  Phone: [phone] Fax: [phone]      Assistance purchasing medications?:    Assistance provided by Case Management: None at this time    DISCHARGE Disposition: Home- No Services Needed    Transportation:  Transportation PLAN for discharge: family   Mode of Transport: Private Car  Time of Transport: family at bedside to coordinate with RN     IMM Completed:   Not Indicated    Additional CM Notes: Patient and daughter at bedside. Family declined home care.   Confirmed daughter to transport and reports no needs at this time.     The Plan for Transition of Care is related to the following treatment goals of Facial droop [R29.810]  Elevated troponin I level [R79.89]  Stroke-like episode [R29.90]  Acute kidney injury superimposed on CKD (HCC) [N17.9, N18.9]    The Patient and/or patient representative Eliceo and her family were provided with a choice of provider and agrees with the discharge plan Yes    Freedom of choice list was provided with basic dialogue that supports the patient's individualized plan of care/goals and shares the quality data associated with the providers. Yes    ASHWINI Slade  [facility]   Case Management Department  Ph: [phone]  Electronically signed by ASHWINI Slade on 4/30/2024 at 3:56 PM      
Discharge order acknowledged.   Per chart review, Home Care is ordered. And family preferred [facility].     Discharging to Facility/ Agency   Name: [facility]  Address:   [address], [facility] [address]  Phone:  [phone]  Fax:  [phone]    Called to Joyce with intake, confirmed they will update me on if they can accept patient.     ASHWINI Slade, LSW, Social Work/Case Management   [phone]  Electronically signed by ASHWINI Slade on 4/30/2024 at 3:36 PM    [facility]: spoke to Joyce, they cannot accept due to staffing.     Met with patient and daughter at bedside, confirmed plan is to return home today.   Family declined home care at this time.     Family to transport. No needs at this time.   Electronically signed by ASHWINI Slade on 4/30/2024 at 3:56 PM    
present  Other Identified Issues/Barriers to RETURNING to current housing: weakness.  Potential Assistance needed at discharge:              Potential DME:    Patient expects to discharge to: Apartment  Plan for transportation at discharge: Family    Financial    Payor: [facility] MEDICARE / Plan: [employer] ESSENTIAL/PLUS / Product Type: *No Product type* /     Does insurance require precert for SNF: Yes    Potential assistance Purchasing Medications:    Meds-to-Beds request:        [id number] - [place], [place] - [address] - P [phone] - F [phone]  [address]  [facility] [address]  Phone: [phone] Fax: [phone]      Notes:    Factors facilitating achievement of predicted outcomes: Family support, Motivated, Cooperative, and Pleasant    Barriers to discharge: Limited family support and Medical complications    Additional Case Management Notes: Patient hopes to return home with her daughter, had [facility] in the past. OK with them again.  Provided home care list and SNF list, information for [facility], discussed with daughter. Patient requested Advanced Directives.  Completed- daughter- Maria next of Kin.    The Plan for Transition of Care is related to the following treatment goals of Facial droop [R29.810]    IF APPLICABLE: The Patient and/or patient representative Eliceo and her family were provided with a choice of provider and agrees with the discharge plan. Freedom of choice list with basic dialogue that supports the patient's individualized plan of care/goals and shares the quality data associated with the providers was provided to: Patient   Patient Representative Name:       The Patient and/or Patient Representative Agree with the Discharge Plan? Yes    FLORENTINO Witt  Case Management Department  Ph: [phone] Fax: [phone]

## 2024-04-30 NOTE — DISCHARGE INSTR - COC
alzheimers disease G30.9, F02.80    Asthma J45.909    c spine djd M19.90    Glaucoma H40.9    IBS (irritable bowel syndrome) K58.9    Neuropathy G62.9    Osteopenia M85.80    Facial droop R29.810       Isolation/Infection:   Isolation            No Isolation          Patient Infection Status       None to display                     Nurse Assessment:  Last Vital Signs: /60   Pulse 64   Temp 98 °F (36.7 °C) (Oral)   Resp 14   Ht 1.6 m (5' 3\")   Wt 74.8 kg (164 lb 14.5 oz)   SpO2 99%   BMI 29.21 kg/m²     Last documented pain score (0-10 scale): Pain Level: 0  Last Weight:   Wt Readings from Last 1 Encounters:   04/30/24 74.8 kg (164 lb 14.5 oz)     Mental Status:  disoriented, alert, coherent, and able to concentrate and follow conversation    IV Access:  - None    Nursing Mobility/ADLs:  Walking   Independent  Transfer  Independent  Bathing  Assisted  Dressing  Assisted  Toileting  Independent  Feeding  Independent  Med Admin  Assisted  Med Delivery   whole    Wound Care Documentation and Therapy:  Incision 08/23/11 Abdomen (Active)   Number of days: 4634       Wound 04/29/24 Heel Left;Right DTI (Active)   Dressing Status Other (Comment) 04/29/24 2000   Wound Cleansed Not Cleansed 04/29/24 2000   Dressing/Treatment Protective barrier 04/29/24 2000   Drainage Amount None (dry) 04/29/24 2000   Odor None 04/29/24 2000   Number of days: 0       Incision 08/18/22 Abdomen Lateral;Left;Upper (Active)   Number of days: 621       Incision 02/25/22 Sternum (Active)   Number of days: 795       Incision 02/25/22 Knee Right;Inner (Active)   Number of days: 795       Incision 02/25/22 Femoral Right;Anterior (Active)   Number of days: 795       Incision 02/25/22 Pretibial Right;Inner;Mid (Active)   Number of days: 795        Elimination:  Continence:   Bowel: Yes  Bladder: Yes  Urinary Catheter: None   Colostomy/Ileostomy/Ileal Conduit: No       Date of Last BM: 4/30/2024    Intake/Output Summary (Last 24 hours) at

## 2024-04-30 NOTE — PROGRESS NOTES
4 Eyes Skin Assessment     NAME:  Eliceo Evangelista  YOB: 1940  MEDICAL RECORD NUMBER:  [id number]    The patient is being assessed for  Admission    I agree that at least one RN has performed a thorough Head to Toe Skin Assessment on the patient. ALL assessment sites listed below have been assessed.      Areas assessed by both nurses:    Head, Face, Ears, Shoulders, Back, Chest, Arms, Elbows, Hands, Sacrum. Buttock, Coccyx, Ischium, Legs. Feet and Heels, and Under Medical Devices         Does the Patient have a Wound? Yes wound(s) were present on assessment. LDA wound assessment was Initiated and completed by RN       Kushal Prevention initiated by RN: Yes  Wound Care Orders initiated by RN: Yes    Pressure Injury (Stage 3,4, Unstageable, DTI, NWPT, and Complex wounds) if present, place Wound referral order by RN under : Yes    New Ostomies, if present place, Ostomy referral order under : No     Nurse 1 eSignature: Electronically signed by Dorothy Durán RN on 4/29/24 at 6:26 PM EDT    **SHARE this note so that the co-signing nurse can place an eSignature**    Nurse 2 eSignature: Electronically signed by Nithya Mayo RN on 4/29/24 at 6:30 PM EDT    
Discharge and follow up instructions reviewed with patient and family who denied having any questions. IV and telemetry removed without complication. All personal belongings gathered and this RN wheeled down patient to family car.     Electronically signed by Elia Taylor RN on 4/30/2024 at 4:34 PM   
Medication Reconciliation    List of medications patient is currently taking is complete.     Source of information: 1. Conversation with patient at bedside                                      2. EPIC records     Notes regarding home medications:   1. Patient did not receive any of her home medications prior to arrival to the ER today.    Shayan Milton RPH, PharmD, BCPS  4/29/2024 2:24 PM                
NAME:  Eliceo Evangelista  YOB: 1940  MEDICAL RECORD NUMBER:  [id number]  TODAYS DATE:  4/29/2024    Discussed personal risk factors for Stroke/TIA with patient/family, and ways to reduce the risk for a recurrent stroke. Patient's personal risk factors which were identified are:     [] Alcohol Abuse: check with your physician before any alcohol consumption.   [] Atrial fibrillation: may cause blood clots.  [] Drug Abuse: Seek help, talk with your doctor  [] Clotting Disorder  [] Diabetes  [x] Family history of stroke or heart disease  [x] High Blood Pressure/Hypertension: work with your physician.  [x] High cholesterol: monitor cholesterol levels with your physician.   [x] Overweight/Obesity: work with your physician for your ideal body weight.  [x] Physical Inactivity: get regular exercise as directed by your physician.   [] Personal history of previous TIA or stroke  [x] Poor Diet; decrease salt (sodium) in your diet, follow diet directed by physician.   [] Smoking: Cigarette/Cigar: stop smoking.         Advised pt. that you can reduce your risk for stroke/TIA by modifying/controlling the risk factors that you have. Pt.advised to take the medications as prescribed, which will be detailed in the discharge instructions, and to not stop taking them without consulting their physician. In addition, pt. advised to maintain a healthy diet, exercise regularly and to not smoke.      [facility]'s Stroke treatment and prevention, Managing your recovery  notebook  provided and/or reviewed  with patient/family.  The notebook includes, but not limited to, sections addressing warning signs & symptoms of a stroke, which are: sudden numbness or weakness especially on one side of the body, sudden confusion, difficulty speaking or understanding, sudden changes in vision, sudden dizziness or loss of balance/ coordination, sudden severe headache, syncope and seizure.  The need to call EMS (911) immediately if signs & 
Occupational Therapy  Facility/Department: [address] PROGRESSIVE CARE  Occupational Therapy Initial Assessment/Discharge    Name: Eliceo Evangelista  : 1940  MRN: [id number]  Date of Service: 2024    Discharge Recommendations:  Home with assist PRN  OT Equipment Recommendations  Equipment Needed: No  Eliceo Evangelista scored a 22/24 on the AM-PAC ADL Inpatient form.  At this time, no further OT is recommended upon discharge due to no needs.  Recommend patient returns to prior setting with prior services.        Patient Diagnosis(es): The primary encounter diagnosis was Stroke-like episode. Diagnoses of Acute kidney injury superimposed on CKD (HCC) and Elevated troponin I level were also pertinent to this visit.  Past Medical History:  has a past medical history of alzheimers disease, Asthma, c spine djd, CAD (coronary artery disease), CKD (chronic kidney disease), CKD (chronic kidney disease), DNR (do not resuscitate), Glaucoma, Hyperlipidemia, Hypertension, Hyperthyroidism, IBS (irritable bowel syndrome), Neuropathy, Osteoporosis, and S/P CABG (coronary artery bypass graft).  Past Surgical History:  has a past surgical history that includes Cholecystectomy; Foot surgery; Tubal ligation; Gastric Band (2011); orif femur decompression (Left, 2014); Ankle fracture surgery (Left, 2015); Hand surgery (); other surgical history (Right, ); Coronary artery bypass graft (2022); Coronary artery bypass graft (N/A, 2022); IR TUNNELED CVC PLACE WO SQ PORT/PUMP > 5 YEARS (2022); Diagnostic Cardiac Cath Lab Procedure (2022); CT GUIDED CHEST TUBE (2022); joint replacement (Left, 2014); Colonoscopy; Pleural Cath Insertion (Left, 2022); other surgical history (Left, 10/12/2022); and Pleural Cath Insertion (Left, 10/12/2022).    Treatment Diagnosis: pt Ind w/ fxl mob in preparation for ADL tasks      Assessment   Performance deficits / Impairments:  (pt at her Ind 
Occupational Therapy Attempt  Eliceo Evangelista  4/30/2024  [id number]    OT orders noted, pt chart reviewed. Pt attempted, but is off the unit for MRI. Will follow up when pt is back in her room and therapy schedule allows.     Electronically signed by Jaylon Christensen OTR/L [id number] on 4/30/24 at 9:34 AM EDT    
Slept well throughout the night. NIH 1. Denies headache or dizziness. Up to BR with SBA. Slow steady gait noted. Will continue to monitor.  
Provided: Role of Therapy;Plan of Care  Education Method: Demonstration;Verbal  Education Outcome: Unable to demonstrate understanding      Therapy Time   Individual Concurrent Group Co-treatment   Time In       1059   Time Out       1122   Minutes       23   Timed Code Treatment Minutes: 8 Minutes   Low Complexity Evaluation    Mikie Brock PT   Electronically signed by Mikie Brock PT [id number] on 4/30/2024 at 11:22 AM

## 2024-04-30 NOTE — CONSULTS
NEUROLOGY CONSULT NOTE  Reason for Consult: Radha Bashir MD asked me to see Eliceo Evangelista in consultation for evaluation of:  TIA, stroke work up    Chief complaint: \"I don't remember.\"    HISTORY OF PRESENT ILLNESS:  HPI was gathered primarily from the patient's daughter as patient has dementia and is a poor historian.    Eliceo Evangelista is a 83 y.o. female with a PMH that includes AD, CAD s/p CABG x4, CKD, HLD, HTN, hyperthyroidism, and peripheral neuropathy.  Please see below for additional medical and surgical histories.    Patient presented to the ED yesterday with complaints of feeling weak and SOB.  Patient's daughter states that she went to get her mother up out of bed and noticed that she was a little slower than usual and not acting like herself.  She went to make her breakfast and when she returned the patient was hunched over.  She told her daughter she didn't know what was wrong but that she wasn't feeling right.  Her voice was reportedly shaky.   Her daughter got her to the chair and states her walking was shaky too.  She gave the patient her socks and pants to put on and she was having difficulty coordinating with both legs.  Patient then reported that her face felt droopy.  Her daughter thought it \"maybe\" looked a little droopy.  Apparently that resolved after about 5 minutes.    Today the patient has no complaints.  No headache, no chest pain, no n/v.  No pain or burning with urination.  Her daughter thinks she is back to her baseline.    Patient has never had a stroke before.  She was on ASA and Lipitor PTA and her daughter states she is compliant.    BP in /73.  HR 64.  Afebrile.  NIHSS 1.  HCT and CTAs non-acute. MRI ruled out ischemia.    The patient's daughter was at the bedside at the time of evaluation.    MEDICAL HISTORY:  Past Medical History:   Diagnosis Date    alzheimers disease 10/2022    Neurologist Dr Quynh Hernandez    Asthma     as child grew out of it    c spine 
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 Incidental findings of moderate to severe tricuspid regurgitation. Follow-up in the office as regularly scheduled.

## 2024-05-01 NOTE — DISCHARGE SUMMARY
V2.0  Discharge Summary    Name:  Eliceo Evangelista /Age/Sex: 1940 (83 y.o. female)   Admit Date: 2024  Discharge Date: 24    MRN & CSN:  [id number] & [id number] Encounter Date and Time 24 10:23 AM EDT    Attending:  No att. providers found Discharging Provider: Radha Edge MD       Hospital Course:     Suspected TIA  - CTA head and neck, brain MRI reassuring  - seen by neurology -- recommending continued ASA and statin therapy    Hypertheyroidism:  - Free T4 mildly low, may need methimazole adjustment   - discussed with daughter, they will follow up outpatient with PCP who manages the medication    Enlarged thyroid:  - defer further monitoring to PCP    Moderate-severe tricuspid regurgitation  - seen by cardiology -- uncertain etiology  - can f/u outpatient for further monitoring, management    2mm ICA infundibulum or aneurysm:  - not causing symptoms current  - cont outpatient monitoring      The patient expressed appropriate understanding of, and agreement with the discharge recommendations, medications, and plan.     Consults this admission:  IP CONSULT TO STROKE TEAM  IP CONSULT TO NEUROLOGY  IP CONSULT TO CARDIOLOGY  IP CONSULT TO HOME CARE NEEDS    Discharge Diagnosis:   Facial droop        Discharge Instruction:   Follow up appointments: PCP  Primary care physician: Miguel Zamora, MARKEL - CNP within 2 weeks  Diet: cardiac diet   Activity: activity as tolerated  Disposition: Discharged to:   [x]Home, [][facility], []SNF, []Acute Rehab, []Hospice   Condition on discharge: Stable  Labs and Tests to be Followed up as an outpatient by PCP or Specialist: further thyroid managment    Discharge Medications:        Medication List        CONTINUE taking these medications      amLODIPine 10 MG tablet  Commonly known as: NORVASC     Aspirin Low Dose 81 MG EC tablet  Generic drug: aspirin  TAKE 1 TABLET BY MOUTH DAILY     atorvastatin 80 MG tablet  Commonly known as: LIPITOR  Take 1 tablet by mouth

## 2024-06-11 ENCOUNTER — OFFICE VISIT (OUTPATIENT)
Dept: CARDIOLOGY CLINIC | Age: 84
End: 2024-06-11

## 2024-06-11 VITALS
SYSTOLIC BLOOD PRESSURE: 144 MMHG | WEIGHT: 167 LBS | DIASTOLIC BLOOD PRESSURE: 70 MMHG | BODY MASS INDEX: 29.59 KG/M2 | HEART RATE: 63 BPM | OXYGEN SATURATION: 97 % | HEIGHT: 63 IN

## 2024-06-11 DIAGNOSIS — Z95.1 S/P CABG X 4: ICD-10-CM

## 2024-06-11 DIAGNOSIS — I36.1 NONRHEUMATIC TRICUSPID VALVE REGURGITATION: ICD-10-CM

## 2024-06-11 DIAGNOSIS — I10 ESSENTIAL HYPERTENSION: ICD-10-CM

## 2024-06-11 DIAGNOSIS — J90 PLEURAL EFFUSION: ICD-10-CM

## 2024-06-11 DIAGNOSIS — G45.9 TRANSIENT CEREBRAL ISCHEMIA, UNSPECIFIED TYPE: ICD-10-CM

## 2024-06-11 DIAGNOSIS — I25.10 ATHEROSCLEROSIS OF NATIVE CORONARY ARTERY OF NATIVE HEART WITHOUT ANGINA PECTORIS: Primary | ICD-10-CM

## 2024-06-11 DIAGNOSIS — E78.5 HYPERLIPIDEMIA LDL GOAL <70: ICD-10-CM

## 2024-06-11 NOTE — PROGRESS NOTES
97%   BMI 29.59 kg/m²   No intake or output data in the 24 hours ending 06/11/24 1626  Wt Readings from Last 2 Encounters:   06/11/24 75.8 kg (167 lb)   05/08/24 72.6 kg (160 lb)     Constitutional: She is oriented to person, place, and time. She appears well-developed and well-nourished. In no acute distress.   Head: Normocephalic and atraumatic.     Neck: Neck supple. No JVD present. Carotid bruit is not present. No mass and no thyromegaly present. No lymphadenopathy present.  Cardiovascular: Normal rate, regular rhythm, normal heart sounds and intact distal pulses.  Exam reveals no gallop and no friction rub.  No murmur heard.  Pulmonary/Chest: Effort normal and breath sounds normal. No respiratory distress. She has no wheezes, rhonchi or rales.   Abdominal: Soft, non-tender. Bowel sounds and aorta are normal. She exhibits no organomegaly, mass or bruit.   Extremities: No edema, cyanosis, or clubbing. Pulses are 2+ radial/carotid/dorsalis pedis and posterior tibial bilaterally.  Neurological: She is alert and oriented to person, place, and time. She has normal reflexes. No cranial nerve deficit. Coordination normal.   Skin: Skin is warm and dry. There is no rash or diaphoresis.   Psychiatric: She has a normal mood and affect. Her speech is normal and behavior is normal.     Personally reviewed and interpreted   EKG Interpretation: 7/29/2022 Sinus rhythm with normal intervals and axis      Procedures:     [facility] 2/22/22  1.  Severe triple-vessel coronary artery disease.  The distal left main  is heavily calcified and has a 60% lesion.  The proximal LAD is heavily  calcified with a 60% lesion.  The mid-LAD is 100% occluded but fills in  from right collaterals.  The distal LAD backfills back to the midportion  via left collaterals.  The proximal circumflex is heavily calcified and  has an 80% lesion.  OM1 is calcified with 80% serial lesions.  The  proximal right coronary artery is subtotally occluded and the PDA 
is  backfilling from the right system into the mid LAD via a septal  .  Also, the LAD first diagonal branch has a 90% lesion.  2.  Normal left ventricular systolic function.  LV ejection fraction of  65%.  3.  Borderline left ventricular end-diastolic pressure at 14 to 16 mmHg.  4.  No gradient across the aortic valve on pullback to suggest aortic  stenosis.    CABG 2/25/22  Urgent cabgx4 (LIMA-LAD, SVG-RI-OM, SVG-PDA)  EDISON exclusion( 35mm AtriClip)      Imaging:     Stress perfusion 2/21/22  Abnormal study. There is a large sized, moderate intensity, reversible    defect of the mid to apical anterior, apical septal, apical inferior, and    apical cap which is consistent with ischemia.    Normal LV size and systolic function.    Uncontrolled hypertension.    Overall findings represent a high risk study.     Echo 2/21/22  Normal left ventricle size, wall thickness, and systolic function with an  estimated ejection fraction of 55-60%. No regional wall motion abnormalities are seen.  grade 1 diastolic dysfunction  The right ventricle is normal in size and function.  No significant valvular heart disease    Chest CT 7/25/22  The left-sided hydropneumothorax is re-identified and overall is similar in   size.  There is less fluid than on the previous exam.  Superimposed infection   cannot be excluded.     ECHO 4/29/2024  Limited study.  Left ventricular cavity size is normal. Normal left ventricular wall  thickness. Overall left ventricular systolic function appears normal with an  ejection fraction of 55-60%. No regional wall motion abnormalities are  noted.  The right ventricle is normal in size and function.  Moderate to severe tricuspid regurgitation.  The interatrial septum appears intact with no shunting seen by color  Doppler.  Mild mitral regurgitation is present.    Lab Review:   Lab Results   Component Value Date/Time    TRIG 39 04/30/2024 05:17 AM    HDL 50 04/30/2024 05:17 AM     Lab Results

## 2024-06-21 ENCOUNTER — TELEPHONE (OUTPATIENT)
Dept: CARDIOLOGY CLINIC | Age: 84
End: 2024-06-21

## 2024-06-21 NOTE — TELEPHONE ENCOUNTER
CARDIAC CLEARANCE     What type of procedure are you having? Ectropion    Which physician is performing your procedure? Dr. Willian Rogel    When is your procedure scheduled for?6/26    Where are you having this procedure? [facility]    Are you taking Blood Thinners? Asprin   If so what? (Name/dose/frequesncy)     Does the surgeon want you to stop your blood thinner?  If so for how long? 7 days     Phone Number and Contact Name for Physicians office: [phone]    Fax number to send information: [phone]

## 2024-06-21 NOTE — TELEPHONE ENCOUNTER
Pt is scheduled for Ectropion with Dr. Willian Rogel on 6/26/2024.  They are requesting pt hold her Asprin for 7 days prior.    Last ov 6/11/2024  CABG 2/25/22  Urgent cabgx4 (LIMA-LAD, SVG-RI-OM, SVG-PDA)  EDISON exclusion( 35mm AtriClip)

## 2024-06-24 NOTE — TELEPHONE ENCOUNTER
Patient should have stopped ASA on 6/18 per Luci at [facility]. Received a call today in regards to clearance letter.    Once clearance letter created, please send to: [phone] at [facility]

## 2024-06-25 NOTE — TELEPHONE ENCOUNTER
Luci called the office to f/u with clearance, OV and EKG. Luci was okay to receive this current telephone encounter with clearance from [facility].    Faxed needed documents to [phone]

## 2024-06-25 NOTE — TELEPHONE ENCOUNTER
Prepared cardiac clearance letter, faxed and scanned in chart.   Called [facility]-[phone]   Luci is aware.

## 2024-07-13 ENCOUNTER — HOSPITAL ENCOUNTER (EMERGENCY)
Age: 84
Discharge: LEFT BEFORE DISCHARGE | End: 2024-07-13

## 2024-07-23 DIAGNOSIS — I25.10 CAD IN NATIVE ARTERY: ICD-10-CM

## 2024-07-23 DIAGNOSIS — I10 PRIMARY HYPERTENSION: ICD-10-CM

## 2024-07-23 RX ORDER — METOPROLOL SUCCINATE 25 MG/1
25 TABLET, EXTENDED RELEASE ORAL DAILY
Qty: 90 TABLET | Refills: 3 | Status: SHIPPED | OUTPATIENT
Start: 2024-07-23

## 2024-09-16 ENCOUNTER — HOSPITAL ENCOUNTER (OUTPATIENT)
Age: 84
Setting detail: OBSERVATION
Discharge: HOME | End: 2024-09-17
Attending: HOSPITALIST | Admitting: HOSPITALIST
Payer: MEDICARE

## 2024-09-16 ENCOUNTER — APPOINTMENT (OUTPATIENT)
Dept: CT IMAGING | Age: 84
End: 2024-09-16
Payer: MEDICARE

## 2024-09-16 ENCOUNTER — APPOINTMENT (OUTPATIENT)
Dept: GENERAL RADIOLOGY | Age: 84
End: 2024-09-16
Payer: MEDICARE

## 2024-09-16 DIAGNOSIS — I65.21 STENOSIS OF RIGHT CAROTID ARTERY: ICD-10-CM

## 2024-09-16 DIAGNOSIS — R42 DIZZINESS: Primary | ICD-10-CM

## 2024-09-16 LAB
ALBUMIN SERPL-MCNC: 4.3 G/DL (ref 3.4–5)
ALBUMIN/GLOB SERPL: 1.2 {RATIO} (ref 1.1–2.2)
ALP SERPL-CCNC: 118 U/L (ref 40–129)
ALT SERPL-CCNC: 37 U/L (ref 10–40)
ANION GAP SERPL CALCULATED.3IONS-SCNC: 13 MMOL/L (ref 3–16)
AST SERPL-CCNC: 39 U/L (ref 15–37)
BASOPHILS # BLD: 0.1 K/UL (ref 0–0.2)
BASOPHILS NFR BLD: 1 %
BILIRUB SERPL-MCNC: 0.8 MG/DL (ref 0–1)
BUN SERPL-MCNC: 22 MG/DL (ref 7–20)
CALCIUM SERPL-MCNC: 10.1 MG/DL (ref 8.3–10.6)
CHLORIDE SERPL-SCNC: 105 MMOL/L (ref 99–110)
CO2 SERPL-SCNC: 23 MMOL/L (ref 21–32)
CREAT SERPL-MCNC: 1.3 MG/DL (ref 0.6–1.2)
DEPRECATED RDW RBC AUTO: 13.5 % (ref 12.4–15.4)
EKG ATRIAL RATE: 79 BPM
EKG DIAGNOSIS: NORMAL
EKG P AXIS: 68 DEGREES
EKG P-R INTERVAL: 152 MS
EKG Q-T INTERVAL: 406 MS
EKG QRS DURATION: 72 MS
EKG QTC CALCULATION (BAZETT): 465 MS
EKG R AXIS: 20 DEGREES
EKG T AXIS: 75 DEGREES
EKG VENTRICULAR RATE: 79 BPM
EOSINOPHIL # BLD: 0.1 K/UL (ref 0–0.6)
EOSINOPHIL NFR BLD: 0.9 %
GFR SERPLBLD CREATININE-BSD FMLA CKD-EPI: 41 ML/MIN/{1.73_M2}
GLUCOSE BLD-MCNC: 116 MG/DL (ref 70–99)
GLUCOSE SERPL-MCNC: 117 MG/DL (ref 70–99)
HCT VFR BLD AUTO: 40.1 % (ref 36–48)
HGB BLD-MCNC: 13.3 G/DL (ref 12–16)
INR PPP: 1.09 (ref 0.85–1.15)
LYMPHOCYTES # BLD: 1.3 K/UL (ref 1–5.1)
LYMPHOCYTES NFR BLD: 18.6 %
MCH RBC QN AUTO: 29.4 PG (ref 26–34)
MCHC RBC AUTO-ENTMCNC: 33.1 G/DL (ref 31–36)
MCV RBC AUTO: 88.8 FL (ref 80–100)
MONOCYTES # BLD: 0.6 K/UL (ref 0–1.3)
MONOCYTES NFR BLD: 8.2 %
NEUTROPHILS # BLD: 5 K/UL (ref 1.7–7.7)
NEUTROPHILS NFR BLD: 71.3 %
PERFORMED ON: ABNORMAL
PLATELET # BLD AUTO: 165 K/UL (ref 135–450)
PMV BLD AUTO: 9.2 FL (ref 5–10.5)
POTASSIUM SERPL-SCNC: 3.9 MMOL/L (ref 3.5–5.1)
PROT SERPL-MCNC: 8 G/DL (ref 6.4–8.2)
PROTHROMBIN TIME: 14.3 SEC (ref 11.9–14.9)
RBC # BLD AUTO: 4.51 M/UL (ref 4–5.2)
SODIUM SERPL-SCNC: 141 MMOL/L (ref 136–145)
T4 FREE SERPL-MCNC: 2.3 NG/DL (ref 0.9–1.8)
TROPONIN, HIGH SENSITIVITY: 17 NG/L (ref 0–14)
TROPONIN, HIGH SENSITIVITY: 19 NG/L (ref 0–14)
TSH SERPL DL<=0.005 MIU/L-ACNC: <0.01 UIU/ML (ref 0.27–4.2)
WBC # BLD AUTO: 7 K/UL (ref 4–11)

## 2024-09-16 PROCEDURE — 70450 CT HEAD/BRAIN W/O DYE: CPT

## 2024-09-16 PROCEDURE — 6360000002 HC RX W HCPCS: Performed by: HOSPITALIST

## 2024-09-16 PROCEDURE — 93005 ELECTROCARDIOGRAM TRACING: CPT | Performed by: EMERGENCY MEDICINE

## 2024-09-16 PROCEDURE — 71045 X-RAY EXAM CHEST 1 VIEW: CPT

## 2024-09-16 PROCEDURE — 70496 CT ANGIOGRAPHY HEAD: CPT

## 2024-09-16 PROCEDURE — 96361 HYDRATE IV INFUSION ADD-ON: CPT

## 2024-09-16 PROCEDURE — 99285 EMERGENCY DEPT VISIT HI MDM: CPT

## 2024-09-16 PROCEDURE — G0378 HOSPITAL OBSERVATION PER HR: HCPCS

## 2024-09-16 PROCEDURE — 2580000003 HC RX 258: Performed by: HOSPITALIST

## 2024-09-16 PROCEDURE — 84439 ASSAY OF FREE THYROXINE: CPT

## 2024-09-16 PROCEDURE — 2580000003 HC RX 258: Performed by: PHYSICIAN ASSISTANT

## 2024-09-16 PROCEDURE — 85610 PROTHROMBIN TIME: CPT

## 2024-09-16 PROCEDURE — 93010 ELECTROCARDIOGRAM REPORT: CPT | Performed by: INTERNAL MEDICINE

## 2024-09-16 PROCEDURE — 85025 COMPLETE CBC W/AUTO DIFF WBC: CPT

## 2024-09-16 PROCEDURE — 6370000000 HC RX 637 (ALT 250 FOR IP): Performed by: PHYSICIAN ASSISTANT

## 2024-09-16 PROCEDURE — 6370000000 HC RX 637 (ALT 250 FOR IP): Performed by: HOSPITALIST

## 2024-09-16 PROCEDURE — 6360000004 HC RX CONTRAST MEDICATION: Performed by: PHYSICIAN ASSISTANT

## 2024-09-16 PROCEDURE — 80053 COMPREHEN METABOLIC PANEL: CPT

## 2024-09-16 PROCEDURE — 96372 THER/PROPH/DIAG INJ SC/IM: CPT

## 2024-09-16 PROCEDURE — 96360 HYDRATION IV INFUSION INIT: CPT

## 2024-09-16 PROCEDURE — 84484 ASSAY OF TROPONIN QUANT: CPT

## 2024-09-16 PROCEDURE — 84443 ASSAY THYROID STIM HORMONE: CPT

## 2024-09-16 RX ORDER — RIVASTIGMINE 13.3 MG/24H
1 PATCH, EXTENDED RELEASE TRANSDERMAL DAILY
COMMUNITY

## 2024-09-16 RX ORDER — TIMOLOL MALEATE 5 MG/ML
1 SOLUTION/ DROPS OPHTHALMIC 2 TIMES DAILY
COMMUNITY

## 2024-09-16 RX ORDER — SODIUM CHLORIDE 9 MG/ML
INJECTION, SOLUTION INTRAVENOUS PRN
Status: DISCONTINUED | OUTPATIENT
Start: 2024-09-16 | End: 2024-09-17 | Stop reason: HOSPADM

## 2024-09-16 RX ORDER — ACETAMINOPHEN 325 MG/1
650 TABLET ORAL EVERY 6 HOURS PRN
Status: DISCONTINUED | OUTPATIENT
Start: 2024-09-16 | End: 2024-09-17 | Stop reason: HOSPADM

## 2024-09-16 RX ORDER — ONDANSETRON 2 MG/ML
4 INJECTION INTRAMUSCULAR; INTRAVENOUS EVERY 6 HOURS PRN
Status: DISCONTINUED | OUTPATIENT
Start: 2024-09-16 | End: 2024-09-17 | Stop reason: HOSPADM

## 2024-09-16 RX ORDER — IOPAMIDOL 755 MG/ML
75 INJECTION, SOLUTION INTRAVASCULAR
Status: COMPLETED | OUTPATIENT
Start: 2024-09-16 | End: 2024-09-16

## 2024-09-16 RX ORDER — ASPIRIN 81 MG/1
81 TABLET, CHEWABLE ORAL ONCE
Status: COMPLETED | OUTPATIENT
Start: 2024-09-16 | End: 2024-09-16

## 2024-09-16 RX ORDER — SODIUM CHLORIDE 0.9 % (FLUSH) 0.9 %
5-40 SYRINGE (ML) INJECTION EVERY 12 HOURS SCHEDULED
Status: DISCONTINUED | OUTPATIENT
Start: 2024-09-16 | End: 2024-09-17 | Stop reason: HOSPADM

## 2024-09-16 RX ORDER — METOPROLOL SUCCINATE 25 MG/1
25 TABLET, EXTENDED RELEASE ORAL DAILY
Status: DISCONTINUED | OUTPATIENT
Start: 2024-09-16 | End: 2024-09-17 | Stop reason: HOSPADM

## 2024-09-16 RX ORDER — AMLODIPINE BESYLATE 10 MG/1
10 TABLET ORAL NIGHTLY
Status: DISCONTINUED | OUTPATIENT
Start: 2024-09-16 | End: 2024-09-17 | Stop reason: HOSPADM

## 2024-09-16 RX ORDER — POLYETHYLENE GLYCOL 3350 17 G/17G
17 POWDER, FOR SOLUTION ORAL DAILY PRN
Status: DISCONTINUED | OUTPATIENT
Start: 2024-09-16 | End: 2024-09-17 | Stop reason: HOSPADM

## 2024-09-16 RX ORDER — LATANOPROST 50 UG/ML
1 SOLUTION/ DROPS OPHTHALMIC NIGHTLY
Status: DISCONTINUED | OUTPATIENT
Start: 2024-09-16 | End: 2024-09-17 | Stop reason: HOSPADM

## 2024-09-16 RX ORDER — 0.9 % SODIUM CHLORIDE 0.9 %
500 INTRAVENOUS SOLUTION INTRAVENOUS ONCE
Status: COMPLETED | OUTPATIENT
Start: 2024-09-16 | End: 2024-09-16

## 2024-09-16 RX ORDER — POLYETHYLENE GLYCOL 3350 17 G/17G
17 POWDER, FOR SOLUTION ORAL DAILY
Status: DISCONTINUED | OUTPATIENT
Start: 2024-09-17 | End: 2024-09-17 | Stop reason: HOSPADM

## 2024-09-16 RX ORDER — ATORVASTATIN CALCIUM 80 MG/1
80 TABLET, FILM COATED ORAL NIGHTLY
Status: DISCONTINUED | OUTPATIENT
Start: 2024-09-16 | End: 2024-09-17 | Stop reason: HOSPADM

## 2024-09-16 RX ORDER — RIVASTIGMINE 9.5 MG/24H
1 PATCH, EXTENDED RELEASE TRANSDERMAL DAILY
Status: DISCONTINUED | OUTPATIENT
Start: 2024-09-16 | End: 2024-09-16

## 2024-09-16 RX ORDER — SODIUM CHLORIDE 0.9 % (FLUSH) 0.9 %
5-40 SYRINGE (ML) INJECTION PRN
Status: DISCONTINUED | OUTPATIENT
Start: 2024-09-16 | End: 2024-09-17 | Stop reason: HOSPADM

## 2024-09-16 RX ORDER — FERROUS SULFATE 325(65) MG
325 TABLET ORAL
Status: DISCONTINUED | OUTPATIENT
Start: 2024-09-17 | End: 2024-09-17 | Stop reason: HOSPADM

## 2024-09-16 RX ORDER — ASPIRIN 81 MG/1
81 TABLET ORAL DAILY
Status: DISCONTINUED | OUTPATIENT
Start: 2024-09-17 | End: 2024-09-17 | Stop reason: HOSPADM

## 2024-09-16 RX ORDER — POTASSIUM CHLORIDE 1500 MG/1
40 TABLET, EXTENDED RELEASE ORAL PRN
Status: DISCONTINUED | OUTPATIENT
Start: 2024-09-16 | End: 2024-09-17 | Stop reason: HOSPADM

## 2024-09-16 RX ORDER — TIMOLOL MALEATE 5 MG/ML
1 SOLUTION/ DROPS OPHTHALMIC 2 TIMES DAILY
Status: DISCONTINUED | OUTPATIENT
Start: 2024-09-16 | End: 2024-09-17 | Stop reason: HOSPADM

## 2024-09-16 RX ORDER — POTASSIUM CHLORIDE 7.45 MG/ML
10 INJECTION INTRAVENOUS PRN
Status: DISCONTINUED | OUTPATIENT
Start: 2024-09-16 | End: 2024-09-17 | Stop reason: HOSPADM

## 2024-09-16 RX ORDER — ENOXAPARIN SODIUM 100 MG/ML
40 INJECTION SUBCUTANEOUS NIGHTLY
Status: DISCONTINUED | OUTPATIENT
Start: 2024-09-16 | End: 2024-09-17 | Stop reason: HOSPADM

## 2024-09-16 RX ORDER — SODIUM CHLORIDE 9 MG/ML
INJECTION, SOLUTION INTRAVENOUS CONTINUOUS
Status: ACTIVE | OUTPATIENT
Start: 2024-09-16 | End: 2024-09-17

## 2024-09-16 RX ORDER — LOSARTAN POTASSIUM 100 MG/1
100 TABLET ORAL DAILY
COMMUNITY

## 2024-09-16 RX ORDER — MECLIZINE HCL 12.5 MG 12.5 MG/1
12.5 TABLET ORAL ONCE
Status: COMPLETED | OUTPATIENT
Start: 2024-09-16 | End: 2024-09-16

## 2024-09-16 RX ORDER — GABAPENTIN 300 MG/1
300 CAPSULE ORAL 4 TIMES DAILY
Status: DISCONTINUED | OUTPATIENT
Start: 2024-09-16 | End: 2024-09-17 | Stop reason: HOSPADM

## 2024-09-16 RX ORDER — RIVASTIGMINE 13.3 MG/24H
1 PATCH, EXTENDED RELEASE TRANSDERMAL DAILY
Status: DISCONTINUED | OUTPATIENT
Start: 2024-09-16 | End: 2024-09-17 | Stop reason: HOSPADM

## 2024-09-16 RX ORDER — ACETAMINOPHEN 650 MG/1
650 SUPPOSITORY RECTAL EVERY 6 HOURS PRN
Status: DISCONTINUED | OUTPATIENT
Start: 2024-09-16 | End: 2024-09-17 | Stop reason: HOSPADM

## 2024-09-16 RX ORDER — MECLIZINE HCL 12.5 MG 12.5 MG/1
12.5 TABLET ORAL 3 TIMES DAILY PRN
Status: DISCONTINUED | OUTPATIENT
Start: 2024-09-16 | End: 2024-09-17 | Stop reason: HOSPADM

## 2024-09-16 RX ORDER — ONDANSETRON 4 MG/1
4 TABLET, ORALLY DISINTEGRATING ORAL EVERY 8 HOURS PRN
Status: DISCONTINUED | OUTPATIENT
Start: 2024-09-16 | End: 2024-09-17 | Stop reason: HOSPADM

## 2024-09-16 RX ORDER — MAGNESIUM SULFATE IN WATER 40 MG/ML
2000 INJECTION, SOLUTION INTRAVENOUS PRN
Status: DISCONTINUED | OUTPATIENT
Start: 2024-09-16 | End: 2024-09-17 | Stop reason: HOSPADM

## 2024-09-16 RX ORDER — METHIMAZOLE 5 MG/1
5 TABLET ORAL
Status: DISCONTINUED | OUTPATIENT
Start: 2024-09-16 | End: 2024-09-17 | Stop reason: HOSPADM

## 2024-09-16 RX ADMIN — SODIUM CHLORIDE, PRESERVATIVE FREE 10 ML: 5 INJECTION INTRAVENOUS at 21:17

## 2024-09-16 RX ADMIN — LATANOPROST 1 DROP: 50 SOLUTION OPHTHALMIC at 21:15

## 2024-09-16 RX ADMIN — ENOXAPARIN SODIUM 40 MG: 100 INJECTION SUBCUTANEOUS at 21:16

## 2024-09-16 RX ADMIN — GABAPENTIN 300 MG: 300 CAPSULE ORAL at 21:16

## 2024-09-16 RX ADMIN — ASPIRIN 81 MG: 81 TABLET, CHEWABLE ORAL at 15:59

## 2024-09-16 RX ADMIN — MECLIZINE 12.5 MG: 12.5 TABLET ORAL at 15:12

## 2024-09-16 RX ADMIN — SODIUM CHLORIDE: 9 INJECTION, SOLUTION INTRAVENOUS at 18:56

## 2024-09-16 RX ADMIN — TIMOLOL MALEATE 1 DROP: 5 SOLUTION OPHTHALMIC at 21:15

## 2024-09-16 RX ADMIN — IOPAMIDOL 75 ML: 755 INJECTION, SOLUTION INTRAVENOUS at 14:11

## 2024-09-16 RX ADMIN — METHIMAZOLE 5 MG: 5 TABLET ORAL at 21:18

## 2024-09-16 RX ADMIN — AMLODIPINE BESYLATE 10 MG: 10 TABLET ORAL at 21:16

## 2024-09-16 RX ADMIN — ATORVASTATIN CALCIUM 80 MG: 80 TABLET, FILM COATED ORAL at 21:16

## 2024-09-16 RX ADMIN — METOPROLOL SUCCINATE 25 MG: 25 TABLET, EXTENDED RELEASE ORAL at 21:15

## 2024-09-16 RX ADMIN — SODIUM CHLORIDE 500 ML: 9 INJECTION, SOLUTION INTRAVENOUS at 14:20

## 2024-09-16 ASSESSMENT — PAIN DESCRIPTION - ORIENTATION: ORIENTATION: MID

## 2024-09-16 ASSESSMENT — PAIN DESCRIPTION - PAIN TYPE: TYPE: ACUTE PAIN

## 2024-09-16 ASSESSMENT — LIFESTYLE VARIABLES
HOW MANY STANDARD DRINKS CONTAINING ALCOHOL DO YOU HAVE ON A TYPICAL DAY: PATIENT DOES NOT DRINK
HOW OFTEN DO YOU HAVE A DRINK CONTAINING ALCOHOL: NEVER

## 2024-09-16 ASSESSMENT — PAIN DESCRIPTION - DESCRIPTORS: DESCRIPTORS: PRESSURE

## 2024-09-16 ASSESSMENT — PAIN DESCRIPTION - LOCATION: LOCATION: CHEST

## 2024-09-16 ASSESSMENT — PAIN DESCRIPTION - ONSET: ONSET: ON-GOING

## 2024-09-16 ASSESSMENT — PAIN SCALES - GENERAL
PAINLEVEL_OUTOF10: 0
PAINLEVEL_OUTOF10: 0

## 2024-09-16 ASSESSMENT — PAIN SCALES - WONG BAKER
WONGBAKER_NUMERICALRESPONSE: HURTS WHOLE LOT
WONGBAKER_NUMERICALRESPONSE: NO HURT

## 2024-09-16 ASSESSMENT — PAIN - FUNCTIONAL ASSESSMENT
PAIN_FUNCTIONAL_ASSESSMENT: ACTIVITIES ARE NOT PREVENTED
PAIN_FUNCTIONAL_ASSESSMENT: WONG-BAKER FACES
PAIN_FUNCTIONAL_ASSESSMENT: 0-10

## 2024-09-16 ASSESSMENT — PAIN DESCRIPTION - FREQUENCY: FREQUENCY: CONTINUOUS

## 2024-09-17 ENCOUNTER — APPOINTMENT (OUTPATIENT)
Dept: MRI IMAGING | Age: 84
End: 2024-09-17
Payer: MEDICARE

## 2024-09-17 VITALS
TEMPERATURE: 98.1 F | HEIGHT: 63 IN | WEIGHT: 152.12 LBS | HEART RATE: 62 BPM | DIASTOLIC BLOOD PRESSURE: 82 MMHG | SYSTOLIC BLOOD PRESSURE: 141 MMHG | OXYGEN SATURATION: 99 % | RESPIRATION RATE: 14 BRPM | BODY MASS INDEX: 26.95 KG/M2

## 2024-09-17 PROCEDURE — 97530 THERAPEUTIC ACTIVITIES: CPT

## 2024-09-17 PROCEDURE — 97162 PT EVAL MOD COMPLEX 30 MIN: CPT

## 2024-09-17 PROCEDURE — G0378 HOSPITAL OBSERVATION PER HR: HCPCS

## 2024-09-17 PROCEDURE — 6370000000 HC RX 637 (ALT 250 FOR IP): Performed by: HOSPITALIST

## 2024-09-17 PROCEDURE — 94760 N-INVAS EAR/PLS OXIMETRY 1: CPT

## 2024-09-17 PROCEDURE — 97165 OT EVAL LOW COMPLEX 30 MIN: CPT

## 2024-09-17 PROCEDURE — 96361 HYDRATE IV INFUSION ADD-ON: CPT

## 2024-09-17 PROCEDURE — 70551 MRI BRAIN STEM W/O DYE: CPT

## 2024-09-17 RX ORDER — MECLIZINE HYDROCHLORIDE 25 MG/1
25 TABLET ORAL 3 TIMES DAILY PRN
Qty: 15 TABLET | Refills: 0 | Status: SHIPPED | OUTPATIENT
Start: 2024-09-17 | End: 2024-09-22

## 2024-09-17 RX ADMIN — FERROUS SULFATE TAB 325 MG (65 MG ELEMENTAL FE) 325 MG: 325 (65 FE) TAB at 10:07

## 2024-09-17 RX ADMIN — ASPIRIN 81 MG: 81 TABLET, COATED ORAL at 10:07

## 2024-09-17 RX ADMIN — TIMOLOL MALEATE 1 DROP: 5 SOLUTION OPHTHALMIC at 10:08

## 2024-09-17 RX ADMIN — GABAPENTIN 300 MG: 300 CAPSULE ORAL at 10:07

## 2024-09-17 RX ADMIN — GABAPENTIN 300 MG: 300 CAPSULE ORAL at 12:08

## 2024-09-17 RX ADMIN — METOPROLOL SUCCINATE 25 MG: 25 TABLET, EXTENDED RELEASE ORAL at 10:07

## 2024-09-17 ASSESSMENT — PAIN SCALES - GENERAL: PAINLEVEL_OUTOF10: 0

## 2024-11-18 ENCOUNTER — APPOINTMENT (OUTPATIENT)
Dept: CT IMAGING | Age: 84
End: 2024-11-18
Payer: MEDICARE

## 2024-11-18 ENCOUNTER — APPOINTMENT (OUTPATIENT)
Dept: GENERAL RADIOLOGY | Age: 84
End: 2024-11-18
Payer: MEDICARE

## 2024-11-18 ENCOUNTER — HOSPITAL ENCOUNTER (EMERGENCY)
Age: 84
Discharge: HOME | End: 2024-11-18
Attending: EMERGENCY MEDICINE
Payer: MEDICARE

## 2024-11-18 VITALS
TEMPERATURE: 97.1 F | SYSTOLIC BLOOD PRESSURE: 166 MMHG | DIASTOLIC BLOOD PRESSURE: 71 MMHG | RESPIRATION RATE: 18 BRPM | HEART RATE: 75 BPM | OXYGEN SATURATION: 99 %

## 2024-11-18 DIAGNOSIS — I10 ESSENTIAL HYPERTENSION: Primary | ICD-10-CM

## 2024-11-18 DIAGNOSIS — F03.90 DEMENTIA, UNSPECIFIED DEMENTIA SEVERITY, UNSPECIFIED DEMENTIA TYPE, UNSPECIFIED WHETHER BEHAVIORAL, PSYCHOTIC, OR MOOD DISTURBANCE OR ANXIETY (HCC): ICD-10-CM

## 2024-11-18 LAB
ALBUMIN SERPL-MCNC: 4.1 G/DL (ref 3.4–5)
ALBUMIN/GLOB SERPL: 1.1 {RATIO} (ref 1.1–2.2)
ALP SERPL-CCNC: 115 U/L (ref 40–129)
ALT SERPL-CCNC: 37 U/L (ref 10–40)
ANION GAP SERPL CALCULATED.3IONS-SCNC: 13 MMOL/L (ref 3–16)
AST SERPL-CCNC: 49 U/L (ref 15–37)
BACTERIA URNS QL MICRO: ABNORMAL /HPF
BASOPHILS # BLD: 0.1 K/UL (ref 0–0.2)
BASOPHILS NFR BLD: 1.1 %
BILIRUB SERPL-MCNC: 1 MG/DL (ref 0–1)
BILIRUB UR QL STRIP.AUTO: NEGATIVE
BUN SERPL-MCNC: 27 MG/DL (ref 7–20)
CALCIUM SERPL-MCNC: 9.7 MG/DL (ref 8.3–10.6)
CHLORIDE SERPL-SCNC: 107 MMOL/L (ref 99–110)
CLARITY UR: CLEAR
CO2 SERPL-SCNC: 22 MMOL/L (ref 21–32)
COLOR UR: YELLOW
CREAT SERPL-MCNC: 1.5 MG/DL (ref 0.6–1.2)
DEPRECATED RDW RBC AUTO: 14.7 % (ref 12.4–15.4)
EOSINOPHIL # BLD: 0.1 K/UL (ref 0–0.6)
EOSINOPHIL NFR BLD: 0.9 %
EPI CELLS #/AREA URNS AUTO: 6 /HPF (ref 0–5)
FLUAV + FLUBV AG NOSE IA.RAPID: NOT DETECTED
FLUAV + FLUBV AG NOSE IA.RAPID: NOT DETECTED
GFR SERPLBLD CREATININE-BSD FMLA CKD-EPI: 34 ML/MIN/{1.73_M2}
GLUCOSE SERPL-MCNC: 94 MG/DL (ref 70–99)
GLUCOSE UR STRIP.AUTO-MCNC: NEGATIVE MG/DL
HCT VFR BLD AUTO: 40.8 % (ref 36–48)
HGB BLD-MCNC: 13.6 G/DL (ref 12–16)
HGB UR QL STRIP.AUTO: NEGATIVE
HYALINE CASTS #/AREA URNS AUTO: 1 /LPF (ref 0–8)
KETONES UR STRIP.AUTO-MCNC: NEGATIVE MG/DL
LEUKOCYTE ESTERASE UR QL STRIP.AUTO: NEGATIVE
LYMPHOCYTES # BLD: 1.8 K/UL (ref 1–5.1)
LYMPHOCYTES NFR BLD: 22.3 %
MCH RBC QN AUTO: 29.5 PG (ref 26–34)
MCHC RBC AUTO-ENTMCNC: 33.3 G/DL (ref 31–36)
MCV RBC AUTO: 88.8 FL (ref 80–100)
MONOCYTES # BLD: 0.8 K/UL (ref 0–1.3)
MONOCYTES NFR BLD: 9.2 %
NEUTROPHILS # BLD: 5.4 K/UL (ref 1.7–7.7)
NEUTROPHILS NFR BLD: 66.5 %
NITRITE UR QL STRIP.AUTO: NEGATIVE
NT-PROBNP SERPL-MCNC: 1265 PG/ML (ref 0–449)
PH UR STRIP.AUTO: 7 [PH] (ref 5–8)
PLATELET # BLD AUTO: 170 K/UL (ref 135–450)
PMV BLD AUTO: 8.7 FL (ref 5–10.5)
POTASSIUM SERPL-SCNC: 3.7 MMOL/L (ref 3.5–5.1)
PROT SERPL-MCNC: 7.8 G/DL (ref 6.4–8.2)
PROT UR STRIP.AUTO-MCNC: 100 MG/DL
RBC # BLD AUTO: 4.6 M/UL (ref 4–5.2)
RBC CLUMPS #/AREA URNS AUTO: 1 /HPF (ref 0–4)
SARS-COV-2 RDRP RESP QL NAA+PROBE: NOT DETECTED
SODIUM SERPL-SCNC: 142 MMOL/L (ref 136–145)
SP GR UR STRIP.AUTO: 1.01 (ref 1–1.03)
TROPONIN, HIGH SENSITIVITY: 24 NG/L (ref 0–14)
TROPONIN, HIGH SENSITIVITY: 25 NG/L (ref 0–14)
UA COMPLETE W REFLEX CULTURE PNL UR: ABNORMAL
UA DIPSTICK W REFLEX MICRO PNL UR: YES
URN SPEC COLLECT METH UR: ABNORMAL
UROBILINOGEN UR STRIP-ACNC: 0.2 E.U./DL
WBC # BLD AUTO: 8.2 K/UL (ref 4–11)
WBC #/AREA URNS AUTO: 2 /HPF (ref 0–5)

## 2024-11-18 PROCEDURE — 83880 ASSAY OF NATRIURETIC PEPTIDE: CPT

## 2024-11-18 PROCEDURE — 99285 EMERGENCY DEPT VISIT HI MDM: CPT

## 2024-11-18 PROCEDURE — 81001 URINALYSIS AUTO W/SCOPE: CPT

## 2024-11-18 PROCEDURE — 93005 ELECTROCARDIOGRAM TRACING: CPT | Performed by: PHYSICIAN ASSISTANT

## 2024-11-18 PROCEDURE — 85025 COMPLETE CBC W/AUTO DIFF WBC: CPT

## 2024-11-18 PROCEDURE — 71045 X-RAY EXAM CHEST 1 VIEW: CPT

## 2024-11-18 PROCEDURE — 70450 CT HEAD/BRAIN W/O DYE: CPT

## 2024-11-18 PROCEDURE — 87635 SARS-COV-2 COVID-19 AMP PRB: CPT

## 2024-11-18 PROCEDURE — 84484 ASSAY OF TROPONIN QUANT: CPT

## 2024-11-18 PROCEDURE — 80053 COMPREHEN METABOLIC PANEL: CPT

## 2024-11-18 PROCEDURE — 87502 INFLUENZA DNA AMP PROBE: CPT

## 2024-11-18 ASSESSMENT — PAIN SCALES - GENERAL: PAINLEVEL_OUTOF10: 0

## 2024-11-18 NOTE — ED PROVIDER NOTES
[facility] EMERGENCY DEPARTMENT     EMERGENCY DEPARTMENT ENCOUNTER     Location: [facility] EMERGENCY DEPARTMENT  11/18/2024  Note Started: 4:42 PM EST 11/18/24      Patient Identification  Eliceo Evangelista is a 84 y.o. female      HPI:Eliceo Evangelista was evaluated in the Emergency Department for feeling lightheaded, more difficulty finding her words since yesterday morning.  Her blood pressure was elevated at home at 190/90.  She does have a history of dementia.  She was admitted and worked up on 9/16/2024 for dizziness including an MRI. Although initial history and physical exam information was obtained by MEGAN/NPP/MD/ (who also dictated a record of this visit), I personally saw the patient and performed and made/approved the management plan and take responsibility for the patient management.      PHYSICAL EXAM:  General: Alert black female no acute distress.  HEENT: Atraumatic.  Pupils equal round reactive.  Extraocular movements are intact.  Oropharynx is negative.  No facial droop.  Heart: Regular rate and rhythm.  No murmurs or gallops noted.  Lungs: Breath sounds equal bilaterally and clear.  Abdomen: Soft, nondistended, nontender.  Neuro: Awake, alert, oriented.  NIHSS by MEGAN.    EKG Interpretation  Normal sinus rhythm, rate of 74, T wave changes, consider anterior ischemia.  Rhythm strip shows a sinus rhythm with a rate of 74, DC interval 146 ms, QRS 68 ms with no other ectopy as interpreted by me.  No significant change compared to 9/16/2024.        Labs Reviewed   COMPREHENSIVE METABOLIC PANEL W/ REFLEX TO MG FOR LOW K - Abnormal; Notable for the following components:       Result Value    BUN 27 (*)     Creatinine 1.5 (*)     Est, Glom Filt Rate 34 (*)     AST 49 (*)     All other components within normal limits   TROPONIN - Abnormal; Notable for the following components:    Troponin, High Sensitivity 25 (*)     All other components within normal limits   TROPONIN -

## 2024-11-18 NOTE — ED PROVIDER NOTES
Abnormal; Notable for the following components:       Result Value    BUN 27 (*)     Creatinine 1.5 (*)     Est, Glom Filt Rate 34 (*)     AST 49 (*)     All other components within normal limits   TROPONIN - Abnormal; Notable for the following components:    Troponin, High Sensitivity 25 (*)     All other components within normal limits   TROPONIN - Abnormal; Notable for the following components:    Troponin, High Sensitivity 24 (*)     All other components within normal limits   URINALYSIS WITH REFLEX TO CULTURE - Abnormal; Notable for the following components:    Protein,  (*)     All other components within normal limits   MICROSCOPIC URINALYSIS - Abnormal; Notable for the following components:    Epithelial Cells, UA 6 (*)     All other components within normal limits   BRAIN NATRIURETIC PEPTIDE - Abnormal; Notable for the following components:    NT Pro-BNP 1,265 (*)     All other components within normal limits   COVID-19, RAPID   RAPID INFLUENZA A/B ANTIGENS   CBC WITH AUTO DIFFERENTIAL       When ordered only abnormal lab results are displayed. All other labs were within normal range or not returned as of this dictation.    EKG: When ordered, EKG's are interpreted by the Emergency Department Physician in the absence of a cardiologist.  Please see their note for interpretation of EKG.    RADIOLOGY:   Non-plain film images such as CT, Ultrasound and MRI are read by the radiologist. Plain radiographic images are visualized and preliminarily interpreted by the ED Provider with the below findings:        Interpretation per the Radiologist below, if available at the time of this note:    XR CHEST PORTABLE   Final Result   No radiographic evidence of an acute cardiopulmonary process.         CT HEAD WO CONTRAST   Final Result   No acute intracranial abnormality.      No significant interval change compared to the prior study.      Maxillary sinusitis.           XR CHEST PORTABLE    Result Date:

## 2024-11-19 LAB
EKG ATRIAL RATE: 74 BPM
EKG DIAGNOSIS: NORMAL
EKG P AXIS: 66 DEGREES
EKG P-R INTERVAL: 146 MS
EKG Q-T INTERVAL: 408 MS
EKG QRS DURATION: 68 MS
EKG QTC CALCULATION (BAZETT): 452 MS
EKG R AXIS: 22 DEGREES
EKG T AXIS: 13 DEGREES
EKG VENTRICULAR RATE: 74 BPM

## 2024-11-19 PROCEDURE — 93010 ELECTROCARDIOGRAM REPORT: CPT | Performed by: INTERNAL MEDICINE

## 2024-11-19 NOTE — DISCHARGE INSTRUCTIONS
It is important to follow-up with your primary care physician.  Call tomorrow.  Return to the emergency department if any change in the symptoms or worsening

## 2025-01-31 ENCOUNTER — TELEPHONE (OUTPATIENT)
Dept: CARDIOLOGY CLINIC | Age: 85
End: 2025-01-31

## 2025-01-31 PROBLEM — N18.32 STAGE 3B CHRONIC KIDNEY DISEASE (HCC): Status: ACTIVE | Noted: 2025-01-31

## 2025-01-31 NOTE — TELEPHONE ENCOUNTER
Letter created and faxed to below contact. Will have fax confirmation scanned to chart once received.

## 2025-01-31 NOTE — TELEPHONE ENCOUNTER
Pt is scheduled for an eye procedure on 2/10/2025.  She will need to hold her ASA for 7 days.    Last ov 6/11/2024  CABG x4 2/2022  Mod/severe tricuspid regurgitation

## 2025-01-31 NOTE — TELEPHONE ENCOUNTER
CARDIAC CLEARANCE     What type of procedure are you having?  LLL Ectropion Repair/LLL Med Spin Conjuc/LLL Excision of lesion  Which physician is performing your procedure?  Dr Rogel  When is your procedure scheduled?  2/10/25  Where are you having this procedure?  [facility]  Are you taking Blood Thinners?yes   If so what? Aspirin 81mg daily     Does the surgeon want you to stop your blood thinner?  yes  If so for how long?  7 days  Are you having any new or worsening cardiac symptoms?   No  Phone Number and Contact Name for Physicians office:  [phone]  Fax number to send information:  [phone]    Cardiac History:  Last office visit with Cardiologist:  6/11/2024    Cardiac Procedures:    Cardiac Testing:    CC REQUEST SCANNED INTO CHART

## 2025-06-02 DIAGNOSIS — I25.10 CAD IN NATIVE ARTERY: ICD-10-CM

## 2025-06-02 DIAGNOSIS — I10 PRIMARY HYPERTENSION: ICD-10-CM

## 2025-06-02 RX ORDER — METOPROLOL SUCCINATE 25 MG/1
25 TABLET, EXTENDED RELEASE ORAL DAILY
Qty: 90 TABLET | Refills: 3 | Status: SHIPPED | OUTPATIENT
Start: 2025-06-02

## (undated) DEVICE — TOWEL,OR,DSP,ST,WHITE,DLX,XR,4/PK,20PK/C: Brand: MEDLINE

## (undated) DEVICE — TOWEL,OR,DSP,ST,BLUE,DLX,8/PK,10PK/CS: Brand: MEDLINE

## (undated) DEVICE — AEGIS 1" DISK 4MM HOLE, PEEL OPEN: Brand: MEDLINE

## (undated) DEVICE — TUBING, SUCTION, 1/4" X 12', STRAIGHT: Brand: MEDLINE

## (undated) DEVICE — HYPODERMIC SAFETY NEEDLE: Brand: MAGELLAN

## (undated) DEVICE — STERILE LATEX POWDER-FREE SURGICAL GLOVESWITH NITRILE COATING: Brand: PROTEXIS

## (undated) DEVICE — SUTURE VCRL SZ 0 L18IN ABSRB UD L36MM CT-1 1/2 CIR J840D

## (undated) DEVICE — SUTURE PERMAHAND SZ 2-0 L30IN NONABSORBABLE BLK SILK W/O A305H

## (undated) DEVICE — SUTURE PROL SZ 3-0 L36IN NONABSORBABLE BLU L17MM RB-1 1/2 8558H

## (undated) DEVICE — ELECTRODE PT RET AD L9FT HI MOIST COND ADH HYDRGEL CORDED

## (undated) DEVICE — SUTURE ETHBND EXCEL SZ 2-0 L36IN NONABSORBABLE GRN L26MM SH X523H

## (undated) DEVICE — X-RAY CASSETTE COVER: Brand: CONVERTORS

## (undated) DEVICE — PACK HARV VEIN ENDOSCP VASOVIEW

## (undated) DEVICE — PENCIL SMOKE EVAC PUSH BUTTON COATED

## (undated) DEVICE — DRESSING TRNSPAR W FAB BORD SORBAVIEW ULT 475X425IN

## (undated) DEVICE — MEDI-VAC YANKAUER SUCTION HANDLE W/BULBOUS TIP: Brand: CARDINAL HEALTH

## (undated) DEVICE — SUTURE PERMA-HAND SZ 4-0 L144IN NONABSORBABLE BLK LIGAPAK LA53G

## (undated) DEVICE — CONNECTOR PERF L5 IN OD1 2 IN SAT HCT ST FEATURING B CARE 5

## (undated) DEVICE — SUTURE NONABSORBABLE MONOFILAMENT 5-0 C-1 1X24 IN PROLENE 8725H

## (undated) DEVICE — CANNULA PERF RETROGRADE 15 FRX125 IN VEN FLUT PVC TRU TCH

## (undated) DEVICE — STERILE POLYISOPRENE POWDER-FREE SURGICAL GLOVES: Brand: PROTEXIS

## (undated) DEVICE — COVER LT HNDL BLU PLAS

## (undated) DEVICE — SUTURE VCRL + SZ 0 L27IN ABSRB UD CT-1 L36MM 1/2 CIR TAPR VCP260H

## (undated) DEVICE — CATHETER THOR 36FR L23CM DIA12MM POLYVI CHL TAPR CONN TIP

## (undated) DEVICE — SET ADMIN PRIMING 67ML L105IN NVENT 180UM FLTR 3 RLER CLMP

## (undated) DEVICE — DECANTER: Brand: UNBRANDED

## (undated) DEVICE — DRAPE,LAP,CHOLE,W/TROUGHS,STERILE: Brand: MEDLINE

## (undated) DEVICE — KIT OR ROOM TURNOVER W/STRAP

## (undated) DEVICE — LINER SUCTION

## (undated) DEVICE — SUTURE NONABSORBABLE MONOFILAMENT 4-0 RB-1 36 IN BLU PROLENE 8557H

## (undated) DEVICE — PRESSURE MONITORING SET: Brand: TRUWAVE, VAMP PLUS

## (undated) DEVICE — LEAD PACE L475MM CHNL A OR V MYOCARDIAL STEROID ELUT SIL

## (undated) DEVICE — CONNECTOR IV TB L28MM CLR VLV ACCS NDLLSS DISP MAXPLUS

## (undated) DEVICE — ADHESIVE SKIN CLSR 0.7ML TOP DERMBND ADV

## (undated) DEVICE — STERILE LATEX POWDER-FREE SURGICAL GLOVESWITH NITRILE AND EMOLLIENT COATINGS: Brand: PROTEXIS

## (undated) DEVICE — BLADE CLIPPER GEN PURP NS

## (undated) DEVICE — KIT COMPL CK0289R4  BB9L99R8

## (undated) DEVICE — STERNUM BLADE, OFFSET (31.7 X 0.64 X 6.3MM)

## (undated) DEVICE — SUTURE MCRYL SZ 4-0 L27IN ABSRB UD L19MM PS-2 1/2 CIR PRIM Y426H

## (undated) DEVICE — APPLICATOR MEDICATED 3 CC SOLUTION CLR STRL CHLORAPREP

## (undated) DEVICE — TRAY URIN CATH PED 16FR BLLN 5CC 2 CONN SIL STR TIP W/ URIN

## (undated) DEVICE — CAP TBNG ACC CHEMO SFTY LUER FEM OR M TEXIUM

## (undated) DEVICE — BLADE OPHTH 180DEG CUT SURF BLU STR SHRP DBL BVL GRINDLESS

## (undated) DEVICE — SYSTEM ENDOSCP VES HARV W/ TOOL CANN SEAL SHT PRT BLNT TIP

## (undated) DEVICE — PRESSURE MONITORING SET: Brand: TRUWAVE

## (undated) DEVICE — AORTIC PNCH 4.0MM 8IN BX 10 DISP

## (undated) DEVICE — DRESSING FOAM W6.3XL7.9IN TAN SACR SELF ADH MEPILEX BORD

## (undated) DEVICE — AGENT HEMSTAT W4XL8IN OXIDIZED REGENERATED CELOS ABSRB

## (undated) DEVICE — PLATELET CONCENTRATION PACK PROC 14-20 ML SMARTPREP 2

## (undated) DEVICE — SYRINGE MED 10ML LUERLOCK TIP W/O SFTY DISP

## (undated) DEVICE — Device: Brand: MEDEX

## (undated) DEVICE — SET EXTN L41IN 2 NDL FREE VALVES FREE INJ PRT

## (undated) DEVICE — LABEL MED CUST CVR

## (undated) DEVICE — [HIGH FLOW INSUFFLATOR,  DO NOT USE IF PACKAGE IS DAMAGED,  KEEP DRY,  KEEP AWAY FROM SUNLIGHT,  PROTECT FROM HEAT AND RADIOACTIVE SOURCES.]: Brand: PNEUMOSURE

## (undated) DEVICE — APPLICATOR PREP 26ML 0.7% IOD POVACRYLEX 74% ISO ALC ST

## (undated) DEVICE — BLOOD TRANSFUSION FILTER: Brand: HAEMONETICS

## (undated) DEVICE — DRAIN SURG SGL COLL PT TB FOR ATS BG OASIS

## (undated) DEVICE — CLIP SM RED INTERN HMOCLP TITAN LIGATING

## (undated) DEVICE — TIP APPL TOP 2 SPRY

## (undated) DEVICE — Z DUPLICATE USE 2436340 CANNULA PERF L15IN DIA29FR VEN 3 STG THN WALL DSGN W VENT

## (undated) DEVICE — TIP APPL GEL PLT ENDO 5MMX32CM

## (undated) DEVICE — PACK PROCEDURE SURG EXTREMITY MFFOP CUST

## (undated) DEVICE — KIT ART LN 20GA L12CM FEP RADPQ 0.025X13.75IN SPR GWIRE

## (undated) DEVICE — OPEN HRT BASIC PK

## (undated) DEVICE — 3M™ TEGADERM™ TRANSPARENT FILM DRESSING FRAME STYLE, 1626W, 4 IN X 4-3/4 IN (10 CM X 12 CM), 50/CT 4CT/CASE: Brand: 3M™ TEGADERM™

## (undated) DEVICE — SUTURE PROL SZ 7-0 L24IN NONABSORBABLE BLU L8MM BV175-6 3/8 8735H

## (undated) DEVICE — TOWEL,OR,DSP,ST,GREEN,DLX,4/PK,20PK/CS: Brand: MEDLINE

## (undated) DEVICE — NEEDLE HYPO 18GA L1.5IN THN WALL PIVOTING SHLD BVL ORIENTED

## (undated) DEVICE — KIT CATH PLEUREX 15.5FR

## (undated) DEVICE — DRAPE SLUSH DISC W44XL66IN ST FOR RND BSIN HUSH SLUSH SYS

## (undated) DEVICE — SHEET,DRAPE,53X77,STERILE: Brand: MEDLINE

## (undated) DEVICE — SUTURE MCRYL + SZ 4-0 L27IN ABSRB UD L19MM PS-2 3/8 CIR MCP426H

## (undated) DEVICE — COVER LT HNDL CAM BLU DISP W/ SURG CTRL

## (undated) DEVICE — SUTURE GOR TX SZ 2-0 L36IN NONABSORBABLE L18MM TH-18 1/2 3N04B

## (undated) DEVICE — AUTOTRANSFUSION BOWL SET 225 CC XTRA

## (undated) DEVICE — AVA HIGH FLOW BUNDLE: Brand: MEDLINE

## (undated) DEVICE — CANNULA PERF 7FR L5.5IN AORT ROOT RADPQ STD TIP W/ VENT LN

## (undated) DEVICE — SUTURE PROL SZ 7 0 L24IN NONABSORBABLE BLU BV175 6 L8MM 3 8 EP8735H

## (undated) DEVICE — 48" PROBE COVER W/GEL, ULTRASOUND, STERILE: Brand: SITE-RITE

## (undated) DEVICE — EVERGRIP INSERT SET 86MM: Brand: FOGARTY EVERGRIP

## (undated) DEVICE — THORACIC CATHETER, RIGHT ANGLE, SILICONE, WITH CLOTSTOP®: Brand: AXIOM® ATRAUM™ WITH CLOTSTOP®

## (undated) DEVICE — KIT DRNGE PLEUR CONTAIN CATH AND STRT 4 1000ML BTL PLEURX

## (undated) DEVICE — SUTURE VCRL + SZ 2-0 L36IN ABSRB UD L36MM CT-1 1/2 CIR VCP945H

## (undated) DEVICE — DISSECTOR LAP DIA5MM BLNT TIP ENDOPATH

## (undated) DEVICE — SUTURE S STL SZ 6 L18IN NONABSORBABLE SIL L48MM CCS 1/2 CIR M654G

## (undated) DEVICE — Z CONVERTED USE 2275207 CLOTH PREP W7.5XL7.5IN 2% CHG SKIN ALC AND RNS FREE

## (undated) DEVICE — PROCEDURE PACK FOR CABG ACCS CUST

## (undated) DEVICE — 3M™ TEGADERM™ TRANSPARENT FILM DRESSING FRAME STYLE, 1624W, 2-3/8 IN X 2-3/4 IN (6 CM X 7 CM), 100/CT 4CT/CASE: Brand: 3M™ TEGADERM™

## (undated) DEVICE — GAUZE SPONGES,12 PLY: Brand: CURITY

## (undated) DEVICE — SET PERF L15IN BLU CLMP MULT FEM LUER ON SGL INLET LEG DLP

## (undated) DEVICE — TOWEL,OR,DSP,ST,BLUE,STD,4/PK,20PK/CS: Brand: MEDLINE

## (undated) DEVICE — SET ADMIN PRIMING 12ML L36IN 2ND INCL RLER CLMP SPIN M

## (undated) DEVICE — SYSTEM BLD DEL

## (undated) DEVICE — SPONGE GZ W4XL4IN COT 12 PLY TYP VII WVN C FLD DSGN

## (undated) DEVICE — MERCY HEALTH WEST TURNOVER: Brand: MEDLINE INDUSTRIES, INC.

## (undated) DEVICE — TRAY KIT 2 APPL 2 FLAT TIP

## (undated) DEVICE — KIT CATH PLEUR CHLORAPREP FEN DRP FLTR STRW FOAM CATH PD

## (undated) DEVICE — PERFUSION SET 120 MM

## (undated) DEVICE — SOLUTION IV IRRIG POUR BRL 0.9% SODIUM CHL 2F7124

## (undated) DEVICE — BAG BLD TRNSF 1 CPLR IV ST 600ML TERUFLEX

## (undated) DEVICE — SUTURE NONABSORBABLE MONOFILAMENT 7-0 BV-1 1X24 IN PROLENE 8702H

## (undated) DEVICE — FAIRFIELD CABG ACCESSARY PK

## (undated) DEVICE — SUTURE NONABSORBABLE MONOFILAMENT 6-0 C-1 1X30 IN PROLENE 8706H

## (undated) DEVICE — 60 ML SYRINGE,LUER-LOCK TIP: Brand: MONOJECT

## (undated) DEVICE — SYRINGE 20ML LL S/C 50

## (undated) DEVICE — BASIC SINGLE BASIN 1-LF: Brand: MEDLINE INDUSTRIES, INC.

## (undated) DEVICE — BANDAGE COMPR W6INXL10YD ST M E WHITE/BEIGE

## (undated) DEVICE — PAD THRM M SPL TORSO

## (undated) DEVICE — STRIP,CLOSURE,WOUND,MEDI-STRIP,1/2X4: Brand: MEDLINE

## (undated) DEVICE — CANNULA ART 21FR L14IN VENT 3/8IN CONN SFT FLO ANG TIP W/